# Patient Record
Sex: FEMALE | Race: WHITE | NOT HISPANIC OR LATINO | Employment: OTHER | URBAN - METROPOLITAN AREA
[De-identification: names, ages, dates, MRNs, and addresses within clinical notes are randomized per-mention and may not be internally consistent; named-entity substitution may affect disease eponyms.]

---

## 2016-07-23 LAB
LEFT EYE DM RETINOPATHY: POSITIVE
RIGHT EYE DM RETINOPATHY: POSITIVE

## 2019-09-27 ENCOUNTER — TELEPHONE (OUTPATIENT)
Dept: ENDOCRINOLOGY | Facility: CLINIC | Age: 69
End: 2019-09-27

## 2019-09-27 NOTE — TELEPHONE ENCOUNTER
Left vm regarding no insurance information completed in chart to make NP with Endocrine. Advised pt to call [facility] to have completed .

## 2020-05-29 ENCOUNTER — OFFICE VISIT (OUTPATIENT)
Dept: FAMILY MEDICINE | Facility: CLINIC | Age: 70
End: 2020-05-29
Payer: MEDICARE

## 2020-05-29 VITALS
HEART RATE: 82 BPM | WEIGHT: 153.38 LBS | TEMPERATURE: 100 F | BODY MASS INDEX: 27.18 KG/M2 | HEIGHT: 63 IN | DIASTOLIC BLOOD PRESSURE: 86 MMHG | SYSTOLIC BLOOD PRESSURE: 136 MMHG | OXYGEN SATURATION: 96 %

## 2020-05-29 DIAGNOSIS — Z13.820 ENCOUNTER FOR SCREENING FOR OSTEOPOROSIS: ICD-10-CM

## 2020-05-29 DIAGNOSIS — Z13.6 SCREENING FOR ISCHEMIC HEART DISEASE (IHD): ICD-10-CM

## 2020-05-29 DIAGNOSIS — Z12.11 SCREENING FOR COLON CANCER: ICD-10-CM

## 2020-05-29 DIAGNOSIS — Z79.4 TYPE 2 DIABETES MELLITUS WITHOUT COMPLICATION, WITH LONG-TERM CURRENT USE OF INSULIN: Primary | ICD-10-CM

## 2020-05-29 DIAGNOSIS — F17.200 SMOKER: ICD-10-CM

## 2020-05-29 DIAGNOSIS — Z01.00 ENCOUNTER FOR ROUTINE EYE AND VISION EXAMINATION: ICD-10-CM

## 2020-05-29 DIAGNOSIS — Z13.29 ENCOUNTER FOR SCREENING FOR ENDOCRINE DISORDER: ICD-10-CM

## 2020-05-29 DIAGNOSIS — Z01.419 WOMEN'S ANNUAL ROUTINE GYNECOLOGICAL EXAMINATION: ICD-10-CM

## 2020-05-29 DIAGNOSIS — Z12.31 ENCOUNTER FOR SCREENING MAMMOGRAM FOR BREAST CANCER: ICD-10-CM

## 2020-05-29 DIAGNOSIS — E11.9 TYPE 2 DIABETES MELLITUS WITHOUT COMPLICATION, WITH LONG-TERM CURRENT USE OF INSULIN: Primary | ICD-10-CM

## 2020-05-29 DIAGNOSIS — Z13.89 SCREENING FOR BLOOD OR PROTEIN IN URINE: ICD-10-CM

## 2020-05-29 DIAGNOSIS — Z78.0 POST-MENOPAUSE: ICD-10-CM

## 2020-05-29 PROCEDURE — 1126F AMNT PAIN NOTED NONE PRSNT: CPT | Mod: S$GLB,,, | Performed by: FAMILY MEDICINE

## 2020-05-29 PROCEDURE — 99203 OFFICE O/P NEW LOW 30 MIN: CPT | Mod: S$GLB,,, | Performed by: FAMILY MEDICINE

## 2020-05-29 PROCEDURE — 1159F PR MEDICATION LIST DOCUMENTED IN MEDICAL RECORD: ICD-10-PCS | Mod: S$GLB,,, | Performed by: FAMILY MEDICINE

## 2020-05-29 PROCEDURE — 1159F MED LIST DOCD IN RCRD: CPT | Mod: S$GLB,,, | Performed by: FAMILY MEDICINE

## 2020-05-29 PROCEDURE — 1101F PT FALLS ASSESS-DOCD LE1/YR: CPT | Mod: S$GLB,,, | Performed by: FAMILY MEDICINE

## 2020-05-29 PROCEDURE — 99203 PR OFFICE/OUTPT VISIT, NEW, LEVL III, 30-44 MIN: ICD-10-PCS | Mod: S$GLB,,, | Performed by: FAMILY MEDICINE

## 2020-05-29 PROCEDURE — 1101F PR PT FALLS ASSESS DOC 0-1 FALLS W/OUT INJ PAST YR: ICD-10-PCS | Mod: S$GLB,,, | Performed by: FAMILY MEDICINE

## 2020-05-29 PROCEDURE — 1126F PR PAIN SEVERITY QUANTIFIED, NO PAIN PRESENT: ICD-10-PCS | Mod: S$GLB,,, | Performed by: FAMILY MEDICINE

## 2020-05-29 RX ORDER — LOPERAMIDE HYDROCHLORIDE 2 MG/1
2 CAPSULE ORAL 4 TIMES DAILY PRN
Status: ON HOLD | COMMUNITY
End: 2023-04-24

## 2020-05-29 RX ORDER — MELATONIN 10 MG
1 CAPSULE ORAL NIGHTLY
Status: ON HOLD | COMMUNITY
End: 2023-06-11 | Stop reason: HOSPADM

## 2020-05-29 RX ORDER — INSULIN DETEMIR 100 [IU]/ML
10-20 INJECTION, SOLUTION SUBCUTANEOUS DAILY
COMMUNITY
Start: 2020-03-02 | End: 2020-05-29 | Stop reason: SDUPTHER

## 2020-05-29 RX ORDER — ASCORBIC ACID 500 MG
500 TABLET ORAL DAILY
Status: ON HOLD | COMMUNITY
End: 2023-04-24

## 2020-05-29 RX ORDER — PHENYLEPHRINE HCL 10 MG
500 TABLET ORAL DAILY
Status: ON HOLD | COMMUNITY
End: 2023-04-24

## 2020-05-29 RX ORDER — INSULIN DETEMIR 100 [IU]/ML
20 INJECTION, SOLUTION SUBCUTANEOUS NIGHTLY
Qty: 6 ML | Refills: 2 | Status: SHIPPED | OUTPATIENT
Start: 2020-05-29 | End: 2020-11-09 | Stop reason: SDUPTHER

## 2020-05-29 NOTE — PROGRESS NOTES
SUBJECTIVE:    Patient ID: Anastasiia Badillo is a 70 y.o. female.    Chief Complaint: Establish Care and Bottles brought    Pt here to get established.     Pt with PMHx of DM.   Has had for 10 years.     Pt doesn't check her blood sugar anymore because it is a pain. Has a machine in a bag somewhere.   Eats twice a day and drinks twice a day. Also has been snacking since the COVID pandemic. Has gained 13 pounds over the last few months.   Takes 15 units with meals, 20 units if she drinks. Has only had 2 spells this year, and will go drink something sweet at this time.      Has to take immodium every now and then, is she eats too many vegetables.  Unusual for her, as she usually has constipation.     Has a hx of smoking since in her 20s, about 1/4 ppd.     Pt sleeps with tea next to her bed, getting up 3-4 times a night.       -----------------------------------------------  Last mammogram last year. Last pap smear was about 5 years ago. Could not find anyone who would take her insurance.       No visits with results within 6 Month(s) from this visit.   Latest known visit with results is:   No results found for any previous visit.       Past Medical History:   Diagnosis Date    Diabetes mellitus, type 2      History reviewed. No pertinent surgical history.  History reviewed. No pertinent family history.    Marital Status:   Alcohol History:  reports that she drinks alcohol.  Tobacco History:  reports that she has been smoking cigarettes. She has a 11.25 pack-year smoking history. She has never used smokeless tobacco.  Drug History:  reports that she does not use drugs.    Review of patient's allergies indicates:  No Known Allergies    Current Outpatient Medications:     ascorbic acid, vitamin C, (VITAMIN C) 500 MG tablet, Take 500 mg by mouth once daily., Disp: , Rfl:     cinnamon bark (CINNAMON) 500 mg capsule, Take 500 mg by mouth once daily., Disp: , Rfl:     LEVEMIR FLEXTOUCH U-100 INSULN 100 unit/mL  "(3 mL) InPn pen, Inject 20 Units into the skin every evening., Disp: 6 mL, Rfl: 2    loperamide (IMODIUM) 2 mg capsule, Take 2 mg by mouth 4 (four) times daily as needed for Diarrhea., Disp: , Rfl:     melatonin 10 mg Cap, Take 1 capsule by mouth every evening., Disp: , Rfl:     multivit-min-FA-lycopen-lutein (CENTRUM SILVER) 0.4-300-250 mg-mcg-mcg Tab, Take 1 tablet by mouth once daily., Disp: , Rfl:     UNABLE TO FIND, Take 1 capsule by mouth daily as needed. Beet Root, Disp: , Rfl:     Review of Systems   Constitutional: Negative for appetite change, fatigue, fever and unexpected weight change.   Respiratory: Negative for cough, chest tightness, shortness of breath and wheezing.    Cardiovascular: Negative for chest pain and leg swelling.   Gastrointestinal: Positive for constipation. Negative for abdominal pain, nausea and vomiting.        -heartburn   Genitourinary: Positive for frequency. Negative for difficulty urinating, dysuria and urgency.   Musculoskeletal: Negative for arthralgias, back pain, myalgias and neck pain.   Skin: Negative for rash.   Neurological: Negative for dizziness, weakness, numbness and headaches.   Hematological: Does not bruise/bleed easily.   Psychiatric/Behavioral: Negative for dysphoric mood, sleep disturbance and suicidal ideas. The patient is not nervous/anxious.    All other systems reviewed and are negative.         Objective:      Vitals:    05/29/20 0952   BP: 136/86   Pulse: 82   Temp: 99.6 °F (37.6 °C)   SpO2: 96%   Weight: 69.6 kg (153 lb 6.4 oz)   Height: 5' 3.25" (1.607 m)     Body mass index is 26.96 kg/m².     Physical Exam   Constitutional: She is oriented to person, place, and time. She appears well-developed and well-nourished. No distress.   overweight   HENT:   Head: Normocephalic and atraumatic.   Neck: Neck supple. Carotid bruit is not present. No thyromegaly present.   Cardiovascular: Normal rate, regular rhythm and normal heart sounds. Exam reveals no " friction rub.   No murmur heard.  Pulmonary/Chest: Effort normal and breath sounds normal. She has no wheezes. She has no rales.   Abdominal: Soft. Bowel sounds are normal. She exhibits no distension. There is no tenderness.   Musculoskeletal: She exhibits no edema.   Lymphadenopathy:     She has no cervical adenopathy.   Neurological: She is alert and oriented to person, place, and time.   Skin: Skin is warm and dry. No rash noted.   Psychiatric: She has a normal mood and affect. Her speech is normal and behavior is normal. Judgment and thought content normal. She is attentive.   Vitals reviewed.        Assessment:       1. Type 2 diabetes mellitus without complication, with long-term current use of insulin    2. Post-menopause    3. Encounter for screening for osteoporosis    4. Smoker    5. Encounter for screening mammogram for breast cancer    6. Women's annual routine gynecological examination    7. Screening for colon cancer    8. Encounter for routine eye and vision examination    9. Screening for blood or protein in urine    10. Encounter for screening for endocrine disorder    11. Screening for ischemic heart disease (IHD)         Plan:       Type 2 diabetes mellitus without complication, with long-term current use of insulin  Comments:  Pt encouraged to check blood sugar, follow ADA diet, remain active. Will monitor A1c.  Orders:  -     Urinalysis Complete; Future; Expected date: 05/29/2020  -     CBC auto differential; Future; Expected date: 05/29/2020  -     TSH w/reflex to FT4; Future; Expected date: 05/29/2020  -     Microalbumin/creatinine urine ratio; Future; Expected date: 05/29/2020  -     Comprehensive metabolic panel; Future; Expected date: 05/29/2020  -     Lipid Panel; Future; Expected date: 05/29/2020  -     Mammo Digital Screening Bilat w/ Florentino; Future; Expected date: 05/29/2020  -     DXA Bone Density Spine And Hip; Future; Expected date: 05/29/2020  -     Ambulatory referral/consult to  Ophthalmology; Future; Expected date: 06/05/2020  -     LEVEMIR FLEXTOUCH U-100 INSULN 100 unit/mL (3 mL) InPn pen; Inject 20 Units into the skin every evening.  Dispense: 6 mL; Refill: 2    Post-menopause  Comments:  DEXA order sent to [facility]  Orders:  -     DXA Bone Density Spine And Hip; Future; Expected date: 05/29/2020    Encounter for screening for osteoporosis  -     DXA Bone Density Spine And Hip; Future; Expected date: 05/29/2020    Smoker  Comments:  Pt encouraged to stop smoking    Encounter for screening mammogram for breast cancer  Comments:  Mammogram order sent to [facility]  Orders:  -     Mammo Digital Screening Bilat w/ Florentino; Future; Expected date: 05/29/2020    Women's annual routine gynecological examination  Comments:  Pt referred to GYN for exam  Orders:  -     Ambulatory referral/consult to Obstetrics / Gynecology; Future; Expected date: 06/05/2020    Screening for colon cancer  Comments:  Pt has elected to do cologuard which is appropriate for this pt that is low risk. Discussed risks and benefits, including costs.    Encounter for routine eye and vision examination  Comments:  Pt referred for eye exam for screening DM retinopathy and glaucoma    Screening for blood or protein in urine  -     Urinalysis Complete; Future; Expected date: 05/29/2020    Encounter for screening for endocrine disorder  -     TSH w/reflex to FT4; Future; Expected date: 05/29/2020    Screening for ischemic heart disease (IHD)  -     Lipid Panel; Future; Expected date: 05/29/2020    Labs have been ordered for monitoring of chronic conditions, just before next visit.    Follow up in about 3 months (around 8/29/2020) for DM.

## 2020-05-30 LAB
ALBUMIN SERPL-MCNC: 4.4 G/DL (ref 3.6–5.1)
ALBUMIN/GLOB SERPL: 1.9 (CALC) (ref 1–2.5)
ALP SERPL-CCNC: 89 U/L (ref 37–153)
ALT SERPL-CCNC: 15 U/L (ref 6–29)
AST SERPL-CCNC: 15 U/L (ref 10–35)
BASOPHILS # BLD AUTO: 39 CELLS/UL (ref 0–200)
BASOPHILS NFR BLD AUTO: 0.7 %
BILIRUB SERPL-MCNC: 0.7 MG/DL (ref 0.2–1.2)
BUN SERPL-MCNC: 16 MG/DL (ref 7–25)
BUN/CREAT SERPL: ABNORMAL (CALC) (ref 6–22)
CALCIUM SERPL-MCNC: 9.5 MG/DL (ref 8.6–10.4)
CHLORIDE SERPL-SCNC: 100 MMOL/L (ref 98–110)
CHOLEST SERPL-MCNC: 190 MG/DL
CHOLEST/HDLC SERPL: 2.1 (CALC)
CO2 SERPL-SCNC: 30 MMOL/L (ref 20–32)
CREAT SERPL-MCNC: 0.89 MG/DL (ref 0.5–0.99)
EOSINOPHIL # BLD AUTO: 39 CELLS/UL (ref 15–500)
EOSINOPHIL NFR BLD AUTO: 0.7 %
ERYTHROCYTE [DISTWIDTH] IN BLOOD BY AUTOMATED COUNT: 13.5 % (ref 11–15)
GFRSERPLBLD MDRD-ARVRAT: 66 ML/MIN/1.73M2
GLOBULIN SER CALC-MCNC: 2.3 G/DL (CALC) (ref 1.9–3.7)
GLUCOSE SERPL-MCNC: 199 MG/DL (ref 65–99)
HCT VFR BLD AUTO: 42.2 % (ref 35–45)
HDLC SERPL-MCNC: 89 MG/DL
HGB BLD-MCNC: 13.6 G/DL (ref 11.7–15.5)
LDLC SERPL CALC-MCNC: 85 MG/DL (CALC)
LYMPHOCYTES # BLD AUTO: 1348 CELLS/UL (ref 850–3900)
LYMPHOCYTES NFR BLD AUTO: 24.5 %
MCH RBC QN AUTO: 30.4 PG (ref 27–33)
MCHC RBC AUTO-ENTMCNC: 32.2 G/DL (ref 32–36)
MCV RBC AUTO: 94.4 FL (ref 80–100)
MONOCYTES # BLD AUTO: 402 CELLS/UL (ref 200–950)
MONOCYTES NFR BLD AUTO: 7.3 %
NEUTROPHILS # BLD AUTO: 3674 CELLS/UL (ref 1500–7800)
NEUTROPHILS NFR BLD AUTO: 66.8 %
NONHDLC SERPL-MCNC: 101 MG/DL (CALC)
PLATELET # BLD AUTO: 235 THOUSAND/UL (ref 140–400)
PMV BLD REES-ECKER: 10.8 FL (ref 7.5–12.5)
POTASSIUM SERPL-SCNC: 4.7 MMOL/L (ref 3.5–5.3)
PROT SERPL-MCNC: 6.7 G/DL (ref 6.1–8.1)
RBC # BLD AUTO: 4.47 MILLION/UL (ref 3.8–5.1)
SODIUM SERPL-SCNC: 138 MMOL/L (ref 135–146)
TRIGL SERPL-MCNC: 74 MG/DL
TSH SERPL-ACNC: 0.87 MIU/L (ref 0.4–4.5)
WBC # BLD AUTO: 5.5 THOUSAND/UL (ref 3.8–10.8)

## 2020-06-11 ENCOUNTER — HOSPITAL ENCOUNTER (OUTPATIENT)
Dept: RADIOLOGY | Facility: HOSPITAL | Age: 70
Discharge: HOME | End: 2020-06-11
Attending: FAMILY MEDICINE
Payer: MEDICARE

## 2020-06-11 DIAGNOSIS — Z13.820 ENCOUNTER FOR SCREENING FOR OSTEOPOROSIS: ICD-10-CM

## 2020-06-11 DIAGNOSIS — E11.9 TYPE 2 DIABETES MELLITUS WITHOUT COMPLICATION, WITH LONG-TERM CURRENT USE OF INSULIN: ICD-10-CM

## 2020-06-11 DIAGNOSIS — Z12.31 ENCOUNTER FOR SCREENING MAMMOGRAM FOR BREAST CANCER: ICD-10-CM

## 2020-06-11 DIAGNOSIS — Z78.0 POST-MENOPAUSE: ICD-10-CM

## 2020-06-11 DIAGNOSIS — Z79.4 TYPE 2 DIABETES MELLITUS WITHOUT COMPLICATION, WITH LONG-TERM CURRENT USE OF INSULIN: ICD-10-CM

## 2020-06-11 PROCEDURE — 77067 SCR MAMMO BI INCL CAD: CPT | Mod: TC,PO

## 2020-06-11 PROCEDURE — 77080 DXA BONE DENSITY AXIAL: CPT | Mod: TC,PO

## 2020-06-19 ENCOUNTER — TELEPHONE (OUTPATIENT)
Dept: FAMILY MEDICINE | Facility: CLINIC | Age: 70
End: 2020-06-19

## 2020-06-19 NOTE — TELEPHONE ENCOUNTER
----- Message from Raji Parkinson MD sent at 6/19/2020  7:10 AM CDT -----  Please let the patient know that her mammogram is shows no evidence of malignancy. To repeat in 1-2 years.

## 2020-07-14 ENCOUNTER — LAB VISIT (OUTPATIENT)
Dept: PRIMARY CARE CLINIC | Facility: OTHER | Age: 70
End: 2020-07-14
Attending: INTERNAL MEDICINE
Payer: MEDICARE

## 2020-07-14 DIAGNOSIS — Z11.59 SPECIAL SCREENING EXAMINATION FOR UNSPECIFIED VIRAL DISEASE: ICD-10-CM

## 2020-07-14 PROCEDURE — U0003 INFECTIOUS AGENT DETECTION BY NUCLEIC ACID (DNA OR RNA); SEVERE ACUTE RESPIRATORY SYNDROME CORONAVIRUS 2 (SARS-COV-2) (CORONAVIRUS DISEASE [COVID-19]), AMPLIFIED PROBE TECHNIQUE, MAKING USE OF HIGH THROUGHPUT TECHNOLOGIES AS DESCRIBED BY CMS-2020-01-R: HCPCS

## 2020-07-17 LAB — SARS-COV-2 RNA RESP QL NAA+PROBE: NOT DETECTED

## 2020-11-02 ENCOUNTER — TELEPHONE (OUTPATIENT)
Dept: FAMILY MEDICINE | Facility: CLINIC | Age: 70
End: 2020-11-02

## 2020-11-02 NOTE — TELEPHONE ENCOUNTER
----- Message from Panda Olson sent at 11/2/2020  8:42 AM CST -----  Regarding: needing call back  Pt legs gave out and pt sat down and then she couldn't get back up. When she goes to the rest room she just has to sit there it take a while for her to get up. Pt is saying she is needing a Rollator walker due to her legs   Pt [phone]

## 2020-11-02 NOTE — TELEPHONE ENCOUNTER
Pt states that her legs are numb and and she has been using a friends walker and it works well for her but she just has trouble getting up (no strength in her legs) pt says she doesn't want to come in or feel she needs to be seen- I advised she probably will need to be evaluated but will call her back

## 2020-11-03 ENCOUNTER — TELEPHONE (OUTPATIENT)
Dept: FAMILY MEDICINE | Facility: CLINIC | Age: 70
End: 2020-11-03

## 2020-11-03 NOTE — TELEPHONE ENCOUNTER
----- Message from Alaina Lozoya sent at 11/3/2020  8:41 AM CST -----  Pt calling she said was suppose to have an appt alfonso and she said she spoke to someone yesterday.   # [phone]

## 2020-11-04 ENCOUNTER — OFFICE VISIT (OUTPATIENT)
Dept: FAMILY MEDICINE | Facility: CLINIC | Age: 70
End: 2020-11-04
Payer: MEDICARE

## 2020-11-04 VITALS
DIASTOLIC BLOOD PRESSURE: 78 MMHG | WEIGHT: 152 LBS | SYSTOLIC BLOOD PRESSURE: 122 MMHG | TEMPERATURE: 98 F | HEIGHT: 63 IN | HEART RATE: 72 BPM | BODY MASS INDEX: 26.93 KG/M2

## 2020-11-04 DIAGNOSIS — G62.9 NEUROPATHY: Primary | ICD-10-CM

## 2020-11-04 DIAGNOSIS — E11.42 DIABETIC POLYNEUROPATHY ASSOCIATED WITH TYPE 2 DIABETES MELLITUS: ICD-10-CM

## 2020-11-04 DIAGNOSIS — R29.898 WEAKNESS OF BOTH LOWER EXTREMITIES: ICD-10-CM

## 2020-11-04 PROCEDURE — 99214 PR OFFICE/OUTPT VISIT, EST, LEVL IV, 30-39 MIN: ICD-10-PCS | Mod: S$GLB,,, | Performed by: NURSE PRACTITIONER

## 2020-11-04 PROCEDURE — 3008F BODY MASS INDEX DOCD: CPT | Mod: S$GLB,,, | Performed by: NURSE PRACTITIONER

## 2020-11-04 PROCEDURE — 1159F MED LIST DOCD IN RCRD: CPT | Mod: S$GLB,,, | Performed by: NURSE PRACTITIONER

## 2020-11-04 PROCEDURE — 99214 OFFICE O/P EST MOD 30 MIN: CPT | Mod: S$GLB,,, | Performed by: NURSE PRACTITIONER

## 2020-11-04 PROCEDURE — 3288F FALL RISK ASSESSMENT DOCD: CPT | Mod: S$GLB,,, | Performed by: NURSE PRACTITIONER

## 2020-11-04 PROCEDURE — 1100F PTFALLS ASSESS-DOCD GE2>/YR: CPT | Mod: S$GLB,,, | Performed by: NURSE PRACTITIONER

## 2020-11-04 PROCEDURE — 3008F PR BODY MASS INDEX (BMI) DOCUMENTED: ICD-10-PCS | Mod: S$GLB,,, | Performed by: NURSE PRACTITIONER

## 2020-11-04 PROCEDURE — 1159F PR MEDICATION LIST DOCUMENTED IN MEDICAL RECORD: ICD-10-PCS | Mod: S$GLB,,, | Performed by: NURSE PRACTITIONER

## 2020-11-04 PROCEDURE — 3288F PR FALLS RISK ASSESSMENT DOCUMENTED: ICD-10-PCS | Mod: S$GLB,,, | Performed by: NURSE PRACTITIONER

## 2020-11-04 PROCEDURE — 1100F PR PT FALLS ASSESS DOC 2+ FALLS/FALL W/INJURY/YR: ICD-10-PCS | Mod: S$GLB,,, | Performed by: NURSE PRACTITIONER

## 2020-11-04 RX ORDER — LISINOPRIL 5 MG/1
TABLET ORAL
COMMUNITY
End: 2023-03-28
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
## 2020-11-05 ENCOUNTER — TELEPHONE (OUTPATIENT)
Dept: FAMILY MEDICINE | Facility: CLINIC | Age: 70
End: 2020-11-05

## 2020-11-05 DIAGNOSIS — Z79.4 TYPE 2 DIABETES MELLITUS WITHOUT COMPLICATION, WITH LONG-TERM CURRENT USE OF INSULIN: ICD-10-CM

## 2020-11-05 DIAGNOSIS — E11.42 DIABETIC POLYNEUROPATHY ASSOCIATED WITH TYPE 2 DIABETES MELLITUS: Primary | ICD-10-CM

## 2020-11-05 DIAGNOSIS — E11.9 TYPE 2 DIABETES MELLITUS WITHOUT COMPLICATION, WITH LONG-TERM CURRENT USE OF INSULIN: ICD-10-CM

## 2020-11-05 LAB
ALBUMIN SERPL-MCNC: 4.4 G/DL (ref 3.6–5.1)
ALBUMIN/CREAT UR: 345 MCG/MG CREAT
ALBUMIN/GLOB SERPL: 1.6 (CALC) (ref 1–2.5)
ALP SERPL-CCNC: 85 U/L (ref 37–153)
ALT SERPL-CCNC: 12 U/L (ref 6–29)
APPEARANCE UR: ABNORMAL
AST SERPL-CCNC: 18 U/L (ref 10–35)
BACTERIA #/AREA URNS HPF: ABNORMAL /HPF
BILIRUB SERPL-MCNC: 0.7 MG/DL (ref 0.2–1.2)
BILIRUB UR QL STRIP: NEGATIVE
BUN SERPL-MCNC: 12 MG/DL (ref 7–25)
BUN/CREAT SERPL: ABNORMAL (CALC) (ref 6–22)
CALCIUM SERPL-MCNC: 9.7 MG/DL (ref 8.6–10.4)
CHLORIDE SERPL-SCNC: 93 MMOL/L (ref 98–110)
CO2 SERPL-SCNC: 34 MMOL/L (ref 20–32)
COLOR UR: YELLOW
CREAT SERPL-MCNC: 0.79 MG/DL (ref 0.5–0.99)
CREAT UR-MCNC: 44 MG/DL (ref 20–275)
GFRSERPLBLD MDRD-ARVRAT: 76 ML/MIN/1.73M2
GLOBULIN SER CALC-MCNC: 2.7 G/DL (CALC) (ref 1.9–3.7)
GLUCOSE SERPL-MCNC: 356 MG/DL (ref 65–99)
GLUCOSE UR QL STRIP: ABNORMAL
HBA1C MFR BLD: 11.7 % OF TOTAL HGB
HGB UR QL STRIP: ABNORMAL
HYALINE CASTS #/AREA URNS LPF: ABNORMAL /LPF
KETONES UR QL STRIP: ABNORMAL
LEUKOCYTE ESTERASE UR QL STRIP: ABNORMAL
MICROALBUMIN UR-MCNC: 15.2 MG/DL
NITRITE UR QL STRIP: NEGATIVE
PH UR STRIP: ABNORMAL [PH] (ref 5–8)
POTASSIUM SERPL-SCNC: 4.8 MMOL/L (ref 3.5–5.3)
PROT SERPL-MCNC: 7.1 G/DL (ref 6.1–8.1)
PROT UR QL STRIP: ABNORMAL
RBC #/AREA URNS HPF: ABNORMAL /HPF
SODIUM SERPL-SCNC: 133 MMOL/L (ref 135–146)
SP GR UR STRIP: 1.03 (ref 1–1.03)
SQUAMOUS #/AREA URNS HPF: ABNORMAL /HPF
WBC #/AREA URNS HPF: ABNORMAL /HPF

## 2020-11-05 RX ORDER — METFORMIN HYDROCHLORIDE 500 MG/1
500 TABLET, EXTENDED RELEASE ORAL 2 TIMES DAILY WITH MEALS
Qty: 180 TABLET | Refills: 1 | Status: SHIPPED | OUTPATIENT
Start: 2020-11-05 | End: 2021-04-15 | Stop reason: SDUPTHER

## 2020-11-05 RX ORDER — METFORMIN HYDROCHLORIDE 500 MG/1
500 TABLET, FILM COATED, EXTENDED RELEASE ORAL 2 TIMES DAILY WITH MEALS
Qty: 60 TABLET | Refills: 2 | Status: CANCELLED | OUTPATIENT
Start: 2020-11-05 | End: 2021-02-03

## 2020-11-05 NOTE — TELEPHONE ENCOUNTER
----- Message from Nehal Smart NP sent at 11/5/2020  8:20 AM CST -----  Please call pt and let her know her DM is extremely poorly controlled with A1C up to 11.7%. I would recommend she increase levemir to 30units daily and also add metformin ER 500mg twice a day. She needs to be monitoring her blood sugar BID and slowly increase levemir dose by 2-3 units every 3 days if FBS>140. Her uncontrolled DM is likely what is causing worsening leg symptoms so she needs to get DM under control.

## 2020-11-05 NOTE — TELEPHONE ENCOUNTER
----- Message from [facility] RT sent at 11/5/2020  9:36 AM CST -----  Patient said she needs a handicapped sticker. Wants to know how she gets one? [phone]

## 2020-11-05 NOTE — PROGRESS NOTES
Spoke to patient with results verbatim per Nehal. Verbalized understanding on all, including to increase Levemir by 30 units daily and to slowly increase by 2-3 units every days if FBS>140. She also understandings we are calling in rx to take twice a day and the importance of getting DM under control Allergies and pharmacy verified.

## 2020-11-05 NOTE — PROGRESS NOTES
Please call pt and let her know her DM is extremely poorly controlled with A1C up to 11.7%. I would recommend she increase levemir to 30units daily and also add metformin ER 500mg twice a day. She needs to be monitoring her blood sugar BID and slowly increase levemir dose by 2-3 units every 3 days if FBS>140. Her uncontrolled DM is likely what is causing worsening leg symptoms so she needs to get DM under control.

## 2020-11-06 ENCOUNTER — TELEPHONE (OUTPATIENT)
Dept: FAMILY MEDICINE | Facility: CLINIC | Age: 70
End: 2020-11-06

## 2020-11-06 NOTE — TELEPHONE ENCOUNTER
Call pt and find out if she is having UTI symptoms. If so, can have ceftin 500mg po bid x 5 days. Let pt know her A1c is 11.7, which is too high and may be increasing her propensity for UTI or infection in general. Is she taking her blood sugar. If she taking her metformin or levemir, if not she may need to resume.     If possible, move her appt sooner than Dec 22

## 2020-11-09 ENCOUNTER — TELEPHONE (OUTPATIENT)
Dept: FAMILY MEDICINE | Facility: CLINIC | Age: 70
End: 2020-11-09

## 2020-11-09 DIAGNOSIS — Z79.4 TYPE 2 DIABETES MELLITUS WITHOUT COMPLICATION, WITH LONG-TERM CURRENT USE OF INSULIN: ICD-10-CM

## 2020-11-09 DIAGNOSIS — E11.9 TYPE 2 DIABETES MELLITUS WITHOUT COMPLICATION, WITH LONG-TERM CURRENT USE OF INSULIN: ICD-10-CM

## 2020-11-09 RX ORDER — INSULIN DETEMIR 100 [IU]/ML
20 INJECTION, SOLUTION SUBCUTANEOUS NIGHTLY
Qty: 6 ML | Refills: 2 | Status: SHIPPED | OUTPATIENT
Start: 2020-11-09 | End: 2021-03-08 | Stop reason: SDUPTHER

## 2020-11-09 RX ORDER — CALCIUM CARBONATE 600 MG
600 TABLET ORAL 2 TIMES DAILY WITH MEALS
Status: ON HOLD | COMMUNITY
End: 2023-04-24

## 2020-11-09 RX ORDER — FOLIC ACID/MULTIVIT,IRON,MINER 0.4MG-18MG
TABLET ORAL 2 TIMES DAILY
Status: ON HOLD | COMMUNITY
End: 2023-04-24

## 2020-11-09 NOTE — TELEPHONE ENCOUNTER
----- Message from Dickson Miner sent at 11/9/2020  9:27 AM CST -----  Regarding: Refill  Contact: Anastasiia Badillo  Pt needs a refill on her Levemir Pens   [facility]  Pt# [phone]

## 2020-11-09 NOTE — TELEPHONE ENCOUNTER
Spoke to patient with results verbatim per Dr Parkinson. Verbalized understanding on all instructions, including instructions on Calcium and Vitamin D

## 2020-11-09 NOTE — TELEPHONE ENCOUNTER
The patient's prescription has been approved and sent to   [id number] - [place], [place] - [address] AT [facility] [address] [facility] [address]  [address] [address]  Phone: [phone] Fax: [phone]

## 2020-11-09 NOTE — TELEPHONE ENCOUNTER
----- Message from Raji Parkinson MD sent at 11/6/2020 12:19 PM CST -----  Let pt know that her DEXA bone scan is     1. Osteopenia in the left hip and spine.  2. She should continue citrical 600mg plus 400 IU D bid with meals and Weight bearing exercises as tolerated to maintain her bone strength and to help prevent further decreases in bone density in the future.

## 2020-11-20 ENCOUNTER — LAB VISIT (OUTPATIENT)
Dept: LAB | Facility: OTHER | Age: 70
End: 2020-11-20
Payer: MEDICARE

## 2020-11-20 DIAGNOSIS — Z03.818 ENCOUNTER FOR OBSERVATION FOR SUSPECTED EXPOSURE TO OTHER BIOLOGICAL AGENTS RULED OUT: ICD-10-CM

## 2020-11-20 PROCEDURE — U0003 INFECTIOUS AGENT DETECTION BY NUCLEIC ACID (DNA OR RNA); SEVERE ACUTE RESPIRATORY SYNDROME CORONAVIRUS 2 (SARS-COV-2) (CORONAVIRUS DISEASE [COVID-19]), AMPLIFIED PROBE TECHNIQUE, MAKING USE OF HIGH THROUGHPUT TECHNOLOGIES AS DESCRIBED BY CMS-2020-01-R: HCPCS

## 2020-11-21 LAB — SARS-COV-2 RNA RESP QL NAA+PROBE: NOT DETECTED

## 2020-12-02 ENCOUNTER — TELEPHONE (OUTPATIENT)
Dept: FAMILY MEDICINE | Facility: CLINIC | Age: 70
End: 2020-12-02

## 2020-12-02 NOTE — TELEPHONE ENCOUNTER
Pt states she will not pay for a same day cancel we should have known better than to send her there --I advised pt to find a PT she would like to go to and we will be glad to fax the referral/ pt voiced ok

## 2020-12-02 NOTE — TELEPHONE ENCOUNTER
----- Message from Lissettelorie Wesley sent at 12/2/2020 10:01 AM CST -----  Regarding: needing call back  Pt is saying that she had to cancel her appt with the specialist in [place] because she was sick and they sent you a bill for cancellation for a$120 and pt says she cant pay for that being close to ac time. Pt is asking can be sent to another clinic   Pt [phone]

## 2021-02-10 ENCOUNTER — LAB VISIT (OUTPATIENT)
Dept: LAB | Facility: OTHER | Age: 71
End: 2021-02-10
Payer: MEDICARE

## 2021-02-10 DIAGNOSIS — Z20.822 ENCOUNTER FOR LABORATORY TESTING FOR COVID-19 VIRUS: ICD-10-CM

## 2021-02-10 PROCEDURE — U0003 INFECTIOUS AGENT DETECTION BY NUCLEIC ACID (DNA OR RNA); SEVERE ACUTE RESPIRATORY SYNDROME CORONAVIRUS 2 (SARS-COV-2) (CORONAVIRUS DISEASE [COVID-19]), AMPLIFIED PROBE TECHNIQUE, MAKING USE OF HIGH THROUGHPUT TECHNOLOGIES AS DESCRIBED BY CMS-2020-01-R: HCPCS

## 2021-02-12 LAB — SARS-COV-2 RNA RESP QL NAA+PROBE: NOT DETECTED

## 2021-03-08 DIAGNOSIS — E11.9 TYPE 2 DIABETES MELLITUS WITHOUT COMPLICATION, WITH LONG-TERM CURRENT USE OF INSULIN: ICD-10-CM

## 2021-03-08 DIAGNOSIS — Z79.4 TYPE 2 DIABETES MELLITUS WITHOUT COMPLICATION, WITH LONG-TERM CURRENT USE OF INSULIN: ICD-10-CM

## 2021-03-08 RX ORDER — INSULIN DETEMIR 100 [IU]/ML
20 INJECTION, SOLUTION SUBCUTANEOUS NIGHTLY
Qty: 6 ML | Refills: 2 | Status: SHIPPED | OUTPATIENT
Start: 2021-03-08 | End: 2021-05-18 | Stop reason: SDUPTHER

## 2021-04-15 ENCOUNTER — OFFICE VISIT (OUTPATIENT)
Dept: FAMILY MEDICINE | Facility: CLINIC | Age: 71
End: 2021-04-15
Payer: MEDICARE

## 2021-04-15 VITALS
SYSTOLIC BLOOD PRESSURE: 136 MMHG | DIASTOLIC BLOOD PRESSURE: 88 MMHG | HEART RATE: 70 BPM | HEIGHT: 63 IN | BODY MASS INDEX: 26.93 KG/M2 | WEIGHT: 152 LBS

## 2021-04-15 DIAGNOSIS — H93.19 TINNITUS, UNSPECIFIED LATERALITY: ICD-10-CM

## 2021-04-15 DIAGNOSIS — Z79.4 TYPE 2 DIABETES MELLITUS WITH DIABETIC POLYNEUROPATHY, WITH LONG-TERM CURRENT USE OF INSULIN: Primary | ICD-10-CM

## 2021-04-15 DIAGNOSIS — Z13.6 SCREENING FOR ISCHEMIC HEART DISEASE (IHD): ICD-10-CM

## 2021-04-15 DIAGNOSIS — Z79.899 LONG-TERM USE OF HIGH-RISK MEDICATION: ICD-10-CM

## 2021-04-15 DIAGNOSIS — E11.42 TYPE 2 DIABETES MELLITUS WITH DIABETIC POLYNEUROPATHY, WITH LONG-TERM CURRENT USE OF INSULIN: Primary | ICD-10-CM

## 2021-04-15 DIAGNOSIS — Z13.5 SCREENING FOR EYE CONDITION: ICD-10-CM

## 2021-04-15 DIAGNOSIS — R31.9 HEMATURIA, UNSPECIFIED TYPE: ICD-10-CM

## 2021-04-15 DIAGNOSIS — Z13.29 SCREENING FOR ENDOCRINE DISORDER: ICD-10-CM

## 2021-04-15 DIAGNOSIS — Z13.89 SCREENING FOR BLOOD OR PROTEIN IN URINE: ICD-10-CM

## 2021-04-15 DIAGNOSIS — F17.200 SMOKER: ICD-10-CM

## 2021-04-15 PROCEDURE — 3008F PR BODY MASS INDEX (BMI) DOCUMENTED: ICD-10-PCS | Mod: S$GLB,,, | Performed by: FAMILY MEDICINE

## 2021-04-15 PROCEDURE — 99214 PR OFFICE/OUTPT VISIT, EST, LEVL IV, 30-39 MIN: ICD-10-PCS | Mod: S$GLB,,, | Performed by: FAMILY MEDICINE

## 2021-04-15 PROCEDURE — 3008F BODY MASS INDEX DOCD: CPT | Mod: S$GLB,,, | Performed by: FAMILY MEDICINE

## 2021-04-15 PROCEDURE — 1159F MED LIST DOCD IN RCRD: CPT | Mod: S$GLB,,, | Performed by: FAMILY MEDICINE

## 2021-04-15 PROCEDURE — 1159F PR MEDICATION LIST DOCUMENTED IN MEDICAL RECORD: ICD-10-PCS | Mod: S$GLB,,, | Performed by: FAMILY MEDICINE

## 2021-04-15 PROCEDURE — 99214 OFFICE O/P EST MOD 30 MIN: CPT | Mod: S$GLB,,, | Performed by: FAMILY MEDICINE

## 2021-04-15 RX ORDER — METFORMIN HYDROCHLORIDE 500 MG/1
500 TABLET, EXTENDED RELEASE ORAL 2 TIMES DAILY WITH MEALS
Qty: 180 TABLET | Refills: 1 | Status: ON HOLD | OUTPATIENT
Start: 2021-04-15 | End: 2023-02-23 | Stop reason: HOSPADM

## 2021-04-16 LAB
ALBUMIN SERPL-MCNC: 4.4 G/DL (ref 3.6–5.1)
ALBUMIN/CREAT UR: 83 MCG/MG CREAT
ALBUMIN/GLOB SERPL: 1.7 (CALC) (ref 1–2.5)
ALP SERPL-CCNC: 78 U/L (ref 37–153)
ALT SERPL-CCNC: 26 U/L (ref 6–29)
AST SERPL-CCNC: 24 U/L (ref 10–35)
BASOPHILS # BLD AUTO: 40 CELLS/UL (ref 0–200)
BASOPHILS NFR BLD AUTO: 0.7 %
BILIRUB SERPL-MCNC: 0.8 MG/DL (ref 0.2–1.2)
BUN SERPL-MCNC: 20 MG/DL (ref 7–25)
BUN/CREAT SERPL: ABNORMAL (CALC) (ref 6–22)
CALCIUM SERPL-MCNC: 9.7 MG/DL (ref 8.6–10.4)
CHLORIDE SERPL-SCNC: 93 MMOL/L (ref 98–110)
CHOLEST SERPL-MCNC: 203 MG/DL
CHOLEST/HDLC SERPL: 1.9 (CALC)
CO2 SERPL-SCNC: 29 MMOL/L (ref 20–32)
CREAT SERPL-MCNC: 0.87 MG/DL (ref 0.6–0.93)
CREAT UR-MCNC: 125 MG/DL (ref 20–275)
EOSINOPHIL # BLD AUTO: 68 CELLS/UL (ref 15–500)
EOSINOPHIL NFR BLD AUTO: 1.2 %
ERYTHROCYTE [DISTWIDTH] IN BLOOD BY AUTOMATED COUNT: 12.5 % (ref 11–15)
GLOBULIN SER CALC-MCNC: 2.6 G/DL (CALC) (ref 1.9–3.7)
GLUCOSE SERPL-MCNC: 323 MG/DL (ref 65–99)
HBA1C MFR BLD: 10 % OF TOTAL HGB
HCT VFR BLD AUTO: 43.6 % (ref 35–45)
HDLC SERPL-MCNC: 107 MG/DL
HGB BLD-MCNC: 14.5 G/DL (ref 11.7–15.5)
LDLC SERPL CALC-MCNC: 82 MG/DL (CALC)
LYMPHOCYTES # BLD AUTO: 1408 CELLS/UL (ref 850–3900)
LYMPHOCYTES NFR BLD AUTO: 24.7 %
MCH RBC QN AUTO: 31.3 PG (ref 27–33)
MCHC RBC AUTO-ENTMCNC: 33.3 G/DL (ref 32–36)
MCV RBC AUTO: 94 FL (ref 80–100)
MICROALBUMIN UR-MCNC: 10.4 MG/DL
MONOCYTES # BLD AUTO: 479 CELLS/UL (ref 200–950)
MONOCYTES NFR BLD AUTO: 8.4 %
NEUTROPHILS # BLD AUTO: 3705 CELLS/UL (ref 1500–7800)
NEUTROPHILS NFR BLD AUTO: 65 %
NONHDLC SERPL-MCNC: 96 MG/DL (CALC)
PLATELET # BLD AUTO: 275 THOUSAND/UL (ref 140–400)
PMV BLD REES-ECKER: 11.4 FL (ref 7.5–12.5)
POTASSIUM SERPL-SCNC: 4.5 MMOL/L (ref 3.5–5.3)
PROT SERPL-MCNC: 7 G/DL (ref 6.1–8.1)
RBC # BLD AUTO: 4.64 MILLION/UL (ref 3.8–5.1)
SODIUM SERPL-SCNC: 133 MMOL/L (ref 135–146)
TRIGL SERPL-MCNC: 67 MG/DL
TSH SERPL-ACNC: 1.29 MIU/L (ref 0.4–4.5)
WBC # BLD AUTO: 5.7 THOUSAND/UL (ref 3.8–10.8)

## 2021-04-19 ENCOUNTER — TELEPHONE (OUTPATIENT)
Dept: FAMILY MEDICINE | Facility: CLINIC | Age: 71
End: 2021-04-19

## 2021-05-18 DIAGNOSIS — Z79.4 TYPE 2 DIABETES MELLITUS WITHOUT COMPLICATION, WITH LONG-TERM CURRENT USE OF INSULIN: ICD-10-CM

## 2021-05-18 DIAGNOSIS — E11.9 TYPE 2 DIABETES MELLITUS WITHOUT COMPLICATION, WITH LONG-TERM CURRENT USE OF INSULIN: ICD-10-CM

## 2021-05-18 RX ORDER — INSULIN DETEMIR 100 [IU]/ML
INJECTION, SOLUTION SUBCUTANEOUS
Qty: 27 ML | Refills: 2 | Status: SHIPPED | OUTPATIENT
Start: 2021-05-18 | End: 2022-05-11 | Stop reason: SDUPTHER

## 2021-07-06 ENCOUNTER — TELEPHONE (OUTPATIENT)
Dept: FAMILY MEDICINE | Facility: CLINIC | Age: 71
End: 2021-07-06

## 2021-07-06 DIAGNOSIS — E11.42 TYPE 2 DIABETES MELLITUS WITH DIABETIC POLYNEUROPATHY, WITH LONG-TERM CURRENT USE OF INSULIN: Primary | ICD-10-CM

## 2021-07-06 DIAGNOSIS — Z79.4 TYPE 2 DIABETES MELLITUS WITH DIABETIC POLYNEUROPATHY, WITH LONG-TERM CURRENT USE OF INSULIN: Primary | ICD-10-CM

## 2021-07-08 ENCOUNTER — OFFICE VISIT (OUTPATIENT)
Dept: FAMILY MEDICINE | Facility: CLINIC | Age: 71
End: 2021-07-08
Payer: MEDICARE

## 2021-07-08 VITALS
SYSTOLIC BLOOD PRESSURE: 132 MMHG | DIASTOLIC BLOOD PRESSURE: 82 MMHG | BODY MASS INDEX: 27.29 KG/M2 | OXYGEN SATURATION: 98 % | HEIGHT: 63 IN | HEART RATE: 85 BPM | WEIGHT: 154 LBS

## 2021-07-08 DIAGNOSIS — Z01.818 PREOPERATIVE CLEARANCE: Primary | ICD-10-CM

## 2021-07-08 DIAGNOSIS — Z91.199 NONCOMPLIANCE WITH SELF-MONITORING REGIMEN: ICD-10-CM

## 2021-07-08 DIAGNOSIS — Z12.31 SCREENING MAMMOGRAM, ENCOUNTER FOR: ICD-10-CM

## 2021-07-08 DIAGNOSIS — Z79.4 TYPE 2 DIABETES MELLITUS WITH DIABETIC POLYNEUROPATHY, WITH LONG-TERM CURRENT USE OF INSULIN: ICD-10-CM

## 2021-07-08 DIAGNOSIS — Z87.448 HX OF HEMATURIA: ICD-10-CM

## 2021-07-08 DIAGNOSIS — E11.42 TYPE 2 DIABETES MELLITUS WITH DIABETIC POLYNEUROPATHY, WITH LONG-TERM CURRENT USE OF INSULIN: ICD-10-CM

## 2021-07-08 DIAGNOSIS — F17.200 SMOKER: ICD-10-CM

## 2021-07-08 PROCEDURE — 3046F PR MOST RECENT HEMOGLOBIN A1C LEVEL > 9.0%: ICD-10-PCS | Mod: S$GLB,,, | Performed by: FAMILY MEDICINE

## 2021-07-08 PROCEDURE — 3046F HEMOGLOBIN A1C LEVEL >9.0%: CPT | Mod: S$GLB,,, | Performed by: FAMILY MEDICINE

## 2021-07-08 PROCEDURE — 99214 OFFICE O/P EST MOD 30 MIN: CPT | Mod: S$GLB,,, | Performed by: FAMILY MEDICINE

## 2021-07-08 PROCEDURE — 1101F PR PT FALLS ASSESS DOC 0-1 FALLS W/OUT INJ PAST YR: ICD-10-PCS | Mod: S$GLB,,, | Performed by: FAMILY MEDICINE

## 2021-07-08 PROCEDURE — 3288F FALL RISK ASSESSMENT DOCD: CPT | Mod: S$GLB,,, | Performed by: FAMILY MEDICINE

## 2021-07-08 PROCEDURE — 3008F BODY MASS INDEX DOCD: CPT | Mod: S$GLB,,, | Performed by: FAMILY MEDICINE

## 2021-07-08 PROCEDURE — 1159F MED LIST DOCD IN RCRD: CPT | Mod: S$GLB,,, | Performed by: FAMILY MEDICINE

## 2021-07-08 PROCEDURE — 1159F PR MEDICATION LIST DOCUMENTED IN MEDICAL RECORD: ICD-10-PCS | Mod: S$GLB,,, | Performed by: FAMILY MEDICINE

## 2021-07-08 PROCEDURE — 3008F PR BODY MASS INDEX (BMI) DOCUMENTED: ICD-10-PCS | Mod: S$GLB,,, | Performed by: FAMILY MEDICINE

## 2021-07-08 PROCEDURE — 1101F PT FALLS ASSESS-DOCD LE1/YR: CPT | Mod: S$GLB,,, | Performed by: FAMILY MEDICINE

## 2021-07-08 PROCEDURE — 3288F PR FALLS RISK ASSESSMENT DOCUMENTED: ICD-10-PCS | Mod: S$GLB,,, | Performed by: FAMILY MEDICINE

## 2021-07-08 PROCEDURE — 99214 PR OFFICE/OUTPT VISIT, EST, LEVL IV, 30-39 MIN: ICD-10-PCS | Mod: S$GLB,,, | Performed by: FAMILY MEDICINE

## 2021-07-11 LAB
APPEARANCE UR: ABNORMAL
BACTERIA #/AREA URNS HPF: ABNORMAL /HPF
BACTERIA UR CULT: ABNORMAL
BILIRUB UR QL STRIP: NEGATIVE
COLOR UR: ABNORMAL
GLUCOSE UR QL STRIP: ABNORMAL
HGB UR QL STRIP: ABNORMAL
HYALINE CASTS #/AREA URNS LPF: ABNORMAL /LPF
KETONES UR QL STRIP: ABNORMAL
LEUKOCYTE ESTERASE UR QL STRIP: ABNORMAL
NITRITE UR QL STRIP: NEGATIVE
PH UR STRIP: ABNORMAL [PH] (ref 5–8)
PROT UR QL STRIP: ABNORMAL
RBC #/AREA URNS HPF: ABNORMAL /HPF
SERVICE CMNT-IMP: ABNORMAL
SP GR UR STRIP: 1.02 (ref 1–1.03)
SQUAMOUS #/AREA URNS HPF: ABNORMAL /HPF
WBC #/AREA URNS HPF: ABNORMAL /HPF

## 2021-07-12 ENCOUNTER — TELEPHONE (OUTPATIENT)
Dept: FAMILY MEDICINE | Facility: CLINIC | Age: 71
End: 2021-07-12

## 2021-07-19 ENCOUNTER — TELEPHONE (OUTPATIENT)
Dept: FAMILY MEDICINE | Facility: CLINIC | Age: 71
End: 2021-07-19

## 2021-09-29 ENCOUNTER — TELEPHONE (OUTPATIENT)
Dept: FAMILY MEDICINE | Facility: CLINIC | Age: 71
End: 2021-09-29

## 2021-11-04 ENCOUNTER — TELEPHONE (OUTPATIENT)
Dept: FAMILY MEDICINE | Facility: CLINIC | Age: 71
End: 2021-11-04
Payer: MEDICARE

## 2022-02-14 RX ORDER — PEN NEEDLE, DIABETIC 30 GX3/16"
1 NEEDLE, DISPOSABLE MISCELLANEOUS 2 TIMES DAILY
Qty: 200 EACH | Refills: 0 | Status: SHIPPED | OUTPATIENT
Start: 2022-02-14

## 2022-02-14 NOTE — TELEPHONE ENCOUNTER
----- Message from Neahl Davis sent at 2/14/2022  9:04 AM CST -----  Pt needs a RX for pen needles. [facility] [address] Pt #[phone]

## 2022-02-14 NOTE — TELEPHONE ENCOUNTER
The patient's prescription has been approved and sent to   [id number] - [place], [place] - [address] AT [facility] [address]  [address] [address]  Phone: [phone] Fax: [phone]

## 2022-05-11 DIAGNOSIS — Z79.4 TYPE 2 DIABETES MELLITUS WITHOUT COMPLICATION, WITH LONG-TERM CURRENT USE OF INSULIN: ICD-10-CM

## 2022-05-11 DIAGNOSIS — E11.9 TYPE 2 DIABETES MELLITUS WITHOUT COMPLICATION, WITH LONG-TERM CURRENT USE OF INSULIN: ICD-10-CM

## 2022-05-11 NOTE — TELEPHONE ENCOUNTER
----- Message from Carri Lara sent at 5/11/2022 12:24 PM CDT -----  Patient called and stated that she need a refill of her insulin called into [facility] on [facility] if any questions please give her a call at [phone]

## 2022-05-12 RX ORDER — INSULIN DETEMIR 100 [IU]/ML
INJECTION, SOLUTION SUBCUTANEOUS
Qty: 27 ML | Refills: 1 | Status: ON HOLD | OUTPATIENT
Start: 2022-05-12 | End: 2023-02-23 | Stop reason: HOSPADM

## 2022-05-12 NOTE — TELEPHONE ENCOUNTER
The patient's prescription has been approved and sent to   [id number] - [place], [place] - [address] AT [facility] [address]  [address] [address]  Phone: [phone] Fax: [phone]

## 2023-02-17 ENCOUNTER — HOSPITAL ENCOUNTER (INPATIENT)
Facility: HOSPITAL | Age: 73
LOS: 6 days | Discharge: NURSING, REHAB OR LONG-TERM CARE | DRG: 481 | End: 2023-02-23
Attending: EMERGENCY MEDICINE | Admitting: INTERNAL MEDICINE
Payer: MEDICARE

## 2023-02-17 DIAGNOSIS — E11.65 TYPE 2 DIABETES MELLITUS WITH HYPERGLYCEMIA, WITH LONG-TERM CURRENT USE OF INSULIN: ICD-10-CM

## 2023-02-17 DIAGNOSIS — S72.002A CLOSED FRACTURE OF LEFT HIP, INITIAL ENCOUNTER: Primary | ICD-10-CM

## 2023-02-17 DIAGNOSIS — M25.552 LEFT HIP PAIN: ICD-10-CM

## 2023-02-17 DIAGNOSIS — Z79.4 TYPE 2 DIABETES MELLITUS WITH HYPERGLYCEMIA, WITH LONG-TERM CURRENT USE OF INSULIN: ICD-10-CM

## 2023-02-17 DIAGNOSIS — S72.22XA CLOSED LEFT SUBTROCHANTERIC FEMUR FRACTURE, INITIAL ENCOUNTER: ICD-10-CM

## 2023-02-17 DIAGNOSIS — R07.9 CHEST PAIN: ICD-10-CM

## 2023-02-17 DIAGNOSIS — Z01.810 PREOP CARDIOVASCULAR EXAM: ICD-10-CM

## 2023-02-17 DIAGNOSIS — N30.00 ACUTE CYSTITIS WITHOUT HEMATURIA: ICD-10-CM

## 2023-02-17 PROBLEM — N39.0 URINARY TRACT INFECTION WITHOUT HEMATURIA: Status: ACTIVE | Noted: 2023-02-17

## 2023-02-17 PROBLEM — I10 ESSENTIAL HYPERTENSION: Status: ACTIVE | Noted: 2023-02-17

## 2023-02-17 PROBLEM — E86.0 DEHYDRATION: Status: ACTIVE | Noted: 2023-02-17

## 2023-02-17 PROBLEM — E87.20 METABOLIC ACIDOSIS: Status: ACTIVE | Noted: 2023-02-17

## 2023-02-17 PROBLEM — E87.5 HYPERKALEMIA: Status: ACTIVE | Noted: 2023-02-17

## 2023-02-17 PROBLEM — Z71.89 ACP (ADVANCE CARE PLANNING): Status: ACTIVE | Noted: 2023-02-17

## 2023-02-17 LAB
ALBUMIN SERPL BCP-MCNC: 4 G/DL (ref 3.5–5.2)
ALP SERPL-CCNC: 71 U/L (ref 55–135)
ALT SERPL W/O P-5'-P-CCNC: 25 U/L (ref 10–44)
ANION GAP SERPL CALC-SCNC: 18 MMOL/L (ref 8–16)
ANION GAP SERPL CALC-SCNC: 8 MMOL/L (ref 8–16)
AST SERPL-CCNC: 31 U/L (ref 10–40)
B-OH-BUTYR BLD STRIP-SCNC: 4.2 MMOL/L (ref 0–0.5)
BACTERIA #/AREA URNS HPF: ABNORMAL /HPF
BASOPHILS # BLD AUTO: 0.02 K/UL (ref 0–0.2)
BASOPHILS NFR BLD: 0.2 % (ref 0–1.9)
BILIRUB SERPL-MCNC: 1.4 MG/DL (ref 0.1–1)
BILIRUB UR QL STRIP: NEGATIVE
BUN SERPL-MCNC: 26 MG/DL (ref 8–23)
BUN SERPL-MCNC: 28 MG/DL (ref 8–23)
CALCIUM SERPL-MCNC: 8.5 MG/DL (ref 8.7–10.5)
CALCIUM SERPL-MCNC: 9.5 MG/DL (ref 8.7–10.5)
CHLORIDE SERPL-SCNC: 102 MMOL/L (ref 95–110)
CHLORIDE SERPL-SCNC: 97 MMOL/L (ref 95–110)
CK SERPL-CCNC: 461 U/L (ref 20–180)
CLARITY UR: ABNORMAL
CO2 SERPL-SCNC: 22 MMOL/L (ref 23–29)
CO2 SERPL-SCNC: 25 MMOL/L (ref 23–29)
COLOR UR: YELLOW
CREAT SERPL-MCNC: 1 MG/DL (ref 0.5–1.4)
CREAT SERPL-MCNC: 1.1 MG/DL (ref 0.5–1.4)
DIFFERENTIAL METHOD: ABNORMAL
EOSINOPHIL # BLD AUTO: 0 K/UL (ref 0–0.5)
EOSINOPHIL NFR BLD: 0 % (ref 0–8)
ERYTHROCYTE [DISTWIDTH] IN BLOOD BY AUTOMATED COUNT: 13.4 % (ref 11.5–14.5)
EST. GFR  (NO RACE VARIABLE): 53 ML/MIN/1.73 M^2
EST. GFR  (NO RACE VARIABLE): 60 ML/MIN/1.73 M^2
GLUCOSE SERPL-MCNC: 261 MG/DL (ref 70–110)
GLUCOSE SERPL-MCNC: 485 MG/DL (ref 70–110)
GLUCOSE UR QL STRIP: ABNORMAL
HCT VFR BLD AUTO: 33 % (ref 37–48.5)
HGB BLD-MCNC: 11 G/DL (ref 12–16)
HGB UR QL STRIP: ABNORMAL
IMM GRANULOCYTES # BLD AUTO: 0.04 K/UL (ref 0–0.04)
IMM GRANULOCYTES NFR BLD AUTO: 0.4 % (ref 0–0.5)
KETONES UR QL STRIP: ABNORMAL
LACTATE SERPL-SCNC: 2.5 MMOL/L (ref 0.5–2.2)
LEUKOCYTE ESTERASE UR QL STRIP: ABNORMAL
LYMPHOCYTES # BLD AUTO: 0.4 K/UL (ref 1–4.8)
LYMPHOCYTES NFR BLD: 3.7 % (ref 18–48)
MCH RBC QN AUTO: 32.2 PG (ref 27–31)
MCHC RBC AUTO-ENTMCNC: 33.3 G/DL (ref 32–36)
MCV RBC AUTO: 97 FL (ref 82–98)
MICROSCOPIC COMMENT: ABNORMAL
MONOCYTES # BLD AUTO: 0.8 K/UL (ref 0.3–1)
MONOCYTES NFR BLD: 7.2 % (ref 4–15)
NEUTROPHILS # BLD AUTO: 9.9 K/UL (ref 1.8–7.7)
NEUTROPHILS NFR BLD: 88.5 % (ref 38–73)
NITRITE UR QL STRIP: POSITIVE
NRBC BLD-RTO: 0 /100 WBC
PH UR STRIP: 5 [PH] (ref 5–8)
PLATELET # BLD AUTO: 221 K/UL (ref 150–450)
PMV BLD AUTO: 11.1 FL (ref 9.2–12.9)
POCT GLUCOSE: 280 MG/DL (ref 70–110)
POCT GLUCOSE: 320 MG/DL (ref 70–110)
POCT GLUCOSE: 448 MG/DL (ref 70–110)
POCT GLUCOSE: 488 MG/DL (ref 70–110)
POTASSIUM SERPL-SCNC: 4.4 MMOL/L (ref 3.5–5.1)
POTASSIUM SERPL-SCNC: 5.2 MMOL/L (ref 3.5–5.1)
PROT SERPL-MCNC: 7 G/DL (ref 6–8.4)
PROT UR QL STRIP: NEGATIVE
RBC # BLD AUTO: 3.42 M/UL (ref 4–5.4)
RBC #/AREA URNS HPF: 6 /HPF (ref 0–4)
SODIUM SERPL-SCNC: 135 MMOL/L (ref 136–145)
SODIUM SERPL-SCNC: 137 MMOL/L (ref 136–145)
SP GR UR STRIP: 1.01 (ref 1–1.03)
URN SPEC COLLECT METH UR: ABNORMAL
UROBILINOGEN UR STRIP-ACNC: NEGATIVE EU/DL
WBC # BLD AUTO: 11.18 K/UL (ref 3.9–12.7)
WBC #/AREA URNS HPF: 25 /HPF (ref 0–5)
YEAST URNS QL MICRO: ABNORMAL

## 2023-02-17 PROCEDURE — A4216 STERILE WATER/SALINE, 10 ML: HCPCS | Performed by: INTERNAL MEDICINE

## 2023-02-17 PROCEDURE — 87186 SC STD MICRODIL/AGAR DIL: CPT | Performed by: EMERGENCY MEDICINE

## 2023-02-17 PROCEDURE — 93010 ELECTROCARDIOGRAM REPORT: CPT | Mod: ,,, | Performed by: INTERNAL MEDICINE

## 2023-02-17 PROCEDURE — 80048 BASIC METABOLIC PNL TOTAL CA: CPT | Mod: XB | Performed by: INTERNAL MEDICINE

## 2023-02-17 PROCEDURE — 87086 URINE CULTURE/COLONY COUNT: CPT | Performed by: EMERGENCY MEDICINE

## 2023-02-17 PROCEDURE — 87077 CULTURE AEROBIC IDENTIFY: CPT | Performed by: EMERGENCY MEDICINE

## 2023-02-17 PROCEDURE — 63600175 PHARM REV CODE 636 W HCPCS: Performed by: INTERNAL MEDICINE

## 2023-02-17 PROCEDURE — 82550 ASSAY OF CK (CPK): CPT | Performed by: EMERGENCY MEDICINE

## 2023-02-17 PROCEDURE — 93010 EKG 12-LEAD: ICD-10-PCS | Mod: ,,, | Performed by: INTERNAL MEDICINE

## 2023-02-17 PROCEDURE — 80053 COMPREHEN METABOLIC PANEL: CPT | Performed by: EMERGENCY MEDICINE

## 2023-02-17 PROCEDURE — 96374 THER/PROPH/DIAG INJ IV PUSH: CPT

## 2023-02-17 PROCEDURE — 51702 INSERT TEMP BLADDER CATH: CPT

## 2023-02-17 PROCEDURE — 83036 HEMOGLOBIN GLYCOSYLATED A1C: CPT | Performed by: INTERNAL MEDICINE

## 2023-02-17 PROCEDURE — 82010 KETONE BODYS QUAN: CPT | Performed by: INTERNAL MEDICINE

## 2023-02-17 PROCEDURE — 82962 GLUCOSE BLOOD TEST: CPT

## 2023-02-17 PROCEDURE — 36415 COLL VENOUS BLD VENIPUNCTURE: CPT | Performed by: EMERGENCY MEDICINE

## 2023-02-17 PROCEDURE — 12000002 HC ACUTE/MED SURGE SEMI-PRIVATE ROOM

## 2023-02-17 PROCEDURE — 81000 URINALYSIS NONAUTO W/SCOPE: CPT | Performed by: EMERGENCY MEDICINE

## 2023-02-17 PROCEDURE — 87088 URINE BACTERIA CULTURE: CPT | Performed by: EMERGENCY MEDICINE

## 2023-02-17 PROCEDURE — 93005 ELECTROCARDIOGRAM TRACING: CPT

## 2023-02-17 PROCEDURE — 83605 ASSAY OF LACTIC ACID: CPT | Performed by: INTERNAL MEDICINE

## 2023-02-17 PROCEDURE — 85025 COMPLETE CBC W/AUTO DIFF WBC: CPT | Performed by: EMERGENCY MEDICINE

## 2023-02-17 PROCEDURE — 25000003 PHARM REV CODE 250: Performed by: INTERNAL MEDICINE

## 2023-02-17 PROCEDURE — 63600175 PHARM REV CODE 636 W HCPCS: Performed by: EMERGENCY MEDICINE

## 2023-02-17 PROCEDURE — 99285 EMERGENCY DEPT VISIT HI MDM: CPT | Mod: 25

## 2023-02-17 PROCEDURE — 25000003 PHARM REV CODE 250: Performed by: EMERGENCY MEDICINE

## 2023-02-17 PROCEDURE — 36415 COLL VENOUS BLD VENIPUNCTURE: CPT | Performed by: INTERNAL MEDICINE

## 2023-02-17 RX ORDER — GLUCAGON 1 MG
1 KIT INJECTION
Status: DISCONTINUED | OUTPATIENT
Start: 2023-02-17 | End: 2023-02-23 | Stop reason: HOSPADM

## 2023-02-17 RX ORDER — POLYETHYLENE GLYCOL 3350 17 G/17G
17 POWDER, FOR SOLUTION ORAL DAILY PRN
Status: DISCONTINUED | OUTPATIENT
Start: 2023-02-17 | End: 2023-02-23 | Stop reason: HOSPADM

## 2023-02-17 RX ORDER — SODIUM CHLORIDE 0.9 % (FLUSH) 0.9 %
10 SYRINGE (ML) INJECTION
Status: DISCONTINUED | OUTPATIENT
Start: 2023-02-17 | End: 2023-02-23 | Stop reason: HOSPADM

## 2023-02-17 RX ORDER — ACETAMINOPHEN 325 MG/1
650 TABLET ORAL EVERY 8 HOURS PRN
Status: DISCONTINUED | OUTPATIENT
Start: 2023-02-17 | End: 2023-02-23 | Stop reason: HOSPADM

## 2023-02-17 RX ORDER — MUPIROCIN 20 MG/G
OINTMENT TOPICAL
Status: CANCELLED | OUTPATIENT
Start: 2023-02-17

## 2023-02-17 RX ORDER — LANOLIN ALCOHOL/MO/W.PET/CERES
800 CREAM (GRAM) TOPICAL
Status: DISCONTINUED | OUTPATIENT
Start: 2023-02-17 | End: 2023-02-23 | Stop reason: HOSPADM

## 2023-02-17 RX ORDER — LISINOPRIL 2.5 MG/1
5 TABLET ORAL DAILY
Status: DISCONTINUED | OUTPATIENT
Start: 2023-02-18 | End: 2023-02-23 | Stop reason: HOSPADM

## 2023-02-17 RX ORDER — TALC
3 POWDER (GRAM) TOPICAL NIGHTLY PRN
Status: DISCONTINUED | OUTPATIENT
Start: 2023-02-17 | End: 2023-02-17 | Stop reason: SDUPTHER

## 2023-02-17 RX ORDER — HYDRALAZINE HYDROCHLORIDE 20 MG/ML
10 INJECTION INTRAMUSCULAR; INTRAVENOUS
Status: DISCONTINUED | OUTPATIENT
Start: 2023-02-17 | End: 2023-02-22

## 2023-02-17 RX ORDER — DEXTROSE 40 %
30 GEL (GRAM) ORAL
Status: DISCONTINUED | OUTPATIENT
Start: 2023-02-17 | End: 2023-02-23 | Stop reason: HOSPADM

## 2023-02-17 RX ORDER — HYDROCODONE BITARTRATE AND ACETAMINOPHEN 5; 325 MG/1; MG/1
1 TABLET ORAL EVERY 6 HOURS PRN
Status: DISCONTINUED | OUTPATIENT
Start: 2023-02-17 | End: 2023-02-23 | Stop reason: HOSPADM

## 2023-02-17 RX ORDER — NALOXONE HCL 0.4 MG/ML
0.02 VIAL (ML) INJECTION
Status: DISCONTINUED | OUTPATIENT
Start: 2023-02-17 | End: 2023-02-23 | Stop reason: HOSPADM

## 2023-02-17 RX ORDER — TALC
6 POWDER (GRAM) TOPICAL NIGHTLY PRN
Status: DISCONTINUED | OUTPATIENT
Start: 2023-02-17 | End: 2023-02-23 | Stop reason: HOSPADM

## 2023-02-17 RX ORDER — ASCORBIC ACID 500 MG
500 TABLET ORAL DAILY
Status: DISCONTINUED | OUTPATIENT
Start: 2023-02-18 | End: 2023-02-23 | Stop reason: HOSPADM

## 2023-02-17 RX ORDER — CHOLECALCIFEROL (VITAMIN D3) 25 MCG
1000 TABLET ORAL DAILY
Status: DISCONTINUED | OUTPATIENT
Start: 2023-02-18 | End: 2023-02-23 | Stop reason: HOSPADM

## 2023-02-17 RX ORDER — MUPIROCIN 20 MG/G
OINTMENT TOPICAL 2 TIMES DAILY
Status: COMPLETED | OUTPATIENT
Start: 2023-02-17 | End: 2023-02-22

## 2023-02-17 RX ORDER — DEXTROSE 40 %
15 GEL (GRAM) ORAL
Status: DISCONTINUED | OUTPATIENT
Start: 2023-02-17 | End: 2023-02-23 | Stop reason: HOSPADM

## 2023-02-17 RX ORDER — SODIUM,POTASSIUM PHOSPHATES 280-250MG
2 POWDER IN PACKET (EA) ORAL
Status: DISCONTINUED | OUTPATIENT
Start: 2023-02-17 | End: 2023-02-23 | Stop reason: HOSPADM

## 2023-02-17 RX ORDER — SODIUM CHLORIDE 0.9 % (FLUSH) 0.9 %
10 SYRINGE (ML) INJECTION EVERY 8 HOURS
Status: DISCONTINUED | OUTPATIENT
Start: 2023-02-17 | End: 2023-02-23 | Stop reason: HOSPADM

## 2023-02-17 RX ORDER — SODIUM CHLORIDE 9 MG/ML
INJECTION, SOLUTION INTRAVENOUS CONTINUOUS
Status: DISCONTINUED | OUTPATIENT
Start: 2023-02-17 | End: 2023-02-22

## 2023-02-17 RX ORDER — CALCIUM CARBONATE 200(500)MG
500 TABLET,CHEWABLE ORAL 2 TIMES DAILY
Status: DISCONTINUED | OUTPATIENT
Start: 2023-02-17 | End: 2023-02-23 | Stop reason: HOSPADM

## 2023-02-17 RX ORDER — INSULIN ASPART 100 [IU]/ML
0-5 INJECTION, SOLUTION INTRAVENOUS; SUBCUTANEOUS
Status: DISCONTINUED | OUTPATIENT
Start: 2023-02-17 | End: 2023-02-23 | Stop reason: HOSPADM

## 2023-02-17 RX ORDER — CEFAZOLIN SODIUM 2 G/50ML
2 SOLUTION INTRAVENOUS
Status: CANCELLED | OUTPATIENT
Start: 2023-02-17

## 2023-02-17 RX ORDER — IBUPROFEN 200 MG
16 TABLET ORAL
Status: DISCONTINUED | OUTPATIENT
Start: 2023-02-17 | End: 2023-02-23 | Stop reason: HOSPADM

## 2023-02-17 RX ORDER — IBUPROFEN 200 MG
24 TABLET ORAL
Status: DISCONTINUED | OUTPATIENT
Start: 2023-02-17 | End: 2023-02-23 | Stop reason: HOSPADM

## 2023-02-17 RX ORDER — MORPHINE SULFATE 4 MG/ML
4 INJECTION, SOLUTION INTRAMUSCULAR; INTRAVENOUS EVERY 4 HOURS PRN
Status: DISCONTINUED | OUTPATIENT
Start: 2023-02-17 | End: 2023-02-23 | Stop reason: HOSPADM

## 2023-02-17 RX ORDER — ONDANSETRON 2 MG/ML
4 INJECTION INTRAMUSCULAR; INTRAVENOUS EVERY 8 HOURS PRN
Status: DISCONTINUED | OUTPATIENT
Start: 2023-02-17 | End: 2023-02-23 | Stop reason: HOSPADM

## 2023-02-17 RX ADMIN — CEFTRIAXONE 1 G: 1 INJECTION, POWDER, FOR SOLUTION INTRAMUSCULAR; INTRAVENOUS at 04:02

## 2023-02-17 RX ADMIN — MORPHINE SULFATE 4 MG: 4 INJECTION INTRAVENOUS at 09:02

## 2023-02-17 RX ADMIN — MUPIROCIN: 20 OINTMENT TOPICAL at 09:02

## 2023-02-17 RX ADMIN — INSULIN DETEMIR 20 UNITS: 100 INJECTION, SOLUTION SUBCUTANEOUS at 05:02

## 2023-02-17 RX ADMIN — CALCIUM CARBONATE (ANTACID) CHEW TAB 500 MG 500 MG: 500 CHEW TAB at 09:02

## 2023-02-17 RX ADMIN — SODIUM CHLORIDE: 9 INJECTION, SOLUTION INTRAVENOUS at 05:02

## 2023-02-17 RX ADMIN — INSULIN HUMAN 5 UNITS: 100 INJECTION, SOLUTION PARENTERAL at 03:02

## 2023-02-17 RX ADMIN — SODIUM CHLORIDE 1000 ML: 9 INJECTION, SOLUTION INTRAVENOUS at 03:02

## 2023-02-17 RX ADMIN — SODIUM CHLORIDE: 9 INJECTION, SOLUTION INTRAVENOUS at 08:02

## 2023-02-17 RX ADMIN — Medication 10 ML: at 09:02

## 2023-02-17 RX ADMIN — MORPHINE SULFATE 4 MG: 4 INJECTION INTRAVENOUS at 05:02

## 2023-02-17 NOTE — ASSESSMENT & PLAN NOTE
Check lactic acid and beta hydroxybutyrate.  Continue IV fluid hydration and improve glycemic control.  Hold metformin use.

## 2023-02-17 NOTE — ED PROVIDER NOTES
Encounter Date: 2/17/2023       History     Chief Complaint   Patient presents with    Fall    Hip Injury     Left  / leg short and rotated      73-year-old female with diabetes presents after mechanical fall.  She is complaining of left hip pain.  She was on the ground all night, someone heard her yelling for help today and called EMS.  She has no complaints other than the hip pain.    The history is provided by the patient and the EMS personnel.   Review of patient's allergies indicates:  No Known Allergies  Past Medical History:   Diagnosis Date    Diabetes mellitus, type 2      Past Surgical History:   Procedure Laterality Date    AUGMENTATION OF BREAST       Family History   Problem Relation Age of Onset    Breast cancer Sister      Social History     Tobacco Use    Smoking status: Every Day     Packs/day: 0.25     Years: 45.00     Pack years: 11.25     Types: Cigarettes    Smokeless tobacco: Never   Substance Use Topics    Alcohol use: Yes    Drug use: Never     Review of Systems   Constitutional:  Negative for fever.   HENT:  Negative for sore throat.    Respiratory:  Negative for shortness of breath.    Cardiovascular:  Negative for chest pain.   Gastrointestinal:  Negative for nausea.   Genitourinary:  Negative for dysuria.   Musculoskeletal:  Positive for arthralgias. Negative for back pain.   Skin:  Negative for rash.   Neurological:  Negative for weakness.   Hematological:  Does not bruise/bleed easily.   All other systems reviewed and are negative.    Physical Exam     Initial Vitals [02/17/23 1359]   BP Pulse Resp Temp SpO2   (!) 216/92 100 18 98.9 °F (37.2 °C) 98 %      MAP       --         Physical Exam    Nursing note and vitals reviewed.  Constitutional: She appears well-developed and well-nourished. She is not diaphoretic. No distress.   HENT:   Head: Normocephalic and atraumatic.   Eyes: EOM are normal.   Neck: Neck supple.   Normal range of motion.  Cardiovascular:  Normal rate, regular rhythm  and normal heart sounds.     Exam reveals no gallop and no friction rub.       No murmur heard.  Pulmonary/Chest: Breath sounds normal. No respiratory distress. She has no wheezes. She has no rhonchi. She has no rales.   Musculoskeletal:      Cervical back: Normal range of motion and neck supple.      Comments: Left leg shortened and externally rotated.  Lower extremity plantar flexion dorsiflexion and EHL extension is intact.  Tenderness and swelling to the left superior lateral thigh     Neurological: She is alert and oriented to person, place, and time.   Skin: Skin is warm and dry.   Psychiatric: She has a normal mood and affect. Her behavior is normal. Judgment and thought content normal.       ED Course   Procedures  Labs Reviewed   CBC W/ AUTO DIFFERENTIAL - Abnormal; Notable for the following components:       Result Value    RBC 3.42 (*)     Hemoglobin 11.0 (*)     Hematocrit 33.0 (*)     MCH 32.2 (*)     Gran # (ANC) 9.9 (*)     Lymph # 0.4 (*)     Gran % 88.5 (*)     Lymph % 3.7 (*)     All other components within normal limits   COMPREHENSIVE METABOLIC PANEL - Abnormal; Notable for the following components:    Potassium 5.2 (*)     CO2 22 (*)     Glucose 485 (*)     BUN 26 (*)     Total Bilirubin 1.4 (*)     Anion Gap 18 (*)     eGFR 53 (*)     All other components within normal limits    Narrative:      Glucose  critical result(s) called and verbal readback obtained from   Marian Jarquin by BTI1 02/17/2023 15:38   CK - Abnormal; Notable for the following components:     (*)     All other components within normal limits   URINALYSIS, REFLEX TO URINE CULTURE - Abnormal; Notable for the following components:    Appearance, UA Hazy (*)     Glucose, UA 3+ (*)     Ketones, UA 3+ (*)     Occult Blood UA 2+ (*)     Nitrite, UA Positive (*)     Leukocytes, UA Trace (*)     All other components within normal limits    Narrative:     Specimen Source->Urine   URINALYSIS MICROSCOPIC - Abnormal; Notable for the  following components:    RBC, UA 6 (*)     WBC, UA 25 (*)     Bacteria Many (*)     All other components within normal limits    Narrative:     Specimen Source->Urine   POCT GLUCOSE - Abnormal; Notable for the following components:    POCT Glucose 448 (*)     All other components within normal limits   POCT GLUCOSE - Abnormal; Notable for the following components:    POCT Glucose 488 (*)     All other components within normal limits   CULTURE, URINE   POCT GLUCOSE MONITORING CONTINUOUS          Imaging Results              X-Ray Hip 2 or 3 views Left (with Pelvis when performed) (Final result)  Result time 02/17/23 14:51:27      Final result by Ihsan Aguilar Jr., MD (02/17/23 14:51:27)                   Impression:      Mildly angulated inter trochanteric fracture of the left hip.      Electronically signed by: Ihsan Aguilar MD  Date:    02/17/2023  Time:    14:51               Narrative:    EXAMINATION:  XR HIP WITH PELVIS WHEN PERFORMED, 2 OR 3 VIEWS LEFT    CLINICAL HISTORY:  Pain in left hip    TECHNIQUE:  AP view of the pelvis and frog leg lateral view of the left hip were performed.    COMPARISON:  None    FINDINGS:  There is an angulated intertrochanteric fracture of the left femur.  The acetabulum is intact.  The pelvis and right hip are intact.  The sacroiliac joints are sharp and symmetric.                                       X-Ray Chest AP Portable (Final result)  Result time 02/17/23 14:50:32      Final result by Ihsan Aguilar Jr., MD (02/17/23 14:50:32)                   Impression:      Few strands of atelectasis bilaterally otherwise negative chest      Electronically signed by: Ihsan Aguilar MD  Date:    02/17/2023  Time:    14:50               Narrative:    EXAMINATION:  XR CHEST AP PORTABLE    CLINICAL HISTORY:  pre-operative;    TECHNIQUE:  Single frontal view of the chest was performed.    COMPARISON:  None    FINDINGS:  The mediastinal and cardiac size and contours are normal.   There are few strands of atelectasis seen in the left midlung field and right lung base.  An infiltrate or solid mass is not seen.  There is no pneumothorax or pleural effusion.                                       Medications   sodium chloride 0.9% bolus 1,000 mL 1,000 mL (1,000 mLs Intravenous New Bag 2/17/23 1543)   insulin regular injection 5 Units 0.05 mL (5 Units Intravenous Given 2/17/23 1546)     Medical Decision Making:   History:   I obtained history from: EMS provider.  Clinical Tests:   Lab Tests: Reviewed and Ordered  Radiological Study: Ordered and Reviewed  Medical Tests: Reviewed and Ordered  Other:   I have discussed this case with another health care provider.       <> Summary of the Discussion: Case discussed with Orthopedics and Internal Medicine for admission.           ED Course as of 02/17/23 1552   Fri Feb 17, 2023   1414 Sinus rhythm 99 beats per minute normal axis no ST elevation or depression or T-wave inversion independently interpreted [EF]   1434 POCT Glucose(!!): 488 [EF]   1454 X-Ray Hip 2 or 3 views Left (with Pelvis when performed) [EF]   1454 X-Ray Chest AP Portable [EF]   1504 Bacteria, UA(!): Many [EF]   1504 WBC, UA(!): 25 [EF]   1504 NITRITE UA(!): Positive [EF]   1504 Leukocytes, UA(!): Trace [EF]   1551 73-year-old presents after mechanical fall.  X-ray demonstrates left-sided hip fracture.  Blood sugar is elevated but she did not get her insulin last night.  She will be given IV fluids and IV insulin in the ER.  I do not think she is in diabetic ketoacidosis.  Also has a bladder infection.  Hospital Medicine will admit.  Case discussed with orthopedics. [EF]      ED Course User Index  [EF] Param Calvert MD                 Clinical Impression:   Final diagnoses:  [Z01.810] Preop cardiovascular exam  [M25.552] Left hip pain  [S72.002A] Closed fracture of left hip, initial encounter (Primary)        ED Disposition Condition    Observation Stable                Param HANNA  MD Enrike  02/17/23 6610

## 2023-02-17 NOTE — H&P
[facility] Emergency Dept  [facility]  History & Physical    Patient Name: Anastasiia Badillo  MRN: [id number]  Patient Class: IP- Inpatient  Admission Date: 2/17/2023  Attending Physician: Arely Duval MD   Primary Care Provider: Primary Doctor No         Patient information was obtained from patient and ER records.     Subjective:     Principal Problem:Type 2 diabetes mellitus with hyperglycemia    Chief Complaint:   Chief Complaint   Patient presents with    Fall    Hip Injury     Left  / leg short and rotated         HPI: Patient is a 73-year-old female with past medical history significant for hypertension and diabetes mellitus type 2 who is being admitted to Hospital Medicine from [facility] Emergency room status post fall which patient suffered last evening at home with complaints of left hip pain.  Got a around midnight after waking up from sleep and somehow lost her balance while using walker and landed on her left hip.  Patient was brought by paramedics.  Patient remained on the floor for more than 12 hours until friend arrived.  There is with obvious left leg shortening and external rotation noted.  Patient is unable to bear weight.  Patient denies any head injury, loss of consciousness or any focal neuro deficits.  No recent fevers or chills reported.  Patient denies any chest pain or shortness of breath.      Past Medical History:   Diagnosis Date    Diabetes mellitus, type 2        Past Surgical History:   Procedure Laterality Date    AUGMENTATION OF BREAST         Review of patient's allergies indicates:  No Known Allergies    No current facility-administered medications on file prior to encounter.     Current Outpatient Medications on File Prior to Encounter   Medication Sig    ascorbic acid, vitamin C, (VITAMIN C) 500 MG tablet Take 500 mg by mouth once daily.    calcium carbonate (OS-DAGMAR) 600 mg calcium (1,500 mg) Tab Take 600 mg by mouth 2 (two) times daily with  "meals.    cholecalciferol, vitamin D3, (VITAMIN D3) 10 mcg (400 unit) Chew Take by mouth 2 (two) times a day.    cinnamon bark (CINNAMON) 500 mg capsule Take 500 mg by mouth once daily.    LEVEMIR FLEXTOUCH U-100 INSULN 100 unit/mL (3 mL) InPn pen Inject 20-25 units into skin every evening.    lisinopriL (PRINIVIL,ZESTRIL) 5 MG tablet lisinopril 5 mg tablet    loperamide (IMODIUM) 2 mg capsule Take 2 mg by mouth 4 (four) times daily as needed for Diarrhea.    melatonin 10 mg Cap Take 1 capsule by mouth every evening.    metFORMIN (GLUCOPHAGE-XR) 500 MG ER 24hr tablet Take 1 tablet (500 mg total) by mouth 2 (two) times daily with meals.    multivit-min-FA-lycopen-lutein (CENTRUM SILVER) 0.4-300-250 mg-mcg-mcg Tab Take 1 tablet by mouth once daily.    pen needle, diabetic (PEN NEEDLE) 31 gauge x 3/16" Ndle 1 application by Misc.(Non-Drug; Combo Route) route 2 (two) times a day.    UNABLE TO FIND Take 1 capsule by mouth daily as needed. Beet Root     Family History       Problem Relation (Age of Onset)    Breast cancer Sister          Tobacco Use    Smoking status: Every Day     Packs/day: 0.25     Years: 45.00     Pack years: 11.25     Types: Cigarettes    Smokeless tobacco: Never   Substance and Sexual Activity    Alcohol use: Yes    Drug use: Never    Sexual activity: Not on file     Review of Systems   Constitutional:  Positive for fatigue.   Musculoskeletal:         Left hip pain   Neurological:  Positive for weakness.   All other systems reviewed and are negative.  Objective:     Vital Signs (Most Recent):  Temp: 98.9 °F (37.2 °C) (02/17/23 1359)  Pulse: 100 (02/17/23 1510)  Resp: 18 (02/17/23 1359)  BP: (!) 196/90 (02/17/23 1510)  SpO2: 98 % (02/17/23 1510)   Vital Signs (24h Range):  Temp:  [98.9 °F (37.2 °C)] 98.9 °F (37.2 °C)  Pulse:  [100] 100  Resp:  [18] 18  SpO2:  [98 %] 98 %  BP: (196-216)/(90-92) 196/90     Weight: 69.9 kg (154 lb)  Body mass index is 27.28 kg/m².    Physical " Exam  Constitutional:       Appearance: She is well-developed.   HENT:      Head: Normocephalic and atraumatic.   Eyes:      Conjunctiva/sclera: Conjunctivae normal.      Pupils: Pupils are equal, round, and reactive to light.   Neck:      Thyroid: No thyromegaly.      Vascular: No JVD.   Cardiovascular:      Rate and Rhythm: Normal rate and regular rhythm.      Heart sounds: No murmur heard.    No friction rub. No gallop.   Pulmonary:      Effort: Pulmonary effort is normal.      Breath sounds: Normal breath sounds.   Abdominal:      General: Bowel sounds are normal. There is no distension.      Palpations: Abdomen is soft. There is no mass.      Tenderness: There is no abdominal tenderness.   Musculoskeletal:         General: Normal range of motion.      Cervical back: Neck supple.      Comments: Left leg shortening and external rotation noted.  Left hip tenderness present.   Skin:     General: Skin is warm and dry.   Neurological:      Mental Status: She is oriented to person, place, and time.      Cranial Nerves: No cranial nerve deficit.   Psychiatric:         Behavior: Behavior normal.         CRANIAL NERVES     CN III, IV, VI   Pupils are equal, round, and reactive to light.     Significant Labs: All pertinent labs within the past 24 hours have been reviewed.  CBC:   Recent Labs   Lab 02/17/23  1415   WBC 11.18   HGB 11.0*   HCT 33.0*        CMP:   Recent Labs   Lab 02/17/23  1415      K 5.2*   CL 97   CO2 22*   *   BUN 26*   CREATININE 1.1   CALCIUM 9.5   PROT 7.0   ALBUMIN 4.0   BILITOT 1.4*   ALKPHOS 71   AST 31   ALT 25   ANIONGAP 18*     Lactic Acid: No results for input(s): LACTATE in the last 48 hours.  Urine Culture: No results for input(s): LABURIN in the last 48 hours.  Urine Studies:   Recent Labs   Lab 02/17/23  1424   COLORU Yellow   APPEARANCEUA Hazy*   PHUR 5.0   SPECGRAV 1.010   PROTEINUA Negative   GLUCUA 3+*   KETONESU 3+*   BILIRUBINUA Negative   OCCULTUA 2+*   NITRITE  Positive*   UROBILINOGEN Negative   LEUKOCYTESUR Trace*   RBCUA 6*   WBCUA 25*   BACTERIA Many*   Beta-hydroxybutyrate; Pending.    Significant Imaging:   CXR: Few strands of atelectasis bilaterally otherwise negative chest    Left hip x-ray:  Mildly angulated inter trochanteric fracture of the left hip.       Assessment/Plan:     * Type 2 diabetes mellitus with hyperglycemia  Patient's FSGs are uncontrolled due to hyperglycemia on current medication regimen.  Last A1c reviewed-   Lab Results   Component Value Date    HGBA1C 10.0 (H) 04/15/2021     Most recent fingerstick glucose reviewed-   Recent Labs   Lab 02/17/23  1422 02/17/23  1424   POCTGLUCOSE 448* 488*     Current correctional scale  Low  Maintain anti-hyperglycemic dose as follows-   Antihyperglycemics (From admission, onward)    None        Hold Oral hypoglycemics while patient is in the hospital.    ACP (advance care planning)  Advance Care Planning     Date: 02/17/2023    Living Will  During this visit, I engaged the patient  in the advance care planning process.  The patient and I reviewed the role for advance directives and their purpose in directing future healthcare if the patient's unable to speak for herself.  At this point in time, the patient does have full decision-making capacity.  We discussed different extreme health states that she could experience, and reviewed what kind of medical care she would want in those situations.  The patient communicated that if she were comatose and had little chance of a meaningful recovery, she would want machines/life-sustaining treatments used.  I spent a total of 16 minutes engaging the patient in this advance care planning discussion.                Dehydration  Continue gentle IV fluid hydration with normal saline.      Urinary tract infection without hematuria  Follow urine culture and sensitivity results.  Continue intravenous Rocephin 1 g daily      Metabolic acidosis  Check lactic acid and beta  hydroxybutyrate.  Continue IV fluid hydration and improve glycemic control.  Hold metformin use.      Hyperkalemia  Tele monitoring.  Continue IV fluid hydration.  Repeat BMP this evening.      Essential hypertension  Continue lisinopril 5 mg daily.  Tele monitoring.  Continue intravenous hydralazine 10 mg every 2 hours as needed for systolic blood pressure in excess of 160 mmHg.      Closed fracture of neck of left femur  NPO after MN. Dr. Ovalle from orthopedics to evaluate the patient.  Patient denies any cardiopulmonary symptoms or history.  Patient is considered intermediate risk for cardiopulmonary complications in the perioperative period.  Patient may proceed for left femur neck fracture repair surgery tomorrow.  In the meantime will improve patient's hydration status and glycemic control.  Check lactic acid and beta hydroxybutyrate.  Bedrest for now.  Use intravenous narcotics and antiemetics on as needed basis.  Use of Buck's traction and anticoagulation therapy for DVT prophylaxis as per orthopedic recommendations.  EKG reviewed which shows normal sinus rhythm 99 beats per minute with nonspecific ST and T-wave abnormality.        DVT prophylaxis: Use SCD and TEDs. Start Lovenox 40 mg sq q day once okay with orthopedics.     Arely Duval MD  Department of Hospital Medicine   [facility] - Emergency Dept

## 2023-02-17 NOTE — ASSESSMENT & PLAN NOTE
Patient's FSGs are uncontrolled due to hyperglycemia on current medication regimen.  Last A1c reviewed-   Lab Results   Component Value Date    HGBA1C 10.0 (H) 04/15/2021     Most recent fingerstick glucose reviewed-   Recent Labs   Lab 02/17/23  1422 02/17/23  1424   POCTGLUCOSE 448* 488*     Current correctional scale  Low  Maintain anti-hyperglycemic dose as follows-   Antihyperglycemics (From admission, onward)    None        Hold Oral hypoglycemics while patient is in the hospital.

## 2023-02-17 NOTE — ASSESSMENT & PLAN NOTE
Advance Care Planning     Date: 02/17/2023    Living Will  During this visit, I engaged the patient  in the advance care planning process.  The patient and I reviewed the role for advance directives and their purpose in directing future healthcare if the patient's unable to speak for herself.  At this point in time, the patient does have full decision-making capacity.  We discussed different extreme health states that she could experience, and reviewed what kind of medical care she would want in those situations.  The patient communicated that if she were comatose and had little chance of a meaningful recovery, she would want machines/life-sustaining treatments used.  I spent a total of 16 minutes engaging the patient in this advance care planning discussion.

## 2023-02-17 NOTE — SUBJECTIVE & OBJECTIVE
"Past Medical History:   Diagnosis Date    Diabetes mellitus, type 2        Past Surgical History:   Procedure Laterality Date    AUGMENTATION OF BREAST         Review of patient's allergies indicates:  No Known Allergies    No current facility-administered medications on file prior to encounter.     Current Outpatient Medications on File Prior to Encounter   Medication Sig    ascorbic acid, vitamin C, (VITAMIN C) 500 MG tablet Take 500 mg by mouth once daily.    calcium carbonate (OS-DAGMAR) 600 mg calcium (1,500 mg) Tab Take 600 mg by mouth 2 (two) times daily with meals.    cholecalciferol, vitamin D3, (VITAMIN D3) 10 mcg (400 unit) Chew Take by mouth 2 (two) times a day.    cinnamon bark (CINNAMON) 500 mg capsule Take 500 mg by mouth once daily.    LEVEMIR FLEXTOUCH U-100 INSULN 100 unit/mL (3 mL) InPn pen Inject 20-25 units into skin every evening.    lisinopriL (PRINIVIL,ZESTRIL) 5 MG tablet lisinopril 5 mg tablet    loperamide (IMODIUM) 2 mg capsule Take 2 mg by mouth 4 (four) times daily as needed for Diarrhea.    melatonin 10 mg Cap Take 1 capsule by mouth every evening.    metFORMIN (GLUCOPHAGE-XR) 500 MG ER 24hr tablet Take 1 tablet (500 mg total) by mouth 2 (two) times daily with meals.    multivit-min-FA-lycopen-lutein (CENTRUM SILVER) 0.4-300-250 mg-mcg-mcg Tab Take 1 tablet by mouth once daily.    pen needle, diabetic (PEN NEEDLE) 31 gauge x 3/16" Ndle 1 application by Misc.(Non-Drug; Combo Route) route 2 (two) times a day.    UNABLE TO FIND Take 1 capsule by mouth daily as needed. Beet Root     Family History       Problem Relation (Age of Onset)    Breast cancer Sister          Tobacco Use    Smoking status: Every Day     Packs/day: 0.25     Years: 45.00     Pack years: 11.25     Types: Cigarettes    Smokeless tobacco: Never   Substance and Sexual Activity    Alcohol use: Yes    Drug use: Never    Sexual activity: Not on file     Review of Systems   Constitutional:  Positive for fatigue. "   Musculoskeletal:         Left hip pain   Neurological:  Positive for weakness.   All other systems reviewed and are negative.  Objective:     Vital Signs (Most Recent):  Temp: 98.9 °F (37.2 °C) (02/17/23 1359)  Pulse: 100 (02/17/23 1510)  Resp: 18 (02/17/23 1359)  BP: (!) 196/90 (02/17/23 1510)  SpO2: 98 % (02/17/23 1510)   Vital Signs (24h Range):  Temp:  [98.9 °F (37.2 °C)] 98.9 °F (37.2 °C)  Pulse:  [100] 100  Resp:  [18] 18  SpO2:  [98 %] 98 %  BP: (196-216)/(90-92) 196/90     Weight: 69.9 kg (154 lb)  Body mass index is 27.28 kg/m².    Physical Exam  Constitutional:       Appearance: She is well-developed.   HENT:      Head: Normocephalic and atraumatic.   Eyes:      Conjunctiva/sclera: Conjunctivae normal.      Pupils: Pupils are equal, round, and reactive to light.   Neck:      Thyroid: No thyromegaly.      Vascular: No JVD.   Cardiovascular:      Rate and Rhythm: Normal rate and regular rhythm.      Heart sounds: No murmur heard.    No friction rub. No gallop.   Pulmonary:      Effort: Pulmonary effort is normal.      Breath sounds: Normal breath sounds.   Abdominal:      General: Bowel sounds are normal. There is no distension.      Palpations: Abdomen is soft. There is no mass.      Tenderness: There is no abdominal tenderness.   Musculoskeletal:         General: Normal range of motion.      Cervical back: Neck supple.      Comments: Left leg shortening and external rotation noted.  Left hip tenderness present.   Skin:     General: Skin is warm and dry.   Neurological:      Mental Status: She is oriented to person, place, and time.      Cranial Nerves: No cranial nerve deficit.   Psychiatric:         Behavior: Behavior normal.         CRANIAL NERVES     CN III, IV, VI   Pupils are equal, round, and reactive to light.     Significant Labs: All pertinent labs within the past 24 hours have been reviewed.  CBC:   Recent Labs   Lab 02/17/23  1415   WBC 11.18   HGB 11.0*   HCT 33.0*        CMP:   Recent  Labs   Lab 02/17/23  1415      K 5.2*   CL 97   CO2 22*   *   BUN 26*   CREATININE 1.1   CALCIUM 9.5   PROT 7.0   ALBUMIN 4.0   BILITOT 1.4*   ALKPHOS 71   AST 31   ALT 25   ANIONGAP 18*     Lactic Acid: No results for input(s): LACTATE in the last 48 hours.  Urine Culture: No results for input(s): LABURIN in the last 48 hours.  Urine Studies:   Recent Labs   Lab 02/17/23  1424   COLORU Yellow   APPEARANCEUA Hazy*   PHUR 5.0   SPECGRAV 1.010   PROTEINUA Negative   GLUCUA 3+*   KETONESU 3+*   BILIRUBINUA Negative   OCCULTUA 2+*   NITRITE Positive*   UROBILINOGEN Negative   LEUKOCYTESUR Trace*   RBCUA 6*   WBCUA 25*   BACTERIA Many*   Beta-hydroxybutyrate; Pending.    Significant Imaging:   CXR: Few strands of atelectasis bilaterally otherwise negative chest    Left hip x-ray:  Mildly angulated inter trochanteric fracture of the left hip.

## 2023-02-17 NOTE — HPI
Patient is a 73-year-old female with past medical history significant for hypertension and diabetes mellitus type 2 who is being admitted to Hospital Medicine from [facility] Emergency room status post fall which patient suffered last evening at home with complaints of left hip pain.  Got a around midnight after waking up from sleep and somehow lost her balance while using walker and landed on her left hip.  Patient was brought by paramedics.  Patient remained on the floor for more than 12 hours until friend arrived.  There is with obvious left leg shortening and external rotation noted.  Patient is unable to bear weight.  Patient denies any head injury, loss of consciousness or any focal neuro deficits.  No recent fevers or chills reported.  Patient denies any chest pain or shortness of breath.

## 2023-02-17 NOTE — ASSESSMENT & PLAN NOTE
Continue lisinopril 5 mg daily.  Tele monitoring.  Continue intravenous hydralazine 10 mg every 2 hours as needed for systolic blood pressure in excess of 160 mmHg.

## 2023-02-18 ENCOUNTER — ANESTHESIA (OUTPATIENT)
Dept: SURGERY | Facility: HOSPITAL | Age: 73
DRG: 481 | End: 2023-02-18
Payer: MEDICARE

## 2023-02-18 ENCOUNTER — ANESTHESIA EVENT (OUTPATIENT)
Dept: SURGERY | Facility: HOSPITAL | Age: 73
DRG: 481 | End: 2023-02-18
Payer: MEDICARE

## 2023-02-18 PROBLEM — S72.22XA CLOSED LEFT SUBTROCHANTERIC FEMUR FRACTURE, INITIAL ENCOUNTER: Status: ACTIVE | Noted: 2023-02-18

## 2023-02-18 LAB
ALBUMIN SERPL BCP-MCNC: 3.3 G/DL (ref 3.5–5.2)
ALP SERPL-CCNC: 51 U/L (ref 55–135)
ALT SERPL W/O P-5'-P-CCNC: 20 U/L (ref 10–44)
ANION GAP SERPL CALC-SCNC: 9 MMOL/L (ref 8–16)
AST SERPL-CCNC: 27 U/L (ref 10–40)
B-OH-BUTYR BLD STRIP-SCNC: 1.9 MMOL/L (ref 0–0.5)
BILIRUB SERPL-MCNC: 0.7 MG/DL (ref 0.1–1)
BUN SERPL-MCNC: 30 MG/DL (ref 8–23)
CALCIUM SERPL-MCNC: 8.8 MG/DL (ref 8.7–10.5)
CHLORIDE SERPL-SCNC: 100 MMOL/L (ref 95–110)
CO2 SERPL-SCNC: 24 MMOL/L (ref 23–29)
CREAT SERPL-MCNC: 1.1 MG/DL (ref 0.5–1.4)
ERYTHROCYTE [DISTWIDTH] IN BLOOD BY AUTOMATED COUNT: 13.7 % (ref 11.5–14.5)
EST. GFR  (NO RACE VARIABLE): 53 ML/MIN/1.73 M^2
ESTIMATED AVG GLUCOSE: 209 MG/DL (ref 68–131)
GLUCOSE SERPL-MCNC: 162 MG/DL (ref 70–110)
HBA1C MFR BLD: 8.9 % (ref 4–5.6)
HCT VFR BLD AUTO: 27.3 % (ref 37–48.5)
HGB BLD-MCNC: 9.1 G/DL (ref 12–16)
LACTATE SERPL-SCNC: 0.9 MMOL/L (ref 0.5–2.2)
MAGNESIUM SERPL-MCNC: 1.8 MG/DL (ref 1.6–2.6)
MCH RBC QN AUTO: 32.4 PG (ref 27–31)
MCHC RBC AUTO-ENTMCNC: 33.3 G/DL (ref 32–36)
MCV RBC AUTO: 97 FL (ref 82–98)
PHOSPHATE SERPL-MCNC: 3.5 MG/DL (ref 2.7–4.5)
PLATELET # BLD AUTO: 174 K/UL (ref 150–450)
PMV BLD AUTO: 11 FL (ref 9.2–12.9)
POCT GLUCOSE: 136 MG/DL (ref 70–110)
POCT GLUCOSE: 204 MG/DL (ref 70–110)
POTASSIUM SERPL-SCNC: 4.4 MMOL/L (ref 3.5–5.1)
PROT SERPL-MCNC: 5.8 G/DL (ref 6–8.4)
RBC # BLD AUTO: 2.81 M/UL (ref 4–5.4)
SODIUM SERPL-SCNC: 133 MMOL/L (ref 136–145)
WBC # BLD AUTO: 11.35 K/UL (ref 3.9–12.7)

## 2023-02-18 PROCEDURE — C1713 ANCHOR/SCREW BN/BN,TIS/BN: HCPCS | Performed by: ORTHOPAEDIC SURGERY

## 2023-02-18 PROCEDURE — D9220A PRA ANESTHESIA: ICD-10-PCS | Mod: CRNA,,, | Performed by: NURSE ANESTHETIST, CERTIFIED REGISTERED

## 2023-02-18 PROCEDURE — 27000221 HC OXYGEN, UP TO 24 HOURS

## 2023-02-18 PROCEDURE — 71000039 HC RECOVERY, EACH ADD'L HOUR: Performed by: ORTHOPAEDIC SURGERY

## 2023-02-18 PROCEDURE — D9220A PRA ANESTHESIA: ICD-10-PCS | Mod: ANES,,, | Performed by: ANESTHESIOLOGY

## 2023-02-18 PROCEDURE — 25000003 PHARM REV CODE 250: Performed by: ORTHOPAEDIC SURGERY

## 2023-02-18 PROCEDURE — 63600175 PHARM REV CODE 636 W HCPCS: Performed by: ORTHOPAEDIC SURGERY

## 2023-02-18 PROCEDURE — 12000002 HC ACUTE/MED SURGE SEMI-PRIVATE ROOM

## 2023-02-18 PROCEDURE — 94799 UNLISTED PULMONARY SVC/PX: CPT

## 2023-02-18 PROCEDURE — 99900035 HC TECH TIME PER 15 MIN (STAT)

## 2023-02-18 PROCEDURE — 99223 1ST HOSP IP/OBS HIGH 75: CPT | Mod: 57,,, | Performed by: ORTHOPAEDIC SURGERY

## 2023-02-18 PROCEDURE — 83735 ASSAY OF MAGNESIUM: CPT | Performed by: INTERNAL MEDICINE

## 2023-02-18 PROCEDURE — 25000003 PHARM REV CODE 250: Performed by: NURSE ANESTHETIST, CERTIFIED REGISTERED

## 2023-02-18 PROCEDURE — 25000003 PHARM REV CODE 250: Performed by: NURSE PRACTITIONER

## 2023-02-18 PROCEDURE — 94761 N-INVAS EAR/PLS OXIMETRY MLT: CPT

## 2023-02-18 PROCEDURE — 36415 COLL VENOUS BLD VENIPUNCTURE: CPT | Performed by: INTERNAL MEDICINE

## 2023-02-18 PROCEDURE — 63600175 PHARM REV CODE 636 W HCPCS: Performed by: NURSE ANESTHETIST, CERTIFIED REGISTERED

## 2023-02-18 PROCEDURE — 37000009 HC ANESTHESIA EA ADD 15 MINS: Performed by: ORTHOPAEDIC SURGERY

## 2023-02-18 PROCEDURE — 85027 COMPLETE CBC AUTOMATED: CPT | Performed by: INTERNAL MEDICINE

## 2023-02-18 PROCEDURE — 71000033 HC RECOVERY, INTIAL HOUR: Performed by: ORTHOPAEDIC SURGERY

## 2023-02-18 PROCEDURE — A4216 STERILE WATER/SALINE, 10 ML: HCPCS | Performed by: ORTHOPAEDIC SURGERY

## 2023-02-18 PROCEDURE — 63600175 PHARM REV CODE 636 W HCPCS: Performed by: ANESTHESIOLOGY

## 2023-02-18 PROCEDURE — 84100 ASSAY OF PHOSPHORUS: CPT | Performed by: INTERNAL MEDICINE

## 2023-02-18 PROCEDURE — 36000711: Performed by: ORTHOPAEDIC SURGERY

## 2023-02-18 PROCEDURE — D9220A PRA ANESTHESIA: Mod: ANES,,, | Performed by: ANESTHESIOLOGY

## 2023-02-18 PROCEDURE — 83605 ASSAY OF LACTIC ACID: CPT | Performed by: INTERNAL MEDICINE

## 2023-02-18 PROCEDURE — 37000008 HC ANESTHESIA 1ST 15 MINUTES: Performed by: ORTHOPAEDIC SURGERY

## 2023-02-18 PROCEDURE — 80053 COMPREHEN METABOLIC PANEL: CPT | Performed by: INTERNAL MEDICINE

## 2023-02-18 PROCEDURE — 25000003 PHARM REV CODE 250: Performed by: INTERNAL MEDICINE

## 2023-02-18 PROCEDURE — D9220A PRA ANESTHESIA: Mod: CRNA,,, | Performed by: NURSE ANESTHETIST, CERTIFIED REGISTERED

## 2023-02-18 PROCEDURE — 25000003 PHARM REV CODE 250: Performed by: ANESTHESIOLOGY

## 2023-02-18 PROCEDURE — C1769 GUIDE WIRE: HCPCS | Performed by: ORTHOPAEDIC SURGERY

## 2023-02-18 PROCEDURE — 27245 TREAT THIGH FRACTURE: CPT | Mod: LT,,, | Performed by: ORTHOPAEDIC SURGERY

## 2023-02-18 PROCEDURE — 27201423 OPTIME MED/SURG SUP & DEVICES STERILE SUPPLY: Performed by: ORTHOPAEDIC SURGERY

## 2023-02-18 PROCEDURE — 36000710: Performed by: ORTHOPAEDIC SURGERY

## 2023-02-18 PROCEDURE — 27245 PR OPEN FIX INTER/SUBTROCH FX,IMPLNT: ICD-10-PCS | Mod: LT,,, | Performed by: ORTHOPAEDIC SURGERY

## 2023-02-18 PROCEDURE — 99223 PR INITIAL HOSPITAL CARE,LEVL III: ICD-10-PCS | Mod: 57,,, | Performed by: ORTHOPAEDIC SURGERY

## 2023-02-18 PROCEDURE — 82010 KETONE BODYS QUAN: CPT | Performed by: INTERNAL MEDICINE

## 2023-02-18 DEVICE — NAIL IM CANN 130 DEG 11X400 L: Type: IMPLANTABLE DEVICE | Site: HIP | Status: FUNCTIONAL

## 2023-02-18 DEVICE — SCREW FEM NECK PERF GOLD 85MM: Type: IMPLANTABLE DEVICE | Site: HIP | Status: FUNCTIONAL

## 2023-02-18 DEVICE — SCREW STRDRV REC T25 5X42 TTNM: Type: IMPLANTABLE DEVICE | Site: HIP | Status: FUNCTIONAL

## 2023-02-18 DEVICE — SCREW LOCK T25 5.0X38MM: Type: IMPLANTABLE DEVICE | Site: HIP | Status: FUNCTIONAL

## 2023-02-18 RX ORDER — FENTANYL CITRATE 50 UG/ML
25 INJECTION, SOLUTION INTRAMUSCULAR; INTRAVENOUS EVERY 5 MIN PRN
Status: DISCONTINUED | OUTPATIENT
Start: 2023-02-18 | End: 2023-02-18

## 2023-02-18 RX ORDER — ONDANSETRON HYDROCHLORIDE 2 MG/ML
INJECTION, SOLUTION INTRAMUSCULAR; INTRAVENOUS
Status: DISCONTINUED | OUTPATIENT
Start: 2023-02-18 | End: 2023-02-18

## 2023-02-18 RX ORDER — CEFAZOLIN SODIUM 1 G/3ML
INJECTION, POWDER, FOR SOLUTION INTRAMUSCULAR; INTRAVENOUS
Status: DISCONTINUED | OUTPATIENT
Start: 2023-02-18 | End: 2023-02-18

## 2023-02-18 RX ORDER — MIDAZOLAM HYDROCHLORIDE 1 MG/ML
INJECTION INTRAMUSCULAR; INTRAVENOUS
Status: DISCONTINUED | OUTPATIENT
Start: 2023-02-18 | End: 2023-02-18

## 2023-02-18 RX ORDER — CEFAZOLIN SODIUM 2 G/50ML
2 SOLUTION INTRAVENOUS
Status: DISPENSED | OUTPATIENT
Start: 2023-02-18 | End: 2023-02-19

## 2023-02-18 RX ORDER — OXYCODONE HYDROCHLORIDE 5 MG/1
5 TABLET ORAL
Status: DISCONTINUED | OUTPATIENT
Start: 2023-02-18 | End: 2023-02-18

## 2023-02-18 RX ORDER — FENTANYL CITRATE 50 UG/ML
INJECTION, SOLUTION INTRAMUSCULAR; INTRAVENOUS
Status: DISCONTINUED | OUTPATIENT
Start: 2023-02-18 | End: 2023-02-18

## 2023-02-18 RX ORDER — BACLOFEN 5 MG/1
5 TABLET ORAL ONCE
Status: COMPLETED | OUTPATIENT
Start: 2023-02-18 | End: 2023-02-18

## 2023-02-18 RX ORDER — LIDOCAINE HCL/PF 100 MG/5ML
SYRINGE (ML) INTRAVENOUS
Status: DISCONTINUED | OUTPATIENT
Start: 2023-02-18 | End: 2023-02-18

## 2023-02-18 RX ORDER — PROPOFOL 10 MG/ML
VIAL (ML) INTRAVENOUS
Status: DISCONTINUED | OUTPATIENT
Start: 2023-02-18 | End: 2023-02-18

## 2023-02-18 RX ORDER — ACETAMINOPHEN 10 MG/ML
INJECTION, SOLUTION INTRAVENOUS
Status: DISCONTINUED | OUTPATIENT
Start: 2023-02-18 | End: 2023-02-18

## 2023-02-18 RX ORDER — ROCURONIUM BROMIDE 10 MG/ML
INJECTION, SOLUTION INTRAVENOUS
Status: DISCONTINUED | OUTPATIENT
Start: 2023-02-18 | End: 2023-02-18

## 2023-02-18 RX ORDER — DEXAMETHASONE SODIUM PHOSPHATE 4 MG/ML
INJECTION, SOLUTION INTRA-ARTICULAR; INTRALESIONAL; INTRAMUSCULAR; INTRAVENOUS; SOFT TISSUE
Status: DISCONTINUED | OUTPATIENT
Start: 2023-02-18 | End: 2023-02-18

## 2023-02-18 RX ORDER — SODIUM CHLORIDE 0.9 % (FLUSH) 0.9 %
10 SYRINGE (ML) INJECTION
Status: DISCONTINUED | OUTPATIENT
Start: 2023-02-18 | End: 2023-02-23 | Stop reason: HOSPADM

## 2023-02-18 RX ORDER — HYDROMORPHONE HYDROCHLORIDE 2 MG/ML
0.2 INJECTION, SOLUTION INTRAMUSCULAR; INTRAVENOUS; SUBCUTANEOUS EVERY 5 MIN PRN
Status: DISCONTINUED | OUTPATIENT
Start: 2023-02-18 | End: 2023-02-18

## 2023-02-18 RX ORDER — PHENYLEPHRINE HYDROCHLORIDE 10 MG/ML
INJECTION INTRAVENOUS
Status: DISCONTINUED | OUTPATIENT
Start: 2023-02-18 | End: 2023-02-18

## 2023-02-18 RX ADMIN — ACETAMINOPHEN 1000 MG: 10 INJECTION, SOLUTION INTRAVENOUS at 10:02

## 2023-02-18 RX ADMIN — OXYCODONE HYDROCHLORIDE AND ACETAMINOPHEN 500 MG: 500 TABLET ORAL at 03:02

## 2023-02-18 RX ADMIN — FENTANYL CITRATE 25 MCG: 50 INJECTION INTRAMUSCULAR; INTRAVENOUS at 01:02

## 2023-02-18 RX ADMIN — BACLOFEN 5 MG: 5 TABLET ORAL at 10:02

## 2023-02-18 RX ADMIN — LISINOPRIL 5 MG: 2.5 TABLET ORAL at 03:02

## 2023-02-18 RX ADMIN — Medication 10 ML: at 10:02

## 2023-02-18 RX ADMIN — SODIUM CHLORIDE, SODIUM GLUCONATE, SODIUM ACETATE, POTASSIUM CHLORIDE, MAGNESIUM CHLORIDE, SODIUM PHOSPHATE, DIBASIC, AND POTASSIUM PHOSPHATE: .53; .5; .37; .037; .03; .012; .00082 INJECTION, SOLUTION INTRAVENOUS at 10:02

## 2023-02-18 RX ADMIN — MUPIROCIN: 20 OINTMENT TOPICAL at 09:02

## 2023-02-18 RX ADMIN — FENTANYL CITRATE 25 MCG: 50 INJECTION, SOLUTION INTRAMUSCULAR; INTRAVENOUS at 12:02

## 2023-02-18 RX ADMIN — MORPHINE SULFATE 4 MG: 4 INJECTION INTRAVENOUS at 05:02

## 2023-02-18 RX ADMIN — GLYCOPYRROLATE 0.2 MG: 0.2 INJECTION, SOLUTION INTRAMUSCULAR; INTRAVITREAL at 10:02

## 2023-02-18 RX ADMIN — PROPOFOL 100 MG: 10 INJECTION, EMULSION INTRAVENOUS at 10:02

## 2023-02-18 RX ADMIN — CALCIUM CARBONATE (ANTACID) CHEW TAB 500 MG 500 MG: 500 CHEW TAB at 08:02

## 2023-02-18 RX ADMIN — HYDROCODONE BITARTRATE AND ACETAMINOPHEN 1 TABLET: 5; 325 TABLET ORAL at 08:02

## 2023-02-18 RX ADMIN — FENTANYL CITRATE 25 MCG: 50 INJECTION, SOLUTION INTRAMUSCULAR; INTRAVENOUS at 10:02

## 2023-02-18 RX ADMIN — DEXAMETHASONE SODIUM PHOSPHATE 4 MG: 4 INJECTION, SOLUTION INTRA-ARTICULAR; INTRALESIONAL; INTRAMUSCULAR; INTRAVENOUS; SOFT TISSUE at 10:02

## 2023-02-18 RX ADMIN — OXYCODONE 5 MG: 5 TABLET ORAL at 01:02

## 2023-02-18 RX ADMIN — PHENYLEPHRINE HYDROCHLORIDE 100 MCG: 10 INJECTION INTRAVENOUS at 11:02

## 2023-02-18 RX ADMIN — ONDANSETRON 4 MG: 2 INJECTION INTRAMUSCULAR; INTRAVENOUS at 10:02

## 2023-02-18 RX ADMIN — INSULIN ASPART 2 UNITS: 100 INJECTION, SOLUTION INTRAVENOUS; SUBCUTANEOUS at 05:02

## 2023-02-18 RX ADMIN — APIXABAN 2.5 MG: 2.5 TABLET, FILM COATED ORAL at 08:02

## 2023-02-18 RX ADMIN — ROCURONIUM BROMIDE 50 MG: 10 INJECTION, SOLUTION INTRAVENOUS at 10:02

## 2023-02-18 RX ADMIN — PHENYLEPHRINE HYDROCHLORIDE 100 MCG: 10 INJECTION INTRAVENOUS at 10:02

## 2023-02-18 RX ADMIN — CEFAZOLIN 2 G: 1 INJECTION, POWDER, FOR SOLUTION INTRAVENOUS at 10:02

## 2023-02-18 RX ADMIN — MORPHINE SULFATE 4 MG: 4 INJECTION INTRAVENOUS at 10:02

## 2023-02-18 RX ADMIN — CALCIUM CARBONATE (ANTACID) CHEW TAB 500 MG 500 MG: 500 CHEW TAB at 09:02

## 2023-02-18 RX ADMIN — SODIUM CHLORIDE, SODIUM GLUCONATE, SODIUM ACETATE, POTASSIUM CHLORIDE, MAGNESIUM CHLORIDE, SODIUM PHOSPHATE, DIBASIC, AND POTASSIUM PHOSPHATE: .53; .5; .37; .037; .03; .012; .00082 INJECTION, SOLUTION INTRAVENOUS at 11:02

## 2023-02-18 RX ADMIN — LIDOCAINE HYDROCHLORIDE 50 MG: 20 INJECTION INTRAVENOUS at 10:02

## 2023-02-18 RX ADMIN — MIDAZOLAM HYDROCHLORIDE 2 MG: 1 INJECTION, SOLUTION INTRAMUSCULAR; INTRAVENOUS at 10:02

## 2023-02-18 RX ADMIN — Medication 1000 UNITS: at 03:02

## 2023-02-18 RX ADMIN — SUGAMMADEX 200 MG: 100 INJECTION, SOLUTION INTRAVENOUS at 12:02

## 2023-02-18 RX ADMIN — Medication 10 ML: at 02:02

## 2023-02-18 NOTE — PROGRESS NOTES
Pt in PACU To go for surgery. Pts son Yaya notified per telephone pt is going to OR Set up for text messages    1020 To OR per bed

## 2023-02-18 NOTE — PT/OT/SLP PROGRESS
Physical Therapy      Patient Name:  Anastasiia Badillo   MRN:  [id number]    Patient not seen today secondary to Other (Comment) (awaiting ortho consult for possbile surgery). Will follow-up when new order obtained.

## 2023-02-18 NOTE — TRANSFER OF CARE
"Anesthesia Transfer of Care Note    Patient: Anastasiia Badillo    Procedure(s) Performed: Procedure(s) (LRB):  INSERTION, INTRAMEDULLARY ROXANNA, FEMUR (hana table -- synthes -- long nail -- have bovie and suction and large bone set) (Left)    Patient location: PACU    Anesthesia Type: general    Transport from OR: Transported from OR on 2-3 L/min O2 by NC with adequate spontaneous ventilation    Post pain: adequate analgesia    Post assessment: no apparent anesthetic complications and tolerated procedure well    Post vital signs: stable    Level of consciousness: sedated and responds to stimulation    Nausea/Vomiting: no nausea/vomiting    Complications: none    Transfer of care protocol was followed      Last vitals:   Visit Vitals  BP (!) 152/73   Pulse 74   Temp 36.3 °C (97.4 °F) (Tympanic)   Resp 14   Ht 5' 5" (1.651 m)   Wt 67 kg (147 lb 11.3 oz)   SpO2 98%   Breastfeeding No   BMI 24.58 kg/m²     "

## 2023-02-18 NOTE — CONSULTS
Patient ID: Anastasiia Badillo is a 73 y.o. female    Chief Complaint: left hip pain     History of Present Illness:    Anastasiia Badillo is a pleasant 73 y.o. female who presented to the ED with a chief complaint of left hip pain. This occurred after a fall from standing height on thursday. She was found down the following day.  Patient was taken to the ED and X-rays revealed a left subtrochanteric femur fracture. She was then admitted to the medicine service for optimization with orthopedic consultation.     Social Status: lives at assisted living  Ambulatory Status: walker  Pertinent Surgical/Medical Hx: DM, tobacco      PAST MEDICAL HISTORY:   Past Medical History:   Diagnosis Date    Diabetes mellitus, type 2      PAST SURGICAL HISTORY:   Past Surgical History:   Procedure Laterality Date    AUGMENTATION OF BREAST       FAMILY HISTORY:   Family History   Problem Relation Age of Onset    Breast cancer Sister      SOCIAL HISTORY:   Social History     Occupational History    Not on file   Tobacco Use    Smoking status: Every Day     Packs/day: 0.25     Years: 45.00     Pack years: 11.25     Types: Cigarettes    Smokeless tobacco: Never   Substance and Sexual Activity    Alcohol use: Yes    Drug use: Never    Sexual activity: Not on file        MEDICATIONS: Reviewed per Epic   ALLERGIES: Review of patient's allergies indicates:  No Known Allergies    Review of Systems:  Constitutional: no fever or chills  Eyes: no visual changes  ENT: no nasal congestion or sore throat  Respiratory: no cough or shortness of breath  Cardiovascular: no chest pain  Gastrointestinal: no nausea or vomiting, tolerating diet  Musculoskeletal: pain and soreness  All others negative        Physical Exam     Vitals:    02/18/23 0741   BP: (!) 152/70   Pulse: 89   Resp: 18   Temp: 98.3 °F (36.8 °C)     Alert and oriented to person, place and time. No acute distress. Well-groomed, not ill appearing. Pupils round and reactive, normal  respiratory effort, no audible wheezing.     Hip Exam    Global tenderness to palpation of the left hip  + Logroll  Leg held in shortened/externally rotated position  No evidence of open fracture, no masses/lesions/surgical scars  Neurovascularly intact: intact peroneal, tibial, sciatic nerve function   Palpable distal pulses  Compartments soft and compressible  Warm well perfused extremity, brisk cap refill to toes       Imagin view left Hip X-rays ordered/reviewed by me showing displaced subtrochanteric femur fracture, varus        Assessment    73 y.o. female with   Left subtrochanteric femur fracture     Plan    --Admit to Internal Medicine service for pre-operative clearance and medical evaluation.   --To OR today for operative fixation of left hip with long intramedullary nail  --Written and verbal consent obtained from patient  --Pre-operative labs and imaging reviewed by me   --Bed rest, holley, NPO     Risks and complications were discussed including but not limited to the risks of anesthetic complications, infection, wound healing complications, non-union, mal-union, hardware failure, pain, stiffness, DVT, pulmonary embolism, perioperative medical risks (cardiac, pulmonary, renal, neurologic), and death among others were discussed. No guarantees were made and the patient and family elect to proceed. They fully understand the reported 30% mortality risk within the first year of surgery.

## 2023-02-18 NOTE — PT/OT/SLP PROGRESS
Occupational Therapy      Patient Name:  Anastasiia Badillo   MRN:  [id number]    Patient not seen today secondary to Other (Comment) (Pt with L femur fracture with surgery scheduled today per Ortho note. Will required OT orders post op as appropriate.).    2/18/2023

## 2023-02-18 NOTE — ANESTHESIA PROCEDURE NOTES
Intubation    Date/Time: 2/18/2023 10:31 AM  Performed by: Daniel Mcmahon CRNA  Authorized by: Phoebe Aguilar MD     Intubation:     Induction:  Intravenous    Intubated:  Postinduction    Mask Ventilation:  Easy mask    Attempts:  1    Attempted By:  CRNA    Method of Intubation:  Video laryngoscopy    Blade:  Blel 3    Laryngeal View Grade: Grade I - full view of cords      Difficult Airway Encountered?: No      Complications:  None    Airway Device:  Oral endotracheal tube    Airway Device Size:  7.0    Style/Cuff Inflation:  Cuffed (inflated to minimal occlusive pressure)    Tube secured:  22    Secured at:  The lips    Placement Verified By:  Capnometry    Complicating Factors:  None    Findings Post-Intubation:  BS equal bilateral and atraumatic/condition of teeth unchanged

## 2023-02-18 NOTE — PLAN OF CARE
Problem: Adult Inpatient Plan of Care  Goal: Plan of Care Review  Outcome: Ongoing, Progressing   Supine with HOB up 35. POC and medications reviewed with pt. Verbalized understanding. Tele 8678 in use. NS infusing at 75cc/hr without difficulty. DDI. No redness or swelling at site. SR up x 2. Call light in reach. Bed in lowest position  with brakes locked. Will continue to monitor.   Problem: Adult Inpatient Plan of Care  Goal: Optimal Comfort and Wellbeing  Outcome: Ongoing, Progressing   Q 2 hourly rounds made through out shift and IV site, pain and position monitored .   Problem: Diabetes Comorbidity  Goal: Blood Glucose Level Within Targeted Range  Outcome: Ongoing, Progressing   CBGs monitored through out shift.   Problem: Adult Inpatient Plan of Care  Goal: Patient-Specific Goal (Individualized)  Outcome: Ongoing, Progressing   Pt is post op Left hip denzel placement today. Island dsg dry and intact. No drainage on dsg.  Denies any needs at this time.Will continue to monitor.

## 2023-02-18 NOTE — PROGRESS NOTES
Criteria met per anesthesia for transfer to room. Alert oriented. Pain controlled with po  and IV analgesics. Denies nausea  Tolerating po fluids. Transferred  to 322 per bed with telemetry. Report given to Yannick Patel's family at bedside

## 2023-02-18 NOTE — PLAN OF CARE
POC discussed with patient, verbalized understanding. Iv to left arm remains intact and patent with iv fluids infusing without difficulty. Pain well controlled with iv pain meds this shift. N/v checks remain intact. Gee cath remains intact and patent.  Safety measures maintained with side rails up, bed alarm on, slip resistant socks on, call light in reach, pt instructed to call for needs.

## 2023-02-18 NOTE — PLAN OF CARE
CM attempted to complete DC assessment x2. Pt in surgery       02/18/23 9255   Discharge Assessment   Assessment Type Discharge Planning Assessment

## 2023-02-18 NOTE — ASSESSMENT & PLAN NOTE
Patient's FSGs are uncontrolled due to hyperglycemia on current medication regimen.  Last A1c reviewed-   Lab Results   Component Value Date    HGBA1C 8.9 (H) 02/17/2023     Most recent fingerstick glucose reviewed-   Recent Labs   Lab 02/17/23  1424 02/17/23  1700 02/17/23 2020 02/18/23  0759   POCTGLUCOSE 488* 320* 280* 136*     Current correctional scale  Low  Maintain anti-hyperglycemic dose as follows-   Antihyperglycemics (From admission, onward)    Start     Stop Route Frequency Ordered    02/17/23 1727  insulin aspart U-100 pen 0-5 Units         -- SubQ Before meals & nightly PRN 02/17/23 1628    02/17/23 1630  insulin detemir U-100 pen 20 Units         -- SubQ Daily 02/17/23 1628        Hold Oral hypoglycemics while patient is in the hospital.

## 2023-02-18 NOTE — BRIEF OP NOTE
[facility]  Brief Operative Note    SUMMARY     Surgery Date: 2/18/2023     Surgeon(s) and Role:     * Keanu Brand MD - Primary    Assisting Surgeon: None    Pre-op Diagnosis:  Closed fracture of left hip, initial encounter [S72.002A]    Post-op Diagnosis:  Post-Op Diagnosis Codes:     * Closed fracture of left hip, initial encounter [S72.002A]    Procedure(s) (LRB):  INSERTION, INTRAMEDULLARY ROXANNA, FEMUR (hana table -- synthes -- long nail -- have bovie and suction and large bone set) (Left)    Anesthesia: General    Operative Findings: displaced subtroch fracture    Estimated Blood Loss: 100 mL    Estimated Blood Loss has been documented.         Specimens:   Specimen (24h ago, onward)      None            [id number]

## 2023-02-18 NOTE — SUBJECTIVE & OBJECTIVE
Interval History: s/p left femur IMR placement.  Currently afebrile.  Denies any chest pain or shortness of breath.  Glycemic control improving.    Review of Systems   Constitutional:  Positive for fatigue.   Musculoskeletal:         Left hip pain   Neurological:  Positive for weakness.   All other systems reviewed and are negative.  Objective:     Vital Signs (Most Recent):  Temp: 98.3 °F (36.8 °C) (02/18/23 0741)  Pulse: 89 (02/18/23 0741)  Resp: 18 (02/18/23 0741)  BP: (!) 152/70 (02/18/23 0741)  SpO2: (!) 92 % (02/18/23 0741)   Vital Signs (24h Range):  Temp:  [96.1 °F (35.6 °C)-98.9 °F (37.2 °C)] 98.3 °F (36.8 °C)  Pulse:  [] 89  Resp:  [18-20] 18  SpO2:  [92 %-98 %] 92 %  BP: (135-216)/(62-92) 152/70     Weight: 67 kg (147 lb 11.3 oz)  Body mass index is 24.58 kg/m².    Intake/Output Summary (Last 24 hours) at 2/18/2023 0950  Last data filed at 2/18/2023 0526  Gross per 24 hour   Intake 926.44 ml   Output 800 ml   Net 126.44 ml      Physical Exam  Constitutional:       Appearance: She is well-developed.   HENT:      Head: Normocephalic and atraumatic.   Eyes:      Conjunctiva/sclera: Conjunctivae normal.      Pupils: Pupils are equal, round, and reactive to light.   Neck:      Thyroid: No thyromegaly.      Vascular: No JVD.   Cardiovascular:      Rate and Rhythm: Normal rate and regular rhythm.      Heart sounds: No murmur heard.    No friction rub. No gallop.   Pulmonary:      Effort: Pulmonary effort is normal.      Breath sounds: Normal breath sounds.   Abdominal:      General: Bowel sounds are normal. There is no distension.      Palpations: Abdomen is soft. There is no mass.      Tenderness: There is no abdominal tenderness.   Musculoskeletal:         General: Normal range of motion.      Cervical back: Neck supple.      Comments: Left leg shortening and external rotation noted.  Left hip tenderness present.   Skin:     General: Skin is warm and dry.   Neurological:      Mental Status: She is  oriented to person, place, and time.      Cranial Nerves: No cranial nerve deficit.   Psychiatric:         Behavior: Behavior normal.       Significant Labs: All pertinent labs within the past 24 hours have been reviewed.  CBC:   Recent Labs   Lab 02/17/23  1415 02/18/23  0457   WBC 11.18 11.35   HGB 11.0* 9.1*   HCT 33.0* 27.3*    174     CMP:   Recent Labs   Lab 02/17/23  1415 02/17/23  2250 02/18/23  0457    135* 133*   K 5.2* 4.4 4.4   CL 97 102 100   CO2 22* 25 24   * 261* 162*   BUN 26* 28* 30*   CREATININE 1.1 1.0 1.1   CALCIUM 9.5 8.5* 8.8   PROT 7.0  --  5.8*   ALBUMIN 4.0  --  3.3*   BILITOT 1.4*  --  0.7   ALKPHOS 71  --  51*   AST 31  --  27   ALT 25  --  20   ANIONGAP 18* 8 9     Coagulation: No results for input(s): PT, INR, APTT in the last 48 hours.    Significant Imaging:   CXR: Few strands of atelectasis bilaterally otherwise negative chest     Left hip x-ray:  Mildly angulated inter trochanteric fracture of the left hip.

## 2023-02-18 NOTE — ANESTHESIA PREPROCEDURE EVALUATION
02/18/2023  Anastasiia Badillo is a 73 y.o., female.      Pre-op Assessment    I have reviewed the Patient Summary Reports.     I have reviewed the Nursing Notes.       Review of Systems  Anesthesia Hx:  No problems with previous Anesthesia    Cardiovascular:   Hypertension    Endocrine:   Diabetes        Physical Exam  General: Well nourished        Anesthesia Plan  Type of Anesthesia, risks & benefits discussed:    Anesthesia Type: Gen ETT  Intra-op Monitoring Plan: Standard ASA Monitors  Post Op Pain Control Plan: multimodal analgesia and IV/PO Opioids PRN  Induction:  IV  Airway Plan: Video  Informed Consent: Informed consent signed with the Patient and all parties understand the risks and agree with anesthesia plan.  All questions answered.   ASA Score: 3  Day of Surgery Review of History & Physical: H&P Update referred to the surgeon/provider.    Ready For Surgery From Anesthesia Perspective.     .

## 2023-02-18 NOTE — PROGRESS NOTES
[facility] Hospital Medicine  Progress Note    Patient Name: Anastasiia Badillo  MRN: [id number]  Patient Class: IP- Inpatient   Admission Date: 2/17/2023  Length of Stay: 1 days  Attending Physician: Arely Duval MD  Primary Care Provider: Primary Doctor No        Subjective:     Principal Problem:Closed left subtrochanteric femur fracture, initial encounter        HPI:  Patient is a 73-year-old female with past medical history significant for hypertension and diabetes mellitus type 2 who is being admitted to Hospital Medicine from [facility] Emergency room status post fall which patient suffered last evening at home with complaints of left hip pain.  Got a around midnight after waking up from sleep and somehow lost her balance while using walker and landed on her left hip.  Patient was brought by paramedics.  Patient remained on the floor for more than 12 hours until friend arrived.  There is with obvious left leg shortening and external rotation noted.  Patient is unable to bear weight.  Patient denies any head injury, loss of consciousness or any focal neuro deficits.  No recent fevers or chills reported.  Patient denies any chest pain or shortness of breath.      Overview/Hospital Course:  No notes on file    Interval History: s/p left femur IMR placement.  Currently afebrile.  Denies any chest pain or shortness of breath.  Glycemic control improving.    Review of Systems   Constitutional:  Positive for fatigue.   Musculoskeletal:         Left hip pain   Neurological:  Positive for weakness.   All other systems reviewed and are negative.  Objective:     Vital Signs (Most Recent):  Temp: 98.3 °F (36.8 °C) (02/18/23 0741)  Pulse: 89 (02/18/23 0741)  Resp: 18 (02/18/23 0741)  BP: (!) 152/70 (02/18/23 0741)  SpO2: (!) 92 % (02/18/23 0741)   Vital Signs (24h Range):  Temp:  [96.1 °F (35.6 °C)-98.9 °F (37.2 °C)] 98.3 °F (36.8 °C)  Pulse:  [] 89  Resp:  [18-20] 18  SpO2:   [92 %-98 %] 92 %  BP: (135-216)/(62-92) 152/70     Weight: 67 kg (147 lb 11.3 oz)  Body mass index is 24.58 kg/m².    Intake/Output Summary (Last 24 hours) at 2/18/2023 0950  Last data filed at 2/18/2023 0526  Gross per 24 hour   Intake 926.44 ml   Output 800 ml   Net 126.44 ml      Physical Exam  Constitutional:       Appearance: She is well-developed.   HENT:      Head: Normocephalic and atraumatic.   Eyes:      Conjunctiva/sclera: Conjunctivae normal.      Pupils: Pupils are equal, round, and reactive to light.   Neck:      Thyroid: No thyromegaly.      Vascular: No JVD.   Cardiovascular:      Rate and Rhythm: Normal rate and regular rhythm.      Heart sounds: No murmur heard.    No friction rub. No gallop.   Pulmonary:      Effort: Pulmonary effort is normal.      Breath sounds: Normal breath sounds.   Abdominal:      General: Bowel sounds are normal. There is no distension.      Palpations: Abdomen is soft. There is no mass.      Tenderness: There is no abdominal tenderness.   Musculoskeletal:         General: Normal range of motion.      Cervical back: Neck supple.      Comments: Left leg shortening and external rotation noted.  Left hip tenderness present.   Skin:     General: Skin is warm and dry.   Neurological:      Mental Status: She is oriented to person, place, and time.      Cranial Nerves: No cranial nerve deficit.   Psychiatric:         Behavior: Behavior normal.       Significant Labs: All pertinent labs within the past 24 hours have been reviewed.  CBC:   Recent Labs   Lab 02/17/23  1415 02/18/23  0457   WBC 11.18 11.35   HGB 11.0* 9.1*   HCT 33.0* 27.3*    174     CMP:   Recent Labs   Lab 02/17/23  1415 02/17/23  2250 02/18/23  0457    135* 133*   K 5.2* 4.4 4.4   CL 97 102 100   CO2 22* 25 24   * 261* 162*   BUN 26* 28* 30*   CREATININE 1.1 1.0 1.1   CALCIUM 9.5 8.5* 8.8   PROT 7.0  --  5.8*   ALBUMIN 4.0  --  3.3*   BILITOT 1.4*  --  0.7   ALKPHOS 71  --  51*   AST 31  --   27   ALT 25  --  20   ANIONGAP 18* 8 9     Coagulation: No results for input(s): PT, INR, APTT in the last 48 hours.    Significant Imaging:   CXR: Few strands of atelectasis bilaterally otherwise negative chest     Left hip x-ray:  Mildly angulated inter trochanteric fracture of the left hip.      Assessment/Plan:      ACP (advance care planning)  Advance Care Planning     Date: 02/17/2023    Living Will  During this visit, I engaged the patient  in the advance care planning process.  The patient and I reviewed the role for advance directives and their purpose in directing future healthcare if the patient's unable to speak for herself.  At this point in time, the patient does have full decision-making capacity.  We discussed different extreme health states that she could experience, and reviewed what kind of medical care she would want in those situations.  The patient communicated that if she were comatose and had little chance of a meaningful recovery, she would want machines/life-sustaining treatments used.  I spent a total of 16 minutes engaging the patient in this advance care planning discussion.                Dehydration  Continue gentle IV fluid hydration with normal saline.      Urinary tract infection without hematuria  Follow urine culture and sensitivity results.  Continue intravenous Rocephin 1 g daily      Metabolic acidosis  Check lactic acid and beta hydroxybutyrate.  Continue IV fluid hydration and improve glycemic control.  Hold metformin use.      Hyperkalemia  Tele monitoring.  Continue IV fluid hydration.  Repeat BMP this evening.      Essential hypertension  Continue lisinopril 5 mg daily.  Tele monitoring.  Continue intravenous hydralazine 10 mg every 2 hours as needed for systolic blood pressure in excess of 160 mmHg.      Type 2 diabetes mellitus with hyperglycemia  Patient's FSGs are uncontrolled due to hyperglycemia on current medication regimen.  Last A1c reviewed-   Lab Results    Component Value Date    HGBA1C 8.9 (H) 02/17/2023     Most recent fingerstick glucose reviewed-   Recent Labs   Lab 02/17/23  1424 02/17/23  1700 02/17/23  2020 02/18/23  0759   POCTGLUCOSE 488* 320* 280* 136*     Current correctional scale  Low  Maintain anti-hyperglycemic dose as follows-   Antihyperglycemics (From admission, onward)    Start     Stop Route Frequency Ordered    02/17/23 1727  insulin aspart U-100 pen 0-5 Units         -- SubQ Before meals & nightly PRN 02/17/23 1628    02/17/23 1630  insulin detemir U-100 pen 20 Units         -- SubQ Daily 02/17/23 1628        Hold Oral hypoglycemics while patient is in the hospital.    Closed fracture of neck of left femur  S/p left femur IMR placement - POD # 0.  Continue to follow Orthopedic recommendations.  Needs aggressive incentive spirometry.  Follow hemoglobin and hematocrit closely.  Pain control with PO narcotics and antiemetics as needed.  Physical therapy as per Orthopedics protocol with fall precautions.          D/W family members at bedside, answered all the questions.   VTE Risk Mitigation (From admission, onward)         Ordered     apixaban tablet 2.5 mg  2 times daily         02/18/23 1358     Reason for No Pharmacological VTE Prophylaxis  Once        Question:  Reasons:  Answer:  Physician Provided (leave comment)    02/17/23 1628     IP VTE HIGH RISK PATIENT  Once         02/17/23 1628     Place sequential compression device  Until discontinued         02/17/23 1628                Discharge Planning   CITLALI:2/20/23      Code Status: Full Code   Is the patient medically ready for discharge?:     Reason for patient still in hospital (select all that apply): Patient trending condition and Consult recommendations                     Arely Duval MD  Department of Hospital Medicine   [facility]

## 2023-02-18 NOTE — ASSESSMENT & PLAN NOTE
S/p left femur IMR placement - POD # 0.  Continue to follow Orthopedic recommendations.  Needs aggressive incentive spirometry.  Follow hemoglobin and hematocrit closely.  Pain control with PO narcotics and antiemetics as needed.  Physical therapy as per Orthopedics protocol with fall precautions.

## 2023-02-18 NOTE — ANESTHESIA POSTPROCEDURE EVALUATION
Anesthesia Post Evaluation    Patient: Anastasiia Badillo    Procedure(s) Performed: Procedure(s) (LRB):  INSERTION, INTRAMEDULLARY ROXANNA, FEMUR (hana table -- synthes -- long nail -- have bovie and suction and large bone set) (Left)    Final Anesthesia Type: general      Patient location during evaluation: PACU  Patient participation: Yes- Able to Participate  Level of consciousness: awake and alert  Post-procedure vital signs: reviewed and stable  Pain management: adequate  Airway patency: patent    PONV status at discharge: No PONV  Anesthetic complications: no      Cardiovascular status: blood pressure returned to baseline  Respiratory status: unassisted and room air  Hydration status: euvolemic  Follow-up not needed.          Vitals Value Taken Time   /74 02/18/23 1350   Temp 36.5 °C (97.7 °F) 02/18/23 1340   Pulse 63 02/18/23 1405   Resp 16 02/18/23 1405   SpO2 94 % 02/18/23 1405         Event Time   Out of Recovery 02/18/2023 13:50:00         Pain/Pravin Score: Pain Rating Prior to Med Admin: 2 (2/18/2023  1:24 PM)  Pain Rating Post Med Admin: 0 (2/18/2023  1:45 PM)  Pravin Score: 9 (2/18/2023  1:45 PM)

## 2023-02-18 NOTE — NURSING
Nurses Note -- 4 Eyes      2/17/2023   6:28 PM      Skin assessed during: Admit      [x] No Pressure Injuries Present    []Prevention Measures Documented      [] Yes- Altered Skin Integrity Present or Discovered   [] LDA Added if Not in Epic (Describe Wound)   [] New Altered Skin Integrity was Present on Admit and Documented in LDA   [] Wound Image Taken    Wound Care Consulted? No    Attending Nurse:  Eagle Peña RN     Second RN/Staff Member:  Laurie Wilson

## 2023-02-18 NOTE — OP NOTE
OPERATIVE NOTE    Date of Surgery:  02/18/2023     Pre-operative Diagnosis: Left  subtrochanteric femur fracture    Post-operative Diagnosis: Left  subtrochanteric femur fracture    Procedure:   Intramedullary nailing left femur    Surgeon:   Keanu Brand M.D.    Asst:    Miguel Johnson, Certified First Assist    He was present and scrubbed for the entirety of the procedure. They were instrumental in patient positioning, insertion of implants and retractions. Without him/her I would have been unable to safely perform the case.     Anesthesia: General    EBL:  100 mL    Specimens: None    Findings: Displaced subtrochanteric femur fracture    Dispo:  To PACU extubated/stable    Implants:    Implant Name Type Inv. Item Serial No.  Lot No. LRB No. Used Action   ROXANNA REAM W/BL TP 2.5MM D 950 - [id number]  ROXANNA REAM W/BL TP 2.5MM D 950  [employer]  Left 1 Implanted and Explanted   NAIL IM  DEG 11X400 L - [id number]  NAIL IM  DEG 11X400 L  [employer]  Left 1 Implanted   SCREW FEM NECK PERF GOLD 85MM - [id number]  SCREW FEM NECK PERF GOLD 85MM  [employer]  Left 1 Implanted           Indications for Procedure:    The patient is a 73 y.o. female who sustained a ground level fall resulting in a left subtrochanteric femur fracture.  We are proceeding to the operating room for cephalomedullary nail fixation.  The risks, benefits and alternatives to surgery were discussed with the patient and family at length prior to going to the operating room.  Informed consent was obtained for fixation.  The patient was optimized by Internal Medicine prior to going to the operating room.    Procedure in Detail:    The patient was identified in the preoperative holding area and the site was marked.   Patient was wheeled into the operating room and general endotracheal anesthesia was induced on the patient's hospital bed.  Preoperative antibiotics were administered.  The patient was placed onto the [place] fracture table  with all bony prominences well padded and both lower extremities in traction boots.  A time-out was undertaken to confirm patient, side, site, surgery, surgeon and the administration of preoperative antibiotics.  All agreed and we proceeded.    Closed reduction maneuvers were performed on the table gaining good alignment.  The left  hip and lower extremity were prepped and draped in sterile fashion.  Based on the fracture pattern I felt that we could not get an adequate reduction closed.  I marked out an incision over the fracture at the subtrochanteric level and incision was made.  I carried the incision down to the ITB band.  The ITB band and vastus lateralis fascia was opened.  I mobilized the fracture and removed any fibrous debris and hematoma.  Using a collinear clamp and a pin inside of the neck to rotate, I was able to reduce the fracture in a better position for nail insertion.  A incision was made proximal to the greater trochanter and the guidewire for the entry reamer was placed in the appropriate position.  Entry reamer was then used.  A guide denzel was passed distally and measurement undertaken.  The canal was then reamed with a 12.5 mm reamer through the isthmus.  A size 11 x 380 Synthes TFN nail was placed over the guidewire and seated down into the canal. X-ray was checked of the lateral distal femur to make sure we were not too anterior.     Attention was then turned proximally to the hip and the nail seated at its final position.  A guidewire for the hip screw was then placed in a center center position in the femoral head under fluoroscopic guidance.  This was then reamed and measured and a 85 mm hip screw was placed gaining good compression through the insertion handle.  Final position was confirmed under fluoroscopy and insertion handle was removed.     Attention was then turned distally and 2 distal interlocking screws were placed without difficulty using perfect [place] technique..  The wounds  were then copiously irrigated with normal saline solution and the proximal wound closed with 0 Vicryl suture the fascia, and all wounds closed with 2-0 Vicryl suture in the subcutaneous tissue, followed by staples in the skin.  Sterile dressings were applied.    All instrument and sponge counts were reported correct at the end of the case.  There were no complications.  The patient was returned to the hospital bed, extubated, awakened and taken to the recovery room in stable condition.    Disposition:     Immediate physical and occupational therapy (weight bearing status:  25% weight-bearing)   Multimodal pain management limiting narcotics.  Anticoagulation x1 month.  Follow up in 2-3 weeks with X-rays of the hip and wound check.

## 2023-02-18 NOTE — ED NOTES
Attempt #2- will call back in 10 minutes.   
Attempted to call report- patient has not been assigned yet.   
Gee catheter placed using sterile technique, pt tolerated well. 10 mL balloon inflated with sterile water. Urine returned and collection for urinalysis obtained.   
Patient to ER 15 via [facility]. Patient reports she fell last night around 8pm with immediate onset of left hip/thigh pain. Patient was unable to move from position, so was on floor until around 1 when someone heard her yell. Patient received 25 mcg of Fentanyl via EMS for pain.    
Pt AAO x 3, vital sign stable. IV intact and dry. Dr. Duval at the bedside.   
Report given to Laurie.  Telebox [phone] placed on patient.  Monitor room notified.   
step to step

## 2023-02-19 PROBLEM — D62 ACUTE BLOOD LOSS ANEMIA: Status: ACTIVE | Noted: 2023-02-19

## 2023-02-19 LAB
ABO + RH BLD: NORMAL
ALBUMIN SERPL BCP-MCNC: 2.8 G/DL (ref 3.5–5.2)
ALP SERPL-CCNC: 44 U/L (ref 55–135)
ALT SERPL W/O P-5'-P-CCNC: 14 U/L (ref 10–44)
ANION GAP SERPL CALC-SCNC: 11 MMOL/L (ref 8–16)
AST SERPL-CCNC: 27 U/L (ref 10–40)
BILIRUB SERPL-MCNC: 0.4 MG/DL (ref 0.1–1)
BLD GP AB SCN CELLS X3 SERPL QL: NORMAL
BLD PROD TYP BPU: NORMAL
BLD PROD TYP BPU: NORMAL
BLOOD UNIT EXPIRATION DATE: NORMAL
BLOOD UNIT EXPIRATION DATE: NORMAL
BLOOD UNIT TYPE CODE: 5100
BLOOD UNIT TYPE CODE: 5100
BLOOD UNIT TYPE: NORMAL
BLOOD UNIT TYPE: NORMAL
BUN SERPL-MCNC: 33 MG/DL (ref 8–23)
CALCIUM SERPL-MCNC: 8.3 MG/DL (ref 8.7–10.5)
CHLORIDE SERPL-SCNC: 98 MMOL/L (ref 95–110)
CO2 SERPL-SCNC: 23 MMOL/L (ref 23–29)
CODING SYSTEM: NORMAL
CODING SYSTEM: NORMAL
CREAT SERPL-MCNC: 1.2 MG/DL (ref 0.5–1.4)
CROSSMATCH INTERPRETATION: NORMAL
CROSSMATCH INTERPRETATION: NORMAL
DISPENSE STATUS: NORMAL
DISPENSE STATUS: NORMAL
ERYTHROCYTE [DISTWIDTH] IN BLOOD BY AUTOMATED COUNT: 13.5 % (ref 11.5–14.5)
EST. GFR  (NO RACE VARIABLE): 48 ML/MIN/1.73 M^2
GLUCOSE SERPL-MCNC: 251 MG/DL (ref 70–110)
HCT VFR BLD AUTO: 21.8 % (ref 37–48.5)
HGB BLD-MCNC: 7.1 G/DL (ref 12–16)
MAGNESIUM SERPL-MCNC: 1.9 MG/DL (ref 1.6–2.6)
MCH RBC QN AUTO: 32.7 PG (ref 27–31)
MCHC RBC AUTO-ENTMCNC: 32.6 G/DL (ref 32–36)
MCV RBC AUTO: 101 FL (ref 82–98)
NUM UNITS TRANS PACKED RBC: NORMAL
NUM UNITS TRANS PACKED RBC: NORMAL
PHOSPHATE SERPL-MCNC: 4.9 MG/DL (ref 2.7–4.5)
PLATELET # BLD AUTO: 166 K/UL (ref 150–450)
PMV BLD AUTO: 11.6 FL (ref 9.2–12.9)
POCT GLUCOSE: 208 MG/DL (ref 70–110)
POCT GLUCOSE: 216 MG/DL (ref 70–110)
POCT GLUCOSE: 258 MG/DL (ref 70–110)
POTASSIUM SERPL-SCNC: 4.9 MMOL/L (ref 3.5–5.1)
PROT SERPL-MCNC: 5.1 G/DL (ref 6–8.4)
RBC # BLD AUTO: 2.17 M/UL (ref 4–5.4)
SODIUM SERPL-SCNC: 132 MMOL/L (ref 136–145)
WBC # BLD AUTO: 7.76 K/UL (ref 3.9–12.7)

## 2023-02-19 PROCEDURE — 80053 COMPREHEN METABOLIC PANEL: CPT | Performed by: ORTHOPAEDIC SURGERY

## 2023-02-19 PROCEDURE — 36415 COLL VENOUS BLD VENIPUNCTURE: CPT | Performed by: INTERNAL MEDICINE

## 2023-02-19 PROCEDURE — 97161 PT EVAL LOW COMPLEX 20 MIN: CPT

## 2023-02-19 PROCEDURE — 84100 ASSAY OF PHOSPHORUS: CPT | Performed by: ORTHOPAEDIC SURGERY

## 2023-02-19 PROCEDURE — 63600175 PHARM REV CODE 636 W HCPCS: Performed by: NURSE PRACTITIONER

## 2023-02-19 PROCEDURE — 25000003 PHARM REV CODE 250: Performed by: INTERNAL MEDICINE

## 2023-02-19 PROCEDURE — 12000002 HC ACUTE/MED SURGE SEMI-PRIVATE ROOM

## 2023-02-19 PROCEDURE — 25000003 PHARM REV CODE 250: Performed by: ORTHOPAEDIC SURGERY

## 2023-02-19 PROCEDURE — 25000003 PHARM REV CODE 250: Performed by: NURSE PRACTITIONER

## 2023-02-19 PROCEDURE — 86920 COMPATIBILITY TEST SPIN: CPT | Performed by: INTERNAL MEDICINE

## 2023-02-19 PROCEDURE — 86580 TB INTRADERMAL TEST: CPT | Performed by: INTERNAL MEDICINE

## 2023-02-19 PROCEDURE — 36430 TRANSFUSION BLD/BLD COMPNT: CPT

## 2023-02-19 PROCEDURE — 97530 THERAPEUTIC ACTIVITIES: CPT

## 2023-02-19 PROCEDURE — 85027 COMPLETE CBC AUTOMATED: CPT | Performed by: ORTHOPAEDIC SURGERY

## 2023-02-19 PROCEDURE — A4216 STERILE WATER/SALINE, 10 ML: HCPCS | Performed by: ORTHOPAEDIC SURGERY

## 2023-02-19 PROCEDURE — 63600175 PHARM REV CODE 636 W HCPCS: Performed by: ORTHOPAEDIC SURGERY

## 2023-02-19 PROCEDURE — 97165 OT EVAL LOW COMPLEX 30 MIN: CPT

## 2023-02-19 PROCEDURE — 27000221 HC OXYGEN, UP TO 24 HOURS

## 2023-02-19 PROCEDURE — 30200315 PPD INTRADERMAL TEST REV CODE 302: Performed by: INTERNAL MEDICINE

## 2023-02-19 PROCEDURE — 36415 COLL VENOUS BLD VENIPUNCTURE: CPT | Performed by: ORTHOPAEDIC SURGERY

## 2023-02-19 PROCEDURE — 94761 N-INVAS EAR/PLS OXIMETRY MLT: CPT

## 2023-02-19 PROCEDURE — 86900 BLOOD TYPING SEROLOGIC ABO: CPT | Performed by: INTERNAL MEDICINE

## 2023-02-19 PROCEDURE — P9016 RBC LEUKOCYTES REDUCED: HCPCS | Performed by: INTERNAL MEDICINE

## 2023-02-19 PROCEDURE — 94799 UNLISTED PULMONARY SVC/PX: CPT

## 2023-02-19 PROCEDURE — 83735 ASSAY OF MAGNESIUM: CPT | Performed by: ORTHOPAEDIC SURGERY

## 2023-02-19 RX ORDER — HYDROCODONE BITARTRATE AND ACETAMINOPHEN 500; 5 MG/1; MG/1
TABLET ORAL
Status: DISCONTINUED | OUTPATIENT
Start: 2023-02-19 | End: 2023-02-23 | Stop reason: HOSPADM

## 2023-02-19 RX ORDER — METFORMIN HYDROCHLORIDE 500 MG/1
500 TABLET, EXTENDED RELEASE ORAL 2 TIMES DAILY WITH MEALS
Status: DISCONTINUED | OUTPATIENT
Start: 2023-02-19 | End: 2023-02-22

## 2023-02-19 RX ADMIN — APIXABAN 2.5 MG: 2.5 TABLET, FILM COATED ORAL at 08:02

## 2023-02-19 RX ADMIN — Medication 10 ML: at 10:02

## 2023-02-19 RX ADMIN — CEFAZOLIN SODIUM 2 G: 2 SOLUTION INTRAVENOUS at 02:02

## 2023-02-19 RX ADMIN — METFORMIN HYDROCHLORIDE 500 MG: 500 TABLET, EXTENDED RELEASE ORAL at 11:02

## 2023-02-19 RX ADMIN — INSULIN DETEMIR 20 UNITS: 100 INJECTION, SOLUTION SUBCUTANEOUS at 08:02

## 2023-02-19 RX ADMIN — TUBERCULIN PURIFIED PROTEIN DERIVATIVE 5 UNITS: 5 INJECTION, SOLUTION INTRADERMAL at 12:02

## 2023-02-19 RX ADMIN — MORPHINE SULFATE 4 MG: 4 INJECTION INTRAVENOUS at 03:02

## 2023-02-19 RX ADMIN — CALCIUM CARBONATE (ANTACID) CHEW TAB 500 MG 500 MG: 500 CHEW TAB at 08:02

## 2023-02-19 RX ADMIN — HYDROCODONE BITARTRATE AND ACETAMINOPHEN 1 TABLET: 5; 325 TABLET ORAL at 02:02

## 2023-02-19 RX ADMIN — Medication 10 ML: at 02:02

## 2023-02-19 RX ADMIN — INSULIN ASPART 2 UNITS: 100 INJECTION, SOLUTION INTRAVENOUS; SUBCUTANEOUS at 12:02

## 2023-02-19 RX ADMIN — OXYCODONE HYDROCHLORIDE AND ACETAMINOPHEN 500 MG: 500 TABLET ORAL at 08:02

## 2023-02-19 RX ADMIN — SODIUM CHLORIDE: 9 INJECTION, SOLUTION INTRAVENOUS at 12:02

## 2023-02-19 RX ADMIN — LISINOPRIL 5 MG: 2.5 TABLET ORAL at 08:02

## 2023-02-19 RX ADMIN — MUPIROCIN: 20 OINTMENT TOPICAL at 09:02

## 2023-02-19 RX ADMIN — PROMETHAZINE HYDROCHLORIDE 12.5 MG: 25 INJECTION INTRAMUSCULAR; INTRAVENOUS at 03:02

## 2023-02-19 RX ADMIN — MUPIROCIN: 20 OINTMENT TOPICAL at 08:02

## 2023-02-19 RX ADMIN — INSULIN ASPART 3 UNITS: 100 INJECTION, SOLUTION INTRAVENOUS; SUBCUTANEOUS at 08:02

## 2023-02-19 RX ADMIN — ONDANSETRON 4 MG: 2 INJECTION INTRAMUSCULAR; INTRAVENOUS at 12:02

## 2023-02-19 RX ADMIN — THERA TABS 1 TABLET: TAB at 08:02

## 2023-02-19 RX ADMIN — HYDROCODONE BITARTRATE AND ACETAMINOPHEN 1 TABLET: 5; 325 TABLET ORAL at 09:02

## 2023-02-19 RX ADMIN — Medication 1000 UNITS: at 08:02

## 2023-02-19 RX ADMIN — Medication 10 ML: at 06:02

## 2023-02-19 RX ADMIN — INSULIN ASPART 2 UNITS: 100 INJECTION, SOLUTION INTRAVENOUS; SUBCUTANEOUS at 05:02

## 2023-02-19 RX ADMIN — METFORMIN HYDROCHLORIDE 500 MG: 500 TABLET, EXTENDED RELEASE ORAL at 05:02

## 2023-02-19 RX ADMIN — MORPHINE SULFATE 4 MG: 4 INJECTION INTRAVENOUS at 09:02

## 2023-02-19 NOTE — ASSESSMENT & PLAN NOTE
Follow urine culture and sensitivity results (growing GNR).  Continue intravenous Rocephin 1 g daily

## 2023-02-19 NOTE — ASSESSMENT & PLAN NOTE
Secondary to femur fracture and post-op.  Transfuse 2 units of PRBC. Follow CBC and transfuse as needed.

## 2023-02-19 NOTE — PLAN OF CARE
1. Patient will perform UE dressing with SBA  2. Patient will perform LE dressing with max A  3. Patient will perform toileting with max A  4. Patient will perform rolling with LB dressing with mod A

## 2023-02-19 NOTE — PLAN OF CARE
[facility]  Initial Discharge Assessment       Primary Care Provider: Primary Doctor No    Admission Diagnosis: Preop cardiovascular exam [Z01.810]  Left hip pain [M25.552]  Chest pain [R07.9]  Closed fracture of left hip, initial encounter [S72.002A]    Admission Date: 2/17/2023  Expected Discharge Date:    The pt confirmed her home address and resides at [facility]. She has a home walker and denied HH. Pt stated that Dr. Parkinson is her PCP and she has PH[employer] insurance. She uses [facility] and stated that she uses [facility] for transportation services. PTs son Yaya Badillo [phone] is a  in [facility]. CM following for therapy recs for discharge.     Discharge Barriers Identified: None    Payor: [employer]ARE / Plan: [employer] / Product Type: Medicare Advantage /     Extended Emergency Contact Information  Primary Emergency Contact: Yaya Badillo  Mobile Phone: [phone]  Relation: Son   needed? No    Discharge Plan A: Skilled Nursing Facility, Rehab  Discharge Plan B: Home with family, Home Health      [id number] - [place], [place] - [address] AT [facility] [address]  [address]  [place] [address]  Phone: [phone] Fax: [phone]      Initial Assessment (most recent)       Adult Discharge Assessment - 02/19/23 1041          Discharge Assessment    Assessment Type Discharge Planning Assessment     Confirmed/corrected address, phone number and insurance Yes     Confirmed Demographics Correct on Facesheet     Source of Information patient     Reason For Admission Fall     People in Home alone     Facility Arrived From: [facility]     Do you expect to return to your current living situation? Yes     Do you have help at home or someone to help you manage your care at home? Yes     Who are your caregiver(s) and their phone number(s)? leonardo Badillo  [phone]     Prior to hospitilization cognitive status: Alert/Oriented     Current cognitive status: Alert/Oriented     Walking or Climbing Stairs ambulation difficulty, requires equipment     Mobility Management walker     Home Layout Able to live on 1st floor     Equipment Currently Used at Home walker, rolling     Readmission within 30 days? No     Patient currently being followed by outpatient case management? No     Do you currently have service(s) that help you manage your care at home? No     Do you take prescription medications? Yes     Do you have prescription coverage? Yes     Is the patient taking medications as prescribed? yes     Who is going to help you get home at discharge? leonardo Badillo [phone]     How do you get to doctors appointments? family or friend will provide     Are you on dialysis? No     Do you take coumadin? No     Discharge Plan A Skilled Nursing Facility;Rehab     Discharge Plan B Home with family;Home Health     DME Needed Upon Discharge  none     Discharge Plan discussed with: Patient     Discharge Barriers Identified None        Physical Activity    On average, how many days per week do you engage in moderate to strenuous exercise (like a brisk walk)? Patient refused     On average, how many minutes do you engage in exercise at this level? Patient refused        Financial Resource Strain    How hard is it for you to pay for the very basics like food, housing, medical care, and heating? Not hard at all        Housing Stability    In the last 12 months, was there a time when you were not able to pay the mortgage or rent on time? No     In the last 12 months, was there a time when you did not have a steady place to sleep or slept in a shelter (including now)? No        Transportation Needs    In the past 12 months, has lack of transportation kept you from medical appointments or from getting medications? No     In the past 12 months, has lack of transportation kept you  from meetings, work, or from getting things needed for daily living? No        Food Insecurity    Within the past 12 months, you worried that your food would run out before you got the money to buy more. Never true     Within the past 12 months, the food you bought just didn't last and you didn't have money to get more. Never true        Stress    Do you feel stress - tense, restless, nervous, or anxious, or unable to sleep at night because your mind is troubled all the time - these days? Patient refused        Social Connections    In a typical week, how many times do you talk on the phone with family, friends, or neighbors? Patient refused     How often do you get together with friends or relatives? Patient refused     How often do you attend Scientology or Scientology services? Patient refused     Do you belong to any clubs or organizations such as Scientology groups, unions, fraternal or athletic groups, or school groups? Patient refused     How often do you attend meetings of the clubs or organizations you belong to? Patient refused     Are you , , , , never , or living with a partner? Patient refused        Alcohol Use    Q1: How often do you have a drink containing alcohol? Patient refused     Q2: How many drinks containing alcohol do you have on a typical day when you are drinking? Patient refused     Q3: How often do you have six or more drinks on one occasion? Patient refused

## 2023-02-19 NOTE — NURSING
Pt's holley was pulled out this am at 6.  Pt has not yet voided on her own.  Bladder scanned pt only got 155mls.  Notified MD Duval.  Orders were just to continue to monitor pt at this time.

## 2023-02-19 NOTE — ASSESSMENT & PLAN NOTE
S/p left femur IMR placement - POD # 1.  Continue to follow Orthopedic recommendations. Surgeon was notified regarding bleeding noted at the dressing.  Needs aggressive incentive spirometry.  Follow hemoglobin and hematocrit closely.  Pain control with PO narcotics and antiemetics as needed.  Physical therapy as per Orthopedics protocol with fall precautions.

## 2023-02-19 NOTE — PT/OT/SLP EVAL
Physical Therapy Evaluation    Patient Name:  Anastasiia Badillo   MRN:  [id number]    Recommendations:     Discharge Recommendations: nursing facility, skilled   Discharge Equipment Recommendations:  (TBD at next level of care)   Barriers to discharge: Decreased caregiver support    Assessment:     Anastasiia Badillo is a 73 y.o. female admitted with a medical diagnosis of Closed left subtrochanteric femur fracture, initial encounter.  She presents with the following impairments/functional limitations: orthopedic precautions, weakness, impaired endurance, impaired balance, pain, gait instability, impaired functional mobility, decreased lower extremity function  .    Rehab Prognosis: Good and Fair; patient would benefit from acute skilled PT services to address these deficits and reach maximum level of function.    Recent Surgery: Procedure(s) (LRB):  INSERTION, INTRAMEDULLARY ROXANNA, FEMUR (hana table -- synthes -- long nail -- have bovie and suction and large bone set) (Left) 1 Day Post-Op    Plan:     During this hospitalization, patient to be seen daily (1-2 x/day) to address the identified rehab impairments via gait training, therapeutic activities, therapeutic exercises and progress toward the following goals:    Plan of Care Expires:  02/20/23    Subjective     Family Comments/goals: not present  Pain/Comfort:  Pain Rating 1: 3/10  Location - Side 1: Left  Location 1: hip  Pain Addressed 1: Reposition, Cessation of Activity, Pre-medicate for activity, Nurse notified  Pain Rating Post-Intervention 1: 3/10    Living Environment:  Alone in apartment  Prior to admission, patients level of function was independent with rollator.  Equipment used at home: rollator.  DME owned (not currently used): none.  Upon discharge, patient will have assistance from ?.    Objective:     Communicated with nurse (Nissa) prior to session.  Patient found supine with peripheral IV, SCD, oxygen, PureWick  upon PT entry to room.    General  Precautions: Standard, fall  Orthopedic Precautions: (LLE 25%)   Braces: N/A  Respiratory Status: Nasal cannula, flow 1 L/min    Exams:  RLE Strength: demonstrates < 3-/5 hip strength for abd/add and SLR  LLE Strength: HIP: abd/add < 2-/5; SLR: ~ 2-/5    Functional Mobility training:  Bed Mobility:     Rolling Right: maximal assistance  Scooting: maximal assistance to scoot in sitting and supine  Supine to Sit: maximal assistance  Sit to Supine: maximal assistance  Transfers:     Sit to Stand:  maximal assistance with rolling walker and x 3 reps  Bed to Chair: attempted but unable due to safety (weakness, balance, effort?)  Gait: attempted but unable      AM-PAC 6 CLICK MOBILITY  Total Score:9       Treatment & Education:  Mobility training as above with cues for technique  SUPINE: SLR (with assist L/R), ankle DF/PF, x 10 reps each  Sit balance training at eob with min to max A and cues  Stand balance training with RW with mod A and cues 25% WB LLE    Patient left HOB elevated with all lines intact, call button in reach, bed alarm on, and SCDs on BLEs .    GOALS:   Multidisciplinary Problems       Physical Therapy Goals          Problem: Physical Therapy    Goal Priority Disciplines Outcome Goal Variances Interventions   Physical Therapy Goal     PT, PT/OT Ongoing, Progressing     Description: Goals to be met by: 2023     Patient will increase functional independence with mobility by performin). Supine to sit with Stand-by Assistance  2). Sit to supine with Stand-by Assistance  3). Sit to stand transfer with Contact Guard Assistance  4). Gait  x > 25 feet with Contact Guard Assistance using Rolling Walker.                          History:     Past Medical History:   Diagnosis Date    Diabetes mellitus, type 2        Past Surgical History:   Procedure Laterality Date    AUGMENTATION OF BREAST         Time Tracking:     PT Received On: 23  PT Start Time: 925     PT Stop Time: 1005  PT Total Time  (min): 40 min     Billable Minutes: Evaluation 15 and Therapeutic Activity 25 02/19/2023

## 2023-02-19 NOTE — SUBJECTIVE & OBJECTIVE
Interval History: s/p left femur IMR placement - POD # 1. Some bleeding noted at hip dressing site. Further drop in Hgb noted.  Currently afebrile.  Denies any chest pain or shortness of breath.  Glycemic control improving.    Review of Systems   Constitutional:  Positive for fatigue.   Musculoskeletal:         Left hip pain   Neurological:  Positive for weakness.   All other systems reviewed and are negative.  Objective:     Vital Signs (Most Recent):  Temp: 98.3 °F (36.8 °C) (02/19/23 0733)  Pulse: 99 (02/19/23 0733)  Resp: 19 (02/19/23 0900)  BP: (!) 171/75 (02/19/23 0733)  SpO2: 99 % (02/19/23 0744)   Vital Signs (24h Range):  Temp:  [96.2 °F (35.7 °C)-98.4 °F (36.9 °C)] 98.3 °F (36.8 °C)  Pulse:  [59-99] 99  Resp:  [11-20] 19  SpO2:  [86 %-100 %] 99 %  BP: ()/(55-83) 171/75     Weight: 67 kg (147 lb 11.3 oz)  Body mass index is 24.58 kg/m².    Intake/Output Summary (Last 24 hours) at 2/19/2023 0931  Last data filed at 2/19/2023 0906  Gross per 24 hour   Intake 3083.96 ml   Output 375 ml   Net 2708.96 ml        Physical Exam  Constitutional:       Appearance: She is well-developed.   HENT:      Head: Normocephalic and atraumatic.   Eyes:      Conjunctiva/sclera: Conjunctivae normal.      Pupils: Pupils are equal, round, and reactive to light.   Neck:      Thyroid: No thyromegaly.      Vascular: No JVD.   Cardiovascular:      Rate and Rhythm: Normal rate and regular rhythm.      Heart sounds: No murmur heard.    No friction rub. No gallop.   Pulmonary:      Effort: Pulmonary effort is normal.      Breath sounds: Normal breath sounds.   Abdominal:      General: Bowel sounds are normal. There is no distension.      Palpations: Abdomen is soft. There is no mass.      Tenderness: There is no abdominal tenderness.   Musculoskeletal:         General: Normal range of motion.      Cervical back: Neck supple.      Comments: Left hip surgical dressing soaked with blood.    Skin:     General: Skin is warm and dry.    Neurological:      Mental Status: She is oriented to person, place, and time.      Cranial Nerves: No cranial nerve deficit.   Psychiatric:         Behavior: Behavior normal.       Significant Labs: All pertinent labs within the past 24 hours have been reviewed.  CBC:   Recent Labs   Lab 02/17/23  1415 02/18/23  0457 02/19/23  0506   WBC 11.18 11.35 7.76   HGB 11.0* 9.1* 7.1*   HCT 33.0* 27.3* 21.8*    174 166       CMP:   Recent Labs   Lab 02/17/23  1415 02/17/23  2250 02/18/23  0457 02/19/23  0506    135* 133* 132*   K 5.2* 4.4 4.4 4.9   CL 97 102 100 98   CO2 22* 25 24 23   * 261* 162* 251*   BUN 26* 28* 30* 33*   CREATININE 1.1 1.0 1.1 1.2   CALCIUM 9.5 8.5* 8.8 8.3*   PROT 7.0  --  5.8* 5.1*   ALBUMIN 4.0  --  3.3* 2.8*   BILITOT 1.4*  --  0.7 0.4   ALKPHOS 71  --  51* 44*   AST 31  --  27 27   ALT 25  --  20 14   ANIONGAP 18* 8 9 11       Coagulation: No results for input(s): PT, INR, APTT in the last 48 hours.    Significant Imaging:   CXR: Few strands of atelectasis bilaterally otherwise negative chest     Left hip x-ray:  Mildly angulated inter trochanteric fracture of the left hip.

## 2023-02-19 NOTE — PLAN OF CARE
Problem: Physical Therapy  Goal: Physical Therapy Goal  Description: Goals to be met by: 2023     Patient will increase functional independence with mobility by performin). Supine to sit with Stand-by Assistance  2). Sit to supine with Stand-by Assistance  3). Sit to stand transfer with Contact Guard Assistance  4). Gait  x > 25 feet with Contact Guard Assistance using Rolling Walker.     Outcome: Ongoing, Progressing

## 2023-02-19 NOTE — PROGRESS NOTES
[facility] Hospital Medicine  Progress Note    Patient Name: Anastasiia Badillo  MRN: [id number]  Patient Class: IP- Inpatient   Admission Date: 2/17/2023  Length of Stay: 2 days  Attending Physician: Arely Duval MD  Primary Care Provider: Raji Parkinson MD        Subjective:     Principal Problem:Closed left subtrochanteric femur fracture, initial encounter        HPI:  Patient is a 73-year-old female with past medical history significant for hypertension and diabetes mellitus type 2 who is being admitted to Hospital Medicine from [facility] Emergency room status post fall which patient suffered last evening at home with complaints of left hip pain.  Got a around midnight after waking up from sleep and somehow lost her balance while using walker and landed on her left hip.  Patient was brought by paramedics.  Patient remained on the floor for more than 12 hours until friend arrived.  There is with obvious left leg shortening and external rotation noted.  Patient is unable to bear weight.  Patient denies any head injury, loss of consciousness or any focal neuro deficits.  No recent fevers or chills reported.  Patient denies any chest pain or shortness of breath.      Overview/Hospital Course:  No notes on file    Interval History: s/p left femur IMR placement - POD # 1. Some bleeding noted at hip dressing site. Further drop in Hgb noted.  Currently afebrile.  Denies any chest pain or shortness of breath.  Glycemic control improving.    Review of Systems   Constitutional:  Positive for fatigue.   Musculoskeletal:         Left hip pain   Neurological:  Positive for weakness.   All other systems reviewed and are negative.  Objective:     Vital Signs (Most Recent):  Temp: 98.3 °F (36.8 °C) (02/19/23 0733)  Pulse: 99 (02/19/23 0733)  Resp: 19 (02/19/23 0900)  BP: (!) 171/75 (02/19/23 0733)  SpO2: 99 % (02/19/23 0744)   Vital Signs (24h Range):  Temp:  [96.2 °F (35.7 °C)-98.4 °F  (36.9 °C)] 98.3 °F (36.8 °C)  Pulse:  [59-99] 99  Resp:  [11-20] 19  SpO2:  [86 %-100 %] 99 %  BP: ()/(55-83) 171/75     Weight: 67 kg (147 lb 11.3 oz)  Body mass index is 24.58 kg/m².    Intake/Output Summary (Last 24 hours) at 2/19/2023 0931  Last data filed at 2/19/2023 0906  Gross per 24 hour   Intake 3083.96 ml   Output 375 ml   Net 2708.96 ml        Physical Exam  Constitutional:       Appearance: She is well-developed.   HENT:      Head: Normocephalic and atraumatic.   Eyes:      Conjunctiva/sclera: Conjunctivae normal.      Pupils: Pupils are equal, round, and reactive to light.   Neck:      Thyroid: No thyromegaly.      Vascular: No JVD.   Cardiovascular:      Rate and Rhythm: Normal rate and regular rhythm.      Heart sounds: No murmur heard.    No friction rub. No gallop.   Pulmonary:      Effort: Pulmonary effort is normal.      Breath sounds: Normal breath sounds.   Abdominal:      General: Bowel sounds are normal. There is no distension.      Palpations: Abdomen is soft. There is no mass.      Tenderness: There is no abdominal tenderness.   Musculoskeletal:         General: Normal range of motion.      Cervical back: Neck supple.      Comments: Left hip surgical dressing soaked with blood.    Skin:     General: Skin is warm and dry.   Neurological:      Mental Status: She is oriented to person, place, and time.      Cranial Nerves: No cranial nerve deficit.   Psychiatric:         Behavior: Behavior normal.       Significant Labs: All pertinent labs within the past 24 hours have been reviewed.  CBC:   Recent Labs   Lab 02/17/23  1415 02/18/23  0457 02/19/23  0506   WBC 11.18 11.35 7.76   HGB 11.0* 9.1* 7.1*   HCT 33.0* 27.3* 21.8*    174 166       CMP:   Recent Labs   Lab 02/17/23  1415 02/17/23  2250 02/18/23  0457 02/19/23  0506    135* 133* 132*   K 5.2* 4.4 4.4 4.9   CL 97 102 100 98   CO2 22* 25 24 23   * 261* 162* 251*   BUN 26* 28* 30* 33*   CREATININE 1.1 1.0 1.1 1.2    CALCIUM 9.5 8.5* 8.8 8.3*   PROT 7.0  --  5.8* 5.1*   ALBUMIN 4.0  --  3.3* 2.8*   BILITOT 1.4*  --  0.7 0.4   ALKPHOS 71  --  51* 44*   AST 31  --  27 27   ALT 25  --  20 14   ANIONGAP 18* 8 9 11       Coagulation: No results for input(s): PT, INR, APTT in the last 48 hours.    Significant Imaging:   CXR: Few strands of atelectasis bilaterally otherwise negative chest     Left hip x-ray:  Mildly angulated inter trochanteric fracture of the left hip.      Assessment/Plan:      Acute blood loss anemia  Secondary to femur fracture and post-op.  Transfuse 2 units of PRBC. Follow CBC and transfuse as needed.       ACP (advance care planning)  Advance Care Planning     Date: 02/17/2023    Living Will  During this visit, I engaged the patient  in the advance care planning process.  The patient and I reviewed the role for advance directives and their purpose in directing future healthcare if the patient's unable to speak for herself.  At this point in time, the patient does have full decision-making capacity.  We discussed different extreme health states that she could experience, and reviewed what kind of medical care she would want in those situations.  The patient communicated that if she were comatose and had little chance of a meaningful recovery, she would want machines/life-sustaining treatments used.  I spent a total of 16 minutes engaging the patient in this advance care planning discussion.                Dehydration  Resolved. DC IVF.      Urinary tract infection without hematuria  Follow urine culture and sensitivity results (growing GNR).  Continue intravenous Rocephin 1 g daily      Metabolic acidosis  Resolved. Will resume Metformin.       Hyperkalemia  Tele monitoring.  Continue IV fluid hydration.  Repeat BMP this evening.      Essential hypertension  Continue lisinopril 5 mg daily.  Tele monitoring.  Continue intravenous hydralazine 10 mg every 2 hours as needed for systolic blood pressure in excess of  160 mmHg.      Type 2 diabetes mellitus with hyperglycemia  Patient's FSGs are uncontrolled due to hyperglycemia on current medication regimen.  Last A1c reviewed-   Lab Results   Component Value Date    HGBA1C 8.9 (H) 02/17/2023     Most recent fingerstick glucose reviewed-   Recent Labs   Lab 02/17/23  1424 02/17/23  1700 02/17/23  2020 02/18/23  0759   POCTGLUCOSE 488* 320* 280* 136*     Current correctional scale  Low  Maintain anti-hyperglycemic dose as follows-   Antihyperglycemics (From admission, onward)    Start     Stop Route Frequency Ordered    02/17/23 1727  insulin aspart U-100 pen 0-5 Units         -- SubQ Before meals & nightly PRN 02/17/23 1628    02/17/23 1630  insulin detemir U-100 pen 20 Units         -- SubQ Daily 02/17/23 1628        Hold Oral hypoglycemics while patient is in the hospital.    Closed fracture of neck of left femur  S/p left femur IMR placement - POD # 1.  Continue to follow Orthopedic recommendations. Surgeon was notified regarding bleeding noted at the dressing.  Needs aggressive incentive spirometry.  Follow hemoglobin and hematocrit closely.  Pain control with PO narcotics and antiemetics as needed.  Physical therapy as per Orthopedics protocol with fall precautions.          Patient will benefit with SNF placement. Consulting SW for assistance, place PPD.   VTE Risk Mitigation (From admission, onward)         Ordered     apixaban tablet 2.5 mg  2 times daily         02/18/23 1358     Reason for No Pharmacological VTE Prophylaxis  Once        Question:  Reasons:  Answer:  Physician Provided (leave comment)    02/17/23 1628     IP VTE HIGH RISK PATIENT  Once         02/17/23 1628     Place sequential compression device  Until discontinued         02/17/23 1628                Discharge Planning   CITLALI: 2/21/23     Code Status: Full Code   Is the patient medically ready for discharge?:     Reason for patient still in hospital (select all that apply): Patient trending condition  and Consult recommendations  Discharge Plan A: Skilled Nursing Facility, Rehab                  Arely Duval MD  Department of Hospital Medicine   [facility]

## 2023-02-19 NOTE — NURSING
Patient with complant of pain and was medicated as per prn orders with Morphine and Hydrocodone with good relief. Patient complained pof leg spasms and shooting pain running down left left, notified NP who gave one time dose of Baclofen which patient described as effective with relief. She had nausea and vomiting, medicated with Zofran which was unsuccessful with relief, new order for phenagren which provided good relief from the nausea and vomiting. FC removed at 6am, patient tolerated well. Instructed would need to void within the next 6hr andpurewick external catheter placed.

## 2023-02-19 NOTE — PT/OT/SLP EVAL
Occupational Therapy   Evaluation    Name: Anastasiia Badillo  MRN: [id number]  Admitting Diagnosis: Closed left subtrochanteric femur fracture, initial encounter  Recent Surgery: Procedure(s) (LRB):  INSERTION, INTRAMEDULLARY ROXANNA, FEMUR (hana table -- synthes -- long nail -- have bovie and suction and large bone set) (Left) 1 Day Post-Op    Recommendations:     Discharge Recommendations: rehabilitation facility  Discharge Equipment Recommendations:  walker, rolling, grab bar, bedside commode, bath bench, shower chair  Barriers to discharge:       Assessment:     Anastasiia Badillo is a 73 y.o. female with a medical diagnosis of Closed left subtrochanteric femur fracture, initial encounter.  She presents with decreased ADL and functional transfers. Performance deficits affecting function: weakness, impaired self care skills, impaired functional mobility, pain.      Rehab Prognosis: Good; patient would benefit from acute skilled OT services to address these deficits and reach maximum level of function.       Plan:     Patient to be seen 3 x/week to address the above listed problems via self-care/home management, therapeutic activities, therapeutic exercises  Plan of Care Expires: 02/26/23  Plan of Care Reviewed with: patient    Subjective     Chief Complaint: decreased ADL  Patient/Family Comments/goals: return to OF and living environment    Occupational Profile:  Living Environment: [facility]  Previous level of function: I with all ADL and transfers  Roles and Routines: basic ADL  Equipment Used at Home: walker, rolling, grab bar  Assistance upon Discharge: [facility] staff    Pain/Comfort:  Pain Rating 1: 9/10 (left hip, intermittent)  Pain Addressed 1: Other (see comments) (nurse came in to give meds from around [phone] and asked if patient wanted IV pain meds)    Patients cultural, spiritual, Quaker conflicts given the current situation: no    Objective:     Communicated  with: nurse yolie prior to session.  Patient found HOB elevated with peripheral IV, oxygen, SCD upon OT entry to room.    General Precautions: Standard, fall, other (see comments) (25% weightbearing left leg)  Orthopedic Precautions:    Braces:    Respiratory Status: Nasal cannula, flow 1 L/min    Occupational Performance:    Bed Mobility:    Patient completed Rolling/Turning to Right with maximal assistance    Functional Mobility/Transfers:  Not tested  Activities of Daily Living:  Feeding:  modified independence required  UE dressing min A  LE dressing dependent  Toileting dependent    Cognitive/Visual Perceptual:  Cognitive/Psychosocial Skills:     -       Oriented to: Person, Place, Time, and Situation   -       Follows Commands/attention:Follows one-step commands  -       Communication: clear/fluent  -       Memory: No Deficits noted  -       Safety awareness/insight to disability: intact   -       Mood/Affect/Coping skills/emotional control: Appropriate to situation  Visual/Perceptual:      -Intact seems to be the status    Physical Exam:  Postural examination/scapula alignment:    -       Rounded shoulders  -       Forward head  Skin integrity: Visible skin intact  Edema:  None noted  Sensation:    -       Intact  Motor Planning:    -       WNL  Dominant hand:    -       not tested  Upper Extremity Range of Motion:     -       Right Upper Extremity: WNL  -       Left Upper Extremity: WNL  Upper Extremity Strength:    -       Right Upper Extremity: WFL  -       Left Upper Extremity: WFL  Fine Motor Coordination:    -       Intact  Gross motor coordination:   WFL    AMPAC 6 Click ADL:  AMPAC Total Score: 16    Treatment & Education:  Evaluation, basic role of OT, need to move left leg via AROM as tolerated-pain free frequently when in bed    Patient left HOB elevated with all lines intact, call button in reach, and bed alarm off    GOALS:   Multidisciplinary Problems       Occupational Therapy Goals           Problem: Occupational Therapy    Goal Priority Disciplines Outcome Interventions   Occupational Therapy Goal     OT, PT/OT                         History:     Past Medical History:   Diagnosis Date    Diabetes mellitus, type 2          Past Surgical History:   Procedure Laterality Date    AUGMENTATION OF BREAST         Time Tracking:     OT Date of Treatment: 02/19/23  OT Start Time: 0800  OT Stop Time: 0835  OT Total Time (min): 35 min    Billable Minutes:Evaluation 30    2/19/2023

## 2023-02-20 PROBLEM — F17.200 NICOTINE ADDICTION: Status: ACTIVE | Noted: 2023-02-20

## 2023-02-20 LAB
ALBUMIN SERPL BCP-MCNC: 3 G/DL (ref 3.5–5.2)
ALP SERPL-CCNC: 48 U/L (ref 55–135)
ALT SERPL W/O P-5'-P-CCNC: 10 U/L (ref 10–44)
ANION GAP SERPL CALC-SCNC: 9 MMOL/L (ref 8–16)
AST SERPL-CCNC: 29 U/L (ref 10–40)
BACTERIA UR CULT: ABNORMAL
BILIRUB SERPL-MCNC: 0.9 MG/DL (ref 0.1–1)
BUN SERPL-MCNC: 26 MG/DL (ref 8–23)
CALCIUM SERPL-MCNC: 8.9 MG/DL (ref 8.7–10.5)
CHLORIDE SERPL-SCNC: 98 MMOL/L (ref 95–110)
CO2 SERPL-SCNC: 26 MMOL/L (ref 23–29)
CREAT SERPL-MCNC: 0.8 MG/DL (ref 0.5–1.4)
ERYTHROCYTE [DISTWIDTH] IN BLOOD BY AUTOMATED COUNT: 14.9 % (ref 11.5–14.5)
EST. GFR  (NO RACE VARIABLE): >60 ML/MIN/1.73 M^2
GLUCOSE SERPL-MCNC: 158 MG/DL (ref 70–110)
HCT VFR BLD AUTO: 30.6 % (ref 37–48.5)
HGB BLD-MCNC: 10.3 G/DL (ref 12–16)
MAGNESIUM SERPL-MCNC: 1.9 MG/DL (ref 1.6–2.6)
MCH RBC QN AUTO: 31.8 PG (ref 27–31)
MCHC RBC AUTO-ENTMCNC: 33.7 G/DL (ref 32–36)
MCV RBC AUTO: 94 FL (ref 82–98)
PHOSPHATE SERPL-MCNC: 2.4 MG/DL (ref 2.7–4.5)
PLATELET # BLD AUTO: 184 K/UL (ref 150–450)
PMV BLD AUTO: 11.1 FL (ref 9.2–12.9)
POCT GLUCOSE: 178 MG/DL (ref 70–110)
POCT GLUCOSE: 197 MG/DL (ref 70–110)
POCT GLUCOSE: 240 MG/DL (ref 70–110)
POCT GLUCOSE: 250 MG/DL (ref 70–110)
POCT GLUCOSE: 87 MG/DL (ref 70–110)
POTASSIUM SERPL-SCNC: 4.5 MMOL/L (ref 3.5–5.1)
PROT SERPL-MCNC: 5.8 G/DL (ref 6–8.4)
RBC # BLD AUTO: 3.24 M/UL (ref 4–5.4)
SODIUM SERPL-SCNC: 133 MMOL/L (ref 136–145)
WBC # BLD AUTO: 7.5 K/UL (ref 3.9–12.7)

## 2023-02-20 PROCEDURE — 27000221 HC OXYGEN, UP TO 24 HOURS

## 2023-02-20 PROCEDURE — 97110 THERAPEUTIC EXERCISES: CPT

## 2023-02-20 PROCEDURE — 83735 ASSAY OF MAGNESIUM: CPT | Performed by: ORTHOPAEDIC SURGERY

## 2023-02-20 PROCEDURE — 84100 ASSAY OF PHOSPHORUS: CPT | Performed by: ORTHOPAEDIC SURGERY

## 2023-02-20 PROCEDURE — 97535 SELF CARE MNGMENT TRAINING: CPT

## 2023-02-20 PROCEDURE — 25000003 PHARM REV CODE 250: Performed by: INTERNAL MEDICINE

## 2023-02-20 PROCEDURE — 97530 THERAPEUTIC ACTIVITIES: CPT

## 2023-02-20 PROCEDURE — 99406 BEHAV CHNG SMOKING 3-10 MIN: CPT

## 2023-02-20 PROCEDURE — 36415 COLL VENOUS BLD VENIPUNCTURE: CPT | Performed by: ORTHOPAEDIC SURGERY

## 2023-02-20 PROCEDURE — 85027 COMPLETE CBC AUTOMATED: CPT | Performed by: ORTHOPAEDIC SURGERY

## 2023-02-20 PROCEDURE — 80053 COMPREHEN METABOLIC PANEL: CPT | Performed by: ORTHOPAEDIC SURGERY

## 2023-02-20 PROCEDURE — 63600175 PHARM REV CODE 636 W HCPCS: Performed by: ORTHOPAEDIC SURGERY

## 2023-02-20 PROCEDURE — A4216 STERILE WATER/SALINE, 10 ML: HCPCS | Performed by: ORTHOPAEDIC SURGERY

## 2023-02-20 PROCEDURE — 12000002 HC ACUTE/MED SURGE SEMI-PRIVATE ROOM

## 2023-02-20 PROCEDURE — 94761 N-INVAS EAR/PLS OXIMETRY MLT: CPT

## 2023-02-20 PROCEDURE — 25000003 PHARM REV CODE 250: Performed by: ORTHOPAEDIC SURGERY

## 2023-02-20 PROCEDURE — 94799 UNLISTED PULMONARY SVC/PX: CPT

## 2023-02-20 RX ORDER — BISACODYL 10 MG
10 SUPPOSITORY, RECTAL RECTAL ONCE
Status: COMPLETED | OUTPATIENT
Start: 2023-02-20 | End: 2023-02-20

## 2023-02-20 RX ORDER — DOCUSATE SODIUM 100 MG/1
100 CAPSULE, LIQUID FILLED ORAL 2 TIMES DAILY
Status: DISCONTINUED | OUTPATIENT
Start: 2023-02-20 | End: 2023-02-23 | Stop reason: HOSPADM

## 2023-02-20 RX ORDER — NITROFURANTOIN 25; 75 MG/1; MG/1
100 CAPSULE ORAL EVERY 12 HOURS
Status: DISCONTINUED | OUTPATIENT
Start: 2023-02-20 | End: 2023-02-23 | Stop reason: HOSPADM

## 2023-02-20 RX ADMIN — SODIUM PHOSPHATE, MONOBASIC, MONOHYDRATE AND SODIUM PHOSPHATE, DIBASIC, ANHYDROUS 30 MMOL: 276; 142 INJECTION, SOLUTION INTRAVENOUS at 12:02

## 2023-02-20 RX ADMIN — INSULIN ASPART 2 UNITS: 100 INJECTION, SOLUTION INTRAVENOUS; SUBCUTANEOUS at 05:02

## 2023-02-20 RX ADMIN — BISACODYL 10 MG: 10 SUPPOSITORY RECTAL at 07:02

## 2023-02-20 RX ADMIN — CALCIUM CARBONATE (ANTACID) CHEW TAB 500 MG 500 MG: 500 CHEW TAB at 09:02

## 2023-02-20 RX ADMIN — NITROFURANTOIN (MONOHYDRATE/MACROCRYSTALS) 100 MG: 75; 25 CAPSULE ORAL at 09:02

## 2023-02-20 RX ADMIN — Medication 10 ML: at 10:02

## 2023-02-20 RX ADMIN — HYDROCODONE BITARTRATE AND ACETAMINOPHEN 1 TABLET: 5; 325 TABLET ORAL at 02:02

## 2023-02-20 RX ADMIN — APIXABAN 2.5 MG: 2.5 TABLET, FILM COATED ORAL at 08:02

## 2023-02-20 RX ADMIN — DOCUSATE SODIUM 100 MG: 100 CAPSULE, LIQUID FILLED ORAL at 08:02

## 2023-02-20 RX ADMIN — MUPIROCIN: 20 OINTMENT TOPICAL at 09:02

## 2023-02-20 RX ADMIN — HYDROCODONE BITARTRATE AND ACETAMINOPHEN 1 TABLET: 5; 325 TABLET ORAL at 08:02

## 2023-02-20 RX ADMIN — MORPHINE SULFATE 4 MG: 4 INJECTION INTRAVENOUS at 03:02

## 2023-02-20 RX ADMIN — INSULIN ASPART 2 UNITS: 100 INJECTION, SOLUTION INTRAVENOUS; SUBCUTANEOUS at 12:02

## 2023-02-20 RX ADMIN — OXYCODONE HYDROCHLORIDE AND ACETAMINOPHEN 500 MG: 500 TABLET ORAL at 09:02

## 2023-02-20 RX ADMIN — Medication 10 ML: at 06:02

## 2023-02-20 RX ADMIN — LISINOPRIL 5 MG: 2.5 TABLET ORAL at 09:02

## 2023-02-20 RX ADMIN — MUPIROCIN: 20 OINTMENT TOPICAL at 08:02

## 2023-02-20 RX ADMIN — METFORMIN HYDROCHLORIDE 500 MG: 500 TABLET, EXTENDED RELEASE ORAL at 09:02

## 2023-02-20 RX ADMIN — METFORMIN HYDROCHLORIDE 500 MG: 500 TABLET, EXTENDED RELEASE ORAL at 05:02

## 2023-02-20 RX ADMIN — CALCIUM CARBONATE (ANTACID) CHEW TAB 500 MG 500 MG: 500 CHEW TAB at 08:02

## 2023-02-20 RX ADMIN — HYDROCODONE BITARTRATE AND ACETAMINOPHEN 1 TABLET: 5; 325 TABLET ORAL at 05:02

## 2023-02-20 RX ADMIN — APIXABAN 2.5 MG: 2.5 TABLET, FILM COATED ORAL at 09:02

## 2023-02-20 RX ADMIN — Medication 1000 UNITS: at 09:02

## 2023-02-20 NOTE — RESPIRATORY THERAPY
02/19/23 1928   Patient Assessment/Suction   Level of Consciousness (AVPU) alert   Respiratory Effort Normal;Unlabored   Expansion/Accessory Muscles/Retractions expansion symmetric;no use of accessory muscles   PRE-TX-O2   Device (Oxygen Therapy) room air   SpO2 96 %   Pulse Oximetry Type Intermittent   $ Pulse Oximetry - Multiple Charge Pulse Oximetry - Multiple   Pulse 94   Resp 18   Incentive Spirometer   $ Incentive Spirometer Charges done with encouragement   Administration (IS) proper technique demonstrated   Number of Repetitions (IS) 8   Level Incentive Spirometer (mL) 1000   Patient Tolerance (IS) good;no adverse signs/symptoms present

## 2023-02-20 NOTE — SUBJECTIVE & OBJECTIVE
Interval History: s/p left femur IMR placement - POD # 2. S/p 2 PRBC. Currently afebrile.  Denies any chest pain or shortness of breath.  Glycemic control improving.    Review of Systems   Constitutional:  Positive for fatigue.   Musculoskeletal:         Left hip pain   Neurological:  Positive for weakness.   All other systems reviewed and are negative.  Objective:     Vital Signs (Most Recent):  Temp: 97.8 °F (36.6 °C) (02/20/23 0712)  Pulse: 86 (02/20/23 0712)  Resp: 16 (02/20/23 0712)  BP: (!) 140/66 (02/20/23 0712)  SpO2: 98 % (02/20/23 0712)   Vital Signs (24h Range):  Temp:  [97.8 °F (36.6 °C)-100 °F (37.8 °C)] 97.8 °F (36.6 °C)  Pulse:  [] 86  Resp:  [16-20] 16  SpO2:  [84 %-98 %] 98 %  BP: (112-140)/(56-74) 140/66     Weight: 67 kg (147 lb 11.3 oz)  Body mass index is 24.58 kg/m².    Intake/Output Summary (Last 24 hours) at 2/20/2023 0915  Last data filed at 2/20/2023 0602  Gross per 24 hour   Intake 2218.75 ml   Output 600 ml   Net 1618.75 ml        Physical Exam  Constitutional:       Appearance: She is well-developed.   HENT:      Head: Normocephalic and atraumatic.   Eyes:      Conjunctiva/sclera: Conjunctivae normal.      Pupils: Pupils are equal, round, and reactive to light.   Neck:      Thyroid: No thyromegaly.      Vascular: No JVD.   Cardiovascular:      Rate and Rhythm: Normal rate and regular rhythm.      Heart sounds: No murmur heard.    No friction rub. No gallop.   Pulmonary:      Effort: Pulmonary effort is normal.      Breath sounds: Normal breath sounds.   Abdominal:      General: Bowel sounds are normal. There is no distension.      Palpations: Abdomen is soft. There is no mass.      Tenderness: There is no abdominal tenderness.   Musculoskeletal:         General: Normal range of motion.      Cervical back: Neck supple.      Comments: Left hip surgical dressing soaked with blood.    Skin:     General: Skin is warm and dry.   Neurological:      Mental Status: She is oriented to  person, place, and time.      Cranial Nerves: No cranial nerve deficit.   Psychiatric:         Behavior: Behavior normal.       Significant Labs: All pertinent labs within the past 24 hours have been reviewed.  CBC:   Recent Labs   Lab 02/19/23  0506 02/20/23  0535   WBC 7.76 7.50   HGB 7.1* 10.3*   HCT 21.8* 30.6*    184       CMP:   Recent Labs   Lab 02/19/23  0506 02/20/23  0535   * 133*   K 4.9 4.5   CL 98 98   CO2 23 26   * 158*   BUN 33* 26*   CREATININE 1.2 0.8   CALCIUM 8.3* 8.9   PROT 5.1* 5.8*   ALBUMIN 2.8* 3.0*   BILITOT 0.4 0.9   ALKPHOS 44* 48*   AST 27 29   ALT 14 10   ANIONGAP 11 9       Coagulation: No results for input(s): PT, INR, APTT in the last 48 hours.    Significant Imaging:   CXR: Few strands of atelectasis bilaterally otherwise negative chest     Left hip x-ray:  Mildly angulated inter trochanteric fracture of the left hip.

## 2023-02-20 NOTE — CARE UPDATE
02/20/23 0805   Tobacco Cessation Intervention   Do you use any type of tobacco product? Yes   Are you interested in quitting use of tobacco products? Thinking about quitting   Are you interested in Nicotine Replacement for symptom relief? No   $ Smoking Cessation Charges Smoking Cessation - Intermediate (CTTS)     Patient states that she lives in non-smoking facility and she only goes out to smoke 3 times a day. Thinking about quitting all together since she's been in the hospital.  Nicotine patch not needed.

## 2023-02-20 NOTE — ASSESSMENT & PLAN NOTE
Smoking cessation counseling performed. Dangers of cigarette smoking were reviewed with patient in detail and patient was encouraged to quit. Nicotine replacement options were discussed for > 10 minutes.

## 2023-02-20 NOTE — ASSESSMENT & PLAN NOTE
Secondary to femur fracture and post-op.  S/p 2 units of PRBC transfusion on 2/19/23. Follow CBC and transfuse as needed.

## 2023-02-20 NOTE — PT/OT/SLP PROGRESS
Physical Therapy Treatment    Patient Name:  Anastasiia Badillo   MRN:  [id number]    Recommendations:     Discharge Recommendations: nursing facility, skilled  Discharge Equipment Recommendations:  (TBD at next level of care)    Assessment:     Anastasiia Badillo is a 73 y.o. female admitted with a medical diagnosis of Closed left subtrochanteric femur fracture, initial encounter.  She presents with the following impairments/functional limitations: weakness, impaired endurance, impaired balance, gait instability, impaired functional mobility, decreased lower extremity function, orthopedic precautions  .    Rehab Prognosis: Good and better effort today ; patient would benefit from acute skilled PT services to address these deficits and reach maximum level of function.    Recent Surgery: Procedure(s) (LRB):  INSERTION, INTRAMEDULLARY ROXANNA, FEMUR (hana table -- synthes -- long nail -- have bovie and suction and large bone set) (Left) 2 Days Post-Op    Plan:     During this hospitalization, patient to be seen BID to address the identified rehab impairments via gait training, therapeutic activities, therapeutic exercises and progress toward the following goals:    Plan of Care Expires:  02/20/23    Subjective     Patient/Family Comments/goals: agrees to get back to bed  Pain/Comfort:  Pain Rating Post-Intervention 1: 6/10  Location - Side 2: Left  Location 2: hip  Pain Addressed 2: Pre-medicate for activity, Reposition, Cessation of Activity  Pain Rating Post-Intervention 2: 6/10      Objective:     Communicated with RN (Laurie) prior to session.  Patient found up in chair with telemetry, oxygen (chair alarm) upon PT entry to room.     General Precautions: Standard, fall  Orthopedic Precautions:  (25% WB LLE)  Braces: N/A  Respiratory Status: Nasal cannula, flow   L/min     Functional Mobility training:  Bed Mobility:     Scooting: maximal assistance, of 2 persons, and to scoot up in bed  Sit to Supine: maximal assistance  and of 2 persons  Transfers:     Sit to Stand:  moderate assistance with rolling walker and x 2 reps  Bed to Chair: moderate assistance with  rolling walker  using  Stand Pivot and 25% WB LLE  Toilet Transfer: moderate assistance with  rolling walker  using  Stand Pivot  Gait: pivot 2' x 1, 4' x 1 with RW 25% WB LLE with mod A and cues      AM-PAC 6 CLICK MOBILITY  Turning over in bed (including adjusting bedclothes, sheets and blankets)?: 2  Sitting down on and standing up from a chair with arms (e.g., wheelchair, bedside commode, etc.): 2  Moving from lying on back to sitting on the side of the bed?: 2  Moving to and from a bed to a chair (including a wheelchair)?: 2  Need to walk in hospital room?: 1  Climbing 3-5 steps with a railing?: 1  Basic Mobility Total Score: 10       Treatment & Education:  Mobility training as above with cues for technique    Patient left supine with all lines intact, call button in reach, bed alarm on, charge RN (Cindy) present, and SCDs on BLEs .    GOALS:   Multidisciplinary Problems       Physical Therapy Goals          Problem: Physical Therapy    Goal Priority Disciplines Outcome Goal Variances Interventions   Physical Therapy Goal     PT, PT/OT Ongoing, Progressing     Description: Goals to be met by: 2023     Patient will increase functional independence with mobility by performin). Supine to sit with Stand-by Assistance  2). Sit to supine with Stand-by Assistance  3). Sit to stand transfer with Contact Guard Assistance  4). Gait  x > 25 feet with Contact Guard Assistance using Rolling Walker.                          Time Tracking:     PT Received On: 23  PT Start Time: 1402     PT Stop Time: 1428  PT Total Time (min): 26 min     Billable Minutes: Therapeutic Activity 15    Treatment Type: Treatment  PT/PTA: PT     PTA Visit Number: 0     2023

## 2023-02-20 NOTE — ASSESSMENT & PLAN NOTE
S/p left femur IMR placement - POD # 2.  Continue to follow Orthopedic recommendations.   Needs aggressive incentive spirometry.  Follow hemoglobin and hematocrit closely.  Pain control with PO narcotics and antiemetics as needed.  Physical therapy as per Orthopedics protocol with fall precautions.

## 2023-02-20 NOTE — PT/OT/SLP PROGRESS
Physical Therapy Treatment    Patient Name:  Anastasiia Badillo   MRN:  [id number]    Recommendations:     Discharge Recommendations: nursing facility, skilled  Discharge Equipment Recommendations:  (TBD at next level of care)    Assessment:     Anastasiia Badillo is a 73 y.o. female admitted with a medical diagnosis of Closed left subtrochanteric femur fracture, initial encounter.  She presents with the following impairments/functional limitations: orthopedic precautions, weakness, impaired endurance, pain, impaired balance, gait instability, impaired functional mobility, decreased lower extremity function  .    Rehab Prognosis: Good and Fair; patient would benefit from acute skilled PT services to address these deficits and reach maximum level of function.    Recent Surgery: Procedure(s) (LRB):  INSERTION, INTRAMEDULLARY ROXANNA, FEMUR (hana table -- synthes -- long nail -- have bovie and suction and large bone set) (Left) 2 Days Post-Op    Plan:     During this hospitalization, patient to be seen BID to address the identified rehab impairments via gait training, therapeutic activities, therapeutic exercises and progress toward the following goals:    Plan of Care Expires:  02/20/23    Subjective     Chief Complaint: pain  Patient/Family goals: none stated  Pain/Comfort:  Pain Rating Post-Intervention 1: 6/10  Location - Side 2: Left  Location 2: hip  Pain Addressed 2: Pre-medicate for activity, Reposition, Cessation of Activity  Pain Rating Post-Intervention 2: 6/10      Objective:     Communicated with charge RN (Cindy) prior to session.  Patient found HOB elevated with SCD, oxygen, bed alarm, telemetry upon PT entry to room.     General Precautions: Standard, fall  Orthopedic Precautions:  (LLE 25%)  Braces: N/A  Respiratory Status: Nasal cannula, flow 1.75 L/min     Functional Mobility training:  Bed Mobility:     Rolling Right: maximal assistance  Scooting: maximal assistance  Supine to Sit: maximal  assistance  Transfers:     Sit to Stand:  maximal assistance with rolling walker and x 3 reps  Bed to Chair: moderate assistance, maximal assistance, and of 2 persons with  rolling walker  using  Stand Pivot and 25%WB LLE  Gait: pivot 2' with RW with mod/max A 25%WB LLE      AM-PAC 6 CLICK MOBILITY  Turning over in bed (including adjusting bedclothes, sheets and blankets)?: 2  Sitting down on and standing up from a chair with arms (e.g., wheelchair, bedside commode, etc.): 2  Moving from lying on back to sitting on the side of the bed?: 2  Moving to and from a bed to a chair (including a wheelchair)?: 2  Need to walk in hospital room?: 1  Climbing 3-5 steps with a railing?: 1  Basic Mobility Total Score: 10       Treatment & Education:  Mobility training as above with ceus for technique  Instructed to not exceed 25% WB LLE  SUPINE: SLR (with assist), ankle DF/PF, x 10 reps each  SEATED: (L with assist):  LAQ, hip flexion, ankle DF/PF, x 10 reps each (rec'd to perform frequently to prevent DVT)  Spoke with patient re importance of compliance with PT/OT to qualify for acceptance to rehab/SNF.    Patient left up in chair with all lines intact, call button in reach, chair alarm on, and RN (Laurie) notified.    GOALS:   Multidisciplinary Problems       Physical Therapy Goals          Problem: Physical Therapy    Goal Priority Disciplines Outcome Goal Variances Interventions   Physical Therapy Goal     PT, PT/OT Ongoing, Progressing     Description: Goals to be met by: 2023     Patient will increase functional independence with mobility by performin). Supine to sit with Stand-by Assistance  2). Sit to supine with Stand-by Assistance  3). Sit to stand transfer with Contact Guard Assistance  4). Gait  x > 25 feet with Contact Guard Assistance using Rolling Walker.                          Time Tracking:     PT Received On: 23  PT Start Time: 842     PT Stop Time: 908  PT Total Time (min): 26 min      Billable Minutes: Therapeutic Activity 15 and Therapeutic Exercise 11    Treatment Type: Treatment  PT/PTA: PT     PTA Visit Number: 0     02/20/2023

## 2023-02-20 NOTE — PT/OT/SLP PROGRESS
Occupational Therapy   Treatment    Name: Anastasiia Badillo  MRN: [id number]  Admitting Diagnosis:  Closed left subtrochanteric femur fracture, initial encounter  2 Days Post-Op    Recommendations:     Discharge Recommendations: rehabilitation facility  Discharge Equipment Recommendations:  walker, rolling, grab bar, bedside commode, bath bench, shower chair  Barriers to discharge:      Assessment:     Anastasiia Badillo is a 73 y.o. female with a medical diagnosis of Closed left subtrochanteric femur fracture, initial encounter.  She presents with performance deficits affecting function are weakness, impaired self care skills, impaired functional mobility and pain.     Rehab Prognosis:  Good; patient would benefit from acute skilled OT services to address these deficits and reach maximum level of function.       Plan:     Patient to be seen 3 x/week to address the above listed problems via self-care/home management, therapeutic activities, therapeutic exercises  Plan of Care Expires: 02/26/23  Plan of Care Reviewed with: patient    Subjective     Pain/Comfort:  Pain Rating 1: 7/10  Location - Side 1: Left  Location 1: hip  Pain Addressed 1: Nurse notified    Objective:     Communicated with: nurse prior to session.  Patient found HOB elevated with peripheral IV and SCD upon OT entry to room.    General Precautions: Standard, fall    Orthopedic Precautions: 25% WB LLE  Braces: N/A  Respiratory Status: Room air     Occupational Performance:     Activities of Daily Living:  Grooming: Pt combed her hair and performed hand hygiene with set up A/SBA.      Treatment & Education:  Grooming: set up A/SBA    Patient left HOB elevated with all lines intact, call button in reach and bed alarm on.    GOALS:   Multidisciplinary Problems       Occupational Therapy Goals          Problem: Occupational Therapy    Goal Priority Disciplines Outcome Interventions   Occupational Therapy Goal     OT, PT/OT                         Time  Tracking:     OT Date of Treatment: 02/20/23  OT Start Time: 1430  OT Stop Time: 1438  OT Total Time (min): 8 min    Billable Minutes:Self Care/Home Management 8               2/20/2023

## 2023-02-20 NOTE — PLAN OF CARE
Problem: Adult Inpatient Plan of Care  Goal: Plan of Care Review  Outcome: Ongoing, Progressing  Goal: Absence of Hospital-Acquired Illness or Injury  Outcome: Ongoing, Progressing  Goal: Optimal Comfort and Wellbeing  Outcome: Ongoing, Progressing     Problem: Diabetes Comorbidity  Goal: Blood Glucose Level Within Targeted Range  Outcome: Ongoing, Progressing     Problem: Skin Injury Risk Increased  Goal: Skin Health and Integrity  Outcome: Ongoing, Progressing     Problem: Fall Injury Risk  Goal: Absence of Fall and Fall-Related Injury  Outcome: Ongoing, Progressing   POC has been reviewed with pt and son.  Pt received 2 units of PRBCs today for hgb of 7.1 day before was 9.1.  No falls during this shift.  Current pain regimen is working.  No bleeding at incisional site today.  Pt hasn't urinated but has been bladder scanned twice and doctor is aware.  Last bladder scan was 270mls. Pt has been in/out cath today.  Blood glucose has not remained in targeted range.

## 2023-02-20 NOTE — PLAN OF CARE
JOHN sent patient information to [facility], [facility], and [facility] via [employer] system.       02/20/23 1530   Post-Acute Status   Post-Acute Authorization Placement   Post-Acute Placement Status Pending post-acute provider review/more information requested   Patient choice form signed by patient/caregiver List with quality metrics by geographic area provided

## 2023-02-20 NOTE — CARE UPDATE
02/20/23 0810   PRE-TX-O2   Device (Oxygen Therapy) nasal cannula   $ Is the patient on Low Flow Oxygen? Yes   Flow (L/min) 2   SpO2 98 %   Pulse Oximetry Type Intermittent   $ Pulse Oximetry - Multiple Charge Pulse Oximetry - Multiple   Incentive Spirometer   $ Incentive Spirometer Charges done with encouragement   Administration (IS) instruction provided, follow-up   Number of Repetitions (IS) 10   Level Incentive Spirometer (mL) 1000   Patient Tolerance (IS) good;no adverse signs/symptoms present

## 2023-02-21 PROBLEM — E87.20 METABOLIC ACIDOSIS: Status: RESOLVED | Noted: 2023-02-17 | Resolved: 2023-02-21

## 2023-02-21 PROBLEM — E87.5 HYPERKALEMIA: Status: RESOLVED | Noted: 2023-02-17 | Resolved: 2023-02-21

## 2023-02-21 PROBLEM — E86.0 DEHYDRATION: Status: RESOLVED | Noted: 2023-02-17 | Resolved: 2023-02-21

## 2023-02-21 LAB
BASOPHILS # BLD AUTO: 0.02 K/UL (ref 0–0.2)
BASOPHILS NFR BLD: 0.3 % (ref 0–1.9)
DIFFERENTIAL METHOD: ABNORMAL
EOSINOPHIL # BLD AUTO: 0 K/UL (ref 0–0.5)
EOSINOPHIL NFR BLD: 0.4 % (ref 0–8)
ERYTHROCYTE [DISTWIDTH] IN BLOOD BY AUTOMATED COUNT: 14.6 % (ref 11.5–14.5)
HCT VFR BLD AUTO: 31 % (ref 37–48.5)
HGB BLD-MCNC: 10.2 G/DL (ref 12–16)
IMM GRANULOCYTES # BLD AUTO: 0.03 K/UL (ref 0–0.04)
IMM GRANULOCYTES NFR BLD AUTO: 0.4 % (ref 0–0.5)
LYMPHOCYTES # BLD AUTO: 0.8 K/UL (ref 1–4.8)
LYMPHOCYTES NFR BLD: 10.1 % (ref 18–48)
MCH RBC QN AUTO: 31.5 PG (ref 27–31)
MCHC RBC AUTO-ENTMCNC: 32.9 G/DL (ref 32–36)
MCV RBC AUTO: 96 FL (ref 82–98)
MONOCYTES # BLD AUTO: 0.8 K/UL (ref 0.3–1)
MONOCYTES NFR BLD: 10.2 % (ref 4–15)
NEUTROPHILS # BLD AUTO: 6.1 K/UL (ref 1.8–7.7)
NEUTROPHILS NFR BLD: 78.6 % (ref 38–73)
NRBC BLD-RTO: 0 /100 WBC
PLATELET # BLD AUTO: 218 K/UL (ref 150–450)
PMV BLD AUTO: 10.5 FL (ref 9.2–12.9)
POCT GLUCOSE: 203 MG/DL (ref 70–110)
POCT GLUCOSE: 217 MG/DL (ref 70–110)
POCT GLUCOSE: 41 MG/DL (ref 70–110)
POCT GLUCOSE: 47 MG/DL (ref 70–110)
POCT GLUCOSE: 73 MG/DL (ref 70–110)
POCT GLUCOSE: 91 MG/DL (ref 70–110)
POCT GLUCOSE: 96 MG/DL (ref 70–110)
RBC # BLD AUTO: 3.24 M/UL (ref 4–5.4)
WBC # BLD AUTO: 7.74 K/UL (ref 3.9–12.7)

## 2023-02-21 PROCEDURE — 94799 UNLISTED PULMONARY SVC/PX: CPT

## 2023-02-21 PROCEDURE — 85025 COMPLETE CBC W/AUTO DIFF WBC: CPT | Performed by: HOSPITALIST

## 2023-02-21 PROCEDURE — 97110 THERAPEUTIC EXERCISES: CPT

## 2023-02-21 PROCEDURE — 25000003 PHARM REV CODE 250: Performed by: ORTHOPAEDIC SURGERY

## 2023-02-21 PROCEDURE — A4216 STERILE WATER/SALINE, 10 ML: HCPCS | Performed by: ORTHOPAEDIC SURGERY

## 2023-02-21 PROCEDURE — 25000003 PHARM REV CODE 250: Performed by: INTERNAL MEDICINE

## 2023-02-21 PROCEDURE — 12000002 HC ACUTE/MED SURGE SEMI-PRIVATE ROOM

## 2023-02-21 PROCEDURE — 97530 THERAPEUTIC ACTIVITIES: CPT

## 2023-02-21 PROCEDURE — 97535 SELF CARE MNGMENT TRAINING: CPT

## 2023-02-21 PROCEDURE — 94761 N-INVAS EAR/PLS OXIMETRY MLT: CPT

## 2023-02-21 PROCEDURE — 36415 COLL VENOUS BLD VENIPUNCTURE: CPT | Performed by: HOSPITALIST

## 2023-02-21 RX ADMIN — Medication 10 ML: at 11:02

## 2023-02-21 RX ADMIN — Medication 10 ML: at 06:02

## 2023-02-21 RX ADMIN — APIXABAN 2.5 MG: 2.5 TABLET, FILM COATED ORAL at 09:02

## 2023-02-21 RX ADMIN — NITROFURANTOIN (MONOHYDRATE/MACROCRYSTALS) 100 MG: 75; 25 CAPSULE ORAL at 08:02

## 2023-02-21 RX ADMIN — MUPIROCIN: 20 OINTMENT TOPICAL at 08:02

## 2023-02-21 RX ADMIN — ACETAMINOPHEN 650 MG: 325 TABLET ORAL at 09:02

## 2023-02-21 RX ADMIN — INSULIN ASPART 2 UNITS: 100 INJECTION, SOLUTION INTRAVENOUS; SUBCUTANEOUS at 11:02

## 2023-02-21 RX ADMIN — CALCIUM CARBONATE (ANTACID) CHEW TAB 500 MG 500 MG: 500 CHEW TAB at 08:02

## 2023-02-21 RX ADMIN — APIXABAN 2.5 MG: 2.5 TABLET, FILM COATED ORAL at 08:02

## 2023-02-21 RX ADMIN — CALCIUM CARBONATE (ANTACID) CHEW TAB 500 MG 500 MG: 500 CHEW TAB at 09:02

## 2023-02-21 RX ADMIN — HYDROCODONE BITARTRATE AND ACETAMINOPHEN 1 TABLET: 5; 325 TABLET ORAL at 08:02

## 2023-02-21 RX ADMIN — HYDROCODONE BITARTRATE AND ACETAMINOPHEN 1 TABLET: 5; 325 TABLET ORAL at 01:02

## 2023-02-21 RX ADMIN — OXYCODONE HYDROCHLORIDE AND ACETAMINOPHEN 500 MG: 500 TABLET ORAL at 09:02

## 2023-02-21 RX ADMIN — THERA TABS 1 TABLET: TAB at 09:02

## 2023-02-21 RX ADMIN — NITROFURANTOIN (MONOHYDRATE/MACROCRYSTALS) 100 MG: 75; 25 CAPSULE ORAL at 09:02

## 2023-02-21 RX ADMIN — HYDROCODONE BITARTRATE AND ACETAMINOPHEN 1 TABLET: 5; 325 TABLET ORAL at 07:02

## 2023-02-21 RX ADMIN — MUPIROCIN: 20 OINTMENT TOPICAL at 09:02

## 2023-02-21 RX ADMIN — METFORMIN HYDROCHLORIDE 500 MG: 500 TABLET, EXTENDED RELEASE ORAL at 07:02

## 2023-02-21 RX ADMIN — METFORMIN HYDROCHLORIDE 500 MG: 500 TABLET, EXTENDED RELEASE ORAL at 05:02

## 2023-02-21 RX ADMIN — Medication 10 ML: at 01:02

## 2023-02-21 RX ADMIN — Medication 1000 UNITS: at 09:02

## 2023-02-21 RX ADMIN — ACETAMINOPHEN 650 MG: 325 TABLET ORAL at 05:02

## 2023-02-21 RX ADMIN — Medication 24 G: at 04:02

## 2023-02-21 RX ADMIN — LISINOPRIL 5 MG: 2.5 TABLET ORAL at 09:02

## 2023-02-21 RX ADMIN — SODIUM CHLORIDE: 9 INJECTION, SOLUTION INTRAVENOUS at 08:02

## 2023-02-21 NOTE — PLAN OF CARE
Plan of care reviewed with patient, verbalized understanding. Worked with PT, only able to pivot to chair. Up in chair throughout day. Patient only able to void 50 cc today. Bladder scan performed 672 cc in bladder.  Dr. Duval notified. Gee cath ordered. Blood glucose monitored , prn insulin given. Call light within reach.  Hourly rounding.

## 2023-02-21 NOTE — PLAN OF CARE
Problem: Infection  Goal: Absence of Infection Signs and Symptoms  2/21/2023 0516 by Juanita Harrell RN  Outcome: Ongoing, Progressing  2/20/2023 2219 by Juanita Harrell RN  Outcome: Ongoing, Progressing     Problem: Adult Inpatient Plan of Care  Goal: Plan of Care Review  2/21/2023 0516 by Juanita Harrell RN  Outcome: Ongoing, Progressing  2/20/2023 2219 by Juanita Harrell RN  Outcome: Ongoing, Progressing  Goal: Patient-Specific Goal (Individualized)  2/21/2023 0516 by Juanita Harrell RN  Outcome: Ongoing, Progressing  2/20/2023 2219 by Juanita Harrell RN  Outcome: Ongoing, Progressing  Goal: Absence of Hospital-Acquired Illness or Injury  2/21/2023 0516 by Juanita Harrell RN  Outcome: Ongoing, Progressing  2/20/2023 2219 by Juanita Harrell RN  Outcome: Ongoing, Progressing  Goal: Optimal Comfort and Wellbeing  2/21/2023 0516 by Juanita Harrell RN  Outcome: Ongoing, Progressing  2/20/2023 2219 by Juanita Harrell RN  Outcome: Ongoing, Progressing  Goal: Readiness for Transition of Care  2/21/2023 0516 by Juanita Harrell RN  Outcome: Ongoing, Progressing  2/20/2023 2219 by Juanita Harrell RN  Outcome: Ongoing, Progressing     Problem: Diabetes Comorbidity  Goal: Blood Glucose Level Within Targeted Range  2/21/2023 0516 by Juanita Harrell RN  Outcome: Ongoing, Progressing  2/20/2023 2219 by Juanita Harrell RN  Outcome: Ongoing, Progressing     Problem: Skin Injury Risk Increased  Goal: Skin Health and Integrity  2/21/2023 0516 by Juanita Harrell RN  Outcome: Ongoing, Progressing  2/20/2023 2219 by Juanita Harrell RN  Outcome: Ongoing, Progressing     Problem: Fall Injury Risk  Goal: Absence of Fall and Fall-Related Injury  2/21/2023 0516 by Juanita Harrell RN  Outcome: Ongoing, Progressing  2/20/2023 2219 by Juanita Harrell RN  Outcome: Ongoing, Progressing

## 2023-02-21 NOTE — PT/OT/SLP PROGRESS
Physical Therapy Treatment    Patient Name:  Anastasiia Badillo   MRN:  [id number]    Recommendations:     Discharge Recommendations: nursing facility, skilled  Discharge Equipment Recommendations:  (TBD at next level of care)    Assessment:     Anastasiia Badillo is a 73 y.o. female admitted with a medical diagnosis of Closed left subtrochanteric femur fracture, initial encounter.  She presents with the following impairments/functional limitations: weakness, impaired endurance, orthopedic precautions, impaired balance, gait instability, impaired functional mobility, decreased lower extremity function  .    Rehab Prognosis: Good; patient would benefit from acute skilled PT services to address these deficits and reach maximum level of function.    Recent Surgery: Procedure(s) (LRB):  INSERTION, INTRAMEDULLARY ROXANNA, FEMUR (hana table -- synthes -- long nail -- have bovie and suction and large bone set) (Left) 3 Days Post-Op    Plan:     During this hospitalization, patient to be seen  (1-2 x/day) to address the identified rehab impairments via gait training, therapeutic activities, therapeutic exercises and progress toward the following goals:    Plan of Care Expires:  02/20/23    Subjective     Patient/Family Comments/goals: wants to get back in bed    Objective:     Communicated with nurse prior to session.  Patient found up in chair with holley catheter upon PT entry to room.     General Precautions: Standard, fall  Orthopedic Precautions:  (25% WB LLE)  Braces: N/A  Respiratory Status: Room air     Functional Mobility training:  Bed Mobility:     Sit to Supine: moderate assistance and for BLEs  Transfers:     Sit to Stand:  moderate assistance with rolling walker  Bed to Chair: moderate assistance with  rolling walker  using  Stand Pivot      AM-PAC 6 CLICK MOBILITY  Turning over in bed (including adjusting bedclothes, sheets and blankets)?: 2  Sitting down on and standing up from a chair with arms (e.g., wheelchair,  bedside commode, etc.): 2  Moving from lying on back to sitting on the side of the bed?: 2  Moving to and from a bed to a chair (including a wheelchair)?: 2  Need to walk in hospital room?: 1  Climbing 3-5 steps with a railing?: 1  Basic Mobility Total Score: 10       Treatment & Education:  Mobility training as above with cues for technique  Instructed to not exceed 25% WB LLE  Static stand with RW 25% WB LLE with CGA  Declines further activity.    Patient left supine with all lines intact, call button in reach, and bed alarm on..    GOALS:   Multidisciplinary Problems       Physical Therapy Goals          Problem: Physical Therapy    Goal Priority Disciplines Outcome Goal Variances Interventions   Physical Therapy Goal     PT, PT/OT Ongoing, Progressing     Description: Goals to be met by: 2023     Patient will increase functional independence with mobility by performin). Supine to sit with Stand-by Assistance  2). Sit to supine with Stand-by Assistance  3). Sit to stand transfer with Contact Guard Assistance  4). Gait  x > 25 feet with Contact Guard Assistance using Rolling Walker.                          Time Tracking:     PT Received On: 23  PT Start Time: 1335     PT Stop Time: 1349  PT Total Time (min): 14 min     Billable Minutes: Therapeutic Activity 14    Treatment Type: Treatment  PT/PTA: PT     PTA Visit Number: 0     2023

## 2023-02-21 NOTE — PT/OT/SLP PROGRESS
Physical Therapy Treatment    Patient Name:  Anastasiia Badillo   MRN:  [id number]     Recommendations:     Discharge Recommendations: nursing facility, skilled  Discharge Equipment Recommendations:  (TBD at next level of care)    Assessment:     Anastasiia Badillo is a 73 y.o. female admitted with a medical diagnosis of Closed left subtrochanteric femur fracture, initial encounter.  She presents with the following impairments/functional limitations: weakness, impaired endurance, orthopedic precautions, impaired balance, gait instability, impaired functional mobility, decreased lower extremity function  .    Rehab Prognosis: Good; patient would benefit from acute skilled PT services to address these deficits and reach maximum level of function.    Recent Surgery: Procedure(s) (LRB):  INSERTION, INTRAMEDULLARY ROXANNA, FEMUR (hana table -- synthes -- long nail -- have bovie and suction and large bone set) (Left) 3 Days Post-Op    Plan:     During this hospitalization, patient to be seen  (1-2 x/day) to address the identified rehab impairments via gait training, therapeutic activities, therapeutic exercises and progress toward the following goals:    Plan of Care Expires:  02/20/23    Subjective     Patient/Family Comments/goals: agrees to work with PT to get to chair    Objective:     Communicated with nurse (Meaghan) prior to session.  Patient found HOB elevated with SCD upon PT entry to room.     General Precautions: Standard, fall  Orthopedic Precautions:  (25% WB LLE)  Braces: N/A  Respiratory Status: Room air     Functional Mobility training:  Bed Mobility:     Rolling Right: moderate assistance  Supine to Sit: moderate assistance  Transfers:     Sit to Stand:  moderate assistance with rolling walker  Bed to Chair: moderate assistance with  rolling walker  using  Stand Pivot  Gait: 3' with RW with min A 25% WB LLE      AM-PAC 6 CLICK MOBILITY  Turning over in bed (including adjusting bedclothes, sheets and blankets)?:  2  Sitting down on and standing up from a chair with arms (e.g., wheelchair, bedside commode, etc.): 2  Moving from lying on back to sitting on the side of the bed?: 2  Moving to and from a bed to a chair (including a wheelchair)?: 2  Need to walk in hospital room?: 1  Climbing 3-5 steps with a railing?: 1  Basic Mobility Total Score: 10       Treatment & Education:  Mobility training as above with cues for technique  Static stand balance training with RW with cGA 25% WB LLE  SUPINE: SLR (with assist), ankle DF/PF  SEATED: (L with assist): LAQ, hip flexion, ankle DF/PF, x 10 reps each  Instructed to not exceed 25% WB LLE, verbalizes understanding  Declines further activity.    Patient left up in chair with all lines intact, call button in reach, and nurse (Meaghan) notified..    GOALS:   Multidisciplinary Problems       Physical Therapy Goals          Problem: Physical Therapy    Goal Priority Disciplines Outcome Goal Variances Interventions   Physical Therapy Goal     PT, PT/OT Ongoing, Progressing     Description: Goals to be met by: 2023     Patient will increase functional independence with mobility by performin). Supine to sit with Stand-by Assistance  2). Sit to supine with Stand-by Assistance  3). Sit to stand transfer with Contact Guard Assistance  4). Gait  x > 25 feet with Contact Guard Assistance using Rolling Walker.                          Time Tracking:     PT Received On: 23  PT Start Time: 1005     PT Stop Time: 1038  PT Total Time (min): 33 min     Billable Minutes: Therapeutic Activity 15 and Therapeutic Exercise 15    Treatment Type: Treatment  PT/PTA: PT     PTA Visit Number: 0     2023

## 2023-02-21 NOTE — NURSING
Patient with poor appetite and refusing to eat, frequent small meals encouraged, reheated dinner meal and offered with much encouragement, HS BS of 89 noted and patient provided with bedtime snack and encouraged to eat to maintain glycemic control. 0426 patient reported feeling flushed and BS was taken and was noted at 47, rechecked and noted as 41. Juice and glucose administered, BS rechecked and patient at 91. Will continue to monitor, neurochecks q4hr this shift

## 2023-02-21 NOTE — PLAN OF CARE
JOHN received call from Mariela with [facility] [phone] who stated they accept patient.  [facility] closed for holiday for state approval.  JOHN faxed patient information to [facility] [phone] for insurance approval.  Fax confirmation received.       02/21/23 8178   Post-Acute Status   Post-Acute Authorization Placement   Post-Acute Placement Status Pending payor review/awaiting authorization (if required)

## 2023-02-21 NOTE — CARE UPDATE
02/20/23 2005   Patient Assessment/Suction   Level of Consciousness (AVPU) alert   Respiratory Effort Normal;Unlabored   Expansion/Accessory Muscles/Retractions no retractions;no use of accessory muscles   All Lung Fields Breath Sounds clear   Rhythm/Pattern, Respiratory no shortness of breath reported;depth regular;pattern regular   Cough Frequency no cough   PRE-TX-O2   Device (Oxygen Therapy) room air   SpO2 95 %   Pulse Oximetry Type Intermittent   Incentive Spirometer   $ Incentive Spirometer Charges done with encouragement   Administration (IS) mouthpiece utilized;proper technique demonstrated   Number of Repetitions (IS) 10   Level Incentive Spirometer (mL) 1000   Patient Tolerance (IS) good;no adverse signs/symptoms present

## 2023-02-22 LAB
ANION GAP SERPL CALC-SCNC: 8 MMOL/L (ref 8–16)
BUN SERPL-MCNC: 14 MG/DL (ref 8–23)
CALCIUM SERPL-MCNC: 8.7 MG/DL (ref 8.7–10.5)
CHLORIDE SERPL-SCNC: 101 MMOL/L (ref 95–110)
CO2 SERPL-SCNC: 26 MMOL/L (ref 23–29)
CREAT SERPL-MCNC: 0.7 MG/DL (ref 0.5–1.4)
EST. GFR  (NO RACE VARIABLE): >60 ML/MIN/1.73 M^2
GLUCOSE SERPL-MCNC: 62 MG/DL (ref 70–110)
POCT GLUCOSE: 165 MG/DL (ref 70–110)
POCT GLUCOSE: 169 MG/DL (ref 70–110)
POCT GLUCOSE: 219 MG/DL (ref 70–110)
POCT GLUCOSE: 255 MG/DL (ref 70–110)
POCT GLUCOSE: 38 MG/DL (ref 70–110)
POCT GLUCOSE: 45 MG/DL (ref 70–110)
POCT GLUCOSE: 93 MG/DL (ref 70–110)
POTASSIUM SERPL-SCNC: 3.7 MMOL/L (ref 3.5–5.1)
SODIUM SERPL-SCNC: 135 MMOL/L (ref 136–145)
TB INDURATION 48 - 72 HR READ: 0 MM

## 2023-02-22 PROCEDURE — 63600175 PHARM REV CODE 636 W HCPCS: Performed by: ORTHOPAEDIC SURGERY

## 2023-02-22 PROCEDURE — 25000003 PHARM REV CODE 250: Performed by: ORTHOPAEDIC SURGERY

## 2023-02-22 PROCEDURE — 36415 COLL VENOUS BLD VENIPUNCTURE: CPT | Performed by: NURSE PRACTITIONER

## 2023-02-22 PROCEDURE — 94761 N-INVAS EAR/PLS OXIMETRY MLT: CPT

## 2023-02-22 PROCEDURE — 97530 THERAPEUTIC ACTIVITIES: CPT

## 2023-02-22 PROCEDURE — 25000003 PHARM REV CODE 250: Performed by: INTERNAL MEDICINE

## 2023-02-22 PROCEDURE — 12000002 HC ACUTE/MED SURGE SEMI-PRIVATE ROOM

## 2023-02-22 PROCEDURE — A4216 STERILE WATER/SALINE, 10 ML: HCPCS | Performed by: ORTHOPAEDIC SURGERY

## 2023-02-22 PROCEDURE — 80048 BASIC METABOLIC PNL TOTAL CA: CPT | Performed by: NURSE PRACTITIONER

## 2023-02-22 RX ORDER — HYDRALAZINE HYDROCHLORIDE 20 MG/ML
10 INJECTION INTRAMUSCULAR; INTRAVENOUS EVERY 4 HOURS PRN
Status: DISCONTINUED | OUTPATIENT
Start: 2023-02-22 | End: 2023-02-23 | Stop reason: HOSPADM

## 2023-02-22 RX ADMIN — THERA TABS 1 TABLET: TAB at 08:02

## 2023-02-22 RX ADMIN — Medication 10 ML: at 05:02

## 2023-02-22 RX ADMIN — NITROFURANTOIN (MONOHYDRATE/MACROCRYSTALS) 100 MG: 75; 25 CAPSULE ORAL at 08:02

## 2023-02-22 RX ADMIN — INSULIN ASPART 2 UNITS: 100 INJECTION, SOLUTION INTRAVENOUS; SUBCUTANEOUS at 04:02

## 2023-02-22 RX ADMIN — POLYETHYLENE GLYCOL (3350) 17 G: 17 POWDER, FOR SOLUTION ORAL at 02:02

## 2023-02-22 RX ADMIN — DOCUSATE SODIUM 100 MG: 100 CAPSULE, LIQUID FILLED ORAL at 08:02

## 2023-02-22 RX ADMIN — MELATONIN TAB 3 MG 6 MG: 3 TAB at 01:02

## 2023-02-22 RX ADMIN — Medication 10 ML: at 02:02

## 2023-02-22 RX ADMIN — CALCIUM CARBONATE (ANTACID) CHEW TAB 500 MG 500 MG: 500 CHEW TAB at 08:02

## 2023-02-22 RX ADMIN — APIXABAN 2.5 MG: 2.5 TABLET, FILM COATED ORAL at 08:02

## 2023-02-22 RX ADMIN — LISINOPRIL 5 MG: 2.5 TABLET ORAL at 08:02

## 2023-02-22 RX ADMIN — ONDANSETRON 4 MG: 2 INJECTION INTRAMUSCULAR; INTRAVENOUS at 05:02

## 2023-02-22 RX ADMIN — Medication 1000 UNITS: at 08:02

## 2023-02-22 RX ADMIN — Medication 10 ML: at 11:02

## 2023-02-22 RX ADMIN — MORPHINE SULFATE 4 MG: 4 INJECTION INTRAVENOUS at 08:02

## 2023-02-22 RX ADMIN — HYDROCODONE BITARTRATE AND ACETAMINOPHEN 1 TABLET: 5; 325 TABLET ORAL at 08:02

## 2023-02-22 RX ADMIN — MUPIROCIN: 20 OINTMENT TOPICAL at 08:02

## 2023-02-22 RX ADMIN — OXYCODONE HYDROCHLORIDE AND ACETAMINOPHEN 500 MG: 500 TABLET ORAL at 08:02

## 2023-02-22 RX ADMIN — HYDRALAZINE HYDROCHLORIDE 10 MG: 20 INJECTION INTRAMUSCULAR; INTRAVENOUS at 01:02

## 2023-02-22 RX ADMIN — DEXTROSE MONOHYDRATE 250 ML: 100 INJECTION, SOLUTION INTRAVENOUS at 12:02

## 2023-02-22 RX ADMIN — INSULIN ASPART 1 UNITS: 100 INJECTION, SOLUTION INTRAVENOUS; SUBCUTANEOUS at 08:02

## 2023-02-22 RX ADMIN — HYDROCODONE BITARTRATE AND ACETAMINOPHEN 1 TABLET: 5; 325 TABLET ORAL at 02:02

## 2023-02-22 RX ADMIN — MORPHINE SULFATE 4 MG: 4 INJECTION INTRAVENOUS at 02:02

## 2023-02-22 NOTE — PROGRESS NOTES
[facility] Hospital Medicine  Progress Note    Patient Name: Anastasiia Badillo  MRN: [id number]  Patient Class: IP- Inpatient   Admission Date: 2/17/2023  Length of Stay: 5 days  Attending Physician: Sg Hendricks MD  Primary Care Provider: Raji Pakrinson MD        Subjective:     Principal Problem:Closed left subtrochanteric femur fracture, initial encounter        HPI:  Patient is a 73-year-old female with past medical history significant for hypertension and diabetes mellitus type 2 who is being admitted to Hospital Medicine from [facility] Emergency room status post fall which patient suffered last evening at home with complaints of left hip pain.  Got a around midnight after waking up from sleep and somehow lost her balance while using walker and landed on her left hip.  Patient was brought by paramedics.  Patient remained on the floor for more than 12 hours until friend arrived.  There is with obvious left leg shortening and external rotation noted.  Patient is unable to bear weight.  Patient denies any head injury, loss of consciousness or any focal neuro deficits.  No recent fevers or chills reported.  Patient denies any chest pain or shortness of breath.      Overview/Hospital Course:  Admitted after a fall resulting in left hip fracture. Ortho consulted and performed repair. Also noted to have E. Coli UTI so started on macrobid. Had an episode of hypoglycemia overnight so hypoglycemics held and adjusted. CM worked on SNF placement.      Interval History: Patient seen and examined. Had some hypoglycemia overnight. No changes. Working on SNF placement.    Review of Systems   Constitutional:  Negative for chills and fever.   Respiratory:  Negative for shortness of breath.    Cardiovascular:  Negative for chest pain.   Gastrointestinal:  Negative for abdominal pain, constipation, diarrhea, nausea and vomiting.   Musculoskeletal:  Positive for arthralgias.    Objective:     Vital Signs (Most Recent):  Temp: 97.4 °F (36.3 °C) (02/22/23 1550)  Pulse: 87 (02/22/23 1550)  Resp: 18 (02/22/23 1550)  BP: (!) 182/84 (02/22/23 1550)  SpO2: 96 % (02/22/23 1550)   Vital Signs (24h Range):  Temp:  [96.2 °F (35.7 °C)-98.2 °F (36.8 °C)] 97.4 °F (36.3 °C)  Pulse:  [77-94] 87  Resp:  [17-19] 18  SpO2:  [94 %-99 %] 96 %  BP: (135-184)/(69-84) 182/84     Weight: 67 kg (147 lb 11.3 oz)  Body mass index is 24.58 kg/m².    Intake/Output Summary (Last 24 hours) at 2/22/2023 1620  Last data filed at 2/22/2023 1341  Gross per 24 hour   Intake 480 ml   Output 2350 ml   Net -1870 ml      Physical Exam  Vitals reviewed.   Constitutional:       General: She is not in acute distress.  HENT:      Head: Normocephalic and atraumatic.   Cardiovascular:      Rate and Rhythm: Normal rate and regular rhythm.      Heart sounds: Normal heart sounds. No murmur heard.  Pulmonary:      Effort: Pulmonary effort is normal. No respiratory distress.      Breath sounds: Normal breath sounds. No wheezing, rhonchi or rales.   Musculoskeletal:      Comments: Left hip dressing c/d/I  LLE neurovascularly intact   Neurological:      General: No focal deficit present.      Mental Status: She is alert and oriented to person, place, and time.   Psychiatric:         Mood and Affect: Affect normal.         Behavior: Behavior normal.         Thought Content: Thought content normal.       Significant Labs: All pertinent labs within the past 24 hours have been reviewed.    Significant Imaging: I have reviewed all pertinent imaging results/findings within the past 24 hours.      Assessment/Plan:      * Closed left subtrochanteric femur fracture, initial encounter  Repaired by orthopedic surgeon.  Routine postop care  continue daily physical therapy.  Continue analgesics.  CM working on SNF placement    Nicotine addiction  Smoking cessation counseling performed. Dangers of cigarette smoking were reviewed with patient in detail  and patient was encouraged to quit. Nicotine replacement options were discussed for > 10 minutes on admit.    Acute blood loss anemia  Secondary to femur fracture and post-op.  Patient's anemia is currently controlled.  Current CBC reviewed-   Lab Results   Component Value Date    HGB 10.2 (L) 02/21/2023    HCT 31.0 (L) 02/21/2023     Monitor serial CBC and transfuse if patient becomes hemodynamically unstable, symptomatic or H/H drops below 7/21.     ACP (advance care planning)  Advance Care Planning     Date: 02/17/2023    Living Will  During this visit, I engaged the patient  in the advance care planning process.  The patient and I reviewed the role for advance directives and their purpose in directing future healthcare if the patient's unable to speak for herself.  At this point in time, the patient does have full decision-making capacity.  We discussed different extreme health states that she could experience, and reviewed what kind of medical care she would want in those situations.  The patient communicated that if she were comatose and had little chance of a meaningful recovery, she would want machines/life-sustaining treatments used.  I spent a total of 16 minutes engaging the patient in this advance care planning discussion. (per MD at time of visit)    Urinary tract infection without hematuria  E. Coli per urine culture  Continue macrobid for 7 days total    Essential hypertension  Chronic, stable.  Continue lisinopril 5 mg daily.   Tele monitoring.  Continue intravenous hydralazine 10 mg every 2 hours as needed for systolic blood pressure in excess of 180 mmHg.    Type 2 diabetes mellitus with hyperglycemia  Patient's FSGs are uncontrolled due to hypoglycemia on current medication regimen.  Last A1c reviewed-   Lab Results   Component Value Date    HGBA1C 8.9 (H) 02/17/2023     Most recent fingerstick glucose reviewed-   Recent Labs   Lab 02/22/23  0029 02/22/23  0126 02/22/23  0735 02/22/23  1154    POCTGLUCOSE 45* 169* 93 165*     Current correctional scale  Low  Maintain anti-hyperglycemic dose as follows-   Antihyperglycemics (From admission, onward)    Start     Stop Route Frequency Ordered    02/17/23 1727  insulin aspart U-100 pen 0-5 Units         -- SubQ Before meals & nightly PRN 02/17/23 1628        Hold Oral hypoglycemics while patient is in the hospital.  Holding all hypoglycemics due to hypoglycemia overnight got dextrose now has been stable  Likely restart lower dose regimen      VTE Risk Mitigation (From admission, onward)         Ordered     apixaban tablet 2.5 mg  2 times daily         02/18/23 1358     Reason for No Pharmacological VTE Prophylaxis  Once        Question:  Reasons:  Answer:  Physician Provided (leave comment)    02/17/23 1628     IP VTE HIGH RISK PATIENT  Once         02/17/23 1628     Place sequential compression device  Until discontinued         02/17/23 1628                Discharge Planning   CITLALI: 2/23/2023     Code Status: Full Code   Is the patient medically ready for discharge?:     Reason for patient still in hospital (select all that apply): Pending disposition  Discharge Plan A: Skilled Nursing Facility, Rehab        Sg Hendricks MD  Department of Hospital Medicine   [facility]

## 2023-02-22 NOTE — PLAN OF CARE
Called [facility] for update on SNF auth request- request is still pending. CM following       02/22/23 1526   Post-Acute Status   Post-Acute Authorization Placement   Post-Acute Placement Status Pending payor review/awaiting authorization (if required)

## 2023-02-22 NOTE — SUBJECTIVE & OBJECTIVE
Interval History: Patient seen and examined. Had some hypoglycemia overnight. No changes. Working on SNF placement.    Review of Systems   Constitutional:  Negative for chills and fever.   Respiratory:  Negative for shortness of breath.    Cardiovascular:  Negative for chest pain.   Gastrointestinal:  Negative for abdominal pain, constipation, diarrhea, nausea and vomiting.   Musculoskeletal:  Positive for arthralgias.   Objective:     Vital Signs (Most Recent):  Temp: 97.4 °F (36.3 °C) (02/22/23 1550)  Pulse: 87 (02/22/23 1550)  Resp: 18 (02/22/23 1550)  BP: (!) 182/84 (02/22/23 1550)  SpO2: 96 % (02/22/23 1550)   Vital Signs (24h Range):  Temp:  [96.2 °F (35.7 °C)-98.2 °F (36.8 °C)] 97.4 °F (36.3 °C)  Pulse:  [77-94] 87  Resp:  [17-19] 18  SpO2:  [94 %-99 %] 96 %  BP: (135-184)/(69-84) 182/84     Weight: 67 kg (147 lb 11.3 oz)  Body mass index is 24.58 kg/m².    Intake/Output Summary (Last 24 hours) at 2/22/2023 1620  Last data filed at 2/22/2023 1341  Gross per 24 hour   Intake 480 ml   Output 2350 ml   Net -1870 ml      Physical Exam  Vitals reviewed.   Constitutional:       General: She is not in acute distress.  HENT:      Head: Normocephalic and atraumatic.   Cardiovascular:      Rate and Rhythm: Normal rate and regular rhythm.      Heart sounds: Normal heart sounds. No murmur heard.  Pulmonary:      Effort: Pulmonary effort is normal. No respiratory distress.      Breath sounds: Normal breath sounds. No wheezing, rhonchi or rales.   Musculoskeletal:      Comments: Left hip dressing c/d/I  LLE neurovascularly intact   Neurological:      General: No focal deficit present.      Mental Status: She is alert and oriented to person, place, and time.   Psychiatric:         Mood and Affect: Affect normal.         Behavior: Behavior normal.         Thought Content: Thought content normal.       Significant Labs: All pertinent labs within the past 24 hours have been reviewed.    Significant Imaging: I have reviewed all  pertinent imaging results/findings within the past 24 hours.

## 2023-02-22 NOTE — ASSESSMENT & PLAN NOTE
Repaired by orthopedic surgeon.  Routine postop care  continue daily physical therapy.  Continue analgesics.  CM working on SNF placement

## 2023-02-22 NOTE — ASSESSMENT & PLAN NOTE
Patient's FSGs are uncontrolled due to hypoglycemia on current medication regimen.  Last A1c reviewed-   Lab Results   Component Value Date    HGBA1C 8.9 (H) 02/17/2023     Most recent fingerstick glucose reviewed-   Recent Labs   Lab 02/22/23  0029 02/22/23  0126 02/22/23  0735 02/22/23  1154   POCTGLUCOSE 45* 169* 93 165*     Current correctional scale  Low  Maintain anti-hyperglycemic dose as follows-   Antihyperglycemics (From admission, onward)    Start     Stop Route Frequency Ordered    02/17/23 1727  insulin aspart U-100 pen 0-5 Units         -- SubQ Before meals & nightly PRN 02/17/23 1628        Hold Oral hypoglycemics while patient is in the hospital.  Holding all hypoglycemics due to hypoglycemia overnight got dextrose now has been stable  Likely restart lower dose regimen

## 2023-02-22 NOTE — PROGRESS NOTES
[facility] Hospital Medicine  Progress Note    Patient Name: Anastasiia Badillo  MRN: [id number]  Patient Class: IP- Inpatient   Admission Date: 2/17/2023  Length of Stay: 4 days  Attending Physician: Brandon Martin MD  Primary Care Provider: Raji Parkinson MD        Subjective:     Principal Problem:Closed left subtrochanteric femur fracture, initial encounter        HPI:  Patient is a 73-year-old female with past medical history significant for hypertension and diabetes mellitus type 2 who is being admitted to Hospital Medicine from [facility] Emergency room status post fall which patient suffered last evening at home with complaints of left hip pain.  Got a around midnight after waking up from sleep and somehow lost her balance while using walker and landed on her left hip.  Patient was brought by paramedics.  Patient remained on the floor for more than 12 hours until friend arrived.  There is with obvious left leg shortening and external rotation noted.  Patient is unable to bear weight.  Patient denies any head injury, loss of consciousness or any focal neuro deficits.  No recent fevers or chills reported.  Patient denies any chest pain or shortness of breath.      Overview/Hospital Course:  No notes on file    Interval History:  no new issues or complaints.   working on SNF referral.  Pain is well controlled.    Review of Systems   Constitutional:  Negative for chills and fever.   Respiratory:  Negative for cough and shortness of breath.    Cardiovascular:  Negative for chest pain and leg swelling.   Gastrointestinal:  Negative for abdominal pain, nausea and vomiting.   Objective:     Vital Signs (Most Recent):  Temp: 97.9 °F (36.6 °C) (02/21/23 2003)  Pulse: 82 (02/21/23 2003)  Resp: 18 (02/21/23 2003)  BP: (!) 146/70 (02/21/23 2003)  SpO2: 98 % (02/21/23 2003)   Vital Signs (24h Range):  Temp:  [97.5 °F (36.4 °C)-98.2 °F (36.8 °C)] 97.9 °F (36.6  °C)  Pulse:  [78-93] 82  Resp:  [18] 18  SpO2:  [94 %-99 %] 98 %  BP: (134-176)/(70-85) 146/70     Weight: 67 kg (147 lb 11.3 oz)  Body mass index is 24.58 kg/m².    Intake/Output Summary (Last 24 hours) at 2/21/2023 2211  Last data filed at 2/21/2023 1730  Gross per 24 hour   Intake 340 ml   Output --   Net 340 ml      Physical Exam  Constitutional:       General: She is not in acute distress.     Appearance: She is not ill-appearing.   Eyes:      General:         Right eye: No discharge.         Left eye: No discharge.   Neck:      Vascular: No JVD.   Cardiovascular:      Rate and Rhythm: Normal rate and regular rhythm.   Pulmonary:      Effort: Pulmonary effort is normal.      Breath sounds: Normal breath sounds.   Abdominal:      General: Abdomen is flat. Bowel sounds are normal. There is no distension.      Palpations: Abdomen is soft.      Tenderness: There is no abdominal tenderness.   Musculoskeletal:      Right lower leg: No edema.      Left lower leg: No edema.   Skin:     General: Skin is warm and moist.      Findings: No rash.   Neurological:      Mental Status: She is alert and oriented to person, place, and time.   Psychiatric:         Attention and Perception: Attention normal.         Mood and Affect: Mood and affect normal.         Speech: Speech normal.       Significant Labs: All pertinent labs within the past 24 hours have been reviewed.    Significant Imaging:  No new imaging      Assessment/Plan:      * Closed left subtrochanteric femur fracture, initial encounter  Repaired by orthopedic surgeon.  continue daily physical therapy.  Continue analgesics.      Nicotine addiction  Smoking cessation counseling performed. Dangers of cigarette smoking were reviewed with patient in detail and patient was encouraged to quit. Nicotine replacement options were discussed for > 10 minutes.        Acute blood loss anemia  Secondary to femur fracture and post-op.  Patient's anemia is currently controlled. .    Current CBC reviewed-   Lab Results   Component Value Date    HGB 10.2 (L) 02/21/2023    HCT 31.0 (L) 02/21/2023     Monitor serial CBC and transfuse if patient becomes hemodynamically unstable, symptomatic or H/H drops below 7/21.       ACP (advance care planning)  Advance Care Planning     Date: 02/17/2023    Living Will  During this visit, I engaged the patient  in the advance care planning process.  The patient and I reviewed the role for advance directives and their purpose in directing future healthcare if the patient's unable to speak for herself.  At this point in time, the patient does have full decision-making capacity.  We discussed different extreme health states that she could experience, and reviewed what kind of medical care she would want in those situations.  The patient communicated that if she were comatose and had little chance of a meaningful recovery, she would want machines/life-sustaining treatments used.  I spent a total of 16 minutes engaging the patient in this advance care planning discussion.                Urinary tract infection without hematuria  Follow urine culture and sensitivity results (growing E.Coli).  On PO Macrobid day 2 of 7    Results for orders placed or performed during the hospital encounter of 02/17/23 (from the past 168 hour(s))   Urine culture    Specimen: Urine   Result Value Ref Range    Urine Culture, Routine (A)      ESCHERICHIA COLI  > 100,000 cfu/ml  No other significant isolate         Susceptibility    Escherichia coli - CULTURE, URINE     Amp/Sulbactam <=8/4 Sensitive mcg/mL     Ampicillin >16 Resistant mcg/mL     Amox/K Clav'ate <=8/4 Sensitive mcg/mL     Ceftriaxone <=1 Sensitive mcg/mL     Cefazolin <=2 Sensitive mcg/mL     Ciprofloxacin >2 Resistant mcg/mL     Cefepime <=2 Sensitive mcg/mL     Ertapenem <=0.5 Sensitive mcg/mL     Nitrofurantoin <=32 Sensitive mcg/mL     Gentamicin <=4 Sensitive mcg/mL     Levofloxacin >4 Resistant mcg/mL     Meropenem  <=1 Sensitive mcg/mL     Piperacillin/Tazo <=16 Sensitive mcg/mL     Trimeth/Sulfa <=2/38 Sensitive mcg/mL     Tobramycin <=4 Sensitive mcg/mL     .      Essential hypertension  Continue lisinopril 5 mg daily.  Stable.  Tele monitoring.  Continue intravenous hydralazine 10 mg every 2 hours as needed for systolic blood pressure in excess of 160 mmHg.      Type 2 diabetes mellitus with hyperglycemia  Patient's FSGs are uncontrolled due to hyperglycemia on current medication regimen.  Last A1c reviewed-   Lab Results   Component Value Date    HGBA1C 8.9 (H) 02/17/2023     Most recent fingerstick glucose reviewed-   Recent Labs   Lab 02/21/23  0729 02/21/23  1143 02/21/23  1750 02/21/23 2033   POCTGLUCOSE 217* 203* 96 73     Current correctional scale  Low  Maintain anti-hyperglycemic dose as follows-   Antihyperglycemics (From admission, onward)    Start     Stop Route Frequency Ordered    02/20/23 0900  insulin detemir U-100 pen 26 Units         -- SubQ Daily 02/19/23 0934    02/19/23 1045  metFORMIN ER 24hr tablet 500 mg         -- Oral 2 times daily with meals 02/19/23 0934    02/17/23 1727  insulin aspart U-100 pen 0-5 Units         -- SubQ Before meals & nightly PRN 02/17/23 1628        Hold Oral hypoglycemics while patient is in the hospital.      VTE Risk Mitigation (From admission, onward)         Ordered     apixaban tablet 2.5 mg  2 times daily         02/18/23 1358     Reason for No Pharmacological VTE Prophylaxis  Once        Question:  Reasons:  Answer:  Physician Provided (leave comment)    02/17/23 1628     IP VTE HIGH RISK PATIENT  Once         02/17/23 1628     Place sequential compression device  Until discontinued         02/17/23 1628                Discharge Planning   CITLALI: 2/21/2023     Code Status: Full Code   Is the patient medically ready for discharge?:     Reason for patient still in hospital (select all that apply): Pending disposition  Discharge Plan A: Skilled Nursing Facility, Rehab                   Brandon Martin MD  Department of Hospital Medicine   [facility]

## 2023-02-22 NOTE — PLAN OF CARE
Attempted to call in [facility]- due to high call volumes they will return my call       02/22/23 1120   Post-Acute Status   Post-Acute Authorization Placement   Post-Acute Placement Status Pending state direction/certification

## 2023-02-22 NOTE — PT/OT/SLP PROGRESS
Physical Therapy Treatment    Patient Name:  Anastasiia Badillo   MRN:  [id number]    Recommendations:     Discharge Recommendations: nursing facility, skilled  Discharge Equipment Recommendations: none  Barriers to discharge: Decreased caregiver support    Assessment:     Anastasiia Badillo is a 73 y.o. female admitted with a medical diagnosis of Closed left subtrochanteric femur fracture, initial encounter.  She presents with the following impairments/functional limitations: weakness, impaired endurance, impaired functional mobility, gait instability, impaired balance, decreased lower extremity function, pain, decreased ROM, impaired cardiopulmonary response to activity, orthopedic precautions .    Pt seen supine in bed, alert and agreeable to PT. Pt completed thera ex in supine. Mod/max assist to sit EOB. Pt tolerated standing with RW and few steps to chair. Education for 25% weight bearing only LLE pt repositioned in chair with all needs within reach.  Pt to benefit from SNF.    Rehab Prognosis: Fair; patient would benefit from acute skilled PT services to address these deficits and reach maximum level of function.    Recent Surgery: Procedure(s) (LRB):  INSERTION, INTRAMEDULLARY ROXANNA, FEMUR (hana table -- synthes -- long nail -- have bovie and suction and large bone set) (Left) 4 Days Post-Op    Plan:     During this hospitalization, patient to be seen daily (1-2x day) to address the identified rehab impairments via gait training, therapeutic activities, therapeutic exercises and progress toward the following goals:    Plan of Care Expires:  02/28/23    Subjective   Pt stated that he was down on floor several hours post falling  Was home alone at [facility]  Chief Complaint: LH pain  Patient/Family Comments/goals: get well  Pain/Comfort:  Pain Rating 1:  (not rated)  Location - Side 1: Left  Location 1: hip  Pain Addressed 1: Pre-medicate for activity, Reposition,  Distraction      Objective:     Communicated with nurse Flowers prior to session.  Patient found HOB elevated with holley catheter, bed alarm upon PT entry to room.     General Precautions: Standard, fall, anti-coagulation medicine  Orthopedic Precautions: LLE toe touch weight bearing (25% only)  Braces: N/A  Respiratory Status: Room air     Functional Mobility:  Bed Mobility:     Rolling Right: moderate assistance  Scooting: moderate assistance  Supine to Sit: moderate assistance  Transfers:     Sit to Stand:  moderate assistance and maximal assistance with rolling walker  Bed to Chair: moderate assistance and maximal assistance with  rolling walker  using  Stand Pivot and pt able to take ~4 steps 25% weight bearing      AM-PAC 6 CLICK MOBILITY          Treatment & Education:  Patient was educated on the importance of OOB activity and functional mobility to negate negative effects of prolonged bed rest during hospitalization, safe transfers and ambulation, and D/C planning   Pt completed AP,QS/GS  Repositioned in chair with all needs within reach    Patient left up in chair with all lines intact, call button in reach, chair alarm on, and nurse Flowers notified..    GOALS:   Multidisciplinary Problems       Physical Therapy Goals          Problem: Physical Therapy    Goal Priority Disciplines Outcome Goal Variances Interventions   Physical Therapy Goal     PT, PT/OT Ongoing, Progressing     Description: Goals to be met by: 2023     Patient will increase functional independence with mobility by performin). Supine to sit with Stand-by Assistance  2). Sit to supine with Stand-by Assistance  3). Sit to stand transfer with Contact Guard Assistance  4). Gait  x > 25 feet with Contact Guard Assistance using Rolling Walker.                          Time Tracking:     PT Received On: 23  PT Start Time: 1119     PT Stop Time: 1138  PT Total Time (min): 19 min     Billable Minutes: Therapeutic Activity  19    Treatment Type: Treatment  PT/PTA: PT     PTA Visit Number: 0     02/22/2023

## 2023-02-22 NOTE — ASSESSMENT & PLAN NOTE
Follow urine culture and sensitivity results (growing E.Coli).  On PO Macrobid day 2 of 7    Results for orders placed or performed during the hospital encounter of 02/17/23 (from the past 168 hour(s))   Urine culture    Specimen: Urine   Result Value Ref Range    Urine Culture, Routine (A)      ESCHERICHIA COLI  > 100,000 cfu/ml  No other significant isolate         Susceptibility    Escherichia coli - CULTURE, URINE     Amp/Sulbactam <=8/4 Sensitive mcg/mL     Ampicillin >16 Resistant mcg/mL     Amox/K Clav'ate <=8/4 Sensitive mcg/mL     Ceftriaxone <=1 Sensitive mcg/mL     Cefazolin <=2 Sensitive mcg/mL     Ciprofloxacin >2 Resistant mcg/mL     Cefepime <=2 Sensitive mcg/mL     Ertapenem <=0.5 Sensitive mcg/mL     Nitrofurantoin <=32 Sensitive mcg/mL     Gentamicin <=4 Sensitive mcg/mL     Levofloxacin >4 Resistant mcg/mL     Meropenem <=1 Sensitive mcg/mL     Piperacillin/Tazo <=16 Sensitive mcg/mL     Trimeth/Sulfa <=2/38 Sensitive mcg/mL     Tobramycin <=4 Sensitive mcg/mL     .

## 2023-02-22 NOTE — HOSPITAL COURSE
Admitted after a fall resulting in left hip fracture. Ortho consulted and performed repair. Also noted to have E. Coli UTI so started on macrobid. Had an episode of hypoglycemia overnight so hypoglycemics held and adjusted. Had urinary retention so holley kept in and she will follow up with outpatient urology for voiding trial. CM worked on SNF placement. She was accepted to SNF and discharged there. By time of discharge the patient was tolerating a regular diet with improving admission symptoms. Patient seen and examined on day of discharge.    Physical exam on day of discharge:  Constitutional:       General: She is not in acute distress.  HENT:      Head: Normocephalic and atraumatic.   Cardiovascular:      Rate and Rhythm: Normal rate and regular rhythm.      Heart sounds: Normal heart sounds. No murmur heard.  Pulmonary:      Effort: Pulmonary effort is normal. No respiratory distress.      Breath sounds: Normal breath sounds. No wheezing, rhonchi or rales.   Musculoskeletal:      Comments: Left hip dressing with moderate serosanguinous saturation  LLE neurovascularly intact   Neurological:      General: No focal deficit present.      Mental Status: She is alert and oriented to person, place, and time.   Psychiatric:         Mood and Affect: Affect normal.         Behavior: Behavior normal.         Thought Content: Thought content normal.

## 2023-02-22 NOTE — PROGRESS NOTES
Rechecked patients Blood Glucose 38/45 will cover with PRN and reevaluate, charge and on call NP made aware, acknowledged order for BMP.

## 2023-02-22 NOTE — ASSESSMENT & PLAN NOTE
Secondary to femur fracture and post-op.  Patient's anemia is currently controlled. .   Current CBC reviewed-   Lab Results   Component Value Date    HGB 10.2 (L) 02/21/2023    HCT 31.0 (L) 02/21/2023     Monitor serial CBC and transfuse if patient becomes hemodynamically unstable, symptomatic or H/H drops below 7/21.

## 2023-02-22 NOTE — PT/OT/SLP PROGRESS
Physical Therapy Treatment    Patient Name:  Anastasiia Badillo   MRN:  [id number]    Recommendations:     Discharge Recommendations: nursing facility, skilled  Discharge Equipment Recommendations: none  Barriers to discharge: Decreased caregiver support    Assessment:     Anastasiia Badillo is a 73 y.o. female admitted with a medical diagnosis of Closed left subtrochanteric femur fracture, initial encounter.  She presents with the following impairments/functional limitations: weakness, impaired endurance, impaired functional mobility, gait instability, impaired balance, decreased lower extremity function, pain, decreased ROM, impaired cardiopulmonary response to activity, orthopedic precautions .    Pt seen BID today. Pt tolerated OOB chair x 2.5 hours. Pt able to stand from chair with mod assist and took few steps  back to bed with RW.  Pt to benefit from SNF.    Rehab Prognosis: Fair; patient would benefit from acute skilled PT services to address these deficits and reach maximum level of function.    Recent Surgery: Procedure(s) (LRB):  INSERTION, INTRAMEDULLARY ROXANNA, FEMUR ([employer] table -- synthes -- long nail -- have bovie and suction and large bone set) (Left) 4 Days Post-Op    Plan:     During this hospitalization, patient to be seen daily (1-2x day) to address the identified rehab impairments via gait training, therapeutic activities, therapeutic exercises and progress toward the following goals:    Plan of Care Expires:  02/28/23    Subjective     Chief Complaint: pain with mobility training  Patient/Family Comments/goals: get well  Pain/Comfort:  Pain Rating 1:  (not rated)  Location - Side 1: Left  Location 1: hip  Pain Addressed 1: Pre-medicate for activity, Reposition, Distraction      Objective:     Communicated with nurse Flowers prior to session.  Patient found up in chair with holley catheter, bed alarm upon PT entry to room.     General Precautions: Standard, fall, anti-coagulation medicine  Orthopedic  Precautions: LLE toe touch weight bearing (25% only)  Braces: N/A  Respiratory Status: Room air     Functional Mobility:  Bed Mobility:     Scooting: moderate assistance  Sit to Supine: moderate assistance  Transfers:     Sit to Stand:  moderate assistance and maximal assistance with rolling walker      AM-PAC 6 CLICK MOBILITY          Treatment & Education:  Patient was educated on the importance of OOB activity and functional mobility to negate negative effects of prolonged bed rest during hospitalization, safe transfers and ambulation, and D/C planning   Pt with skin tear on both elbows from fall at home with bleeding L elbow.  Wound care nurse  at bedside to assess and dress wounds    Patient left HOB elevated with all lines intact, call button in reach, and bed alarm on..    GOALS:   Multidisciplinary Problems       Physical Therapy Goals          Problem: Physical Therapy    Goal Priority Disciplines Outcome Goal Variances Interventions   Physical Therapy Goal     PT, PT/OT Ongoing, Progressing     Description: Goals to be met by: 2023     Patient will increase functional independence with mobility by performin). Supine to sit with Stand-by Assistance  2). Sit to supine with Stand-by Assistance  3). Sit to stand transfer with Contact Guard Assistance  4). Gait  x > 25 feet with Contact Guard Assistance using Rolling Walker.                          Time Tracking:     PT Received On: 23  PT Start Time: 1419     PT Stop Time: 1437  PT Total Time (min): 18 min     Billable Minutes: Therapeutic Activity 18    Treatment Type: Treatment  PT/PTA: PT     PTA Visit Number: 0     2023

## 2023-02-22 NOTE — PLAN OF CARE
Problem: Adult Inpatient Plan of Care  Goal: Plan of Care Review  Outcome: Ongoing, Progressing     Problem: Adult Inpatient Plan of Care  Goal: Optimal Comfort and Wellbeing  Outcome: Ongoing, Progressing       POC reviewed with pt. Verbalized understanding. AAOx4. VSS. Meds given per mar. Complaints of pain moderately managed with prn pain medication. Left hip dressing changed this shift.  BG monitored closely, covered per PRN sliding scale. Purposeful hourly/q2hr rounding done during shift to promote patient safety. NAD noted. Safety maintained with side rails up x3, bed wheels locked, bed in lowest position, call light in reach. No further needs expressed at this time.

## 2023-02-22 NOTE — RESPIRATORY THERAPY
02/21/23 1956   Patient Assessment/Suction   Level of Consciousness (AVPU) alert   Respiratory Effort Normal;Unlabored   Expansion/Accessory Muscles/Retractions no use of accessory muscles   PRE-TX-O2   Device (Oxygen Therapy) room air   SpO2 98 %   Pulse Oximetry Type Intermittent   $ Pulse Oximetry - Multiple Charge Pulse Oximetry - Multiple   Pulse 80   Resp 18

## 2023-02-22 NOTE — PLAN OF CARE
Problem: Physical Therapy  Goal: Physical Therapy Goal  Description: Goals to be met by: 2023     Patient will increase functional independence with mobility by performin). Supine to sit with Stand-by Assistance  2). Sit to supine with Stand-by Assistance  3). Sit to stand transfer with Contact Guard Assistance  4). Gait  x > 25 feet with Contact Guard Assistance using Rolling Walker.     Outcome: Ongoing, Progressing   Pt tolerated thera ex in supine. Mod/max assist to sit EOB. Pt able to stand with RW mod assist and few steps to chair with 25% weight bearing LLE. SNF

## 2023-02-22 NOTE — ASSESSMENT & PLAN NOTE
Continue lisinopril 5 mg daily.  Stable.  Tele monitoring.  Continue intravenous hydralazine 10 mg every 2 hours as needed for systolic blood pressure in excess of 160 mmHg.

## 2023-02-22 NOTE — ASSESSMENT & PLAN NOTE
Patient's FSGs are uncontrolled due to hyperglycemia on current medication regimen.  Last A1c reviewed-   Lab Results   Component Value Date    HGBA1C 8.9 (H) 02/17/2023     Most recent fingerstick glucose reviewed-   Recent Labs   Lab 02/21/23  0729 02/21/23  1143 02/21/23  1750 02/21/23 2033   POCTGLUCOSE 217* 203* 96 73     Current correctional scale  Low  Maintain anti-hyperglycemic dose as follows-   Antihyperglycemics (From admission, onward)    Start     Stop Route Frequency Ordered    02/20/23 0900  insulin detemir U-100 pen 26 Units         -- SubQ Daily 02/19/23 0934    02/19/23 1045  metFORMIN ER 24hr tablet 500 mg         -- Oral 2 times daily with meals 02/19/23 0934    02/17/23 1727  insulin aspart U-100 pen 0-5 Units         -- SubQ Before meals & nightly PRN 02/17/23 1628        Hold Oral hypoglycemics while patient is in the hospital.

## 2023-02-22 NOTE — PLAN OF CARE
AAO x 4, VSS, Blood glucose monitored and maintained to orders, PRN pain medication given to maintain comfort, holley to gravity, dressing clean, dry, intact, safety maintained, resting between care    Problem: Adult Inpatient Plan of Care  Goal: Plan of Care Review  Outcome: Ongoing, Progressing     Problem: Adult Inpatient Plan of Care  Goal: Optimal Comfort and Wellbeing  Outcome: Ongoing, Progressing     Problem: Infection  Goal: Absence of Infection Signs and Symptoms  Outcome: Ongoing, Progressing     Problem: Diabetes Comorbidity  Goal: Blood Glucose Level Within Targeted Range  Outcome: Ongoing, Progressing     Problem: Skin Injury Risk Increased  Goal: Skin Health and Integrity  Outcome: Ongoing, Progressing     Problem: Fall Injury Risk  Goal: Absence of Fall and Fall-Related Injury  Outcome: Ongoing, Progressing

## 2023-02-22 NOTE — ASSESSMENT & PLAN NOTE
Smoking cessation counseling performed. Dangers of cigarette smoking were reviewed with patient in detail and patient was encouraged to quit. Nicotine replacement options were discussed for > 10 minutes on admit.

## 2023-02-22 NOTE — PT/OT/SLP PROGRESS
Occupational Therapy   Treatment    Name: Anastasiia Badillo  MRN: [id number]  Admitting Diagnosis:  Closed left subtrochanteric femur fracture, initial encounter  3 Days Post-Op    Recommendations:     Discharge Recommendations: rehabilitation facility  Discharge Equipment Recommendations:  walker, rolling, grab bar, bedside commode, bath bench, shower chair    Assessment:     Anastasiia Badillo is a 73 y.o. female with a medical diagnosis of Closed left subtrochanteric femur fracture, initial encounter.  She presents with performance deficits affecting function including weakness, impaired self care skills, impaired functional mobility and pain.      Rehab Prognosis:  Good; patient would benefit from acute skilled OT services to address these deficits and reach maximum level of function.       Plan:     Patient to be seen 3 x/week to address the above listed problems via self-care/home management, therapeutic activities, therapeutic exercises  Plan of Care Expires: 02/26/23  Plan of Care Reviewed with: patient    Subjective     Pain/Comfort:  Pain Rating 1: 0/10    Objective:     Communicated with: Nursing prior to session.  Patient found HOB elevated upon OT entry to room.    General Precautions: Standard, fall   Orthopedic Precautions: 25% WB LLE  Braces: N/A    Occupational Performance:     Bed Mobility:    Patient completed Supine to Sit with moderate assistance  Patient completed Sit to Supine with moderate assistance       Activities of Daily Living:  Pt performed hand and oral hygiene with set up A/SBA.      Treatment & Education:  Pt was given instruction and demonstration of UE ROM exercises to improve her independence with ADLs. Pt performed 3x10 bilateral shoulder flexion, circumduction and elbow extension/flexion with rest breaks.   - Importance of mobility to maximize recovery.    Patient left HOB elevated with all lines intact and call button in reach.    GOALS:   Multidisciplinary Problems        Occupational Therapy Goals          Problem: Occupational Therapy    Goal Priority Disciplines Outcome Interventions   Occupational Therapy Goal     OT, PT/OT                         Time Tracking:     OT Date of Treatment: 02/21/23  OT Start Time: 1601  OT Stop Time: 1624  OT Total Time (min): 23 min    Billable Minutes:Self Care/Home Management 15  Therapeutic Exercise 8    Karma Pierce, OTR  2/21/2023

## 2023-02-22 NOTE — ASSESSMENT & PLAN NOTE
Secondary to femur fracture and post-op.  Patient's anemia is currently controlled.  Current CBC reviewed-   Lab Results   Component Value Date    HGB 10.2 (L) 02/21/2023    HCT 31.0 (L) 02/21/2023     Monitor serial CBC and transfuse if patient becomes hemodynamically unstable, symptomatic or H/H drops below 7/21.

## 2023-02-22 NOTE — PLAN OF CARE
[facility] called in. pssr faxed. awaiting 142       02/22/23 1341   Post-Acute Status   Post-Acute Authorization Placement   Post-Acute Placement Status Pending state direction/certification

## 2023-02-22 NOTE — ASSESSMENT & PLAN NOTE
Advance Care Planning     Date: 02/17/2023    Living Will  During this visit, I engaged the patient  in the advance care planning process.  The patient and I reviewed the role for advance directives and their purpose in directing future healthcare if the patient's unable to speak for herself.  At this point in time, the patient does have full decision-making capacity.  We discussed different extreme health states that she could experience, and reviewed what kind of medical care she would want in those situations.  The patient communicated that if she were comatose and had little chance of a meaningful recovery, she would want machines/life-sustaining treatments used.  I spent a total of 16 minutes engaging the patient in this advance care planning discussion. (per MD at time of visit)

## 2023-02-22 NOTE — SUBJECTIVE & OBJECTIVE
Interval History:  no new issues or complaints.   working on SNF referral.  Pain is well controlled.    Review of Systems   Constitutional:  Negative for chills and fever.   Respiratory:  Negative for cough and shortness of breath.    Cardiovascular:  Negative for chest pain and leg swelling.   Gastrointestinal:  Negative for abdominal pain, nausea and vomiting.   Objective:     Vital Signs (Most Recent):  Temp: 97.9 °F (36.6 °C) (02/21/23 2003)  Pulse: 82 (02/21/23 2003)  Resp: 18 (02/21/23 2003)  BP: (!) 146/70 (02/21/23 2003)  SpO2: 98 % (02/21/23 2003)   Vital Signs (24h Range):  Temp:  [97.5 °F (36.4 °C)-98.2 °F (36.8 °C)] 97.9 °F (36.6 °C)  Pulse:  [78-93] 82  Resp:  [18] 18  SpO2:  [94 %-99 %] 98 %  BP: (134-176)/(70-85) 146/70     Weight: 67 kg (147 lb 11.3 oz)  Body mass index is 24.58 kg/m².    Intake/Output Summary (Last 24 hours) at 2/21/2023 2211  Last data filed at 2/21/2023 1730  Gross per 24 hour   Intake 340 ml   Output --   Net 340 ml      Physical Exam  Constitutional:       General: She is not in acute distress.     Appearance: She is not ill-appearing.   Eyes:      General:         Right eye: No discharge.         Left eye: No discharge.   Neck:      Vascular: No JVD.   Cardiovascular:      Rate and Rhythm: Normal rate and regular rhythm.   Pulmonary:      Effort: Pulmonary effort is normal.      Breath sounds: Normal breath sounds.   Abdominal:      General: Abdomen is flat. Bowel sounds are normal. There is no distension.      Palpations: Abdomen is soft.      Tenderness: There is no abdominal tenderness.   Musculoskeletal:      Right lower leg: No edema.      Left lower leg: No edema.   Skin:     General: Skin is warm and moist.      Findings: No rash.   Neurological:      Mental Status: She is alert and oriented to person, place, and time.   Psychiatric:         Attention and Perception: Attention normal.         Mood and Affect: Mood and affect normal.         Speech: Speech  normal.       Significant Labs: All pertinent labs within the past 24 hours have been reviewed.    Significant Imaging:  No new imaging

## 2023-02-22 NOTE — ASSESSMENT & PLAN NOTE
Chronic, stable.  Continue lisinopril 5 mg daily.   Tele monitoring.  Continue intravenous hydralazine 10 mg every 2 hours as needed for systolic blood pressure in excess of 180 mmHg.

## 2023-02-23 VITALS
HEIGHT: 65 IN | SYSTOLIC BLOOD PRESSURE: 186 MMHG | WEIGHT: 147.69 LBS | RESPIRATION RATE: 18 BRPM | TEMPERATURE: 97 F | HEART RATE: 90 BPM | DIASTOLIC BLOOD PRESSURE: 84 MMHG | BODY MASS INDEX: 24.61 KG/M2 | OXYGEN SATURATION: 95 %

## 2023-02-23 LAB
POCT GLUCOSE: 208 MG/DL (ref 70–110)
POCT GLUCOSE: 318 MG/DL (ref 70–110)

## 2023-02-23 PROCEDURE — 63600175 PHARM REV CODE 636 W HCPCS: Performed by: ORTHOPAEDIC SURGERY

## 2023-02-23 PROCEDURE — A4216 STERILE WATER/SALINE, 10 ML: HCPCS | Performed by: ORTHOPAEDIC SURGERY

## 2023-02-23 PROCEDURE — 25000003 PHARM REV CODE 250: Performed by: ORTHOPAEDIC SURGERY

## 2023-02-23 PROCEDURE — 97530 THERAPEUTIC ACTIVITIES: CPT | Mod: CQ

## 2023-02-23 PROCEDURE — 94761 N-INVAS EAR/PLS OXIMETRY MLT: CPT

## 2023-02-23 PROCEDURE — 25000003 PHARM REV CODE 250: Performed by: INTERNAL MEDICINE

## 2023-02-23 PROCEDURE — 25000003 PHARM REV CODE 250: Performed by: STUDENT IN AN ORGANIZED HEALTH CARE EDUCATION/TRAINING PROGRAM

## 2023-02-23 RX ORDER — LACTULOSE 10 G/15ML
30 SOLUTION ORAL ONCE
Status: COMPLETED | OUTPATIENT
Start: 2023-02-23 | End: 2023-02-23

## 2023-02-23 RX ORDER — HYDROCODONE BITARTRATE AND ACETAMINOPHEN 5; 325 MG/1; MG/1
1 TABLET ORAL EVERY 6 HOURS PRN
Qty: 21 TABLET | Refills: 0 | Status: SHIPPED | OUTPATIENT
Start: 2023-02-23 | End: 2023-03-31 | Stop reason: SDUPTHER

## 2023-02-23 RX ORDER — NITROFURANTOIN 25; 75 MG/1; MG/1
100 CAPSULE ORAL EVERY 12 HOURS
Qty: 6 CAPSULE | Refills: 0
Start: 2023-02-23 | End: 2023-02-26

## 2023-02-23 RX ORDER — INSULIN ASPART 100 [IU]/ML
0-5 INJECTION, SOLUTION INTRAVENOUS; SUBCUTANEOUS
Refills: 0
Start: 2023-02-23 | End: 2023-05-24 | Stop reason: SDUPTHER

## 2023-02-23 RX ADMIN — MORPHINE SULFATE 4 MG: 4 INJECTION INTRAVENOUS at 06:02

## 2023-02-23 RX ADMIN — Medication 10 ML: at 02:02

## 2023-02-23 RX ADMIN — MORPHINE SULFATE 4 MG: 4 INJECTION INTRAVENOUS at 12:02

## 2023-02-23 RX ADMIN — POLYETHYLENE GLYCOL (3350) 17 G: 17 POWDER, FOR SOLUTION ORAL at 07:02

## 2023-02-23 RX ADMIN — Medication 1000 UNITS: at 08:02

## 2023-02-23 RX ADMIN — INSULIN ASPART 2 UNITS: 100 INJECTION, SOLUTION INTRAVENOUS; SUBCUTANEOUS at 08:02

## 2023-02-23 RX ADMIN — LACTULOSE 30 G: 20 SOLUTION ORAL at 11:02

## 2023-02-23 RX ADMIN — INSULIN ASPART 4 UNITS: 100 INJECTION, SOLUTION INTRAVENOUS; SUBCUTANEOUS at 12:02

## 2023-02-23 RX ADMIN — ACETAMINOPHEN 650 MG: 325 TABLET ORAL at 03:02

## 2023-02-23 RX ADMIN — CALCIUM CARBONATE (ANTACID) CHEW TAB 500 MG 500 MG: 500 CHEW TAB at 08:02

## 2023-02-23 RX ADMIN — Medication 10 ML: at 05:02

## 2023-02-23 RX ADMIN — INSULIN DETEMIR 18 UNITS: 100 INJECTION, SOLUTION SUBCUTANEOUS at 11:02

## 2023-02-23 RX ADMIN — NITROFURANTOIN (MONOHYDRATE/MACROCRYSTALS) 100 MG: 75; 25 CAPSULE ORAL at 08:02

## 2023-02-23 RX ADMIN — APIXABAN 2.5 MG: 2.5 TABLET, FILM COATED ORAL at 08:02

## 2023-02-23 RX ADMIN — THERA TABS 1 TABLET: TAB at 08:02

## 2023-02-23 RX ADMIN — DOCUSATE SODIUM 100 MG: 100 CAPSULE, LIQUID FILLED ORAL at 08:02

## 2023-02-23 RX ADMIN — LISINOPRIL 5 MG: 2.5 TABLET ORAL at 08:02

## 2023-02-23 RX ADMIN — HYDROCODONE BITARTRATE AND ACETAMINOPHEN 1 TABLET: 5; 325 TABLET ORAL at 10:02

## 2023-02-23 RX ADMIN — OXYCODONE HYDROCHLORIDE AND ACETAMINOPHEN 500 MG: 500 TABLET ORAL at 08:02

## 2023-02-23 NOTE — PLAN OF CARE
Problem: Physical Therapy  Goal: Physical Therapy Goal  Description: Goals to be met by: 2023     Patient will increase functional independence with mobility by performin). Supine to sit with Stand-by Assistance  2). Sit to supine with Stand-by Assistance  3). Sit to stand transfer with Contact Guard Assistance  4). Gait  x > 25 feet with Contact Guard Assistance using Rolling Walker.     Outcome: Ongoing, Progressing   Therapeutic activity to improve functional mobility : bed mobility, transfers, gait with rw and assistance for safety TTWB LLE.

## 2023-02-23 NOTE — PLAN OF CARE
AVS, ppd, cxr, phn auth sent to [facility] via [employer] for review. Still awaiting 142       02/23/23 0900   Post-Acute Status   Post-Acute Authorization Placement   Post-Acute Placement Status Pending post-acute provider review/more information requested

## 2023-02-23 NOTE — PLAN OF CARE
AAO x 4, VSS, Blood glucose monitored and maintained to sliding scale, PRN pain medication given to maintain comfort, hip dressing dry and intact, holley to gravity, safety maintained, resting between care.     Problem: Adult Inpatient Plan of Care  Goal: Plan of Care Review  Outcome: Ongoing, Progressing     Problem: Adult Inpatient Plan of Care  Goal: Optimal Comfort and Wellbeing  Outcome: Ongoing, Progressing     Problem: Diabetes Comorbidity  Goal: Blood Glucose Level Within Targeted Range  Outcome: Ongoing, Progressing     Problem: Skin Injury Risk Increased  Goal: Skin Health and Integrity  Outcome: Ongoing, Progressing     Problem: Fall Injury Risk  Goal: Absence of Fall and Fall-Related Injury  Outcome: Ongoing, Progressing

## 2023-02-23 NOTE — PLAN OF CARE
Problem: Adult Inpatient Plan of Care  Goal: Plan of Care Review  Outcome: Ongoing, Progressing   Supine with HOB 45. Tele [id number] in use. POC and medications reviewed with pt. Verbalized understanding. HL in left fa with DDI. No redness or swelling at site. Dsg to Left hip dry and intact. No drainage. Gee cath intact and draining yellow urine in drainage bag. SR up x 2. Call light in reach. Will continue to monitor. Bed in lowest position with brakes locked.   Problem: Adult Inpatient Plan of Care  Goal: Optimal Comfort and Wellbeing  Outcome: Ongoing, Progressing   Q 2 hourly rounds made through out shift and IV site, pain and position monitored. PRN meds given per MD order.   Problem: Diabetes Comorbidity  Goal: Blood Glucose Level Within Targeted Range  Outcome: Ongoing, Progressing   CBGs monitored through out shift and insulin given per MD order.

## 2023-02-23 NOTE — CONSULTS
Wound care consulted for skin tears to bilateral elbows present on admit. Cleansed with wound cleanser and patted dry, applied urgotul over tears and secured with foam dressing.         L elbow       R elbow

## 2023-02-23 NOTE — PROGRESS NOTES
Pt discharge instructions and prescription sent with pt. PIV and Tele removed.   Catheter remains intact until urology follow up. Personal items in hand. Pt transported via wheelchair to [employer] transport vehicle.

## 2023-02-23 NOTE — NURSING
Attempted to call report to nurse receiving patient at [facility]. Nurse not available at this time, will call back.

## 2023-02-23 NOTE — PLAN OF CARE
Pssr/142 in [facility].    Report can be called to [phone] to A14 in 30 minutes. [facility] will set up transportation    Pt clear for discharge from CM standpoint. Discharging to [facility]       02/23/23 1152   Final Note   Assessment Type Final Discharge Note   Anticipated Discharge Disposition SNF

## 2023-02-23 NOTE — DISCHARGE SUMMARY
[facility] Hospital Medicine  Discharge Summary      Patient Name: Anastasiia Badillo  MRN: [id number]  CASEY: [id number]  Patient Class: IP- Inpatient  Admission Date: 2/17/2023  Hospital Length of Stay: 6 days  Discharge Date and Time:  02/23/2023 4:24 PM  Attending Physician: Sg Hendricks MD   Discharging Provider: Sg Hendricks MD  Primary Care Provider: Raji Parkinson MD    Primary Care Team: Networked reference to record PCT     HPI:   Patient is a 73-year-old female with past medical history significant for hypertension and diabetes mellitus type 2 who is being admitted to Hospital Medicine from [facility] Emergency room status post fall which patient suffered last evening at home with complaints of left hip pain.  Got a around midnight after waking up from sleep and somehow lost her balance while using walker and landed on her left hip.  Patient was brought by paramedics.  Patient remained on the floor for more than 12 hours until friend arrived.  There is with obvious left leg shortening and external rotation noted.  Patient is unable to bear weight.  Patient denies any head injury, loss of consciousness or any focal neuro deficits.  No recent fevers or chills reported.  Patient denies any chest pain or shortness of breath.      Procedure(s) (LRB):  INSERTION, INTRAMEDULLARY ROXANNA, FEMUR (hana table -- synthes -- long nail -- have bovie and suction and large bone set) (Left)      Hospital Course:   Admitted after a fall resulting in left hip fracture. Ortho consulted and performed repair. Also noted to have E. Coli UTI so started on macrobid. Had an episode of hypoglycemia overnight so hypoglycemics held and adjusted. Had urinary retention so holley kept in and she will follow up with outpatient urology for voiding trial. CM worked on SNF placement. She was accepted to SNF and discharged there. By time of discharge the patient was tolerating a regular diet with  improving admission symptoms. Patient seen and examined on day of discharge.    Physical exam on day of discharge:  Constitutional:       General: She is not in acute distress.  HENT:      Head: Normocephalic and atraumatic.   Cardiovascular:      Rate and Rhythm: Normal rate and regular rhythm.      Heart sounds: Normal heart sounds. No murmur heard.  Pulmonary:      Effort: Pulmonary effort is normal. No respiratory distress.      Breath sounds: Normal breath sounds. No wheezing, rhonchi or rales.   Musculoskeletal:      Comments: Left hip dressing with moderate serosanguinous saturation  LLE neurovascularly intact   Neurological:      General: No focal deficit present.      Mental Status: She is alert and oriented to person, place, and time.   Psychiatric:         Mood and Affect: Affect normal.         Behavior: Behavior normal.         Thought Content: Thought content normal.         Goals of Care Treatment Preferences:  Code Status: Full Code      Consults:   Consults (From admission, onward)        Status Ordering Provider     Inpatient consult to Social Work/Case Management  Once        Provider:  (Not yet assigned)    Acknowledged INDRA VILLALPANDO     Case Management/  Once        Provider:  (Not yet assigned)    Completed KEANU CASTRO     Inpatient consult to Orthopedic Surgery  Once        Provider:  Keanu Castro MD    Completed INDRA VILLALPANDO     Inpatient consult to Orthopedic Surgery  Once        Provider:  Keanu Castro MD    Completed KAT ALVAREZ                Final Active Diagnoses:    Diagnosis Date Noted POA    PRINCIPAL PROBLEM:  Closed left subtrochanteric femur fracture, initial encounter [S72.22XA] 02/18/2023 Yes    Nicotine addiction [F17.200] 02/20/2023 Yes    Acute blood loss anemia [D62] 02/19/2023 No    Type 2 diabetes mellitus with hyperglycemia [E11.65] 02/17/2023 Yes    Essential hypertension [I10] 02/17/2023 Yes    Urinary tract infection without  hematuria [N39.0] 02/17/2023 Yes    ACP (advance care planning) [Z71.89] 02/17/2023 Not Applicable      Problems Resolved During this Admission:    Diagnosis Date Noted Date Resolved POA    Hyperkalemia [E87.5] 02/17/2023 02/21/2023 Yes    Metabolic acidosis [E87.20] 02/17/2023 02/21/2023 Yes    Dehydration [E86.0] 02/17/2023 02/21/2023 Yes       Discharged Condition: good    Disposition: Skilled Nursing Facility    Follow Up:   Follow-up Information     Raji Parkinson MD. Schedule an appointment as soon as possible for a visit in 1 week(s).    Specialty: Family Medicine  Why: or with SNF provider  Contact information:  [address]  [address]  [facility]  [place] [address]  [phone]             Keanu Brand MD. Go on 3/3/2023.    Specialty: Orthopedic Surgery  Contact information:  [address] [place] [address] [address] [place] [address]  [phone]                       Patient Instructions:      Diet diabetic   Order Comments: Cardiac, low sodium as well     Notify your health care provider if you experience any of the following:  temperature >100.4     Notify your health care provider if you experience any of the following:  persistent nausea and vomiting or diarrhea     Notify your health care provider if you experience any of the following:  severe uncontrolled pain     Notify your health care provider if you experience any of the following:  redness, tenderness, or signs of infection (pain, swelling, redness, odor or green/yellow discharge around incision site)     Notify your health care provider if you experience any of the following:  difficulty breathing or increased cough     Notify your health care provider if you experience any of the following:  severe persistent headache     Notify your health care provider if you experience any of the following:  worsening rash     Notify your health care provider if you experience any of the following:  persistent dizziness, light-headedness, or  visual disturbances     Notify your health care provider if you experience any of the following:  increased confusion or weakness     Activity as tolerated       Significant Diagnostic Studies:       Recent Results (from the past 150 hour(s))   CBC Auto Differential    Collection Time: 02/17/23  2:15 PM   Result Value Ref Range    WBC 11.18 3.90 - 12.70 K/uL    RBC 3.42 (L) 4.00 - 5.40 M/uL    Hemoglobin 11.0 (L) 12.0 - 16.0 g/dL    Hematocrit 33.0 (L) 37.0 - 48.5 %    MCV 97 82 - 98 fL    MCH 32.2 (H) 27.0 - 31.0 pg    MCHC 33.3 32.0 - 36.0 g/dL    RDW 13.4 11.5 - 14.5 %    Platelets 221 150 - 450 K/uL    MPV 11.1 9.2 - 12.9 fL    Immature Granulocytes 0.4 0.0 - 0.5 %    Gran # (ANC) 9.9 (H) 1.8 - 7.7 K/uL    Immature Grans (Abs) 0.04 0.00 - 0.04 K/uL    Lymph # 0.4 (L) 1.0 - 4.8 K/uL    Mono # 0.8 0.3 - 1.0 K/uL    Eos # 0.0 0.0 - 0.5 K/uL    Baso # 0.02 0.00 - 0.20 K/uL    nRBC 0 0 /100 WBC    Gran % 88.5 (H) 38.0 - 73.0 %    Lymph % 3.7 (L) 18.0 - 48.0 %    Mono % 7.2 4.0 - 15.0 %    Eosinophil % 0.0 0.0 - 8.0 %    Basophil % 0.2 0.0 - 1.9 %    Differential Method Automated    Comprehensive Metabolic Panel    Collection Time: 02/17/23  2:15 PM   Result Value Ref Range    Sodium 137 136 - 145 mmol/L    Potassium 5.2 (H) 3.5 - 5.1 mmol/L    Chloride 97 95 - 110 mmol/L    CO2 22 (L) 23 - 29 mmol/L    Glucose 485 (HH) 70 - 110 mg/dL    BUN 26 (H) 8 - 23 mg/dL    Creatinine 1.1 0.5 - 1.4 mg/dL    Calcium 9.5 8.7 - 10.5 mg/dL    Total Protein 7.0 6.0 - 8.4 g/dL    Albumin 4.0 3.5 - 5.2 g/dL    Total Bilirubin 1.4 (H) 0.1 - 1.0 mg/dL    Alkaline Phosphatase 71 55 - 135 U/L    AST 31 10 - 40 U/L    ALT 25 10 - 44 U/L    Anion Gap 18 (H) 8 - 16 mmol/L    eGFR 53 (A) >60 mL/min/1.73 m^2   CPK    Collection Time: 02/17/23  2:15 PM   Result Value Ref Range     (H) 20 - 180 U/L   Hemoglobin A1c    Collection Time: 02/17/23  2:15 PM   Result Value Ref Range    Hemoglobin A1C 8.9 (H) 4.0 - 5.6 %    Estimated Avg Glucose  209 (H) 68 - 131 mg/dL   POCT glucose    Collection Time: 02/17/23  2:22 PM   Result Value Ref Range    POCT Glucose 448 (H) 70 - 110 mg/dL   Urinalysis, Reflex to Urine Culture Urine, Clean Catch    Collection Time: 02/17/23  2:24 PM    Specimen: Urine   Result Value Ref Range    Specimen UA Urine, Catheterized     Color, UA Yellow Yellow, Straw, Meaghan    Appearance, UA Hazy (A) Clear    pH, UA 5.0 5.0 - 8.0    Specific Gravity, UA 1.010 1.005 - 1.030    Protein, UA Negative Negative    Glucose, UA 3+ (A) Negative    Ketones, UA 3+ (A) Negative    Bilirubin (UA) Negative Negative    Occult Blood UA 2+ (A) Negative    Nitrite, UA Positive (A) Negative    Urobilinogen, UA Negative <2.0 EU/dL    Leukocytes, UA Trace (A) Negative   Urinalysis Microscopic    Collection Time: 02/17/23  2:24 PM   Result Value Ref Range    RBC, UA 6 (H) 0 - 4 /hpf    WBC, UA 25 (H) 0 - 5 /hpf    Bacteria Many (A) None-Occ /hpf    Yeast, UA None None    Microscopic Comment SEE COMMENT    Urine culture    Collection Time: 02/17/23  2:24 PM    Specimen: Urine   Result Value Ref Range    Urine Culture, Routine (A)      ESCHERICHIA COLI  > 100,000 cfu/ml  No other significant isolate         Susceptibility    Escherichia coli - CULTURE, URINE     Amp/Sulbactam <=8/4 Sensitive mcg/mL     Ampicillin >16 Resistant mcg/mL     Amox/K Clav'ate <=8/4 Sensitive mcg/mL     Ceftriaxone <=1 Sensitive mcg/mL     Cefazolin <=2 Sensitive mcg/mL     Ciprofloxacin >2 Resistant mcg/mL     Cefepime <=2 Sensitive mcg/mL     Ertapenem <=0.5 Sensitive mcg/mL     Nitrofurantoin <=32 Sensitive mcg/mL     Gentamicin <=4 Sensitive mcg/mL     Levofloxacin >4 Resistant mcg/mL     Meropenem <=1 Sensitive mcg/mL     Piperacillin/Tazo <=16 Sensitive mcg/mL     Trimeth/Sulfa <=2/38 Sensitive mcg/mL     Tobramycin <=4 Sensitive mcg/mL   POCT glucose    Collection Time: 02/17/23  2:24 PM   Result Value Ref Range    POCT Glucose 488 (HH) 70 - 110 mg/dL   Beta - Hydroxybutyrate,  Serum    Collection Time: 02/17/23  4:10 PM   Result Value Ref Range    Beta-Hydroxybutyrate 4.2 (H) 0.0 - 0.5 mmol/L   Lactic Acid, Plasma    Collection Time: 02/17/23  4:10 PM   Result Value Ref Range    Lactate (Lactic Acid) 2.5 (H) 0.5 - 2.2 mmol/L   POCT glucose    Collection Time: 02/17/23  5:00 PM   Result Value Ref Range    POCT Glucose 320 (H) 70 - 110 mg/dL   POCT glucose    Collection Time: 02/17/23  8:20 PM   Result Value Ref Range    POCT Glucose 280 (H) 70 - 110 mg/dL   Basic Metabolic Panel    Collection Time: 02/17/23 10:50 PM   Result Value Ref Range    Sodium 135 (L) 136 - 145 mmol/L    Potassium 4.4 3.5 - 5.1 mmol/L    Chloride 102 95 - 110 mmol/L    CO2 25 23 - 29 mmol/L    Glucose 261 (H) 70 - 110 mg/dL    BUN 28 (H) 8 - 23 mg/dL    Creatinine 1.0 0.5 - 1.4 mg/dL    Calcium 8.5 (L) 8.7 - 10.5 mg/dL    Anion Gap 8 8 - 16 mmol/L    eGFR 60 >60 mL/min/1.73 m^2   Comprehensive Metabolic Panel (CMP)    Collection Time: 02/18/23  4:57 AM   Result Value Ref Range    Sodium 133 (L) 136 - 145 mmol/L    Potassium 4.4 3.5 - 5.1 mmol/L    Chloride 100 95 - 110 mmol/L    CO2 24 23 - 29 mmol/L    Glucose 162 (H) 70 - 110 mg/dL    BUN 30 (H) 8 - 23 mg/dL    Creatinine 1.1 0.5 - 1.4 mg/dL    Calcium 8.8 8.7 - 10.5 mg/dL    Total Protein 5.8 (L) 6.0 - 8.4 g/dL    Albumin 3.3 (L) 3.5 - 5.2 g/dL    Total Bilirubin 0.7 0.1 - 1.0 mg/dL    Alkaline Phosphatase 51 (L) 55 - 135 U/L    AST 27 10 - 40 U/L    ALT 20 10 - 44 U/L    Anion Gap 9 8 - 16 mmol/L    eGFR 53 (A) >60 mL/min/1.73 m^2   Magnesium    Collection Time: 02/18/23  4:57 AM   Result Value Ref Range    Magnesium 1.8 1.6 - 2.6 mg/dL   Phosphorus    Collection Time: 02/18/23  4:57 AM   Result Value Ref Range    Phosphorus 3.5 2.7 - 4.5 mg/dL   CBC Without Differential    Collection Time: 02/18/23  4:57 AM   Result Value Ref Range    WBC 11.35 3.90 - 12.70 K/uL    RBC 2.81 (L) 4.00 - 5.40 M/uL    Hemoglobin 9.1 (L) 12.0 - 16.0 g/dL    Hematocrit 27.3 (L) 37.0  - 48.5 %    MCV 97 82 - 98 fL    MCH 32.4 (H) 27.0 - 31.0 pg    MCHC 33.3 32.0 - 36.0 g/dL    RDW 13.7 11.5 - 14.5 %    Platelets 174 150 - 450 K/uL    MPV 11.0 9.2 - 12.9 fL   POCT glucose    Collection Time: 02/18/23  7:59 AM   Result Value Ref Range    POCT Glucose 136 (H) 70 - 110 mg/dL   Lactic Acid, Plasma    Collection Time: 02/18/23  2:16 PM   Result Value Ref Range    Lactate (Lactic Acid) 0.9 0.5 - 2.2 mmol/L   Beta - Hydroxybutyrate, Serum    Collection Time: 02/18/23  2:16 PM   Result Value Ref Range    Beta-Hydroxybutyrate 1.9 (H) 0.0 - 0.5 mmol/L   POCT glucose    Collection Time: 02/18/23  4:54 PM   Result Value Ref Range    POCT Glucose 204 (H) 70 - 110 mg/dL   POCT glucose    Collection Time: 02/18/23  8:31 PM   Result Value Ref Range    POCT Glucose 197 (H) 70 - 110 mg/dL   Comprehensive Metabolic Panel (CMP)    Collection Time: 02/19/23  5:06 AM   Result Value Ref Range    Sodium 132 (L) 136 - 145 mmol/L    Potassium 4.9 3.5 - 5.1 mmol/L    Chloride 98 95 - 110 mmol/L    CO2 23 23 - 29 mmol/L    Glucose 251 (H) 70 - 110 mg/dL    BUN 33 (H) 8 - 23 mg/dL    Creatinine 1.2 0.5 - 1.4 mg/dL    Calcium 8.3 (L) 8.7 - 10.5 mg/dL    Total Protein 5.1 (L) 6.0 - 8.4 g/dL    Albumin 2.8 (L) 3.5 - 5.2 g/dL    Total Bilirubin 0.4 0.1 - 1.0 mg/dL    Alkaline Phosphatase 44 (L) 55 - 135 U/L    AST 27 10 - 40 U/L    ALT 14 10 - 44 U/L    Anion Gap 11 8 - 16 mmol/L    eGFR 48 (A) >60 mL/min/1.73 m^2   Magnesium    Collection Time: 02/19/23  5:06 AM   Result Value Ref Range    Magnesium 1.9 1.6 - 2.6 mg/dL   Phosphorus    Collection Time: 02/19/23  5:06 AM   Result Value Ref Range    Phosphorus 4.9 (H) 2.7 - 4.5 mg/dL   CBC Without Differential    Collection Time: 02/19/23  5:06 AM   Result Value Ref Range    WBC 7.76 3.90 - 12.70 K/uL    RBC 2.17 (L) 4.00 - 5.40 M/uL    Hemoglobin 7.1 (L) 12.0 - 16.0 g/dL    Hematocrit 21.8 (L) 37.0 - 48.5 %     (H) 82 - 98 fL    MCH 32.7 (H) 27.0 - 31.0 pg    MCHC 32.6 32.0  - 36.0 g/dL    RDW 13.5 11.5 - 14.5 %    Platelets 166 150 - 450 K/uL    MPV 11.6 9.2 - 12.9 fL   POCT glucose    Collection Time: 02/19/23  7:43 AM   Result Value Ref Range    POCT Glucose 258 (H) 70 - 110 mg/dL   Prepare RBC 2 Units; Post-surgical bleeding    Collection Time: 02/19/23 11:06 AM   Result Value Ref Range    UNIT NUMBER [id number]     Product Code P5915T94     DISPENSE STATUS TRANSFUSED     CODING SYSTEM RYDL175     Unit Blood Type Code 5100     Unit Blood Type O POS     Unit Expiration [id number]     CROSSMATCH INTERPRETATION Compatible     UNIT NUMBER [id number]     Product Code H7079V28     DISPENSE STATUS TRANSFUSED     CODING SYSTEM [id number]     Unit Blood Type Code 5100     Unit Blood Type O POS     Unit Expiration [id number]     CROSSMATCH INTERPRETATION Compatible    Type & Screen    Collection Time: 02/19/23 11:06 AM   Result Value Ref Range    Group & Rh O POS     Indirect Robinson NEG    POCT glucose    Collection Time: 02/19/23 11:23 AM   Result Value Ref Range    POCT Glucose 208 (H) 70 - 110 mg/dL   POCT glucose    Collection Time: 02/19/23  4:53 PM   Result Value Ref Range    POCT Glucose 216 (H) 70 - 110 mg/dL   POCT glucose    Collection Time: 02/19/23  9:54 PM   Result Value Ref Range    POCT Glucose 178 (H) 70 - 110 mg/dL   Comprehensive Metabolic Panel (CMP)    Collection Time: 02/20/23  5:35 AM   Result Value Ref Range    Sodium 133 (L) 136 - 145 mmol/L    Potassium 4.5 3.5 - 5.1 mmol/L    Chloride 98 95 - 110 mmol/L    CO2 26 23 - 29 mmol/L    Glucose 158 (H) 70 - 110 mg/dL    BUN 26 (H) 8 - 23 mg/dL    Creatinine 0.8 0.5 - 1.4 mg/dL    Calcium 8.9 8.7 - 10.5 mg/dL    Total Protein 5.8 (L) 6.0 - 8.4 g/dL    Albumin 3.0 (L) 3.5 - 5.2 g/dL    Total Bilirubin 0.9 0.1 - 1.0 mg/dL    Alkaline Phosphatase 48 (L) 55 - 135 U/L    AST 29 10 - 40 U/L    ALT 10 10 - 44 U/L    Anion Gap 9 8 - 16 mmol/L    eGFR >60 >60 mL/min/1.73 m^2   Magnesium    Collection Time: 02/20/23  5:35 AM    Result Value Ref Range    Magnesium 1.9 1.6 - 2.6 mg/dL   Phosphorus    Collection Time: 02/20/23  5:35 AM   Result Value Ref Range    Phosphorus 2.4 (L) 2.7 - 4.5 mg/dL   CBC Without Differential    Collection Time: 02/20/23  5:35 AM   Result Value Ref Range    WBC 7.50 3.90 - 12.70 K/uL    RBC 3.24 (L) 4.00 - 5.40 M/uL    Hemoglobin 10.3 (L) 12.0 - 16.0 g/dL    Hematocrit 30.6 (L) 37.0 - 48.5 %    MCV 94 82 - 98 fL    MCH 31.8 (H) 27.0 - 31.0 pg    MCHC 33.7 32.0 - 36.0 g/dL    RDW 14.9 (H) 11.5 - 14.5 %    Platelets 184 150 - 450 K/uL    MPV 11.1 9.2 - 12.9 fL   POCT glucose    Collection Time: 02/20/23 11:38 AM   Result Value Ref Range    POCT Glucose 240 (H) 70 - 110 mg/dL   POCT glucose    Collection Time: 02/20/23  4:34 PM   Result Value Ref Range    POCT Glucose 250 (H) 70 - 110 mg/dL   POCT glucose    Collection Time: 02/20/23 10:22 PM   Result Value Ref Range    POCT Glucose 87 70 - 110 mg/dL   POCT glucose    Collection Time: 02/21/23  4:20 AM   Result Value Ref Range    POCT Glucose 47 (LL) 70 - 110 mg/dL   POCT glucose    Collection Time: 02/21/23  4:22 AM   Result Value Ref Range    POCT Glucose 41 (LL) 70 - 110 mg/dL   POCT glucose    Collection Time: 02/21/23  4:42 AM   Result Value Ref Range    POCT Glucose 91 70 - 110 mg/dL   POCT glucose    Collection Time: 02/21/23  7:29 AM   Result Value Ref Range    POCT Glucose 217 (H) 70 - 110 mg/dL   CBC Auto Differential    Collection Time: 02/21/23  8:26 AM   Result Value Ref Range    WBC 7.74 3.90 - 12.70 K/uL    RBC 3.24 (L) 4.00 - 5.40 M/uL    Hemoglobin 10.2 (L) 12.0 - 16.0 g/dL    Hematocrit 31.0 (L) 37.0 - 48.5 %    MCV 96 82 - 98 fL    MCH 31.5 (H) 27.0 - 31.0 pg    MCHC 32.9 32.0 - 36.0 g/dL    RDW 14.6 (H) 11.5 - 14.5 %    Platelets 218 150 - 450 K/uL    MPV 10.5 9.2 - 12.9 fL    Immature Granulocytes 0.4 0.0 - 0.5 %    Gran # (ANC) 6.1 1.8 - 7.7 K/uL    Immature Grans (Abs) 0.03 0.00 - 0.04 K/uL    Lymph # 0.8 (L) 1.0 - 4.8 K/uL    Mono # 0.8  0.3 - 1.0 K/uL    Eos # 0.0 0.0 - 0.5 K/uL    Baso # 0.02 0.00 - 0.20 K/uL    nRBC 0 0 /100 WBC    Gran % 78.6 (H) 38.0 - 73.0 %    Lymph % 10.1 (L) 18.0 - 48.0 %    Mono % 10.2 4.0 - 15.0 %    Eosinophil % 0.4 0.0 - 8.0 %    Basophil % 0.3 0.0 - 1.9 %    Differential Method Automated    POCT glucose    Collection Time: 02/21/23 11:43 AM   Result Value Ref Range    POCT Glucose 203 (H) 70 - 110 mg/dL   POCT glucose    Collection Time: 02/21/23  5:50 PM   Result Value Ref Range    POCT Glucose 96 70 - 110 mg/dL   POCT glucose    Collection Time: 02/21/23  8:33 PM   Result Value Ref Range    POCT Glucose 73 70 - 110 mg/dL   POCT glucose    Collection Time: 02/22/23 12:23 AM   Result Value Ref Range    POCT Glucose 38 (LL) 70 - 110 mg/dL   POCT glucose    Collection Time: 02/22/23 12:29 AM   Result Value Ref Range    POCT Glucose 45 (LL) 70 - 110 mg/dL   Basic Metabolic Panel    Collection Time: 02/22/23 12:51 AM   Result Value Ref Range    Sodium 135 (L) 136 - 145 mmol/L    Potassium 3.7 3.5 - 5.1 mmol/L    Chloride 101 95 - 110 mmol/L    CO2 26 23 - 29 mmol/L    Glucose 62 (L) 70 - 110 mg/dL    BUN 14 8 - 23 mg/dL    Creatinine 0.7 0.5 - 1.4 mg/dL    Calcium 8.7 8.7 - 10.5 mg/dL    Anion Gap 8 8 - 16 mmol/L    eGFR >60 >60 mL/min/1.73 m^2   POCT glucose    Collection Time: 02/22/23  1:26 AM   Result Value Ref Range    POCT Glucose 169 (H) 70 - 110 mg/dL   POCT glucose    Collection Time: 02/22/23  7:35 AM   Result Value Ref Range    POCT Glucose 93 70 - 110 mg/dL   POCT glucose    Collection Time: 02/22/23 11:54 AM   Result Value Ref Range    POCT Glucose 165 (H) 70 - 110 mg/dL   POCT TB Skin Test Read    Collection Time: 02/22/23  2:09 PM   Result Value Ref Range    TB Induration 48 - 72 hr read 0 mm   POCT glucose    Collection Time: 02/22/23  4:23 PM   Result Value Ref Range    POCT Glucose 219 (H) 70 - 110 mg/dL   POCT glucose    Collection Time: 02/22/23  8:24 PM   Result Value Ref Range    POCT Glucose 255 (H)  70 - 110 mg/dL   POCT glucose    Collection Time: 02/23/23  8:13 AM   Result Value Ref Range    POCT Glucose 208 (H) 70 - 110 mg/dL   POCT glucose    Collection Time: 02/23/23 11:59 AM   Result Value Ref Range    POCT Glucose 318 (H) 70 - 110 mg/dL       Imaging Results          X-Ray Hip 2 or 3 views Left (with Pelvis when performed) (Final result)  Result time 02/17/23 14:51:27    Final result by Ihsan Aguilar Jr., MD (02/17/23 14:51:27)                 Impression:      Mildly angulated inter trochanteric fracture of the left hip.      Electronically signed by: Ihsan Aguilar MD  Date:    02/17/2023  Time:    14:51             Narrative:    EXAMINATION:  XR HIP WITH PELVIS WHEN PERFORMED, 2 OR 3 VIEWS LEFT    CLINICAL HISTORY:  Pain in left hip    TECHNIQUE:  AP view of the pelvis and frog leg lateral view of the left hip were performed.    COMPARISON:  None    FINDINGS:  There is an angulated intertrochanteric fracture of the left femur.  The acetabulum is intact.  The pelvis and right hip are intact.  The sacroiliac joints are sharp and symmetric.                               X-Ray Chest AP Portable (Final result)  Result time 02/17/23 14:50:32    Final result by Ihsan Aguilar Jr., MD (02/17/23 14:50:32)                 Impression:      Few strands of atelectasis bilaterally otherwise negative chest      Electronically signed by: Ihsan Aguilar MD  Date:    02/17/2023  Time:    14:50             Narrative:    EXAMINATION:  XR CHEST AP PORTABLE    CLINICAL HISTORY:  pre-operative;    TECHNIQUE:  Single frontal view of the chest was performed.    COMPARISON:  None    FINDINGS:  The mediastinal and cardiac size and contours are normal.  There are few strands of atelectasis seen in the left midlung field and right lung base.  An infiltrate or solid mass is not seen.  There is no pneumothorax or pleural effusion.                                Pending Diagnostic Studies:     Procedure Component Value  Units Date/Time    SURG FL Surgery Fluoro Usage [[id number]] Resulted: 02/18/23 1241    Order Status: Sent Lab Status: In process Updated: 02/18/23 1241         Medications:  Reconciled Home Medications:      Medication List      START taking these medications    apixaban 2.5 mg Tab  Commonly known as: ELIQUIS  Take 1 tablet (2.5 mg total) by mouth 2 (two) times daily. for 25 days     HYDROcodone-acetaminophen 5-325 mg per tablet  Commonly known as: NORCO  Take 1 tablet by mouth every 6 (six) hours as needed.     insulin aspart U-100 100 unit/mL (3 mL) Inpn pen  Commonly known as: NovoLOG  Inject 0-5 Units into the skin before meals and at bedtime as needed (Hyperglycemia). Blood Glucose  mg/dL                  Pre-meal                2200  151-200                0 unit                      0 unit  201-250                2 units                    1 unit  251-300                3 units                    1 unit  301-350                4 units                    2 units  >350                     5 units                    3 units     nitrofurantoin (macrocrystal-monohydrate) 100 MG capsule  Commonly known as: MACROBID  Take 1 capsule (100 mg total) by mouth every 12 (twelve) hours. for 3 days        CHANGE how you take these medications    insulin detemir U-100 100 unit/mL (3 mL) Inpn pen  Commonly known as: Levemir FLEXTOUCH  Inject 18 Units into the skin once daily.  What changed:   · how much to take  · how to take this  · when to take this  · additional instructions        CONTINUE taking these medications    calcium carbonate 600 mg calcium (1,500 mg) Tab  Commonly known as: OS-DAGMAR  Take 600 mg by mouth 2 (two) times daily with meals.     CENTRUM SILVER 0.4 mg-300 mcg- 250 mcg Tab  Generic drug: multivit-min-FA-lycopen-lutein  Take 1 tablet by mouth once daily.     CINNAMON 500 mg capsule  Generic drug: cinnamon bark  Take 500 mg by mouth once daily.     lisinopriL 5 MG tablet  Commonly known as:  "PRINIVIL,ZESTRIL  lisinopril 5 mg tablet     loperamide 2 mg capsule  Commonly known as: IMODIUM  Take 2 mg by mouth 4 (four) times daily as needed for Diarrhea.     melatonin 10 mg Cap  Take 1 capsule by mouth every evening.     pen needle, diabetic 31 gauge x 3/16" Ndle  Commonly known as: PEN NEEDLE  1 application by Misc.(Non-Drug; Combo Route) route 2 (two) times a day.     UNABLE TO FIND  Take 1 capsule by mouth daily as needed. Beet Root     VITAMIN C 500 MG tablet  Generic drug: ascorbic acid (vitamin C)  Take 500 mg by mouth once daily.     VITAMIN D3 10 mcg (400 unit) Chew  Generic drug: cholecalciferol (vitamin D3)  Take by mouth 2 (two) times a day.        STOP taking these medications    metFORMIN 500 MG ER 24hr tablet  Commonly known as: GLUCOPHAGE-XR            Indwelling Lines/Drains at time of discharge:   Lines/Drains/Airways     Drain  Duration                Urethral Catheter 02/20/23 0700 3 days                Time spent on the discharge of patient: 38 minutes         Sg Hendricks MD  Department of Hospital Medicine  [facility]  "

## 2023-02-23 NOTE — PT/OT/SLP PROGRESS
Physical Therapy Treatment    Patient Name:  Anastasiia Badillo   MRN:  [id number]    Recommendations:     Discharge Recommendations: nursing facility, skilled  Discharge Equipment Recommendations: none  Barriers to discharge: None    Assessment:     Anastasiia Badillo is a 73 y.o. female admitted with a medical diagnosis of Closed left subtrochanteric femur fracture, initial encounter.  She presents with the following impairments/functional limitations: weakness, impaired endurance, impaired self care skills, impaired functional mobility, gait instability, visual deficits, decreased lower extremity function, pain, orthopedic precautions . Required 2 attempts to participate. Requested pain meds prior. Transferred EOB with Max A and verbal cues for technique. Assistance to move LLE very slowly due to pain, Max A for push up and to scoot forward to feet touching the floor. Reviewed weight bearing precautions with patient. Sat briefly. Sit > stand with rw and Max A. SOT bed > chair with rw and Max A, TTWB LLE. Stand to sit with Mod A and verbal cues for safety.     Rehab Prognosis: Fair; patient would benefit from acute skilled PT services to address these deficits and reach maximum level of function.    Recent Surgery: Procedure(s) (LRB):  INSERTION, INTRAMEDULLARY ROXANNA, FEMUR (hana table -- synthes -- long nail -- have bovie and suction and large bone set) (Left) 5 Days Post-Op    Plan:     During this hospitalization, patient to be seen daily (1-2x / day) to address the identified rehab impairments via gait training, therapeutic activities, therapeutic exercises and progress toward the following goals:    Plan of Care Expires:  02/28/23    Subjective     Chief Complaint: pain LLE  Patient/Family Comments/goals: to go to facility   Pain/Comfort:  Pain Rating 1: other (see comments) (did not rate)  Location - Side 1: Left  Location - Orientation 1: generalized  Location 1: leg  Pain Addressed 1: Pre-medicate for activity  (requested and received pain meds prior.)      Objective:     Communicated with nurse Harding prior to session.  Patient found supine with bed alarm, holley catheter, SCD, telemetry upon PT entry to room.     General Precautions: Standard, fall, anti-coagulation medicine  Orthopedic Precautions: LLE toe touch weight bearing (25% weight LLE.)  Braces: N/A  Respiratory Status: Room air     Functional Mobility:  Bed Mobility:     Rolling Right: moderate assistance  Supine to Sit: maximal assistance  Transfers:     Sit to Stand:  maximal assistance with rolling walker  Bed to Chair: maximal assistance with  rolling walker  using  Stand Pivot      AM-PAC 6 CLICK MOBILITY          Treatment & Education:  Rolled R<>L for gown change in bed ( drainage noted , nurse aware).   Sup > sit with  Max A.  Sit > stand with rw and Max A.  SPT bed > chair with rw and Max A.  Stand to sit with Mod A , verbal cues for technique.     Patient left up in chair with all lines intact, call button in reach, chair alarm on, and nurse Harding notified..    GOALS:   Multidisciplinary Problems       Physical Therapy Goals          Problem: Physical Therapy    Goal Priority Disciplines Outcome Goal Variances Interventions   Physical Therapy Goal     PT, PT/OT Ongoing, Progressing     Description: Goals to be met by: 2023     Patient will increase functional independence with mobility by performin). Supine to sit with Stand-by Assistance  2). Sit to supine with Stand-by Assistance  3). Sit to stand transfer with Contact Guard Assistance  4). Gait  x > 25 feet with Contact Guard Assistance using Rolling Walker.                          Time Tracking:     PT Received On: 23  PT Start Time: 1020     PT Stop Time: 1043  PT Total Time (min): 23 min     Billable Minutes: Therapeutic Activity 23min    Treatment Type: Treatment  PT/PTA: PTA     PTA Visit Number: 1     2023

## 2023-02-23 NOTE — DISCHARGE INSTRUCTIONS
[facility] Facility Transfer Orders        Admit to: SNF    Diagnoses:   Active Hospital Problems    Diagnosis  POA    *Closed left subtrochanteric femur fracture, initial encounter [S72.22XA]  Yes     Priority: 1 - High    Nicotine addiction [F17.200]  Yes    Acute blood loss anemia [D62]  No    Type 2 diabetes mellitus with hyperglycemia [E11.65]  Yes    Essential hypertension [I10]  Yes    Urinary tract infection without hematuria [N39.0]  Yes    ACP (advance care planning) [Z71.89]  Not Applicable      Resolved Hospital Problems    Diagnosis Date Resolved POA    Hyperkalemia [E87.5] 02/21/2023 Yes    Metabolic acidosis [E87.20] 02/21/2023 Yes    Dehydration [E86.0] 02/21/2023 Yes     Allergies: Review of patient's allergies indicates:  No Known Allergies    Code Status: full    Vitals: Routine       Diet: cardiac diet and diabetic diet: 2000 calorie    Activity: Activity as tolerated    Nursing Precautions: Fall    Bed/Surface: routine    Consults: PT to evaluate and treat-, OT to evaluate and treat-, and Wound Care    Oxygen: room air    Dialysis: Patient is not on dialysis.     Pending Diagnostic Studies:       Procedure Component Value Units Date/Time    SURG FL Surgery Fluoro Usage [[id number]] Resulted: 02/18/23 1241    Order Status: Sent Lab Status: In process Updated: 02/18/23 1241             Medications: Discontinue all previous medication orders, if any. See new list below.  Current Discharge Medication List        START taking these medications    Details   apixaban (ELIQUIS) 2.5 mg Tab Take 1 tablet (2.5 mg total) by mouth 2 (two) times daily. for 25 days  Qty: 50 tablet, Refills: 0      insulin aspart U-100 (NOVOLOG) 100 unit/mL (3 mL) InPn pen Inject 0-5 Units into the skin before meals and at bedtime as needed (Hyperglycemia). Blood Glucose  mg/dL                  Pre-meal                2200  151-200                0 unit                      0 unit  201-250                2  "units                    1 unit  251-300                3 units                    1 unit  301-350                4 units                    2 units  >350                     5 units                    3 units  Refills: 0      nitrofurantoin, macrocrystal-monohydrate, (MACROBID) 100 MG capsule Take 1 capsule (100 mg total) by mouth every 12 (twelve) hours. for 3 days  Qty: 6 capsule, Refills: 0           CONTINUE these medications which have CHANGED    Details   insulin detemir U-100 (LEVEMIR FLEXTOUCH) 100 unit/mL (3 mL) SubQ InPn pen Inject 18 Units into the skin once daily.  Refills: 0           CONTINUE these medications which have NOT CHANGED    Details   ascorbic acid, vitamin C, (VITAMIN C) 500 MG tablet Take 500 mg by mouth once daily.      calcium carbonate (OS-DAGMAR) 600 mg calcium (1,500 mg) Tab Take 600 mg by mouth 2 (two) times daily with meals.      cholecalciferol, vitamin D3, (VITAMIN D3) 10 mcg (400 unit) Chew Take by mouth 2 (two) times a day.      cinnamon bark (CINNAMON) 500 mg capsule Take 500 mg by mouth once daily.      lisinopriL (PRINIVIL,ZESTRIL) 5 MG tablet lisinopril 5 mg tablet    Comments: .      loperamide (IMODIUM) 2 mg capsule Take 2 mg by mouth 4 (four) times daily as needed for Diarrhea.      melatonin 10 mg Cap Take 1 capsule by mouth every evening.      multivit-min-FA-lycopen-lutein (CENTRUM SILVER) 0.4-300-250 mg-mcg-mcg Tab Take 1 tablet by mouth once daily.      pen needle, diabetic (PEN NEEDLE) 31 gauge x 3/16" Ndle 1 application by Misc.(Non-Drug; Combo Route) route 2 (two) times a day.  Qty: 200 each, Refills: 0      UNABLE TO FIND Take 1 capsule by mouth daily as needed. Beet Root           STOP taking these medications       metFORMIN (GLUCOPHAGE-XR) 500 MG ER 24hr tablet Comments:   Reason for Stopping:             Follow up:    Follow-up Information       Raji Parkinson MD. Schedule an appointment as soon as possible for a visit in 1 week(s).    Specialty: Family " Medicine  Why: or with SNF provider  Contact information:  [address]  [address]  [facility]  [place] [address]  [phone]               Keanu Brand MD. Go on 3/3/2023.    Specialty: Orthopedic Surgery  Contact information:  [address] [place] [address] [address] [place] [address]  [phone]                               Immunizations Administered as of 2/23/2023       No immunizations on file.            Sg Hendricks MD

## 2023-02-23 NOTE — PLAN OF CARE
PHN auth obtained for SNF placement. Auth #[id number]  Still awaiting 142  LM for Jayda at [facility] regarding DC today       02/23/23 0843   Post-Acute Status   Post-Acute Authorization Placement   Post-Acute Placement Status Set-up Complete/Auth obtained

## 2023-02-24 ENCOUNTER — TELEPHONE (OUTPATIENT)
Dept: ORTHOPEDICS | Facility: CLINIC | Age: 73
End: 2023-02-24
Payer: MEDICARE

## 2023-02-24 NOTE — TELEPHONE ENCOUNTER
----- Message from Jewel Keating MA sent at 2/24/2023  2:24 PM CST -----  Contact: Quan/Javier  Patient is having some drainage from her incision site & would like to speak to nurse.     Callback number is [phone]

## 2023-02-24 NOTE — TELEPHONE ENCOUNTER
Has excessive amount of serous drainage. She states it is mostly clear however, she states taking the dressing off it did have a dark green halo around the dressing like pseudomonas appearing. No odor. She is on Macrobid for UTI. Please advise on what to do?

## 2023-02-27 DIAGNOSIS — S72.22XA CLOSED LEFT SUBTROCHANTERIC FEMUR FRACTURE, INITIAL ENCOUNTER: Primary | ICD-10-CM

## 2023-02-28 ENCOUNTER — HOSPITAL ENCOUNTER (OUTPATIENT)
Dept: RADIOLOGY | Facility: HOSPITAL | Age: 73
Discharge: HOME | End: 2023-02-28
Attending: ORTHOPAEDIC SURGERY
Payer: MEDICARE

## 2023-02-28 ENCOUNTER — OFFICE VISIT (OUTPATIENT)
Dept: ORTHOPEDICS | Facility: CLINIC | Age: 73
End: 2023-02-28
Payer: MEDICARE

## 2023-02-28 VITALS — BODY MASS INDEX: 24.49 KG/M2 | HEIGHT: 65 IN | RESPIRATION RATE: 18 BRPM | WEIGHT: 147 LBS

## 2023-02-28 DIAGNOSIS — S72.22XA CLOSED LEFT SUBTROCHANTERIC FEMUR FRACTURE, INITIAL ENCOUNTER: Primary | ICD-10-CM

## 2023-02-28 DIAGNOSIS — S72.22XA CLOSED LEFT SUBTROCHANTERIC FEMUR FRACTURE, INITIAL ENCOUNTER: ICD-10-CM

## 2023-02-28 PROCEDURE — 73552 X-RAY EXAM OF FEMUR 2/>: CPT | Mod: 26,LT,, | Performed by: RADIOLOGY

## 2023-02-28 PROCEDURE — 73552 X-RAY EXAM OF FEMUR 2/>: CPT | Mod: TC,PN,LT

## 2023-02-28 PROCEDURE — 73552 XR FEMUR 2 VIEW LEFT: ICD-10-PCS | Mod: 26,LT,, | Performed by: RADIOLOGY

## 2023-02-28 PROCEDURE — 1125F AMNT PAIN NOTED PAIN PRSNT: CPT | Mod: CPTII,S$GLB,, | Performed by: ORTHOPAEDIC SURGERY

## 2023-02-28 PROCEDURE — 3008F PR BODY MASS INDEX (BMI) DOCUMENTED: ICD-10-PCS | Mod: CPTII,S$GLB,, | Performed by: ORTHOPAEDIC SURGERY

## 2023-02-28 PROCEDURE — 4010F PR ACE/ARB THEARPY RXD/TAKEN: ICD-10-PCS | Mod: CPTII,S$GLB,, | Performed by: ORTHOPAEDIC SURGERY

## 2023-02-28 PROCEDURE — 99999 PR PBB SHADOW E&M-EST. PATIENT-LVL II: CPT | Mod: PBBFAC,,, | Performed by: ORTHOPAEDIC SURGERY

## 2023-02-28 PROCEDURE — 99999 PR PBB SHADOW E&M-EST. PATIENT-LVL II: ICD-10-PCS | Mod: PBBFAC,,, | Performed by: ORTHOPAEDIC SURGERY

## 2023-02-28 PROCEDURE — 4010F ACE/ARB THERAPY RXD/TAKEN: CPT | Mod: CPTII,S$GLB,, | Performed by: ORTHOPAEDIC SURGERY

## 2023-02-28 PROCEDURE — 3008F BODY MASS INDEX DOCD: CPT | Mod: CPTII,S$GLB,, | Performed by: ORTHOPAEDIC SURGERY

## 2023-02-28 PROCEDURE — 99024 PR POST-OP FOLLOW-UP VISIT: ICD-10-PCS | Mod: S$GLB,,, | Performed by: ORTHOPAEDIC SURGERY

## 2023-02-28 PROCEDURE — 3052F HG A1C>EQUAL 8.0%<EQUAL 9.0%: CPT | Mod: CPTII,S$GLB,, | Performed by: ORTHOPAEDIC SURGERY

## 2023-02-28 PROCEDURE — 3052F PR MOST RECENT HEMOGLOBIN A1C LEVEL 8.0 - < 9.0%: ICD-10-PCS | Mod: CPTII,S$GLB,, | Performed by: ORTHOPAEDIC SURGERY

## 2023-02-28 PROCEDURE — 1125F PR PAIN SEVERITY QUANTIFIED, PAIN PRESENT: ICD-10-PCS | Mod: CPTII,S$GLB,, | Performed by: ORTHOPAEDIC SURGERY

## 2023-02-28 PROCEDURE — 99024 POSTOP FOLLOW-UP VISIT: CPT | Mod: S$GLB,,, | Performed by: ORTHOPAEDIC SURGERY

## 2023-02-28 NOTE — PROGRESS NOTES
Post-op Note    HPI    Anastasiia Arangondld is here 2 weeks s/p the following procedure:     Left femur intramedullary nailing     Overall doing well. Pain controlled on current regimen. She is currently enrolled in Physical Therapy. Denies any chest pain or shortness of breathe. Denies any drainage from the incision. Denies any fevers, chills or paresthesias. Was notified of some drainage of the left hip which has since stopped. On bactrim.     DVT Prophylaxis: none       Physical Exam:     Patient is alert and oriented no acute distress.   Assistive Device: wheelchair     Left hip.leg Incision(s) are well healed.  There is no evidence of dehiscence.  There is no induration erythema or signs of infection.  Appropriate soft tissue swelling.  Compartments are soft and compressible.  Warm well-perfused extremity. Negative homans. Diffuse LLE swelling and edema.     Imaging:     I have personally reviewed the following imaging and these are an interpretation of my findings:     X-Ray: I have reviewed all pertinent results/findings and my personal findings are:  well aligned left subtrochanteric femur fracture, no evidence of complication     Assessment    Anastasiia A Carolinld is 2 weeks Post-op     Plan:    Overall doing as expected.  We discussed expectations of surgery and postoperative course.     Pain: Continued postoperative pain regimen   DVT prophylaxis  PT/OT: Continue/Initiate physical therapy (weight bearing status: 25% WB)   No signs of infection or DVT    Follow-up: 1 weeks  for suture removal  X-rays next visit: none

## 2023-03-07 ENCOUNTER — OFFICE VISIT (OUTPATIENT)
Dept: ORTHOPEDICS | Facility: CLINIC | Age: 73
End: 2023-03-07
Payer: MEDICARE

## 2023-03-07 VITALS — WEIGHT: 147 LBS | HEIGHT: 65 IN | BODY MASS INDEX: 24.49 KG/M2 | RESPIRATION RATE: 18 BRPM

## 2023-03-07 DIAGNOSIS — S72.22XA CLOSED LEFT SUBTROCHANTERIC FEMUR FRACTURE, INITIAL ENCOUNTER: Primary | ICD-10-CM

## 2023-03-07 PROCEDURE — 99999 PR PBB SHADOW E&M-EST. PATIENT-LVL II: CPT | Mod: PBBFAC,,, | Performed by: ORTHOPAEDIC SURGERY

## 2023-03-07 PROCEDURE — 4010F PR ACE/ARB THEARPY RXD/TAKEN: ICD-10-PCS | Mod: CPTII,S$GLB,, | Performed by: ORTHOPAEDIC SURGERY

## 2023-03-07 PROCEDURE — 99024 PR POST-OP FOLLOW-UP VISIT: ICD-10-PCS | Mod: S$GLB,,, | Performed by: ORTHOPAEDIC SURGERY

## 2023-03-07 PROCEDURE — 99024 POSTOP FOLLOW-UP VISIT: CPT | Mod: S$GLB,,, | Performed by: ORTHOPAEDIC SURGERY

## 2023-03-07 PROCEDURE — 3052F HG A1C>EQUAL 8.0%<EQUAL 9.0%: CPT | Mod: CPTII,S$GLB,, | Performed by: ORTHOPAEDIC SURGERY

## 2023-03-07 PROCEDURE — 3052F PR MOST RECENT HEMOGLOBIN A1C LEVEL 8.0 - < 9.0%: ICD-10-PCS | Mod: CPTII,S$GLB,, | Performed by: ORTHOPAEDIC SURGERY

## 2023-03-07 PROCEDURE — 3008F BODY MASS INDEX DOCD: CPT | Mod: CPTII,S$GLB,, | Performed by: ORTHOPAEDIC SURGERY

## 2023-03-07 PROCEDURE — 4010F ACE/ARB THERAPY RXD/TAKEN: CPT | Mod: CPTII,S$GLB,, | Performed by: ORTHOPAEDIC SURGERY

## 2023-03-07 PROCEDURE — 3008F PR BODY MASS INDEX (BMI) DOCUMENTED: ICD-10-PCS | Mod: CPTII,S$GLB,, | Performed by: ORTHOPAEDIC SURGERY

## 2023-03-07 PROCEDURE — 1125F PR PAIN SEVERITY QUANTIFIED, PAIN PRESENT: ICD-10-PCS | Mod: CPTII,S$GLB,, | Performed by: ORTHOPAEDIC SURGERY

## 2023-03-07 PROCEDURE — 1125F AMNT PAIN NOTED PAIN PRSNT: CPT | Mod: CPTII,S$GLB,, | Performed by: ORTHOPAEDIC SURGERY

## 2023-03-07 PROCEDURE — 99999 PR PBB SHADOW E&M-EST. PATIENT-LVL II: ICD-10-PCS | Mod: PBBFAC,,, | Performed by: ORTHOPAEDIC SURGERY

## 2023-03-07 NOTE — PROGRESS NOTES
Post-op Note    HPI    Anastasiia Badillo is here 3 weeks s/p the following procedure:     Left femur intramedullary nailing     Overall doing well. Pain controlled on current regimen. She is currently enrolled in Physical Therapy. Denies any chest pain or shortness of breathe. Denies any drainage from the incision. Denies any fevers, chills or paresthesias.       Physical Exam:     Patient is alert and oriented no acute distress.   Assistive Device: wheelchair     Left hip.leg Incision(s) are well healed.  There is no evidence of dehiscence.  There is no induration erythema or signs of infection.  Appropriate soft tissue swelling.  Compartments are soft and compressible.  Warm well-perfused extremity. Negative homans.  Improved LLE swelling and edema.     Imaging:     None    Assessment    Anastasiia Badillo is 3 weeks Post-op     Plan:    Overall doing as expected.  We discussed expectations of surgery and postoperative course.     Pain: Continued postoperative pain regimen   DVT prophylaxis  PT/OT: Continue/Initiate physical therapy (weight bearing status: 25% WB)   No signs of infection or DVT  Staples removed today    Follow-up: 3 weeks    X-rays next visit:  Left femur

## 2023-03-08 ENCOUNTER — TELEPHONE (OUTPATIENT)
Dept: FAMILY MEDICINE | Facility: CLINIC | Age: 73
End: 2023-03-08

## 2023-03-08 DIAGNOSIS — Z12.39 ENCOUNTER FOR SCREENING FOR MALIGNANT NEOPLASM OF BREAST, UNSPECIFIED SCREENING MODALITY: ICD-10-CM

## 2023-03-08 DIAGNOSIS — Z12.11 COLON CANCER SCREENING: Primary | ICD-10-CM

## 2023-03-08 NOTE — TELEPHONE ENCOUNTER
Called pt to inquire about her mammo/c-scope, pt stated she is in the hospital for a left hip[ fracture and would like the orders put in and she will see if they will do them while she is there.

## 2023-03-09 NOTE — TELEPHONE ENCOUNTER
These are outpt procedures, so I am pretty sure they will not do them while she is inpt unless she is bleeding severely.  Let pt know I put in referral to GI for Dr. Reed

## 2023-03-09 NOTE — TELEPHONE ENCOUNTER
Called patient. No answer.    Just trying to let her know that her mammogram order was put in and referral to GI who she will have to see for colonoscopy

## 2023-03-13 DIAGNOSIS — Z12.31 ENCOUNTER FOR SCREENING MAMMOGRAM FOR MALIGNANT NEOPLASM OF BREAST: Primary | ICD-10-CM

## 2023-03-20 ENCOUNTER — TELEPHONE (OUTPATIENT)
Dept: ORTHOPEDICS | Facility: CLINIC | Age: 73
End: 2023-03-20
Payer: MEDICARE

## 2023-03-20 ENCOUNTER — TELEPHONE (OUTPATIENT)
Dept: FAMILY MEDICINE | Facility: CLINIC | Age: 73
End: 2023-03-20

## 2023-03-20 NOTE — TELEPHONE ENCOUNTER
As she is seeing Ortho tomorrow and not likely to see me in the next 30 days, it is best if they sign it, due to Medicare rules

## 2023-03-20 NOTE — TELEPHONE ENCOUNTER
----- Message from Nehal Davis sent at 3/20/2023  3:19 PM CDT -----  Contact: SE DIANA BOWLES [facility] They want to confirm that Dr. Parkinson will follow patientNoemi Trevino @[phone]

## 2023-03-20 NOTE — TELEPHONE ENCOUNTER
----- Message from Kenia Natarajan MA sent at 3/20/2023  3:10 PM CDT -----  Contact:   SE Chau  Received  orders Lissa dunn  orders   Call back

## 2023-03-21 PROCEDURE — G0180 PR HOME HEALTH MD CERTIFICATION: ICD-10-PCS | Mod: ,,, | Performed by: ORTHOPAEDIC SURGERY

## 2023-03-21 PROCEDURE — G0180 MD CERTIFICATION HHA PATIENT: HCPCS | Mod: ,,, | Performed by: ORTHOPAEDIC SURGERY

## 2023-03-21 NOTE — TELEPHONE ENCOUNTER
Spoke to Marian with [facility]. Advised we have not seen the patient in almost 2 years and she is following up with Ortho so advised for them to request Ortho to sign orders.

## 2023-03-27 DIAGNOSIS — S72.22XA CLOSED LEFT SUBTROCHANTERIC FEMUR FRACTURE, INITIAL ENCOUNTER: Primary | ICD-10-CM

## 2023-03-28 ENCOUNTER — OFFICE VISIT (OUTPATIENT)
Dept: ORTHOPEDICS | Facility: CLINIC | Age: 73
End: 2023-03-28
Payer: MEDICARE

## 2023-03-28 ENCOUNTER — HOSPITAL ENCOUNTER (OUTPATIENT)
Dept: RADIOLOGY | Facility: HOSPITAL | Age: 73
Discharge: HOME | End: 2023-03-28
Attending: ORTHOPAEDIC SURGERY
Payer: MEDICARE

## 2023-03-28 VITALS — WEIGHT: 147 LBS | RESPIRATION RATE: 16 BRPM | HEIGHT: 65 IN | BODY MASS INDEX: 24.49 KG/M2

## 2023-03-28 DIAGNOSIS — S72.22XA CLOSED LEFT SUBTROCHANTERIC FEMUR FRACTURE, INITIAL ENCOUNTER: Primary | ICD-10-CM

## 2023-03-28 DIAGNOSIS — S72.22XA CLOSED LEFT SUBTROCHANTERIC FEMUR FRACTURE, INITIAL ENCOUNTER: ICD-10-CM

## 2023-03-28 PROCEDURE — 73552 XR FEMUR 2 VIEW LEFT: ICD-10-PCS | Mod: 26,LT,, | Performed by: RADIOLOGY

## 2023-03-28 PROCEDURE — 73552 X-RAY EXAM OF FEMUR 2/>: CPT | Mod: 26,LT,, | Performed by: RADIOLOGY

## 2023-03-28 PROCEDURE — 3052F HG A1C>EQUAL 8.0%<EQUAL 9.0%: CPT | Mod: CPTII,S$GLB,, | Performed by: ORTHOPAEDIC SURGERY

## 2023-03-28 PROCEDURE — 1159F PR MEDICATION LIST DOCUMENTED IN MEDICAL RECORD: ICD-10-PCS | Mod: CPTII,S$GLB,, | Performed by: ORTHOPAEDIC SURGERY

## 2023-03-28 PROCEDURE — 99024 PR POST-OP FOLLOW-UP VISIT: ICD-10-PCS | Mod: S$GLB,,, | Performed by: ORTHOPAEDIC SURGERY

## 2023-03-28 PROCEDURE — 73552 X-RAY EXAM OF FEMUR 2/>: CPT | Mod: TC,PN,LT

## 2023-03-28 PROCEDURE — 4010F PR ACE/ARB THEARPY RXD/TAKEN: ICD-10-PCS | Mod: CPTII,S$GLB,, | Performed by: ORTHOPAEDIC SURGERY

## 2023-03-28 PROCEDURE — 3008F BODY MASS INDEX DOCD: CPT | Mod: CPTII,S$GLB,, | Performed by: ORTHOPAEDIC SURGERY

## 2023-03-28 PROCEDURE — 4010F ACE/ARB THERAPY RXD/TAKEN: CPT | Mod: CPTII,S$GLB,, | Performed by: ORTHOPAEDIC SURGERY

## 2023-03-28 PROCEDURE — 99999 PR PBB SHADOW E&M-EST. PATIENT-LVL III: ICD-10-PCS | Mod: PBBFAC,,, | Performed by: ORTHOPAEDIC SURGERY

## 2023-03-28 PROCEDURE — 3052F PR MOST RECENT HEMOGLOBIN A1C LEVEL 8.0 - < 9.0%: ICD-10-PCS | Mod: CPTII,S$GLB,, | Performed by: ORTHOPAEDIC SURGERY

## 2023-03-28 PROCEDURE — 99999 PR PBB SHADOW E&M-EST. PATIENT-LVL III: CPT | Mod: PBBFAC,,, | Performed by: ORTHOPAEDIC SURGERY

## 2023-03-28 PROCEDURE — 1159F MED LIST DOCD IN RCRD: CPT | Mod: CPTII,S$GLB,, | Performed by: ORTHOPAEDIC SURGERY

## 2023-03-28 PROCEDURE — 99024 POSTOP FOLLOW-UP VISIT: CPT | Mod: S$GLB,,, | Performed by: ORTHOPAEDIC SURGERY

## 2023-03-28 PROCEDURE — 3008F PR BODY MASS INDEX (BMI) DOCUMENTED: ICD-10-PCS | Mod: CPTII,S$GLB,, | Performed by: ORTHOPAEDIC SURGERY

## 2023-03-28 RX ORDER — LISINOPRIL 5 MG/1
5 TABLET ORAL DAILY
Status: ON HOLD | COMMUNITY
Start: 2023-03-16 | End: 2023-06-11 | Stop reason: HOSPADM

## 2023-03-28 RX ORDER — ONDANSETRON 4 MG/1
TABLET, FILM COATED ORAL
Status: ON HOLD | COMMUNITY
Start: 2023-03-13 | End: 2023-04-24

## 2023-03-28 NOTE — PROGRESS NOTES
Post-op Note    HPI    Anastasiia Badillo is here 6 weeks s/p the following procedure:     Left femur intramedullary nailing     Overall doing well. Pain controlled on current regimen. She is currently enrolled in Physical Therapy.  She is living with her son currently.  Reports swelling of the left lower extremity and weakness but is slowly getting better.  She is able to be a little bit more independent at home.      Physical Exam:     Patient is alert and oriented no acute distress.   Assistive Device: wheelchair     Left hip.leg Incision(s) are well healed.  There is no evidence of dehiscence.  There is no induration erythema or signs of infection.  Appropriate soft tissue swelling.  Compartments are soft and compressible.  Warm well-perfused extremity. Negative homans.  Improved LLE swelling and edema.  Painless passive range of motion.  Mild swelling and tenderness over the lateral proximal femur.      Imaging:     Left hip and femur x-rays ordered and reviewed by me showing nail fixation of a subtrochanteric femur fracture.  There is mild early healing and no evidence of hardware complication.  Adequate alignment.    Assessment    Anastasiia Badillo is 6 weeks Post-op     Plan:    Overall doing as expected.  We discussed expectations of surgery and postoperative course.     Had long discussion with patient and patient's family regarding expectations of surgery.  She is functionally doing quite well.  We do recommend advancing her weight-bearing to progressive weight-bearing as tolerated.  Her goal is to return home.  She is currently living with her son.  We discussed the morbidity and mortality associated with the surgery and that functionally she is doing quite well.  I would like to see her back in 2 months and see how she is doing.  We will get a repeat x-ray of the left femur.

## 2023-03-31 DIAGNOSIS — S72.22XA CLOSED LEFT SUBTROCHANTERIC FEMUR FRACTURE, INITIAL ENCOUNTER: Primary | ICD-10-CM

## 2023-03-31 RX ORDER — HYDROCODONE BITARTRATE AND ACETAMINOPHEN 5; 325 MG/1; MG/1
1 TABLET ORAL EVERY 6 HOURS PRN
Qty: 21 TABLET | Refills: 0 | Status: SHIPPED | OUTPATIENT
Start: 2023-03-31 | End: 2023-04-12 | Stop reason: SDUPTHER

## 2023-03-31 NOTE — TELEPHONE ENCOUNTER
----- Message from Kenia Natarajan MA sent at 3/31/2023  8:46 AM CDT -----  Contact: son, Yaya  Pain medication   Shania matt   Call back

## 2023-04-12 DIAGNOSIS — S72.22XA CLOSED LEFT SUBTROCHANTERIC FEMUR FRACTURE, INITIAL ENCOUNTER: ICD-10-CM

## 2023-04-12 RX ORDER — HYDROCODONE BITARTRATE AND ACETAMINOPHEN 5; 325 MG/1; MG/1
1 TABLET ORAL EVERY 6 HOURS PRN
Qty: 21 TABLET | Refills: 0 | Status: ON HOLD | OUTPATIENT
Start: 2023-04-12 | End: 2023-04-27 | Stop reason: HOSPADM

## 2023-04-12 NOTE — TELEPHONE ENCOUNTER
----- Message from Jewel Keating MA sent at 4/12/2023  9:28 AM CDT -----  Contact: patient  Refill on pain medications      [id number] - [place] - [address] AT [facility] [address] [facility] [address]  [address] [place] [address]  Phone: [phone] Fax: [phone]    Call back number is [phone]

## 2023-04-17 ENCOUNTER — TELEPHONE (OUTPATIENT)
Dept: FAMILY MEDICINE | Facility: CLINIC | Age: 73
End: 2023-04-17

## 2023-04-17 NOTE — TELEPHONE ENCOUNTER
----- Message from Carri Lara sent at 4/17/2023 11:17 AM CDT -----  Patient called and stated that the home health nurse advised her to contact Dr. Parkinson and advised that her foot looks real bad she stated that she has been putting a lot of pressure on her foot  and that she need a referral to a foot doctor [facility]. And now the patient is worried because she has diabetes. Please give her a call at [phone]

## 2023-04-17 NOTE — TELEPHONE ENCOUNTER
----- Message from Carri Lara sent at 4/17/2023  2:28 PM CDT -----  Patient called to see if anything had been done about a referral for her foot because she had not gotten a call I did advised that and order was sent over to her home health but also did advise she need to schedule an appointment with Dr Parkinson please give her a call to schedule an appointment. Please give her a call at [phone]

## 2023-04-17 NOTE — TELEPHONE ENCOUNTER
Spoke to Karma. Scabs on foot that the patient is picking but now needs wound care. Needs us to send an order for wound care. Advised we havent seen patient in almost 2 years but I can ask Dr Parkinson and that patient will need to come in for an appointment soon.    Printed for Dr goel

## 2023-04-19 ENCOUNTER — HOSPITAL ENCOUNTER (INPATIENT)
Facility: HOSPITAL | Age: 73
LOS: 2 days | Discharge: ANOTHER HOSPITAL | DRG: 872 | End: 2023-04-21
Attending: EMERGENCY MEDICINE | Admitting: HOSPITALIST
Payer: MEDICARE

## 2023-04-19 DIAGNOSIS — E11.621 DIABETIC FOOT ULCER: ICD-10-CM

## 2023-04-19 DIAGNOSIS — R73.9 HYPERGLYCEMIA: Primary | ICD-10-CM

## 2023-04-19 DIAGNOSIS — R07.9 CHEST PAIN: ICD-10-CM

## 2023-04-19 DIAGNOSIS — L97.509 DIABETIC FOOT ULCER: ICD-10-CM

## 2023-04-19 PROBLEM — E11.59 HYPERTENSION ASSOCIATED WITH TYPE 2 DIABETES MELLITUS: Status: ACTIVE | Noted: 2023-02-17

## 2023-04-19 PROBLEM — L03.032 CELLULITIS OF GREAT TOE OF LEFT FOOT: Status: ACTIVE | Noted: 2023-04-19

## 2023-04-19 PROBLEM — I15.2 HYPERTENSION ASSOCIATED WITH TYPE 2 DIABETES MELLITUS: Status: ACTIVE | Noted: 2023-02-17

## 2023-04-19 PROBLEM — A41.9 SEPSIS: Status: ACTIVE | Noted: 2023-04-19

## 2023-04-19 LAB
ALBUMIN SERPL BCP-MCNC: 3.4 G/DL (ref 3.5–5.2)
ALLENS TEST: ABNORMAL
ALP SERPL-CCNC: 114 U/L (ref 55–135)
ALT SERPL W/O P-5'-P-CCNC: 10 U/L (ref 10–44)
ANION GAP SERPL CALC-SCNC: 11 MMOL/L (ref 8–16)
AST SERPL-CCNC: 11 U/L (ref 10–40)
B-OH-BUTYR BLD STRIP-SCNC: 2.8 MMOL/L (ref 0–0.5)
BASOPHILS # BLD AUTO: 0.04 K/UL (ref 0–0.2)
BASOPHILS NFR BLD: 0.3 % (ref 0–1.9)
BILIRUB SERPL-MCNC: 0.6 MG/DL (ref 0.1–1)
BUN SERPL-MCNC: 14 MG/DL (ref 8–23)
CALCIUM SERPL-MCNC: 10.1 MG/DL (ref 8.7–10.5)
CHLORIDE SERPL-SCNC: 97 MMOL/L (ref 95–110)
CO2 SERPL-SCNC: 23 MMOL/L (ref 23–29)
CREAT SERPL-MCNC: 0.8 MG/DL (ref 0.5–1.4)
CRP SERPL-MCNC: 213.8 MG/L (ref 0–8.2)
DELSYS: ABNORMAL
DIFFERENTIAL METHOD: ABNORMAL
EOSINOPHIL # BLD AUTO: 0 K/UL (ref 0–0.5)
EOSINOPHIL NFR BLD: 0.1 % (ref 0–8)
ERYTHROCYTE [DISTWIDTH] IN BLOOD BY AUTOMATED COUNT: 13.4 % (ref 11.5–14.5)
ERYTHROCYTE [SEDIMENTATION RATE] IN BLOOD BY WESTERGREN METHOD: 75 MM/HR (ref 0–20)
EST. GFR  (NO RACE VARIABLE): >60 ML/MIN/1.73 M^2
GLUCOSE SERPL-MCNC: 238 MG/DL (ref 70–110)
HCO3 UR-SCNC: 28.6 MMOL/L (ref 24–28)
HCT VFR BLD AUTO: 36.5 % (ref 37–48.5)
HCV AB SERPL QL IA: NORMAL
HGB BLD-MCNC: 11.8 G/DL (ref 12–16)
HIV 1+2 AB+HIV1 P24 AG SERPL QL IA: NORMAL
IMM GRANULOCYTES # BLD AUTO: 0.04 K/UL (ref 0–0.04)
IMM GRANULOCYTES NFR BLD AUTO: 0.3 % (ref 0–0.5)
LACTATE SERPL-SCNC: 1.1 MMOL/L (ref 0.5–2.2)
LYMPHOCYTES # BLD AUTO: 1.5 K/UL (ref 1–4.8)
LYMPHOCYTES NFR BLD: 11.5 % (ref 18–48)
MCH RBC QN AUTO: 30.5 PG (ref 27–31)
MCHC RBC AUTO-ENTMCNC: 32.3 G/DL (ref 32–36)
MCV RBC AUTO: 94 FL (ref 82–98)
MONOCYTES # BLD AUTO: 0.7 K/UL (ref 0.3–1)
MONOCYTES NFR BLD: 5.1 % (ref 4–15)
NEUTROPHILS # BLD AUTO: 10.8 K/UL (ref 1.8–7.7)
NEUTROPHILS NFR BLD: 82.7 % (ref 38–73)
NRBC BLD-RTO: 0 /100 WBC
PCO2 BLDA: 50 MMHG (ref 35–45)
PH SMN: 7.37 [PH] (ref 7.35–7.45)
PLATELET # BLD AUTO: 342 K/UL (ref 150–450)
PMV BLD AUTO: 11.6 FL (ref 9.2–12.9)
PO2 BLDA: 19 MMHG (ref 40–60)
POC BE: 3 MMOL/L
POC SATURATED O2: 27 % (ref 95–100)
POC TCO2: 30 MMOL/L (ref 24–29)
POCT GLUCOSE: 231 MG/DL (ref 70–110)
POCT GLUCOSE: 317 MG/DL (ref 70–110)
POTASSIUM SERPL-SCNC: 5 MMOL/L (ref 3.5–5.1)
PROT SERPL-MCNC: 7.4 G/DL (ref 6–8.4)
RBC # BLD AUTO: 3.87 M/UL (ref 4–5.4)
SAMPLE: ABNORMAL
SITE: ABNORMAL
SODIUM SERPL-SCNC: 131 MMOL/L (ref 136–145)
WBC # BLD AUTO: 13 K/UL (ref 3.9–12.7)

## 2023-04-19 PROCEDURE — 82803 BLOOD GASES ANY COMBINATION: CPT

## 2023-04-19 PROCEDURE — 80053 COMPREHEN METABOLIC PANEL: CPT | Performed by: EMERGENCY MEDICINE

## 2023-04-19 PROCEDURE — 87075 CULTR BACTERIA EXCEPT BLOOD: CPT | Performed by: NURSE PRACTITIONER

## 2023-04-19 PROCEDURE — 63600175 PHARM REV CODE 636 W HCPCS: Performed by: NURSE PRACTITIONER

## 2023-04-19 PROCEDURE — 99900035 HC TECH TIME PER 15 MIN (STAT)

## 2023-04-19 PROCEDURE — 36415 COLL VENOUS BLD VENIPUNCTURE: CPT | Performed by: EMERGENCY MEDICINE

## 2023-04-19 PROCEDURE — 83605 ASSAY OF LACTIC ACID: CPT | Performed by: NURSE PRACTITIONER

## 2023-04-19 PROCEDURE — 94761 N-INVAS EAR/PLS OXIMETRY MLT: CPT

## 2023-04-19 PROCEDURE — 87186 SC STD MICRODIL/AGAR DIL: CPT | Performed by: NURSE PRACTITIONER

## 2023-04-19 PROCEDURE — 96375 TX/PRO/DX INJ NEW DRUG ADDON: CPT

## 2023-04-19 PROCEDURE — 25000003 PHARM REV CODE 250: Performed by: EMERGENCY MEDICINE

## 2023-04-19 PROCEDURE — 25000003 PHARM REV CODE 250: Performed by: NURSE PRACTITIONER

## 2023-04-19 PROCEDURE — 87077 CULTURE AEROBIC IDENTIFY: CPT | Performed by: NURSE PRACTITIONER

## 2023-04-19 PROCEDURE — 36415 COLL VENOUS BLD VENIPUNCTURE: CPT | Performed by: NURSE PRACTITIONER

## 2023-04-19 PROCEDURE — 86140 C-REACTIVE PROTEIN: CPT | Performed by: EMERGENCY MEDICINE

## 2023-04-19 PROCEDURE — 99285 EMERGENCY DEPT VISIT HI MDM: CPT | Mod: 25

## 2023-04-19 PROCEDURE — 87389 HIV-1 AG W/HIV-1&-2 AB AG IA: CPT | Performed by: EMERGENCY MEDICINE

## 2023-04-19 PROCEDURE — 85651 RBC SED RATE NONAUTOMATED: CPT | Performed by: EMERGENCY MEDICINE

## 2023-04-19 PROCEDURE — 63600175 PHARM REV CODE 636 W HCPCS: Performed by: EMERGENCY MEDICINE

## 2023-04-19 PROCEDURE — 11000001 HC ACUTE MED/SURG PRIVATE ROOM

## 2023-04-19 PROCEDURE — 25000003 PHARM REV CODE 250: Performed by: HOSPITALIST

## 2023-04-19 PROCEDURE — 87040 BLOOD CULTURE FOR BACTERIA: CPT | Mod: 59 | Performed by: EMERGENCY MEDICINE

## 2023-04-19 PROCEDURE — 96365 THER/PROPH/DIAG IV INF INIT: CPT

## 2023-04-19 PROCEDURE — 87070 CULTURE OTHR SPECIMN AEROBIC: CPT | Performed by: NURSE PRACTITIONER

## 2023-04-19 PROCEDURE — 82010 KETONE BODYS QUAN: CPT | Performed by: EMERGENCY MEDICINE

## 2023-04-19 PROCEDURE — 96361 HYDRATE IV INFUSION ADD-ON: CPT

## 2023-04-19 PROCEDURE — 86803 HEPATITIS C AB TEST: CPT | Performed by: EMERGENCY MEDICINE

## 2023-04-19 PROCEDURE — 85025 COMPLETE CBC W/AUTO DIFF WBC: CPT | Performed by: EMERGENCY MEDICINE

## 2023-04-19 RX ORDER — DEXTROSE 40 %
30 GEL (GRAM) ORAL
Status: DISCONTINUED | OUTPATIENT
Start: 2023-04-19 | End: 2023-04-21 | Stop reason: HOSPADM

## 2023-04-19 RX ORDER — ASCORBIC ACID 500 MG
500 TABLET ORAL DAILY
Status: DISCONTINUED | OUTPATIENT
Start: 2023-04-20 | End: 2023-04-21 | Stop reason: HOSPADM

## 2023-04-19 RX ORDER — SODIUM CHLORIDE 0.9 % (FLUSH) 0.9 %
10 SYRINGE (ML) INJECTION EVERY 12 HOURS PRN
Status: DISCONTINUED | OUTPATIENT
Start: 2023-04-19 | End: 2023-04-21 | Stop reason: HOSPADM

## 2023-04-19 RX ORDER — LANOLIN ALCOHOL/MO/W.PET/CERES
800 CREAM (GRAM) TOPICAL
Status: DISCONTINUED | OUTPATIENT
Start: 2023-04-19 | End: 2023-04-21 | Stop reason: HOSPADM

## 2023-04-19 RX ORDER — CHOLECALCIFEROL (VITAMIN D3) 25 MCG
1000 TABLET ORAL DAILY
Status: DISCONTINUED | OUTPATIENT
Start: 2023-04-20 | End: 2023-04-21 | Stop reason: HOSPADM

## 2023-04-19 RX ORDER — MAG HYDROX/ALUMINUM HYD/SIMETH 200-200-20
30 SUSPENSION, ORAL (FINAL DOSE FORM) ORAL 4 TIMES DAILY PRN
Status: DISCONTINUED | OUTPATIENT
Start: 2023-04-19 | End: 2023-04-21 | Stop reason: HOSPADM

## 2023-04-19 RX ORDER — INSULIN ASPART 100 [IU]/ML
1-10 INJECTION, SOLUTION INTRAVENOUS; SUBCUTANEOUS
Status: DISCONTINUED | OUTPATIENT
Start: 2023-04-19 | End: 2023-04-21 | Stop reason: HOSPADM

## 2023-04-19 RX ORDER — HYDROCODONE BITARTRATE AND ACETAMINOPHEN 10; 325 MG/1; MG/1
1 TABLET ORAL EVERY 6 HOURS PRN
Status: DISCONTINUED | OUTPATIENT
Start: 2023-04-19 | End: 2023-04-21 | Stop reason: HOSPADM

## 2023-04-19 RX ORDER — ONDANSETRON 2 MG/ML
4 INJECTION INTRAMUSCULAR; INTRAVENOUS EVERY 8 HOURS PRN
Status: DISCONTINUED | OUTPATIENT
Start: 2023-04-19 | End: 2023-04-21 | Stop reason: HOSPADM

## 2023-04-19 RX ORDER — CALCIUM CARBONATE 200(500)MG
1000 TABLET,CHEWABLE ORAL DAILY
Status: DISCONTINUED | OUTPATIENT
Start: 2023-04-20 | End: 2023-04-21 | Stop reason: HOSPADM

## 2023-04-19 RX ORDER — IPRATROPIUM BROMIDE 0.5 MG/2.5ML
0.5 SOLUTION RESPIRATORY (INHALATION) EVERY 8 HOURS PRN
Status: DISCONTINUED | OUTPATIENT
Start: 2023-04-19 | End: 2023-04-21 | Stop reason: HOSPADM

## 2023-04-19 RX ORDER — GLUCAGON 1 MG
1 KIT INJECTION
Status: DISCONTINUED | OUTPATIENT
Start: 2023-04-19 | End: 2023-04-21 | Stop reason: HOSPADM

## 2023-04-19 RX ORDER — AMOXICILLIN 250 MG
1 CAPSULE ORAL 2 TIMES DAILY
Status: DISCONTINUED | OUTPATIENT
Start: 2023-04-19 | End: 2023-04-21 | Stop reason: HOSPADM

## 2023-04-19 RX ORDER — LEVALBUTEROL 1.25 MG/.5ML
1.25 SOLUTION, CONCENTRATE RESPIRATORY (INHALATION) EVERY 8 HOURS PRN
Status: DISCONTINUED | OUTPATIENT
Start: 2023-04-19 | End: 2023-04-21 | Stop reason: HOSPADM

## 2023-04-19 RX ORDER — DEXTROSE 40 %
15 GEL (GRAM) ORAL
Status: DISCONTINUED | OUTPATIENT
Start: 2023-04-19 | End: 2023-04-21 | Stop reason: HOSPADM

## 2023-04-19 RX ORDER — HYDROCODONE BITARTRATE AND ACETAMINOPHEN 7.5; 325 MG/1; MG/1
1 TABLET ORAL EVERY 6 HOURS PRN
Status: DISCONTINUED | OUTPATIENT
Start: 2023-04-19 | End: 2023-04-21 | Stop reason: HOSPADM

## 2023-04-19 RX ORDER — NALOXONE HCL 0.4 MG/ML
0.02 VIAL (ML) INJECTION
Status: DISCONTINUED | OUTPATIENT
Start: 2023-04-19 | End: 2023-04-21 | Stop reason: HOSPADM

## 2023-04-19 RX ORDER — SODIUM,POTASSIUM PHOSPHATES 280-250MG
2 POWDER IN PACKET (EA) ORAL
Status: DISCONTINUED | OUTPATIENT
Start: 2023-04-19 | End: 2023-04-21 | Stop reason: HOSPADM

## 2023-04-19 RX ORDER — ACETAMINOPHEN 325 MG/1
650 TABLET ORAL EVERY 8 HOURS PRN
Status: DISCONTINUED | OUTPATIENT
Start: 2023-04-19 | End: 2023-04-21 | Stop reason: HOSPADM

## 2023-04-19 RX ORDER — HYDROCODONE BITARTRATE AND ACETAMINOPHEN 5; 325 MG/1; MG/1
1 TABLET ORAL
Status: COMPLETED | OUTPATIENT
Start: 2023-04-19 | End: 2023-04-19

## 2023-04-19 RX ORDER — TALC
9 POWDER (GRAM) TOPICAL NIGHTLY PRN
Status: DISCONTINUED | OUTPATIENT
Start: 2023-04-19 | End: 2023-04-21 | Stop reason: HOSPADM

## 2023-04-19 RX ORDER — LISINOPRIL 10 MG/1
10 TABLET ORAL DAILY
Status: DISCONTINUED | OUTPATIENT
Start: 2023-04-20 | End: 2023-04-21 | Stop reason: HOSPADM

## 2023-04-19 RX ORDER — SIMETHICONE 80 MG
2 TABLET,CHEWABLE ORAL 4 TIMES DAILY PRN
Status: DISCONTINUED | OUTPATIENT
Start: 2023-04-19 | End: 2023-04-21 | Stop reason: HOSPADM

## 2023-04-19 RX ADMIN — VANCOMYCIN HYDROCHLORIDE 1500 MG: 1.5 INJECTION, POWDER, LYOPHILIZED, FOR SOLUTION INTRAVENOUS at 01:04

## 2023-04-19 RX ADMIN — HYDROCODONE BITARTRATE AND ACETAMINOPHEN 1 TABLET: 7.5; 325 TABLET ORAL at 07:04

## 2023-04-19 RX ADMIN — MELATONIN TAB 3 MG 9 MG: 3 TAB at 08:04

## 2023-04-19 RX ADMIN — DOCUSATE SODIUM AND SENNOSIDES 1 TABLET: 8.6; 5 TABLET, FILM COATED ORAL at 08:04

## 2023-04-19 RX ADMIN — SODIUM CHLORIDE 1000 ML: 9 INJECTION, SOLUTION INTRAVENOUS at 01:04

## 2023-04-19 RX ADMIN — INSULIN ASPART 4 UNITS: 100 INJECTION, SOLUTION INTRAVENOUS; SUBCUTANEOUS at 08:04

## 2023-04-19 RX ADMIN — CEFEPIME 2 G: 2 INJECTION, POWDER, FOR SOLUTION INTRAVENOUS at 07:04

## 2023-04-19 RX ADMIN — PIPERACILLIN AND TAZOBACTAM 4.5 G: 4; .5 INJECTION, POWDER, LYOPHILIZED, FOR SOLUTION INTRAVENOUS; PARENTERAL at 02:04

## 2023-04-19 RX ADMIN — INSULIN DETEMIR 14 UNITS: 100 INJECTION, SOLUTION SUBCUTANEOUS at 08:04

## 2023-04-19 RX ADMIN — HYDROCODONE BITARTRATE AND ACETAMINOPHEN 1 TABLET: 5; 325 TABLET ORAL at 01:04

## 2023-04-19 NOTE — ED NOTES
Attempted to give report to floor, but rn is not available at the moment.  Wound care RN at bedside dressing wounds and completing cultures

## 2023-04-19 NOTE — ASSESSMENT & PLAN NOTE
Consult podiatry  IV vancomycin/IV cefepime  NPO after MN unless otherwise directed by podiatry  Tight glucose control  Wound care consult  Trend CRP  Arterial studies BLE

## 2023-04-19 NOTE — HPI
Anastasiia Badillo is a 72 y/o F with a past medical history significant for DM2 who presented to the ED for further evaluation of two wounds to the left foot, one to the heel and one to the great toe.  She states they have been present for about 2 weeks, but are becoming worse and she noted some redness to the left great toe over the past couple of days.  She was seen by home health today and was directed to the ED for a wound check.  She denies ever having similar wounds, but she had an intramedullary denzel inserted into her left femur on 2/18/23 for a subtrochanteric fracture and has been rubbing her left lower leg against objects frequently due to the difficulty she is experiencing in moving her left leg since surgery.  While in the ED she was administered IV vancomycin and IV zosyn.  WBC is 13K.  She is admitted to the service of hospital medicine for continued monitoring and treatment.

## 2023-04-19 NOTE — H&P
[facility] Medicine  History & Physical    Patient Name: Anastasiia Badillo  MRN: [id number]  Patient Class: IP- Inpatient  Admission Date: 4/19/2023  Attending Physician: Sania Soliman MD   Primary Care Provider: Raji Parkinson MD         Patient information was obtained from patient, past medical records and ER records.     Subjective:     Principal Problem:Diabetic foot ulcer    Chief Complaint:   Chief Complaint   Patient presents with    Wound Check     Patient had diabetes and has a wound on her left foot, has 2 wounds one on her toes and her heel         HPI: Anastasiia Badillo is a 74 y/o F with a past medical history significant for DM2 who presented to the ED for further evaluation of two wounds to the left foot, one to the heel and one to the great toe.  She states they have been present for about 2 weeks, but are becoming worse and she noted some redness to the left great toe over the past couple of days.  She was seen by home health today and was directed to the ED for a wound check.  She denies ever having similar wounds, but she had an intramedullary roxanna inserted into her left femur on 2/18/23 for a subtrochanteric fracture and has been rubbing her left lower leg against objects frequently due to the difficulty she is experiencing in moving her left leg since surgery.  While in the ED she was administered IV vancomycin and IV zosyn.  WBC is 13K.  She is admitted to the service of hospital medicine for continued monitoring and treatment.      Past Medical History:   Diagnosis Date    Diabetes mellitus, type 2        Past Surgical History:   Procedure Laterality Date    AUGMENTATION OF BREAST      INTRAMEDULLARY RODDING OF FEMUR Left 2/18/2023    Procedure: INSERTION, INTRAMEDULLARY ROXANNA, FEMUR (hana table -- synthes -- long nail -- have bovie and suction and large bone set);  Surgeon: Keanu Brand MD;  [facility];  Service: Orthopedics;  Laterality: Left;  "      Review of patient's allergies indicates:  No Known Allergies    No current facility-administered medications on file prior to encounter.     Current Outpatient Medications on File Prior to Encounter   Medication Sig    ascorbic acid, vitamin C, (VITAMIN C) 500 MG tablet Take 500 mg by mouth once daily.    calcium carbonate (OS-DAGMAR) 600 mg calcium (1,500 mg) Tab Take 600 mg by mouth 2 (two) times daily with meals.    cholecalciferol, vitamin D3, 10 mcg (400 unit) Chew Take by mouth 2 (two) times a day.    cinnamon bark 500 mg capsule Take 500 mg by mouth once daily.    HYDROcodone-acetaminophen (NORCO) 5-325 mg per tablet Take 1 tablet by mouth every 6 (six) hours as needed for Pain.    insulin aspart U-100 (NOVOLOG) 100 unit/mL (3 mL) InPn pen Inject 0-5 Units into the skin before meals and at bedtime as needed (Hyperglycemia). Blood Glucose  mg/dL                  Pre-meal                2200  151-200                0 unit                      0 unit  201-250                2 units                    1 unit  251-300                3 units                    1 unit  301-350                4 units                    2 units  >350                     5 units                    3 units    insulin detemir U-100 (LEVEMIR FLEXTOUCH) 100 unit/mL (3 mL) SubQ InPn pen Inject 18 Units into the skin once daily.    lisinopriL 10 MG tablet Take 10 mg by mouth.    loperamide (IMODIUM) 2 mg capsule Take 2 mg by mouth 4 (four) times daily as needed for Diarrhea.    melatonin 10 mg Cap Take 1 capsule by mouth every evening.    multivit-min-FA-lycopen-lutein 0.4 mg-300 mcg- 250 mcg Tab Take 1 tablet by mouth once daily.    ondansetron (ZOFRAN) 4 MG tablet Take by mouth.    pen needle, diabetic (PEN NEEDLE) 31 gauge x 3/16" Ndle 1 application by Misc.(Non-Drug; Combo Route) route 2 (two) times a day.    UNABLE TO FIND Take 1 capsule by mouth daily as needed. Beet Root     Family History       Problem Relation (Age of Onset)    " Breast cancer Sister    Cancer Mother, Sister    Cataracts Mother    Heart disease Mother, Father, Sister, Brother    Hypertension Father          Tobacco Use    Smoking status: Every Day     Packs/day: 0.25     Years: 45.00     Pack years: 11.25     Types: Cigarettes    Smokeless tobacco: Never   Substance and Sexual Activity    Alcohol use: Yes    Drug use: Never    Sexual activity: Not on file     Review of Systems   Constitutional:  Negative for chills and fever.   HENT:  Negative for congestion and sore throat.    Respiratory:  Negative for cough and shortness of breath.    Cardiovascular:  Negative for chest pain and palpitations.   Gastrointestinal:  Negative for abdominal pain, diarrhea, nausea and vomiting.   Genitourinary:  Negative for dysuria and hematuria.   Musculoskeletal:  Positive for arthralgias and joint swelling.   Skin:  Positive for color change and wound.   Neurological:  Negative for dizziness and light-headedness.   Psychiatric/Behavioral:  Negative for agitation and confusion.    All other systems reviewed and are negative.  Objective:     Vital Signs (Most Recent):  Temp: 98.2 °F (36.8 °C) (04/19/23 1121)  Pulse: 94 (04/19/23 1406)  Resp: 20 (04/19/23 1357)  BP: (!) 172/77 (04/19/23 1406)  SpO2: 98 % (04/19/23 1406)   Vital Signs (24h Range):  Temp:  [98.2 °F (36.8 °C)] 98.2 °F (36.8 °C)  Pulse:  [83-94] 94  Resp:  [20] 20  SpO2:  [97 %-100 %] 98 %  BP: (141-197)/(63-87) 172/77     Weight: 72.6 kg (160 lb)  Body mass index is 26.63 kg/m².    Physical Exam  Constitutional:       General: She is not in acute distress.     Appearance: She is well-developed and normal weight. She is not ill-appearing.   HENT:      Head: Normocephalic and atraumatic.      Mouth/Throat:      Mouth: Mucous membranes are dry.   Eyes:      Conjunctiva/sclera: Conjunctivae normal.      Pupils: Pupils are equal, round, and reactive to light.   Cardiovascular:      Rate and Rhythm: Normal rate and regular rhythm.       Pulses: faint left pedal.      Heart sounds: Normal heart sounds.   Pulmonary:      Effort: Pulmonary effort is normal.      Breath sounds: Normal breath sounds.   Abdominal:      General: Bowel sounds are normal. There is no distension.      Palpations: Abdomen is soft.      Tenderness: There is no abdominal tenderness.   Genitourinary:     Comments: Deferred    Musculoskeletal:         General: Normal range of motion.      Cervical back: Normal range of motion and neck supple.   Skin:     General: Skin is warm and dry.                           Neurological:      General: No focal deficit present.      Mental Status: She is alert and oriented to person, place, and time.   Psychiatric:         Mood and Affect: Mood normal.         Behavior: Behavior normal.         CRANIAL NERVES     CN III, IV, VI   Pupils are equal, round, and reactive to light.     Significant Labs: All pertinent labs within the past 24 hours have been reviewed.  CBC:   Recent Labs   Lab 04/19/23  1248   WBC 13.00*   HGB 11.8*   HCT 36.5*        CMP:   Recent Labs   Lab 04/19/23  1248   *   K 5.0   CL 97   CO2 23   *   BUN 14   CREATININE 0.8   CALCIUM 10.1   PROT 7.4   ALBUMIN 3.4*   BILITOT 0.6   ALKPHOS 114   AST 11   ALT 10   ANIONGAP 11       Significant Imaging: I have reviewed all pertinent imaging results/findings within the past 24 hours.  Xray left foot:  Soft tissue swelling of the forefoot.  Otherwise negative radiographs.    Assessment/Plan:     * Diabetic foot ulcer  Consult podiatry  IV vancomycin/IV cefepime  NPO after MN unless otherwise directed by podiatry  Tight glucose control  Wound care consult  Trend CRP  Arterial studies BLE    Sepsis  This patient does have evidence of infective focus  My overall impression is sepsis.  Source: Skin and Soft Tissue (location left foot)  Antibiotics given-   Antibiotics (72h ago, onward)      Start     Stop Route Frequency Ordered    04/19/23 1700  ceFEPIme (MAXIPIME)  2 g in dextrose 5 % in water (D5W) 5 % 50 mL IVPB (MB+)         -- IV Every 8 hours (non-standard times) 04/19/23 1551    04/19/23 1647  vancomycin - pharmacy to dose  (vancomycin IVPB)        See Hyperspace for full Linked Orders Report.    -- IV pharmacy to manage frequency 04/19/23 1551          Latest lactate reviewed-  No results for input(s): LACTATE in the last 72 hours.  Organ dysfunction indicated by tachycardia    Fluid challenge Not needed - patient is not hypotensive      Post- resuscitation assessment No - Post resuscitation assessment not needed       Will Not start Pressors- Levophed for MAP of 65  Source control achieved by: IV abx/podiatry consult    Cellulitis of great toe of left foot  IV vanc/IV cefepime  Trend CRP  Check lactic acid      Nicotine dependence  Dangers of cigarette smoking were reviewed with patient in detail. Patient was Counseled for 3-10 minutes. Nicotine replacement options were discussed. Nicotine replacement was discussed- not prescribed per patient's request    Hypertension associated with type 2 diabetes mellitus  Chronic, controlled.  Latest blood pressure and vitals reviewed-   Temp:  [98.2 °F (36.8 °C)]   Pulse:  [83-94]   Resp:  [20]   BP: (141-197)/(63-87)   SpO2:  [97 %-100 %] .   Home meds for hypertension were reviewed and noted below.   Hypertension Medications               lisinopriL 10 MG tablet Take 10 mg by mouth.            While in the hospital, will manage blood pressure as follows; Continue home antihypertensive regimen    Will utilize p.r.n. blood pressure medication only if patient's blood pressure greater than  180/110 and she develops symptoms such as worsening chest pain or shortness of breath.      Type 2 diabetes mellitus with hyperglycemia  Patient's FSGs are uncontrolled due to hyperglycemia on current medication regimen.  Last A1c reviewed-   Lab Results   Component Value Date    HGBA1C 8.9 (H) 02/17/2023     Most recent fingerstick glucose  reviewed-   Recent Labs   Lab 04/19/23  1138   POCTGLUCOSE 231*     Current correctional scale  Medium  Maintain anti-hyperglycemic dose as follows-   Antihyperglycemics (From admission, onward)      Continue levemir and SSI          Hold Oral hypoglycemics while patient is in the hospital.      VTE Risk Mitigation (From admission, onward)      SAVANNAs                       RAMYA Mckeon  Department of Hospital Medicine  [facility] - Emergency Dept

## 2023-04-19 NOTE — ED PROVIDER NOTES
Encounter Date: 4/19/2023    SCRIBE #1 NOTE: I, Ezequiel Avalos, am scribing for, and in the presence of,  Ollie Hankins MD.     History     Chief Complaint   Patient presents with    Wound Check     Patient had diabetes and has a wound on her left foot, has 2 wounds one on her toes and her heel      Time seen by provider: 12:14 PM on 04/19/2023    Anastasiia Badillo is a 73 y.o. female who presents to the ED with an onset of two wounds to her left first toe and heel that began in the last two weeks. The patient had an intramedullary roxanna inserted into her left femur on 2/18 for a subtrochanteric fracture. She states she has been rubbing her left lower leg against objects frequently due to how difficult it is for her to move her left leg following her surgery. Her home health nurse visited her today and referred her to the ED. The patient confirms a history of DM and states her blood sugar typically hovers around 200 mg/dL. The patient denies fever or any other symptoms at this time. PMHx of DM II. PSHx of intramedullary rodding of left femur.    The history is provided by the patient.   Review of patient's allergies indicates:  No Known Allergies  Past Medical History:   Diagnosis Date    Diabetes mellitus, type 2      Past Surgical History:   Procedure Laterality Date    AUGMENTATION OF BREAST      INTRAMEDULLARY RODDING OF FEMUR Left 2/18/2023    Procedure: INSERTION, INTRAMEDULLARY ROXANNA, FEMUR (hana table -- synthes -- long nail -- have bovie and suction and large bone set);  Surgeon: Keanu Brand MD;  Location: [facility];  Service: Orthopedics;  Laterality: Left;     Family History   Problem Relation Age of Onset    Heart disease Mother     Cancer Mother     Cataracts Mother     Hypertension Father     Heart disease Father     Cancer Sister     Heart disease Sister     Breast cancer Sister     Heart disease Brother      Social History     Tobacco Use    Smoking status: Every Day     Packs/day: 0.25     Years:  45.00     Pack years: 11.25     Types: Cigarettes    Smokeless tobacco: Never   Substance Use Topics    Alcohol use: Yes    Drug use: Never     Review of Systems   Constitutional:  Negative for activity change, diaphoresis and fever.   HENT:  Negative for ear pain, rhinorrhea, sore throat and trouble swallowing.    Eyes:  Negative for pain and visual disturbance.   Respiratory:  Negative for cough, shortness of breath and stridor.    Cardiovascular:  Negative for chest pain.   Gastrointestinal:  Negative for abdominal pain, blood in stool, diarrhea, nausea and vomiting.   Genitourinary:  Negative for dysuria, hematuria, vaginal bleeding and vaginal discharge.   Skin:  Positive for wound. Negative for rash.   Neurological:  Negative for seizures and headaches.   Psychiatric/Behavioral:  Negative for hallucinations and suicidal ideas.      Physical Exam     Initial Vitals [04/19/23 1121]   BP Pulse Resp Temp SpO2   (!) 141/63 83 20 98.2 °F (36.8 °C) 100 %      MAP       --         Physical Exam    Nursing note and vitals reviewed.  Constitutional: She appears well-developed. She is not diaphoretic. No distress.   HENT:   Head: Normocephalic and atraumatic.   Nose: Nose normal.   Eyes: EOM are normal. No scleral icterus.   Neck: Neck supple.   Normal range of motion.  Cardiovascular:  Normal rate, regular rhythm, normal heart sounds and intact distal pulses.     Exam reveals no gallop and no friction rub.       No murmur heard.  Pulses:       Dorsalis pedis pulses are 2+ on the right side and 2+ on the left side.        Posterior tibial pulses are 2+ on the right side and 2+ on the left side.   Patient's legs are warm and well perfused distally.   Pulmonary/Chest: Breath sounds normal. No stridor. No respiratory distress. She has no wheezes. She has no rhonchi. She has no rales.   Abdominal: Abdomen is soft. Bowel sounds are normal. She exhibits no distension. There is no abdominal tenderness. There is no rebound and  no guarding.   Musculoskeletal:         General: Normal range of motion.      Cervical back: Normal range of motion and neck supple.     Neurological: She is alert and oriented to person, place, and time. She has normal strength. No cranial nerve deficit or sensory deficit.   Sensation intact to the left foot.   Skin: Skin is warm and dry. Capillary refill takes less than 2 seconds. Abrasion (superficial to the bilateral calves) noted. No rash noted.   Diabetic foot ulcer to the bottom of the first left toe with surrounding erythema and swelling. Diabetic ulcer to the left heel, as well. Well-healing surgical scar over left hip is clean, dry, and intact.    Psychiatric: She has a normal mood and affect.                             ED Course   Procedures  Labs Reviewed   CBC W/ AUTO DIFFERENTIAL - Abnormal; Notable for the following components:       Result Value    WBC 13.00 (*)     RBC 3.87 (*)     Hemoglobin 11.8 (*)     Hematocrit 36.5 (*)     Gran # (ANC) 10.8 (*)     Gran % 82.7 (*)     Lymph % 11.5 (*)     All other components within normal limits   COMPREHENSIVE METABOLIC PANEL - Abnormal; Notable for the following components:    Sodium 131 (*)     Glucose 238 (*)     Albumin 3.4 (*)     All other components within normal limits   BETA - HYDROXYBUTYRATE, SERUM - Abnormal; Notable for the following components:    Beta-Hydroxybutyrate 2.8 (*)     All other components within normal limits   SEDIMENTATION RATE - Abnormal; Notable for the following components:    Sed Rate 75 (*)     All other components within normal limits   C-REACTIVE PROTEIN - Abnormal; Notable for the following components:    .8 (*)     All other components within normal limits   POCT GLUCOSE - Abnormal; Notable for the following components:    POCT Glucose 231 (*)     All other components within normal limits   ISTAT PROCEDURE - Abnormal; Notable for the following components:    POC PCO2 50.0 (*)     POC PO2 19 (*)     POC HCO3 28.6  (*)     POC SATURATED O2 27 (*)     POC TCO2 30 (*)     All other components within normal limits   CULTURE, AEROBIC  (SPECIFY SOURCE)   CULTURE, ANAEROBIC   LACTIC ACID, PLASMA    Narrative:     Suspect COVID 19   URINALYSIS, REFLEX TO URINE CULTURE   POCT GLUCOSE MONITORING CONTINUOUS          Imaging Results              US Lower Extremity Arteries Bilateral (Final result)  Result time 04/19/23 18:23:27   Procedure changed from US Lower Extrem Arteries Bilat with BECKIE (xpd)     Final result by Belinda Parekh MD (04/19/23 18:23:27)                   Impression:      Focal high-grade stenosis within the bilateral mid SFAs and left popliteal artery.    Diffusely low velocities and monophasic waveforms in the bilateral calf arteries, likely due to upstream stenoses and/or vessel hardening such as from arteriosclerosis.      Electronically signed by: Belinda Parekh  Date:    04/19/2023  Time:    18:23               Narrative:    EXAMINATION:  US LOWER EXTREMITY ARTERIES BILATERAL    CLINICAL HISTORY:  diabetic foot ulcer;    TECHNIQUE:  Bilateral lower extremity arterial duplex ultrasound examination performed. Multiple gray scale and color doppler images were obtained in addition to waveform analysis.  Ankle-brachial indices not performed due to overlying wounds and bandages.    COMPARISON:  None    FINDINGS:  Morphologically normal left inguinal lymph nodes.    The peak systolic velocities on the right are as follows, in centimeters/second:    Common femoral artery: 146    Deep femoral artery: 89    Superficial femoral artery, proximal: 135    Superficial femoral artery, mid portion: 325, 141, 322    Superficial femoral artery, distal: 173    Popliteal artery: 117, monophasic    Posterior tibial artery: 55, monophasic    Anterior tibial artery: 38, monophasic    Peroneal artery: Unable to visualize    The peak systolic velocities on the left are as follows, in centimeters/second:    Common femoral artery: 146,  monophasic    Deep femoral artery: 70    Superficial femoral artery, proximal: 116    Superficial femoral artery, mid portion: 212, 274    Superficial femoral artery, distal: 107    Popliteal artery: 254, 385, 354, 139    Posterior tibial artery: 28    Anterior tibial artery: 76    Peroneal artery: 53    Monophasic waveforms throughout the left lower extremity arterial system.                                       X-Ray Foot Complete Left (Final result)  Result time 04/19/23 13:21:06      Final result by Ihsan Aguilar Jr., MD (04/19/23 13:21:06)                   Impression:      Soft tissue swelling of the forefoot.  Otherwise negative radiographs.      Electronically signed by: Ihsan Aguilar MD  Date:    04/19/2023  Time:    13:21               Narrative:    EXAMINATION:  XR FOOT COMPLETE 3 VIEW LEFT    CLINICAL HISTORY:  .  Type 2 diabetes mellitus with foot ulcer    TECHNIQUE:  AP, lateral and oblique views of the left foot were performed.    COMPARISON:  None    FINDINGS:  There is soft tissue swelling of the forefoot.  A foreign body is not identified.  On the provided images a fracture is not identified.                                       Medications   ascorbic acid (vitamin C) tablet 500 mg (has no administration in time range)   calcium carbonate 200 mg calcium (500 mg) chewable tablet 1,000 mg (has no administration in time range)   vitamin D 1000 units tablet 1,000 Units (has no administration in time range)   lisinopriL tablet 10 mg (has no administration in time range)   insulin detemir U-100 (Levemir) pen 14 Units (14 Units Subcutaneous Given 4/19/23 2050)   sodium chloride 0.9% flush 10 mL (has no administration in time range)   melatonin tablet 9 mg (9 mg Oral Given 4/19/23 2050)   ondansetron injection 4 mg (has no administration in time range)   senna-docusate 8.6-50 mg per tablet 1 tablet (1 tablet Oral Given 4/19/23 2051)   acetaminophen tablet 650 mg (has no administration in time  range)   simethicone chewable tablet 160 mg (has no administration in time range)   aluminum-magnesium hydroxide-simethicone 200-200-20 mg/5 mL suspension 30 mL (has no administration in time range)   naloxone 0.4 mg/mL injection 0.02 mg (has no administration in time range)   potassium bicarbonate disintegrating tablet 50 mEq (has no administration in time range)   potassium bicarbonate disintegrating tablet 35 mEq (has no administration in time range)   potassium bicarbonate disintegrating tablet 60 mEq (has no administration in time range)   magnesium oxide tablet 800 mg (has no administration in time range)   magnesium oxide tablet 800 mg (has no administration in time range)   potassium, sodium phosphates 280-160-250 mg packet 2 packet (has no administration in time range)   potassium, sodium phosphates 280-160-250 mg packet 2 packet (has no administration in time range)   potassium, sodium phosphates 280-160-250 mg packet 2 packet (has no administration in time range)   insulin aspart U-100 pen 1-10 Units (4 Units Subcutaneous Given 4/19/23 2049)   glucagon (human recombinant) injection 1 mg (has no administration in time range)   dextrose 10% bolus 125 mL 125 mL (has no administration in time range)   dextrose 10% bolus 250 mL 250 mL (has no administration in time range)   dextrose 40 % gel 15,000 mg (has no administration in time range)   dextrose 40 % gel 30,000 mg (has no administration in time range)   levalbuterol nebulizer solution 1.25 mg (has no administration in time range)     And   ipratropium 0.02 % nebulizer solution 0.5 mg (has no administration in time range)   vancomycin - pharmacy to dose (has no administration in time range)   ceFEPIme (MAXIPIME) 2 g in dextrose 5 % in water (D5W) 5 % 50 mL IVPB (MB+) (0 g Intravenous Stopped 4/19/23 1943)   vancomycin 750 mg in dextrose 5 % (D5W) 250 mL IVPB (Vial-Mate) (750 mg Intravenous Trough Due As Scheduled Before Dose 4/20/23 1200)    HYDROcodone-acetaminophen 7.5-325 mg per tablet 1 tablet (1 tablet Oral Given 4/19/23 1913)   HYDROcodone-acetaminophen  mg per tablet 1 tablet (has no administration in time range)   sodium chloride 0.9% bolus 1,000 mL 1,000 mL (0 mLs Intravenous Stopped 4/19/23 1421)   vancomycin 1,500 mg in dextrose 5 % (D5W) 250 mL IVPB (Vial-Mate) (0 mg Intravenous Stopped 4/19/23 1443)   piperacillin-tazobactam (ZOSYN) 4.5 g in dextrose 5 % in water (D5W) 5 % 100 mL IVPB (MB+) (0 g Intravenous Stopped 4/19/23 1610)   HYDROcodone-acetaminophen 5-325 mg per tablet 1 tablet (1 tablet Oral Given 4/19/23 1357)     Medical Decision Making:   History:   Old Medical Records: I decided to obtain old medical records.  Independently Interpreted Test(s):   I have ordered and independently interpreted EKG Reading(s) - see prior notes  Clinical Tests:   Lab Tests: Ordered and Reviewed  Radiological Study: Ordered and Reviewed  Medical Tests: Ordered and Reviewed        Scribe Attestation:   Scribe #1: I performed the above scribed service and the documentation accurately describes the services I performed. I attest to the accuracy of the note.      ED Course as of 04/19/23 2101 Wed Apr 19, 2023   1339 X-Ray Foot Complete Left [BD]   1339 Impression:     Soft tissue swelling of the forefoot.  Otherwise negative radiographs.        Electronically signed by: Ihsan Aguilar MD  Date:                                            04/19/2023  Time:                                           13:21 [BD]   1339 BP(!): 141/63 [BD]   1340 Temp: 98.2 °F (36.8 °C) [BD]   1340 Pulse: 83 [BD]   1340 Resp: 20 [BD]   1340 SpO2: 100 % [BD]   1340 WBC(!): 13.00 [BD]   1340 Glucose(!): 238 [BD]   1340 Beta-Hydroxybutyrate(!): 2.8 [BD]   1340 POC PH: 7.366 [BD]   1342 73-year-old female with a history of left femur fracture approximately 2 months status post intramedullary denzel insertion by Dr. Brand and diabetes presents today with two worsening  diabetic foot ulcers. Vital signs reassuring do not suspect sepsis. Labs show patient is hyperglycemic and has a leukocytosis with elevated inflammatory markers.   Patient hyperglycemic to 238 with beta hydroxybutyrate of 2.8 however no anion gap, normal CO2 and pH do not suspect DKA. Suspect infection is causing hyperglycemia.  Given patient is a diabetic and wounds worsening despite outpatient management with wound care will start on IV antibiotics and admit to Hospital Medicine for further management of infected diabetic foot ulcers.  [BD]      ED Course User Index  [BD] Ollie Hankins MD            Attending Attestation:     Physician Attestation for Scribe:    I, Dr. Ollie Hankins, personally performed the services described in this documentation.   All medical record entries made by the scribe were at my direction and in my presence.   I have reviewed the chart and agree that the record is accurate and complete.   Ollie Hankins MD  9:02 PM 04/19/2023     DISCLAIMER: This note was prepared with [employer] voice recognition transcription software. Garbled syntax, mangled pronouns, and other bizarre constructions may be attributed to that software system.         Clinical Impression:   Final diagnoses:  [E11.621, L97.509] Diabetic foot ulcer  [E11.621, L97.509] Diabetic foot ulcer - Heal and great toe  [R73.9] Hyperglycemia (Primary)  [R07.9] Chest pain        ED Disposition Condition    Admit                 Ollie Hankins MD  04/19/23 2181

## 2023-04-19 NOTE — ASSESSMENT & PLAN NOTE
This patient does have evidence of infective focus  My overall impression is sepsis.  Source: Skin and Soft Tissue (location left foot)  Antibiotics given-   Antibiotics (72h ago, onward)    Start     Stop Route Frequency Ordered    04/19/23 1700  ceFEPIme (MAXIPIME) 2 g in dextrose 5 % in water (D5W) 5 % 50 mL IVPB (MB+)         -- IV Every 8 hours (non-standard times) 04/19/23 1551    04/19/23 1647  vancomycin - pharmacy to dose  (vancomycin IVPB)        See Hyperspace for full Linked Orders Report.    -- IV pharmacy to manage frequency 04/19/23 1551        Latest lactate reviewed-  No results for input(s): LACTATE in the last 72 hours.  Organ dysfunction indicated by tachycardia    Fluid challenge Not needed - patient is not hypotensive      Post- resuscitation assessment No - Post resuscitation assessment not needed       Will Not start Pressors- Levophed for MAP of 65  Source control achieved by: IV abx/podiatry consult

## 2023-04-19 NOTE — NURSING
Pt awake, alert and oriented. Wound care available at bedside for foot wound care- pt admits to having pain in foot.. Pt aware of impending admission- agreeable with plan. PIV site available.

## 2023-04-19 NOTE — PROGRESS NOTES
Pharmacokinetic Initial Assessment: IV Vancomycin    Assessment/Plan:    Initiate intravenous vancomycin with loading dose of 1500 mg once followed by a maintenance dose of vancomycin 750mg IV every 12 hours  Desired empiric serum trough concentration is 15 to 20 mcg/mL  Draw vancomycin trough level 60 min prior to third dose on 4/20/23 at approximately 1200  Pharmacy will continue to follow and monitor vancomycin.      Please contact pharmacy at extension [phone] with any questions regarding this assessment.     Thank you for the consult,   Norma Romano       Patient brief summary:  Anastasiia Badillo is a 73 y.o. female initiated on antimicrobial therapy with IV Vancomycin for treatment of suspected skin & soft tissue infection    Drug Allergies:   Review of patient's allergies indicates:  No Known Allergies    Actual Body Weight:   72.6 kg    Renal Function:   Estimated Creatinine Clearance: 63.4 mL/min (based on SCr of 0.8 mg/dL).,     Dialysis Method (if applicable):  N/A    CBC (last 72 hours):  Recent Labs   Lab Result Units 04/19/23  1248   WBC K/uL 13.00*   Hemoglobin g/dL 11.8*   Hematocrit % 36.5*   Platelets K/uL 342   Gran % % 82.7*   Lymph % % 11.5*   Mono % % 5.1   Eosinophil % % 0.1   Basophil % % 0.3   Differential Method  Automated       Metabolic Panel (last 72 hours):  Recent Labs   Lab Result Units 04/19/23  1248   Sodium mmol/L 131*   Potassium mmol/L 5.0   Chloride mmol/L 97   CO2 mmol/L 23   Glucose mg/dL 238*   BUN mg/dL 14   Creatinine mg/dL 0.8   Albumin g/dL 3.4*   Total Bilirubin mg/dL 0.6   Alkaline Phosphatase U/L 114   AST U/L 11   ALT U/L 10       Drug levels (last 3 results):  No results for input(s): VANCOMYCINRA, VANCORANDOM, VANCOMYCINPE, VANCOPEAK, VANCOMYCINTR, VANCOTROUGH in the last 72 hours.    Microbiologic Results:  Microbiology Results (last 7 days)       Procedure Component Value Units Date/Time    Blood Culture #2 [[id number]] Collected: 04/19/23 1252    Order Status:  Sent Specimen: Blood from Antecubital, Left Arm Updated: 04/19/23 1253    Blood Culture #1 [[id number]] Collected: 04/19/23 1248    Order Status: Sent Specimen: Blood from Antecubital, Right Arm Updated: 04/19/23 1241

## 2023-04-19 NOTE — ASSESSMENT & PLAN NOTE
Dangers of cigarette smoking were reviewed with patient in detail. Patient was Counseled for 3-10 minutes. Nicotine replacement options were discussed. Nicotine replacement was discussed- not prescribed per patient's request

## 2023-04-19 NOTE — ASSESSMENT & PLAN NOTE
Chronic, controlled.  Latest blood pressure and vitals reviewed-   Temp:  [98.2 °F (36.8 °C)]   Pulse:  [83-94]   Resp:  [20]   BP: (141-197)/(63-87)   SpO2:  [97 %-100 %] .   Home meds for hypertension were reviewed and noted below.   Hypertension Medications             lisinopriL 10 MG tablet Take 10 mg by mouth.          While in the hospital, will manage blood pressure as follows; Continue home antihypertensive regimen    Will utilize p.r.n. blood pressure medication only if patient's blood pressure greater than  180/110 and she develops symptoms such as worsening chest pain or shortness of breath.

## 2023-04-19 NOTE — SUBJECTIVE & OBJECTIVE
Past Medical History:   Diagnosis Date    Diabetes mellitus, type 2        Past Surgical History:   Procedure Laterality Date    AUGMENTATION OF BREAST      INTRAMEDULLARY RODDING OF FEMUR Left 2/18/2023    Procedure: INSERTION, INTRAMEDULLARY ROXANNA, FEMUR (hana table -- synthes -- long nail -- have bovie and suction and large bone set);  Surgeon: Keanu Brand MD;  Location: [facility];  Service: Orthopedics;  Laterality: Left;       Review of patient's allergies indicates:  No Known Allergies    No current facility-administered medications on file prior to encounter.     Current Outpatient Medications on File Prior to Encounter   Medication Sig    ascorbic acid, vitamin C, (VITAMIN C) 500 MG tablet Take 500 mg by mouth once daily.    calcium carbonate (OS-DAGMAR) 600 mg calcium (1,500 mg) Tab Take 600 mg by mouth 2 (two) times daily with meals.    cholecalciferol, vitamin D3, 10 mcg (400 unit) Chew Take by mouth 2 (two) times a day.    cinnamon bark 500 mg capsule Take 500 mg by mouth once daily.    HYDROcodone-acetaminophen (NORCO) 5-325 mg per tablet Take 1 tablet by mouth every 6 (six) hours as needed for Pain.    insulin aspart U-100 (NOVOLOG) 100 unit/mL (3 mL) InPn pen Inject 0-5 Units into the skin before meals and at bedtime as needed (Hyperglycemia). Blood Glucose  mg/dL                  Pre-meal                2200  151-200                0 unit                      0 unit  201-250                2 units                    1 unit  251-300                3 units                    1 unit  301-350                4 units                    2 units  >350                     5 units                    3 units    insulin detemir U-100 (LEVEMIR FLEXTOUCH) 100 unit/mL (3 mL) SubQ InPn pen Inject 18 Units into the skin once daily.    lisinopriL 10 MG tablet Take 10 mg by mouth.    loperamide (IMODIUM) 2 mg capsule Take 2 mg by mouth 4 (four) times daily as needed for Diarrhea.    melatonin 10 mg Cap Take 1 capsule  "by mouth every evening.    multivit-min-FA-lycopen-lutein 0.4 mg-300 mcg- 250 mcg Tab Take 1 tablet by mouth once daily.    ondansetron (ZOFRAN) 4 MG tablet Take by mouth.    pen needle, diabetic (PEN NEEDLE) 31 gauge x 3/16" Ndle 1 application by Misc.(Non-Drug; Combo Route) route 2 (two) times a day.    UNABLE TO FIND Take 1 capsule by mouth daily as needed. Beet Root     Family History       Problem Relation (Age of Onset)    Breast cancer Sister    Cancer Mother, Sister    Cataracts Mother    Heart disease Mother, Father, Sister, Brother    Hypertension Father          Tobacco Use    Smoking status: Every Day     Packs/day: 0.25     Years: 45.00     Pack years: 11.25     Types: Cigarettes    Smokeless tobacco: Never   Substance and Sexual Activity    Alcohol use: Yes    Drug use: Never    Sexual activity: Not on file     Review of Systems   Constitutional:  Negative for chills and fever.   HENT:  Negative for congestion and sore throat.    Respiratory:  Negative for cough and shortness of breath.    Cardiovascular:  Negative for chest pain and palpitations.   Gastrointestinal:  Negative for abdominal pain, diarrhea, nausea and vomiting.   Genitourinary:  Negative for dysuria and hematuria.   Musculoskeletal:  Positive for arthralgias and joint swelling.   Skin:  Positive for color change and wound.   Neurological:  Negative for dizziness and light-headedness.   Psychiatric/Behavioral:  Negative for agitation and confusion.    All other systems reviewed and are negative.  Objective:     Vital Signs (Most Recent):  Temp: 98.2 °F (36.8 °C) (04/19/23 1121)  Pulse: 94 (04/19/23 1406)  Resp: 20 (04/19/23 1357)  BP: (!) 172/77 (04/19/23 1406)  SpO2: 98 % (04/19/23 1406)   Vital Signs (24h Range):  Temp:  [98.2 °F (36.8 °C)] 98.2 °F (36.8 °C)  Pulse:  [83-94] 94  Resp:  [20] 20  SpO2:  [97 %-100 %] 98 %  BP: (141-197)/(63-87) 172/77     Weight: 72.6 kg (160 lb)  Body mass index is 26.63 kg/m².    Physical " Exam  Constitutional:       General: She is not in acute distress.     Appearance: She is well-developed and normal weight. She is not ill-appearing.   HENT:      Head: Normocephalic and atraumatic.      Mouth/Throat:      Mouth: Mucous membranes are dry.   Eyes:      Conjunctiva/sclera: Conjunctivae normal.      Pupils: Pupils are equal, round, and reactive to light.   Cardiovascular:      Rate and Rhythm: Normal rate and regular rhythm.      Pulses: Normal pulses.      Heart sounds: Normal heart sounds.   Pulmonary:      Effort: Pulmonary effort is normal.      Breath sounds: Normal breath sounds.   Abdominal:      General: Bowel sounds are normal. There is no distension.      Palpations: Abdomen is soft.      Tenderness: There is no abdominal tenderness.   Genitourinary:     Comments: Deferred    Musculoskeletal:         General: Normal range of motion.      Cervical back: Normal range of motion and neck supple.   Skin:     General: Skin is warm and dry.   Neurological:      General: No focal deficit present.      Mental Status: She is alert and oriented to person, place, and time.   Psychiatric:         Mood and Affect: Mood normal.         Behavior: Behavior normal.         CRANIAL NERVES     CN III, IV, VI   Pupils are equal, round, and reactive to light.     Significant Labs: All pertinent labs within the past 24 hours have been reviewed.  CBC:   Recent Labs   Lab 04/19/23  1248   WBC 13.00*   HGB 11.8*   HCT 36.5*        CMP:   Recent Labs   Lab 04/19/23  1248   *   K 5.0   CL 97   CO2 23   *   BUN 14   CREATININE 0.8   CALCIUM 10.1   PROT 7.4   ALBUMIN 3.4*   BILITOT 0.6   ALKPHOS 114   AST 11   ALT 10   ANIONGAP 11       Significant Imaging: I have reviewed all pertinent imaging results/findings within the past 24 hours.  Xray left foot:  Soft tissue swelling of the forefoot.  Otherwise negative radiographs.

## 2023-04-19 NOTE — CONSULTS
Consulted for wounds present on admit to left great toe, left heel, left lower medial leg, left 5th toe. Patient is also noted with right lower medial leg scabbed areas and right heel is noted with a callous. Patient also has a moisture associated skin damage to her bilateral buttocks and sacral areas and a scabbed area to her right outer buttock as patient reports she scratched that area and caused a wound. Bilateral arms are noted with scabs from patient scratching her arms as this was stated by the patient. Patient has pain to her left leg from recent surgery on 2/28/23 to repair her left femur fracture. Patient is not able to mobilize on her left leg well due to the amount of pain from her left hip and thigh. Patient reports pain to left foot is stabbing and different from her hip and thigh pain.   Cleaned all wounds with wound cleanser and patted dry. Applied Urgotul pieces cut to cover the wounds to bilateral heels, left great toe, left 5th toe, bilateral lower medial leg wounds then covered these areas with foam dressings. Applied Triad to buttocks and sacral areas. Multiple scabs to bilateral arms cleaned and applied a thin layer of Triad to these areas and left open to air. Patient will need a quilted heel protector to her left foot to help offload heel. Notified charge nurse Robert Schwab that skin assessment was completed on this patient in the ED by wound care nurse. Wound care will follow up throughout hospital stay.

## 2023-04-19 NOTE — FIRST PROVIDER EVALUATION
Emergency Department TeleTriage Encounter Note      CHIEF COMPLAINT    Chief Complaint   Patient presents with    Wound Check     Patient had diabetes and has a wound on her left foot, has 2 wounds one on her toes and her heel        VITAL SIGNS   Initial Vitals [04/19/23 1121]   BP Pulse Resp Temp SpO2   (!) 141/63 83 20 98.2 °F (36.8 °C) 100 %      MAP       --            ALLERGIES    Review of patient's allergies indicates:  No Known Allergies    PROVIDER TRIAGE NOTE  This is a teletriage evaluation of a 73 y.o. female presenting to the ED complaining of wound check. Patient reports wound to left foot have started forming after having surgery on her left thigh 2 months ago. She is unsure of how well her blood sugar has been controlled since the surgery. Home health recommended she be seen by wound specialist.    Patient is alert and oriented. She speaks in complete sentences. She is sitting upright in the chair in no distress. Wounds not visualized.    Initial orders will be placed and care will be transferred to an alternate provider when patient is roomed for a full evaluation. Any additional orders and the final disposition will be determined by that provider.         ORDERS  Labs Reviewed   HIV 1 / 2 ANTIBODY   HEPATITIS C ANTIBODY   POCT GLUCOSE MONITORING CONTINUOUS       ED Orders (720h ago, onward)      Start Ordered     Status Ordering Provider    04/19/23 1128 04/19/23 1127  POCT glucose  Once         Ordered KORY SOLARES    04/19/23 1122 04/19/23 1121  HIV 1/2 Ag/Ab (4th Gen)  STAT         Pending Collection RIANA GARCIA    04/19/23 1122 04/19/23 1121  Hepatitis C Antibody  STAT         Pending Collection RIANA GARCIA              Virtual Visit Note: The provider triage portion of this emergency department evaluation and documentation was performed via [employer], a HIPAA-compliant telemedicine application, in concert with a tele-presenter in the room. A face to face patient  evaluation with one of my colleagues will occur once the patient is placed in an emergency department room.      DISCLAIMER: This note was prepared with [employer] voice recognition transcription software. Garbled syntax, mangled pronouns, and other bizarre constructions may be attributed to that software system.

## 2023-04-19 NOTE — ASSESSMENT & PLAN NOTE
Patient's FSGs are uncontrolled due to hyperglycemia on current medication regimen.  Last A1c reviewed-   Lab Results   Component Value Date    HGBA1C 8.9 (H) 02/17/2023     Most recent fingerstick glucose reviewed-   Recent Labs   Lab 04/19/23  1138   POCTGLUCOSE 231*     Current correctional scale  Medium  Maintain anti-hyperglycemic dose as follows-   Antihyperglycemics (From admission, onward)    Continue levemir and SSI        Hold Oral hypoglycemics while patient is in the hospital.

## 2023-04-20 ENCOUNTER — OUTSIDE PLACE OF SERVICE (OUTPATIENT)
Dept: INFECTIOUS DISEASES | Facility: CLINIC | Age: 73
End: 2023-04-20
Payer: MEDICARE

## 2023-04-20 DIAGNOSIS — E11.621 DIABETIC FOOT ULCER: ICD-10-CM

## 2023-04-20 DIAGNOSIS — A41.9 SEPSIS: ICD-10-CM

## 2023-04-20 DIAGNOSIS — L97.509 DIABETIC FOOT ULCER: ICD-10-CM

## 2023-04-20 DIAGNOSIS — L03.032 CELLULITIS OF GREAT TOE OF LEFT FOOT: Primary | ICD-10-CM

## 2023-04-20 PROBLEM — I70.202: Status: ACTIVE | Noted: 2023-04-20

## 2023-04-20 LAB
ANION GAP SERPL CALC-SCNC: 6 MMOL/L (ref 8–16)
APTT PPP: 27.7 SEC (ref 21–32)
BASOPHILS # BLD AUTO: 0.04 K/UL (ref 0–0.2)
BASOPHILS NFR BLD: 0.5 % (ref 0–1.9)
BUN SERPL-MCNC: 15 MG/DL (ref 8–23)
CALCIUM SERPL-MCNC: 9.1 MG/DL (ref 8.7–10.5)
CHLORIDE SERPL-SCNC: 98 MMOL/L (ref 95–110)
CO2 SERPL-SCNC: 25 MMOL/L (ref 23–29)
CREAT SERPL-MCNC: 0.8 MG/DL (ref 0.5–1.4)
DIFFERENTIAL METHOD: ABNORMAL
EOSINOPHIL # BLD AUTO: 0.1 K/UL (ref 0–0.5)
EOSINOPHIL NFR BLD: 0.9 % (ref 0–8)
ERYTHROCYTE [DISTWIDTH] IN BLOOD BY AUTOMATED COUNT: 13.3 % (ref 11.5–14.5)
EST. GFR  (NO RACE VARIABLE): >60 ML/MIN/1.73 M^2
GLUCOSE SERPL-MCNC: 352 MG/DL (ref 70–110)
HCT VFR BLD AUTO: 32.2 % (ref 37–48.5)
HGB BLD-MCNC: 10.2 G/DL (ref 12–16)
IMM GRANULOCYTES # BLD AUTO: 0.03 K/UL (ref 0–0.04)
IMM GRANULOCYTES NFR BLD AUTO: 0.4 % (ref 0–0.5)
INR PPP: 0.9 (ref 0.8–1.2)
LYMPHOCYTES # BLD AUTO: 0.9 K/UL (ref 1–4.8)
LYMPHOCYTES NFR BLD: 12 % (ref 18–48)
MAGNESIUM SERPL-MCNC: 1.6 MG/DL (ref 1.6–2.6)
MCH RBC QN AUTO: 30 PG (ref 27–31)
MCHC RBC AUTO-ENTMCNC: 31.7 G/DL (ref 32–36)
MCV RBC AUTO: 95 FL (ref 82–98)
MONOCYTES # BLD AUTO: 0.6 K/UL (ref 0.3–1)
MONOCYTES NFR BLD: 7.5 % (ref 4–15)
NEUTROPHILS # BLD AUTO: 5.8 K/UL (ref 1.8–7.7)
NEUTROPHILS NFR BLD: 78.7 % (ref 38–73)
NRBC BLD-RTO: 0 /100 WBC
PLATELET # BLD AUTO: 324 K/UL (ref 150–450)
PMV BLD AUTO: 11.9 FL (ref 9.2–12.9)
POCT GLUCOSE: 199 MG/DL (ref 70–110)
POCT GLUCOSE: 250 MG/DL (ref 70–110)
POCT GLUCOSE: 253 MG/DL (ref 70–110)
POCT GLUCOSE: 285 MG/DL (ref 70–110)
POTASSIUM SERPL-SCNC: 4.3 MMOL/L (ref 3.5–5.1)
PROTHROMBIN TIME: 10 SEC (ref 9–12.5)
RBC # BLD AUTO: 3.4 M/UL (ref 4–5.4)
SODIUM SERPL-SCNC: 129 MMOL/L (ref 136–145)
TSH SERPL DL<=0.005 MIU/L-ACNC: 1.17 UIU/ML (ref 0.4–4)
VANCOMYCIN TROUGH SERPL-MCNC: 14.2 UG/ML (ref 10–22)
WBC # BLD AUTO: 7.43 K/UL (ref 3.9–12.7)

## 2023-04-20 PROCEDURE — 85730 THROMBOPLASTIN TIME PARTIAL: CPT | Performed by: NURSE PRACTITIONER

## 2023-04-20 PROCEDURE — 80048 BASIC METABOLIC PNL TOTAL CA: CPT | Performed by: NURSE PRACTITIONER

## 2023-04-20 PROCEDURE — 11000001 HC ACUTE MED/SURG PRIVATE ROOM

## 2023-04-20 PROCEDURE — 25000003 PHARM REV CODE 250: Performed by: NURSE PRACTITIONER

## 2023-04-20 PROCEDURE — 63600175 PHARM REV CODE 636 W HCPCS: Performed by: NURSE PRACTITIONER

## 2023-04-20 PROCEDURE — A9585 GADOBUTROL INJECTION: HCPCS | Performed by: HOSPITALIST

## 2023-04-20 PROCEDURE — 97165 OT EVAL LOW COMPLEX 30 MIN: CPT

## 2023-04-20 PROCEDURE — 94761 N-INVAS EAR/PLS OXIMETRY MLT: CPT

## 2023-04-20 PROCEDURE — 80202 ASSAY OF VANCOMYCIN: CPT | Performed by: HOSPITALIST

## 2023-04-20 PROCEDURE — 99900035 HC TECH TIME PER 15 MIN (STAT)

## 2023-04-20 PROCEDURE — 99223 PR INITIAL HOSPITAL CARE,LEVL III: ICD-10-PCS | Mod: S$GLB,,, | Performed by: STUDENT IN AN ORGANIZED HEALTH CARE EDUCATION/TRAINING PROGRAM

## 2023-04-20 PROCEDURE — 97161 PT EVAL LOW COMPLEX 20 MIN: CPT

## 2023-04-20 PROCEDURE — 25500020 PHARM REV CODE 255: Performed by: HOSPITALIST

## 2023-04-20 PROCEDURE — 36415 COLL VENOUS BLD VENIPUNCTURE: CPT | Performed by: HOSPITALIST

## 2023-04-20 PROCEDURE — 25000003 PHARM REV CODE 250: Performed by: HOSPITALIST

## 2023-04-20 PROCEDURE — 84443 ASSAY THYROID STIM HORMONE: CPT | Performed by: NURSE PRACTITIONER

## 2023-04-20 PROCEDURE — 36415 COLL VENOUS BLD VENIPUNCTURE: CPT | Performed by: NURSE PRACTITIONER

## 2023-04-20 PROCEDURE — 97535 SELF CARE MNGMENT TRAINING: CPT

## 2023-04-20 PROCEDURE — 85025 COMPLETE CBC W/AUTO DIFF WBC: CPT | Performed by: NURSE PRACTITIONER

## 2023-04-20 PROCEDURE — 83735 ASSAY OF MAGNESIUM: CPT | Performed by: NURSE PRACTITIONER

## 2023-04-20 PROCEDURE — 25500020 PHARM REV CODE 255

## 2023-04-20 PROCEDURE — 99223 1ST HOSP IP/OBS HIGH 75: CPT | Mod: S$GLB,,, | Performed by: STUDENT IN AN ORGANIZED HEALTH CARE EDUCATION/TRAINING PROGRAM

## 2023-04-20 PROCEDURE — 85610 PROTHROMBIN TIME: CPT | Performed by: NURSE PRACTITIONER

## 2023-04-20 RX ORDER — GADOBUTROL 604.72 MG/ML
7 INJECTION INTRAVENOUS
Status: COMPLETED | OUTPATIENT
Start: 2023-04-20 | End: 2023-04-20

## 2023-04-20 RX ADMIN — DOCUSATE SODIUM AND SENNOSIDES 1 TABLET: 8.6; 5 TABLET, FILM COATED ORAL at 08:04

## 2023-04-20 RX ADMIN — HYDROCODONE BITARTRATE AND ACETAMINOPHEN 1 TABLET: 7.5; 325 TABLET ORAL at 08:04

## 2023-04-20 RX ADMIN — HYDROCODONE BITARTRATE AND ACETAMINOPHEN 1 TABLET: 10; 325 TABLET ORAL at 03:04

## 2023-04-20 RX ADMIN — CEFEPIME 2 G: 2 INJECTION, POWDER, FOR SOLUTION INTRAVENOUS at 08:04

## 2023-04-20 RX ADMIN — INSULIN ASPART 2 UNITS: 100 INJECTION, SOLUTION INTRAVENOUS; SUBCUTANEOUS at 12:04

## 2023-04-20 RX ADMIN — VANCOMYCIN HYDROCHLORIDE 750 MG: 750 INJECTION, POWDER, LYOPHILIZED, FOR SOLUTION INTRAVENOUS at 12:04

## 2023-04-20 RX ADMIN — CALCIUM CARBONATE (ANTACID) CHEW TAB 500 MG 1000 MG: 500 CHEW TAB at 08:04

## 2023-04-20 RX ADMIN — CEFEPIME 2 G: 2 INJECTION, POWDER, FOR SOLUTION INTRAVENOUS at 12:04

## 2023-04-20 RX ADMIN — HYDROCODONE BITARTRATE AND ACETAMINOPHEN 1 TABLET: 7.5; 325 TABLET ORAL at 06:04

## 2023-04-20 RX ADMIN — CEFEPIME 2 G: 2 INJECTION, POWDER, FOR SOLUTION INTRAVENOUS at 06:04

## 2023-04-20 RX ADMIN — MELATONIN TAB 3 MG 9 MG: 3 TAB at 08:04

## 2023-04-20 RX ADMIN — INSULIN ASPART 2 UNITS: 100 INJECTION, SOLUTION INTRAVENOUS; SUBCUTANEOUS at 08:04

## 2023-04-20 RX ADMIN — OXYCODONE HYDROCHLORIDE AND ACETAMINOPHEN 500 MG: 500 TABLET ORAL at 08:04

## 2023-04-20 RX ADMIN — INSULIN ASPART 6 UNITS: 100 INJECTION, SOLUTION INTRAVENOUS; SUBCUTANEOUS at 07:04

## 2023-04-20 RX ADMIN — IOHEXOL 125 ML: 350 INJECTION, SOLUTION INTRAVENOUS at 11:04

## 2023-04-20 RX ADMIN — VANCOMYCIN HYDROCHLORIDE 750 MG: 750 INJECTION, POWDER, LYOPHILIZED, FOR SOLUTION INTRAVENOUS at 01:04

## 2023-04-20 RX ADMIN — LISINOPRIL 10 MG: 10 TABLET ORAL at 08:04

## 2023-04-20 RX ADMIN — GADOBUTROL 7 ML: 604.72 INJECTION INTRAVENOUS at 04:04

## 2023-04-20 RX ADMIN — Medication 1000 UNITS: at 08:04

## 2023-04-20 NOTE — CONSULTS
Consult Note  Infectious Disease    Reason for Consult:  Diabetic foot ulcer    HPI: Anastasiia Badillo is a 73 y.o. female active smoker, with past medical history of diabetes, and nonhealing ulcers on her feet, diabetic neuropathy, who was brought in to the ER for further workup for worsening wounds on her left great toe, and right heel.  As per patient, she is not very certain regarding timeline of this ulcers.  She states she has had them for at least 2 weeks but she noticed worsening redness on her left great toe for the last couple of days in addition to stabbing pain on both legs.  She denies fever or chills, no nausea or vomiting, no abdominal pain, no dysuria increased urinary frequency, no change in bowel movements.  Of note, patient ambulates with a walker, and usually uses a wheelchair to get around.  She admits to rubbing her legs against objects frequently since she had a left hip fracture in February status post intramedullary denzel.    In the ER, hypertensive, afebrile   Labs on admission with leukocytosis of 13, left shift 82.7%, H&H 11.8/36.5, platelet count 342   ESR 75, .8   Hyponatremia 131, glucose 238, stable kidney function   Beta hydroxybutyrate 2.8   Hep C and HIV 4th Gen nonreactive   X-ray left foot with soft tissue swelling of the forefoot.  Otherwise negative.    Lower extremity arterial ultrasound with focal high-grade stenosis within the bilateral mid SFA and left popliteal artery.  Diffusely low velocities and monophasic waveforms in the bilateral calf arteries, likely due to upstream stenosis.      Empirically started on vancomycin and Zosyn, switch to cefepime IV.    ID consult for left great toe diabetic foot ulcer.    Review of patient's allergies indicates:  No Known Allergies  Past Medical History:   Diagnosis Date    Diabetes mellitus, type 2      Past Surgical History:   Procedure Laterality Date    AUGMENTATION OF BREAST      INTRAMEDULLARY RODDING OF FEMUR Left  2/18/2023    Procedure: INSERTION, INTRAMEDULLARY ROXANNA, FEMUR (hana table -- synthes -- long nail -- have bovie and suction and large bone set);  Surgeon: Keanu Brand MD;  Location: [facility];  Service: Orthopedics;  Laterality: Left;     Social History     Tobacco Use    Smoking status: Every Day     Packs/day: 0.25     Years: 45.00     Pack years: 11.25     Types: Cigarettes    Smokeless tobacco: Never   Substance Use Topics    Alcohol use: Yes        Family History   Problem Relation Age of Onset    Heart disease Mother     Cancer Mother     Cataracts Mother     Hypertension Father     Heart disease Father     Cancer Sister     Heart disease Sister     Breast cancer Sister     Heart disease Brother        Review of Systems:   No chills, fever, sweats, weight loss  No change in vision, loss of vision or diplopia  No sinus congestion, purulent nasal discharge, post nasal drip or facial pain  No pain in mouth or throat. No problems with teeth, gums.  No chest pain, palpitations, syncope  No cough, sputum production, shortness of breath, dyspnea on exertion, pleurisy, hemoptysis  No dysphagia, odynophagia  No nausea, vomiting, diarrhea, constipation, blood in stool, or focal abd pain,    No dysuria, hesitancy, hematuria, retention, incontinence  No vaginal discharge, genital ulcers, risk for STD  Except for left great toe, and right heel, no swelling of joints, redness of joints, injuries, or new focal pain  No unusual headaches, dizziness, vertigo, LE +numbness, paresthesias, neuropathy  No anxiety, depression, substance abuse, sleep disturbance  No bleeding, lymphadenopathy  No new rashes, lesions, or wounds    Outdoor activities: Lives at home by herself, active smoker, no alcohol, no drugs.   Travel: None  Implants: L hip - intramedullary roxanna  Antibiotic History: See HPI    EXAM & DIAGNOSTICS REVIEWED:   Vitals:     Temp:  [97.1 °F (36.2 °C)-98.4 °F (36.9 °C)]   Temp: 97.1 °F (36.2 °C) (04/20/23  0734)  Pulse: 82 (04/20/23 0734)  Resp: 18 (04/20/23 0851)  BP: (!) 159/67 (04/20/23 0734)  SpO2: 96 % (04/20/23 0734)    Intake/Output Summary (Last 24 hours) at 4/20/2023 0923  Last data filed at 4/20/2023 0530  Gross per 24 hour   Intake 1665.6 ml   Output 300 ml   Net 1365.6 ml       General:  In NAD. Alert and attentive, cooperative, comfortable on room air  Eyes:  Anicteric, PERRL  ENT:  No ulcers, exudates, thrush, nares patent, dentures  Neck:  Supple, no adenopathy appreciated  Lungs: Clear to auscultation b/l  Heart:  S1/S2+, regular rhythm, no murmurs  Abd:  +BS, soft, non tender, non distended, no rebound  :  Voids, no flank tenderness  Musc:  Joints without effusion, swelling,  erythema, synovitis, ambulatory with assistance  Skin:  Warm, no rash  Wound: L great toe and R heel with callous  Neuro:  Following commands, speech is clear, moves all extremities, LE neuropathy b/l  Psych:  Calm, cooperative  Lymphatic:     No cervical, supraclavicular nodes  Extrem: L great toe with redness and edema, wound at base of L great toe,  heel and left 5th toe. R heel callous,   No LE edema b/l  VAD:  Peripheral IV       Isolation: None    4/19:      L great toe    L great toe         R heel      R heel    General Labs reviewed:  Recent Labs   Lab 04/19/23  1248 04/20/23  0443   WBC 13.00* 7.43   HGB 11.8* 10.2*   HCT 36.5* 32.2*    324       Recent Labs   Lab 04/19/23  1248 04/20/23  0443   * 129*   K 5.0 4.3   CL 97 98   CO2 23 25   BUN 14 15   CREATININE 0.8 0.8   CALCIUM 10.1 9.1   PROT 7.4  --    BILITOT 0.6  --    ALKPHOS 114  --    ALT 10  --    AST 11  --      Recent Labs   Lab 04/19/23  1248   .8*     Recent Labs   Lab 04/19/23  1248   SEDRATE 75*       Estimated Creatinine Clearance: 63.4 mL/min (based on SCr of 0.8 mg/dL).     Prior micro:  Urine culture 02/17/2023 E coli resistant to ampicillin and quinolones, sensitive to cephalosporins, Zosyn, Bactrim, meropenem,  Macrobid    Micro:  Microbiology Results (last 7 days)       Procedure Component Value Units Date/Time    Blood Culture #1 [[id number]] Collected: 04/19/23 1248    Order Status: Completed Specimen: Blood from Antecubital, Right Arm Updated: 04/20/23 0315     Blood Culture, Routine No Growth to date    Blood Culture #2 [[id number]] Collected: 04/19/23 1252    Order Status: Completed Specimen: Blood from Antecubital, Left Arm Updated: 04/20/23 0315     Blood Culture, Routine No Growth to date    Aerobic culture [[id number]] Collected: 04/19/23 1752    Order Status: Sent Specimen: Wound from Toe, Left Foot Updated: 04/19/23 2238    Culture, Anaerobic [[id number]] Collected: 04/19/23 1752    Order Status: Sent Specimen: Wound from Toe, Left Foot Updated: 04/19/23 2238            Imaging Reviewed:  X-ray   LE arterial US:   Focal high-grade stenosis within the bilateral mid SFAs and left popliteal artery.  Diffusely low velocities and monophasic waveforms in the bilateral calf arteries, likely due to upstream stenoses and/or vessel hardening such as from arteriosclerosis.    Cardiology: Last EKG 2/17/23 Qtc 456 ms       IMPRESSION & PLAN     Left great toe severe cellulitis with non-healing chronic ulcer, rule out osteomyelitis in the setting of PAD and poorly controlled diabetes  ESR 75, .2, high  Blood cultures x 2 no growth to date, pending final   Wound cultures 4/19 in process    2. Diabetes, last HbA1C: 8.9% (Feb/2023)    3. Smoker    Recommendations:  Follow up CT angio run-off  MRI L  foot ordered   Transfer to [facility]/higher level of care  for vascular surgery evaluation/angiogram  Podiatry for I&D, please send deep tissue and bone for Gram stain and cultures   HbA1c ordered with AM labs  Follow cultures   Continue Vancomycin IV, keep level 15-20  Cefepime 2g IV q8h  Tight glucose control   Wound care following    D/w patient, NP     Medical Decision Making during this encounter was  [_] Low Complexity  [_]  Moderate Complexity  [xx] High Complexity

## 2023-04-20 NOTE — PLAN OF CARE
[facility]  Initial Discharge Assessment       Primary Care Provider: Raji Parkinson MD    Admission Diagnosis: Hyperglycemia [R73.9]  Diabetic foot ulcer [E11.621, L97.509]  Chest pain [R07.9]    Admission Date: 4/19/2023  Expected Discharge Date:     Discharge Barriers Identified: None    Payor: [employer]ICARE / Plan: [employer] / Product Type: Medicare Advantage /     Extended Emergency Contact Information  Primary Emergency Contact: Yaya Badillo  Mobile Phone: [phone]  Relation: Son   needed? No    Discharge Plan A: Home Health  Discharge Plan B: Skilled Nursing Facility      [id number] - [place], [place] - [address] AT [facility] [address]  [address]  [place] [address]  Phone: [phone] Fax: [phone]    SW met with patient at bedside to complete discharge planning assessment.  Patient alert and oriented xs 4.  Patient verified all demographic information on facesheet is correct.  Patient verified PCP is Dr. Parkinson.  Patient verified primary health insurance is [employer].  Patient active with [employer] [facility] and has listed DME.  Patient choice completed to resume home health with [facility].  Patient with NO POA or Living Will.  Patient not on dialysis or medication coumadin.  Patient with no 30 day admission.  Patient with no financial issues at this time.  Patient family or facility will provide transportation upon discharge from facility.  Patient independent with ADLs, live at [facility], facility drives.      Initial Assessment (most recent)       Adult Discharge Assessment - 04/20/23 1540          Discharge Assessment    Assessment Type Discharge Planning Assessment     Confirmed/corrected address, phone number and insurance Yes     Confirmed Demographics Correct on Facesheet     Source of Information patient     Communicated CITLALI with patient/caregiver Date  not available/Unable to determine     People in Home facility resident     Facility Arrived From: [facility]     Do you expect to return to your current living situation? Yes     Do you have help at home or someone to help you manage your care at home? Yes     Who are your caregiver(s) and their phone number(s)? staff     Prior to hospitilization cognitive status: Alert/Oriented     Current cognitive status: Alert/Oriented     Walking or Climbing Stairs ambulation difficulty, requires equipment;stair climbing difficulty, requires equipment     Dressing/Bathing bathing difficulty, requires equipment     Equipment Currently Used at Home wheelchair;walker, rolling     Readmission within 30 days? No     Patient currently being followed by outpatient case management? No     Do you currently have service(s) that help you manage your care at home? Yes     Name and Contact number of agency [facility]     Is the pt/caregiver preference to resume services with current agency Yes     Do you take prescription medications? Yes     Do you have prescription coverage? Yes     Is the patient taking medications as prescribed? yes     Who is going to help you get home at discharge? staff     How do you get to doctors appointments? agency;family or friend will provide     Are you on dialysis? No     Do you take coumadin? No     Discharge Plan A Home Health     Discharge Plan B Skilled Nursing Facility     DME Needed Upon Discharge  none     Discharge Plan discussed with: Patient     Discharge Barriers Identified None

## 2023-04-20 NOTE — PLAN OF CARE
Goals to be met by: 5/18/23     Patient will increase functional independence with ADLs by performing:    UE Dressing with Modified [place].  LE Dressing with Modified [place].  Grooming while standing at sink with Modified [place].  Toileting from raised toilet with Modified [place]ke for hygiene and clothing management.   Bathing from  shower chair/bench with Modified [place]ke.  Toilet transfer to raised toilet with Modified [place]ke.  Increased functional strength to WFL for ADL's/IADL's.

## 2023-04-20 NOTE — ASSESSMENT & PLAN NOTE
This patient does have evidence of infective focus  My overall impression is sepsis.  Source: Skin and Soft Tissue (location left foot)  Antibiotics given-   Antibiotics (72h ago, onward)    Start     Stop Route Frequency Ordered    04/20/23 0100  vancomycin 750 mg in dextrose 5 % (D5W) 250 mL IVPB (Vial-Mate)         -- IV Every 12 hours (non-standard times) 04/19/23 1603    04/19/23 1700  ceFEPIme (MAXIPIME) 2 g in dextrose 5 % in water (D5W) 5 % 50 mL IVPB (MB+)         -- IV Every 8 hours (non-standard times) 04/19/23 1551    04/19/23 1647  vancomycin - pharmacy to dose  (vancomycin IVPB)        See Hyperspace for full Linked Orders Report.    -- IV pharmacy to manage frequency 04/19/23 1551        Latest lactate reviewed-  Recent Labs   Lab 04/19/23  1252   LACTATE 1.1     Organ dysfunction indicated by tachycardia    Fluid challenge Not needed - patient is not hypotensive      Post- resuscitation assessment No - Post resuscitation assessment not needed       Will Not start Pressors- Levophed for MAP of 65  Source control achieved by: IV abx/podiatry consult

## 2023-04-20 NOTE — PT/OT/SLP EVAL
Occupational Therapy   Evaluation    Name: Anastasiia Badillo  MRN: [id number]  Admitting Diagnosis: Diabetic foot ulcer  Recent Surgery: * No surgery found *      Recommendations:     Discharge Recommendations: home health OT  Discharge Equipment Recommendations:  none  Barriers to discharge:       Assessment:     Anastasiia Badillo is a 73 y.o. female with a medical diagnosis of Diabetic foot ulcer.  She presents with the following performance deficits affecting function: weakness, impaired endurance, impaired self care skills, impaired functional mobility, gait instability, impaired balance, pain, impaired skin, edema.  Pt was returning from CT when OT arrived. Pt required Min assist with transfer from  to bed with no AD. OT provided education in role of OT. Pt verbalized understanding and was agreeable to OT. Pt demonstrates BUE AROM/strength WFL's. OT provided instruction in home safety with ADL's/IADL's including review of home set up and DME/AE. Pt verbalized understanding. Pt required Min assist for LLE with supine to sit EOB. Pt required Min assist with transfer to BSC. Pt required Min assist for clothing management in standing. Pt completed toileting hygiene with set up. Pt required Min assist with sit to standing and clothing management after toileting. Pt required Min assist with transfer from BSC to bed including CGA with LLE for sit to supine. Pt was able to scoot up in bed with SBA. OT provided education in calling for assist. Pt verbalized understanding.    Rehab Prognosis: Good; patient would benefit from acute skilled OT services to address these deficits and reach maximum level of function.       Plan:     Patient to be seen 5 x/week to address the above listed problems via self-care/home management, therapeutic activities, therapeutic exercises  Plan of Care Expires: 05/18/23  Plan of Care Reviewed with: patient    Subjective     Chief Complaint: Pain  Patient/Family Comments/goals: To feel  better and go home    Occupational Profile:  Living Environment: [facility] Pt has tub/shower with grab bars and raised toilet with grab bars.    Previous level of function: Independent prior to IM Demetris L femur 2/18/23.  Equipment Used at Home: wheelchair, walker, rolling, bath bench, grab bar, raised toilet  Assistance upon Discharge: Family/    Pain/Comfort:  Pain Rating 1: 5/10  Location - Side 1: Left  Location 1: foot  Pain Rating Post-Intervention 1: 5/10    Patients cultural, spiritual, [employer] conflicts given the current situation:      Objective:     Communicated with: Nurse Anat prior to session.  Patient found  arriving from CT  with peripheral IV upon OT entry to room.    General Precautions: Standard, fall  Orthopedic Precautions: N/A  Braces: N/A  Respiratory Status: Room air    Occupational Performance:    Bed Mobility:    Patient completed Scooting/Bridging with stand by assistance  Patient completed Supine to Sit with minimum assistance  Patient completed Sit to Supine with contact guard assistance    Functional Mobility/Transfers:  Patient completed Sit <> Stand Transfer with minimum assistance  with  no assistive device   Patient completed Toilet Transfer to/from [facility] Step Transfer technique with minimum assistance with  no AD    Activities of Daily Living:  Feeding:  independence    Grooming: stand by assistance set up in sitting  Bathing: moderate assistance    Upper Body Dressing: stand by assistance set up in sitting  Lower Body Dressing: minimum assistance and moderate assistance    Toileting: minimum assistance      Cognitive/Visual Perceptual:  Pt alert and oriented    Physical Exam:  Upper Extremity Strength:    -       Right Upper Extremity: WFL  -       Left Upper Extremity: WFL    AMPAC 6 Click ADL:  AMPAC Total Score: 19    Treatment & Education:  OT provided education in role of OT. Pt verbalized understanding and was agreeable to  OT.  OT provided instruction in home safety with ADL's/IADL's including review of home set up and DME/AE. Pt verbalized understanding.  OT provided education in calling for assist. Pt verbalized understanding.    Patient left HOB elevated with all lines intact, call button in reach, and bed alarm on    GOALS:   Multidisciplinary Problems       Occupational Therapy Goals          Problem: Occupational Therapy    Goal Priority Disciplines Outcome Interventions   Occupational Therapy Goal     OT, PT/OT     Description: Goals to be met by: 5/18/23     Patient will increase functional independence with ADLs by performing:    UE Dressing with Modified [place].  LE Dressing with Modified [place].  Grooming while standing at sink with Modified [place].  Toileting from raised toilet with Modified [place] for hygiene and clothing management.   Bathing from  shower chair/bench with Modified [place].  Toilet transfer to raised toilet with Modified [place].  Increased functional strength to WFL for ADL's/IADL's.                         History:     Past Medical History:   Diagnosis Date    Diabetes mellitus, type 2          Past Surgical History:   Procedure Laterality Date    AUGMENTATION OF BREAST      INTRAMEDULLARY RODDING OF FEMUR Left 2/18/2023    Procedure: INSERTION, INTRAMEDULLARY ROXANNA, FEMUR (hana table -- synthes -- long nail -- have bovie and suction and large bone set);  Surgeon: Keanu Brand MD;  Location: [facility];  Service: Orthopedics;  Laterality: Left;       Time Tracking:     OT Date of Treatment: 04/20/23  OT Start Time: 1120  OT Stop Time: 1143  OT Total Time (min): 23 min    Billable Minutes:Evaluation 8  Self Care/Home Management 15    4/20/2023

## 2023-04-20 NOTE — PLAN OF CARE
Pt educated on keeping blood sugar under control and foods to avoid. Pt educated on need for protein for wound healing. Pt educated on using call light when needing to get out of bed.Pt verbalized understanding of education.  Problem: Adult Inpatient Plan of Care  Goal: Plan of Care Review  Outcome: Ongoing, Progressing     Problem: Adult Inpatient Plan of Care  Goal: Plan of Care Review  Outcome: Ongoing, Progressing     Problem: Adult Inpatient Plan of Care  Goal: Patient-Specific Goal (Individualized)  Outcome: Ongoing, Progressing     Problem: Adult Inpatient Plan of Care  Goal: Absence of Hospital-Acquired Illness or Injury  Outcome: Ongoing, Progressing     Problem: Adult Inpatient Plan of Care  Goal: Optimal Comfort and Wellbeing  Outcome: Ongoing, Progressing     Problem: Adult Inpatient Plan of Care  Goal: Readiness for Transition of Care  Outcome: Ongoing, Progressing     Problem: Diabetes Comorbidity  Goal: Blood Glucose Level Within Targeted Range  Outcome: Ongoing, Progressing     Problem: Adjustment to Illness (Sepsis/Septic Shock)  Goal: Optimal Coping  Outcome: Ongoing, Progressing     Problem: Bleeding (Sepsis/Septic Shock)  Goal: Absence of Bleeding  Outcome: Ongoing, Progressing     Problem: Glycemic Control Impaired (Sepsis/Septic Shock)  Goal: Blood Glucose Level Within Desired Range  Outcome: Ongoing, Progressing     Problem: Infection Progression (Sepsis/Septic Shock)  Goal: Absence of Infection Signs and Symptoms  Outcome: Ongoing, Progressing     Problem: Nutrition Impaired (Sepsis/Septic Shock)  Goal: Optimal Nutrition Intake  Outcome: Ongoing, Progressing

## 2023-04-20 NOTE — PROGRESS NOTES
Pharmacokinetic Assessment Follow Up: IV Vancomycin    Vancomycin serum concentration assessment(s):    The trough level was drawn correctly and can be used to guide therapy at this time. The measurement is below the desired definitive target range of 15 to 20 mcg/mL.    Vancomycin Regimen Plan:    Continue regimen to Vancomycin 750 mg IV every 12 hours with next serum trough concentration measured at 1200 prior to third dose on 4/21/23    Drug levels (last 3 results):  Recent Labs   Lab Result Units 04/20/23  1144   Vancomycin-Trough ug/mL 14.2       Pharmacy will continue to follow and monitor vancomycin.    Please contact pharmacy at extension [phone] for questions regarding this assessment.    Thank you for the consult,   Radha Gaston       Patient brief summary:  Anastasiia Badillo is a 73 y.o. female initiated on antimicrobial therapy with IV Vancomycin for treatment of skin & soft tissue infection      Drug Allergies:   Review of patient's allergies indicates:  No Known Allergies    Actual Body Weight:   72.5 kg    Renal Function:   Estimated Creatinine Clearance: 63.4 mL/min (based on SCr of 0.8 mg/dL).,     Dialysis Method (if applicable):  N/A    CBC (last 72 hours):  Recent Labs   Lab Result Units 04/19/23  1248 04/20/23  0443   WBC K/uL 13.00* 7.43   Hemoglobin g/dL 11.8* 10.2*   Hematocrit % 36.5* 32.2*   Platelets K/uL 342 324   Gran % % 82.7* 78.7*   Lymph % % 11.5* 12.0*   Mono % % 5.1 7.5   Eosinophil % % 0.1 0.9   Basophil % % 0.3 0.5   Differential Method  Automated Automated       Metabolic Panel (last 72 hours):  Recent Labs   Lab Result Units 04/19/23  1248 04/20/23  0443   Sodium mmol/L 131* 129*   Potassium mmol/L 5.0 4.3   Chloride mmol/L 97 98   CO2 mmol/L 23 25   Glucose mg/dL 238* 352*   BUN mg/dL 14 15   Creatinine mg/dL 0.8 0.8   Albumin g/dL 3.4*  --    Total Bilirubin mg/dL 0.6  --    Alkaline Phosphatase U/L 114  --    AST U/L 11  --    ALT U/L 10  --    Magnesium mg/dL  --  1.6        Vancomycin Administrations:  vancomycin given in the last 96 hours                     vancomycin 750 mg in dextrose 5 % (D5W) 250 mL IVPB (Vial-Mate) (mg) 750 mg New Bag 04/20/23 1222     750 mg New Bag  0149    vancomycin 1,500 mg in dextrose 5 % (D5W) 250 mL IVPB (Vial-Mate) (mg) 1,500 mg New Bag 04/19/23 1313                    Microbiologic Results:  Microbiology Results (last 7 days)       Procedure Component Value Units Date/Time    Blood Culture #1 [[id number]] Collected: 04/19/23 1248    Order Status: Completed Specimen: Blood from Antecubital, Right Arm Updated: 04/20/23 0315     Blood Culture, Routine No Growth to date    Blood Culture #2 [[id number]] Collected: 04/19/23 1252    Order Status: Completed Specimen: Blood from Antecubital, Left Arm Updated: 04/20/23 0315     Blood Culture, Routine No Growth to date    Aerobic culture [[id number]] Collected: 04/19/23 1752    Order Status: Sent Specimen: Wound from Toe, Left Foot Updated: 04/19/23 2238    Culture, Anaerobic [[id number]] Collected: 04/19/23 1752    Order Status: Sent Specimen: Wound from Toe, Left Foot Updated: 04/19/23 2238

## 2023-04-20 NOTE — PROGRESS NOTES
[facility] Hospital Medicine  Progress Note    Patient Name: Anastasiia Badillo  MRN: [id number]  Patient Class: IP- Inpatient   Admission Date: 4/19/2023  Length of Stay: 1 days  Attending Physician: Sania Soliman MD  Primary Care Provider: Raji Parkinson MD        Subjective:     Principal Problem:Diabetic foot ulcer        HPI:  Anastasiia Badillo is a 74 y/o F with a past medical history significant for DM2 who presented to the ED for further evaluation of two wounds to the left foot, one to the heel and one to the great toe.  She states they have been present for about 2 weeks, but are becoming worse and she noted some redness to the left great toe over the past couple of days.  She was seen by home health today and was directed to the ED for a wound check.  She denies ever having similar wounds, but she had an intramedullary denzel inserted into her left femur on 2/18/23 for a subtrochanteric fracture and has been rubbing her left lower leg against objects frequently due to the difficulty she is experiencing in moving her left leg since surgery.  While in the ED she was administered IV vancomycin and IV zosyn.  WBC is 13K.  She is admitted to the service of hospital medicine for continued monitoring and treatment.      Overview/Hospital Course:  No notes on file    Interval History: no acute events overnight.  Arterial ultrasound BLE reviewed.  Transfer for vascular services initiated as ultrasound showed high grade stenosis SFA bilaterally and popliteal on the left.  CTA with runoff ordered.  ID consulted for further recommendations.  Discussed possible transfer with patient and she is in agreement.                Review of Systems   Constitutional:  Negative for chills and fever.   HENT:  Negative for congestion and sore throat.    Respiratory:  Negative for cough and shortness of breath.    Cardiovascular:  Negative for chest pain and palpitations.   Gastrointestinal:  Negative for  abdominal pain, diarrhea, nausea and vomiting.   Genitourinary:  Negative for dysuria and hematuria.   Musculoskeletal:  Positive for arthralgias and joint swelling.   Skin:  Positive for color change and wound.   Neurological:  Negative for dizziness and light-headedness.   Psychiatric/Behavioral:  Negative for agitation and confusion.    All other systems reviewed and are negative.  Objective:     Vital Signs (Most Recent):  Temp: 97.1 °F (36.2 °C) (04/20/23 0734)  Pulse: 82 (04/20/23 0734)  Resp: 18 (04/20/23 0851)  BP: (!) 159/67 (04/20/23 0734)  SpO2: 96 % (04/20/23 0734)   Vital Signs (24h Range):  Temp:  [97.1 °F (36.2 °C)-98.4 °F (36.9 °C)] 97.1 °F (36.2 °C)  Pulse:  [78-94] 82  Resp:  [16-20] 18  SpO2:  [88 %-100 %] 96 %  BP: (121-197)/(58-87) 159/67     Weight: 72.6 kg (160 lb)  Body mass index is 26.63 kg/m².    Intake/Output Summary (Last 24 hours) at 4/20/2023 1013  Last data filed at 4/20/2023 0530  Gross per 24 hour   Intake 1665.6 ml   Output 300 ml   Net 1365.6 ml      Physical Exam  Constitutional:       General: She is not in acute distress.     Appearance: She is well-developed and normal weight. She is not ill-appearing.   HENT:      Head: Normocephalic and atraumatic.      Mouth/Throat:      Mouth: Mucous membranes are dry.   Eyes:      Conjunctiva/sclera: Conjunctivae normal.      Pupils: Pupils are equal, round, and reactive to light.   Cardiovascular:      Rate and Rhythm: Normal rate and regular rhythm.      Pulses: faint left pedal.      Heart sounds: Normal heart sounds.   Pulmonary:      Effort: Pulmonary effort is normal.      Breath sounds: Normal breath sounds.   Abdominal:      General: Bowel sounds are normal. There is no distension.      Palpations: Abdomen is soft.      Tenderness: There is no abdominal tenderness.   Genitourinary:     Comments: Deferred    Musculoskeletal:         General: Normal range of motion.      Cervical back: Normal range of motion and neck supple.    Skin:     General: Skin is warm and dry.      Comments: Dressing to left foot CDI   Neurological:      General: No focal deficit present.      Mental Status: She is alert and oriented to person, place, and time.   Psychiatric:         Mood and Affect: Mood normal.         Behavior: Behavior normal.       Significant Labs: All pertinent labs within the past 24 hours have been reviewed.  CBC:   Recent Labs   Lab 04/19/23  1248 04/20/23  0443   WBC 13.00* 7.43   HGB 11.8* 10.2*   HCT 36.5* 32.2*    324     CMP:   Recent Labs   Lab 04/19/23  1248 04/20/23  0443   * 129*   K 5.0 4.3   CL 97 98   CO2 23 25   * 352*   BUN 14 15   CREATININE 0.8 0.8   CALCIUM 10.1 9.1   PROT 7.4  --    ALBUMIN 3.4*  --    BILITOT 0.6  --    ALKPHOS 114  --    AST 11  --    ALT 10  --    ANIONGAP 11 6*       Significant Imaging: I have reviewed all pertinent imaging results/findings within the past 24 hours.      Assessment/Plan:      * Diabetic foot ulcer  Consult podiatry  IV vancomycin/IV cefepime  NPO after MN unless otherwise directed by podiatry  Tight glucose control  Wound care consult  Trend CRP  Arterial studies BLE-SFA stenosis bilaterally; L popliteal stenosis per ultrasound.  CTA runoff ordered.  ID consult  Transfer for vascular services    Popliteal artery stenosis, left  Per ultrasound  CTA runoff  Transfer for vascular services      Sepsis  This patient does have evidence of infective focus  My overall impression is sepsis.  Source: Skin and Soft Tissue (location left foot)  Antibiotics given-   Antibiotics (72h ago, onward)      Start     Stop Route Frequency Ordered    04/20/23 0100  vancomycin 750 mg in dextrose 5 % (D5W) 250 mL IVPB (Vial-Mate)         -- IV Every 12 hours (non-standard times) 04/19/23 1603    04/19/23 1700  ceFEPIme (MAXIPIME) 2 g in dextrose 5 % in water (D5W) 5 % 50 mL IVPB (MB+)         -- IV Every 8 hours (non-standard times) 04/19/23 1551    04/19/23 1647  vancomycin -  pharmacy to dose  (vancomycin IVPB)        See Hyperspace for full Linked Orders Report.    -- IV pharmacy to manage frequency 04/19/23 1551          Latest lactate reviewed-  Recent Labs   Lab 04/19/23  1252   LACTATE 1.1     Organ dysfunction indicated by tachycardia    Fluid challenge Not needed - patient is not hypotensive      Post- resuscitation assessment No - Post resuscitation assessment not needed       Will Not start Pressors- Levophed for MAP of 65  Source control achieved by: IV abx/podiatry consult    Cellulitis of great toe of left foot  IV vanc/IV cefepime  Trend CRP  Check lactic acid-WNL      Nicotine dependence  Dangers of cigarette smoking were reviewed with patient in detail. Patient was Counseled for 3-10 minutes. Nicotine replacement options were discussed. Nicotine replacement was discussed- not prescribed per patient's request    Hypertension associated with type 2 diabetes mellitus  Chronic, controlled.  Latest blood pressure and vitals reviewed-   Temp:  [97.1 °F (36.2 °C)-98.4 °F (36.9 °C)]   Pulse:  [78-94]   Resp:  [16-20]   BP: (121-197)/(58-87)   SpO2:  [88 %-100 %] .   Home meds for hypertension were reviewed and noted below.   Hypertension Medications               lisinopriL 10 MG tablet Take 10 mg by mouth.            While in the hospital, will manage blood pressure as follows; Continue home antihypertensive regimen    Will utilize p.r.n. blood pressure medication only if patient's blood pressure greater than  180/110 and she develops symptoms such as worsening chest pain or shortness of breath.      Type 2 diabetes mellitus with hyperglycemia  Patient's FSGs are uncontrolled due to hyperglycemia on current medication regimen.  Last A1c reviewed-   Lab Results   Component Value Date    HGBA1C 8.9 (H) 02/17/2023     Most recent fingerstick glucose reviewed-   Recent Labs   Lab 04/19/23  1138 04/19/23  2046 04/20/23  0715   POCTGLUCOSE 231* 317* 285*     Current correctional scale   Medium  Increase anti-hyperglycemic dose as follows-   Antihyperglycemics (From admission, onward)      Start     Stop Route Frequency Ordered    04/20/23 2100  insulin detemir U-100 (Levemir) pen 17 Units         -- SubQ Nightly 04/20/23 1012    04/19/23 1738  insulin aspart U-100 pen 1-10 Units         -- SubQ Before meals & nightly PRN 04/19/23 1738          Hold Oral hypoglycemics while patient is in the hospital.        VTE Risk Mitigation (From admission, onward)           Ordered     IP VTE HIGH RISK PATIENT  Once         04/19/23 1738     Place sequential compression device  Until discontinued         04/19/23 1738     Reason for No Pharmacological VTE Prophylaxis  Once        Question:  Reasons:  Answer:  Physician Provided (leave comment)    04/19/23 1738                    Discharge Planning   CITLALI:      Code Status: Full Code   Is the patient medically ready for discharge?:     Reason for patient still in hospital (select all that apply): Patient trending condition, Laboratory test, Treatment, Imaging, Consult recommendations and Pending disposition                     RAMYA Mckeon  Department of Hospital Medicine   [facility]

## 2023-04-20 NOTE — NURSING
Informed Lalita Camarillo, NP via secure chat of patient and situation, isbar provided, explained to NP that patient has a UA ordered; however, patient is stating she is unable to void via bedpan, and that she is unable to ambulate to the bathroom right now due to diabetic foot ulcer pain, but would like to ambulate to the bathroom tomorrow. Informed NP that patient currently has a female external catheter in.  NP voiced understanding and stated that it was okay to wait to obtain the UA tomorrow whenever the patient gets up to the bathroom to void.

## 2023-04-20 NOTE — SUBJECTIVE & OBJECTIVE
Interval History: no acute events overnight.  Arterial ultrasound BLE reviewed.  Transfer for vascular services initiated as ultrasound showed high grade stenosis SFA bilaterally and popliteal on the left.  CTA with runoff ordered.  ID consulted for further recommendations.  Discussed possible transfer with patient and she is in agreement.                Review of Systems   Constitutional:  Negative for chills and fever.   HENT:  Negative for congestion and sore throat.    Respiratory:  Negative for cough and shortness of breath.    Cardiovascular:  Negative for chest pain and palpitations.   Gastrointestinal:  Negative for abdominal pain, diarrhea, nausea and vomiting.   Genitourinary:  Negative for dysuria and hematuria.   Musculoskeletal:  Positive for arthralgias and joint swelling.   Skin:  Positive for color change and wound.   Neurological:  Negative for dizziness and light-headedness.   Psychiatric/Behavioral:  Negative for agitation and confusion.    All other systems reviewed and are negative.  Objective:     Vital Signs (Most Recent):  Temp: 97.1 °F (36.2 °C) (04/20/23 0734)  Pulse: 82 (04/20/23 0734)  Resp: 18 (04/20/23 0851)  BP: (!) 159/67 (04/20/23 0734)  SpO2: 96 % (04/20/23 0734)   Vital Signs (24h Range):  Temp:  [97.1 °F (36.2 °C)-98.4 °F (36.9 °C)] 97.1 °F (36.2 °C)  Pulse:  [78-94] 82  Resp:  [16-20] 18  SpO2:  [88 %-100 %] 96 %  BP: (121-197)/(58-87) 159/67     Weight: 72.6 kg (160 lb)  Body mass index is 26.63 kg/m².    Intake/Output Summary (Last 24 hours) at 4/20/2023 1013  Last data filed at 4/20/2023 0530  Gross per 24 hour   Intake 1665.6 ml   Output 300 ml   Net 1365.6 ml      Physical Exam  Constitutional:       General: She is not in acute distress.     Appearance: She is well-developed and normal weight. She is not ill-appearing.   HENT:      Head: Normocephalic and atraumatic.      Mouth/Throat:      Mouth: Mucous membranes are dry.   Eyes:      Conjunctiva/sclera: Conjunctivae  normal.      Pupils: Pupils are equal, round, and reactive to light.   Cardiovascular:      Rate and Rhythm: Normal rate and regular rhythm.      Pulses: Normal pulses.      Heart sounds: Normal heart sounds.   Pulmonary:      Effort: Pulmonary effort is normal.      Breath sounds: Normal breath sounds.   Abdominal:      General: Bowel sounds are normal. There is no distension.      Palpations: Abdomen is soft.      Tenderness: There is no abdominal tenderness.   Genitourinary:     Comments: Deferred    Musculoskeletal:         General: Normal range of motion.      Cervical back: Normal range of motion and neck supple.   Skin:     General: Skin is warm and dry.      Comments: Dressing to left foot CDI   Neurological:      General: No focal deficit present.      Mental Status: She is alert and oriented to person, place, and time.   Psychiatric:         Mood and Affect: Mood normal.         Behavior: Behavior normal.       Significant Labs: All pertinent labs within the past 24 hours have been reviewed.  CBC:   Recent Labs   Lab 04/19/23  1248 04/20/23  0443   WBC 13.00* 7.43   HGB 11.8* 10.2*   HCT 36.5* 32.2*    324     CMP:   Recent Labs   Lab 04/19/23  1248 04/20/23  0443   * 129*   K 5.0 4.3   CL 97 98   CO2 23 25   * 352*   BUN 14 15   CREATININE 0.8 0.8   CALCIUM 10.1 9.1   PROT 7.4  --    ALBUMIN 3.4*  --    BILITOT 0.6  --    ALKPHOS 114  --    AST 11  --    ALT 10  --    ANIONGAP 11 6*       Significant Imaging: I have reviewed all pertinent imaging results/findings within the past 24 hours.

## 2023-04-20 NOTE — PLAN OF CARE
Pt educated on foods to avoid while trying to keep blood sugar under control.Pt educated on importance of proteins to aid in wound healing.Pt verbalized understanding of education.

## 2023-04-20 NOTE — CONSULTS
[facility]  Adult Nutrition  Consult Note    SUMMARY     Recommendations  1) Change diet to DM 1500 kcal, easy to chew  2) Send Boost glucose control  1 x daily + Gagandeep BID   3) weigh weekly   4) Nutrition education and handout given   5) Pt needs to purchase a glucometer for home    Goals: 1) PO Intakes > 75% of meals and supplements at f/u  Nutrition Goal Status: new  Communication of RD Recs:  (POC, sticky note)    Assessment and Plan     Altered nutrition related lab values  R/t inconsistent carb intakes, altered absorption of nutrients, infection  As evidenced by DM wounds, Asking nutrition related questions, and elevated glucose/A1C  Intervention: carb modified diet and nutrition supplement therapy  new    Malnutrition Assessment     Skin (Micronutrient):  (Uzair = 20,left great toe, left heel, left lower medial leg, left 5th toe. Scabs right lower medial leg, arms, sacrum, moisture associated dermatitis bilateral buttocks and sacral areas)  Teeth (Micronutrient):  (missing teeth- had dental appliance)   Micronutrient Evaluation Summary: suspected deficiency  Micronutrient Evaluation Comments: Na, check iron             Reason for Assessment    Reason For Assessment: consult  Diagnosis:  (diabetic ulcer)  Relevant Medical History: DM 2  Interdisciplinary Rounds: did not attend    General Information Comments: 74 y/o female admitted with cellulitis/sepsis r/t DM toe wound. Pt with multiple wounds s/p recent hip surgery and fall. A1C slightly elevated. We discussed how tighter blood sugar control could help with wound healing. PTA not able to move around and cook as much, pt prescribed 18 U levemir daily and Novolog sliding scale with meals, she takes 17 of the levemir and only sometimes takes her novolog, she does not know how to carb count but does avoid sugar. Blood sugar monitor broke a month ago. I gave pt written directions for purchasing a Reli-on glucometer and supplies from  "[employer] when she is discharged as her last one was provided through insurance. Educated pt on carb counting/plate method and tips for very little prep/healthy meals. teachback successful. NFPE WDL 23. Wt gain per chart review.    Nutrition Discharge Planning: To be determined- DM 1500 kcal diet + Gagandeep BID    Nutrition Risk Screen    Nutrition Risk Screen: large or nonhealing wound, burn or pressure injury    Nutrition/Diet History    Patient Reported Diet/Restrictions/Preferences: diabetic diet (no sugar added)  Spiritual, Cultural Beliefs, Sikh Practices, Values that Affect Care: no  Food Allergies: NKFA  Factors Affecting Nutritional Intake: None identified at this time (Ate most of her breakfast today)    Anthropometrics    Temp: 97.1 °F (36.2 °C)  Height Method: Measured (office 21)  Height: 5' 3.25" (office 21)  Height (inches): 63.25 in  Weight Method: Bed Scale  Weight: 72.5 kg (159 lb 13.3 oz)  Weight (lb): 159.84 lb  Ideal Body Weight (IBW), Female: 116.25 lb  % Ideal Body Weight, Female (lb): 127.87 %  BMI (Calculated): 26.6  BMI Grade: 25 - 29.9 - overweight  Usual Body Weight (UBW), k.9 kg (21)  % Usual Body Weight: 103.94  % Weight Change From Usual Weight: 3.72 %       Lab/Procedures/Meds    Pertinent Labs Reviewed: reviewed  BMP  Lab Results   Component Value Date     (L) 2023    K 4.3 2023    CL 98 2023    CO2 25 2023    BUN 15 2023    CREATININE 0.8 2023    CALCIUM 9.1 2023    ANIONGAP 6 (L) 2023    EGFRNORACEVR >60 2023     POCT Glucose   Date Value Ref Range Status   2023 199 (H) 70 - 110 mg/dL Final   2023 285 (H) 70 - 110 mg/dL Final   2023 317 (H) 70 - 110 mg/dL Final   2023 231 (H) 70 - 110 mg/dL Final     Lab Results   Component Value Date    HGBA1C 8.9 (H) 2023     Lab Results   Component Value Date    ALBUMIN 3.4 (L) 2023       Pertinent Medications Reviewed: " reviewed  Pertinent Medications Comments: insulin, vitamin C, Ca carbonate, senna, Vitamin D, zofran, Kbicarb, KNaPhos    Estimated/Assessed Needs    Weight Used For Calorie Calculations: 72.5 kg (159 lb 13.3 oz)  Energy Calorie Requirements (kcal): MSj ( x 1.2) = 1450 kcal  Energy Need Method: [place]-St Jeor  Protein Requirements: 1.2-1.5 g protein/kg ( wounds) =  g  Weight Used For Protein Calculations: 72.5 kg (159 lb 13.3 oz)  Fluid Requirements (mL): 1450 ml or per MD  Estimated Fluid Requirement Method: RDA Method  CHO Requirement: 163-199 g      Nutrition Prescription Ordered    Current Diet Order: DM 2000 kcal    Evaluation of Received Nutrient/Fluid Intake    Energy Calories Required: meeting needs  Protein Required: meeting needs  Fluid Required: meeting needs  Tolerance: tolerating - just admitted    Intake/Output Summary (Last 24 hours) at 4/20/2023 1324  Last data filed at 4/20/2023 1319  Gross per 24 hour   Intake 1875.37 ml   Output 300 ml   Net 1575.37 ml       % Intake of Estimated Energy Needs: 50 - 75 %  % Meal Intake: 50 - 75 %    Nutrition Risk    Level of Risk/Frequency of Follow-up:  (1 x weekly)       Monitor and Evaluation    Food and Nutrient Intake: energy intake, food and beverage intake  Food and Nutrient Adminstration: diet order  Anthropometric Measurements: weight  Biochemical Data, Medical Tests and Procedures: electrolyte and renal panel, glucose/endocrine profile  Nutrition-Focused Physical Findings: overall appearance, skin       Nutrition Follow-Up    RD Follow-up?: Yes

## 2023-04-20 NOTE — ASSESSMENT & PLAN NOTE
Patient's FSGs are uncontrolled due to hyperglycemia on current medication regimen.  Last A1c reviewed-   Lab Results   Component Value Date    HGBA1C 8.9 (H) 02/17/2023     Most recent fingerstick glucose reviewed-   Recent Labs   Lab 04/19/23  1138 04/19/23  2046 04/20/23  0715   POCTGLUCOSE 231* 317* 285*     Current correctional scale  Medium  Increase anti-hyperglycemic dose as follows-   Antihyperglycemics (From admission, onward)    Start     Stop Route Frequency Ordered    04/20/23 2100  insulin detemir U-100 (Levemir) pen 17 Units         -- SubQ Nightly 04/20/23 1012    04/19/23 1738  insulin aspart U-100 pen 1-10 Units         -- SubQ Before meals & nightly PRN 04/19/23 1738        Hold Oral hypoglycemics while patient is in the hospital.

## 2023-04-20 NOTE — NURSING
Pt  admitted to floor. Pt's wounds on left foot covered with new dressing applied in ER by Wound care nurse. Dressings are clean dry and intact. Pt states has pain and contacted for order for pain medication.Pt has daughter Adelaida at bedside.Daughter states her mom uses a wheelchair and walker at home since her hip surgery in which a denzel was inserted into her left femur. Pt is AAOX4 and answered all questions appropriately when asked. Charge called for dinner tray for pt.Pt denies additional needs at this time and handoff performed to Marie POTTER.

## 2023-04-20 NOTE — PLAN OF CARE
Problem: Adult Inpatient Plan of Care  Goal: Plan of Care Review  Outcome: Ongoing, Progressing     Problem: Diabetes Comorbidity  Goal: Blood Glucose Level Within Targeted Range  Outcome: Ongoing, Progressing     Problem: Adjustment to Illness (Sepsis/Septic Shock)  Goal: Optimal Coping  Outcome: Ongoing, Progressing     Problem: Infection Progression (Sepsis/Septic Shock)  Goal: Absence of Infection Signs and Symptoms  Outcome: Ongoing, Progressing     Problem: Nutrition Impaired (Sepsis/Septic Shock)  Goal: Optimal Nutrition Intake  Outcome: Ongoing, Progressing     Problem: Impaired Wound Healing  Goal: Optimal Wound Healing  Outcome: Ongoing, Progressing

## 2023-04-20 NOTE — ASSESSMENT & PLAN NOTE
Chronic, controlled.  Latest blood pressure and vitals reviewed-   Temp:  [97.1 °F (36.2 °C)-98.4 °F (36.9 °C)]   Pulse:  [78-94]   Resp:  [16-20]   BP: (121-197)/(58-87)   SpO2:  [88 %-100 %] .   Home meds for hypertension were reviewed and noted below.   Hypertension Medications             lisinopriL 10 MG tablet Take 10 mg by mouth.          While in the hospital, will manage blood pressure as follows; Continue home antihypertensive regimen    Will utilize p.r.n. blood pressure medication only if patient's blood pressure greater than  180/110 and she develops symptoms such as worsening chest pain or shortness of breath.

## 2023-04-20 NOTE — PROVIDER TRANSFER
Outside Transfer Acceptance Note / Regional Referral Center    Referring facility: [facility]   Referring provider: JUANY VILLEGAS  Accepting facility: [facility]  Accepting provider: [facility]- provider  Admitting provider: [facility]- provider  Reason for transfer:    Transfer diagnosis:   Transfer specialty requested: Vascular Surgery  Transfer specialty notified: yes  Transfer level: NUMBER 1-5: 2  Bed type requested: med/surg  Isolation status: No active isolations   Admission class or status: IP- Inpatient      Narrative     Ms. Anastasiia Badillo is a 73 year old woman with PMH of DM2, diabetic neuropathy, non-healing foot ulcers, PAD, tobacco use, and history of subtrochanteric femur fracture s/p intramedullary denzel (2/18/2023) who was admitted to [facility] on 4/19/2023 for evaluation and management of diabetic wounds of the left foot. Patient stated that she first noticed the wounds about 2 weeks prior to admission and they have been progressive worsening with increased redness on her left great toe for the last couple of days in addition to stabbing pain on both legs.  . Since her left femur surgery, she notes that she has been frequently rubbing her left lower leg against objects due to the post-op weakness. At baseline, she ambulates with a walker and usually uses a wheelchair to get around.She was evaluated by home health on 4/19/2023 and was instructed to go to the ED for further evaluation. Upon ED arrival, she was found to have WBC 13, ESR 75, . She was started on IV vancomycin and IV zosyn. She was admitted to Hospital Medicine and escalated from zosyn to cefepime. Arterial US BLE showed high grade stenosis SFA bilaterally and popliteal on the left. ID was consulted and recommended following up on CT angio run-off, ordering MRI left foot, continuing IV cefepime and vancomycin, and tight glucose control. ID also recommended Podiatry consult for I&D and  Wound care consult. Patient remains afebrile and hemodynamically stable at this time. WBC downtrended to 7.43 and glucose in the 200s. Blood cultures are NGTD and wound cultures are pending.     Given the patient's high grade stenosis on Arterial US BLE, Dr. Rojas ([facility]) contacted the [facility] to request a patient transfer for higher level of care due to need for Vascular Surgery consultation. The [facility] notified Dr. Guadarrama ([facility]-main Vascular surgery) and the case was discussed. Dr. Guadarrama agreed to consult on the patient with admission to Hospital Medicine service. Patient will be transferred to [facility] for admission to Hospital Medicine with Vascular surgery consultation for further evaluation and management of SFA stenosis and diabetic foot wound infection. Patient will require med/surg bed.     Objective     Vitals: Temp: 97.1 °F (36.2 °C) (04/20/23 0734)  Pulse: 80 (04/20/23 1153)  Resp: 18 (04/20/23 1153)  BP: (!) 164/70 (04/20/23 1153)  SpO2: 98 % (04/20/23 1153)  Recent Labs: A1C:   Recent Labs   Lab 02/17/23  1415   HGBA1C 8.9*     Blood Culture:   Recent Labs   Lab 04/19/23  1248 04/19/23  1252   LABBLOO No Growth to date No Growth to date     CBC:   Recent Labs   Lab 04/19/23  1248 04/20/23  0443   WBC 13.00* 7.43   HGB 11.8* 10.2*   HCT 36.5* 32.2*    324     CMP:   Recent Labs   Lab 04/19/23  1248 04/20/23  0443   * 129*   K 5.0 4.3   CL 97 98   CO2 23 25   * 352*   BUN 14 15   CREATININE 0.8 0.8   CALCIUM 10.1 9.1   PROT 7.4  --    ALBUMIN 3.4*  --    BILITOT 0.6  --    ALKPHOS 114  --    AST 11  --    ALT 10  --    ANIONGAP 11 6*     Coagulation:   Recent Labs   Lab 04/20/23  0443   INR 0.9   APTT 27.7     Lactic Acid:   Recent Labs   Lab 04/19/23  1252   LACTATE 1.1     Magnesium:   Recent Labs   Lab 04/20/23  0443   MG 1.6     POCT Glucose:   Recent Labs   Lab 04/19/23  2046 04/20/23  0715 04/20/23  1156   POCTGLUCOSE 317* 285* 199*     Troponin: No results for  input(s): TROPONINI, TROPONINIHS in the last 48 hours.  TSH:   Recent Labs   Lab 04/20/23  0443   TSH 1.169       Recent imaging:   Imaging Results              US Lower Extremity Arteries Bilateral (Final result)  Result time 04/19/23 18:23:27   Procedure changed from US Lower Extrem Arteries Bilat with BECKIE (xpd)     Final result by Belinda Parekh MD (04/19/23 18:23:27)                   Impression:      Focal high-grade stenosis within the bilateral mid SFAs and left popliteal artery.    Diffusely low velocities and monophasic waveforms in the bilateral calf arteries, likely due to upstream stenoses and/or vessel hardening such as from arteriosclerosis.      Electronically signed by: Belinda Parekh  Date:    04/19/2023  Time:    18:23               Narrative:    EXAMINATION:  US LOWER EXTREMITY ARTERIES BILATERAL    CLINICAL HISTORY:  diabetic foot ulcer;    TECHNIQUE:  Bilateral lower extremity arterial duplex ultrasound examination performed. Multiple gray scale and color doppler images were obtained in addition to waveform analysis.  Ankle-brachial indices not performed due to overlying wounds and bandages.    COMPARISON:  None    FINDINGS:  Morphologically normal left inguinal lymph nodes.    The peak systolic velocities on the right are as follows, in centimeters/second:    Common femoral artery: 146    Deep femoral artery: 89    Superficial femoral artery, proximal: 135    Superficial femoral artery, mid portion: 325, 141, 322    Superficial femoral artery, distal: 173    Popliteal artery: 117, monophasic    Posterior tibial artery: 55, monophasic    Anterior tibial artery: 38, monophasic    Peroneal artery: Unable to visualize    The peak systolic velocities on the left are as follows, in centimeters/second:    Common femoral artery: 146, monophasic    Deep femoral artery: 70    Superficial femoral artery, proximal: 116    Superficial femoral artery, mid portion: 212, 274    Superficial femoral artery,  distal: 107    Popliteal artery: 254, 385, 354, 139    Posterior tibial artery: 28    Anterior tibial artery: 76    Peroneal artery: 53    Monophasic waveforms throughout the left lower extremity arterial system.                                       X-Ray Foot Complete Left (Final result)  Result time 04/19/23 13:21:06      Final result by Ihsan Aguilar Jr., MD (04/19/23 13:21:06)                   Impression:      Soft tissue swelling of the forefoot.  Otherwise negative radiographs.      Electronically signed by: Ihsan Aguilar MD  Date:    04/19/2023  Time:    13:21               Narrative:    EXAMINATION:  XR FOOT COMPLETE 3 VIEW LEFT    CLINICAL HISTORY:  .  Type 2 diabetes mellitus with foot ulcer    TECHNIQUE:  AP, lateral and oblique views of the left foot were performed.    COMPARISON:  None    FINDINGS:  There is soft tissue swelling of the forefoot.  A foreign body is not identified.  On the provided images a fracture is not identified.                                       Airway:  Room air  Vent settings:  N/A       IV access:        Peripheral IV - Single Lumen 04/19/23 1312 Anterior;Right Forearm (Active)   Site Assessment Clean;Dry;Intact 04/20/23 0819   Extremity Assessment Distal to IV No abnormal discoloration;No redness;No swelling;No warmth 04/20/23 0819   Line Status Infusing 04/20/23 0819   Dressing Status Clean;Dry;Intact 04/20/23 0819   Dressing Intervention Integrity maintained 04/20/23 0819   Dressing Change Due 04/22/23 04/20/23 0819   Site Change Due 04/22/23 04/20/23 0819   Reason Not Rotated Not due 04/20/23 0819     Infusions: IV vancomycin and IV cefepime  Allergies: Review of patient's allergies indicates:  No Known Allergies   NPO: No    Anticoagulation:   Anticoagulants       None             Instructions      Dion Violety-  Admit to Hospital Medicine  Upon patient arrival to floor, please send SecureChat to [facility] P or call extension [phone] (if no answer, do NOT leave a  callback number after the beep, rather please send a SecureChat to [facility]), for Hospital Medicine admit team assignment and for additional admit orders for the patient.  Do not page the attending physician associated with the patient on arrival (this physician may not be on duty at the time of arrival).  Rather, always send a SecureChat to [facility] or call [phone] to reach the triage physician for orders and team assignment.

## 2023-04-20 NOTE — ASSESSMENT & PLAN NOTE
Consult podiatry  IV vancomycin/IV cefepime  NPO after MN unless otherwise directed by podiatry  Tight glucose control  Wound care consult  Trend CRP  Arterial studies BLE-SFA stenosis bilaterally; L popliteal stenosis per ultrasound.  CTA runoff ordered.  ID consult  Transfer for vascular services

## 2023-04-20 NOTE — RESPIRATORY THERAPY
04/19/23 1926   Patient Assessment/Suction   Level of Consciousness (AVPU) alert   Respiratory Effort Normal;Unlabored   Expansion/Accessory Muscles/Retractions expansion symmetric;no use of accessory muscles   PRE-TX-O2   Device (Oxygen Therapy) room air   SpO2 96 %   Pulse Oximetry Type Intermittent   $ Pulse Oximetry - Multiple Charge Pulse Oximetry - Multiple   Pulse 80   Resp 16   Aerosol Therapy   $ Aerosol Therapy Charges PRN treatment not required   Respiratory Treatment Status (SVN) PRN treatment not required

## 2023-04-20 NOTE — PLAN OF CARE
Problem: Physical Therapy  Goal: Physical Therapy Goal  Description: Goals to be met by: 23     Patient will increase functional independence with mobility by performin. Supine to sit with [place]  2. Sit to supine with [place]  3. Sit to stand transfer with Supervision  4. Bed to chair transfer with Supervision using Rolling Walker  5. Gait  x 25 feet with Supervision using Rolling Walker.     Outcome: Ongoing, Progressing

## 2023-04-20 NOTE — PT/OT/SLP EVAL
Physical Therapy Evaluation    Patient Name:  Anastasiia Badillo   MRN:  [id number]    Recommendations:     Discharge Recommendations: home, home health PT   Discharge Equipment Recommendations: none   Barriers to discharge: None    Assessment:     Anastasiia Badillo is a 73 y.o. female admitted with a medical diagnosis of Diabetic foot ulcer.  She presents with the following impairments/functional limitations: weakness, impaired endurance, impaired functional mobility, gait instability, impaired balance, decreased lower extremity function, pain, decreased ROM, impaired skin, edema .  Patient agreeable to PT evaluation but did refuse to attempt to stand due to wound on her right foot feeling like it would damage it further if she attempted to stand.  Patient presented supine in bed and required min assist to transfer to sitting.  Patient then able to sit EOB with independence but then required min assist with left LE to return to supine.      Rehab Prognosis: Good; patient would benefit from acute skilled PT services to address these deficits and reach maximum level of function.    Recent Surgery: * No surgery found *      Plan:     During this hospitalization, patient to be seen 6 x/week to address the identified rehab impairments via therapeutic activities, gait training, therapeutic exercises and progress toward the following goals:    Plan of Care Expires:  05/25/23    Subjective     Chief Complaint: fear of making wound on left LE worse  Patient/Family Comments/goals: get wound healed  Pain/Comfort:  Pain Rating 1: 4/10  Location - Side 1: Left  Location - Orientation 1: lower  Location 1: foot  Pain Addressed 1: Reposition, Cessation of Activity  Pain Rating Post-Intervention 1: 6/10    Patients cultural, spiritual, Latter day conflicts given the current situation:      Living Environment:  Currently lives in an apartment in an assisted living facility.    Prior to admission, patients level of function was  modified independent.  Equipment used at home: wheelchair, walker, rolling.  DME owned (not currently used): none.  Upon discharge, patient will have assistance from facility staff and family.    Objective:     Communicated with nurse prior to session.  Patient found supine with bed alarm  upon PT entry to room.    General Precautions: Standard, fall  Orthopedic Precautions:N/A   Braces:    Respiratory Status: Room air    Exams:  RLE ROM: WFL  RLE Strength: Deficits: 4/5 overall  LLE ROM: not tested  LLE Strength: Deficits: 3/5 overall    Functional Mobility:  Bed Mobility:     Supine to Sit: minimum assistance  Sit to Supine: minimum assistance  Balance: sitting EOB with independence      AM-PAC 6 CLICK MOBILITY  Total Score:18       Treatment & Education:  None given    Patient left supine with call button in reach, bed alarm on, and nurse notified.    GOALS:   Multidisciplinary Problems       Physical Therapy Goals          Problem: Physical Therapy    Goal Priority Disciplines Outcome Goal Variances Interventions   Physical Therapy Goal     PT, PT/OT Ongoing, Progressing     Description: Goals to be met by: 23     Patient will increase functional independence with mobility by performin. Supine to sit with [place]  2. Sit to supine with [place]  3. Sit to stand transfer with Supervision  4. Bed to chair transfer with Supervision using Rolling Walker  5. Gait  x 25 feet with Supervision using Rolling Walker.                          History:     Past Medical History:   Diagnosis Date    Diabetes mellitus, type 2        Past Surgical History:   Procedure Laterality Date    AUGMENTATION OF BREAST      INTRAMEDULLARY RODDING OF FEMUR Left 2023    Procedure: INSERTION, INTRAMEDULLARY ROXANNA, FEMUR (hana table -- synthes -- long nail -- have bovie and suction and large bone set);  Surgeon: Keanu Brand MD;  Location: [facility];  Service: Orthopedics;  Laterality: Left;       Time Tracking:      PT Received On: 04/20/23  PT Start Time: 0924     PT Stop Time: 0945  PT Total Time (min): 21 min     Billable Minutes: Evaluation 21 04/20/2023

## 2023-04-21 ENCOUNTER — HOSPITAL ENCOUNTER (INPATIENT)
Facility: HOSPITAL | Age: 73
LOS: 6 days | Discharge: HOME | DRG: 253 | End: 2023-04-27
Attending: INTERNAL MEDICINE | Admitting: HOSPITALIST
Payer: MEDICARE

## 2023-04-21 ENCOUNTER — DOCUMENT SCAN (OUTPATIENT)
Dept: HOME HEALTH SERVICES | Facility: HOSPITAL | Age: 73
End: 2023-04-21
Payer: MEDICARE

## 2023-04-21 VITALS
HEIGHT: 63 IN | WEIGHT: 159.81 LBS | TEMPERATURE: 98 F | RESPIRATION RATE: 18 BRPM | SYSTOLIC BLOOD PRESSURE: 164 MMHG | BODY MASS INDEX: 28.32 KG/M2 | HEART RATE: 79 BPM | DIASTOLIC BLOOD PRESSURE: 72 MMHG | OXYGEN SATURATION: 95 %

## 2023-04-21 DIAGNOSIS — R07.9 CHEST PAIN: ICD-10-CM

## 2023-04-21 DIAGNOSIS — I73.9 PAD (PERIPHERAL ARTERY DISEASE): Primary | ICD-10-CM

## 2023-04-21 DIAGNOSIS — M86.172 ACUTE OSTEOMYELITIS OF LEFT CALCANEUS: ICD-10-CM

## 2023-04-21 DIAGNOSIS — E11.621 DIABETIC FOOT ULCER: ICD-10-CM

## 2023-04-21 DIAGNOSIS — L97.509 DIABETIC FOOT ULCER: ICD-10-CM

## 2023-04-21 DIAGNOSIS — N63.0 MASS OF BREAST, UNSPECIFIED LATERALITY: ICD-10-CM

## 2023-04-21 DIAGNOSIS — I70.244 ATHEROSCLEROSIS OF NATIVE ARTERIES OF LEFT LEG WITH ULCERATION OF HEEL AND MIDFOOT: ICD-10-CM

## 2023-04-21 DIAGNOSIS — Z01.818 PRE-OP EVALUATION: ICD-10-CM

## 2023-04-21 PROBLEM — N28.9 KIDNEY LESION, NATIVE, RIGHT: Status: ACTIVE | Noted: 2023-04-21

## 2023-04-21 LAB
ANION GAP SERPL CALC-SCNC: 8 MMOL/L (ref 8–16)
BASOPHILS # BLD AUTO: 0.03 K/UL (ref 0–0.2)
BASOPHILS NFR BLD: 0.4 % (ref 0–1.9)
BUN SERPL-MCNC: 11 MG/DL (ref 8–23)
CALCIUM SERPL-MCNC: 9.3 MG/DL (ref 8.7–10.5)
CHLORIDE SERPL-SCNC: 100 MMOL/L (ref 95–110)
CO2 SERPL-SCNC: 24 MMOL/L (ref 23–29)
CREAT SERPL-MCNC: 0.7 MG/DL (ref 0.5–1.4)
CRP SERPL-MCNC: 82.7 MG/L (ref 0–8.2)
DIFFERENTIAL METHOD: ABNORMAL
EOSINOPHIL # BLD AUTO: 0.1 K/UL (ref 0–0.5)
EOSINOPHIL NFR BLD: 1.6 % (ref 0–8)
ERYTHROCYTE [DISTWIDTH] IN BLOOD BY AUTOMATED COUNT: 13.3 % (ref 11.5–14.5)
EST. GFR  (NO RACE VARIABLE): >60 ML/MIN/1.73 M^2
ESTIMATED AVG GLUCOSE: 189 MG/DL (ref 68–131)
GLUCOSE SERPL-MCNC: 230 MG/DL (ref 70–110)
HBA1C MFR BLD: 8.2 % (ref 4–5.6)
HCT VFR BLD AUTO: 31.5 % (ref 37–48.5)
HGB BLD-MCNC: 10 G/DL (ref 12–16)
IMM GRANULOCYTES # BLD AUTO: 0.04 K/UL (ref 0–0.04)
IMM GRANULOCYTES NFR BLD AUTO: 0.5 % (ref 0–0.5)
LYMPHOCYTES # BLD AUTO: 1.3 K/UL (ref 1–4.8)
LYMPHOCYTES NFR BLD: 16.5 % (ref 18–48)
MAGNESIUM SERPL-MCNC: 1.7 MG/DL (ref 1.6–2.6)
MCH RBC QN AUTO: 29.5 PG (ref 27–31)
MCHC RBC AUTO-ENTMCNC: 31.7 G/DL (ref 32–36)
MCV RBC AUTO: 93 FL (ref 82–98)
MONOCYTES # BLD AUTO: 0.6 K/UL (ref 0.3–1)
MONOCYTES NFR BLD: 7.4 % (ref 4–15)
NEUTROPHILS # BLD AUTO: 5.9 K/UL (ref 1.8–7.7)
NEUTROPHILS NFR BLD: 73.6 % (ref 38–73)
NRBC BLD-RTO: 0 /100 WBC
PLATELET # BLD AUTO: 311 K/UL (ref 150–450)
PMV BLD AUTO: 11.9 FL (ref 9.2–12.9)
POCT GLUCOSE: 157 MG/DL (ref 70–110)
POCT GLUCOSE: 179 MG/DL (ref 70–110)
POCT GLUCOSE: 282 MG/DL (ref 70–110)
POTASSIUM SERPL-SCNC: 4 MMOL/L (ref 3.5–5.1)
RBC # BLD AUTO: 3.39 M/UL (ref 4–5.4)
SODIUM SERPL-SCNC: 132 MMOL/L (ref 136–145)
VANCOMYCIN TROUGH SERPL-MCNC: 17.1 UG/ML (ref 10–22)
WBC # BLD AUTO: 8.06 K/UL (ref 3.9–12.7)

## 2023-04-21 PROCEDURE — 25000003 PHARM REV CODE 250: Performed by: NURSE PRACTITIONER

## 2023-04-21 PROCEDURE — 86140 C-REACTIVE PROTEIN: CPT | Performed by: NURSE PRACTITIONER

## 2023-04-21 PROCEDURE — 94761 N-INVAS EAR/PLS OXIMETRY MLT: CPT

## 2023-04-21 PROCEDURE — 25000003 PHARM REV CODE 250: Performed by: HOSPITALIST

## 2023-04-21 PROCEDURE — 83735 ASSAY OF MAGNESIUM: CPT | Performed by: NURSE PRACTITIONER

## 2023-04-21 PROCEDURE — 80202 ASSAY OF VANCOMYCIN: CPT | Performed by: HOSPITALIST

## 2023-04-21 PROCEDURE — 99900035 HC TECH TIME PER 15 MIN (STAT)

## 2023-04-21 PROCEDURE — 11000001 HC ACUTE MED/SURG PRIVATE ROOM

## 2023-04-21 PROCEDURE — 80048 BASIC METABOLIC PNL TOTAL CA: CPT | Performed by: NURSE PRACTITIONER

## 2023-04-21 PROCEDURE — 85025 COMPLETE CBC W/AUTO DIFF WBC: CPT | Performed by: NURSE PRACTITIONER

## 2023-04-21 PROCEDURE — 83036 HEMOGLOBIN GLYCOSYLATED A1C: CPT | Performed by: STUDENT IN AN ORGANIZED HEALTH CARE EDUCATION/TRAINING PROGRAM

## 2023-04-21 PROCEDURE — 36415 COLL VENOUS BLD VENIPUNCTURE: CPT | Performed by: STUDENT IN AN ORGANIZED HEALTH CARE EDUCATION/TRAINING PROGRAM

## 2023-04-21 PROCEDURE — 63600175 PHARM REV CODE 636 W HCPCS: Performed by: NURSE PRACTITIONER

## 2023-04-21 RX ADMIN — OXYCODONE HYDROCHLORIDE AND ACETAMINOPHEN 500 MG: 500 TABLET ORAL at 08:04

## 2023-04-21 RX ADMIN — CEFEPIME 2 G: 2 INJECTION, POWDER, FOR SOLUTION INTRAVENOUS at 12:04

## 2023-04-21 RX ADMIN — CEFEPIME 2 G: 2 INJECTION, POWDER, FOR SOLUTION INTRAVENOUS at 08:04

## 2023-04-21 RX ADMIN — CALCIUM CARBONATE (ANTACID) CHEW TAB 500 MG 1000 MG: 500 CHEW TAB at 08:04

## 2023-04-21 RX ADMIN — MELATONIN TAB 3 MG 9 MG: 3 TAB at 08:04

## 2023-04-21 RX ADMIN — HYDROCODONE BITARTRATE AND ACETAMINOPHEN 1 TABLET: 10; 325 TABLET ORAL at 03:04

## 2023-04-21 RX ADMIN — CEFEPIME 2 G: 2 INJECTION, POWDER, FOR SOLUTION INTRAVENOUS at 05:04

## 2023-04-21 RX ADMIN — Medication 1000 UNITS: at 08:04

## 2023-04-21 RX ADMIN — VANCOMYCIN HYDROCHLORIDE 750 MG: 750 INJECTION, POWDER, LYOPHILIZED, FOR SOLUTION INTRAVENOUS at 01:04

## 2023-04-21 RX ADMIN — DOCUSATE SODIUM AND SENNOSIDES 1 TABLET: 8.6; 5 TABLET, FILM COATED ORAL at 08:04

## 2023-04-21 RX ADMIN — LISINOPRIL 10 MG: 10 TABLET ORAL at 08:04

## 2023-04-21 RX ADMIN — INSULIN ASPART 6 UNITS: 100 INJECTION, SOLUTION INTRAVENOUS; SUBCUTANEOUS at 05:04

## 2023-04-21 RX ADMIN — HYDROCODONE BITARTRATE AND ACETAMINOPHEN 1 TABLET: 10; 325 TABLET ORAL at 09:04

## 2023-04-21 RX ADMIN — INSULIN ASPART 2 UNITS: 100 INJECTION, SOLUTION INTRAVENOUS; SUBCUTANEOUS at 11:04

## 2023-04-21 RX ADMIN — INSULIN ASPART 2 UNITS: 100 INJECTION, SOLUTION INTRAVENOUS; SUBCUTANEOUS at 08:04

## 2023-04-21 RX ADMIN — HYDROCODONE BITARTRATE AND ACETAMINOPHEN 1 TABLET: 10; 325 TABLET ORAL at 05:04

## 2023-04-21 NOTE — ASSESSMENT & PLAN NOTE
Patient's FSGs are uncontrolled due to hyperglycemia on current medication regimen.  Last A1c reviewed-   Lab Results   Component Value Date    HGBA1C 8.2 (H) 04/21/2023     Most recent fingerstick glucose reviewed-   Recent Labs   Lab 04/20/23  1704 04/20/23  2040 04/21/23  0743   POCTGLUCOSE 253* 250* 157*     Current correctional scale  Medium  Maintain anti-hyperglycemic dose as follows-   Antihyperglycemics (From admission, onward)    Start     Stop Route Frequency Ordered    04/20/23 2100  insulin detemir U-100 (Levemir) pen 17 Units         -- SubQ Nightly 04/20/23 1012    04/19/23 1738  insulin aspart U-100 pen 1-10 Units         -- SubQ Before meals & nightly PRN 04/19/23 1738        Hold Oral hypoglycemics while patient is in the hospital.     No

## 2023-04-21 NOTE — ASSESSMENT & PLAN NOTE
Chronic, controlled.  Latest blood pressure and vitals reviewed-   Temp:  [97.7 °F (36.5 °C)-98.4 °F (36.9 °C)]   Pulse:  [65-92]   Resp:  [17-18]   BP: (115-145)/(57-75)   SpO2:  [95 %-96 %] .   Home meds for hypertension were reviewed and noted below.   Hypertension Medications             lisinopriL 10 MG tablet Take 10 mg by mouth.          While in the hospital, will manage blood pressure as follows; Continue home antihypertensive regimen    Will utilize p.r.n. blood pressure medication only if patient's blood pressure greater than  180/110 and she develops symptoms such as worsening chest pain or shortness of breath.

## 2023-04-21 NOTE — SUBJECTIVE & OBJECTIVE
Interval History: no acute events overnight.  MRI left foot without evidence of osteomyelitis.  CTA reviewed.  Awaiting bed at [facility] for vascular services.      Review of Systems   Constitutional:  Negative for chills and fever.   HENT:  Negative for congestion and sore throat.    Respiratory:  Negative for cough and shortness of breath.    Cardiovascular:  Negative for chest pain and palpitations.   Gastrointestinal:  Negative for abdominal pain, diarrhea, nausea and vomiting.   Genitourinary:  Negative for dysuria and hematuria.   Musculoskeletal:  Positive for arthralgias and joint swelling.   Skin:  Positive for color change and wound.   Neurological:  Negative for dizziness and light-headedness.   Psychiatric/Behavioral:  Negative for agitation and confusion.    All other systems reviewed and are negative.  Objective:     Vital Signs (Most Recent):  Temp: 98.3 °F (36.8 °C) (04/21/23 0745)  Pulse: 79 (04/21/23 0745)  Resp: 18 (04/21/23 0942)  BP: (!) 130/58 (04/21/23 0745)  SpO2: 95 % (04/21/23 0745)   Vital Signs (24h Range):  Temp:  [97.7 °F (36.5 °C)-98.4 °F (36.9 °C)] 98.3 °F (36.8 °C)  Pulse:  [74-92] 79  Resp:  [17-18] 18  SpO2:  [95 %-98 %] 95 %  BP: (115-164)/(57-75) 130/58     Weight: 72.5 kg (159 lb 13.3 oz)  Body mass index is 28.09 kg/m².    Intake/Output Summary (Last 24 hours) at 4/21/2023 1100  Last data filed at 4/21/2023 0812  Gross per 24 hour   Intake 1039.77 ml   Output 1400 ml   Net -360.23 ml      Physical Exam  Constitutional:       Appearance: She is well-developed.   HENT:      Head: Normocephalic and atraumatic.   Eyes:      Conjunctiva/sclera: Conjunctivae normal.      Pupils: Pupils are equal, round, and reactive to light.   Cardiovascular:      Rate and Rhythm: Normal rate and regular rhythm.      Heart sounds: Normal heart sounds.   Pulmonary:      Effort: Pulmonary effort is normal.      Breath sounds: Normal breath sounds.   Abdominal:      General: Bowel sounds are normal.       Palpations: Abdomen is soft.   Musculoskeletal:         General: Normal range of motion.      Cervical back: Normal range of motion and neck supple.   Skin:     General: Skin is warm and dry.      Comments: Left foot dressing clean, dry and intact.   Neurological:      General: No focal deficit present.      Mental Status: She is alert and oriented to person, place, and time.   Psychiatric:         Mood and Affect: Mood normal.         Behavior: Behavior normal.         Thought Content: Thought content normal.       Significant Labs: All pertinent labs within the past 24 hours have been reviewed.  CBC:   Recent Labs   Lab 04/19/23  1248 04/20/23  0443 04/21/23  0429   WBC 13.00* 7.43 8.06   HGB 11.8* 10.2* 10.0*   HCT 36.5* 32.2* 31.5*    324 311     CMP:   Recent Labs   Lab 04/19/23  1248 04/20/23 0443 04/21/23  0429   * 129* 132*   K 5.0 4.3 4.0   CL 97 98 100   CO2 23 25 24   * 352* 230*   BUN 14 15 11   CREATININE 0.8 0.8 0.7   CALCIUM 10.1 9.1 9.3   PROT 7.4  --   --    ALBUMIN 3.4*  --   --    BILITOT 0.6  --   --    ALKPHOS 114  --   --    AST 11  --   --    ALT 10  --   --    ANIONGAP 11 6* 8       Significant Imaging: I have reviewed all pertinent imaging results/findings within the past 24 hours.  MRI left forefoot:    1. Skin ulcer along the plantar aspect of the great toe.  No subjacent osteomyelitis for abscess.  2. Organizing soft tissue infection/cellulitis along the great toe.  3. Dorsal foot soft tissue swelling is nonspecific.    CTA runoff abd pel BLE:  1. Right lower extremity: Focal stenosis of 60% in the mid SFA.  Focal stenosis of 60-70% stenosis distal SFA.  Several other sites of up to 50% stenosis in the SFA and popliteal.  High-grade stenosis resulting in diminished flow in the PTA distally.  Preserved flow in the other trifurcation vessels.  2. Left lower extremity: 60% stenosis mid SFA.  Focal 60-70% stenosis distal SFA.  Additional sites up to 50% stenosis  elsewhere in the SFA and popliteal.  Preserved flow in the trifurcation vessels.  3. Cardiomegaly.  4. Hyperdense lesion in the right kidney.  Recommend ultrasound to assess for solid mass as neoplasm is not excluded.

## 2023-04-21 NOTE — CARE UPDATE
04/20/23 1919   Patient Assessment/Suction   Level of Consciousness (AVPU) alert   Respiratory Effort Normal;Unlabored   Expansion/Accessory Muscles/Retractions no retractions;no use of accessory muscles   All Lung Fields Breath Sounds clear   Rhythm/Pattern, Respiratory no shortness of breath reported   Cough Frequency no cough   PRE-TX-O2   Device (Oxygen Therapy) room air   SpO2 96 %   Pulse Oximetry Type Intermittent   Aerosol Therapy   $ Aerosol Therapy Charges PRN treatment not required

## 2023-04-21 NOTE — PROGRESS NOTES
Pharmacokinetic Assessment Follow Up: IV Vancomycin    Vancomycin serum concentration assessment(s):    The trough level was drawn correctly and can be used to guide therapy at this time. The measurement is within the desired definitive target range of 15 to 20 mcg/mL.    Vancomycin Regimen Plan:    Continue regimen to Vancomycin 750  mg IV every 12 hours with next serum trough concentration measured at 0000 prior to fourth dose on 4/23/23    Drug levels (last 3 results):  Recent Labs   Lab Result Units 04/20/23  1144 04/21/23  1202   Vancomycin-Trough ug/mL 14.2 17.1       Pharmacy will continue to follow and monitor vancomycin.    Please contact pharmacy at extension [phone] for questions regarding this assessment.    Thank you for the consult,   Radha Gaston       Patient brief summary:  Anastasiia Badillo is a 73 y.o. female initiated on antimicrobial therapy with IV Vancomycin for treatment of skin & soft tissue infection      Drug Allergies:   Review of patient's allergies indicates:  No Known Allergies    Actual Body Weight:   72.5 kg    Renal Function:   Estimated Creatinine Clearance: 69.7 mL/min (based on SCr of 0.7 mg/dL).,     Dialysis Method (if applicable):  N/A    CBC (last 72 hours):  Recent Labs   Lab Result Units 04/19/23  1248 04/20/23  0443 04/21/23  0429   WBC K/uL 13.00* 7.43 8.06   Hemoglobin g/dL 11.8* 10.2* 10.0*   Hemoglobin A1C %  --   --  8.2*   Hematocrit % 36.5* 32.2* 31.5*   Platelets K/uL 342 324 311   Gran % % 82.7* 78.7* 73.6*   Lymph % % 11.5* 12.0* 16.5*   Mono % % 5.1 7.5 7.4   Eosinophil % % 0.1 0.9 1.6   Basophil % % 0.3 0.5 0.4   Differential Method  Automated Automated Automated       Metabolic Panel (last 72 hours):  Recent Labs   Lab Result Units 04/19/23  1248 04/20/23  0443 04/21/23  0429   Sodium mmol/L 131* 129* 132*   Potassium mmol/L 5.0 4.3 4.0   Chloride mmol/L 97 98 100   CO2 mmol/L 23 25 24   Glucose mg/dL 238* 352* 230*   BUN mg/dL 14 15 11   Creatinine mg/dL 0.8 0.8  0.7   Albumin g/dL 3.4*  --   --    Total Bilirubin mg/dL 0.6  --   --    Alkaline Phosphatase U/L 114  --   --    AST U/L 11  --   --    ALT U/L 10  --   --    Magnesium mg/dL  --  1.6 1.7       Vancomycin Administrations:  vancomycin given in the last 96 hours                     vancomycin 750 mg in dextrose 5 % (D5W) 250 mL IVPB (Vial-Mate) (mg) 750 mg New Bag 04/21/23 0107     750 mg New Bag 04/20/23 1222     750 mg New Bag  0149    vancomycin 1,500 mg in dextrose 5 % (D5W) 250 mL IVPB (Vial-Mate) (mg) 1,500 mg New Bag 04/19/23 1313                    Microbiologic Results:  Microbiology Results (last 7 days)       Procedure Component Value Units Date/Time    Aerobic culture [[id number]]  (Abnormal) Collected: 04/19/23 1752    Order Status: Completed Specimen: Wound from Toe, Left Foot Updated: 04/21/23 1038     Aerobic Bacterial Culture STAPHYLOCOCCUS AUREUS  Many  Susceptibility pending      Culture, Anaerobic [[id number]] Collected: 04/19/23 1752    Order Status: Completed Specimen: Wound from Toe, Left Foot Updated: 04/21/23 0742     Anaerobic Culture Culture in progress    Blood Culture #2 [[id number]] Collected: 04/19/23 1252    Order Status: Completed Specimen: Blood from Antecubital, Left Arm Updated: 04/20/23 2212     Blood Culture, Routine No Growth to date      No Growth to date    Blood Culture #1 [[id number]] Collected: 04/19/23 1248    Order Status: Completed Specimen: Blood from Antecubital, Right Arm Updated: 04/20/23 2212     Blood Culture, Routine No Growth to date      No Growth to date

## 2023-04-21 NOTE — PT/OT/SLP PROGRESS
Occupational Therapy      Patient Name:  Anastasiia Badillo   MRN:  [id number]    Patient not seen today secondary to patient declined OT.     4/21/2023

## 2023-04-21 NOTE — ASSESSMENT & PLAN NOTE
IV vanc/IV cefepime  Trend CRP  Check lactic acid-WNL  MRI without evidence of osteomyelitis     negative Patient-parent interaction appropriate

## 2023-04-21 NOTE — ASSESSMENT & PLAN NOTE
Consult podiatry  IV vancomycin/IV cefepime  NPO after MN unless otherwise directed by podiatry  Tight glucose control  Wound care consult  Trend CRP  Arterial studies BLE-SFA stenosis bilaterally; L popliteal stenosis per ultrasound.  CTA runoff ordered.  ID consult  Transfer for vascular services-awaiting bed at [facility]

## 2023-04-21 NOTE — PLAN OF CARE
Problem: Adult Inpatient Plan of Care  Goal: Plan of Care Review  Outcome: Ongoing, Progressing  Goal: Optimal Comfort and Wellbeing  Outcome: Ongoing, Progressing     Problem: Diabetes Comorbidity  Goal: Blood Glucose Level Within Targeted Range  Outcome: Ongoing, Progressing     Problem: Glycemic Control Impaired (Sepsis/Septic Shock)  Goal: Blood Glucose Level Within Desired Range  Outcome: Ongoing, Progressing     Problem: Infection Progression (Sepsis/Septic Shock)  Goal: Absence of Infection Signs and Symptoms  Outcome: Ongoing, Progressing     Problem: Nutrition Impaired (Sepsis/Septic Shock)  Goal: Optimal Nutrition Intake  Outcome: Ongoing, Progressing     Problem: Impaired Wound Healing  Goal: Optimal Wound Healing  Outcome: Ongoing, Progressing

## 2023-04-21 NOTE — CARE UPDATE
04/21/23 0736   Patient Assessment/Suction   Level of Consciousness (AVPU) alert   Respiratory Effort Unlabored;Normal   Expansion/Accessory Muscles/Retractions no use of accessory muscles;no retractions;expansion symmetric   All Lung Fields Breath Sounds Anterior:;Lateral:;clear   Rhythm/Pattern, Respiratory unlabored;pattern regular;depth regular;no shortness of breath reported   PRE-TX-O2   Device (Oxygen Therapy) room air   SpO2 95 %   Pulse Oximetry Type Intermittent   $ Pulse Oximetry - Multiple Charge Pulse Oximetry - Multiple   Pulse 77   Resp 18   Aerosol Therapy   $ Aerosol Therapy Charges PRN treatment not required   Respiratory Treatment Status (SVN) PRN treatment not required

## 2023-04-21 NOTE — PROGRESS NOTES
[facility] Hospital Medicine  Progress Note    Patient Name: Anastasiia Badillo  MRN: [id number]  Patient Class: IP- Inpatient   Admission Date: 4/19/2023  Length of Stay: 2 days  Attending Physician: Sania Soliman MD  Primary Care Provider: Raji Parkinson MD        Subjective:     Principal Problem:Diabetic foot ulcer        HPI:  Anastasiia Badillo is a 72 y/o F with a past medical history significant for DM2 who presented to the ED for further evaluation of two wounds to the left foot, one to the heel and one to the great toe.  She states they have been present for about 2 weeks, but are becoming worse and she noted some redness to the left great toe over the past couple of days.  She was seen by home health today and was directed to the ED for a wound check.  She denies ever having similar wounds, but she had an intramedullary denzel inserted into her left femur on 2/18/23 for a subtrochanteric fracture and has been rubbing her left lower leg against objects frequently due to the difficulty she is experiencing in moving her left leg since surgery.  While in the ED she was administered IV vancomycin and IV zosyn.  WBC is 13K.  She is admitted to the service of hospital medicine for continued monitoring and treatment.      Overview/Hospital Course:  No notes on file    Interval History: no acute events overnight.  MRI left foot without evidence of osteomyelitis.  CTA reviewed.  Awaiting bed at [facility] for vascular services.      Review of Systems   Constitutional:  Negative for chills and fever.   HENT:  Negative for congestion and sore throat.    Respiratory:  Negative for cough and shortness of breath.    Cardiovascular:  Negative for chest pain and palpitations.   Gastrointestinal:  Negative for abdominal pain, diarrhea, nausea and vomiting.   Genitourinary:  Negative for dysuria and hematuria.   Musculoskeletal:  Positive for arthralgias and joint swelling.   Skin:  Positive for color  change and wound.   Neurological:  Negative for dizziness and light-headedness.   Psychiatric/Behavioral:  Negative for agitation and confusion.    All other systems reviewed and are negative.  Objective:     Vital Signs (Most Recent):  Temp: 98.3 °F (36.8 °C) (04/21/23 0745)  Pulse: 79 (04/21/23 0745)  Resp: 18 (04/21/23 0942)  BP: (!) 130/58 (04/21/23 0745)  SpO2: 95 % (04/21/23 0745)   Vital Signs (24h Range):  Temp:  [97.7 °F (36.5 °C)-98.4 °F (36.9 °C)] 98.3 °F (36.8 °C)  Pulse:  [74-92] 79  Resp:  [17-18] 18  SpO2:  [95 %-98 %] 95 %  BP: (115-164)/(57-75) 130/58     Weight: 72.5 kg (159 lb 13.3 oz)  Body mass index is 28.09 kg/m².    Intake/Output Summary (Last 24 hours) at 4/21/2023 1100  Last data filed at 4/21/2023 0812  Gross per 24 hour   Intake 1039.77 ml   Output 1400 ml   Net -360.23 ml      Physical Exam  Constitutional:       Appearance: She is well-developed.   HENT:      Head: Normocephalic and atraumatic.   Eyes:      Conjunctiva/sclera: Conjunctivae normal.      Pupils: Pupils are equal, round, and reactive to light.   Cardiovascular:      Rate and Rhythm: Normal rate and regular rhythm.      Heart sounds: Normal heart sounds.   Pulmonary:      Effort: Pulmonary effort is normal.      Breath sounds: Normal breath sounds.   Abdominal:      General: Bowel sounds are normal.      Palpations: Abdomen is soft.   Musculoskeletal:         General: Normal range of motion.      Cervical back: Normal range of motion and neck supple.   Skin:     General: Skin is warm and dry.      Comments: Left foot dressing clean, dry and intact.   Neurological:      General: No focal deficit present.      Mental Status: She is alert and oriented to person, place, and time.   Psychiatric:         Mood and Affect: Mood normal.         Behavior: Behavior normal.         Thought Content: Thought content normal.       Significant Labs: All pertinent labs within the past 24 hours have been reviewed.  CBC:   Recent Labs   Lab  04/19/23  1248 04/20/23  0443 04/21/23  0429   WBC 13.00* 7.43 8.06   HGB 11.8* 10.2* 10.0*   HCT 36.5* 32.2* 31.5*    324 311     CMP:   Recent Labs   Lab 04/19/23  1248 04/20/23  0443 04/21/23  0429   * 129* 132*   K 5.0 4.3 4.0   CL 97 98 100   CO2 23 25 24   * 352* 230*   BUN 14 15 11   CREATININE 0.8 0.8 0.7   CALCIUM 10.1 9.1 9.3   PROT 7.4  --   --    ALBUMIN 3.4*  --   --    BILITOT 0.6  --   --    ALKPHOS 114  --   --    AST 11  --   --    ALT 10  --   --    ANIONGAP 11 6* 8       Significant Imaging: I have reviewed all pertinent imaging results/findings within the past 24 hours.  MRI left forefoot:    1. Skin ulcer along the plantar aspect of the great toe.  No subjacent osteomyelitis for abscess.  2. Organizing soft tissue infection/cellulitis along the great toe.  3. Dorsal foot soft tissue swelling is nonspecific.    CTA runoff abd pel BLE:  1. Right lower extremity: Focal stenosis of 60% in the mid SFA.  Focal stenosis of 60-70% stenosis distal SFA.  Several other sites of up to 50% stenosis in the SFA and popliteal.  High-grade stenosis resulting in diminished flow in the PTA distally.  Preserved flow in the other trifurcation vessels.  2. Left lower extremity: 60% stenosis mid SFA.  Focal 60-70% stenosis distal SFA.  Additional sites up to 50% stenosis elsewhere in the SFA and popliteal.  Preserved flow in the trifurcation vessels.  3. Cardiomegaly.  4. Hyperdense lesion in the right kidney.  Recommend ultrasound to assess for solid mass as neoplasm is not excluded.          Assessment/Plan:      * Diabetic foot ulcer  Consult podiatry  IV vancomycin/IV cefepime  NPO after MN unless otherwise directed by podiatry  Tight glucose control  Wound care consult  Trend CRP  Arterial studies BLE-SFA stenosis bilaterally; L popliteal stenosis per ultrasound.  CTA runoff ordered.  ID consult  Transfer for vascular services-awaiting bed at [facility]    Kidney lesion, native,  right  Incidental finding on CT-obtain renal ultrasound      Popliteal artery stenosis, left  Per ultrasound  CTA runoff  Transfer for vascular services      Sepsis  This patient does have evidence of infective focus  My overall impression is sepsis.  Source: Skin and Soft Tissue (location left foot)  Antibiotics given-   Antibiotics (72h ago, onward)    Start     Stop Route Frequency Ordered    04/20/23 0100  vancomycin 750 mg in dextrose 5 % (D5W) 250 mL IVPB (Vial-Mate)         -- IV Every 12 hours (non-standard times) 04/19/23 1603    04/19/23 1700  ceFEPIme (MAXIPIME) 2 g in dextrose 5 % in water (D5W) 5 % 50 mL IVPB (MB+)         -- IV Every 8 hours (non-standard times) 04/19/23 1551    04/19/23 1647  vancomycin - pharmacy to dose  (vancomycin IVPB)        See Hyperspace for full Linked Orders Report.    -- IV pharmacy to manage frequency 04/19/23 1551        Latest lactate reviewed-  Recent Labs   Lab 04/19/23  1252   LACTATE 1.1     Organ dysfunction indicated by tachycardia    Fluid challenge Not needed - patient is not hypotensive      Post- resuscitation assessment No - Post resuscitation assessment not needed       Will Not start Pressors- Levophed for MAP of 65  Source control achieved by: IV abx/podiatry consult    Cellulitis of great toe of left foot  IV vanc/IV cefepime  Trend CRP  Check lactic acid-WNL  MRI without evidence of osteomyelitis      Nicotine dependence  Dangers of cigarette smoking were reviewed with patient in detail. Patient was Counseled for 3-10 minutes. Nicotine replacement options were discussed. Nicotine replacement was discussed- not prescribed per patient's request    Hypertension associated with type 2 diabetes mellitus  Chronic, controlled.  Latest blood pressure and vitals reviewed-   Temp:  [97.7 °F (36.5 °C)-98.4 °F (36.9 °C)]   Pulse:  [65-92]   Resp:  [17-18]   BP: (115-145)/(57-75)   SpO2:  [95 %-96 %] .   Home meds for hypertension were reviewed and noted below.    Hypertension Medications             lisinopriL 10 MG tablet Take 10 mg by mouth.          While in the hospital, will manage blood pressure as follows; Continue home antihypertensive regimen    Will utilize p.r.n. blood pressure medication only if patient's blood pressure greater than  180/110 and she develops symptoms such as worsening chest pain or shortness of breath.      Type 2 diabetes mellitus with hyperglycemia  Patient's FSGs are uncontrolled due to hyperglycemia on current medication regimen.  Last A1c reviewed-   Lab Results   Component Value Date    HGBA1C 8.2 (H) 04/21/2023     Most recent fingerstick glucose reviewed-   Recent Labs   Lab 04/20/23  1704 04/20/23  2040 04/21/23  0743   POCTGLUCOSE 253* 250* 157*     Current correctional scale  Medium  Maintain anti-hyperglycemic dose as follows-   Antihyperglycemics (From admission, onward)    Start     Stop Route Frequency Ordered    04/20/23 2100  insulin detemir U-100 (Levemir) pen 17 Units         -- SubQ Nightly 04/20/23 1012    04/19/23 1738  insulin aspart U-100 pen 1-10 Units         -- SubQ Before meals & nightly PRN 04/19/23 1738        Hold Oral hypoglycemics while patient is in the hospital.        VTE Risk Mitigation (From admission, onward)         Ordered     IP VTE HIGH RISK PATIENT  Once         04/19/23 1738     Place sequential compression device  Until discontinued         04/19/23 1738     Reason for No Pharmacological VTE Prophylaxis  Once        Question:  Reasons:  Answer:  Physician Provided (leave comment)    04/19/23 1738                Discharge Planning   CITLALI:      Code Status: Full Code   Is the patient medically ready for discharge?:     Reason for patient still in hospital (select all that apply): Patient trending condition, Treatment and Pending disposition  Discharge Plan A: Home Health                  RAMYA Mckeon  Department of Hospital Medicine   [facility]

## 2023-04-21 NOTE — PLAN OF CARE
Received ambulance auth from [facility] for transfer. Auth for Manuel.. auth# [id number]

## 2023-04-21 NOTE — CLINICAL REVIEW
ID chart check  Wound cultures 4/19 grew Staph aureus, sensitivities pending   Blood cultures x 2 no growth to date, pending final  MRI no subjacent osteomyelitis, evidence of organizing cellulitis R great toe   CT angio runoff with b/l stenosis mid SFA  ->82.7  HbA1c 8.2%  Continue Vancomycin and Cefepime IV for now  Follow cultures  Awaiting transfer to [facility] for Vascular and Podiatry services

## 2023-04-21 NOTE — NURSING
Informed Lalita Camarillo NP of patient and situation, isbar provided, explained that patient has a nonhealing diabetic foot wound, and is being transferred to [facility] for stent placement to her leg; however, patient is not on any blood thinners, and wanted to clarify to make sure patient did not need to be on anything.  NP voiced understanding and stated that she will look through patient's chart.    2119-Provider responded no blood thinners for now.

## 2023-04-22 PROBLEM — L97.509 TYPE 2 DIABETES MELLITUS WITH FOOT ULCER, WITH LONG-TERM CURRENT USE OF INSULIN: Status: ACTIVE | Noted: 2023-02-17

## 2023-04-22 PROBLEM — Z79.4 TYPE 2 DIABETES MELLITUS WITH FOOT ULCER, WITH LONG-TERM CURRENT USE OF INSULIN: Status: ACTIVE | Noted: 2023-02-17

## 2023-04-22 PROBLEM — I73.9 PAD (PERIPHERAL ARTERY DISEASE): Status: ACTIVE | Noted: 2023-04-22

## 2023-04-22 PROBLEM — E11.621 TYPE 2 DIABETES MELLITUS WITH FOOT ULCER, WITH LONG-TERM CURRENT USE OF INSULIN: Status: ACTIVE | Noted: 2023-02-17

## 2023-04-22 PROBLEM — I70.244 ATHEROSCLEROSIS OF NATIVE ARTERIES OF LEFT LEG WITH ULCERATION OF HEEL AND MIDFOOT: Status: ACTIVE | Noted: 2023-04-22

## 2023-04-22 LAB
ALBUMIN SERPL BCP-MCNC: 2.5 G/DL (ref 3.5–5.2)
ALP SERPL-CCNC: 89 U/L (ref 55–135)
ALT SERPL W/O P-5'-P-CCNC: <5 U/L (ref 10–44)
ANION GAP SERPL CALC-SCNC: 9 MMOL/L (ref 8–16)
AST SERPL-CCNC: 9 U/L (ref 10–40)
BACTERIA SPEC AEROBE CULT: ABNORMAL
BASOPHILS # BLD AUTO: 0.06 K/UL (ref 0–0.2)
BASOPHILS NFR BLD: 0.8 % (ref 0–1.9)
BILIRUB SERPL-MCNC: 0.3 MG/DL (ref 0.1–1)
BUN SERPL-MCNC: 11 MG/DL (ref 8–23)
CALCIUM SERPL-MCNC: 9.4 MG/DL (ref 8.7–10.5)
CHLORIDE SERPL-SCNC: 104 MMOL/L (ref 95–110)
CO2 SERPL-SCNC: 23 MMOL/L (ref 23–29)
CREAT SERPL-MCNC: 0.7 MG/DL (ref 0.5–1.4)
DIFFERENTIAL METHOD: ABNORMAL
EOSINOPHIL # BLD AUTO: 0.2 K/UL (ref 0–0.5)
EOSINOPHIL NFR BLD: 2.3 % (ref 0–8)
ERYTHROCYTE [DISTWIDTH] IN BLOOD BY AUTOMATED COUNT: 13.1 % (ref 11.5–14.5)
EST. GFR  (NO RACE VARIABLE): >60 ML/MIN/1.73 M^2
GLUCOSE SERPL-MCNC: 106 MG/DL (ref 70–110)
HCT VFR BLD AUTO: 34 % (ref 37–48.5)
HGB BLD-MCNC: 11 G/DL (ref 12–16)
IMM GRANULOCYTES # BLD AUTO: 0.04 K/UL (ref 0–0.04)
IMM GRANULOCYTES NFR BLD AUTO: 0.5 % (ref 0–0.5)
LYMPHOCYTES # BLD AUTO: 1.6 K/UL (ref 1–4.8)
LYMPHOCYTES NFR BLD: 21.9 % (ref 18–48)
MAGNESIUM SERPL-MCNC: 1.8 MG/DL (ref 1.6–2.6)
MCH RBC QN AUTO: 30.1 PG (ref 27–31)
MCHC RBC AUTO-ENTMCNC: 32.4 G/DL (ref 32–36)
MCV RBC AUTO: 93 FL (ref 82–98)
MONOCYTES # BLD AUTO: 0.6 K/UL (ref 0.3–1)
MONOCYTES NFR BLD: 8 % (ref 4–15)
NEUTROPHILS # BLD AUTO: 4.9 K/UL (ref 1.8–7.7)
NEUTROPHILS NFR BLD: 66.5 % (ref 38–73)
NRBC BLD-RTO: 0 /100 WBC
PHOSPHATE SERPL-MCNC: 3.6 MG/DL (ref 2.7–4.5)
PLATELET # BLD AUTO: 337 K/UL (ref 150–450)
PMV BLD AUTO: 11.6 FL (ref 9.2–12.9)
POCT GLUCOSE: 194 MG/DL (ref 70–110)
POCT GLUCOSE: 80 MG/DL (ref 70–110)
POCT GLUCOSE: 81 MG/DL (ref 70–110)
POCT GLUCOSE: 87 MG/DL (ref 70–110)
POTASSIUM SERPL-SCNC: 4.2 MMOL/L (ref 3.5–5.1)
PROT SERPL-MCNC: 5.9 G/DL (ref 6–8.4)
RBC # BLD AUTO: 3.65 M/UL (ref 4–5.4)
SODIUM SERPL-SCNC: 136 MMOL/L (ref 136–145)
WBC # BLD AUTO: 7.35 K/UL (ref 3.9–12.7)

## 2023-04-22 PROCEDURE — 80053 COMPREHEN METABOLIC PANEL: CPT

## 2023-04-22 PROCEDURE — 99223 1ST HOSP IP/OBS HIGH 75: CPT | Mod: ,,, | Performed by: SURGERY

## 2023-04-22 PROCEDURE — 99223 PR INITIAL HOSPITAL CARE,LEVL III: ICD-10-PCS | Mod: ,,, | Performed by: SURGERY

## 2023-04-22 PROCEDURE — 25000003 PHARM REV CODE 250

## 2023-04-22 PROCEDURE — 99223 PR INITIAL HOSPITAL CARE,LEVL III: ICD-10-PCS | Mod: ,,, | Performed by: INTERNAL MEDICINE

## 2023-04-22 PROCEDURE — 11000001 HC ACUTE MED/SURG PRIVATE ROOM

## 2023-04-22 PROCEDURE — 36415 COLL VENOUS BLD VENIPUNCTURE: CPT

## 2023-04-22 PROCEDURE — 85025 COMPLETE CBC W/AUTO DIFF WBC: CPT

## 2023-04-22 PROCEDURE — 25000003 PHARM REV CODE 250: Performed by: INTERNAL MEDICINE

## 2023-04-22 PROCEDURE — 84100 ASSAY OF PHOSPHORUS: CPT

## 2023-04-22 PROCEDURE — 99223 1ST HOSP IP/OBS HIGH 75: CPT | Mod: ,,, | Performed by: INTERNAL MEDICINE

## 2023-04-22 PROCEDURE — 63600175 PHARM REV CODE 636 W HCPCS

## 2023-04-22 PROCEDURE — 25000003 PHARM REV CODE 250: Performed by: STUDENT IN AN ORGANIZED HEALTH CARE EDUCATION/TRAINING PROGRAM

## 2023-04-22 PROCEDURE — 83735 ASSAY OF MAGNESIUM: CPT

## 2023-04-22 PROCEDURE — 99223 1ST HOSP IP/OBS HIGH 75: CPT | Mod: ,,, | Performed by: PHYSICIAN ASSISTANT

## 2023-04-22 PROCEDURE — 99223 PR INITIAL HOSPITAL CARE,LEVL III: ICD-10-PCS | Mod: ,,, | Performed by: PHYSICIAN ASSISTANT

## 2023-04-22 RX ORDER — NALOXONE HCL 0.4 MG/ML
0.02 VIAL (ML) INJECTION
Status: DISCONTINUED | OUTPATIENT
Start: 2023-04-22 | End: 2023-04-27 | Stop reason: HOSPADM

## 2023-04-22 RX ORDER — SODIUM CHLORIDE 0.9 % (FLUSH) 0.9 %
10 SYRINGE (ML) INJECTION EVERY 12 HOURS PRN
Status: DISCONTINUED | OUTPATIENT
Start: 2023-04-22 | End: 2023-04-27 | Stop reason: HOSPADM

## 2023-04-22 RX ORDER — TALC
9 POWDER (GRAM) TOPICAL NIGHTLY PRN
Status: DISCONTINUED | OUTPATIENT
Start: 2023-04-22 | End: 2023-04-27 | Stop reason: HOSPADM

## 2023-04-22 RX ORDER — LISINOPRIL 10 MG/1
10 TABLET ORAL DAILY
Status: DISCONTINUED | OUTPATIENT
Start: 2023-04-22 | End: 2023-04-27 | Stop reason: HOSPADM

## 2023-04-22 RX ORDER — CEFADROXIL 1000 MG/1
1 TABLET ORAL EVERY 12 HOURS
Status: DISCONTINUED | OUTPATIENT
Start: 2023-04-22 | End: 2023-04-27 | Stop reason: HOSPADM

## 2023-04-22 RX ORDER — ONDANSETRON 8 MG/1
8 TABLET, ORALLY DISINTEGRATING ORAL EVERY 8 HOURS PRN
Status: DISCONTINUED | OUTPATIENT
Start: 2023-04-22 | End: 2023-04-27 | Stop reason: HOSPADM

## 2023-04-22 RX ORDER — ACETAMINOPHEN 325 MG/1
650 TABLET ORAL EVERY 4 HOURS PRN
Status: DISCONTINUED | OUTPATIENT
Start: 2023-04-22 | End: 2023-04-27 | Stop reason: HOSPADM

## 2023-04-22 RX ORDER — HYDROCODONE BITARTRATE AND ACETAMINOPHEN 10; 325 MG/1; MG/1
1 TABLET ORAL EVERY 6 HOURS PRN
Status: DISCONTINUED | OUTPATIENT
Start: 2023-04-22 | End: 2023-04-22

## 2023-04-22 RX ORDER — ATORVASTATIN CALCIUM 40 MG/1
40 TABLET, FILM COATED ORAL NIGHTLY
Status: DISCONTINUED | OUTPATIENT
Start: 2023-04-22 | End: 2023-04-23

## 2023-04-22 RX ORDER — TALC
6 POWDER (GRAM) TOPICAL NIGHTLY PRN
Status: DISCONTINUED | OUTPATIENT
Start: 2023-04-22 | End: 2023-04-22

## 2023-04-22 RX ORDER — HYDROCODONE BITARTRATE AND ACETAMINOPHEN 7.5; 325 MG/1; MG/1
1 TABLET ORAL EVERY 6 HOURS PRN
Status: DISCONTINUED | OUTPATIENT
Start: 2023-04-22 | End: 2023-04-27

## 2023-04-22 RX ORDER — INSULIN ASPART 100 [IU]/ML
1-10 INJECTION, SOLUTION INTRAVENOUS; SUBCUTANEOUS EVERY 6 HOURS PRN
Status: DISCONTINUED | OUTPATIENT
Start: 2023-04-22 | End: 2023-04-27 | Stop reason: HOSPADM

## 2023-04-22 RX ORDER — ENOXAPARIN SODIUM 100 MG/ML
40 INJECTION SUBCUTANEOUS EVERY 24 HOURS
Status: DISCONTINUED | OUTPATIENT
Start: 2023-04-22 | End: 2023-04-24

## 2023-04-22 RX ORDER — GLUCAGON 1 MG
1 KIT INJECTION
Status: DISCONTINUED | OUTPATIENT
Start: 2023-04-22 | End: 2023-04-27 | Stop reason: HOSPADM

## 2023-04-22 RX ORDER — HYDROCODONE BITARTRATE AND ACETAMINOPHEN 10; 325 MG/1; MG/1
1 TABLET ORAL EVERY 6 HOURS PRN
Status: DISCONTINUED | OUTPATIENT
Start: 2023-04-22 | End: 2023-04-27

## 2023-04-22 RX ADMIN — LISINOPRIL 10 MG: 10 TABLET ORAL at 09:04

## 2023-04-22 RX ADMIN — ATORVASTATIN CALCIUM 40 MG: 40 TABLET, FILM COATED ORAL at 08:04

## 2023-04-22 RX ADMIN — CEFEPIME 2 G: 2 INJECTION, POWDER, FOR SOLUTION INTRAVENOUS at 01:04

## 2023-04-22 RX ADMIN — HYDROCODONE BITARTRATE AND ACETAMINOPHEN 1 TABLET: 7.5; 325 TABLET ORAL at 01:04

## 2023-04-22 RX ADMIN — CEFADROXIL 1 G: 1000 TABLET ORAL at 08:04

## 2023-04-22 RX ADMIN — HYDROCODONE BITARTRATE AND ACETAMINOPHEN 1 TABLET: 7.5; 325 TABLET ORAL at 03:04

## 2023-04-22 RX ADMIN — ENOXAPARIN SODIUM 40 MG: 40 INJECTION SUBCUTANEOUS at 04:04

## 2023-04-22 RX ADMIN — HYDROCODONE BITARTRATE AND ACETAMINOPHEN 1 TABLET: 7.5; 325 TABLET ORAL at 09:04

## 2023-04-22 RX ADMIN — CEFEPIME 2 G: 2 INJECTION, POWDER, FOR SOLUTION INTRAVENOUS at 09:04

## 2023-04-22 RX ADMIN — Medication 9 MG: at 01:04

## 2023-04-22 RX ADMIN — VANCOMYCIN HYDROCHLORIDE 750 MG: 750 INJECTION, POWDER, LYOPHILIZED, FOR SOLUTION INTRAVENOUS at 03:04

## 2023-04-22 NOTE — ASSESSMENT & PLAN NOTE
Patient started on vanc/cefepime at OSH w/ ID consult. Initial WCx w/ staph aureus, susceptibilities pending.  -f/u cultures  -continue vanc/cefepime for now  -ID consult, appreciate recs

## 2023-04-22 NOTE — ASSESSMENT & PLAN NOTE
"See on CTA. F/u US showing "2.3 cm hypoechoic mass of the upper pole of the right kidney may represent a bloody cyst but follow-up is recommended"  -refer to PCP for OP follow up    "

## 2023-04-22 NOTE — H&P
[facility]  History & Physical    Patient Name: Anastasiia Badillo  MRN: [id number]  Patient Class: IP- Inpatient  Admission Date: 4/21/2023  Attending Physician: Linh Cantor MD   Primary Care Provider: Raji Parkinson MD         Patient information was obtained from patient, past medical records and ER records.     Subjective:     Principal Problem:PAD (peripheral artery disease)    Chief Complaint: No chief complaint on file.       HPI: Ms. Badillo is a 74 y/o F with a past medical history significant for DM2, HTN, recent femur fracture, and tobacco use who presented to [facility] ED for L foot wounds.  She states they have been present for about 2 weeks, but are becoming worse. She noted some redness to the left great toe over the past couple of days. She was seen by home health who told her to come to the ED for a wound check. She denies ever having similar wounds, but she had an intramedullary denzel inserted into her left femur on 2/18/23 for a subtrochanteric fracture and has been rubbing her left lower leg against objects frequently due to the difficulty she is experiencing in moving her left leg since surgery. She was found to have WBC 13, ESR 75, . She was given 1x vancomycin and zosyn. She was admitted to Hospital Medicine service. Started on vanc/cefepime. Arterial US BLE showed high grade stenosis SFA bilaterally and popliteal on the left. ID was consulted. Wound cx grew staph aureus, susceptibility pending. CTA w/ runoff showed RLE: 60% and greater stenosis of mid&distal SFA and high-grade stenosis distal PTA; LLE: 60% and greater stenosis mid&distal SFA. She also had a retroperitoneal US which showed 2.3 cm hypoechoic mass of the upper pole of the right kidney may represent a bloody cyst. Patient [place] [facility] [place].      Past Medical History:   Diagnosis Date    Diabetes mellitus, type 2        Past Surgical History:   Procedure Laterality Date     AUGMENTATION OF BREAST      INTRAMEDULLARY RODDING OF FEMUR Left 2/18/2023    Procedure: INSERTION, INTRAMEDULLARY ROXANNA, FEMUR (hana table -- synthes -- long nail -- have bovie and suction and large bone set);  Surgeon: Keanu Brand MD;  Location: [facility], [facility]–1983;  Service: Orthopedics;  Laterality: Left;       Review of patient's allergies indicates:  No Known Allergies    Current Facility-Administered Medications on File Prior to Encounter   Medication    [DISCONTINUED] acetaminophen tablet 650 mg    [DISCONTINUED] aluminum-magnesium hydroxide-simethicone 200-200-20 mg/5 mL suspension 30 mL    [DISCONTINUED] ascorbic acid (vitamin C) tablet 500 mg    [DISCONTINUED] calcium carbonate 200 mg calcium (500 mg) chewable tablet 1,000 mg    [DISCONTINUED] ceFEPIme (MAXIPIME) 2 g in dextrose 5 % in water (D5W) 5 % 50 mL IVPB (MB+)    [DISCONTINUED] dextrose 10% bolus 125 mL 125 mL    [DISCONTINUED] dextrose 10% bolus 250 mL 250 mL    [DISCONTINUED] dextrose 40 % gel 15,000 mg    [DISCONTINUED] dextrose 40 % gel 30,000 mg    [DISCONTINUED] glucagon (human recombinant) injection 1 mg    [DISCONTINUED] HYDROcodone-acetaminophen  mg per tablet 1 tablet    [DISCONTINUED] HYDROcodone-acetaminophen 7.5-325 mg per tablet 1 tablet    [DISCONTINUED] insulin aspart U-100 pen 1-10 Units    [DISCONTINUED] insulin detemir U-100 (Levemir) pen 17 Units    [DISCONTINUED] ipratropium 0.02 % nebulizer solution 0.5 mg    [DISCONTINUED] levalbuterol nebulizer solution 1.25 mg    [DISCONTINUED] lisinopriL tablet 10 mg    [DISCONTINUED] magnesium oxide tablet 800 mg    [DISCONTINUED] magnesium oxide tablet 800 mg    [DISCONTINUED] melatonin tablet 9 mg    [DISCONTINUED] naloxone 0.4 mg/mL injection 0.02 mg    [DISCONTINUED] ondansetron injection 4 mg    [DISCONTINUED] potassium bicarbonate disintegrating tablet 35 mEq    [DISCONTINUED] potassium bicarbonate disintegrating tablet 50 mEq    [DISCONTINUED]  potassium bicarbonate disintegrating tablet 60 mEq    [DISCONTINUED] potassium, sodium phosphates 280-160-250 mg packet 2 packet    [DISCONTINUED] potassium, sodium phosphates 280-160-250 mg packet 2 packet    [DISCONTINUED] potassium, sodium phosphates 280-160-250 mg packet 2 packet    [DISCONTINUED] senna-docusate 8.6-50 mg per tablet 1 tablet    [DISCONTINUED] simethicone chewable tablet 160 mg    [DISCONTINUED] sodium chloride 0.9% flush 10 mL    [DISCONTINUED] vancomycin - pharmacy to dose    [DISCONTINUED] vancomycin 750 mg in dextrose 5 % (D5W) 250 mL IVPB (Vial-Mate)    [DISCONTINUED] vitamin D 1000 units tablet 1,000 Units     Current Outpatient Medications on File Prior to Encounter   Medication Sig    ascorbic acid, vitamin C, (VITAMIN C) 500 MG tablet Take 500 mg by mouth once daily.    calcium carbonate (OS-DAGMAR) 600 mg calcium (1,500 mg) Tab Take 600 mg by mouth 2 (two) times daily with meals.    cholecalciferol, vitamin D3, 10 mcg (400 unit) Chew Take by mouth 2 (two) times a day.    cinnamon bark 500 mg capsule Take 500 mg by mouth once daily.    HYDROcodone-acetaminophen (NORCO) 5-325 mg per tablet Take 1 tablet by mouth every 6 (six) hours as needed for Pain.    insulin aspart U-100 (NOVOLOG) 100 unit/mL (3 mL) InPn pen Inject 0-5 Units into the skin before meals and at bedtime as needed (Hyperglycemia). Blood Glucose  mg/dL                  Pre-meal                2200  151-200                0 unit                      0 unit  201-250                2 units                    1 unit  251-300                3 units                    1 unit  301-350                4 units                    2 units  >350                     5 units                    3 units    insulin detemir U-100 (LEVEMIR FLEXTOUCH) 100 unit/mL (3 mL) SubQ InPn pen Inject 18 Units into the skin once daily.    lisinopriL 10 MG tablet Take 10 mg by mouth.    loperamide (IMODIUM) 2 mg capsule Take 2 mg by mouth 4  "(four) times daily as needed for Diarrhea.    melatonin 10 mg Cap Take 1 capsule by mouth every evening.    multivit-min-FA-lycopen-lutein 0.4 mg-300 mcg- 250 mcg Tab Take 1 tablet by mouth once daily.    pen needle, diabetic (PEN NEEDLE) 31 gauge x 3/16" Ndle 1 application by Misc.(Non-Drug; Combo Route) route 2 (two) times a day.    ondansetron (ZOFRAN) 4 MG tablet Take by mouth.    UNABLE TO FIND Take 1 capsule by mouth daily as needed. Beet Root     Family History       Problem Relation (Age of Onset)    Breast cancer Sister    Cancer Mother, Sister    Cataracts Mother    Heart disease Mother, Father, Sister, Brother    Hypertension Father          Tobacco Use    Smoking status: Every Day     Packs/day: 0.25     Years: 45.00     Pack years: 11.25     Types: Cigarettes    Smokeless tobacco: Never   Substance and Sexual Activity    Alcohol use: Yes    Drug use: Never    Sexual activity: Not on file     Review of Systems   Constitutional:  Negative for chills, fatigue and fever.   Respiratory:  Negative for cough and shortness of breath.    Cardiovascular:  Positive for leg swelling. Negative for chest pain.   Gastrointestinal:  Negative for abdominal pain and nausea.   Genitourinary:  Negative for dysuria and frequency.   Musculoskeletal:  Positive for gait problem. Negative for myalgias.   Skin:  Positive for color change and wound.   Objective:     Vital Signs (Most Recent):  Temp: 98 °F (36.7 °C) (04/21/23 2353)  Pulse: 64 (04/21/23 2353)  Resp: 19 (04/21/23 2353)  BP: (!) 152/69 (04/21/23 2353)  SpO2: 97 % (04/21/23 2353)   Vital Signs (24h Range):  Temp:  [97.7 °F (36.5 °C)-98.5 °F (36.9 °C)] 98 °F (36.7 °C)  Pulse:  [64-87] 64  Resp:  [17-19] 19  SpO2:  [95 %-97 %] 97 %  BP: (130-175)/(58-72) 152/69     Weight: 76.1 kg (167 lb 12.3 oz)  Body mass index is 29.72 kg/m².    Physical Exam  Vitals and nursing note reviewed.   Constitutional:       General: She is not in acute distress.     Appearance: " Normal appearance.   HENT:      Mouth/Throat:      Mouth: Mucous membranes are moist.      Pharynx: Oropharynx is clear.   Cardiovascular:      Rate and Rhythm: Normal rate and regular rhythm.      Heart sounds: No murmur heard.  Pulmonary:      Effort: Pulmonary effort is normal.      Breath sounds: Normal breath sounds. No wheezing or rales.   Abdominal:      General: Abdomen is flat. Bowel sounds are normal. There is no distension.      Palpations: Abdomen is soft.      Tenderness: There is no abdominal tenderness.   Musculoskeletal:         General: Tenderness (LLE) present. No swelling.   Skin:     General: Skin is warm.      Capillary Refill: Capillary refill takes less than 2 seconds.      Findings: Lesion (see media tab, ulcers on LLE, non-healing abrashions LLE, RLE w/ heel callous) present.   Neurological:      General: No focal deficit present.      Mental Status: She is alert and oriented to person, place, and time.   Psychiatric:         Mood and Affect: Mood normal.         Behavior: Behavior normal.           Significant Labs: All pertinent labs within the past 24 hours have been reviewed.  BMP:   Recent Labs   Lab 04/21/23  0429   *   *   K 4.0      CO2 24   BUN 11   CREATININE 0.7   CALCIUM 9.3   MG 1.7     CBC:   Recent Labs   Lab 04/20/23  0443 04/21/23  0429   WBC 7.43 8.06   HGB 10.2* 10.0*   HCT 32.2* 31.5*    311     Lab Results   Component Value Date    CRP 82.7 (H) 04/21/2023       Significant Imaging: I have reviewed all pertinent imaging results/findings within the past 24 hours.    Assessment/Plan:     * PAD (peripheral artery disease)  Patient w/ hx diabetes, tobacco use who presents w/ non-healing ulcers of LLE. CTA showing stenosis of bl SFA , bl popliteal.  -vascular surgery consulted, appreciate recs  -glucose control  -likely need DAPT, will defer until after possible intervention  -lipitor qhs      Kidney lesion, native, right  See on CTA. F/u US showing  ""2.3 cm hypoechoic mass of the upper pole of the right kidney may represent a bloody cyst but follow-up is recommended"  -refer to PCP for OP follow up      Cellulitis of great toe of left foot  See diabetic foot ulcer      Diabetic foot ulcer  Patient started on vanc/cefepime at OSH w/ ID consult. Initial WCx w/ staph aureus, susceptibilities pending.  -f/u cultures  -continue vanc/cefepime for now  -ID consult, appreciate recs    Nicotine dependence  Dangers of cigarette smoking were reviewed with patient in detail. Patient was Counseled for 3-10 minutes. Nicotine replacement options were discussed. Nicotine replacement was discussed- not prescribed per patient's request    Essential (primary) hypertension  -lisinopril 10 daily      Type 2 diabetes mellitus with foot ulcer, with long-term current use of insulin  Patient's FSGs are uncontrolled due to hyperglycemia on current medication regimen.  Last A1c reviewed-   Lab Results   Component Value Date    HGBA1C 8.2 (H) 04/21/2023     Most recent fingerstick glucose reviewed-   Recent Labs   Lab 04/21/23  0743 04/21/23  1102 04/21/23  1619   POCTGLUCOSE 157* 179* 282*     Current correctional scale  Medium  Maintain anti-hyperglycemic dose as follows-   Antihyperglycemics (From admission, onward)    Start     Stop Route Frequency Ordered    04/22/23 0900  insulin detemir U-100 (Levemir) pen 9 Units         -- SubQ Daily 04/22/23 0049    04/22/23 0148  insulin aspart U-100 pen 1-10 Units         -- SubQ Every 6 hours PRN 04/22/23 0049        Hold Oral hypoglycemics while patient is in the hospital.      VTE Risk Mitigation (From admission, onward)         Ordered     enoxaparin injection 40 mg  Daily         04/22/23 0022     IP VTE HIGH RISK PATIENT  Once         04/22/23 0022     Place sequential compression device  Until discontinued         04/22/23 0022                           Dick Lerner MD  Department of Hospital Medicine  [facility]  "

## 2023-04-22 NOTE — ASSESSMENT & PLAN NOTE
Patient's FSGs are uncontrolled due to hyperglycemia on current medication regimen.  Last A1c reviewed-   Lab Results   Component Value Date    HGBA1C 8.2 (H) 04/21/2023     Most recent fingerstick glucose reviewed-   Recent Labs   Lab 04/21/23  0743 04/21/23  1102 04/21/23  1619   POCTGLUCOSE 157* 179* 282*     Current correctional scale  Medium  Maintain anti-hyperglycemic dose as follows-   Antihyperglycemics (From admission, onward)    Start     Stop Route Frequency Ordered    04/22/23 0900  insulin detemir U-100 (Levemir) pen 9 Units         -- SubQ Daily 04/22/23 0049    04/22/23 0148  insulin aspart U-100 pen 1-10 Units         -- SubQ Every 6 hours PRN 04/22/23 0049        Hold Oral hypoglycemics while patient is in the hospital.

## 2023-04-22 NOTE — CONSULTS
[facility] - [facility]  Vascular Surgery  Consult Note    Inpatient consult to Vascular Surgery  Consult performed by: Darron Petty MD  Consult ordered by: Dick Lerner MD        Subjective:     Chief Complaint/Reason for Admission: PAD with ulcer lle    History of Present Illness: 74 yo female with DM2, HTN presenting as a transfer for vascular surgery consultation for her Left foot ulcers. Patient is a long time smoker and currently smoked. Patient has had wounds on her left foot for a couple of months. She walks with a walker at home, she can not walk up a flight of stairs.       Medications Prior to Admission   Medication Sig Dispense Refill Last Dose    ascorbic acid, vitamin C, (VITAMIN C) 500 MG tablet Take 500 mg by mouth once daily.   4/21/2023    calcium carbonate (OS-DAGMAR) 600 mg calcium (1,500 mg) Tab Take 600 mg by mouth 2 (two) times daily with meals.   4/21/2023    cholecalciferol, vitamin D3, 10 mcg (400 unit) Chew Take by mouth 2 (two) times a day.   4/21/2023    cinnamon bark 500 mg capsule Take 500 mg by mouth once daily.   4/21/2023    HYDROcodone-acetaminophen (NORCO) 5-325 mg per tablet Take 1 tablet by mouth every 6 (six) hours as needed for Pain. 21 tablet 0 4/21/2023    insulin aspart U-100 (NOVOLOG) 100 unit/mL (3 mL) InPn pen Inject 0-5 Units into the skin before meals and at bedtime as needed (Hyperglycemia). Blood Glucose  mg/dL                  Pre-meal                2200  151-200                0 unit                      0 unit  201-250                2 units                    1 unit  251-300                3 units                    1 unit  301-350                4 units                    2 units  >350                     5 units                    3 units  0 4/21/2023    insulin detemir U-100 (LEVEMIR FLEXTOUCH) 100 unit/mL (3 mL) SubQ InPn pen Inject 18 Units into the skin once daily.  0 4/21/2023    lisinopriL 10 MG tablet Take 10 mg by mouth.   4/21/2023     "loperamide (IMODIUM) 2 mg capsule Take 2 mg by mouth 4 (four) times daily as needed for Diarrhea.   4/21/2023    melatonin 10 mg Cap Take 1 capsule by mouth every evening.   4/21/2023    multivit-min-FA-lycopen-lutein 0.4 mg-300 mcg- 250 mcg Tab Take 1 tablet by mouth once daily.   4/21/2023    pen needle, diabetic (PEN NEEDLE) 31 gauge x 3/16" Ndle 1 application by Misc.(Non-Drug; Combo Route) route 2 (two) times a day. 200 each 0 4/21/2023    ondansetron (ZOFRAN) 4 MG tablet Take by mouth.       UNABLE TO FIND Take 1 capsule by mouth daily as needed. Beet Root          Review of patient's allergies indicates:  No Known Allergies    Past Medical History:   Diagnosis Date    Diabetes mellitus, type 2      Past Surgical History:   Procedure Laterality Date    AUGMENTATION OF BREAST      INTRAMEDULLARY RODDING OF FEMUR Left 2/18/2023    Procedure: INSERTION, INTRAMEDULLARY ROXANNA, FEMUR ([place] table -- synthes -- long nail -- have bovie and suction and large bone set);  Surgeon: Keanu Brand MD;  Location: [facility];  Service: Orthopedics;  Laterality: Left;     Family History       Problem Relation (Age of Onset)    Breast cancer Sister    Cancer Mother, Sister    Cataracts Mother    Heart disease Mother, Father, Sister, Brother    Hypertension Father          Tobacco Use    Smoking status: Every Day     Packs/day: 0.25     Years: 45.00     Pack years: 11.25     Types: Cigarettes    Smokeless tobacco: Never   Substance and Sexual Activity    Alcohol use: Yes    Drug use: Never    Sexual activity: Not on file     Review of Systems   Constitutional: Negative.    All other systems reviewed and are negative.  Objective:     Vital Signs (Most Recent):  Temp: 97.4 °F (36.3 °C) (04/22/23 0800)  Pulse: 65 (04/22/23 0800)  Resp: 16 (04/22/23 0902)  BP: (!) 188/80 (04/22/23 0800)  SpO2: 95 % (04/22/23 0800)   Vital Signs (24h Range):  Temp:  [97.4 °F (36.3 °C)-98.5 °F (36.9 °C)] 97.4 °F (36.3 °C)  Pulse:  [64-87] " 65  Resp:  [16-19] 16  SpO2:  [95 %-97 %] 95 %  BP: (146-188)/(69-80) 188/80     Weight: 76.1 kg (167 lb 12.3 oz)  Body mass index is 29.72 kg/m².    Physical Exam  Vitals reviewed.   Constitutional:       Appearance: Normal appearance.   Cardiovascular:      Comments: 1+ femoral pulses bilateral  DP and PT monophasic on the right  AT and PT are monophasic on the left.   Pulmonary:      Effort: Pulmonary effort is normal.   Abdominal:      General: Abdomen is flat.   Skin:     General: Skin is warm and dry.      Comments: Non healing heel wound and toe wounds dry gangrene LLE   Neurological:      General: No focal deficit present.      Mental Status: She is alert and oriented to person, place, and time.       Significant Labs:  CBC:   Recent Labs   Lab 04/22/23  0602   WBC 7.35   RBC 3.65*   HGB 11.0*   HCT 34.0*      MCV 93   MCH 30.1   MCHC 32.4     CMP:   Recent Labs   Lab 04/22/23  0602      CALCIUM 9.4   ALBUMIN 2.5*   PROT 5.9*      K 4.2   CO2 23      BUN 11   CREATININE 0.7   ALKPHOS 89   ALT <5*   AST 9*   BILITOT 0.3                 Significant Diagnostics:  U/S: US Retroperitoneal Complete    Result Date: 4/21/2023  2.3 cm hypoechoic mass of the upper pole of the right kidney may represent a bloody cyst but follow-up is recommended.  Elevated resistive index from the right kidney consistent with intrinsic renal disease.  Distended bladder noted Electronically signed by: Ihsan Aguilar MD Date:    04/21/2023 Time:    17:03     Assessment/Plan:     Diabetic foot ulcer  -- Smoking cessation  -- DM control  -- ASA  -- plavix  -- Statin  -- wound care  -- podiatry consult  -- BECKIE/TBI  -- float heels  -- Angiogram for Tuesday  -- NPO  -- shelly  -- consent  -- MRI to evaluate left foot.         Thank you for your consult. I will follow-up with patient. Please contact us if you have any additional questions.    Darron Petty MD  Vascular Surgery  [facility]

## 2023-04-22 NOTE — HPI
72 yo female with DM2, HTN presenting as a transfer for vascular surgery consultation for her Left foot ulcers. Patient is a long time smoker and currently smoked. Patient has had wounds on her left foot for a couple of months. She walks with a walker at home, she can not walk up a flight of stairs.

## 2023-04-22 NOTE — SUBJECTIVE & OBJECTIVE
Past Medical History:   Diagnosis Date    Diabetes mellitus, type 2        Past Surgical History:   Procedure Laterality Date    AUGMENTATION OF BREAST      INTRAMEDULLARY RODDING OF FEMUR Left 2/18/2023    Procedure: INSERTION, INTRAMEDULLARY ROXANNA, FEMUR (hana table -- synthes -- long nail -- have bovie and suction and large bone set);  Surgeon: Keanu Brand MD;  Location: [facility];  Service: Orthopedics;  Laterality: Left;       Review of patient's allergies indicates:  No Known Allergies    Medications:  Medications Prior to Admission   Medication Sig    ascorbic acid, vitamin C, (VITAMIN C) 500 MG tablet Take 500 mg by mouth once daily.    calcium carbonate (OS-DAGMAR) 600 mg calcium (1,500 mg) Tab Take 600 mg by mouth 2 (two) times daily with meals.    cholecalciferol, vitamin D3, 10 mcg (400 unit) Chew Take by mouth 2 (two) times a day.    cinnamon bark 500 mg capsule Take 500 mg by mouth once daily.    HYDROcodone-acetaminophen (NORCO) 5-325 mg per tablet Take 1 tablet by mouth every 6 (six) hours as needed for Pain.    insulin aspart U-100 (NOVOLOG) 100 unit/mL (3 mL) InPn pen Inject 0-5 Units into the skin before meals and at bedtime as needed (Hyperglycemia). Blood Glucose  mg/dL                  Pre-meal                2200  151-200                0 unit                      0 unit  201-250                2 units                    1 unit  251-300                3 units                    1 unit  301-350                4 units                    2 units  >350                     5 units                    3 units    insulin detemir U-100 (LEVEMIR FLEXTOUCH) 100 unit/mL (3 mL) SubQ InPn pen Inject 18 Units into the skin once daily.    lisinopriL 10 MG tablet Take 10 mg by mouth.    loperamide (IMODIUM) 2 mg capsule Take 2 mg by mouth 4 (four) times daily as needed for Diarrhea.    melatonin 10 mg Cap Take 1 capsule by mouth every evening.    multivit-min-FA-lycopen-lutein 0.4 mg-300 mcg- 250 mcg Tab Take  "1 tablet by mouth once daily.    pen needle, diabetic (PEN NEEDLE) 31 gauge x 3/16" Ndle 1 application by Misc.(Non-Drug; Combo Route) route 2 (two) times a day.    ondansetron (ZOFRAN) 4 MG tablet Take by mouth.    UNABLE TO FIND Take 1 capsule by mouth daily as needed. Beet Root     Antibiotics (From admission, onward)      Start     Stop Route Frequency Ordered    04/22/23 0300  vancomycin 750 mg in dextrose 5 % (D5W) 250 mL IVPB (Vial-Mate)         -- IV Every 12 hours (non-standard times) 04/22/23 0116          Antifungals (From admission, onward)      None          Antivirals (From admission, onward)      None             Immunization History   Administered Date(s) Administered    Influenza 09/17/2013    PPD Test 02/19/2023    Pneumococcal Conjugate - 13 Valent 11/07/2019    Pneumococcal Polysaccharide - 23 Valent 12/06/2017    Tdap 12/06/2017       Family History       Problem Relation (Age of Onset)    Breast cancer Sister    Cancer Mother, Sister    Cataracts Mother    Heart disease Mother, Father, Sister, Brother    Hypertension Father          Social History     Socioeconomic History    Marital status:    Tobacco Use    Smoking status: Every Day     Packs/day: 0.25     Years: 45.00     Pack years: 11.25     Types: Cigarettes    Smokeless tobacco: Never   Substance and Sexual Activity    Alcohol use: Yes    Drug use: Never   Social History Narrative    ** Merged History Encounter **          Social Determinants of Health     Financial Resource Strain: Low Risk     Difficulty of Paying Living Expenses: Not hard at all   Food Insecurity: No Food Insecurity    Worried About Running Out of Food in the Last Year: Never true    Ran Out of Food in the Last Year: Never true   Transportation Needs: No Transportation Needs    Lack of Transportation (Medical): No    Lack of Transportation (Non-Medical): No   Physical Activity: Unknown    Days of Exercise per Week: Patient refused    Minutes of Exercise per " Session: Patient refused   Stress: Unknown    Feeling of Stress : Patient refused   Social Connections: Unknown    Frequency of Communication with Friends and Family: Patient refused    Frequency of Social Gatherings with Friends and Family: Patient refused    Attends Religion Services: Patient refused    Active Member of Clubs or Organizations: Patient refused    Attends Club or Organization Meetings: Patient refused    Marital Status: Patient refused   Housing Stability: Unknown    Unable to Pay for Housing in the Last Year: No    Unstable Housing in the Last Year: No     Review of Systems   Constitutional:  Positive for fatigue. Negative for chills, diaphoresis and fever.   Respiratory:  Negative for cough and shortness of breath.    Gastrointestinal:  Negative for abdominal pain, diarrhea, nausea and vomiting.   Genitourinary:  Negative for dysuria.   Musculoskeletal:  Negative for back pain.   Skin:  Positive for color change and wound. Negative for pallor and rash.   All other systems reviewed and are negative.  Objective:     Vital Signs (Most Recent):  Temp: 97.7 °F (36.5 °C) (04/22/23 1159)  Pulse: 64 (04/22/23 1159)  Resp: 16 (04/22/23 1159)  BP: (!) 155/68 (04/22/23 1159)  SpO2: 96 % (04/22/23 1159)   Vital Signs (24h Range):  Temp:  [97.4 °F (36.3 °C)-98.5 °F (36.9 °C)] 97.7 °F (36.5 °C)  Pulse:  [64-87] 64  Resp:  [16-19] 16  SpO2:  [95 %-97 %] 96 %  BP: (146-188)/(68-80) 155/68     Weight: 76.1 kg (167 lb 12.3 oz)  Body mass index is 29.72 kg/m².    Estimated Creatinine Clearance: 71 mL/min (based on SCr of 0.7 mg/dL).    Physical Exam  Vitals and nursing note reviewed.   Constitutional:       General: She is not in acute distress.     Appearance: She is well-developed. She is not diaphoretic.   HENT:      Head: Normocephalic and atraumatic.   Eyes:      Pupils: Pupils are equal, round, and reactive to light.   Cardiovascular:      Rate and Rhythm: Normal rate and regular rhythm.      Heart sounds:  Normal heart sounds. No murmur heard.    No friction rub. No gallop.   Pulmonary:      Effort: Pulmonary effort is normal. No respiratory distress.      Breath sounds: Normal breath sounds. No wheezing or rales.   Chest:      Chest wall: No tenderness.   Abdominal:      General: Bowel sounds are normal. There is no distension.      Palpations: Abdomen is soft. There is no mass.      Tenderness: There is no abdominal tenderness. There is no guarding or rebound.      Hernia: No hernia is present.   Musculoskeletal:         General: No tenderness or deformity. Normal range of motion.      Cervical back: Normal range of motion and neck supple.   Skin:     General: Skin is warm and dry.      Coloration: Skin is not pale.      Findings: No erythema.      Comments: Left hallux wound and left heel wound c/d/I    Neurological:      Mental Status: She is alert and oriented to person, place, and time.      Cranial Nerves: No cranial nerve deficit.      Coordination: Coordination normal.   Psychiatric:         Behavior: Behavior normal.         Thought Content: Thought content normal.             Significant Labs: All pertinent labs within the past 24 hours have been reviewed.    Significant Imaging: I have reviewed all pertinent imaging results/findings within the past 24 hours.

## 2023-04-22 NOTE — ASSESSMENT & PLAN NOTE
Patient w/ hx diabetes, tobacco use who presents w/ non-healing ulcers of LLE. CTA showing stenosis of bl SFA , bl popliteal.  -vascular surgery consulted, appreciate recs  -glucose control  -likely need DAPT, will defer until after possible intervention  -lipitor qhs

## 2023-04-22 NOTE — HPI
72y/o female with DMII with neuropathy, tobacco abuse, left hip fracture s/p intramedullary denzel presents to ED at OSH for worsening wounds of her left hallux and left heel. MRI negative for OM however vascular studies + significant stenosis of SFA and left popliteal artery bilaterally. She was transferred to [facility] for vascular and podiatry evaluation. She was seen by ID and was on empiric vancomycin and cefepime. Wound cultures from left hallux +S.aureus. blood cultures NGTD. Pt remained afebrile, stable.

## 2023-04-22 NOTE — HPI
Ms. Badillo is a 74 y/o F with a past medical history significant for DM2, HTN, recent femur fracture, and tobacco use who presented to [facility] ED for L foot wounds.  She states they have been present for about 2 weeks, but are becoming worse. She noted some redness to the left great toe over the past couple of days. She was seen by home health who told her to come to the ED for a wound check. She denies ever having similar wounds, but she had an intramedullary denzel inserted into her left femur on 2/18/23 for a subtrochanteric fracture and has been rubbing her left lower leg against objects frequently due to the difficulty she is experiencing in moving her left leg since surgery. She was found to have WBC 13, ESR 75, . She was given 1x vancomycin and zosyn. She was admitted to Hospital Medicine service. Started on vanc/cefepime. Arterial US BLE showed high grade stenosis SFA bilaterally and popliteal on the left. ID was consulted. Wound cx grew staph aureus, susceptibility pending. CTA w/ runoff showed RLE: 60% and greater stenosis of mid&distal SFA and high-grade stenosis distal PTA; LLE: 60% and greater stenosis mid&distal SFA. She also had a retroperitoneal US which showed 2.3 cm hypoechoic mass of the upper pole of the right kidney may represent a bloody cyst. Patient transferred [place] [facility] for vascular surgery eval.

## 2023-04-22 NOTE — PLAN OF CARE
Problem: Adult Inpatient Plan of Care  Goal: Plan of Care Review  Outcome: Ongoing, Progressing     Problem: Diabetes Comorbidity  Goal: Blood Glucose Level Within Targeted Range  Outcome: Ongoing, Progressing     Problem: Adjustment to Illness (Sepsis/Septic Shock)  Goal: Optimal Coping  Outcome: Ongoing, Progressing     Problem: Glycemic Control Impaired (Sepsis/Septic Shock)  Goal: Blood Glucose Level Within Desired Range  Outcome: Ongoing, Progressing     Problem: Infection Progression (Sepsis/Septic Shock)  Goal: Absence of Infection Signs and Symptoms  Outcome: Ongoing, Progressing     Problem: Nutrition Impaired (Sepsis/Septic Shock)  Goal: Optimal Nutrition Intake  Outcome: Ongoing, Progressing     Problem: Impaired Wound Healing  Goal: Optimal Wound Healing  Outcome: Ongoing, Progressing     Problem: Skin Injury Risk Increased  Goal: Skin Health and Integrity  Outcome: Ongoing, Progressing     Problem: Fall Injury Risk  Goal: Absence of Fall and Fall-Related Injury  Outcome: Ongoing, Progressing

## 2023-04-22 NOTE — PROGRESS NOTES
VANCOMYCIN DOSING BY PHARMACY DISCONTINUATION NOTE    Anastasiia Badillo is a 73 y.o. female who had been consulted for vancomycin dosing.    The pharmacy consult for vancomycin dosing has been discontinued.     Vancomycin Dosing by Pharmacy Consult will sign-off. Please reconsult if necessary. Thank you for allowing us to participate in this patient's care.     Thank you for allowing pharmacy participate in this patient's care.     Elsy Waldron, Ish  Clinical Pharmacist, [facility] Pharm[facility] Team  [email or web address]  office [phone]

## 2023-04-22 NOTE — CONSULTS
[facility] Surg  Infectious Disease  Consult Note    Patient Name: Anastasiia Badillo  MRN: [id number]  Admission Date: 4/21/2023  Hospital Length of Stay: 1 days  Attending Physician: Linh Cantor MD  Primary Care Provider: Raji Parkinson MD     Isolation Status: No active isolations       Inpatient consult to Infectious Diseases  Consult performed by: Frances Gaston PA-C  Consult ordered by: Dick Lerner MD        Assessment/Plan:     Endocrine  Diabetic foot ulcer  74y/o female with DMII with neuropathy, tobacco abuse, left hip fracture s/p intramedullary denzel presents to ED at OSH for worsening wounds of her left hallux and left heel. MRI negative for OM however vascular studies + significant stenosis of SFA and left popliteal artery bilaterally. She was transferred to [facility] for vascular and podiatry evaluation. Wound cultures from left hallux +MSSA. blood cultures NGTD. Pt remained afebrile, stable.     Recommendations  1. Can d/c vancomycin, transition to cefadroxil 1g po bid x 10 days to treat for SSTI as MRI negative for osteomyelitis   2. D/c cefepime as wound cultures +MSSA  3. Angiogram Tuesday per vascular surgery  4. F/u with podiatry. Continue local wound care and offload pressure of wounds.   5. ID will sign off. Please call if with questions       Thank you for your consult. I will sign off. Please contact us if you have any additional questions.    Frances Gaston PA-C  Infectious Disease  [facility] Surg    Subjective:     Principal Problem: PAD (peripheral artery disease)    HPI: 74y/o female with DMII with neuropathy, tobacco abuse, left hip fracture s/p intramedullary denzel presents to ED at OSH for worsening wounds of her left hallux and left heel. MRI negative for OM however vascular studies + significant stenosis of SFA and left popliteal artery bilaterally. She was transferred to [facility] for vascular and podiatry evaluation. She was seen by ID and was on empiric  vancomycin and cefepime. Wound cultures from left hallux +S.aureus. blood cultures NGTD. Pt remained afebrile, stable.       Past Medical History:   Diagnosis Date    Diabetes mellitus, type 2        Past Surgical History:   Procedure Laterality Date    AUGMENTATION OF BREAST      INTRAMEDULLARY RODDING OF FEMUR Left 2/18/2023    Procedure: INSERTION, INTRAMEDULLARY ROXANNA, FEMUR (hana table -- synthes -- long nail -- have bovie and suction and large bone set);  Surgeon: Keanu Brand MD;  Location: [facility];  Service: Orthopedics;  Laterality: Left;       Review of patient's allergies indicates:  No Known Allergies    Medications:  Medications Prior to Admission   Medication Sig    ascorbic acid, vitamin C, (VITAMIN C) 500 MG tablet Take 500 mg by mouth once daily.    calcium carbonate (OS-DAGMAR) 600 mg calcium (1,500 mg) Tab Take 600 mg by mouth 2 (two) times daily with meals.    cholecalciferol, vitamin D3, 10 mcg (400 unit) Chew Take by mouth 2 (two) times a day.    cinnamon bark 500 mg capsule Take 500 mg by mouth once daily.    HYDROcodone-acetaminophen (NORCO) 5-325 mg per tablet Take 1 tablet by mouth every 6 (six) hours as needed for Pain.    insulin aspart U-100 (NOVOLOG) 100 unit/mL (3 mL) InPn pen Inject 0-5 Units into the skin before meals and at bedtime as needed (Hyperglycemia). Blood Glucose  mg/dL                  Pre-meal                2200  151-200                0 unit                      0 unit  201-250                2 units                    1 unit  251-300                3 units                    1 unit  301-350                4 units                    2 units  >350                     5 units                    3 units    insulin detemir U-100 (LEVEMIR FLEXTOUCH) 100 unit/mL (3 mL) SubQ InPn pen Inject 18 Units into the skin once daily.    lisinopriL 10 MG tablet Take 10 mg by mouth.    loperamide (IMODIUM) 2 mg capsule Take 2 mg by mouth 4 (four) times daily as needed for  "Diarrhea.    melatonin 10 mg Cap Take 1 capsule by mouth every evening.    multivit-min-FA-lycopen-lutein 0.4 mg-300 mcg- 250 mcg Tab Take 1 tablet by mouth once daily.    pen needle, diabetic (PEN NEEDLE) 31 gauge x 3/16" Ndle 1 application by Misc.(Non-Drug; Combo Route) route 2 (two) times a day.    ondansetron (ZOFRAN) 4 MG tablet Take by mouth.    UNABLE TO FIND Take 1 capsule by mouth daily as needed. Beet Root     Antibiotics (From admission, onward)      Start     Stop Route Frequency Ordered    04/22/23 0300  vancomycin 750 mg in dextrose 5 % (D5W) 250 mL IVPB (Vial-Mate)         -- IV Every 12 hours (non-standard times) 04/22/23 0116          Antifungals (From admission, onward)      None          Antivirals (From admission, onward)      None             Immunization History   Administered Date(s) Administered    Influenza 09/17/2013    PPD Test 02/19/2023    Pneumococcal Conjugate - 13 Valent 11/07/2019    Pneumococcal Polysaccharide - 23 Valent 12/06/2017    Tdap 12/06/2017       Family History       Problem Relation (Age of Onset)    Breast cancer Sister    Cancer Mother, Sister    Cataracts Mother    Heart disease Mother, Father, Sister, Brother    Hypertension Father          Social History     Socioeconomic History    Marital status:    Tobacco Use    Smoking status: Every Day     Packs/day: 0.25     Years: 45.00     Pack years: 11.25     Types: Cigarettes    Smokeless tobacco: Never   Substance and Sexual Activity    Alcohol use: Yes    Drug use: Never   Social History Narrative    ** Merged History Encounter **          Social Determinants of Health     Financial Resource Strain: Low Risk     Difficulty of Paying Living Expenses: Not hard at all   Food Insecurity: No Food Insecurity    Worried About Running Out of Food in the Last Year: Never true    Ran Out of Food in the Last Year: Never true   Transportation Needs: No Transportation Needs    Lack of Transportation " (Medical): No    Lack of Transportation (Non-Medical): No   Physical Activity: Unknown    Days of Exercise per Week: Patient refused    Minutes of Exercise per Session: Patient refused   Stress: Unknown    Feeling of Stress : Patient refused   Social Connections: Unknown    Frequency of Communication with Friends and Family: Patient refused    Frequency of Social Gatherings with Friends and Family: Patient refused    Attends Cheondoism Services: Patient refused    Active Member of Clubs or Organizations: Patient refused    Attends Club or Organization Meetings: Patient refused    Marital Status: Patient refused   Housing Stability: Unknown    Unable to Pay for Housing in the Last Year: No    Unstable Housing in the Last Year: No     Review of Systems   Constitutional:  Positive for fatigue. Negative for chills, diaphoresis and fever.   Respiratory:  Negative for cough and shortness of breath.    Gastrointestinal:  Negative for abdominal pain, diarrhea, nausea and vomiting.   Genitourinary:  Negative for dysuria.   Musculoskeletal:  Negative for back pain.   Skin:  Positive for color change and wound. Negative for pallor and rash.   All other systems reviewed and are negative.  Objective:     Vital Signs (Most Recent):  Temp: 97.7 °F (36.5 °C) (04/22/23 1159)  Pulse: 64 (04/22/23 1159)  Resp: 16 (04/22/23 1159)  BP: (!) 155/68 (04/22/23 1159)  SpO2: 96 % (04/22/23 1159)   Vital Signs (24h Range):  Temp:  [97.4 °F (36.3 °C)-98.5 °F (36.9 °C)] 97.7 °F (36.5 °C)  Pulse:  [64-87] 64  Resp:  [16-19] 16  SpO2:  [95 %-97 %] 96 %  BP: (146-188)/(68-80) 155/68     Weight: 76.1 kg (167 lb 12.3 oz)  Body mass index is 29.72 kg/m².    Estimated Creatinine Clearance: 71 mL/min (based on SCr of 0.7 mg/dL).    Physical Exam  Vitals and nursing note reviewed.   Constitutional:       General: She is not in acute distress.     Appearance: She is well-developed. She is not diaphoretic.   HENT:      Head: Normocephalic and  atraumatic.   Eyes:      Pupils: Pupils are equal, round, and reactive to light.   Cardiovascular:      Rate and Rhythm: Normal rate and regular rhythm.      Heart sounds: Normal heart sounds. No murmur heard.    No friction rub. No gallop.   Pulmonary:      Effort: Pulmonary effort is normal. No respiratory distress.      Breath sounds: Normal breath sounds. No wheezing or rales.   Chest:      Chest wall: No tenderness.   Abdominal:      General: Bowel sounds are normal. There is no distension.      Palpations: Abdomen is soft. There is no mass.      Tenderness: There is no abdominal tenderness. There is no guarding or rebound.      Hernia: No hernia is present.   Musculoskeletal:         General: No tenderness or deformity. Normal range of motion.      Cervical back: Normal range of motion and neck supple.   Skin:     General: Skin is warm and dry.      Coloration: Skin is not pale.      Findings: No erythema.      Comments: Left hallux wound and left heel wound c/d/I    Neurological:      Mental Status: She is alert and oriented to person, place, and time.      Cranial Nerves: No cranial nerve deficit.      Coordination: Coordination normal.   Psychiatric:         Behavior: Behavior normal.         Thought Content: Thought content normal.             Significant Labs: All pertinent labs within the past 24 hours have been reviewed.    Significant Imaging: I have reviewed all pertinent imaging results/findings within the past 24 hours.

## 2023-04-22 NOTE — ASSESSMENT & PLAN NOTE
74y/o female with DMII with neuropathy, tobacco abuse, left hip fracture s/p intramedullary denzel presents to ED at OSH for worsening wounds of her left hallux and left heel. MRI negative for OM however vascular studies + significant stenosis of SFA and left popliteal artery bilaterally. She was transferred to [facility] for vascular and podiatry evaluation. Wound cultures from left hallux +MSSA. blood cultures NGTD. Pt remained afebrile, stable.     Recommendations  1. Can d/c vancomycin, transition to cefadroxil 1g po bid x 10 days to treat for SSTI as MRI negative for osteomyelitis   2. D/c cefepime as wound cultures +MSSA  3. Angiogram Tuesday per vascular surgery  4. F/u with podiatry. Continue local wound care and offload pressure of wounds.   5. ID will sign off. Please call if with questions

## 2023-04-22 NOTE — NURSING
Nurses Note -- 4 Eyes      4/22/2023   12:27 AM      Skin assessed during: Admit      [] No Altered Skin Integrity Present    []Prevention Measures Documented      [x] Yes- Altered Skin Integrity Present or Discovered   [x] LDA Added if Not in Epic (Describe Wound)   [x] New Altered Skin Integrity was Present on Admit and Documented in LDA   [] Wound Image Taken    Wound Care Consulted? Yes    Attending Nurse:  Ruth Valdez RN     Second RN/Staff Member:  TARIQ Melo     Pt has small ulcer on Left great toe and 5th toe, general scratches around both arms.  Scabs around bilateral legs

## 2023-04-22 NOTE — PROGRESS NOTES
Pharmacokinetic Initial Assessment: IV Vancomycin    Assessment/Plan:    Continue intravenous vancomycin 750mg every 12 hours started before patient transfer to [facility] main campus   Desired empiric serum trough concentration is 10 to 20 mcg/mL  Draw vancomycin trough level 60 min prior to fourth dose on 4/23 at approximately 1400  Pharmacy will continue to follow and monitor vancomycin.      Please contact pharmacy at extension [phone] with any questions regarding this assessment.     Thank you for the consult,   Miguel Zuniga       Patient brief summary:  Anastasiia Badillo is a 73 y.o. female initiated on antimicrobial therapy with IV Vancomycin for treatment of suspected skin & soft tissue infection    Drug Allergies:   Review of patient's allergies indicates:  No Known Allergies    Actual Body Weight:   76.1kg    Renal Function:   Estimated Creatinine Clearance: 71 mL/min (based on SCr of 0.7 mg/dL).,     Dialysis Method (if applicable):  N/A    CBC (last 72 hours):  Recent Labs   Lab Result Units 04/19/23  1248 04/20/23 0443 04/21/23  0429   WBC K/uL 13.00* 7.43 8.06   Hemoglobin g/dL 11.8* 10.2* 10.0*   Hemoglobin A1C %  --   --  8.2*   Hematocrit % 36.5* 32.2* 31.5*   Platelets K/uL 342 324 311   Gran % % 82.7* 78.7* 73.6*   Lymph % % 11.5* 12.0* 16.5*   Mono % % 5.1 7.5 7.4   Eosinophil % % 0.1 0.9 1.6   Basophil % % 0.3 0.5 0.4   Differential Method  Automated Automated Automated       Metabolic Panel (last 72 hours):  Recent Labs   Lab Result Units 04/19/23  1248 04/20/23  0443 04/21/23  0429   Sodium mmol/L 131* 129* 132*   Potassium mmol/L 5.0 4.3 4.0   Chloride mmol/L 97 98 100   CO2 mmol/L 23 25 24   Glucose mg/dL 238* 352* 230*   BUN mg/dL 14 15 11   Creatinine mg/dL 0.8 0.8 0.7   Albumin g/dL 3.4*  --   --    Total Bilirubin mg/dL 0.6  --   --    Alkaline Phosphatase U/L 114  --   --    AST U/L 11  --   --    ALT U/L 10  --   --    Magnesium mg/dL  --  1.6 1.7       Drug levels (last 3  results):  Recent Labs   Lab Result Units 04/20/23  1144 04/21/23  1202   Vancomycin-Trough ug/mL 14.2 17.1       Microbiologic Results:  Microbiology Results (last 7 days)       ** No results found for the last 168 hours. **

## 2023-04-22 NOTE — ASSESSMENT & PLAN NOTE
-- Smoking cessation  -- DM control  -- ASA  -- plavix  -- Statin  -- wound care  -- podiatry consult  -- BECKIE/TBI  -- float heels  -- Angiogram for Tuesday  -- NPO  -- shelly  -- consent  -- MRI to evaluate left foot.

## 2023-04-22 NOTE — PROGRESS NOTES
04/22/23 0800   Vital Signs   Temp 97.4 °F (36.3 °C)   Temp Source Oral   Pulse 65   Heart Rate Source Monitor   Resp 18   SpO2 95 %   Device (Oxygen Therapy) room air   BP (!) 188/80   MAP (mmHg) 115   BP Location Right arm   BP Method Automatic   Patient Position Lying        Cardiac/Telemetry Details / Alarms   Cardiac/Telemetry Monitor On Yes   Cardiac/Telemetry Audible Yes   Cardiac/Telemetry Alarms Set Yes   Assessments (Pre/Post)   Level of Consciousness (AVPU) alert     Pt denies NAD and none noted. Md notified. Will cont poc

## 2023-04-22 NOTE — PLAN OF CARE
Problem: Adult Inpatient Plan of Care  Goal: Plan of Care Review  Outcome: Ongoing, Progressing  Goal: Patient-Specific Goal (Individualized)  Outcome: Ongoing, Progressing  Goal: Absence of Hospital-Acquired Illness or Injury  Outcome: Ongoing, Progressing  Goal: Optimal Comfort and Wellbeing  Outcome: Ongoing, Progressing  Goal: Readiness for Transition of Care  Outcome: Ongoing, Progressing        Plan of care reviewed with pt. Pt voiced understanding. Pt AAOx4. Pt c/o left leg pain. PRN medication given x2. Pt is being transferred to [facility] main room 642. Report given. No apparent distress noted. Fall precautions maintained. Bed locked and in lowest position. Call light within reach. [facility]

## 2023-04-22 NOTE — SUBJECTIVE & OBJECTIVE
Past Medical History:   Diagnosis Date    Diabetes mellitus, type 2        Past Surgical History:   Procedure Laterality Date    AUGMENTATION OF BREAST      INTRAMEDULLARY RODDING OF FEMUR Left 2/18/2023    Procedure: INSERTION, INTRAMEDULLARY ROXANNA, FEMUR (hana table -- synthes -- long nail -- have bovie and suction and large bone set);  Surgeon: Keanu Brand MD;  Location: [facility];  Service: Orthopedics;  Laterality: Left;       Review of patient's allergies indicates:  No Known Allergies    Current Facility-Administered Medications on File Prior to Encounter   Medication    [DISCONTINUED] acetaminophen tablet 650 mg    [DISCONTINUED] aluminum-magnesium hydroxide-simethicone 200-200-20 mg/5 mL suspension 30 mL    [DISCONTINUED] ascorbic acid (vitamin C) tablet 500 mg    [DISCONTINUED] calcium carbonate 200 mg calcium (500 mg) chewable tablet 1,000 mg    [DISCONTINUED] ceFEPIme (MAXIPIME) 2 g in dextrose 5 % in water (D5W) 5 % 50 mL IVPB (MB+)    [DISCONTINUED] dextrose 10% bolus 125 mL 125 mL    [DISCONTINUED] dextrose 10% bolus 250 mL 250 mL    [DISCONTINUED] dextrose 40 % gel 15,000 mg    [DISCONTINUED] dextrose 40 % gel 30,000 mg    [DISCONTINUED] glucagon (human recombinant) injection 1 mg    [DISCONTINUED] HYDROcodone-acetaminophen  mg per tablet 1 tablet    [DISCONTINUED] HYDROcodone-acetaminophen 7.5-325 mg per tablet 1 tablet    [DISCONTINUED] insulin aspart U-100 pen 1-10 Units    [DISCONTINUED] insulin detemir U-100 (Levemir) pen 17 Units    [DISCONTINUED] ipratropium 0.02 % nebulizer solution 0.5 mg    [DISCONTINUED] levalbuterol nebulizer solution 1.25 mg    [DISCONTINUED] lisinopriL tablet 10 mg    [DISCONTINUED] magnesium oxide tablet 800 mg    [DISCONTINUED] magnesium oxide tablet 800 mg    [DISCONTINUED] melatonin tablet 9 mg    [DISCONTINUED] naloxone 0.4 mg/mL injection 0.02 mg    [DISCONTINUED] ondansetron injection 4 mg    [DISCONTINUED] potassium bicarbonate disintegrating tablet 35  mEq    [DISCONTINUED] potassium bicarbonate disintegrating tablet 50 mEq    [DISCONTINUED] potassium bicarbonate disintegrating tablet 60 mEq    [DISCONTINUED] potassium, sodium phosphates 280-160-250 mg packet 2 packet    [DISCONTINUED] potassium, sodium phosphates 280-160-250 mg packet 2 packet    [DISCONTINUED] potassium, sodium phosphates 280-160-250 mg packet 2 packet    [DISCONTINUED] senna-docusate 8.6-50 mg per tablet 1 tablet    [DISCONTINUED] simethicone chewable tablet 160 mg    [DISCONTINUED] sodium chloride 0.9% flush 10 mL    [DISCONTINUED] vancomycin - pharmacy to dose    [DISCONTINUED] vancomycin 750 mg in dextrose 5 % (D5W) 250 mL IVPB (Vial-Mate)    [DISCONTINUED] vitamin D 1000 units tablet 1,000 Units     Current Outpatient Medications on File Prior to Encounter   Medication Sig    ascorbic acid, vitamin C, (VITAMIN C) 500 MG tablet Take 500 mg by mouth once daily.    calcium carbonate (OS-DAGMAR) 600 mg calcium (1,500 mg) Tab Take 600 mg by mouth 2 (two) times daily with meals.    cholecalciferol, vitamin D3, 10 mcg (400 unit) Chew Take by mouth 2 (two) times a day.    cinnamon bark 500 mg capsule Take 500 mg by mouth once daily.    HYDROcodone-acetaminophen (NORCO) 5-325 mg per tablet Take 1 tablet by mouth every 6 (six) hours as needed for Pain.    insulin aspart U-100 (NOVOLOG) 100 unit/mL (3 mL) InPn pen Inject 0-5 Units into the skin before meals and at bedtime as needed (Hyperglycemia). Blood Glucose  mg/dL                  Pre-meal                2200  151-200                0 unit                      0 unit  201-250                2 units                    1 unit  251-300                3 units                    1 unit  301-350                4 units                    2 units  >350                     5 units                    3 units    insulin detemir U-100 (LEVEMIR FLEXTOUCH) 100 unit/mL (3 mL) SubQ InPn pen Inject 18 Units into the skin once daily.    lisinopriL 10 MG tablet Take  "10 mg by mouth.    loperamide (IMODIUM) 2 mg capsule Take 2 mg by mouth 4 (four) times daily as needed for Diarrhea.    melatonin 10 mg Cap Take 1 capsule by mouth every evening.    multivit-min-FA-lycopen-lutein 0.4 mg-300 mcg- 250 mcg Tab Take 1 tablet by mouth once daily.    pen needle, diabetic (PEN NEEDLE) 31 gauge x 3/16" Ndle 1 application by Misc.(Non-Drug; Combo Route) route 2 (two) times a day.    ondansetron (ZOFRAN) 4 MG tablet Take by mouth.    UNABLE TO FIND Take 1 capsule by mouth daily as needed. Beet Root     Family History       Problem Relation (Age of Onset)    Breast cancer Sister    Cancer Mother, Sister    Cataracts Mother    Heart disease Mother, Father, Sister, Brother    Hypertension Father          Tobacco Use    Smoking status: Every Day     Packs/day: 0.25     Years: 45.00     Pack years: 11.25     Types: Cigarettes    Smokeless tobacco: Never   Substance and Sexual Activity    Alcohol use: Yes    Drug use: Never    Sexual activity: Not on file     Review of Systems   Constitutional:  Negative for chills, fatigue and fever.   Respiratory:  Negative for cough and shortness of breath.    Cardiovascular:  Positive for leg swelling. Negative for chest pain.   Gastrointestinal:  Negative for abdominal pain and nausea.   Genitourinary:  Negative for dysuria and frequency.   Musculoskeletal:  Positive for gait problem. Negative for myalgias.   Skin:  Positive for color change and wound.   Objective:     Vital Signs (Most Recent):  Temp: 98 °F (36.7 °C) (04/21/23 2353)  Pulse: 64 (04/21/23 2353)  Resp: 19 (04/21/23 2353)  BP: (!) 152/69 (04/21/23 2353)  SpO2: 97 % (04/21/23 2353)   Vital Signs (24h Range):  Temp:  [97.7 °F (36.5 °C)-98.5 °F (36.9 °C)] 98 °F (36.7 °C)  Pulse:  [64-87] 64  Resp:  [17-19] 19  SpO2:  [95 %-97 %] 97 %  BP: (130-175)/(58-72) 152/69     Weight: 76.1 kg (167 lb 12.3 oz)  Body mass index is 29.72 kg/m².    Physical Exam  Vitals and nursing note reviewed. "   Constitutional:       General: She is not in acute distress.     Appearance: Normal appearance.   HENT:      Mouth/Throat:      Mouth: Mucous membranes are moist.      Pharynx: Oropharynx is clear.   Cardiovascular:      Rate and Rhythm: Normal rate and regular rhythm.      Heart sounds: No murmur heard.  Pulmonary:      Effort: Pulmonary effort is normal.      Breath sounds: Normal breath sounds. No wheezing or rales.   Abdominal:      General: Abdomen is flat. Bowel sounds are normal. There is no distension.      Palpations: Abdomen is soft.      Tenderness: There is no abdominal tenderness.   Musculoskeletal:         General: Tenderness (LLE) present. No swelling.   Skin:     General: Skin is warm.      Capillary Refill: Capillary refill takes less than 2 seconds.      Findings: Lesion (see media tab, ulcers on LLE, non-healing abrashions LLE, RLE w/ heel callous) present.   Neurological:      General: No focal deficit present.      Mental Status: She is alert and oriented to person, place, and time.   Psychiatric:         Mood and Affect: Mood normal.         Behavior: Behavior normal.           Significant Labs: All pertinent labs within the past 24 hours have been reviewed.  BMP:   Recent Labs   Lab 04/21/23  0429   *   *   K 4.0      CO2 24   BUN 11   CREATININE 0.7   CALCIUM 9.3   MG 1.7     CBC:   Recent Labs   Lab 04/20/23  0443 04/21/23  0429   WBC 7.43 8.06   HGB 10.2* 10.0*   HCT 32.2* 31.5*    311     Lab Results   Component Value Date    CRP 82.7 (H) 04/21/2023       Significant Imaging: I have reviewed all pertinent imaging results/findings within the past 24 hours.

## 2023-04-22 NOTE — PLAN OF CARE
04/22/23 0913   Final Note   Assessment Type Final Discharge Note   Anticipated Discharge Disposition Short Term

## 2023-04-22 NOTE — SUBJECTIVE & OBJECTIVE
"Medications Prior to Admission   Medication Sig Dispense Refill Last Dose    ascorbic acid, vitamin C, (VITAMIN C) 500 MG tablet Take 500 mg by mouth once daily.   4/21/2023    calcium carbonate (OS-DAGMAR) 600 mg calcium (1,500 mg) Tab Take 600 mg by mouth 2 (two) times daily with meals.   4/21/2023    cholecalciferol, vitamin D3, 10 mcg (400 unit) Chew Take by mouth 2 (two) times a day.   4/21/2023    cinnamon bark 500 mg capsule Take 500 mg by mouth once daily.   4/21/2023    HYDROcodone-acetaminophen (NORCO) 5-325 mg per tablet Take 1 tablet by mouth every 6 (six) hours as needed for Pain. 21 tablet 0 4/21/2023    insulin aspart U-100 (NOVOLOG) 100 unit/mL (3 mL) InPn pen Inject 0-5 Units into the skin before meals and at bedtime as needed (Hyperglycemia). Blood Glucose  mg/dL                  Pre-meal                2200  151-200                0 unit                      0 unit  201-250                2 units                    1 unit  251-300                3 units                    1 unit  301-350                4 units                    2 units  >350                     5 units                    3 units  0 4/21/2023    insulin detemir U-100 (LEVEMIR FLEXTOUCH) 100 unit/mL (3 mL) SubQ InPn pen Inject 18 Units into the skin once daily.  0 4/21/2023    lisinopriL 10 MG tablet Take 10 mg by mouth.   4/21/2023    loperamide (IMODIUM) 2 mg capsule Take 2 mg by mouth 4 (four) times daily as needed for Diarrhea.   4/21/2023    melatonin 10 mg Cap Take 1 capsule by mouth every evening.   4/21/2023    multivit-min-FA-lycopen-lutein 0.4 mg-300 mcg- 250 mcg Tab Take 1 tablet by mouth once daily.   4/21/2023    pen needle, diabetic (PEN NEEDLE) 31 gauge x 3/16" Ndle 1 application by Misc.(Non-Drug; Combo Route) route 2 (two) times a day. 200 each 0 4/21/2023    ondansetron (ZOFRAN) 4 MG tablet Take by mouth.       UNABLE TO FIND Take 1 capsule by mouth daily as needed. Beet Root          Review of patient's allergies " indicates:  No Known Allergies    Past Medical History:   Diagnosis Date    Diabetes mellitus, type 2      Past Surgical History:   Procedure Laterality Date    AUGMENTATION OF BREAST      INTRAMEDULLARY RODDING OF FEMUR Left 2/18/2023    Procedure: INSERTION, INTRAMEDULLARY ROXANNA, FEMUR (hana table -- synthes -- long nail -- have bovie and suction and large bone set);  Surgeon: Keanu Brand MD;  [facility];  Service: Orthopedics;  Laterality: Left;     Family History       Problem Relation (Age of Onset)    Breast cancer Sister    Cancer Mother, Sister    Cataracts Mother    Heart disease Mother, Father, Sister, Brother    Hypertension Father          Tobacco Use    Smoking status: Every Day     Packs/day: 0.25     Years: 45.00     Pack years: 11.25     Types: Cigarettes    Smokeless tobacco: Never   Substance and Sexual Activity    Alcohol use: Yes    Drug use: Never    Sexual activity: Not on file     Review of Systems   Constitutional: Negative.    All other systems reviewed and are negative.  Objective:     Vital Signs (Most Recent):  Temp: 97.4 °F (36.3 °C) (04/22/23 0800)  Pulse: 65 (04/22/23 0800)  Resp: 16 (04/22/23 0902)  BP: (!) 188/80 (04/22/23 0800)  SpO2: 95 % (04/22/23 0800)   Vital Signs (24h Range):  Temp:  [97.4 °F (36.3 °C)-98.5 °F (36.9 °C)] 97.4 °F (36.3 °C)  Pulse:  [64-87] 65  Resp:  [16-19] 16  SpO2:  [95 %-97 %] 95 %  BP: (146-188)/(69-80) 188/80     Weight: 76.1 kg (167 lb 12.3 oz)  Body mass index is 29.72 kg/m².    Physical Exam  Vitals reviewed.   Constitutional:       Appearance: Normal appearance.   Cardiovascular:      Comments: 1+ femoral pulses bilateral  DP and PT monophasic on the right  AT and PT are monophasic on the left.   Pulmonary:      Effort: Pulmonary effort is normal.   Abdominal:      General: Abdomen is flat.   Skin:     General: Skin is warm and dry.      Comments: Non healing heel wound and toe wounds dry gangrene LLE   Neurological:      General: No focal  deficit present.      Mental Status: She is alert and oriented to person, place, and time.       Significant Labs:  CBC:   Recent Labs   Lab 04/22/23  0602   WBC 7.35   RBC 3.65*   HGB 11.0*   HCT 34.0*      MCV 93   MCH 30.1   MCHC 32.4     CMP:   Recent Labs   Lab 04/22/23  0602      CALCIUM 9.4   ALBUMIN 2.5*   PROT 5.9*      K 4.2   CO2 23      BUN 11   CREATININE 0.7   ALKPHOS 89   ALT <5*   AST 9*   BILITOT 0.3                 Significant Diagnostics:  U/S: US Retroperitoneal Complete    Result Date: 4/21/2023  2.3 cm hypoechoic mass of the upper pole of the right kidney may represent a bloody cyst but follow-up is recommended.  Elevated resistive index from the right kidney consistent with intrinsic renal disease.  Distended bladder noted Electronically signed by: Ihsan Aguilar MD Date:    04/21/2023 Time:    17:03

## 2023-04-23 ENCOUNTER — DOCUMENT SCAN (OUTPATIENT)
Dept: HOME HEALTH SERVICES | Facility: HOSPITAL | Age: 73
End: 2023-04-23
Payer: MEDICARE

## 2023-04-23 LAB
ALBUMIN SERPL BCP-MCNC: 2.5 G/DL (ref 3.5–5.2)
ALP SERPL-CCNC: 97 U/L (ref 55–135)
ALT SERPL W/O P-5'-P-CCNC: 6 U/L (ref 10–44)
ANION GAP SERPL CALC-SCNC: 10 MMOL/L (ref 8–16)
AST SERPL-CCNC: 13 U/L (ref 10–40)
BASOPHILS # BLD AUTO: 0.08 K/UL (ref 0–0.2)
BASOPHILS NFR BLD: 1.2 % (ref 0–1.9)
BILIRUB SERPL-MCNC: 0.3 MG/DL (ref 0.1–1)
BUN SERPL-MCNC: 11 MG/DL (ref 8–23)
CALCIUM SERPL-MCNC: 9.5 MG/DL (ref 8.7–10.5)
CHLORIDE SERPL-SCNC: 102 MMOL/L (ref 95–110)
CO2 SERPL-SCNC: 23 MMOL/L (ref 23–29)
CREAT SERPL-MCNC: 0.7 MG/DL (ref 0.5–1.4)
DIFFERENTIAL METHOD: ABNORMAL
EOSINOPHIL # BLD AUTO: 0.1 K/UL (ref 0–0.5)
EOSINOPHIL NFR BLD: 1.8 % (ref 0–8)
ERYTHROCYTE [DISTWIDTH] IN BLOOD BY AUTOMATED COUNT: 13.2 % (ref 11.5–14.5)
EST. GFR  (NO RACE VARIABLE): >60 ML/MIN/1.73 M^2
GLUCOSE SERPL-MCNC: 158 MG/DL (ref 70–110)
GRAM STN SPEC: NORMAL
HCT VFR BLD AUTO: 33.5 % (ref 37–48.5)
HGB BLD-MCNC: 10.5 G/DL (ref 12–16)
IMM GRANULOCYTES # BLD AUTO: 0.01 K/UL (ref 0–0.04)
IMM GRANULOCYTES NFR BLD AUTO: 0.1 % (ref 0–0.5)
LYMPHOCYTES # BLD AUTO: 1.5 K/UL (ref 1–4.8)
LYMPHOCYTES NFR BLD: 22.4 % (ref 18–48)
MAGNESIUM SERPL-MCNC: 1.7 MG/DL (ref 1.6–2.6)
MCH RBC QN AUTO: 29.2 PG (ref 27–31)
MCHC RBC AUTO-ENTMCNC: 31.3 G/DL (ref 32–36)
MCV RBC AUTO: 93 FL (ref 82–98)
MONOCYTES # BLD AUTO: 0.6 K/UL (ref 0.3–1)
MONOCYTES NFR BLD: 8.1 % (ref 4–15)
NEUTROPHILS # BLD AUTO: 4.5 K/UL (ref 1.8–7.7)
NEUTROPHILS NFR BLD: 66.4 % (ref 38–73)
NRBC BLD-RTO: 0 /100 WBC
PHOSPHATE SERPL-MCNC: 3.3 MG/DL (ref 2.7–4.5)
PLATELET # BLD AUTO: 347 K/UL (ref 150–450)
PMV BLD AUTO: 11.3 FL (ref 9.2–12.9)
POCT GLUCOSE: 154 MG/DL (ref 70–110)
POCT GLUCOSE: 165 MG/DL (ref 70–110)
POCT GLUCOSE: 249 MG/DL (ref 70–110)
POCT GLUCOSE: 251 MG/DL (ref 70–110)
POTASSIUM SERPL-SCNC: 4.3 MMOL/L (ref 3.5–5.1)
PROT SERPL-MCNC: 5.9 G/DL (ref 6–8.4)
RBC # BLD AUTO: 3.59 M/UL (ref 4–5.4)
SODIUM SERPL-SCNC: 135 MMOL/L (ref 136–145)
WBC # BLD AUTO: 6.83 K/UL (ref 3.9–12.7)

## 2023-04-23 PROCEDURE — 99233 SBSQ HOSP IP/OBS HIGH 50: CPT | Mod: GC,,, | Performed by: HOSPITALIST

## 2023-04-23 PROCEDURE — 84100 ASSAY OF PHOSPHORUS: CPT

## 2023-04-23 PROCEDURE — 87077 CULTURE AEROBIC IDENTIFY: CPT

## 2023-04-23 PROCEDURE — 87205 SMEAR GRAM STAIN: CPT

## 2023-04-23 PROCEDURE — 99223 1ST HOSP IP/OBS HIGH 75: CPT | Mod: ,,, | Performed by: PODIATRIST

## 2023-04-23 PROCEDURE — 25000003 PHARM REV CODE 250: Performed by: STUDENT IN AN ORGANIZED HEALTH CARE EDUCATION/TRAINING PROGRAM

## 2023-04-23 PROCEDURE — 63600175 PHARM REV CODE 636 W HCPCS: Performed by: STUDENT IN AN ORGANIZED HEALTH CARE EDUCATION/TRAINING PROGRAM

## 2023-04-23 PROCEDURE — 87075 CULTR BACTERIA EXCEPT BLOOD: CPT

## 2023-04-23 PROCEDURE — 87106 FUNGI IDENTIFICATION YEAST: CPT

## 2023-04-23 PROCEDURE — 11000001 HC ACUTE MED/SURG PRIVATE ROOM

## 2023-04-23 PROCEDURE — 36415 COLL VENOUS BLD VENIPUNCTURE: CPT

## 2023-04-23 PROCEDURE — 87070 CULTURE OTHR SPECIMN AEROBIC: CPT

## 2023-04-23 PROCEDURE — 25000003 PHARM REV CODE 250

## 2023-04-23 PROCEDURE — 63600175 PHARM REV CODE 636 W HCPCS

## 2023-04-23 PROCEDURE — 80053 COMPREHEN METABOLIC PANEL: CPT

## 2023-04-23 PROCEDURE — 85025 COMPLETE CBC W/AUTO DIFF WBC: CPT

## 2023-04-23 PROCEDURE — 99233 PR SUBSEQUENT HOSPITAL CARE,LEVL III: ICD-10-PCS | Mod: GC,,, | Performed by: HOSPITALIST

## 2023-04-23 PROCEDURE — 83735 ASSAY OF MAGNESIUM: CPT

## 2023-04-23 PROCEDURE — S4991 NICOTINE PATCH NONLEGEND: HCPCS | Performed by: STUDENT IN AN ORGANIZED HEALTH CARE EDUCATION/TRAINING PROGRAM

## 2023-04-23 PROCEDURE — 99223 PR INITIAL HOSPITAL CARE,LEVL III: ICD-10-PCS | Mod: ,,, | Performed by: PODIATRIST

## 2023-04-23 PROCEDURE — 87186 SC STD MICRODIL/AGAR DIL: CPT

## 2023-04-23 PROCEDURE — 25000003 PHARM REV CODE 250: Performed by: INTERNAL MEDICINE

## 2023-04-23 RX ORDER — NAPROXEN SODIUM 220 MG/1
81 TABLET, FILM COATED ORAL DAILY
Status: DISCONTINUED | OUTPATIENT
Start: 2023-04-23 | End: 2023-04-27 | Stop reason: HOSPADM

## 2023-04-23 RX ORDER — IBUPROFEN 200 MG
1 TABLET ORAL DAILY
Status: DISCONTINUED | OUTPATIENT
Start: 2023-04-23 | End: 2023-04-27 | Stop reason: HOSPADM

## 2023-04-23 RX ORDER — MAGNESIUM SULFATE HEPTAHYDRATE 40 MG/ML
2 INJECTION, SOLUTION INTRAVENOUS ONCE
Status: COMPLETED | OUTPATIENT
Start: 2023-04-23 | End: 2023-04-23

## 2023-04-23 RX ORDER — ATORVASTATIN CALCIUM 40 MG/1
80 TABLET, FILM COATED ORAL NIGHTLY
Status: DISCONTINUED | OUTPATIENT
Start: 2023-04-23 | End: 2023-04-27 | Stop reason: HOSPADM

## 2023-04-23 RX ORDER — CLOPIDOGREL BISULFATE 75 MG/1
75 TABLET ORAL DAILY
Status: DISCONTINUED | OUTPATIENT
Start: 2023-04-23 | End: 2023-04-27 | Stop reason: HOSPADM

## 2023-04-23 RX ADMIN — HYDROCODONE BITARTRATE AND ACETAMINOPHEN 1 TABLET: 7.5; 325 TABLET ORAL at 08:04

## 2023-04-23 RX ADMIN — HYDROCODONE BITARTRATE AND ACETAMINOPHEN 1 TABLET: 10; 325 TABLET ORAL at 05:04

## 2023-04-23 RX ADMIN — HYDROCODONE BITARTRATE AND ACETAMINOPHEN 1 TABLET: 7.5; 325 TABLET ORAL at 06:04

## 2023-04-23 RX ADMIN — CEFADROXIL 1 G: 1000 TABLET ORAL at 09:04

## 2023-04-23 RX ADMIN — Medication 9 MG: at 08:04

## 2023-04-23 RX ADMIN — INSULIN DETEMIR 9 UNITS: 100 INJECTION, SOLUTION SUBCUTANEOUS at 12:04

## 2023-04-23 RX ADMIN — HYDROCODONE BITARTRATE AND ACETAMINOPHEN 1 TABLET: 7.5; 325 TABLET ORAL at 12:04

## 2023-04-23 RX ADMIN — MAGNESIUM SULFATE 2 G: 2 INJECTION INTRAVENOUS at 09:04

## 2023-04-23 RX ADMIN — ASPIRIN 81 MG CHEWABLE TABLET 81 MG: 81 TABLET CHEWABLE at 09:04

## 2023-04-23 RX ADMIN — LISINOPRIL 10 MG: 10 TABLET ORAL at 09:04

## 2023-04-23 RX ADMIN — ATORVASTATIN CALCIUM 80 MG: 40 TABLET, FILM COATED ORAL at 08:04

## 2023-04-23 RX ADMIN — CLOPIDOGREL BISULFATE 75 MG: 75 TABLET ORAL at 09:04

## 2023-04-23 RX ADMIN — INSULIN ASPART 4 UNITS: 100 INJECTION, SOLUTION INTRAVENOUS; SUBCUTANEOUS at 12:04

## 2023-04-23 RX ADMIN — INSULIN DETEMIR 9 UNITS: 100 INJECTION, SOLUTION SUBCUTANEOUS at 08:04

## 2023-04-23 RX ADMIN — INSULIN ASPART 6 UNITS: 100 INJECTION, SOLUTION INTRAVENOUS; SUBCUTANEOUS at 05:04

## 2023-04-23 RX ADMIN — ENOXAPARIN SODIUM 40 MG: 40 INJECTION SUBCUTANEOUS at 04:04

## 2023-04-23 NOTE — ASSESSMENT & PLAN NOTE
Patient has wounds note to plantar aspect of left great toe, left heel, and left lower leg. Wounds are without cardinal signs of foot infection noted. Xrays negative for Om like changes processes to left foot. Mri shows possible early OM like changes. Left great toe wound cultures have grown MSSA so far. Vascular surgery planning angiogram on 4/25  - Left heel wound cultures pending   - ABX plan per Id  - Vascular surgery following, appreciate recs for wound healing potential   - Dressings applied by Podiatry today: Aquacel Ag to left heel, betadine pain to left great toe wound and lower leg wound, followed by left foot dressed with 4x4 gauze, kerlix, and ace wraps loosely  - Recommend patient use z flex heel protector boots while laying in bed.   - Patient okay to weight as tolerated in post op shoe  - Patient to keep dressing clean dry and intact  - Nursing wound care orders in   - Podiatry will follow

## 2023-04-23 NOTE — SUBJECTIVE & OBJECTIVE
Scheduled Meds:   aspirin  81 mg Oral Daily    atorvastatin  80 mg Oral QHS    cefadroxil  1 g Oral Q12H    clopidogreL  75 mg Oral Daily    enoxaparin  40 mg Subcutaneous Daily    insulin detemir U-100  9 Units Subcutaneous QHS    lisinopriL  10 mg Oral Daily    nicotine  1 patch Transdermal Daily     Continuous Infusions:  PRN Meds:acetaminophen, dextrose 10%, dextrose 10%, glucagon (human recombinant), HYDROcodone-acetaminophen, HYDROcodone-acetaminophen, insulin aspart U-100, melatonin, naloxone, ondansetron, sodium chloride 0.9%    Review of patient's allergies indicates:  No Known Allergies     Past Medical History:   Diagnosis Date    Diabetes mellitus, type 2      Past Surgical History:   Procedure Laterality Date    AUGMENTATION OF BREAST      INTRAMEDULLARY RODDING OF FEMUR Left 2/18/2023    Procedure: INSERTION, INTRAMEDULLARY ROXANNA, FEMUR (hana table -- synthes -- long nail -- have bovie and suction and large bone set);  Surgeon: Keanu Brand MD;  Location: [facility];  Service: Orthopedics;  Laterality: Left;       Family History       Problem Relation (Age of Onset)    Breast cancer Sister    Cancer Mother, Sister    Cataracts Mother    Heart disease Mother, Father, Sister, Brother    Hypertension Father          Tobacco Use    Smoking status: Every Day     Packs/day: 0.25     Years: 45.00     Pack years: 11.25     Types: Cigarettes    Smokeless tobacco: Never   Substance and Sexual Activity    Alcohol use: Yes    Drug use: Never    Sexual activity: Not on file     Review of Systems   Constitutional:  Positive for fatigue. Negative for chills, diaphoresis and fever.   Respiratory:  Negative for cough and shortness of breath.    Gastrointestinal:  Negative for abdominal pain, diarrhea, nausea and vomiting.   Genitourinary:  Negative for dysuria.   Musculoskeletal:  Negative for back pain.   Skin:  Positive for color change and wound. Negative for pallor and rash.   Neurological:  Negative for weakness,  numbness and headaches.   Psychiatric/Behavioral:  Negative for agitation.    All other systems reviewed and are negative.  Objective:     Vital Signs (Most Recent):  Temp: 97 °F (36.1 °C) (04/23/23 1039)  Pulse: 89 (04/23/23 1039)  Resp: 16 (04/23/23 1039)  BP: (!) 178/99 (04/23/23 1039)  SpO2: 96 % (04/23/23 1039)   Vital Signs (24h Range):  Temp:  [97 °F (36.1 °C)-98.8 °F (37.1 °C)] 97 °F (36.1 °C)  Pulse:  [77-89] 89  Resp:  [14-16] 16  SpO2:  [93 %-97 %] 96 %  BP: (146-189)/(72-99) 178/99     Weight: 76.1 kg (167 lb 12.3 oz)  Body mass index is 29.72 kg/m².    Foot Exam    General  Orientation: alert and oriented to person, place, and time       Right Foot/Ankle     Inspection and Palpation  Skin Exam: dry skin and skin changes;     Neurovascular  Dorsalis pedis: absent  Posterior tibial: absent  Saphenous nerve sensation: diminished  Tibial nerve sensation: diminished  Superficial peroneal nerve sensation: diminished  Deep peroneal nerve sensation: diminished  Sural nerve sensation: diminished    Comments  Right foot: diffuse xerosis noted. Scabs and brown skin pigmentation noted     Left Foot/Ankle      Inspection and Palpation  Skin Exam: ulcer and erythema;     Neurovascular  Dorsalis pedis: absent  Posterior tibial: absent  Saphenous nerve sensation: diminished  Tibial nerve sensation: diminished  Superficial peroneal nerve sensation: diminished  Deep peroneal nerve sensation: diminished  Sural nerve sensation: diminished    Comments  Wound noted on the plantar aspect of the right great toe. Wound has surrounding erythema and mild swelling noted. No purulent drainage crepitus or fluctuance noted. No probing to bone noted. No mal odor noted    Left heel wound. Wound probes to about level of capsule. No purulent drainage noted. Fluctuance noted. No crepitus or mal odor noted. Mild erythema noted.    Left lower leg: Superficial wound noted to left lower leg. No purulent drainage noted. No Fluctuance noted. No  crepitus or mal odor noted. Mild erythema noted.    Diffuse xerosis noted                  Laboratory:  A1C:   Recent Labs   Lab 02/17/23  1415 04/21/23  0429   HGBA1C 8.9* 8.2*     Blood Cultures: No results for input(s): LABBLOO in the last 48 hours.  CBC:   Recent Labs   Lab 04/23/23  0433   WBC 6.83   RBC 3.59*   HGB 10.5*   HCT 33.5*      MCV 93   MCH 29.2   MCHC 31.3*     CMP:   Recent Labs   Lab 04/23/23  0433   *   CALCIUM 9.5   ALBUMIN 2.5*   PROT 5.9*   *   K 4.3   CO2 23      BUN 11   CREATININE 0.7   ALKPHOS 97   ALT 6*   AST 13   BILITOT 0.3     Coagulation:   Recent Labs   Lab 04/20/23  0443   INR 0.9   APTT 27.7     CRP:   Recent Labs   Lab 04/21/23  1202   CRP 82.7*     ESR:   Recent Labs   Lab 04/19/23  1248   SEDRATE 75*     Prealbumin: No results for input(s): PREALBUMIN in the last 48 hours.  Wound Cultures:   Recent Labs   Lab 04/19/23  1752   LABAERO STAPHYLOCOCCUS AUREUS  Many  *     Microbiology Results (last 7 days)       Procedure Component Value Units Date/Time    Culture, Anaerobe [[id number]] Collected: 04/23/23 1147    Order Status: Sent Specimen: Wound from Foot, Left Updated: 04/23/23 1148    Aerobic culture [[id number]] Collected: 04/23/23 1147    Order Status: Sent Specimen: Wound from Foot, Left Updated: 04/23/23 1148    Gram stain [[id number]] Collected: 04/23/23 1147    Order Status: Sent Specimen: Wound from Foot, Left Updated: 04/23/23 1148          Specimen (24h ago, onward)      None

## 2023-04-23 NOTE — ASSESSMENT & PLAN NOTE
Patient started on vanc/cefepime at OSH w/ ID consult. Initial WCx w/ staph aureus, susceptibilities pending.  - Wound cultures; MSSA   - Starting Cefadroxil 10 d course, D/c vanc/cefepime    - ID consult, appreciate recs

## 2023-04-23 NOTE — CONSULTS
[facility] - [facility] Surg  Podiatry  Consult Note    Patient Name: Anastasiia Badillo  MRN: [id number]  Admission Date: 4/21/2023  Hospital Length of Stay: 2 days  Attending Physician: Linh Cantor MD  Primary Care Provider: Raji Parkinson MD     Inpatient consult to Podiatry  Consult performed by: Alejandra Andujar MD  Consult ordered by: Darron Petty MD  Reason for consult: Left foot wounds        Subjective:     History of Present Illness:  72 y/o F with a past medical history significant for DM2, HTN, recent femur fracture, and tobacco use who presented to [facility] ED for L foot wounds. Podiatry consulted for left foot wounds. Patient says she has noticed for two weeks that these wounds are not healing and coming worse. Patient denies noticing any pus to her left foot. Patient admits she has noticed increased redness to her left great toe. Patient denies any fever chills nausea or vomiting. Patient is stable at time of this visit. Patient has no other pedal complaints at this time.       Scheduled Meds:   aspirin  81 mg Oral Daily    atorvastatin  80 mg Oral QHS    cefadroxil  1 g Oral Q12H    clopidogreL  75 mg Oral Daily    enoxaparin  40 mg Subcutaneous Daily    insulin detemir U-100  9 Units Subcutaneous QHS    lisinopriL  10 mg Oral Daily    nicotine  1 patch Transdermal Daily     Continuous Infusions:  PRN Meds:acetaminophen, dextrose 10%, dextrose 10%, glucagon (human recombinant), HYDROcodone-acetaminophen, HYDROcodone-acetaminophen, insulin aspart U-100, melatonin, naloxone, ondansetron, sodium chloride 0.9%    Review of patient's allergies indicates:  No Known Allergies     Past Medical History:   Diagnosis Date    Diabetes mellitus, type 2      Past Surgical History:   Procedure Laterality Date    AUGMENTATION OF BREAST      INTRAMEDULLARY RODDING OF FEMUR Left 2/18/2023    Procedure: INSERTION, INTRAMEDULLARY ROXANNA, FEMUR (Travel Beautya table -- synthes -- long nail -- have bovie and suction and large bone set);  Surgeon:  Keanu Brand MD;  Location: [facility];  Service: Orthopedics;  Laterality: Left;       Family History       Problem Relation (Age of Onset)    Breast cancer Sister    Cancer Mother, Sister    Cataracts Mother    Heart disease Mother, Father, Sister, Brother    Hypertension Father          Tobacco Use    Smoking status: Every Day     Packs/day: 0.25     Years: 45.00     Pack years: 11.25     Types: Cigarettes    Smokeless tobacco: Never   Substance and Sexual Activity    Alcohol use: Yes    Drug use: Never    Sexual activity: Not on file     Review of Systems   Constitutional:  Positive for fatigue. Negative for chills, diaphoresis and fever.   Respiratory:  Negative for cough and shortness of breath.    Gastrointestinal:  Negative for abdominal pain, diarrhea, nausea and vomiting.   Genitourinary:  Negative for dysuria.   Musculoskeletal:  Negative for back pain.   Skin:  Positive for color change and wound. Negative for pallor and rash.   Neurological:  Negative for weakness, numbness and headaches.   Psychiatric/Behavioral:  Negative for agitation.    All other systems reviewed and are negative.  Objective:     Vital Signs (Most Recent):  Temp: 97 °F (36.1 °C) (04/23/23 1039)  Pulse: 89 (04/23/23 1039)  Resp: 16 (04/23/23 1039)  BP: (!) 178/99 (04/23/23 1039)  SpO2: 96 % (04/23/23 1039)   Vital Signs (24h Range):  Temp:  [97 °F (36.1 °C)-98.8 °F (37.1 °C)] 97 °F (36.1 °C)  Pulse:  [77-89] 89  Resp:  [14-16] 16  SpO2:  [93 %-97 %] 96 %  BP: (146-189)/(72-99) 178/99     Weight: 76.1 kg (167 lb 12.3 oz)  Body mass index is 29.72 kg/m².    Foot Exam    General  Orientation: alert and oriented to person, place, and time       Right Foot/Ankle     Inspection and Palpation  Skin Exam: dry skin and skin changes;     Neurovascular  Dorsalis pedis: absent  Posterior tibial: absent  Saphenous nerve sensation: diminished  Tibial nerve sensation: diminished  Superficial peroneal nerve sensation: diminished  Deep peroneal  nerve sensation: diminished  Sural nerve sensation: diminished    Comments  Right foot: diffuse xerosis noted. Scabs and brown skin pigmentation noted     Left Foot/Ankle      Inspection and Palpation  Skin Exam: ulcer and erythema;     Neurovascular  Dorsalis pedis: absent  Posterior tibial: absent  Saphenous nerve sensation: diminished  Tibial nerve sensation: diminished  Superficial peroneal nerve sensation: diminished  Deep peroneal nerve sensation: diminished  Sural nerve sensation: diminished    Comments  Wound noted on the plantar aspect of the right great toe. Wound has surrounding erythema and mild swelling noted. No purulent drainage crepitus or fluctuance noted. No probing to bone noted. No mal odor noted    Left heel wound. Wound probes to about level of capsule. No purulent drainage noted. Fluctuance noted. No crepitus or mal odor noted. Mild erythema noted.    Left lower leg: Superficial wound noted to left lower leg. No purulent drainage noted. No Fluctuance noted. No crepitus or mal odor noted. Mild erythema noted.    Diffuse xerosis noted                  Laboratory:  A1C:   Recent Labs   Lab 02/17/23  1415 04/21/23  0429   HGBA1C 8.9* 8.2*     Blood Cultures: No results for input(s): LABBLOO in the last 48 hours.  CBC:   Recent Labs   Lab 04/23/23  0433   WBC 6.83   RBC 3.59*   HGB 10.5*   HCT 33.5*      MCV 93   MCH 29.2   MCHC 31.3*     CMP:   Recent Labs   Lab 04/23/23  0433   *   CALCIUM 9.5   ALBUMIN 2.5*   PROT 5.9*   *   K 4.3   CO2 23      BUN 11   CREATININE 0.7   ALKPHOS 97   ALT 6*   AST 13   BILITOT 0.3     Coagulation:   Recent Labs   Lab 04/20/23  0443   INR 0.9   APTT 27.7     CRP:   Recent Labs   Lab 04/21/23  1202   CRP 82.7*     ESR:   Recent Labs   Lab 04/19/23  1248   SEDRATE 75*     Prealbumin: No results for input(s): PREALBUMIN in the last 48 hours.  Wound Cultures:   Recent Labs   Lab 04/19/23  1752   LABAERO STAPHYLOCOCCUS AUREUS  Many  *      Microbiology Results (last 7 days)       Procedure Component Value Units Date/Time    Culture, Anaerobe [[id number]] Collected: 04/23/23 1147    Order Status: Sent Specimen: Wound from Foot, Left Updated: 04/23/23 1148    Aerobic culture [[id number]] Collected: 04/23/23 1147    Order Status: Sent Specimen: Wound from Foot, Left Updated: 04/23/23 1148    Gram stain [[id number]] Collected: 04/23/23 1147    Order Status: Sent Specimen: Wound from Foot, Left Updated: 04/23/23 1148          Specimen (24h ago, onward)      None                Assessment/Plan:     Endocrine  Diabetic foot ulcer  Patient has wounds note to plantar aspect of left great toe, left heel, and left lower leg. Wounds are without cardinal signs of foot infection noted. Xrays negative for Om like changes processes to left foot. Mri shows possible early OM like changes. Left great toe wound cultures have grown MSSA so far. Vascular surgery planning angiogram on 4/25  - Left heel wound cultures pending   - ABX plan per Id  - Abis pending   - Vascular surgery following, appreciate recs for wound healing potential   - Dressings applied by Podiatry today: Aquacel Ag to left heel, betadine pain to left great toe wound and lower leg wound, followed by left foot dressed with 4x4 gauze, kerlix, and ace wraps loosely  - Recommend patient use z flex heel protector boots while laying in bed.   - Patient okay to weight as tolerated in post op shoe  - Patient to keep dressing clean dry and intact  - Nursing wound care orders in   - Podiatry will follow           Thank you for your consult. I will follow-up with patient. Please contact us if you have any additional questions.    Alejandra Andujar DPM PGY-1  Podiatric Medicine & Surgery  [facility]  Secure Chat Preferred  Mobile: [phone]  Pager: [phone]

## 2023-04-23 NOTE — HPI
74 y/o F with a past medical history significant for DM2, HTN, recent femur fracture, and tobacco use who presented to [facility] ED for L foot wounds. Podiatry consulted for left foot wounds. Patient says she has noticed for two weeks that these wounds are not healing and coming worse. Patient denies noticing any pus to her left foot. Patient admits she has noticed increased redness to her left great toe. Patient denies any fever chills nausea or vomiting. Patient is stable at time of this visit. Patient has no other pedal complaints at this time.

## 2023-04-23 NOTE — ASSESSMENT & PLAN NOTE
"See on CTA. F/u US showing "2.3 cm hypoechoic mass of the upper pole of the right kidney may represent a bloody cyst but follow-up is recommended"  - Refer to PCP for OP follow up  - Patient understand the need to follow up on this on discharge     "

## 2023-04-23 NOTE — HOSPITAL COURSE
Admitted to medicine for management of LLE wound. Wound positive for MSSA, MRI negative for OM. ID consulted with recs for 10 days course with cefadroxil. Vascular planning for angiogram Tuesday. Pt tolerated procedure well. Having some tingling and pain in the LLE. Returned to vascular lab for follow-up evaluation. Pt tolerating procedure well with mild-moderate leg pain with tingling. Pt requiring Norco 10 3-4 times per day for 2 days after procedure. Pain down to 1. Will send home with short course of Norco 5 until she follows up with vascular surgery. Pt discharging to home.

## 2023-04-23 NOTE — SUBJECTIVE & OBJECTIVE
Interval History:   No acute event overnight. LLE pain tolerated with current PRNs. Patient and family updated. All questions answered.       Review of Systems   Constitutional:  Positive for fatigue. Negative for chills, diaphoresis and fever.   Respiratory:  Negative for cough and shortness of breath.    Gastrointestinal:  Negative for abdominal pain, diarrhea, nausea and vomiting.   Genitourinary:  Negative for dysuria.   Musculoskeletal:  Negative for back pain.   Skin:  Positive for color change and wound. Negative for pallor and rash.   Neurological:  Negative for weakness, numbness and headaches.   Psychiatric/Behavioral:  Negative for agitation.    All other systems reviewed and are negative.  Objective:     Vital Signs (Most Recent):  Temp: 98.8 °F (37.1 °C) (04/23/23 0548)  Pulse: 77 (04/23/23 0548)  Resp: 16 (04/23/23 0634)  BP: (!) 170/75 (04/23/23 0548)  SpO2: (!) 93 % (04/23/23 0548)   Vital Signs (24h Range):  Temp:  [97.4 °F (36.3 °C)-98.8 °F (37.1 °C)] 98.8 °F (37.1 °C)  Pulse:  [64-84] 77  Resp:  [16-18] 16  SpO2:  [93 %-96 %] 93 %  BP: (146-188)/(68-80) 170/75     Weight: 76.1 kg (167 lb 12.3 oz)  Body mass index is 29.72 kg/m².    Intake/Output Summary (Last 24 hours) at 4/23/2023 0723  Last data filed at 4/23/2023 0129  Gross per 24 hour   Intake --   Output 1200 ml   Net -1200 ml      Physical Exam  Vitals and nursing note reviewed.   Constitutional:       General: She is not in acute distress.     Appearance: Normal appearance. She is well-developed. She is not diaphoretic.   HENT:      Head: Normocephalic and atraumatic.      Mouth/Throat:      Mouth: Mucous membranes are moist.      Pharynx: Oropharynx is clear.   Eyes:      Pupils: Pupils are equal, round, and reactive to light.   Cardiovascular:      Rate and Rhythm: Normal rate and regular rhythm.      Heart sounds: Normal heart sounds. No murmur heard.    No friction rub. No gallop.   Pulmonary:      Effort: Pulmonary effort is normal.  No respiratory distress.      Breath sounds: Normal breath sounds. No wheezing or rales.   Chest:      Chest wall: No tenderness.   Abdominal:      General: Abdomen is flat. Bowel sounds are normal. There is no distension.      Palpations: Abdomen is soft. There is no mass.      Tenderness: There is no abdominal tenderness. There is no guarding or rebound.      Hernia: No hernia is present.   Musculoskeletal:         General: No swelling, tenderness or deformity. Normal range of motion.      Cervical back: Normal range of motion and neck supple.   Skin:     General: Skin is warm and dry.      Capillary Refill: Capillary refill takes less than 2 seconds.      Coloration: Skin is not pale.      Findings: Lesion (see media tab, ulcers on LLE, non-healing abrashions LLE, RLE w/ heel callous) present. No erythema.      Comments: Left hallux wound and left heel wound c/d/I    Neurological:      General: No focal deficit present.      Mental Status: She is alert and oriented to person, place, and time.      Cranial Nerves: No cranial nerve deficit.      Coordination: Coordination normal.   Psychiatric:         Mood and Affect: Mood normal.         Behavior: Behavior normal.         Thought Content: Thought content normal.       Significant Labs: All pertinent labs within the past 24 hours have been reviewed.  A1C:   Recent Labs   Lab 02/17/23  1415 04/21/23  0429   HGBA1C 8.9* 8.2*     ABGs: No results for input(s): PH, PCO2, HCO3, POCSATURATED, BE, TOTALHB, COHB, METHB, O2HB, POCFIO2, PO2 in the last 48 hours.  Bilirubin:   Recent Labs   Lab 04/19/23  1248 04/22/23  0602 04/23/23  0433   BILITOT 0.6 0.3 0.3     Blood Culture: No results for input(s): LABBLOO in the last 48 hours.  BMP:   Recent Labs   Lab 04/23/23  0433   *   *   K 4.3      CO2 23   BUN 11   CREATININE 0.7   CALCIUM 9.5   MG 1.7     CBC:   Recent Labs   Lab 04/22/23  0602 04/23/23  0433   WBC 7.35 6.83   HGB 11.0* 10.5*   HCT 34.0*  33.5*    347     CMP:   Recent Labs   Lab 04/22/23  0602 04/23/23  0433    135*   K 4.2 4.3    102   CO2 23 23    158*   BUN 11 11   CREATININE 0.7 0.7   CALCIUM 9.4 9.5   PROT 5.9* 5.9*   ALBUMIN 2.5* 2.5*   BILITOT 0.3 0.3   ALKPHOS 89 97   AST 9* 13   ALT <5* 6*   ANIONGAP 9 10     Cardiac Markers: No results for input(s): CKMB, MYOGLOBIN, BNP, TROPISTAT in the last 48 hours.  Coagulation: No results for input(s): PT, INR, APTT in the last 48 hours.  Lactic Acid: No results for input(s): LACTATE in the last 48 hours.  Lipase: No results for input(s): LIPASE in the last 48 hours.  Lipid Panel: No results for input(s): CHOL, HDL, LDLCALC, TRIG, CHOLHDL in the last 48 hours.  Magnesium:   Recent Labs   Lab 04/22/23  0602 04/23/23  0433   MG 1.8 1.7     Pathology Results  (Last 10 years)      None          POCT Glucose:   Recent Labs   Lab 04/22/23  1556 04/23/23  0013 04/23/23  0546   POCTGLUCOSE 87 165* 154*     Prealbumin: No results for input(s): PREALBUMIN in the last 48 hours.  Respiratory Culture: No results for input(s): GSRESP, RESPIRATORYC in the last 48 hours.  Troponin: No results for input(s): TROPONINI, TROPONINIHS in the last 48 hours.  TSH:   Recent Labs   Lab 04/20/23  0443   TSH 1.169     Urine Culture: No results for input(s): LABURIN in the last 48 hours.  Urine Studies: No results for input(s): COLORU, APPEARANCEUA, PHUR, SPECGRAV, PROTEINUA, GLUCUA, KETONESU, BILIRUBINUA, OCCULTUA, NITRITE, UROBILINOGEN, LEUKOCYTESUR, RBCUA, WBCUA, BACTERIA, SQUAMEPITHEL, HYALINECASTS in the last 48 hours.    Invalid input(s): WRIGHTSUR    Significant Imaging: I have reviewed all pertinent imaging results/findings within the past 24 hours.

## 2023-04-23 NOTE — ASSESSMENT & PLAN NOTE
Patient w/ hx diabetes, tobacco use who presents w/ non-healing ulcers of LLE. CTA showing stenosis of bl SFA , bl popliteal.  -vascular surgery consulted, appreciate recs  -glucose control  -Starting DAPT per vascular recs   - Planning for Angiogram Tuesday   - high intensity statin started qhs

## 2023-04-23 NOTE — ASSESSMENT & PLAN NOTE
Patient's FSGs are uncontrolled due to hyperglycemia on current medication regimen.  Last A1c reviewed-   Lab Results   Component Value Date    HGBA1C 8.2 (H) 04/21/2023     Most recent fingerstick glucose reviewed-   Recent Labs   Lab 04/22/23  1106 04/22/23  1556 04/23/23  0013 04/23/23  0546   POCTGLUCOSE 81 87 165* 154*     Current correctional scale  Medium  Maintain anti-hyperglycemic dose as follows-   Antihyperglycemics (From admission, onward)    Start     Stop Route Frequency Ordered    04/22/23 2100  insulin detemir U-100 (Levemir) pen 9 Units         -- SubQ Nightly 04/22/23 0849    04/22/23 0148  insulin aspart U-100 pen 1-10 Units         -- SubQ Every 6 hours PRN 04/22/23 0049        Hold Oral hypoglycemics while patient is in the hospital.

## 2023-04-23 NOTE — PROGRESS NOTES
[facility]  Progress Note    Patient Name: Anastasiia Badillo  MRN: [id number]  Patient Class: IP- Inpatient   Admission Date: 4/21/2023  Length of Stay: 2 days  Attending Physician: Linh Cantor MD  Primary Care Provider: Raji Parkinson MD        Subjective:     Principal Problem:PAD (peripheral artery disease)        HPI:  Ms. Badillo is a 72 y/o F with a past medical history significant for DM2, HTN, recent femur fracture, and tobacco use who presented to [facility] ED for L foot wounds.  She states they have been present for about 2 weeks, but are becoming worse. She noted some redness to the left great toe over the past couple of days. She was seen by home health who told her to come to the ED for a wound check. She denies ever having similar wounds, but she had an intramedullary denzel inserted into her left femur on 2/18/23 for a subtrochanteric fracture and has been rubbing her left lower leg against objects frequently due to the difficulty she is experiencing in moving her left leg since surgery. She was found to have WBC 13, ESR 75, . She was given 1x vancomycin and zosyn. She was admitted to Hospital Medicine service. Started on vanc/cefepime. Arterial US BLE showed high grade stenosis SFA bilaterally and popliteal on the left. ID was consulted. Wound cx grew staph aureus, susceptibility pending. CTA w/ runoff showed RLE: 60% and greater stenosis of mid&distal SFA and high-grade stenosis distal PTA; LLE: 60% and greater stenosis mid&distal SFA. She also had a retroperitoneal US which showed 2.3 cm hypoechoic mass of the upper pole of the right kidney may represent a bloody cyst. [place] [facility] [place].      Overview/Hospital Course:  Admitted to medicine for management of LLE wound. Wound positive for MSSA, MRI negative for OM. ID consulted with recs for 10 days course with cefadroxil. Vascular planning for angiogram Tuesday.       Interval  History:   No acute event overnight. LLE pain tolerated with current PRNs. Patient and family updated. All questions answered.       Review of Systems   Constitutional:  Positive for fatigue. Negative for chills, diaphoresis and fever.   Respiratory:  Negative for cough and shortness of breath.    Gastrointestinal:  Negative for abdominal pain, diarrhea, nausea and vomiting.   Genitourinary:  Negative for dysuria.   Musculoskeletal:  Negative for back pain.   Skin:  Positive for color change and wound. Negative for pallor and rash.   Neurological:  Negative for weakness, numbness and headaches.   Psychiatric/Behavioral:  Negative for agitation.    All other systems reviewed and are negative.  Objective:     Vital Signs (Most Recent):  Temp: 98.8 °F (37.1 °C) (04/23/23 0548)  Pulse: 77 (04/23/23 0548)  Resp: 16 (04/23/23 0634)  BP: (!) 170/75 (04/23/23 0548)  SpO2: (!) 93 % (04/23/23 0548)   Vital Signs (24h Range):  Temp:  [97.4 °F (36.3 °C)-98.8 °F (37.1 °C)] 98.8 °F (37.1 °C)  Pulse:  [64-84] 77  Resp:  [16-18] 16  SpO2:  [93 %-96 %] 93 %  BP: (146-188)/(68-80) 170/75     Weight: 76.1 kg (167 lb 12.3 oz)  Body mass index is 29.72 kg/m².    Intake/Output Summary (Last 24 hours) at 4/23/2023 0723  Last data filed at 4/23/2023 0129  Gross per 24 hour   Intake --   Output 1200 ml   Net -1200 ml      Physical Exam  Vitals and nursing note reviewed.   Constitutional:       General: She is not in acute distress.     Appearance: Normal appearance. She is well-developed. She is not diaphoretic.   HENT:      Head: Normocephalic and atraumatic.      Mouth/Throat:      Mouth: Mucous membranes are moist.      Pharynx: Oropharynx is clear.   Eyes:      Pupils: Pupils are equal, round, and reactive to light.   Cardiovascular:      Rate and Rhythm: Normal rate and regular rhythm.      Heart sounds: Normal heart sounds. No murmur heard.    No friction rub. No gallop.   Pulmonary:      Effort: Pulmonary effort is normal. No  respiratory distress.      Breath sounds: Normal breath sounds. No wheezing or rales.   Chest:      Chest wall: No tenderness.   Abdominal:      General: Abdomen is flat. Bowel sounds are normal. There is no distension.      Palpations: Abdomen is soft. There is no mass.      Tenderness: There is no abdominal tenderness. There is no guarding or rebound.      Hernia: No hernia is present.   Musculoskeletal:         General: No swelling, tenderness or deformity. Normal range of motion.      Cervical back: Normal range of motion and neck supple.   Skin:     General: Skin is warm and dry.      Capillary Refill: Capillary refill takes less than 2 seconds.      Coloration: Skin is not pale.      Findings: Lesion (see media tab, ulcers on LLE, non-healing abrashions LLE, RLE w/ heel callous) present. No erythema.      Comments: Left hallux wound and left heel wound c/d/I    Neurological:      General: No focal deficit present.      Mental Status: She is alert and oriented to person, place, and time.      Cranial Nerves: No cranial nerve deficit.      Coordination: Coordination normal.   Psychiatric:         Mood and Affect: Mood normal.         Behavior: Behavior normal.         Thought Content: Thought content normal.       Significant Labs: All pertinent labs within the past 24 hours have been reviewed.  A1C:   Recent Labs   Lab 02/17/23  1415 04/21/23  0429   HGBA1C 8.9* 8.2*     ABGs: No results for input(s): PH, PCO2, HCO3, POCSATURATED, BE, TOTALHB, COHB, METHB, O2HB, POCFIO2, PO2 in the last 48 hours.  Bilirubin:   Recent Labs   Lab 04/19/23  1248 04/22/23  0602 04/23/23  0433   BILITOT 0.6 0.3 0.3     Blood Culture: No results for input(s): LABBLOO in the last 48 hours.  BMP:   Recent Labs   Lab 04/23/23  0433   *   *   K 4.3      CO2 23   BUN 11   CREATININE 0.7   CALCIUM 9.5   MG 1.7     CBC:   Recent Labs   Lab 04/22/23  0602 04/23/23  0433   WBC 7.35 6.83   HGB 11.0* 10.5*   HCT 34.0* 33.5*     347     CMP:   Recent Labs   Lab 04/22/23  0602 04/23/23  0433    135*   K 4.2 4.3    102   CO2 23 23    158*   BUN 11 11   CREATININE 0.7 0.7   CALCIUM 9.4 9.5   PROT 5.9* 5.9*   ALBUMIN 2.5* 2.5*   BILITOT 0.3 0.3   ALKPHOS 89 97   AST 9* 13   ALT <5* 6*   ANIONGAP 9 10     Cardiac Markers: No results for input(s): CKMB, MYOGLOBIN, BNP, TROPISTAT in the last 48 hours.  Coagulation: No results for input(s): PT, INR, APTT in the last 48 hours.  Lactic Acid: No results for input(s): LACTATE in the last 48 hours.  Lipase: No results for input(s): LIPASE in the last 48 hours.  Lipid Panel: No results for input(s): CHOL, HDL, LDLCALC, TRIG, CHOLHDL in the last 48 hours.  Magnesium:   Recent Labs   Lab 04/22/23  0602 04/23/23  0433   MG 1.8 1.7     Pathology Results  (Last 10 years)      None          POCT Glucose:   Recent Labs   Lab 04/22/23  1556 04/23/23  0013 04/23/23  0546   POCTGLUCOSE 87 165* 154*     Prealbumin: No results for input(s): PREALBUMIN in the last 48 hours.  Respiratory Culture: No results for input(s): GSRESP, RESPIRATORYC in the last 48 hours.  Troponin: No results for input(s): TROPONINI, TROPONINIHS in the last 48 hours.  TSH:   Recent Labs   Lab 04/20/23  0443   TSH 1.169     Urine Culture: No results for input(s): LABURIN in the last 48 hours.  Urine Studies: No results for input(s): COLORU, APPEARANCEUA, PHUR, SPECGRAV, PROTEINUA, GLUCUA, KETONESU, BILIRUBINUA, OCCULTUA, NITRITE, UROBILINOGEN, LEUKOCYTESUR, RBCUA, WBCUA, BACTERIA, SQUAMEPITHEL, HYALINECASTS in the last 48 hours.    Invalid input(s): WRIGHTSUR    Significant Imaging: I have reviewed all pertinent imaging results/findings within the past 24 hours.      Assessment/Plan:      * PAD (peripheral artery disease)  Patient w/ hx diabetes, tobacco use who presents w/ non-healing ulcers of LLE. CTA showing stenosis of bl SFA , bl popliteal.  -vascular surgery consulted, appreciate recs  -glucose  "control  -Starting DAPT per vascular recs   - Planning for Angiogram Tuesday   - high intensity statin started qhs       Kidney lesion, native, right  See on CTA. F/u US showing "2.3 cm hypoechoic mass of the upper pole of the right kidney may represent a bloody cyst but follow-up is recommended"  - Refer to PCP for OP follow up  - Patient understand the need to follow up on this on discharge       Cellulitis of great toe of left foot  See diabetic foot ulcer       Diabetic foot ulcer  Patient started on vanc/cefepime at OSH w/ ID consult. Initial WCx w/ staph aureus, susceptibilities pending.  - Wound cultures; MSSA   - Starting Cefadroxil 10 d course, D/c vanc/cefepime    - ID consult, appreciate recs    Nicotine dependence  Dangers of cigarette smoking were reviewed with patient in detail. Patient was Counseled for 3-10 minutes. Nicotine replacement options were discussed. Nicotine replacement was discussed- not prescribed per patient's request       Essential (primary) hypertension  -lisinopril 10 daily       Type 2 diabetes mellitus with foot ulcer, with long-term current use of insulin  Patient's FSGs are uncontrolled due to hyperglycemia on current medication regimen.  Last A1c reviewed-   Lab Results   Component Value Date    HGBA1C 8.2 (H) 04/21/2023     Most recent fingerstick glucose reviewed-   Recent Labs   Lab 04/22/23  1106 04/22/23  1556 04/23/23  0013 04/23/23  0546   POCTGLUCOSE 81 87 165* 154*     Current correctional scale  Medium  Maintain anti-hyperglycemic dose as follows-   Antihyperglycemics (From admission, onward)    Start     Stop Route Frequency Ordered    04/22/23 2100  insulin detemir U-100 (Levemir) pen 9 Units         -- SubQ Nightly 04/22/23 0849    04/22/23 0148  insulin aspart U-100 pen 1-10 Units         -- SubQ Every 6 hours PRN 04/22/23 0049        Hold Oral hypoglycemics while patient is in the hospital.       VTE Risk Mitigation (From admission, onward)         Ordered     " enoxaparin injection 40 mg  Daily         04/22/23 0022     IP VTE HIGH RISK PATIENT  Once         04/22/23 0022     Place sequential compression device  Until discontinued         04/22/23 0022                Discharge Planning   CITLALI: 4/25/2023     Code Status: Full Code   Is the patient medically ready for discharge?: No    Reason for patient still in hospital (select all that apply): Patient trending condition                     Sidney Massey MD  Department of Hospital Medicine   [facility]

## 2023-04-24 ENCOUNTER — ANESTHESIA EVENT (OUTPATIENT)
Dept: SURGERY | Facility: HOSPITAL | Age: 73
DRG: 253 | End: 2023-04-24
Payer: MEDICARE

## 2023-04-24 ENCOUNTER — DOCUMENT SCAN (OUTPATIENT)
Dept: HOME HEALTH SERVICES | Facility: HOSPITAL | Age: 73
End: 2023-04-24
Payer: MEDICARE

## 2023-04-24 PROBLEM — Z01.818 PRE-OP EVALUATION: Status: ACTIVE | Noted: 2023-04-24

## 2023-04-24 LAB
ALBUMIN SERPL BCP-MCNC: 2.5 G/DL (ref 3.5–5.2)
ALP SERPL-CCNC: 99 U/L (ref 55–135)
ALT SERPL W/O P-5'-P-CCNC: <5 U/L (ref 10–44)
ANION GAP SERPL CALC-SCNC: 6 MMOL/L (ref 8–16)
AST SERPL-CCNC: 9 U/L (ref 10–40)
BACTERIA BLD CULT: NORMAL
BACTERIA BLD CULT: NORMAL
BACTERIA SPEC ANAEROBE CULT: NORMAL
BASOPHILS # BLD AUTO: 0.07 K/UL (ref 0–0.2)
BASOPHILS NFR BLD: 1.2 % (ref 0–1.9)
BILIRUB SERPL-MCNC: 0.3 MG/DL (ref 0.1–1)
BUN SERPL-MCNC: 13 MG/DL (ref 8–23)
CALCIUM SERPL-MCNC: 8.9 MG/DL (ref 8.7–10.5)
CHLORIDE SERPL-SCNC: 98 MMOL/L (ref 95–110)
CO2 SERPL-SCNC: 28 MMOL/L (ref 23–29)
CREAT SERPL-MCNC: 0.8 MG/DL (ref 0.5–1.4)
DIFFERENTIAL METHOD: ABNORMAL
EOSINOPHIL # BLD AUTO: 0.1 K/UL (ref 0–0.5)
EOSINOPHIL NFR BLD: 2.2 % (ref 0–8)
ERYTHROCYTE [DISTWIDTH] IN BLOOD BY AUTOMATED COUNT: 13.1 % (ref 11.5–14.5)
EST. GFR  (NO RACE VARIABLE): >60 ML/MIN/1.73 M^2
GLUCOSE SERPL-MCNC: 258 MG/DL (ref 70–110)
HCT VFR BLD AUTO: 32.4 % (ref 37–48.5)
HGB BLD-MCNC: 10.3 G/DL (ref 12–16)
IMM GRANULOCYTES # BLD AUTO: 0.02 K/UL (ref 0–0.04)
IMM GRANULOCYTES NFR BLD AUTO: 0.3 % (ref 0–0.5)
LYMPHOCYTES # BLD AUTO: 1.4 K/UL (ref 1–4.8)
LYMPHOCYTES NFR BLD: 24.4 % (ref 18–48)
MAGNESIUM SERPL-MCNC: 1.9 MG/DL (ref 1.6–2.6)
MCH RBC QN AUTO: 29.4 PG (ref 27–31)
MCHC RBC AUTO-ENTMCNC: 31.8 G/DL (ref 32–36)
MCV RBC AUTO: 93 FL (ref 82–98)
MONOCYTES # BLD AUTO: 0.6 K/UL (ref 0.3–1)
MONOCYTES NFR BLD: 9.8 % (ref 4–15)
NEUTROPHILS # BLD AUTO: 3.6 K/UL (ref 1.8–7.7)
NEUTROPHILS NFR BLD: 62.1 % (ref 38–73)
NRBC BLD-RTO: 0 /100 WBC
PHOSPHATE SERPL-MCNC: 2.4 MG/DL (ref 2.7–4.5)
PLATELET # BLD AUTO: 375 K/UL (ref 150–450)
PMV BLD AUTO: 11.4 FL (ref 9.2–12.9)
POCT GLUCOSE: 136 MG/DL (ref 70–110)
POCT GLUCOSE: 165 MG/DL (ref 70–110)
POCT GLUCOSE: 168 MG/DL (ref 70–110)
POCT GLUCOSE: 256 MG/DL (ref 70–110)
POCT GLUCOSE: 311 MG/DL (ref 70–110)
POCT GLUCOSE: 93 MG/DL (ref 70–110)
POTASSIUM SERPL-SCNC: 4.3 MMOL/L (ref 3.5–5.1)
PROT SERPL-MCNC: 5.7 G/DL (ref 6–8.4)
RBC # BLD AUTO: 3.5 M/UL (ref 4–5.4)
SODIUM SERPL-SCNC: 132 MMOL/L (ref 136–145)
WBC # BLD AUTO: 5.83 K/UL (ref 3.9–12.7)

## 2023-04-24 PROCEDURE — 93970 EXTREMITY STUDY: CPT | Performed by: SURGERY

## 2023-04-24 PROCEDURE — 99233 SBSQ HOSP IP/OBS HIGH 50: CPT | Mod: GC,,, | Performed by: HOSPITALIST

## 2023-04-24 PROCEDURE — 80053 COMPREHEN METABOLIC PANEL: CPT

## 2023-04-24 PROCEDURE — 25000003 PHARM REV CODE 250: Performed by: STUDENT IN AN ORGANIZED HEALTH CARE EDUCATION/TRAINING PROGRAM

## 2023-04-24 PROCEDURE — 99233 SBSQ HOSP IP/OBS HIGH 50: CPT | Mod: ,,, | Performed by: SURGERY

## 2023-04-24 PROCEDURE — 25000003 PHARM REV CODE 250: Performed by: INTERNAL MEDICINE

## 2023-04-24 PROCEDURE — 85025 COMPLETE CBC W/AUTO DIFF WBC: CPT

## 2023-04-24 PROCEDURE — 63600175 PHARM REV CODE 636 W HCPCS

## 2023-04-24 PROCEDURE — 25000003 PHARM REV CODE 250

## 2023-04-24 PROCEDURE — 11000001 HC ACUTE MED/SURG PRIVATE ROOM

## 2023-04-24 PROCEDURE — 99233 PR SUBSEQUENT HOSPITAL CARE,LEVL III: ICD-10-PCS | Mod: GC,,, | Performed by: HOSPITALIST

## 2023-04-24 PROCEDURE — 36415 COLL VENOUS BLD VENIPUNCTURE: CPT

## 2023-04-24 PROCEDURE — 93922 UPR/L XTREMITY ART 2 LEVELS: CPT | Performed by: SURGERY

## 2023-04-24 PROCEDURE — 83735 ASSAY OF MAGNESIUM: CPT

## 2023-04-24 PROCEDURE — 84100 ASSAY OF PHOSPHORUS: CPT

## 2023-04-24 PROCEDURE — 99233 PR SUBSEQUENT HOSPITAL CARE,LEVL III: ICD-10-PCS | Mod: ,,, | Performed by: SURGERY

## 2023-04-24 RX ORDER — ACETAMINOPHEN 500 MG
1000 TABLET ORAL EVERY 6 HOURS PRN
Status: ON HOLD | COMMUNITY
End: 2023-06-11 | Stop reason: HOSPADM

## 2023-04-24 RX ADMIN — HYDROCODONE BITARTRATE AND ACETAMINOPHEN 1 TABLET: 10; 325 TABLET ORAL at 06:04

## 2023-04-24 RX ADMIN — CEFADROXIL 1 G: 1000 TABLET ORAL at 08:04

## 2023-04-24 RX ADMIN — HYDROCODONE BITARTRATE AND ACETAMINOPHEN 1 TABLET: 10; 325 TABLET ORAL at 08:04

## 2023-04-24 RX ADMIN — CLOPIDOGREL BISULFATE 75 MG: 75 TABLET ORAL at 08:04

## 2023-04-24 RX ADMIN — ASPIRIN 81 MG CHEWABLE TABLET 81 MG: 81 TABLET CHEWABLE at 08:04

## 2023-04-24 RX ADMIN — ATORVASTATIN CALCIUM 80 MG: 40 TABLET, FILM COATED ORAL at 09:04

## 2023-04-24 RX ADMIN — INSULIN DETEMIR 9 UNITS: 100 INJECTION, SOLUTION SUBCUTANEOUS at 09:04

## 2023-04-24 RX ADMIN — CEFADROXIL 1 G: 1000 TABLET ORAL at 09:04

## 2023-04-24 RX ADMIN — INSULIN ASPART 4 UNITS: 100 INJECTION, SOLUTION INTRAVENOUS; SUBCUTANEOUS at 12:04

## 2023-04-24 RX ADMIN — Medication 9 MG: at 09:04

## 2023-04-24 RX ADMIN — LISINOPRIL 10 MG: 10 TABLET ORAL at 08:04

## 2023-04-24 RX ADMIN — INSULIN ASPART 6 UNITS: 100 INJECTION, SOLUTION INTRAVENOUS; SUBCUTANEOUS at 06:04

## 2023-04-24 NOTE — ASSESSMENT & PLAN NOTE
Ms Badillo is a 73F w/ DM, PAD w/ L heel and L great toe wounds/cellulitis    -- LLE angiogram with possible intervention tomorrow 4/25   - risks and benefits of procedure discussed, consent in chart   - npo at midnight  -- Smoking cessation  -- DM control  -- continue ASA, plavix, statin  -- wound care  -- podiatry consulted   -- MRI to evaluate L foot with posterior calcaneus psb early osteo; talar head psb insufficiency fx; subq edema at ankle/dorsum of foot, sterile vs infectious  -- f/u BECKIE/TBI, vein mapping US  -- float heels  -- remainder of care per primary team

## 2023-04-24 NOTE — PROGRESS NOTES
04/24/23 0800   WOCN Assessment   WOCN Total Time (mins) 45   Visit Date 04/24/23   Visit Time 0800   Consult Type New   WOCN Speciality Wound   Intervention assessed;changed;applied;chart review;coordination of care;orders        Altered Skin Integrity 04/19/23 1600 Left plantar Toe, first Diabetic Ulcer Full thickness tissue loss. Base is covered by slough and/or eschar in the wound bed   Date First Assessed/Time First Assessed: 04/19/23 1600   Altered Skin Integrity Present on Admission - Did Patient arrive to the hospital with altered skin?: yes  Side: Left  Orientation: plantar  Location: Toe, first  Primary Wound Type: Diabetic Ulc...   Wound Image    Description of Altered Skin Integrity Full thickness tissue loss. Base is covered by slough and/or eschar in the wound bed   Dressing Appearance Clean;Dry;Intact   Drainage Amount None   Black (%), Wound Tissue Color 100 %   Periwound Area Dry   Wound Edges Defined;Callused   Wound Length (cm) 2 cm   Wound Width (cm) 2 cm   Wound Depth (cm) 0.1 cm   Wound Volume (cm^3) 0.4 cm^3   Wound Surface Area (cm^2) 4 cm^2        Altered Skin Integrity 04/19/23 1600 Left posterior Heel Ulceration Full thickness tissue loss. Base is covered by slough and/or eschar in the wound bed   Date First Assessed/Time First Assessed: 04/19/23 1600   Altered Skin Integrity Present on Admission - Did Patient arrive to the hospital with altered skin?: yes  Side: Left  Orientation: posterior  Location: Heel  Primary Wound Type: Ulceration  Desc...   Wound Image    Description of Altered Skin Integrity Full thickness tissue loss. Base is covered by slough and/or eschar in the wound bed   Dressing Appearance Clean;Dry;Intact   Drainage Amount None   Black (%), Wound Tissue Color 100 %   Periwound Area Dry;Indurated   Wound Edges Defined   Wound Length (cm) 5 cm   Wound Width (cm) 6 cm   Wound Depth (cm) 0.1 cm   Wound Volume (cm^3) 3 cm^3   Wound Surface Area (cm^2) 30 cm^2        Altered  Skin Integrity 04/19/23 1600 Left anterior;lower;medial Leg Arterial Ulcer Partial thickness tissue loss. Shallow open ulcer with a red or pink wound bed, without slough. Intact or Open/Ruptured Serum-filled blister.   Date First Assessed/Time First Assessed: 04/19/23 1600   Altered Skin Integrity Present on Admission - Did Patient arrive to the hospital with altered skin?: yes  Side: Left  Orientation: anterior;lower;medial  Location: Leg  Primary Wound Type: Arter...   Wound Image    Description of Altered Skin Integrity Intact skin with non-blanchable redness of localized area   Dressing Appearance Clean;Dry;Intact   Drainage Amount None   Red (%), Wound Tissue Color 100 %   Periwound Area Intact   Wound Edges Defined   Wound Length (cm) 7 cm   Wound Width (cm) 7 cm   Wound Depth (cm) 0.1 cm   Wound Volume (cm^3) 4.9 cm^3   Wound Surface Area (cm^2) 49 cm^2        Altered Skin Integrity 04/19/23 1600 Right anterior;lower;medial Leg Abrasion(s) Partial thickness tissue loss. Shallow open ulcer with a red or pink wound bed, without slough. Intact or Open/Ruptured Serum-filled blister.   Date First Assessed/Time First Assessed: 04/19/23 1600   Altered Skin Integrity Present on Admission - Did Patient arrive to the hospital with altered skin?: yes  Side: Right  Orientation: anterior;lower;medial  Location: Leg  Primary Wound Type: Jose...   Wound Image    Description of Altered Skin Integrity Partial thickness tissue loss. Shallow open ulcer with a red or pink wound bed, without slough. Intact or Open/Ruptured Serum-filled blister.   Dressing Appearance Clean;Dry;Intact   Drainage Amount None   Red (%), Wound Tissue Color 100 %   Periwound Area Intact   Wound Edges Defined   Wound Length (cm) 4.5 cm   Wound Width (cm) 4.5 cm   Wound Depth (cm) 0.1 cm   Wound Volume (cm^3) 2.025 cm^3   Wound Surface Area (cm^2) 20.25 cm^2        Altered Skin Integrity 04/19/23 1600 Right posterior Heel Diabetic Ulcer Partial thickness  tissue loss. Shallow open ulcer with a red or pink wound bed, without slough. Intact or Open/Ruptured Serum-filled blister.   Date First Assessed/Time First Assessed: 04/19/23 1600   Altered Skin Integrity Present on Admission - Did Patient arrive to the hospital with altered skin?: yes  Side: Right  Orientation: posterior  Location: Heel  Primary Wound Type: Diabetic Ulcer ...   Wound Image    Description of Altered Skin Integrity Intact skin with non-blanchable redness of localized area   Dressing Appearance Clean;Dry;Intact   Drainage Amount None   Red (%), Wound Tissue Color 100 %   Periwound Area Intact   Wound Edges Undefined   Wound Length (cm) 4.5 cm   Wound Width (cm) 4.5 cm   Wound Depth (cm) 0.1 cm   Wound Volume (cm^3) 2.025 cm^3   Wound Surface Area (cm^2) 20.25 cm^2   [facility]  Wound Care    Patient Name:  Anastasiia Badillo   MRN:  [id number]  Date: 4/24/2023  Diagnosis: PAD (peripheral artery disease)    History:     Past Medical History:   Diagnosis Date    Diabetes mellitus, type 2        Social History     Socioeconomic History    Marital status:    Tobacco Use    Smoking status: Every Day     Packs/day: 0.25     Years: 45.00     Pack years: 11.25     Types: Cigarettes    Smokeless tobacco: Never   Substance and Sexual Activity    Alcohol use: Yes    Drug use: Never   Social History Narrative    ** Merged History Encounter **          Social Determinants of Health     Financial Resource Strain: Low Risk     Difficulty of Paying Living Expenses: Not hard at all   Food Insecurity: No Food Insecurity    Worried About Running Out of Food in the Last Year: Never true    Ran Out of Food in the Last Year: Never true   Transportation Needs: No Transportation Needs    Lack of Transportation (Medical): No    Lack of Transportation (Non-Medical): No   Physical Activity: Unknown    Days of Exercise per Week: Patient refused    Minutes of Exercise per Session: Patient refused   Stress: Unknown     Feeling of Stress : Patient refused   Social Connections: Unknown    Frequency of Communication with Friends and Family: Patient refused    Frequency of Social Gatherings with Friends and Family: Patient refused    Attends Bahai Services: Patient refused    Active Member of Clubs or Organizations: Patient refused    Attends Club or Organization Meetings: Patient refused    Marital Status: Patient refused   Housing Stability: Unknown    Unable to Pay for Housing in the Last Year: No    Unstable Housing in the Last Year: No       Precautions:     Allergies as of 04/20/2023    (No Known Allergies)       [facility] Assessment Details/Treatment   Patient consult for wound care to bi lateral medial legs and bi lateral heels. Bi lateral legs, that has dry scab skin, the patient isn't able explain how she got the scrapes to her legs. The treatment to each leg is to cleanse with normal saline then apply a mepilex border to each leg the dressing change is every three days and prn. The left great toe and left heel with 100% eschar the treatment to each site to cleanse with normal saline apply betadine to each site cover with dry gauze secure great toe with border or tape and secure left heel with dry gauze and a foam heel border then apply heel boots to each heel. Dressing change every other day and prn    Recommendations made to primary team for  the above  Orders placed.     04/24/2023

## 2023-04-24 NOTE — SUBJECTIVE & OBJECTIVE
Medications:  Continuous Infusions:  Scheduled Meds:   aspirin  81 mg Oral Daily    atorvastatin  80 mg Oral QHS    cefadroxil  1 g Oral Q12H    clopidogreL  75 mg Oral Daily    enoxaparin  40 mg Subcutaneous Daily    insulin detemir U-100  9 Units Subcutaneous QHS    lisinopriL  10 mg Oral Daily    nicotine  1 patch Transdermal Daily     PRN Meds:acetaminophen, dextrose 10%, dextrose 10%, glucagon (human recombinant), HYDROcodone-acetaminophen, HYDROcodone-acetaminophen, insulin aspart U-100, melatonin, naloxone, ondansetron, sodium chloride 0.9%     Objective:     Vital Signs (Most Recent):  Temp: 97.9 °F (36.6 °C) (04/24/23 0828)  Pulse: 79 (04/24/23 0828)  Resp: 17 (04/24/23 0839)  BP: 139/64 (04/24/23 0828)  SpO2: 95 % (04/24/23 0828) Vital Signs (24h Range):  Temp:  [97 °F (36.1 °C)-98.6 °F (37 °C)] 97.9 °F (36.6 °C)  Pulse:  [75-89] 79  Resp:  [14-18] 17  SpO2:  [93 %-96 %] 95 %  BP: (139-178)/(60-99) 139/64         Physical Exam  Vitals reviewed.   Constitutional:       Appearance: Normal appearance.   Cardiovascular:      Comments: 1+ femoral pulses bilateral  DP and PT monophasic on the right  AT and PT are monophasic on the left.   Pulmonary:      Effort: Pulmonary effort is normal.   Abdominal:      General: Abdomen is flat.   Skin:     General: Skin is warm and dry.      Comments: Non healing heel wound and toe wounds dry gangrene LLE   Neurological:      General: No focal deficit present.      Mental Status: She is alert and oriented to person, place, and time.        Significant Labs:  CBC:   Recent Labs   Lab 04/24/23  0323   WBC 5.83   RBC 3.50*   HGB 10.3*   HCT 32.4*      MCV 93   MCH 29.4   MCHC 31.8*     CMP:   Recent Labs   Lab 04/24/23  0323   *   CALCIUM 8.9   ALBUMIN 2.5*   PROT 5.7*   *   K 4.3   CO2 28   CL 98   BUN 13   CREATININE 0.8   ALKPHOS 99   ALT <5*   AST 9*   BILITOT 0.3       Significant Diagnostics:  I have reviewed all pertinent imaging results/findings  within the past 24 hours.

## 2023-04-24 NOTE — ANESTHESIA PREPROCEDURE EVALUATION
[facility]  Anesthesia Pre-Operative Evaluation         Patient Name: Anastasiia Badillo  YOB: 1950  MRN: [id number]    SUBJECTIVE:     Pre-operative evaluation for Procedure(s) (LRB):  Angiogram Extremity Unilateral (Left)     04/24/2023    Anastasiia Badillo is a 73 y.o. female with a significant medical history of ischemic left foot ulcers with superinfection cellulitis, PAD on DAPT, tobacco use, T2DM, and HTN who presents for the above procedure after admission for left foot wounds.    LDA:        Peripheral IV - Single Lumen 04/20/23 1240 22 G Anterior;Left Forearm (Active)   Site Assessment No redness;No swelling 04/23/23 0748   Extremity Assessment Distal to IV No abnormal discoloration;No swelling;No redness;No warmth 04/23/23 0748   Line Status Saline locked;Flushed 04/23/23 0748   Dressing Status Intact;Dry;Clean 04/23/23 0748   Dressing Intervention Integrity maintained 04/22/23 1600   Dressing Change Due 04/24/23 04/21/23 1936   Site Change Due 04/24/23 04/21/23 1936   Reason Not Rotated Not due 04/22/23 1600   Number of days: 3       Female External Urinary Catheter 04/19/23 2000 (Active)   Skin no breakdown;no redness 04/23/23 0748   Tolerance no signs/symptoms of discomfort 04/23/23 0748   Suction Continuous suction at 70 mmHg 04/23/23 0748   Date of last wick change 04/22/23 04/22/23 1945   Time of last wick change 0800 04/22/23 0800   Output (mL) 350 mL 04/24/23 0455   Number of days: 4       Prev airway:      Method of Intubation:  Video laryngoscopy    Blade:  Bell 3    Laryngeal View Grade: Grade I - full view of cords      Difficult Airway Encountered?: No      Complications:  None    Airway Device:  Oral endotracheal tube    Airway Device Size:  7.0    Style/Cuff Inflation:  Cuffed (inflated to minimal occlusive pressure)    Tube secured:   22    Drips: None documented.      Patient Active Problem List   Diagnosis    Type 2 diabetes mellitus with foot ulcer, with long-term current use of insulin    Essential (primary) hypertension    Urinary tract infection without hematuria    ACP (advance care planning)    Closed left subtrochanteric femur fracture, initial encounter    Acute blood loss anemia    Nicotine dependence    Diabetic foot ulcer    Cellulitis of great toe of left foot    Sepsis    Popliteal artery stenosis, left    Kidney lesion, native, right    PAD (peripheral artery disease)    Pre-op evaluation       Review of patient's allergies indicates:  No Known Allergies    Current Inpatient Medications:   aspirin  81 mg Oral Daily    atorvastatin  80 mg Oral QHS    cefadroxil  1 g Oral Q12H    clopidogreL  75 mg Oral Daily    enoxaparin  40 mg Subcutaneous Daily    insulin detemir U-100  9 Units Subcutaneous QHS    lisinopriL  10 mg Oral Daily    nicotine  1 patch Transdermal Daily       No current facility-administered medications on file prior to encounter.     Current Outpatient Medications on File Prior to Encounter   Medication Sig Dispense Refill    ascorbic acid, vitamin C, (VITAMIN C) 500 MG tablet Take 500 mg by mouth once daily.      calcium carbonate (OS-DAGMAR) 600 mg calcium (1,500 mg) Tab Take 600 mg by mouth 2 (two) times daily with meals.      cholecalciferol, vitamin D3, 10 mcg (400 unit) Chew Take by mouth 2 (two) times a day.      cinnamon bark 500 mg capsule Take 500 mg by mouth once daily.      HYDROcodone-acetaminophen (NORCO) 5-325 mg per tablet Take 1 tablet by mouth every 6 (six) hours as needed for Pain. 21 tablet 0    insulin aspart U-100 (NOVOLOG) 100 unit/mL (3 mL) InPn pen Inject 0-5 Units into the skin before meals and at bedtime as needed (Hyperglycemia). Blood Glucose  mg/dL                  Pre-meal                2200  151-200                0 unit                      0 unit  201-250     "            2 units                    1 unit  251-300                3 units                    1 unit  301-350                4 units                    2 units  >350                     5 units                    3 units  0    insulin detemir U-100 (LEVEMIR FLEXTOUCH) 100 unit/mL (3 mL) SubQ InPn pen Inject 18 Units into the skin once daily.  0    lisinopriL 10 MG tablet Take 10 mg by mouth.      loperamide (IMODIUM) 2 mg capsule Take 2 mg by mouth 4 (four) times daily as needed for Diarrhea.      melatonin 10 mg Cap Take 1 capsule by mouth every evening.      multivit-min-FA-lycopen-lutein 0.4 mg-300 mcg- 250 mcg Tab Take 1 tablet by mouth once daily.      pen needle, diabetic (PEN NEEDLE) 31 gauge x 3/16" Ndle 1 application by Misc.(Non-Drug; Combo Route) route 2 (two) times a day. 200 each 0    ondansetron (ZOFRAN) 4 MG tablet Take by mouth.      UNABLE TO FIND Take 1 capsule by mouth daily as needed. Beet Root         Past Surgical History:   Procedure Laterality Date    AUGMENTATION OF BREAST      INTRAMEDULLARY RODDING OF FEMUR Left 2/18/2023    Procedure: INSERTION, INTRAMEDULLARY ROXANNA, FEMUR ([employer].coma table -- synthes -- long nail -- have bovie and suction and large bone set);  Surgeon: Keanu Brand MD;  Location[facility];  Service: Orthopedics;  Laterality: Left;       Social History     Socioeconomic History    Marital status:    Tobacco Use    Smoking status: Every Day     Packs/day: 0.25     Years: 45.00     Pack years: 11.25     Types: Cigarettes    Smokeless tobacco: Never   Substance and Sexual Activity    Alcohol use: Yes    Drug use: Never   Social History Narrative    ** Merged History Encounter **          Social Determinants of Health     Financial Resource Strain: Low Risk     Difficulty of Paying Living Expenses: Not hard at all   Food Insecurity: No Food Insecurity    Worried About Running Out of Food in the Last Year: Never true    Ran Out of Food in the Last " Year: Never true   Transportation Needs: No Transportation Needs    Lack of Transportation (Medical): No    Lack of Transportation (Non-Medical): No   Physical Activity: Unknown    Days of Exercise per Week: Patient refused    Minutes of Exercise per Session: Patient refused   Stress: Unknown    Feeling of Stress : Patient refused   Social Connections: Unknown    Frequency of Communication with Friends and Family: Patient refused    Frequency of Social Gatherings with Friends and Family: Patient refused    Attends Hindu Services: Patient refused    Active Member of Clubs or Organizations: Patient refused    Attends Club or Organization Meetings: Patient refused    Marital Status: Patient refused   Housing Stability: Unknown    Unable to Pay for Housing in the Last Year: No    Unstable Housing in the Last Year: No       OBJECTIVE:     Vital Signs Range:  Vitals - 1 value per visit 4/24/2023 4/24/2023 4/24/2023   SYSTOLIC 139 - 174   DIASTOLIC 64 - 83   Pulse 79 - 78   Temp 97.9 - 98.4   Resp 17 17 18   SPO2 95 - 97   Weight (lb) - - -   Weight (kg) - - -   Height - - -   BMI (Calculated) - - -   VISIT REPORT - - -   Pain Score  - - -         CBC:   Lab Results   Component Value Date    WBC 5.83 04/24/2023    HGB 10.3 (L) 04/24/2023    HCT 32.4 (L) 04/24/2023    MCV 93 04/24/2023     04/24/2023         CMP:   Sodium   Date Value Ref Range Status   04/24/2023 132 (L) 136 - 145 mmol/L Final     Potassium   Date Value Ref Range Status   04/24/2023 4.3 3.5 - 5.1 mmol/L Final     Chloride   Date Value Ref Range Status   04/24/2023 98 95 - 110 mmol/L Final     CO2   Date Value Ref Range Status   04/24/2023 28 23 - 29 mmol/L Final     Glucose   Date Value Ref Range Status   04/24/2023 258 (H) 70 - 110 mg/dL Final     BUN   Date Value Ref Range Status   04/24/2023 13 8 - 23 mg/dL Final     Creatinine   Date Value Ref Range Status   04/24/2023 0.8 0.5 - 1.4 mg/dL Final     Calcium   Date Value Ref Range  Status   04/24/2023 8.9 8.7 - 10.5 mg/dL Final     Total Protein   Date Value Ref Range Status   04/24/2023 5.7 (L) 6.0 - 8.4 g/dL Final     Albumin   Date Value Ref Range Status   04/24/2023 2.5 (L) 3.5 - 5.2 g/dL Final     Total Bilirubin   Date Value Ref Range Status   04/24/2023 0.3 0.1 - 1.0 mg/dL Final     Comment:     For infants and newborns, interpretation of results should be based  on gestational age, weight and in agreement with clinical  observations.    Premature Infant recommended reference ranges:  Up to 24 hours.............<8.0 mg/dL  Up to 48 hours............<12.0 mg/dL  3-5 days..................<15.0 mg/dL  6-29 days.................<15.0 mg/dL       Alkaline Phosphatase   Date Value Ref Range Status   04/24/2023 99 55 - 135 U/L Final     AST   Date Value Ref Range Status   04/24/2023 9 (L) 10 - 40 U/L Final     ALT   Date Value Ref Range Status   04/24/2023 <5 (L) 10 - 44 U/L Final     Anion Gap   Date Value Ref Range Status   04/24/2023 6 (L) 8 - 16 mmol/L Final     eGFR if    Date Value Ref Range Status   04/15/2021 78 > OR = 60 mL/min/1.73m2 Final     eGFR if non    Date Value Ref Range Status   04/15/2021 67 > OR = 60 mL/min/1.73m2 Final       INR:  Lab Results   Component Value Date    INR 0.9 04/20/2023       EKG:   Results for orders placed or performed during the hospital encounter of 02/17/23   EKG 12-lead    Collection Time: 02/17/23  2:07 PM    Narrative    Test Reason : Z01.810,    Vent. Rate : 101 BPM     Atrial Rate : 101 BPM     P-R Int : 130 ms          QRS Dur : 086 ms      QT Int : 352 ms       P-R-T Axes : 087 075 057 degrees     QTc Int : 456 ms    Sinus tachycardia  Nonspecific ST abnormality  Abnormal ECG  No previous ECGs available  Confirmed by Jensen Mason MD ([id number]) on 2/20/2023 1:22:59 PM    Referred By: MACIEL   SELF           Confirmed By:Jensen Mason MD        2D ECHO:   No results found for this or any previous visit.          ASSESSMENT/PLAN:         Pre-op Assessment    I have reviewed the Patient Summary Reports.     I have reviewed the Nursing Notes.    I have reviewed the Medications.     Review of Systems  Anesthesia Hx:  No problems with previous Anesthesia  Denies Family Hx of Anesthesia complications.   Denies Personal Hx of Anesthesia complications.   Hematology/Oncology:         -- Anemia:   Cardiovascular:   Hypertension    Pulmonary:  Pulmonary Normal    Renal/:  Renal/ Normal     Hepatic/GI:  Hepatic/GI Normal    Neurological:  Neurology Normal    Endocrine:   Diabetes, type 2, using insulin    Psych:  Psychiatric Normal           Physical Exam  General: Well nourished, Cooperative, Alert and Oriented    Airway:  Mallampati: II   Mouth Opening: Normal  TM Distance: Normal  Tongue: Normal  Neck ROM: Normal ROM    Dental:  Edentulous        Anesthesia Plan  Type of Anesthesia, risks & benefits discussed:    Anesthesia Type: Gen Supraglottic Airway, Gen Natural Airway, MAC  Intra-op Monitoring Plan: Standard ASA Monitors  Post Op Pain Control Plan: multimodal analgesia and IV/PO Opioids PRN  Induction:  IV  Informed Consent: Informed consent signed with the Patient and all parties understand the risks and agree with anesthesia plan.  All questions answered.   ASA Score: 3  Day of Surgery Review of History & Physical: H&P Update referred to the surgeon/provider.    Ready For Surgery From Anesthesia Perspective.     .

## 2023-04-24 NOTE — ASSESSMENT & PLAN NOTE
Patient's FSGs are uncontrolled due to hyperglycemia on current medication regimen.  Last A1c reviewed-   Lab Results   Component Value Date    HGBA1C 8.2 (H) 04/21/2023     Most recent fingerstick glucose reviewed-   Recent Labs   Lab 04/24/23  0009 04/24/23  0544 04/24/23  0726 04/24/23  1141   POCTGLUCOSE 311* 136* 93 168*     Current correctional scale  Medium  Maintain anti-hyperglycemic dose as follows-   Antihyperglycemics (From admission, onward)    Start     Stop Route Frequency Ordered    04/22/23 2100  insulin detemir U-100 (Levemir) pen 9 Units         -- SubQ Nightly 04/22/23 0849    04/22/23 0148  insulin aspart U-100 pen 1-10 Units         -- SubQ Every 6 hours PRN 04/22/23 0049        Hold Oral hypoglycemics while patient is in the hospital.

## 2023-04-24 NOTE — PROGRESS NOTES
[facility] Medicine  Progress Note    Patient Name: Anastasiia Badillo  MRN: [id number]  Patient Class: IP- Inpatient   Admission Date: 4/21/2023  Length of Stay: 3 days  Attending Physician: Linh Cantor MD  Primary Care Provider: Raji Parkinson MD        Subjective:     Principal Problem:PAD (peripheral artery disease)        HPI:  Ms. Badillo is a 72 y/o F with a past medical history significant for DM2, HTN, recent femur fracture, and tobacco use who presented to [facility] ED for L foot wounds.  She states they have been present for about 2 weeks, but are becoming worse. She noted some redness to the left great toe over the past couple of days. She was seen by home health who told her to come to the ED for a wound check. She denies ever having similar wounds, but she had an intramedullary denzel inserted into her left femur on 2/18/23 for a subtrochanteric fracture and has been rubbing her left lower leg against objects frequently due to the difficulty she is experiencing in moving her left leg since surgery. She was found to have WBC 13, ESR 75, . She was given 1x vancomycin and zosyn. She was admitted to Hospital Medicine service. Started on vanc/cefepime. Arterial US BLE showed high grade stenosis SFA bilaterally and popliteal on the left. ID was consulted. Wound cx grew staph aureus, susceptibility pending. CTA w/ runoff showed RLE: 60% and greater stenosis of mid&distal SFA and high-grade stenosis distal PTA; LLE: 60% and greater stenosis mid&distal SFA. She also had a retroperitoneal US which showed 2.3 cm hypoechoic mass of the upper pole of the right kidney may represent a bloody cyst. Patient transferred [place] [facility] [place] vascular surgery eval.      Overview/Hospital Course:  Admitted to medicine for management of LLE wound. Wound positive for MSSA, MRI negative for OM. ID consulted with recs for 10 days course with cefadroxil. Vascular planning for angiogram Tuesday.       Interval  History: NAEON; angio tomorrow. Melatonin added     Review of Systems   Constitutional:  Positive for fatigue. Negative for chills, diaphoresis and fever.   Respiratory:  Negative for cough and shortness of breath.    Gastrointestinal:  Negative for abdominal pain, diarrhea, nausea and vomiting.   Genitourinary:  Negative for dysuria.   Musculoskeletal:  Negative for back pain.   Skin:  Positive for color change and wound. Negative for pallor and rash.   Neurological:  Negative for weakness, numbness and headaches.   Psychiatric/Behavioral:  Negative for agitation.    All other systems reviewed and are negative.  Objective:     Vital Signs (Most Recent):  Temp: 98.4 °F (36.9 °C) (04/24/23 1052)  Pulse: 78 (04/24/23 1052)  Resp: 18 (04/24/23 1052)  BP: (!) 174/83 (04/24/23 1052)  SpO2: 97 % (04/24/23 1052)   Vital Signs (24h Range):  Temp:  [97.8 °F (36.6 °C)-98.6 °F (37 °C)] 98.4 °F (36.9 °C)  Pulse:  [75-84] 78  Resp:  [14-18] 18  SpO2:  [93 %-97 %] 97 %  BP: (139-174)/(60-83) 174/83     Weight: 76.1 kg (167 lb 12.3 oz)  Body mass index is 29.72 kg/m².    Intake/Output Summary (Last 24 hours) at 4/24/2023 1523  Last data filed at 4/24/2023 0455  Gross per 24 hour   Intake --   Output 350 ml   Net -350 ml      Physical Exam  Vitals and nursing note reviewed.   Constitutional:       General: She is not in acute distress.     Appearance: Normal appearance. She is well-developed. She is not diaphoretic.   HENT:      Head: Normocephalic and atraumatic.      Mouth/Throat:      Mouth: Mucous membranes are moist.      Pharynx: Oropharynx is clear.   Eyes:      Pupils: Pupils are equal, round, and reactive to light.   Cardiovascular:      Rate and Rhythm: Normal rate and regular rhythm.      Heart sounds: Normal heart sounds. No murmur heard.    No friction rub. No gallop.   Pulmonary:      Effort: Pulmonary effort is normal. No respiratory distress.      Breath sounds: Normal breath sounds. No wheezing or rales.   Chest:  "     Chest wall: No tenderness.   Abdominal:      General: Abdomen is flat. Bowel sounds are normal. There is no distension.      Palpations: Abdomen is soft. There is no mass.      Tenderness: There is no abdominal tenderness. There is no guarding or rebound.      Hernia: No hernia is present.   Musculoskeletal:         General: No swelling, tenderness or deformity. Normal range of motion.      Cervical back: Normal range of motion and neck supple.   Skin:     General: Skin is warm and dry.      Capillary Refill: Capillary refill takes less than 2 seconds.      Coloration: Skin is not pale.      Findings: Lesion (see media tab, ulcers on LLE, non-healing abrashions LLE, RLE w/ heel callous) present. No erythema.      Comments: Left hallux wound and left heel wound c/d/I    Neurological:      General: No focal deficit present.      Mental Status: She is alert and oriented to person, place, and time.      Cranial Nerves: No cranial nerve deficit.      Coordination: Coordination normal.   Psychiatric:         Mood and Affect: Mood normal.         Behavior: Behavior normal.         Thought Content: Thought content normal.       Significant Labs: All pertinent labs within the past 24 hours have been reviewed.    Significant Imaging: I have reviewed all pertinent imaging results/findings within the past 24 hours.      Assessment/Plan:      * PAD (peripheral artery disease)  Patient w/ hx diabetes, tobacco use who presents w/ non-healing ulcers of LLE. CTA showing stenosis of bl SFA , bl popliteal.  -vascular surgery consulted, appreciate recs  -glucose control  -Starting DAPT per vascular recs   - Planning for Angiogram Tuesday   - high intensity statin started qhs         Pre-op evaluation  Per surgical team      Kidney lesion, native, right  See on CTA. F/u US showing "2.3 cm hypoechoic mass of the upper pole of the right kidney may represent a bloody cyst but follow-up is recommended"  - Refer to PCP for OP follow " up  - Patient understand the need to follow up on this on discharge       Cellulitis of great toe of left foot  See diabetic foot ulcer       Diabetic foot ulcer  Patient started on vanc/cefepime at OSH w/ ID consult. Initial WCx w/ staph aureus, susceptibilities pending.  - Wound cultures; MSSA   - Starting Cefadroxil 10 d course, D/c vanc/cefepime    - ID consult, appreciate recs    Nicotine dependence  Dangers of cigarette smoking were reviewed with patient in detail. Patient was Counseled for 3-10 minutes. Nicotine replacement options were discussed. Nicotine replacement was discussed- not prescribed per patient's request       Essential (primary) hypertension  -lisinopril 10 daily       Type 2 diabetes mellitus with foot ulcer, with long-term current use of insulin  Patient's FSGs are uncontrolled due to hyperglycemia on current medication regimen.  Last A1c reviewed-   Lab Results   Component Value Date    HGBA1C 8.2 (H) 04/21/2023     Most recent fingerstick glucose reviewed-   Recent Labs   Lab 04/24/23  0009 04/24/23  0544 04/24/23  0726 04/24/23  1141   POCTGLUCOSE 311* 136* 93 168*     Current correctional scale  Medium  Maintain anti-hyperglycemic dose as follows-   Antihyperglycemics (From admission, onward)    Start     Stop Route Frequency Ordered    04/22/23 2100  insulin detemir U-100 (Levemir) pen 9 Units         -- SubQ Nightly 04/22/23 0849    04/22/23 0148  insulin aspart U-100 pen 1-10 Units         -- SubQ Every 6 hours PRN 04/22/23 0049        Hold Oral hypoglycemics while patient is in the hospital.       VTE Risk Mitigation (From admission, onward)         Ordered     enoxaparin injection 40 mg  Daily         04/22/23 0022     IP VTE HIGH RISK PATIENT  Once         04/22/23 0022     Place sequential compression device  Until discontinued         04/22/23 0022                Discharge Planning   CITLALI: 4/26/2023     Code Status: Full Code   Is the patient medically ready for discharge?: No     Reason for patient still in hospital (select all that apply): Treatment  Discharge Plan A: Assisted Living, Home Health          Gene Nguyen MD  Department of Hospital Medicine   [facility]

## 2023-04-24 NOTE — PROGRESS NOTES
[facility]  Vascular Surgery  Progress Note    Patient Name: Anastasiia Badillo  MRN: [id number]  Admission Date: 4/21/2023  Primary Care Provider: Raji Parkinson MD    Subjective:     Interval History: NAEON. Risks and benefits of LLE angiogram discussed, consent obtained, planning for tomorrow afternoon 4/25.     Post-Op Info:  Procedure(s) (LRB):  Angiogram Extremity Unilateral (Left)           Medications:  Continuous Infusions:  Scheduled Meds:   aspirin  81 mg Oral Daily    atorvastatin  80 mg Oral QHS    cefadroxil  1 g Oral Q12H    clopidogreL  75 mg Oral Daily    enoxaparin  40 mg Subcutaneous Daily    insulin detemir U-100  9 Units Subcutaneous QHS    lisinopriL  10 mg Oral Daily    nicotine  1 patch Transdermal Daily     PRN Meds:acetaminophen, dextrose 10%, dextrose 10%, glucagon (human recombinant), HYDROcodone-acetaminophen, HYDROcodone-acetaminophen, insulin aspart U-100, melatonin, naloxone, ondansetron, sodium chloride 0.9%     Objective:     Vital Signs (Most Recent):  Temp: 97.9 °F (36.6 °C) (04/24/23 0828)  Pulse: 79 (04/24/23 0828)  Resp: 17 (04/24/23 0839)  BP: 139/64 (04/24/23 0828)  SpO2: 95 % (04/24/23 0828) Vital Signs (24h Range):  Temp:  [97 °F (36.1 °C)-98.6 °F (37 °C)] 97.9 °F (36.6 °C)  Pulse:  [75-89] 79  Resp:  [14-18] 17  SpO2:  [93 %-96 %] 95 %  BP: (139-178)/(60-99) 139/64         Physical Exam  Vitals reviewed.   Constitutional:       Appearance: Normal appearance.   Cardiovascular:      Comments: 1+ femoral pulses bilateral  DP and PT monophasic on the right  AT and PT are monophasic on the left.   Pulmonary:      Effort: Pulmonary effort is normal.   Abdominal:      General: Abdomen is flat.   Skin:     General: Skin is warm and dry.      Comments: Non healing heel wound and toe wounds dry gangrene LLE   Neurological:      General: No focal deficit present.      Mental Status: She is alert and oriented to person, place, and time.        Significant Labs:  CBC:    Recent Labs   Lab 04/24/23  0323   WBC 5.83   RBC 3.50*   HGB 10.3*   HCT 32.4*      MCV 93   MCH 29.4   MCHC 31.8*     CMP:   Recent Labs   Lab 04/24/23  0323   *   CALCIUM 8.9   ALBUMIN 2.5*   PROT 5.7*   *   K 4.3   CO2 28   CL 98   BUN 13   CREATININE 0.8   ALKPHOS 99   ALT <5*   AST 9*   BILITOT 0.3       Significant Diagnostics:  I have reviewed all pertinent imaging results/findings within the past 24 hours.    Assessment/Plan:     Diabetic foot ulcer  Ms Badillo is a 73F w/ DM, PAD w/ L heel and L great toe wounds/cellulitis    -- LLE angiogram with possible intervention tomorrow 4/25   - risks and benefits of procedure discussed, consent in chart   - npo at midnight  -- Smoking cessation  -- DM control  -- continue ASA, plavix, statin  -- wound care  -- podiatry consulted   -- MRI to evaluate L foot with posterior calcaneus psb early osteo; talar head psb insufficiency fx; subq edema at ankle/dorsum of foot, sterile vs infectious  -- f/u BECKIE/TBI, vein mapping US  -- float heels  -- remainder of care per primary team         Anat Dale MD  Vascular Surgery  [facility]

## 2023-04-24 NOTE — PHARMACY MED REC
"Admission Medication History     The home medication history was taken by Isidoro Nguyen.    You may go to "Admission" then "Reconcile Home Medications" tabs to review and/or act upon these items.     The home medication list has been updated by the Pharmacy department.   Please read ALL comments highlighted in yellow.   Please address this information as you see fit.    Feel free to contact us if you have any questions or require assistance.      The medications listed below were removed from the home medication list. Please reorder if appropriate:  Patient reports no longer taking the following medication(s):  ASCORBIC ACID, VITAMIN C 500 MG  CALCIUM CARBONATE 600 MG CALCIUM TAB  CHOLECALCIFEROL, VITAMIN D3, 10 MCG CHEW  CINNAMON BARK 500 MG CAP  LOPERAMIDE 2 MG CAP  ONDANSETRON 4 MG TAB  BEET ROOT CAP    Medications listed below were obtained from: Patient/family  PTA Medications   Medication Sig    acetaminophen (TYLENOL) 500 MG tablet   Take 1,000 mg by mouth every 6 (six) hours as needed for Pain.    HYDROcodone-acetaminophen (NORCO) 5-325 mg per tablet   Take 1 tablet by mouth every 6 (six) hours as needed for Pain.    insulin aspart U-100 (NOVOLOG) 100 unit/mL (3 mL) InPn pen Inject 0-5 Units into the skin before meals and at bedtime as needed (Hyperglycemia). Blood Glucose  mg/dL                  Pre-meal                2200  151-200                0 unit                      0 unit  201-250                2 units                    1 unit  251-300                3 units                    1 unit  301-350                4 units                    2 units  >350                     5 units                    3 units  Last filled 3/13/23 for 7 day supply. Patient reports MD provided insulin      insulin detemir U-100 (LEVEMIR FLEXTOUCH) 100   unit/mL (3 mL) SubQ InPn pen   Inject 14 Units into the skin once daily.    melatonin 10 mg Cap   Take 1 capsule by mouth every evening.    multivit-min-FA-lycopen-lutein 0.4 " "mg-300 mcg- 250 mcg Tab   Take 1 tablet by mouth once daily.    pen needle, diabetic (PEN NEEDLE) 31 gauge x 3/16" Ndle   1 application by Misc.(Non-Drug; Combo Route) route 2 (two) times a day.    lisinopriL (PRINIVIL,ZESTRIL) 5 MG tablet Take 5 mg by mouth once daily.  Patient unsure if taking @ Assisted Living       Potential issues to be addressed PRIOR TO DISCHARGE  Please discuss with the patient barriers to adherence with medication treatment plans    Patient requires education regarding drug therapies           Isidoro Nguyen  [id number]                  .        "

## 2023-04-24 NOTE — PLAN OF CARE
VASCULAR SURGERY CARE UPDATE    Planning for LLE angiogram with possible revascularization tomorrow Tues 4/25. (Procedure and revascularization plan discussed in detail w patient). Will obtain consent today. NPO at midnight.    F/U vein mapping of LE for potential bypass conduit.  Continue asa 81 and plavix daily therapy for PAD.      Catalina Oviedo MD  General Surgery, PGY-1  [facility]  Pager# [phone]     170.18

## 2023-04-24 NOTE — PLAN OF CARE
[facility] - Med Surg  Initial Discharge Assessment       Primary Care Provider: Raji Parkinson MD    Admission Diagnosis: Diabetic foot ulcer [E11.621, L97.509]    Admission Date: 4/21/2023  Expected Discharge Date: 4/26/2023   Pt admitted to [facility] 4/19/23 and transferred to [facility]. PT lives at [facility] assisted living in [place] and is active ith [facility]. Pt has a home RW, and WC. She has PH[employer] insurance and her PCP is Dr. Parkinson . Pt uses [facility]. Pts discharge plan is home with resumption of [facility]. CM following for additional needs.     Discharge Barriers Identified: None    Payor: [employer]ARE / Plan: [employer] / Product Type: Medicare Advantage /     Extended Emergency Contact Information  Primary Emergency Contact: SharmainesYaya salazar  Mobile Phone: [phone]  Relation: Son   needed? No    Discharge Plan A: Assisted Living, Home Health  Discharge Plan B: Skilled Nursing Facility      [id number] [place], [place] - [address] AT [facility] [address]  [address]  [facility] [address]  Phone: [phone] Fax: [phone]      Initial Assessment (most recent)       Adult Discharge Assessment - 04/24/23 1131          Discharge Assessment    Assessment Type Discharge Planning Assessment     Confirmed/corrected address, phone number and insurance Yes     Confirmed Demographics Correct on Facesheet     Source of Information patient     Reason For Admission Diabetic foot ulcer     People in Home alone     Facility Arrived From: [facility]     Do you expect to return to your current living situation? No     Do you have help at home or someone to help you manage your care at home? Yes     Who are your caregiver(s) and their phone number(s)? [facility]'s assited living and [facility]     Prior to hospitilization cognitive status: Alert/Oriented     Current cognitive status: Alert/Oriented     Walking or Climbing Stairs  ambulation difficulty, requires equipment     Mobility Management WC RW     Home Accessibility wheelchair accessible     Home Layout Able to live on 1st floor     Equipment Currently Used at Home walker, rolling;wheelchair     Readmission within 30 days? No     Patient currently being followed by outpatient case management? No     Do you currently have service(s) that help you manage your care at home? Yes     Name and Contact number of agency [facility]     Is the pt/caregiver preference to resume services with current agency Yes     Do you take prescription medications? Yes     Do you have prescription coverage? Yes     Do you have any problems affording any of your prescribed medications? No     Is the patient taking medications as prescribed? yes     Who is going to help you get home at discharge? Son Yaya Badillo [phone]     How do you get to doctors appointments? family or friend will provide     Are you on dialysis? No     Discharge Plan A Assisted Living;Home Health     Discharge Plan B Skilled Nursing Facility     DME Needed Upon Discharge  none     Discharge Plan discussed with: Patient     Discharge Barriers Identified None

## 2023-04-24 NOTE — SUBJECTIVE & OBJECTIVE
Interval History: NAEON; angio tomorrow. Melatonin added     Review of Systems   Constitutional:  Positive for fatigue. Negative for chills, diaphoresis and fever.   Respiratory:  Negative for cough and shortness of breath.    Gastrointestinal:  Negative for abdominal pain, diarrhea, nausea and vomiting.   Genitourinary:  Negative for dysuria.   Musculoskeletal:  Negative for back pain.   Skin:  Positive for color change and wound. Negative for pallor and rash.   Neurological:  Negative for weakness, numbness and headaches.   Psychiatric/Behavioral:  Negative for agitation.    All other systems reviewed and are negative.  Objective:     Vital Signs (Most Recent):  Temp: 98.4 °F (36.9 °C) (04/24/23 1052)  Pulse: 78 (04/24/23 1052)  Resp: 18 (04/24/23 1052)  BP: (!) 174/83 (04/24/23 1052)  SpO2: 97 % (04/24/23 1052)   Vital Signs (24h Range):  Temp:  [97.8 °F (36.6 °C)-98.6 °F (37 °C)] 98.4 °F (36.9 °C)  Pulse:  [75-84] 78  Resp:  [14-18] 18  SpO2:  [93 %-97 %] 97 %  BP: (139-174)/(60-83) 174/83     Weight: 76.1 kg (167 lb 12.3 oz)  Body mass index is 29.72 kg/m².    Intake/Output Summary (Last 24 hours) at 4/24/2023 1523  Last data filed at 4/24/2023 0455  Gross per 24 hour   Intake --   Output 350 ml   Net -350 ml      Physical Exam  Vitals and nursing note reviewed.   Constitutional:       General: She is not in acute distress.     Appearance: Normal appearance. She is well-developed. She is not diaphoretic.   HENT:      Head: Normocephalic and atraumatic.      Mouth/Throat:      Mouth: Mucous membranes are moist.      Pharynx: Oropharynx is clear.   Eyes:      Pupils: Pupils are equal, round, and reactive to light.   Cardiovascular:      Rate and Rhythm: Normal rate and regular rhythm.      Heart sounds: Normal heart sounds. No murmur heard.    No friction rub. No gallop.   Pulmonary:      Effort: Pulmonary effort is normal. No respiratory distress.      Breath sounds: Normal breath sounds. No wheezing or rales.    Chest:      Chest wall: No tenderness.   Abdominal:      General: Abdomen is flat. Bowel sounds are normal. There is no distension.      Palpations: Abdomen is soft. There is no mass.      Tenderness: There is no abdominal tenderness. There is no guarding or rebound.      Hernia: No hernia is present.   Musculoskeletal:         General: No swelling, tenderness or deformity. Normal range of motion.      Cervical back: Normal range of motion and neck supple.   Skin:     General: Skin is warm and dry.      Capillary Refill: Capillary refill takes less than 2 seconds.      Coloration: Skin is not pale.      Findings: Lesion (see media tab, ulcers on LLE, non-healing abrashions LLE, RLE w/ heel callous) present. No erythema.      Comments: Left hallux wound and left heel wound c/d/I    Neurological:      General: No focal deficit present.      Mental Status: She is alert and oriented to person, place, and time.      Cranial Nerves: No cranial nerve deficit.      Coordination: Coordination normal.   Psychiatric:         Mood and Affect: Mood normal.         Behavior: Behavior normal.         Thought Content: Thought content normal.       Significant Labs: All pertinent labs within the past 24 hours have been reviewed.    Significant Imaging: I have reviewed all pertinent imaging results/findings within the past 24 hours.

## 2023-04-25 ENCOUNTER — ANESTHESIA (OUTPATIENT)
Dept: SURGERY | Facility: HOSPITAL | Age: 73
DRG: 253 | End: 2023-04-25
Payer: MEDICARE

## 2023-04-25 LAB
BACTERIA SPEC AEROBE CULT: ABNORMAL
BACTERIA SPEC AEROBE CULT: ABNORMAL
POCT GLUCOSE: 121 MG/DL (ref 70–110)
POCT GLUCOSE: 136 MG/DL (ref 70–110)
POCT GLUCOSE: 140 MG/DL (ref 70–110)
POCT GLUCOSE: 159 MG/DL (ref 70–110)
POCT GLUCOSE: 229 MG/DL (ref 70–110)

## 2023-04-25 PROCEDURE — 25000003 PHARM REV CODE 250: Performed by: STUDENT IN AN ORGANIZED HEALTH CARE EDUCATION/TRAINING PROGRAM

## 2023-04-25 PROCEDURE — 82962 GLUCOSE BLOOD TEST: CPT | Performed by: SURGERY

## 2023-04-25 PROCEDURE — 75710 ARTERY X-RAYS ARM/LEG: CPT | Mod: 26,59,, | Performed by: SURGERY

## 2023-04-25 PROCEDURE — D9220A PRA ANESTHESIA: ICD-10-PCS | Mod: CRNA,,, | Performed by: REGISTERED NURSE

## 2023-04-25 PROCEDURE — 37228 PR TIB/PER REVASC W/TLA: ICD-10-PCS | Mod: LT,,, | Performed by: SURGERY

## 2023-04-25 PROCEDURE — D9220A PRA ANESTHESIA: Mod: ANES,,, | Performed by: INTERNAL MEDICINE

## 2023-04-25 PROCEDURE — 25000003 PHARM REV CODE 250: Performed by: REGISTERED NURSE

## 2023-04-25 PROCEDURE — 63600175 PHARM REV CODE 636 W HCPCS: Performed by: REGISTERED NURSE

## 2023-04-25 PROCEDURE — 63600175 PHARM REV CODE 636 W HCPCS

## 2023-04-25 PROCEDURE — C1760 CLOSURE DEV, VASC: HCPCS | Performed by: SURGERY

## 2023-04-25 PROCEDURE — 99233 PR SUBSEQUENT HOSPITAL CARE,LEVL III: ICD-10-PCS | Mod: GC,,, | Performed by: HOSPITALIST

## 2023-04-25 PROCEDURE — 36000706: Performed by: SURGERY

## 2023-04-25 PROCEDURE — 11000001 HC ACUTE MED/SURG PRIVATE ROOM

## 2023-04-25 PROCEDURE — C1725 CATH, TRANSLUMIN NON-LASER: HCPCS | Performed by: SURGERY

## 2023-04-25 PROCEDURE — 37000009 HC ANESTHESIA EA ADD 15 MINS: Performed by: SURGERY

## 2023-04-25 PROCEDURE — 37000008 HC ANESTHESIA 1ST 15 MINUTES: Performed by: SURGERY

## 2023-04-25 PROCEDURE — 94761 N-INVAS EAR/PLS OXIMETRY MLT: CPT

## 2023-04-25 PROCEDURE — 71000015 HC POSTOP RECOV 1ST HR: Performed by: SURGERY

## 2023-04-25 PROCEDURE — 75710 PR  ANGIO EXTREMITY UNILAT: ICD-10-PCS | Mod: 26,59,, | Performed by: SURGERY

## 2023-04-25 PROCEDURE — 37224 PR FEM/POPL REVAS W/TLA: ICD-10-PCS | Mod: 51,LT,, | Performed by: SURGERY

## 2023-04-25 PROCEDURE — 27201423 OPTIME MED/SURG SUP & DEVICES STERILE SUPPLY: Performed by: SURGERY

## 2023-04-25 PROCEDURE — C1769 GUIDE WIRE: HCPCS | Performed by: SURGERY

## 2023-04-25 PROCEDURE — D9220A PRA ANESTHESIA: ICD-10-PCS | Mod: ANES,,, | Performed by: INTERNAL MEDICINE

## 2023-04-25 PROCEDURE — 99233 SBSQ HOSP IP/OBS HIGH 50: CPT | Mod: GC,,, | Performed by: HOSPITALIST

## 2023-04-25 PROCEDURE — 71000033 HC RECOVERY, INTIAL HOUR: Performed by: SURGERY

## 2023-04-25 PROCEDURE — C1894 INTRO/SHEATH, NON-LASER: HCPCS | Performed by: SURGERY

## 2023-04-25 PROCEDURE — 25500020 PHARM REV CODE 255: Performed by: SURGERY

## 2023-04-25 PROCEDURE — 36000707: Performed by: SURGERY

## 2023-04-25 PROCEDURE — 63600175 PHARM REV CODE 636 W HCPCS: Performed by: NURSE ANESTHETIST, CERTIFIED REGISTERED

## 2023-04-25 PROCEDURE — 37228 PR TIB/PER REVASC W/TLA: CPT | Mod: LT,,, | Performed by: SURGERY

## 2023-04-25 PROCEDURE — 37224 PR FEM/POPL REVAS W/TLA: CPT | Mod: 51,LT,, | Performed by: SURGERY

## 2023-04-25 PROCEDURE — 25000003 PHARM REV CODE 250: Performed by: SURGERY

## 2023-04-25 PROCEDURE — D9220A PRA ANESTHESIA: Mod: CRNA,,, | Performed by: REGISTERED NURSE

## 2023-04-25 PROCEDURE — 25000003 PHARM REV CODE 250

## 2023-04-25 PROCEDURE — C1887 CATHETER, GUIDING: HCPCS | Performed by: SURGERY

## 2023-04-25 PROCEDURE — 63600175 PHARM REV CODE 636 W HCPCS: Performed by: SURGERY

## 2023-04-25 RX ORDER — ONDANSETRON 2 MG/ML
INJECTION INTRAMUSCULAR; INTRAVENOUS
Status: DISCONTINUED | OUTPATIENT
Start: 2023-04-25 | End: 2023-04-25

## 2023-04-25 RX ORDER — LIDOCAINE HYDROCHLORIDE 10 MG/ML
INJECTION, SOLUTION EPIDURAL; INFILTRATION; INTRACAUDAL; PERINEURAL
Status: DISCONTINUED | OUTPATIENT
Start: 2023-04-25 | End: 2023-04-25 | Stop reason: HOSPADM

## 2023-04-25 RX ORDER — HYDRALAZINE HYDROCHLORIDE 20 MG/ML
10 INJECTION INTRAMUSCULAR; INTRAVENOUS ONCE
Status: DISCONTINUED | OUTPATIENT
Start: 2023-04-25 | End: 2023-04-25

## 2023-04-25 RX ORDER — HEPARIN SODIUM 1000 [USP'U]/ML
INJECTION, SOLUTION INTRAVENOUS; SUBCUTANEOUS
Status: DISCONTINUED | OUTPATIENT
Start: 2023-04-25 | End: 2023-04-25

## 2023-04-25 RX ORDER — FENTANYL CITRATE 50 UG/ML
INJECTION, SOLUTION INTRAMUSCULAR; INTRAVENOUS
Status: DISCONTINUED | OUTPATIENT
Start: 2023-04-25 | End: 2023-04-25

## 2023-04-25 RX ORDER — IODIXANOL 320 MG/ML
INJECTION, SOLUTION INTRAVASCULAR
Status: DISCONTINUED | OUTPATIENT
Start: 2023-04-25 | End: 2023-04-25 | Stop reason: HOSPADM

## 2023-04-25 RX ORDER — DEXMEDETOMIDINE HYDROCHLORIDE 100 UG/ML
INJECTION, SOLUTION INTRAVENOUS
Status: DISCONTINUED | OUTPATIENT
Start: 2023-04-25 | End: 2023-04-25

## 2023-04-25 RX ORDER — MIDAZOLAM HYDROCHLORIDE 1 MG/ML
INJECTION INTRAMUSCULAR; INTRAVENOUS
Status: DISCONTINUED | OUTPATIENT
Start: 2023-04-25 | End: 2023-04-25

## 2023-04-25 RX ORDER — SODIUM CHLORIDE 0.9 % (FLUSH) 0.9 %
3 SYRINGE (ML) INJECTION
Status: DISCONTINUED | OUTPATIENT
Start: 2023-04-25 | End: 2023-04-25 | Stop reason: HOSPADM

## 2023-04-25 RX ORDER — VERAPAMIL HYDROCHLORIDE 2.5 MG/ML
INJECTION, SOLUTION INTRAVENOUS
Status: DISCONTINUED | OUTPATIENT
Start: 2023-04-25 | End: 2023-04-25 | Stop reason: HOSPADM

## 2023-04-25 RX ORDER — NITROGLYCERIN 5 MG/ML
INJECTION, SOLUTION INTRAVENOUS
Status: DISCONTINUED | OUTPATIENT
Start: 2023-04-25 | End: 2023-04-25 | Stop reason: HOSPADM

## 2023-04-25 RX ORDER — PROTAMINE SULFATE 10 MG/ML
INJECTION, SOLUTION INTRAVENOUS
Status: DISCONTINUED | OUTPATIENT
Start: 2023-04-25 | End: 2023-04-25

## 2023-04-25 RX ORDER — HEPARIN SODIUM 1000 [USP'U]/ML
INJECTION, SOLUTION INTRAVENOUS; SUBCUTANEOUS
Status: DISCONTINUED | OUTPATIENT
Start: 2023-04-25 | End: 2023-04-25 | Stop reason: HOSPADM

## 2023-04-25 RX ORDER — HYDRALAZINE HYDROCHLORIDE 20 MG/ML
5 INJECTION INTRAMUSCULAR; INTRAVENOUS ONCE
Status: COMPLETED | OUTPATIENT
Start: 2023-04-25 | End: 2023-04-25

## 2023-04-25 RX ADMIN — CEFADROXIL 1 G: 1000 TABLET ORAL at 08:04

## 2023-04-25 RX ADMIN — SODIUM CHLORIDE: 0.9 INJECTION, SOLUTION INTRAVENOUS at 02:04

## 2023-04-25 RX ADMIN — ATORVASTATIN CALCIUM 80 MG: 40 TABLET, FILM COATED ORAL at 08:04

## 2023-04-25 RX ADMIN — MIDAZOLAM HYDROCHLORIDE 1 MG: 1 INJECTION INTRAMUSCULAR; INTRAVENOUS at 02:04

## 2023-04-25 RX ADMIN — FENTANYL CITRATE 25 MCG: 50 INJECTION, SOLUTION INTRAMUSCULAR; INTRAVENOUS at 03:04

## 2023-04-25 RX ADMIN — DEXMEDETOMIDINE HYDROCHLORIDE 4 MCG: 100 INJECTION, SOLUTION INTRAVENOUS at 03:04

## 2023-04-25 RX ADMIN — LISINOPRIL 10 MG: 10 TABLET ORAL at 08:04

## 2023-04-25 RX ADMIN — CLOPIDOGREL BISULFATE 75 MG: 75 TABLET ORAL at 08:04

## 2023-04-25 RX ADMIN — ASPIRIN 81 MG CHEWABLE TABLET 81 MG: 81 TABLET CHEWABLE at 08:04

## 2023-04-25 RX ADMIN — INSULIN DETEMIR 9 UNITS: 100 INJECTION, SOLUTION SUBCUTANEOUS at 08:04

## 2023-04-25 RX ADMIN — HYDROCODONE BITARTRATE AND ACETAMINOPHEN 1 TABLET: 10; 325 TABLET ORAL at 08:04

## 2023-04-25 RX ADMIN — HEPARIN SODIUM 2000 UNITS: 1000 INJECTION, SOLUTION INTRAVENOUS; SUBCUTANEOUS at 04:04

## 2023-04-25 RX ADMIN — DEXMEDETOMIDINE HYDROCHLORIDE 8 MCG: 100 INJECTION, SOLUTION INTRAVENOUS at 04:04

## 2023-04-25 RX ADMIN — PROTAMINE SULFATE 45 MG: 10 INJECTION, SOLUTION INTRAVENOUS at 05:04

## 2023-04-25 RX ADMIN — DEXMEDETOMIDINE HYDROCHLORIDE 4 MCG: 100 INJECTION, SOLUTION INTRAVENOUS at 02:04

## 2023-04-25 RX ADMIN — DEXMEDETOMIDINE HYDROCHLORIDE 8 MCG: 100 INJECTION, SOLUTION INTRAVENOUS at 03:04

## 2023-04-25 RX ADMIN — HYDRALAZINE HYDROCHLORIDE 5 MG: 20 INJECTION, SOLUTION INTRAMUSCULAR; INTRAVENOUS at 08:04

## 2023-04-25 RX ADMIN — FENTANYL CITRATE 25 MCG: 50 INJECTION, SOLUTION INTRAMUSCULAR; INTRAVENOUS at 02:04

## 2023-04-25 RX ADMIN — HYDROCODONE BITARTRATE AND ACETAMINOPHEN 1 TABLET: 10; 325 TABLET ORAL at 10:04

## 2023-04-25 RX ADMIN — HEPARIN SODIUM 7000 UNITS: 1000 INJECTION, SOLUTION INTRAVENOUS; SUBCUTANEOUS at 03:04

## 2023-04-25 RX ADMIN — Medication 9 MG: at 08:04

## 2023-04-25 RX ADMIN — Medication 2 G: at 02:04

## 2023-04-25 RX ADMIN — INSULIN ASPART 2 UNITS: 100 INJECTION, SOLUTION INTRAVENOUS; SUBCUTANEOUS at 01:04

## 2023-04-25 RX ADMIN — ONDANSETRON 4 MG: 2 INJECTION INTRAMUSCULAR; INTRAVENOUS at 02:04

## 2023-04-25 NOTE — PLAN OF CARE
Vascular surgery care update    VSS, AF.  To OR today for LLE angiogram, possible intervention today. Consented and marked.       Catalina Oviedo MD  General Surgery, PGY-1  [facility]  Pager# [phone]

## 2023-04-25 NOTE — SUBJECTIVE & OBJECTIVE
Interval History: Angio postponed until the afternoon. No complaints this morning or overnight.     Review of Systems   Constitutional:  Positive for fatigue. Negative for chills, diaphoresis and fever.   Respiratory:  Negative for cough and shortness of breath.    Gastrointestinal:  Negative for abdominal pain, diarrhea, nausea and vomiting.   Genitourinary:  Negative for dysuria.   Musculoskeletal:  Negative for back pain.   Skin:  Positive for color change and wound. Negative for pallor and rash.   Neurological:  Negative for weakness, numbness and headaches.   Psychiatric/Behavioral:  Negative for agitation.    All other systems reviewed and are negative.  Objective:     Vital Signs (Most Recent):  Temp: 98.4 °F (36.9 °C) (04/25/23 1103)  Pulse: 87 (04/25/23 1103)  Resp: 18 (04/25/23 1103)  BP: (!) 143/64 (04/25/23 1103)  SpO2: 95 % (04/25/23 1103)   Vital Signs (24h Range):  Temp:  [97.8 °F (36.6 °C)-98.4 °F (36.9 °C)] 98.4 °F (36.9 °C)  Pulse:  [74-87] 87  Resp:  [17-20] 18  SpO2:  [94 %-99 %] 95 %  BP: (131-216)/() 143/64     Weight: 76.1 kg (167 lb 12.3 oz)  Body mass index is 29.72 kg/m².    Intake/Output Summary (Last 24 hours) at 4/25/2023 1320  Last data filed at 4/25/2023 0855  Gross per 24 hour   Intake --   Output 850 ml   Net -850 ml      Physical Exam  Vitals and nursing note reviewed.   Constitutional:       General: She is not in acute distress.     Appearance: Normal appearance. She is well-developed. She is not diaphoretic.   HENT:      Head: Normocephalic and atraumatic.      Mouth/Throat:      Mouth: Mucous membranes are moist.      Pharynx: Oropharynx is clear.   Eyes:      Pupils: Pupils are equal, round, and reactive to light.   Cardiovascular:      Rate and Rhythm: Normal rate and regular rhythm.      Heart sounds: Normal heart sounds. No murmur heard.    No friction rub. No gallop.   Pulmonary:      Effort: Pulmonary effort is normal. No respiratory distress.      Breath sounds:  Normal breath sounds. No wheezing or rales.   Chest:      Chest wall: No tenderness.   Abdominal:      General: Abdomen is flat. Bowel sounds are normal. There is no distension.      Palpations: Abdomen is soft. There is no mass.      Tenderness: There is no abdominal tenderness. There is no guarding or rebound.      Hernia: No hernia is present.   Musculoskeletal:         General: No swelling, tenderness or deformity. Normal range of motion.      Cervical back: Normal range of motion and neck supple.   Skin:     General: Skin is warm and dry.      Capillary Refill: Capillary refill takes less than 2 seconds.      Coloration: Skin is not pale.      Findings: Lesion (see media tab, ulcers on LLE, non-healing abrashions LLE, RLE w/ heel callous) present. No erythema.      Comments: Left hallux wound and left heel wound c/d/I    Neurological:      General: No focal deficit present.      Mental Status: She is alert and oriented to person, place, and time.      Cranial Nerves: No cranial nerve deficit.      Coordination: Coordination normal.   Psychiatric:         Mood and Affect: Mood normal.         Behavior: Behavior normal.         Thought Content: Thought content normal.       Significant Labs: All pertinent labs within the past 24 hours have been reviewed.    Significant Imaging: I have reviewed all pertinent imaging results/findings within the past 24 hours.

## 2023-04-25 NOTE — PLAN OF CARE
Vital signs stable. Afebrile. Alert, oriented and following commands. Denies pain/nausea. Dressing to right groin remains CDI. HOB flat until 1930. POC reviewed and understanding verbalized.

## 2023-04-25 NOTE — PLAN OF CARE
Problem: Adult Inpatient Plan of Care  Goal: Plan of Care Review  Outcome: Ongoing, Progressing  Goal: Patient-Specific Goal (Individualized)  Outcome: Ongoing, Progressing  Goal: Absence of Hospital-Acquired Illness or Injury  Outcome: Ongoing, Progressing  Goal: Optimal Comfort and Wellbeing  Outcome: Ongoing, Progressing  Goal: Readiness for Transition of Care  Outcome: Ongoing, Progressing     Problem: Diabetes Comorbidity  Goal: Blood Glucose Level Within Targeted Range  Outcome: Ongoing, Progressing     Problem: Adjustment to Illness (Sepsis/Septic Shock)  Goal: Optimal Coping  Outcome: Ongoing, Progressing     Problem: Bleeding (Sepsis/Septic Shock)  Goal: Absence of Bleeding  Outcome: Ongoing, Progressing     Problem: Glycemic Control Impaired (Sepsis/Septic Shock)  Goal: Blood Glucose Level Within Desired Range  Outcome: Ongoing, Progressing     Problem: Infection Progression (Sepsis/Septic Shock)  Goal: Absence of Infection Signs and Symptoms  Outcome: Ongoing, Progressing     Problem: Nutrition Impaired (Sepsis/Septic Shock)  Goal: Optimal Nutrition Intake  Outcome: Ongoing, Progressing     Problem: Impaired Wound Healing  Goal: Optimal Wound Healing  Outcome: Ongoing, Progressing     Problem: Skin Injury Risk Increased  Goal: Skin Health and Integrity  Outcome: Ongoing, Progressing     Problem: Fall Injury Risk  Goal: Absence of Fall and Fall-Related Injury  Outcome: Ongoing, Progressing     Problem: Pain Acute  Goal: Acceptable Pain Control and Functional Ability  Outcome: Ongoing, Progressing

## 2023-04-25 NOTE — ASSESSMENT & PLAN NOTE
Patient's FSGs are uncontrolled due to hyperglycemia on current medication regimen.  Last A1c reviewed-   Lab Results   Component Value Date    HGBA1C 8.2 (H) 04/21/2023     Most recent fingerstick glucose reviewed-   Recent Labs   Lab 04/25/23  0036 04/25/23  0707 04/25/23  0843 04/25/23  1101   POCTGLUCOSE 229* 121* 140* 159*     Current correctional scale  Medium  Maintain anti-hyperglycemic dose as follows-   Antihyperglycemics (From admission, onward)    Start     Stop Route Frequency Ordered    04/22/23 2100  insulin detemir U-100 (Levemir) pen 9 Units         -- SubQ Nightly 04/22/23 0849    04/22/23 0148  insulin aspart U-100 pen 1-10 Units         -- SubQ Every 6 hours PRN 04/22/23 0049        Hold Oral hypoglycemics while patient is in the hospital.

## 2023-04-25 NOTE — TRANSFER OF CARE
"Anesthesia Transfer of Care Note    Patient: Anastasiia Badillo    Procedure(s) Performed: Procedure(s) (LRB):  Angiogram Extremity Unilateral (Left)    Patient location: PACU    Anesthesia Type: general    Transport from OR: Transported from OR on room air with adequate spontaneous ventilation    Post pain: adequate analgesia    Post assessment: no apparent anesthetic complications    Post vital signs: stable    Level of consciousness: awake    Nausea/Vomiting: no nausea/vomiting    Complications: none    Transfer of care protocol was followed      Last vitals:   Visit Vitals  BP (!) 143/64   Pulse 87   Temp 36.9 °C (98.4 °F) (Oral)   Resp 18   Ht 5' 3" (1.6 m)   Wt 76.1 kg (167 lb 12.3 oz)   SpO2 95%   BMI 29.72 kg/m²     "

## 2023-04-25 NOTE — NURSING TRANSFER
Nursing Transfer Note      4/25/2023     Reason patient is being transferred: per md order     Transfer To: 642    Transfer via stretcher, bed    Transfer with n/a    Transported by n/a    Medicines sent: n/a    Any special needs or follow-up needed: HOB flat until 1930    Chart send with patient: Yes    Notified: son

## 2023-04-25 NOTE — DISCHARGE SUMMARY
[facility] Medicine  Discharge Summary      Patient Name: Anastasiia Badillo  MRN: [id number]  CASEY: [id number]  Patient Class: IP- Inpatient  Admission Date: 4/19/2023  Hospital Length of Stay: 2 days  Discharge Date and Time: 4/21/2023 11:00 PM  Attending Physician: Beverly att. providers found   Discharging Provider: RAMYA Mckeon  Primary Care Provider: Raji Parkinson MD    Primary Care Team: Networked reference to record PCT     HPI:   Anastasiia Badillo is a 74 y/o F with a past medical history significant for DM2 who presented to the ED for further evaluation of two wounds to the left foot, one to the heel and one to the great toe.  She states they have been present for about 2 weeks, but are becoming worse and she noted some redness to the left great toe over the past couple of days.  She was seen by home health today and was directed to the ED for a wound check.  She denies ever having similar wounds, but she had an intramedullary denzel inserted into her left femur on 2/18/23 for a subtrochanteric fracture and has been rubbing her left lower leg against objects frequently due to the difficulty she is experiencing in moving her left leg since surgery.  While in the ED she was administered IV vancomycin and IV zosyn.  WBC is 13K.  She is admitted to the service of hospital medicine for continued monitoring and treatment.      * No surgery found *      Hospital Course:   Anastasiia Badillo was monitored closely during her hospitalization after admission for further treatment of diabetic ulcer to left foot.  She was placed on IV cefepime and IV vancomycin.  ID and podiatry were consulted.  Arterial US BLE was performed which demonstrated focal high-grade stenosis within the bilateral mid SFAs and left popliteal artery.  CTA runoff abd/pelvis/BLE was performed which demonstrated focal stenosis of 60% in the mid SFA, focal stenosis of 60-70% stenosis distal SFA, several other sites  of up to 50% stenosis in the SFA and popliteal.  High-grade stenosis resulting in diminished flow in the PTA distally.  Preserved flow in the other trifurcation vessels of RLE; LLE: 60% stenosis mid SFA.  Focal 60-70% stenosis distal SFA.  Additional sites up to 50% stenosis elsewhere in the SFA and popliteal.  Preserved flow in the trifurcation vessels.  PFC was contacted for transfer for vascular services. Pt [facility].  She was transferred in good condition.       Goals of Care Treatment Preferences:  Code Status: Full Code      Consults:   Consults (From admission, onward)        Status Ordering Provider     Inpatient consult to Infectious Diseases  Once        Provider:  Rachel Pathak MD    Completed JUANY VILLEGAS     Case Management/  Once        Provider:  (Not yet assigned)    Completed JUANY VILLEGAS     Inpatient consult to Registered Dietitian/Nutritionist  Once        Provider:  (Not yet assigned)    Completed JUANY VILLEGAS          No new Assessment & Plan notes have been filed under this hospital service since the last note was generated.  Service: Hospital Medicine    Final Active Diagnoses:    Diagnosis Date Noted POA    PRINCIPAL PROBLEM:  Diabetic foot ulcer [E11.621, L97.509] 04/19/2023 Yes    Kidney lesion, native, right [N28.9] 04/21/2023 Yes    Popliteal artery stenosis, left [I70.202] 04/20/2023 Yes    Cellulitis of great toe of left foot [L03.032] 04/19/2023 Yes    Sepsis [A41.9] 04/19/2023 Yes    Nicotine dependence [F17.200] 02/20/2023 Yes    Type 2 diabetes mellitus with foot ulcer, with long-term current use of insulin [E11.621, L97.509, Z79.4] 02/17/2023 Yes    Essential (primary) hypertension [I10] 02/17/2023 Yes      Problems Resolved During this Admission:       Discharged Condition: good    Disposition: Another Health Care Inst*    Follow Up:    Patient Instructions:   No discharge procedures on  "file.    Significant Diagnostic Studies: Labs: All labs within the past 24 hours have been reviewed    Pending Diagnostic Studies:     None         Medications:  Reconciled Home Medications:      Medication List      ASK your doctor about these medications    HYDROcodone-acetaminophen 5-325 mg per tablet  Commonly known as: NORCO  Take 1 tablet by mouth every 6 (six) hours as needed for Pain.     insulin aspart U-100 100 unit/mL (3 mL) Inpn pen  Commonly known as: NovoLOG  Inject 0-5 Units into the skin before meals and at bedtime as needed (Hyperglycemia). Blood Glucose  mg/dL                  Pre-meal                2200  151-200                0 unit                      0 unit  201-250                2 units                    1 unit  251-300                3 units                    1 unit  301-350                4 units                    2 units  >350                     5 units                    3 units     insulin detemir U-100 (Levemir) 100 unit/mL (3 mL) Inpn pen  Inject 18 Units into the skin once daily.     lisinopriL 5 MG tablet  Commonly known as: PRINIVIL,ZESTRIL  Take 5 mg by mouth once daily.     melatonin 10 mg Cap  Take 1 capsule by mouth every evening.     multivit-min-FA-lycopen-lutein 0.4 mg-300 mcg- 250 mcg Tab  Take 1 tablet by mouth once daily.     pen needle, diabetic 31 gauge x 3/16" Ndle  Commonly known as: PEN NEEDLE  1 application by Misc.(Non-Drug; Combo Route) route 2 (two) times a day.            Indwelling Lines/Drains at time of discharge:   Lines/Drains/Airways     Drain  Duration           Female External Urinary Catheter 04/19/23 2000 5 days                Time spent on the discharge of patient: 35 minutes         RAMYA Mckeon  Department of Hospital Medicine  [facility]  "

## 2023-04-25 NOTE — ASSESSMENT & PLAN NOTE
Patient w/ hx diabetes, tobacco use who presents w/ non-healing ulcers of LLE. CTA showing stenosis of bl SFA , bl popliteal.  -vascular surgery consulted, appreciate recs  -glucose control  -Starting DAPT per vascular recs   - Angiogram this afternoon  - high intensity statin started qhs

## 2023-04-25 NOTE — HOSPITAL COURSE
Anastasiia Badillo was monitored closely during her hospitalization after admission for further treatment of diabetic ulcer to left foot.  She was placed on IV cefepime and IV vancomycin.  ID and podiatry were consulted.  Arterial US BLE was performed which demonstrated focal high-grade stenosis within the bilateral mid SFAs and left popliteal artery.  CTA runoff abd/pelvis/BLE was performed which demonstrated focal stenosis of 60% in the mid SFA, focal stenosis of 60-70% stenosis distal SFA, several other sites of up to 50% stenosis in the SFA and popliteal.  High-grade stenosis resulting in diminished flow in the PTA distally.  Preserved flow in the other trifurcation vessels of RLE; LLE: 60% stenosis mid SFA.  Focal 60-70% stenosis distal SFA.  Additional sites up to 50% stenosis elsewhere in the SFA and popliteal.  Preserved flow in the trifurcation vessels.  [facility] was contacted for transfer for vascular services. Pt was accepted to [facility].  She was transferred in good condition.

## 2023-04-25 NOTE — PROGRESS NOTES
[facility]ion Pantoja - Surgery ([facility])  [facility] Medicine  Progress Note    Patient Name: Anastasiia Badillo  MRN: [id number]  Patient Class: IP- Inpatient   Admission Date: 4/21/2023  Length of Stay: 4 days  Attending Physician: Linh Cantor MD  Primary Care Provider: Raji Parkinson MD        Subjective:     Principal Problem:PAD (peripheral artery disease)        HPI:  Ms. Badillo is a 72 y/o F with a past medical history significant for DM2, HTN, recent femur fracture, and tobacco use who presented to [facility] ED for L foot wounds.  She states they have been present for about 2 weeks, but are becoming worse. She noted some redness to the left great toe over the past couple of days. She was seen by home health who told her to come to the ED for a wound check. She denies ever having similar wounds, but she had an intramedullary denzel inserted into her left femur on 2/18/23 for a subtrochanteric fracture and has been rubbing her left lower leg against objects frequently due to the difficulty she is experiencing in moving her left leg since surgery. She was found to have WBC 13, ESR 75, . She was given 1x vancomycin and zosyn. She was admitted to Hospital Medicine service. Started on vanc/cefepime. Arterial US BLE showed high grade stenosis SFA bilaterally and popliteal on the left. ID was consulted. Wound cx grew staph aureus, susceptibility pending. CTA w/ runoff showed RLE: 60% and greater stenosis of mid&distal SFA and high-grade stenosis distal PTA; LLE: 60% and greater stenosis mid&distal SFA. She also had a retroperitoneal US which showed 2.3 cm hypoechoic mass of the upper pole of the right kidney may represent a bloody cyst. Patient transferred to [facility] for vascular surgery eval.      Overview/Hospital Course:  Admitted to medicine for management of LLE wound. Wound positive for MSSA, MRI negative for OM. ID consulted with recs for 10 days course with cefadroxil. Vascular planning for angiogram Tuesday.        Interval History: Angio postponed until the afternoon. No complaints this morning or overnight.     Review of Systems   Constitutional:  Positive for fatigue. Negative for chills, diaphoresis and fever.   Respiratory:  Negative for cough and shortness of breath.    Gastrointestinal:  Negative for abdominal pain, diarrhea, nausea and vomiting.   Genitourinary:  Negative for dysuria.   Musculoskeletal:  Negative for back pain.   Skin:  Positive for color change and wound. Negative for pallor and rash.   Neurological:  Negative for weakness, numbness and headaches.   Psychiatric/Behavioral:  Negative for agitation.    All other systems reviewed and are negative.  Objective:     Vital Signs (Most Recent):  Temp: 98.4 °F (36.9 °C) (04/25/23 1103)  Pulse: 87 (04/25/23 1103)  Resp: 18 (04/25/23 1103)  BP: (!) 143/64 (04/25/23 1103)  SpO2: 95 % (04/25/23 1103)   Vital Signs (24h Range):  Temp:  [97.8 °F (36.6 °C)-98.4 °F (36.9 °C)] 98.4 °F (36.9 °C)  Pulse:  [74-87] 87  Resp:  [17-20] 18  SpO2:  [94 %-99 %] 95 %  BP: (131-216)/() 143/64     Weight: 76.1 kg (167 lb 12.3 oz)  Body mass index is 29.72 kg/m².    Intake/Output Summary (Last 24 hours) at 4/25/2023 1320  Last data filed at 4/25/2023 0855  Gross per 24 hour   Intake --   Output 850 ml   Net -850 ml      Physical Exam  Vitals and nursing note reviewed.   Constitutional:       General: She is not in acute distress.     Appearance: Normal appearance. She is well-developed. She is not diaphoretic.   HENT:      Head: Normocephalic and atraumatic.      Mouth/Throat:      Mouth: Mucous membranes are moist.      Pharynx: Oropharynx is clear.   Eyes:      Pupils: Pupils are equal, round, and reactive to light.   Cardiovascular:      Rate and Rhythm: Normal rate and regular rhythm.      Heart sounds: Normal heart sounds. No murmur heard.    No friction rub. No gallop.   Pulmonary:      Effort: Pulmonary effort is normal. No respiratory distress.      Breath  "sounds: Normal breath sounds. No wheezing or rales.   Chest:      Chest wall: No tenderness.   Abdominal:      General: Abdomen is flat. Bowel sounds are normal. There is no distension.      Palpations: Abdomen is soft. There is no mass.      Tenderness: There is no abdominal tenderness. There is no guarding or rebound.      Hernia: No hernia is present.   Musculoskeletal:         General: No swelling, tenderness or deformity. Normal range of motion.      Cervical back: Normal range of motion and neck supple.   Skin:     General: Skin is warm and dry.      Capillary Refill: Capillary refill takes less than 2 seconds.      Coloration: Skin is not pale.      Findings: Lesion (see media tab, ulcers on LLE, non-healing abrashions LLE, RLE w/ heel callous) present. No erythema.      Comments: Left hallux wound and left heel wound c/d/I    Neurological:      General: No focal deficit present.      Mental Status: She is alert and oriented to person, place, and time.      Cranial Nerves: No cranial nerve deficit.      Coordination: Coordination normal.   Psychiatric:         Mood and Affect: Mood normal.         Behavior: Behavior normal.         Thought Content: Thought content normal.       Significant Labs: All pertinent labs within the past 24 hours have been reviewed.    Significant Imaging: I have reviewed all pertinent imaging results/findings within the past 24 hours.      Assessment/Plan:      * PAD (peripheral artery disease)  Patient w/ hx diabetes, tobacco use who presents w/ non-healing ulcers of LLE. CTA showing stenosis of bl SFA , bl popliteal.  -vascular surgery consulted, appreciate recs  -glucose control  -Starting DAPT per vascular recs   - Angiogram this afternoon  - high intensity statin started qhs         Pre-op evaluation  Per surgical team      Kidney lesion, native, right  See on CTA. F/u US showing "2.3 cm hypoechoic mass of the upper pole of the right kidney may represent a bloody cyst but " "follow-up is recommended"  - Refer to PCP for OP follow up  - Patient understand the need to follow up on this on discharge       Cellulitis of great toe of left foot  See diabetic foot ulcer       Diabetic foot ulcer  Patient started on vanc/cefepime at OSH w/ ID consult. Initial WCx w/ staph aureus, susceptibilities pending.  - Wound cultures; MSSA   - Starting Cefadroxil 10 d course, D/c vanc/cefepime    - ID consult, appreciate recs    Nicotine dependence  Dangers of cigarette smoking were reviewed with patient in detail. Patient was Counseled for 3-10 minutes. Nicotine replacement options were discussed. Nicotine replacement was discussed- not prescribed per patient's request       Essential (primary) hypertension  -lisinopril 10 daily       Type 2 diabetes mellitus with foot ulcer, with long-term current use of insulin  Patient's FSGs are uncontrolled due to hyperglycemia on current medication regimen.  Last A1c reviewed-   Lab Results   Component Value Date    HGBA1C 8.2 (H) 04/21/2023     Most recent fingerstick glucose reviewed-   Recent Labs   Lab 04/25/23  0036 04/25/23  0707 04/25/23  0843 04/25/23  1101   POCTGLUCOSE 229* 121* 140* 159*     Current correctional scale  Medium  Maintain anti-hyperglycemic dose as follows-   Antihyperglycemics (From admission, onward)    Start     Stop Route Frequency Ordered    04/22/23 2100  insulin detemir U-100 (Levemir) pen 9 Units         -- SubQ Nightly 04/22/23 0849    04/22/23 0148  insulin aspart U-100 pen 1-10 Units         -- SubQ Every 6 hours PRN 04/22/23 0049        Hold Oral hypoglycemics while patient is in the hospital.       VTE Risk Mitigation (From admission, onward)         Ordered     IP VTE HIGH RISK PATIENT  Once         04/22/23 0022     Place sequential compression device  Until discontinued         04/22/23 0022                Discharge Planning   CITLALI: 4/26/2023     Code Status: Full Code   Is the patient medically ready for discharge?: No    " Reason for patient still in hospital (select all that apply): Patient trending condition and Treatment  Discharge Plan A: Assisted Living, Home Health        Gene Nguyen MD  Department of Hospital Medicine   [facility] - Surgery (2nd Fl)

## 2023-04-26 LAB
ALBUMIN SERPL BCP-MCNC: 2.6 G/DL (ref 3.5–5.2)
ALP SERPL-CCNC: 97 U/L (ref 55–135)
ALT SERPL W/O P-5'-P-CCNC: 5 U/L (ref 10–44)
ANION GAP SERPL CALC-SCNC: 9 MMOL/L (ref 8–16)
AST SERPL-CCNC: 14 U/L (ref 10–40)
BASOPHILS # BLD AUTO: 0.07 K/UL (ref 0–0.2)
BASOPHILS NFR BLD: 0.7 % (ref 0–1.9)
BILIRUB SERPL-MCNC: 0.3 MG/DL (ref 0.1–1)
BUN SERPL-MCNC: 13 MG/DL (ref 8–23)
CALCIUM SERPL-MCNC: 9.4 MG/DL (ref 8.7–10.5)
CHLORIDE SERPL-SCNC: 101 MMOL/L (ref 95–110)
CO2 SERPL-SCNC: 24 MMOL/L (ref 23–29)
CREAT SERPL-MCNC: 0.8 MG/DL (ref 0.5–1.4)
DIFFERENTIAL METHOD: ABNORMAL
EOSINOPHIL # BLD AUTO: 0.1 K/UL (ref 0–0.5)
EOSINOPHIL NFR BLD: 1.1 % (ref 0–8)
ERYTHROCYTE [DISTWIDTH] IN BLOOD BY AUTOMATED COUNT: 13.4 % (ref 11.5–14.5)
EST. GFR  (NO RACE VARIABLE): >60 ML/MIN/1.73 M^2
GLUCOSE SERPL-MCNC: 303 MG/DL (ref 70–110)
HCT VFR BLD AUTO: 33.1 % (ref 37–48.5)
HGB BLD-MCNC: 10.2 G/DL (ref 12–16)
IMM GRANULOCYTES # BLD AUTO: 0.04 K/UL (ref 0–0.04)
IMM GRANULOCYTES NFR BLD AUTO: 0.4 % (ref 0–0.5)
LYMPHOCYTES # BLD AUTO: 1.5 K/UL (ref 1–4.8)
LYMPHOCYTES NFR BLD: 16.5 % (ref 18–48)
MCH RBC QN AUTO: 29.2 PG (ref 27–31)
MCHC RBC AUTO-ENTMCNC: 30.8 G/DL (ref 32–36)
MCV RBC AUTO: 95 FL (ref 82–98)
MONOCYTES # BLD AUTO: 0.7 K/UL (ref 0.3–1)
MONOCYTES NFR BLD: 7.4 % (ref 4–15)
NEUTROPHILS # BLD AUTO: 6.9 K/UL (ref 1.8–7.7)
NEUTROPHILS NFR BLD: 73.9 % (ref 38–73)
NRBC BLD-RTO: 0 /100 WBC
PLATELET # BLD AUTO: 386 K/UL (ref 150–450)
PMV BLD AUTO: 11.5 FL (ref 9.2–12.9)
POC ACTIVATED CLOTTING TIME K: 257 SEC (ref 74–137)
POC ACTIVATED CLOTTING TIME K: 275 SEC (ref 74–137)
POCT GLUCOSE: 205 MG/DL (ref 70–110)
POCT GLUCOSE: 207 MG/DL (ref 70–110)
POCT GLUCOSE: 208 MG/DL (ref 70–110)
POCT GLUCOSE: 279 MG/DL (ref 70–110)
POTASSIUM SERPL-SCNC: 4.7 MMOL/L (ref 3.5–5.1)
PROT SERPL-MCNC: 5.9 G/DL (ref 6–8.4)
RBC # BLD AUTO: 3.49 M/UL (ref 4–5.4)
SAMPLE: ABNORMAL
SAMPLE: ABNORMAL
SODIUM SERPL-SCNC: 134 MMOL/L (ref 136–145)
WBC # BLD AUTO: 9.36 K/UL (ref 3.9–12.7)

## 2023-04-26 PROCEDURE — 25000003 PHARM REV CODE 250: Performed by: STUDENT IN AN ORGANIZED HEALTH CARE EDUCATION/TRAINING PROGRAM

## 2023-04-26 PROCEDURE — 99233 PR SUBSEQUENT HOSPITAL CARE,LEVL III: ICD-10-PCS | Mod: ,,, | Performed by: SURGERY

## 2023-04-26 PROCEDURE — 85025 COMPLETE CBC W/AUTO DIFF WBC: CPT

## 2023-04-26 PROCEDURE — 99233 SBSQ HOSP IP/OBS HIGH 50: CPT | Mod: ,,, | Performed by: SURGERY

## 2023-04-26 PROCEDURE — 36415 COLL VENOUS BLD VENIPUNCTURE: CPT

## 2023-04-26 PROCEDURE — 99233 PR SUBSEQUENT HOSPITAL CARE,LEVL III: ICD-10-PCS | Mod: GC,,, | Performed by: HOSPITALIST

## 2023-04-26 PROCEDURE — 99233 SBSQ HOSP IP/OBS HIGH 50: CPT | Mod: GC,,, | Performed by: HOSPITALIST

## 2023-04-26 PROCEDURE — 25000003 PHARM REV CODE 250

## 2023-04-26 PROCEDURE — 11000001 HC ACUTE MED/SURG PRIVATE ROOM

## 2023-04-26 PROCEDURE — 80053 COMPREHEN METABOLIC PANEL: CPT

## 2023-04-26 RX ADMIN — CEFADROXIL 1 G: 1000 TABLET ORAL at 10:04

## 2023-04-26 RX ADMIN — ASPIRIN 81 MG CHEWABLE TABLET 81 MG: 81 TABLET CHEWABLE at 08:04

## 2023-04-26 RX ADMIN — HYDROCODONE BITARTRATE AND ACETAMINOPHEN 1 TABLET: 10; 325 TABLET ORAL at 09:04

## 2023-04-26 RX ADMIN — INSULIN DETEMIR 9 UNITS: 100 INJECTION, SOLUTION SUBCUTANEOUS at 10:04

## 2023-04-26 RX ADMIN — HYDROCODONE BITARTRATE AND ACETAMINOPHEN 1 TABLET: 10; 325 TABLET ORAL at 03:04

## 2023-04-26 RX ADMIN — HYDROCODONE BITARTRATE AND ACETAMINOPHEN 1 TABLET: 10; 325 TABLET ORAL at 08:04

## 2023-04-26 RX ADMIN — CLOPIDOGREL BISULFATE 75 MG: 75 TABLET ORAL at 08:04

## 2023-04-26 RX ADMIN — INSULIN ASPART 6 UNITS: 100 INJECTION, SOLUTION INTRAVENOUS; SUBCUTANEOUS at 06:04

## 2023-04-26 RX ADMIN — CEFADROXIL 1 G: 1000 TABLET ORAL at 09:04

## 2023-04-26 RX ADMIN — LISINOPRIL 10 MG: 10 TABLET ORAL at 08:04

## 2023-04-26 RX ADMIN — ATORVASTATIN CALCIUM 80 MG: 40 TABLET, FILM COATED ORAL at 09:04

## 2023-04-26 NOTE — PROGRESS NOTES
[facility] Medicine  Progress Note    Patient Name: Anastasiia Badillo  MRN: [id number]  Patient Class: IP- Inpatient   Admission Date: 4/21/2023  Length of Stay: 5 days  Attending Physician: Linh Cantor MD  Primary Care Provider: Raji Parkinson MD        Subjective:     Principal Problem:PAD (peripheral artery disease)        HPI:  Ms. Badillo is a 72 y/o F with a past medical history significant for DM2, HTN, recent femur fracture, and tobacco use who presented to [facility] ED for L foot wounds.  She states they have been present for about 2 weeks, but are becoming worse. She noted some redness to the left great toe over the past couple of days. She was seen by home health who told her to come to the ED for a wound check. She denies ever having similar wounds, but she had an intramedullary denzel inserted into her left femur on 2/18/23 for a subtrochanteric fracture and has been rubbing her left lower leg against objects frequently due to the difficulty she is experiencing in moving her left leg since surgery. She was found to have WBC 13, ESR 75, . She was given 1x vancomycin and zosyn. She was admitted to Hospital Medicine service. Started on vanc/cefepime. Arterial US BLE showed high grade stenosis SFA bilaterally and popliteal on the left. ID was consulted. Wound cx grew staph aureus, susceptibility pending. CTA w/ runoff showed RLE: 60% and greater stenosis of mid&distal SFA and high-grade stenosis distal PTA; LLE: 60% and greater stenosis mid&distal SFA. She also had a retroperitoneal US which showed 2.3 cm hypoechoic mass of the upper pole of the right kidney may represent a bloody cyst. Patient transferred to [facility] for vascular surgery eval.      Overview/Hospital Course:  Admitted to medicine for management of LLE wound. Wound positive for MSSA, MRI negative for OM. ID consulted with recs for 10 days course with cefadroxil. Vascular planning for angiogram Tuesday. Pt tolerated  procedure well. Having some tingling and pain in the LLE. Returned to vascular lab for follow-up evaluation.       Interval History: NAEON; pt tolerated procedure well.     Review of Systems   Constitutional:  Positive for fatigue. Negative for chills, diaphoresis and fever.   Respiratory:  Negative for cough and shortness of breath.    Gastrointestinal:  Negative for abdominal pain, diarrhea, nausea and vomiting.   Genitourinary:  Negative for dysuria.   Musculoskeletal:  Negative for back pain.   Skin:  Positive for color change and wound. Negative for pallor and rash.   Neurological:  Negative for weakness, numbness and headaches.   Psychiatric/Behavioral:  Negative for agitation.    All other systems reviewed and are negative.  Objective:     Vital Signs (Most Recent):  Temp: 97.5 °F (36.4 °C) (04/26/23 1142)  Pulse: 76 (04/26/23 1142)  Resp: 18 (04/26/23 1142)  BP: (!) 142/63 (04/26/23 1142)  SpO2: 97 % (04/26/23 1142)   Vital Signs (24h Range):  Temp:  [96.5 °F (35.8 °C)-98.7 °F (37.1 °C)] 97.5 °F (36.4 °C)  Pulse:  [67-82] 76  Resp:  [10-20] 18  SpO2:  [92 %-99 %] 97 %  BP: (136-185)/(60-82) 142/63     Weight: 76.1 kg (167 lb 12.3 oz)  Body mass index is 29.72 kg/m².    Intake/Output Summary (Last 24 hours) at 4/26/2023 1344  Last data filed at 4/26/2023 0517  Gross per 24 hour   Intake 620 ml   Output 400 ml   Net 220 ml      Physical Exam  Vitals and nursing note reviewed.   Constitutional:       General: She is not in acute distress.     Appearance: Normal appearance. She is well-developed. She is not diaphoretic.   HENT:      Head: Normocephalic and atraumatic.      Mouth/Throat:      Mouth: Mucous membranes are moist.      Pharynx: Oropharynx is clear.   Cardiovascular:      Rate and Rhythm: Normal rate.   Pulmonary:      Effort: Pulmonary effort is normal.   Abdominal:      Palpations: Abdomen is soft.   Musculoskeletal:         General: No swelling or deformity. Normal range of motion.      Cervical  "back: Normal range of motion.   Skin:     General: Skin is warm and dry.      Capillary Refill: Capillary refill takes less than 2 seconds.      Findings: Lesion (see media tab, ulcers on LLE, non-healing abrashions LLE, RLE w/ heel callous) present.      Comments: Left heel wound, left great toe wound     Neurological:      General: No focal deficit present.      Mental Status: She is alert and oriented to person, place, and time.      Cranial Nerves: No cranial nerve deficit.      Coordination: Coordination normal.   Psychiatric:         Mood and Affect: Mood normal.       Significant Labs: All pertinent labs within the past 24 hours have been reviewed.    Significant Imaging: I have reviewed all pertinent imaging results/findings within the past 24 hours.      Assessment/Plan:      * PAD (peripheral artery disease)  Patient w/ hx diabetes, tobacco use who presents w/ non-healing ulcers of LLE. CTA showing stenosis of bl SFA , bl popliteal.  -vascular surgery consulted, appreciate recs  -glucose control  -Starting DAPT per vascular recs   - high intensity statin started qhs         Pre-op evaluation  Per surgical team      Kidney lesion, native, right  See on CTA. F/u US showing "2.3 cm hypoechoic mass of the upper pole of the right kidney may represent a bloody cyst but follow-up is recommended"  - Refer to PCP for OP follow up  - Patient understand the need to follow up on this on discharge       Cellulitis of great toe of left foot  See diabetic foot ulcer       Diabetic foot ulcer  Patient started on vanc/cefepime at OSH w/ ID consult. Initial WCx w/ staph aureus, susceptibilities pending.  - Wound cultures; MSSA   - Starting Cefadroxil 10 d course, D/c vanc/cefepime    - ID consult, appreciate recs    Nicotine dependence  Dangers of cigarette smoking were reviewed with patient in detail. Patient was Counseled for 3-10 minutes. Nicotine replacement options were discussed. Nicotine replacement was discussed- " not prescribed per patient's request       Essential (primary) hypertension  -lisinopril 10 daily       Type 2 diabetes mellitus with foot ulcer, with long-term current use of insulin  Patient's FSGs are uncontrolled due to hyperglycemia on current medication regimen.  Last A1c reviewed-   Lab Results   Component Value Date    HGBA1C 8.2 (H) 04/21/2023     Most recent fingerstick glucose reviewed-   Recent Labs   Lab 04/25/23  1744 04/26/23  0004 04/26/23  0654 04/26/23  1141   POCTGLUCOSE 136* 207* 208* 205*     Current correctional scale  Medium  Maintain anti-hyperglycemic dose as follows-   Antihyperglycemics (From admission, onward)    Start     Stop Route Frequency Ordered    04/22/23 2100  insulin detemir U-100 (Levemir) pen 9 Units         -- SubQ Nightly 04/22/23 0849    04/22/23 0148  insulin aspart U-100 pen 1-10 Units         -- SubQ Every 6 hours PRN 04/22/23 0049        Hold Oral hypoglycemics while patient is in the hospital.       VTE Risk Mitigation (From admission, onward)         Ordered     IP VTE HIGH RISK PATIENT  Once         04/22/23 0022     Place sequential compression device  Until discontinued         04/22/23 0022                Discharge Planning   CITLALI: 4/26/2023     Code Status: Full Code   Is the patient medically ready for discharge?: No    Reason for patient still in hospital (select all that apply): Patient trending condition  Discharge Plan A: Assisted Living, Home Health          Gene Nguyen MD  Department of Hospital Medicine   [facility] Surg

## 2023-04-26 NOTE — ANESTHESIA POSTPROCEDURE EVALUATION
Anesthesia Post Evaluation    Patient: Anastasiia Badillo    Procedure(s) Performed: Procedure(s) (LRB):  Angiogram Extremity Unilateral (Left)  PTA (ANGIOPLASTY, PERCUTANEOUS, TRANSLUMINAL) (Left)    Final Anesthesia Type: MAC      Patient location during evaluation: PACU  Patient participation: Yes- Able to Participate  Level of consciousness: awake and alert  Post-procedure vital signs: reviewed and stable  Pain management: adequate  Airway patency: patent    PONV status at discharge: No PONV  Anesthetic complications: no      Cardiovascular status: blood pressure returned to baseline  Respiratory status: unassisted  Hydration status: euvolemic  Follow-up not needed.          Vitals Value Taken Time   /72 04/25/23 1941   Temp 36.2 °C (97.1 °F) 04/25/23 1941   Pulse 81 04/25/23 1941   Resp 18 04/25/23 2047   SpO2 95 % 04/25/23 1941         Event Time   Out of Recovery 04/25/2023 18:00:00         Pain/Pravin Score: Pain Rating Prior to Med Admin: 9 (4/25/2023  8:47 PM)  Pravin Score: 10 (4/25/2023  6:15 PM)

## 2023-04-26 NOTE — SUBJECTIVE & OBJECTIVE
Interval History: NAEON; pt tolerated procedure well.     Review of Systems   Constitutional:  Positive for fatigue. Negative for chills, diaphoresis and fever.   Respiratory:  Negative for cough and shortness of breath.    Gastrointestinal:  Negative for abdominal pain, diarrhea, nausea and vomiting.   Genitourinary:  Negative for dysuria.   Musculoskeletal:  Negative for back pain.   Skin:  Positive for color change and wound. Negative for pallor and rash.   Neurological:  Negative for weakness, numbness and headaches.   Psychiatric/Behavioral:  Negative for agitation.    All other systems reviewed and are negative.  Objective:     Vital Signs (Most Recent):  Temp: 97.5 °F (36.4 °C) (04/26/23 1142)  Pulse: 76 (04/26/23 1142)  Resp: 18 (04/26/23 1142)  BP: (!) 142/63 (04/26/23 1142)  SpO2: 97 % (04/26/23 1142)   Vital Signs (24h Range):  Temp:  [96.5 °F (35.8 °C)-98.7 °F (37.1 °C)] 97.5 °F (36.4 °C)  Pulse:  [67-82] 76  Resp:  [10-20] 18  SpO2:  [92 %-99 %] 97 %  BP: (136-185)/(60-82) 142/63     Weight: 76.1 kg (167 lb 12.3 oz)  Body mass index is 29.72 kg/m².    Intake/Output Summary (Last 24 hours) at 4/26/2023 1344  Last data filed at 4/26/2023 0517  Gross per 24 hour   Intake 620 ml   Output 400 ml   Net 220 ml      Physical Exam  Vitals and nursing note reviewed.   Constitutional:       General: She is not in acute distress.     Appearance: Normal appearance. She is well-developed. She is not diaphoretic.   HENT:      Head: Normocephalic and atraumatic.      Mouth/Throat:      Mouth: Mucous membranes are moist.      Pharynx: Oropharynx is clear.   Cardiovascular:      Rate and Rhythm: Normal rate.   Pulmonary:      Effort: Pulmonary effort is normal.   Abdominal:      Palpations: Abdomen is soft.   Musculoskeletal:         General: No swelling or deformity. Normal range of motion.      Cervical back: Normal range of motion.   Skin:     General: Skin is warm and dry.      Capillary Refill: Capillary refill  takes less than 2 seconds.      Findings: Lesion (see media tab, ulcers on LLE, non-healing abrashions LLE, RLE w/ heel callous) present.      Comments: Left heel wound, left great toe wound     Neurological:      General: No focal deficit present.      Mental Status: She is alert and oriented to person, place, and time.      Cranial Nerves: No cranial nerve deficit.      Coordination: Coordination normal.   Psychiatric:         Mood and Affect: Mood normal.       Significant Labs: All pertinent labs within the past 24 hours have been reviewed.    Significant Imaging: I have reviewed all pertinent imaging results/findings within the past 24 hours.

## 2023-04-26 NOTE — ASSESSMENT & PLAN NOTE
Patient's FSGs are uncontrolled due to hyperglycemia on current medication regimen.  Last A1c reviewed-   Lab Results   Component Value Date    HGBA1C 8.2 (H) 04/21/2023     Most recent fingerstick glucose reviewed-   Recent Labs   Lab 04/25/23  1744 04/26/23  0004 04/26/23  0654 04/26/23  1141   POCTGLUCOSE 136* 207* 208* 205*     Current correctional scale  Medium  Maintain anti-hyperglycemic dose as follows-   Antihyperglycemics (From admission, onward)    Start     Stop Route Frequency Ordered    04/22/23 2100  insulin detemir U-100 (Levemir) pen 9 Units         -- SubQ Nightly 04/22/23 0849    04/22/23 0148  insulin aspart U-100 pen 1-10 Units         -- SubQ Every 6 hours PRN 04/22/23 0049        Hold Oral hypoglycemics while patient is in the hospital.

## 2023-04-26 NOTE — SUBJECTIVE & OBJECTIVE
Medications:  Continuous Infusions:  Scheduled Meds:   aspirin  81 mg Oral Daily    atorvastatin  80 mg Oral QHS    cefadroxil  1 g Oral Q12H    clopidogreL  75 mg Oral Daily    insulin detemir U-100  9 Units Subcutaneous QHS    lisinopriL  10 mg Oral Daily    nicotine  1 patch Transdermal Daily     PRN Meds:acetaminophen, dextrose 10%, dextrose 10%, glucagon (human recombinant), HYDROcodone-acetaminophen, HYDROcodone-acetaminophen, insulin aspart U-100, melatonin, naloxone, ondansetron, sodium chloride 0.9%     Objective:     Vital Signs (Most Recent):  Temp: 98.7 °F (37.1 °C) (04/26/23 0742)  Pulse: 79 (04/26/23 0742)  Resp: 17 (04/26/23 0840)  BP: (!) 158/70 (04/26/23 0742)  SpO2: (!) 93 % (04/26/23 0742)   Vital Signs (24h Range):  Temp:  [96.5 °F (35.8 °C)-98.7 °F (37.1 °C)] 98.7 °F (37.1 °C)  Pulse:  [67-82] 79  Resp:  [10-20] 17  SpO2:  [92 %-99 %] 93 %  BP: (136-185)/(60-82) 158/70         Physical Exam  Vitals and nursing note reviewed.   Constitutional:       General: She is not in acute distress.     Appearance: Normal appearance. She is well-developed. She is not diaphoretic.   HENT:      Head: Normocephalic and atraumatic.      Mouth/Throat:      Mouth: Mucous membranes are moist.      Pharynx: Oropharynx is clear.   Cardiovascular:      Rate and Rhythm: Normal rate.   Pulmonary:      Effort: Pulmonary effort is normal.   Abdominal:      Palpations: Abdomen is soft.   Musculoskeletal:         General: No swelling or deformity. Normal range of motion.      Cervical back: Normal range of motion.   Skin:     General: Skin is warm and dry.      Capillary Refill: Capillary refill takes less than 2 seconds.      Findings: Lesion (see media tab, ulcers on LLE, non-healing abrashions LLE, RLE w/ heel callous) present.      Comments: Left heel wound, left great toe wound     Neurological:      General: No focal deficit present.      Mental Status: She is alert and oriented to person, place, and time.       Cranial Nerves: No cranial nerve deficit.      Coordination: Coordination normal.   Psychiatric:         Mood and Affect: Mood normal.       Significant Labs:  CBC: No results for input(s): WBC, RBC, HGB, HCT, PLT, MCV, MCH, MCHC in the last 48 hours.  CMP: No results for input(s): GLU, CALCIUM, ALBUMIN, PROT, NA, K, CO2, CL, BUN, CREATININE, ALKPHOS, ALT, AST, BILITOT in the last 48 hours.    Significant Diagnostics:  I have reviewed all pertinent imaging results/findings within the past 24 hours.

## 2023-04-26 NOTE — ASSESSMENT & PLAN NOTE
Patient w/ hx diabetes, tobacco use who presents w/ non-healing ulcers of LLE. CTA showing stenosis of bl SFA , bl popliteal.  -vascular surgery consulted, appreciate recs  -glucose control  -Starting DAPT per vascular recs   - high intensity statin started qhs

## 2023-04-27 ENCOUNTER — TELEPHONE (OUTPATIENT)
Dept: FAMILY MEDICINE | Facility: CLINIC | Age: 73
End: 2023-04-27

## 2023-04-27 VITALS
HEIGHT: 63 IN | OXYGEN SATURATION: 95 % | SYSTOLIC BLOOD PRESSURE: 174 MMHG | DIASTOLIC BLOOD PRESSURE: 72 MMHG | BODY MASS INDEX: 29.72 KG/M2 | RESPIRATION RATE: 19 BRPM | HEART RATE: 87 BPM | WEIGHT: 167.75 LBS | TEMPERATURE: 98 F

## 2023-04-27 PROBLEM — R23.4 ESCHAR OF FOOT: Status: ACTIVE | Noted: 2023-04-27

## 2023-04-27 PROBLEM — N63.0 BREAST MASS: Status: ACTIVE | Noted: 2023-04-27

## 2023-04-27 LAB
ALBUMIN SERPL BCP-MCNC: 2.6 G/DL (ref 3.5–5.2)
ALP SERPL-CCNC: 97 U/L (ref 55–135)
ALT SERPL W/O P-5'-P-CCNC: <5 U/L (ref 10–44)
ANION GAP SERPL CALC-SCNC: 7 MMOL/L (ref 8–16)
AST SERPL-CCNC: 13 U/L (ref 10–40)
BASOPHILS # BLD AUTO: 0.05 K/UL (ref 0–0.2)
BASOPHILS NFR BLD: 0.5 % (ref 0–1.9)
BILIRUB SERPL-MCNC: 0.3 MG/DL (ref 0.1–1)
BUN SERPL-MCNC: 12 MG/DL (ref 8–23)
CALCIUM SERPL-MCNC: 9 MG/DL (ref 8.7–10.5)
CHLORIDE SERPL-SCNC: 102 MMOL/L (ref 95–110)
CO2 SERPL-SCNC: 28 MMOL/L (ref 23–29)
CREAT SERPL-MCNC: 0.7 MG/DL (ref 0.5–1.4)
DIFFERENTIAL METHOD: ABNORMAL
EOSINOPHIL # BLD AUTO: 0.2 K/UL (ref 0–0.5)
EOSINOPHIL NFR BLD: 1.8 % (ref 0–8)
ERYTHROCYTE [DISTWIDTH] IN BLOOD BY AUTOMATED COUNT: 13.2 % (ref 11.5–14.5)
EST. GFR  (NO RACE VARIABLE): >60 ML/MIN/1.73 M^2
GLUCOSE SERPL-MCNC: 109 MG/DL (ref 70–110)
GRAM STN SPEC: NORMAL
GRAM STN SPEC: NORMAL
HCT VFR BLD AUTO: 32.3 % (ref 37–48.5)
HGB BLD-MCNC: 10 G/DL (ref 12–16)
IMM GRANULOCYTES # BLD AUTO: 0.06 K/UL (ref 0–0.04)
IMM GRANULOCYTES NFR BLD AUTO: 0.6 % (ref 0–0.5)
LYMPHOCYTES # BLD AUTO: 1.8 K/UL (ref 1–4.8)
LYMPHOCYTES NFR BLD: 19.7 % (ref 18–48)
MCH RBC QN AUTO: 29.3 PG (ref 27–31)
MCHC RBC AUTO-ENTMCNC: 31 G/DL (ref 32–36)
MCV RBC AUTO: 95 FL (ref 82–98)
MONOCYTES # BLD AUTO: 0.9 K/UL (ref 0.3–1)
MONOCYTES NFR BLD: 9.1 % (ref 4–15)
NEUTROPHILS # BLD AUTO: 6.4 K/UL (ref 1.8–7.7)
NEUTROPHILS NFR BLD: 68.3 % (ref 38–73)
NRBC BLD-RTO: 0 /100 WBC
PLATELET # BLD AUTO: 376 K/UL (ref 150–450)
PMV BLD AUTO: 11.8 FL (ref 9.2–12.9)
POCT GLUCOSE: 143 MG/DL (ref 70–110)
POCT GLUCOSE: 168 MG/DL (ref 70–110)
POCT GLUCOSE: 86 MG/DL (ref 70–110)
POCT GLUCOSE: 94 MG/DL (ref 70–110)
POTASSIUM SERPL-SCNC: 4.2 MMOL/L (ref 3.5–5.1)
PROT SERPL-MCNC: 5.8 G/DL (ref 6–8.4)
RBC # BLD AUTO: 3.41 M/UL (ref 4–5.4)
SODIUM SERPL-SCNC: 137 MMOL/L (ref 136–145)
WBC # BLD AUTO: 9.36 K/UL (ref 3.9–12.7)

## 2023-04-27 PROCEDURE — 20220 BONE BIOPSY TROCAR/NDL SUPFC: CPT | Mod: ,,, | Performed by: PODIATRIST

## 2023-04-27 PROCEDURE — 20220 BONE MARROW: ICD-10-PCS | Mod: ,,, | Performed by: PODIATRIST

## 2023-04-27 PROCEDURE — 88311 PR  DECALCIFY TISSUE: ICD-10-PCS | Mod: 26,,, | Performed by: PATHOLOGY

## 2023-04-27 PROCEDURE — 27000221 HC OXYGEN, UP TO 24 HOURS

## 2023-04-27 PROCEDURE — 88311 DECALCIFY TISSUE: CPT | Performed by: PATHOLOGY

## 2023-04-27 PROCEDURE — 99239 HOSP IP/OBS DSCHRG MGMT >30: CPT | Mod: ,,, | Performed by: HOSPITALIST

## 2023-04-27 PROCEDURE — 87075 CULTR BACTERIA EXCEPT BLOOD: CPT | Performed by: STUDENT IN AN ORGANIZED HEALTH CARE EDUCATION/TRAINING PROGRAM

## 2023-04-27 PROCEDURE — 88305 TISSUE EXAM BY PATHOLOGIST: ICD-10-PCS | Mod: 26,,, | Performed by: PATHOLOGY

## 2023-04-27 PROCEDURE — 88311 DECALCIFY TISSUE: CPT | Mod: 26,,, | Performed by: PATHOLOGY

## 2023-04-27 PROCEDURE — 80053 COMPREHEN METABOLIC PANEL: CPT

## 2023-04-27 PROCEDURE — 88305 TISSUE EXAM BY PATHOLOGIST: CPT | Performed by: PATHOLOGY

## 2023-04-27 PROCEDURE — 88305 TISSUE EXAM BY PATHOLOGIST: CPT | Mod: 26,,, | Performed by: PATHOLOGY

## 2023-04-27 PROCEDURE — 94761 N-INVAS EAR/PLS OXIMETRY MLT: CPT

## 2023-04-27 PROCEDURE — 87186 SC STD MICRODIL/AGAR DIL: CPT | Mod: 59 | Performed by: STUDENT IN AN ORGANIZED HEALTH CARE EDUCATION/TRAINING PROGRAM

## 2023-04-27 PROCEDURE — 99233 PR SUBSEQUENT HOSPITAL CARE,LEVL III: ICD-10-PCS | Mod: 25,,, | Performed by: PODIATRIST

## 2023-04-27 PROCEDURE — 25000003 PHARM REV CODE 250

## 2023-04-27 PROCEDURE — 25000003 PHARM REV CODE 250: Performed by: STUDENT IN AN ORGANIZED HEALTH CARE EDUCATION/TRAINING PROGRAM

## 2023-04-27 PROCEDURE — 99233 SBSQ HOSP IP/OBS HIGH 50: CPT | Mod: 25,,, | Performed by: PODIATRIST

## 2023-04-27 PROCEDURE — 87205 SMEAR GRAM STAIN: CPT | Performed by: STUDENT IN AN ORGANIZED HEALTH CARE EDUCATION/TRAINING PROGRAM

## 2023-04-27 PROCEDURE — 85025 COMPLETE CBC W/AUTO DIFF WBC: CPT

## 2023-04-27 PROCEDURE — 99900035 HC TECH TIME PER 15 MIN (STAT)

## 2023-04-27 PROCEDURE — 87070 CULTURE OTHR SPECIMN AEROBIC: CPT | Performed by: STUDENT IN AN ORGANIZED HEALTH CARE EDUCATION/TRAINING PROGRAM

## 2023-04-27 PROCEDURE — 36415 COLL VENOUS BLD VENIPUNCTURE: CPT

## 2023-04-27 PROCEDURE — 87077 CULTURE AEROBIC IDENTIFY: CPT | Mod: 59 | Performed by: STUDENT IN AN ORGANIZED HEALTH CARE EDUCATION/TRAINING PROGRAM

## 2023-04-27 PROCEDURE — 99239 PR HOSPITAL DISCHARGE DAY,>30 MIN: ICD-10-PCS | Mod: ,,, | Performed by: HOSPITALIST

## 2023-04-27 RX ORDER — HYDROCODONE BITARTRATE AND ACETAMINOPHEN 5; 325 MG/1; MG/1
1 TABLET ORAL EVERY 6 HOURS PRN
Status: DISCONTINUED | OUTPATIENT
Start: 2023-04-27 | End: 2023-04-27 | Stop reason: HOSPADM

## 2023-04-27 RX ORDER — CLOPIDOGREL BISULFATE 75 MG/1
75 TABLET ORAL DAILY
Qty: 30 TABLET | Refills: 2 | Status: SHIPPED | OUTPATIENT
Start: 2023-04-28 | End: 2023-05-31 | Stop reason: SDUPTHER

## 2023-04-27 RX ORDER — NAPROXEN SODIUM 220 MG/1
81 TABLET, FILM COATED ORAL DAILY
Qty: 30 TABLET | Refills: 2 | Status: ON HOLD | OUTPATIENT
Start: 2023-04-28 | End: 2023-06-11 | Stop reason: HOSPADM

## 2023-04-27 RX ORDER — HYDROCODONE BITARTRATE AND ACETAMINOPHEN 5; 325 MG/1; MG/1
1 TABLET ORAL EVERY 6 HOURS PRN
Qty: 9 TABLET | Refills: 0 | Status: ON HOLD | OUTPATIENT
Start: 2023-04-27 | End: 2023-06-11 | Stop reason: HOSPADM

## 2023-04-27 RX ORDER — IBUPROFEN 200 MG
1 TABLET ORAL DAILY
Qty: 28 PATCH | Refills: 0 | Status: ON HOLD | OUTPATIENT
Start: 2023-04-28 | End: 2023-06-11 | Stop reason: HOSPADM

## 2023-04-27 RX ORDER — CEFADROXIL 500 MG/1
1 CAPSULE ORAL EVERY 12 HOURS
Qty: 20 CAPSULE | Refills: 0 | Status: SHIPPED | OUTPATIENT
Start: 2023-04-27 | End: 2023-05-02

## 2023-04-27 RX ORDER — ATORVASTATIN CALCIUM 80 MG/1
80 TABLET, FILM COATED ORAL NIGHTLY
Qty: 90 TABLET | Refills: 3 | Status: SHIPPED | OUTPATIENT
Start: 2023-04-27 | End: 2024-04-26

## 2023-04-27 RX ADMIN — CEFADROXIL 1 G: 1000 TABLET ORAL at 09:04

## 2023-04-27 RX ADMIN — ASPIRIN 81 MG CHEWABLE TABLET 81 MG: 81 TABLET CHEWABLE at 09:04

## 2023-04-27 RX ADMIN — CLOPIDOGREL BISULFATE 75 MG: 75 TABLET ORAL at 09:04

## 2023-04-27 RX ADMIN — HYDROCODONE BITARTRATE AND ACETAMINOPHEN 1 TABLET: 5; 325 TABLET ORAL at 10:04

## 2023-04-27 RX ADMIN — HYDROCODONE BITARTRATE AND ACETAMINOPHEN 1 TABLET: 10; 325 TABLET ORAL at 04:04

## 2023-04-27 RX ADMIN — LISINOPRIL 10 MG: 10 TABLET ORAL at 09:04

## 2023-04-27 NOTE — PROCEDURES
"Anastasiia Badillo is a 73 y.o. female patient.    Temp: 96.3 °F (35.7 °C) (04/27/23 1101)  Pulse: 60 (04/27/23 1101)  Resp: 18 (04/27/23 1101)  BP: 134/64 (04/27/23 1101)  SpO2: 96 % (04/27/23 1101)  Weight: 76.1 kg (167 lb 12.3 oz) (04/21/23 2353)  Height: 5' 3" (160 cm) (04/21/23 2353)       Bone marrow    Date/Time: 4/27/2023 10:30 AM  Performed by: Keanu Campa MD  Authorized by: Zach Yuan DPM     Consent Done?: Yes (Verbal)   Immediately prior to procedure a time out was called to verify the correct patient, procedure, equipment, support staff and site/side marked as required. .      Position: supine  Aspiration?: No    Biopsy?: Yes    Number of Specimens: 2  Estimated blood loss (cc):2  Patient tolerated the procedure well with no immediate complications.    2 bone specimens collected from left calcaneus, sent to micro and path    4/27/2023        "

## 2023-04-27 NOTE — PLAN OF CARE
"   04/27/23 1320   Post-Acute Status   Post-Acute Authorization Home Health   Home Health Status Referrals Sent   Discharge Plan   Discharge Plan A Assisted Living;Home Health     Pt requiring home tania services upon d/c today.  JOHN faxed HH Orders/summary to  [facility] Phone: [phone]  - [place] via ibeatyou for review. JOHN will continue to follow patient.    2:27 PM   SW sent hand faxed to [phone];  [phone] (option 2) for Auth and transportation.JOHN will follow    Your fax has been successfully sent to [phone] at [phone].  ------------------------------------------------------------  From: [id number]  ------------------------------------------------------------  4/27/2023 2:25:37 PM Transmission Record   Sent to [phone] with remote ID "[email or web address]"   Result: (0/339;0/0) Success   Page record: 1 - 11   Elapsed time: 08:00 on channel 36      Your fax has been successfully sent to [phone] at [phone].  ------------------------------------------------------------  From: [id number]  ------------------------------------------------------------  4/27/2023 2:27:08 PM Transmission Record   Sent to [phone] with remote ID "[email or web address]"   Result: (0/339;0/0) Success   Page record: 1 - 27   Elapsed time: 10:13 on channel 58    3:54 PM   SW arranged ambulance transport via Patient [facility]. Requested  time is 4:30 pm.  Requested  time does not guarantee arrival time.  If transport does not arrive by 6:00 pm please contact assigned SW or on-call for assistance.([phone])    Patient's bedside nurse and Pt notified of the above.           Maty Aguilar LMSW  PRN - [facility]  [phone]      "

## 2023-04-27 NOTE — PLAN OF CARE
04/27/23 1618   Post-Acute Status   Post-Acute Authorization [facility]   Home Health Status Set-up Complete/Auth obtained   Discharge Delays None known at this time   Discharge Plan   Discharge Plan A Assisted Living;Home Health     [facility] home health has accepted pt to resume services.    4:53 PM   SW spoke with Chintan in Patient Flow [facility] and provided the Auth number from [facility]- [id number] and Manuel [phone].  Pt has no other issues pending.        Maty Aguilar LMSW  PRN - [facility]  [phone]

## 2023-04-27 NOTE — PLAN OF CARE
Problem: Adult Inpatient Plan of Care  Goal: Plan of Care Review  Outcome: Met  Goal: Patient-Specific Goal (Individualized)  Outcome: Met  Goal: Absence of Hospital-Acquired Illness or Injury  Outcome: Met  Goal: Optimal Comfort and Wellbeing  Outcome: Met  Goal: Readiness for Transition of Care  Outcome: Met     Problem: Diabetes Comorbidity  Goal: Blood Glucose Level Within Targeted Range  Outcome: Met     Problem: Adjustment to Illness (Sepsis/Septic Shock)  Goal: Optimal Coping  Outcome: Met     Problem: Bleeding (Sepsis/Septic Shock)  Goal: Absence of Bleeding  Outcome: Met     Problem: Glycemic Control Impaired (Sepsis/Septic Shock)  Goal: Blood Glucose Level Within Desired Range  Outcome: Met     Problem: Infection Progression (Sepsis/Septic Shock)  Goal: Absence of Infection Signs and Symptoms  Outcome: Met     Problem: Nutrition Impaired (Sepsis/Septic Shock)  Goal: Optimal Nutrition Intake  Outcome: Met     Problem: Impaired Wound Healing  Goal: Optimal Wound Healing  Outcome: Met     Problem: Skin Injury Risk Increased  Goal: Skin Health and Integrity  Outcome: Met     Problem: Fall Injury Risk  Goal: Absence of Fall and Fall-Related Injury  Outcome: Met     Problem: Pain Acute  Goal: Acceptable Pain Control and Functional Ability  Outcome: Met

## 2023-04-27 NOTE — ASSESSMENT & PLAN NOTE
Assessment: Multiple eschars of left foot, not clinically infected. MRI + for T2 changes consistent with reactive changes vs early calcaneal OM. Toe wound culture + for MSSA and Candida. Bone culture pending. PAD now s/p angio, R 0.42, L TBI 0.83.    Plan:  -Left heel bone biopsy performed, see procedure note.  -No other intervention by podiatry, OK for discharge from podiatry perspective.  -Antibiotic plan per ID. Bone biopsy can be followed outpatient as wounds are stable, not clinically infected.   -Appreciate vascular surgery recs.  -LLE WBAT in surgical shoe for transfers or short distances.   -Dressing changes by nursing.   -Podiatry will follow.   DC Instructions: Please order ambulatory referral to podiatry. LLE WBAT in surgical shoe for transfers or short distances. Daily betadine paint to eschars of left foot.

## 2023-04-27 NOTE — PLAN OF CARE
Wound care to bilateral medial legs done @ 0200. Due next on 4/30 per Q 3 day wound care order.     MIRELLAON. VSS and WNL. Pain and insomnia managed with preemptive PRN medication timing. Patient is without complaints at this time. Plan of care on going.           Problem: Adult Inpatient Plan of Care  Goal: Plan of Care Review  Outcome: Ongoing, Progressing  Goal: Patient-Specific Goal (Individualized)  Outcome: Ongoing, Progressing  Goal: Absence of Hospital-Acquired Illness or Injury  Outcome: Ongoing, Progressing  Goal: Optimal Comfort and Wellbeing  Outcome: Ongoing, Progressing  Goal: Readiness for Transition of Care  Outcome: Ongoing, Progressing     Problem: Diabetes Comorbidity  Goal: Blood Glucose Level Within Targeted Range  Outcome: Ongoing, Progressing     Problem: Adjustment to Illness (Sepsis/Septic Shock)  Goal: Optimal Coping  Outcome: Ongoing, Progressing     Problem: Bleeding (Sepsis/Septic Shock)  Goal: Absence of Bleeding  Outcome: Ongoing, Progressing     Problem: Glycemic Control Impaired (Sepsis/Septic Shock)  Goal: Blood Glucose Level Within Desired Range  Outcome: Ongoing, Progressing     Problem: Infection Progression (Sepsis/Septic Shock)  Goal: Absence of Infection Signs and Symptoms  Outcome: Ongoing, Progressing     Problem: Nutrition Impaired (Sepsis/Septic Shock)  Goal: Optimal Nutrition Intake  Outcome: Ongoing, Progressing     Problem: Impaired Wound Healing  Goal: Optimal Wound Healing  Outcome: Ongoing, Progressing     Problem: Skin Injury Risk Increased  Goal: Skin Health and Integrity  Outcome: Ongoing, Progressing     Problem: Fall Injury Risk  Goal: Absence of Fall and Fall-Related Injury  Outcome: Ongoing, Progressing     Problem: Pain Acute  Goal: Acceptable Pain Control and Functional Ability  Outcome: Ongoing, Progressing

## 2023-04-27 NOTE — PROGRESS NOTES
[facility]  Vascular Surgery  Progress Note    Patient Name: Anastasiia Badillo  MRN: [id number]  Admission Date: 4/21/2023  Primary Care Provider: Raji Parkinson MD    Subjective:     Interval History: Patient without complaints on rounds, labs stable this morning.  Puncture site at right groin saturated, dressing changed out and pressure applied for twenty minutes with improvement.     Post-Op Info:  Procedure(s) (LRB):  Angiogram Extremity Unilateral (Left)  PTA (ANGIOPLASTY, PERCUTANEOUS, TRANSLUMINAL) (Left)   2 Days Post-Op       Medications:  Continuous Infusions:  Scheduled Meds:   aspirin  81 mg Oral Daily    atorvastatin  80 mg Oral QHS    cefadroxil  1 g Oral Q12H    clopidogreL  75 mg Oral Daily    insulin detemir U-100  9 Units Subcutaneous QHS    lisinopriL  10 mg Oral Daily    nicotine  1 patch Transdermal Daily     PRN Meds:acetaminophen, dextrose 10%, dextrose 10%, glucagon (human recombinant), HYDROcodone-acetaminophen, HYDROcodone-acetaminophen, insulin aspart U-100, melatonin, naloxone, ondansetron, sodium chloride 0.9%     Objective:     Vital Signs (Most Recent):  Temp: 98.7 °F (37.1 °C) (04/26/23 0742)  Pulse: 79 (04/26/23 0742)  Resp: 17 (04/26/23 0840)  BP: (!) 158/70 (04/26/23 0742)  SpO2: (!) 93 % (04/26/23 0742)   Vital Signs (24h Range):  Temp:  [96.5 °F (35.8 °C)-98.7 °F (37.1 °C)] 98.7 °F (37.1 °C)  Pulse:  [67-82] 79  Resp:  [10-20] 17  SpO2:  [92 %-99 %] 93 %  BP: (136-185)/(60-82) 158/70         Physical Exam  Vitals and nursing note reviewed.   Constitutional:       General: She is not in acute distress.     Appearance: Normal appearance. She is well-developed. She is not diaphoretic.   HENT:      Head: Normocephalic and atraumatic.      Mouth/Throat:      Mouth: Mucous membranes are moist.      Pharynx: Oropharynx is clear.   Cardiovascular:      Rate and Rhythm: Normal rate.   Pulmonary:      Effort: Pulmonary effort is normal.   Abdominal:      Palpations: Abdomen  is soft.   Musculoskeletal:         General: No swelling or deformity. Normal range of motion.      Cervical back: Normal range of motion.   Skin:     General: Skin is warm and dry.      Capillary Refill: Capillary refill takes less than 2 seconds.      Findings: Lesion (see media tab, ulcers on LLE, non-healing abrashions LLE, RLE w/ heel callous) present.      Comments: Left heel wound, left great toe wound     Neurological:      General: No focal deficit present.      Mental Status: She is alert and oriented to person, place, and time.      Cranial Nerves: No cranial nerve deficit.      Coordination: Coordination normal.   Psychiatric:         Mood and Affect: Mood normal.       Significant Labs:  CBC: No results for input(s): WBC, RBC, HGB, HCT, PLT, MCV, MCH, MCHC in the last 48 hours.  CMP: No results for input(s): GLU, CALCIUM, ALBUMIN, PROT, NA, K, CO2, CL, BUN, CREATININE, ALKPHOS, ALT, AST, BILITOT in the last 48 hours.    Significant Diagnostics:  I have reviewed all pertinent imaging results/findings within the past 24 hours.    Assessment/Plan:     Diabetic foot ulcer  Ms Badillo is a 73F w/ DM, PAD w/ L heel and L great toe wounds/cellulitis    -- LLE angiogram 4/25   - risks and benefits of procedure discussed, consent in chart   - npo at midnight  -- Smoking cessation  -- DM control  -- continue ASA, plavix, statin  -- wound care  -- podiatry consulted   -- MRI to evaluate L foot with posterior calcaneus psb early osteo; talar head psb insufficiency fx; subq edema at ankle/dorsum of foot, sterile vs infectious  -- f/u BECKIE/TBI, vein mapping US  -- float heels  -- remainder of care per primary team     **Patient will need to follow up in four weeks with BECKIE/TBI/PVR, clinic notified  Please reach out to vascular with any subsequent questions.        Jena Phillips NP  Vascular Surgery  [facility]

## 2023-04-27 NOTE — DISCHARGE SUMMARY
[facility]  Discharge Summary      Patient Name: Anastasiia Badillo  MRN: [id number]  CASEY: [id number]  Patient Class: IP- Inpatient  Admission Date: 4/21/2023  Hospital Length of Stay: 6 days  Discharge Date and Time:  04/27/2023 1:20 PM  Attending Physician: Josselin Minaya MD   Discharging Provider: Gene Nguyen MD  Primary Care Provider: Raji Parkinson MD  Hospital Medicine Team: [facility] 2 Gene Nguyen MD  Primary Care Team: [facility] 2    HPI:   Ms. Badillo is a 72 y/o F with a past medical history significant for DM2, HTN, recent femur fracture, and tobacco use who presented to [facility] ED for L foot wounds.  She states they have been present for about 2 weeks, but are becoming worse. She noted some redness to the left great toe over the past couple of days. She was seen by home health who told her to come to the ED for a wound check. She denies ever having similar wounds, but she had an intramedullary denzel inserted into her left femur on 2/18/23 for a subtrochanteric fracture and has been rubbing her left lower leg against objects frequently due to the difficulty she is experiencing in moving her left leg since surgery. She was found to have WBC 13, ESR 75, . She was given 1x vancomycin and zosyn. She was admitted to Hospital Medicine service. Started on vanc/cefepime. Arterial US BLE showed high grade stenosis SFA bilaterally and popliteal on the left. ID was consulted. Wound cx grew staph aureus, susceptibility pending. CTA w/ runoff showed RLE: 60% and greater stenosis of mid&distal SFA and high-grade stenosis distal PTA; LLE: 60% and greater stenosis mid&distal SFA. She also had a retroperitoneal US which showed 2.3 cm hypoechoic mass of the upper pole of the right kidney may represent a bloody cyst. Patient transferred [facility] vascular surgery eval.      Procedure(s) (LRB):  Angiogram Extremity Unilateral (Left)  PTA (ANGIOPLASTY, PERCUTANEOUS, TRANSLUMINAL)  (Left)      Hospital Course:   Admitted to medicine for management of LLE wound. Wound positive for MSSA, MRI negative for OM. ID consulted with recs for 10 days course with cefadroxil. Vascular planning for angiogram Tuesday. Pt tolerated procedure well. Having some tingling and pain in the LLE. Returned to vascular lab for follow-up evaluation. Pt tolerating procedure well with mild-moderate leg pain with tingling. Pt requiring Norco 10 3-4 times per day for 2 days after procedure. Pain down to 1. Will send home with short course of Norco 5 until she follows up with vascular surgery. Pt discharging to home.      Interval History: Pt still having mild pain overnight. Leg is feeling better day by day. Pt stable for discharge home with home health.      Review of Systems   Constitutional:  Positive for fatigue. Negative for chills, diaphoresis and fever.   Respiratory:  Negative for cough and shortness of breath.    Gastrointestinal:  Negative for abdominal pain, diarrhea, nausea and vomiting.   Genitourinary:  Negative for dysuria.   Musculoskeletal:  Negative for back pain.   Skin:  Positive for color change and wound. Negative for pallor and rash.   Neurological:  Negative for weakness, numbness and headaches.   Psychiatric/Behavioral:  Negative for agitation.    All other systems reviewed and are negative.  Objective:      Vital Signs (Most Recent):  Temp: 98.3 °F (36.8 °C) (04/27/23 0731)  Pulse: 73 (04/27/23 0731)  Resp: 19 (04/27/23 0731)  BP: 112/73 (04/27/23 0731)  SpO2: 97 % (04/27/23 0731) Vital Signs (24h Range):  Temp:  [97.1 °F (36.2 °C)-99 °F (37.2 °C)] 98.3 °F (36.8 °C)  Pulse:  [73-83] 73  Resp:  [16-19] 19  SpO2:  [93 %-97 %] 97 %  BP: (112-196)/(56-86) 112/73      Weight: 76.1 kg (167 lb 12.3 oz)  Body mass index is 29.72 kg/m².  No intake or output data in the 24 hours ending 04/27/23 1020   Physical Exam  Vitals and nursing note reviewed.   Constitutional:       General: She is not in acute  distress.     Appearance: Normal appearance. She is well-developed. She is not diaphoretic.   HENT:      Head: Normocephalic and atraumatic.      Mouth/Throat:      Mouth: Mucous membranes are moist.      Pharynx: Oropharynx is clear.   Cardiovascular:      Rate and Rhythm: Normal rate.   Pulmonary:      Effort: Pulmonary effort is normal.   Abdominal:      Palpations: Abdomen is soft.   Musculoskeletal:         General: No swelling or deformity. Normal range of motion.      Cervical back: Normal range of motion.   Skin:     General: Skin is warm and dry.      Capillary Refill: Capillary refill takes less than 2 seconds.      Findings: Lesion (see media tab, ulcers on LLE, non-healing abrashions LLE, RLE w/ heel callous) present.      Comments: Left heel wound, left great toe wound     Neurological:      General: No focal deficit present.      Mental Status: She is alert and oriented to person, place, and time.      Cranial Nerves: No cranial nerve deficit.      Coordination: Coordination normal.   Psychiatric:         Mood and Affect: Mood normal.         Significant Labs: All pertinent labs within the past 24 hours have been reviewed.     Significant Imaging: I have reviewed all pertinent imaging results/findings within the past 24 hours.    Goals of Care Treatment Preferences:  Code Status: Full Code      Consults:   Consults (From admission, onward)        Status Ordering Provider     Inpatient consult to Podiatry  Once        Provider:  (Not yet assigned)    Completed BRITTANI ESCOBAR     Inpatient consult to Infectious Diseases  Once        Provider:  (Not yet assigned)    Completed SAHIL GARCIA     Inpatient consult to Vascular Surgery  Once        Provider:  (Not yet assigned)    Completed SAHIL GARCIA     IP consult to case management  Once        Provider:  (Not yet assigned)    Completed LAVONNE PETERS new Assessment & Plan notes have been filed under this hospital service since  the last note was generated.  Service: Hospital Medicine    Final Active Diagnoses:    Diagnosis Date Noted POA    PRINCIPAL PROBLEM:  PAD (peripheral artery disease) [I73.9] 04/22/2023 Yes    Breast mass [N63.0] 04/27/2023 Unknown    Pre-op evaluation [Z01.818] 04/24/2023 Not Applicable    Kidney lesion, native, right [N28.9] 04/21/2023 Yes    Cellulitis of great toe of left foot [L03.032] 04/19/2023 Yes    Diabetic foot ulcer [E11.621, L97.509] 04/19/2023 Yes    Nicotine dependence [F17.200] 02/20/2023 Yes    Type 2 diabetes mellitus with foot ulcer, with long-term current use of insulin [E11.621, L97.509, Z79.4] 02/17/2023 Not Applicable    Essential (primary) hypertension [I10] 02/17/2023 Yes      Problems Resolved During this Admission:       Discharged Condition: good    Disposition: Home or Self Care    Follow Up:   Follow-up Information     Raji Parkinson MD Follow up.    Specialty: Family Medicine  Contact information:  [address]  [address]  [facility]  [place] [address]  [phone]                       Patient Instructions:   No discharge procedures on file.    Significant Diagnostic Studies: Labs: All labs within the past 24 hours have been reviewed    Pending Diagnostic Studies:     Procedure Component Value Units Date/Time    Specimen to Pathology, Surgery Orthopedics [[id number]] Collected: 04/27/23 1055    Order Status: Sent Lab Status: In process Updated: 04/27/23 1056    Specimen: Tissue          Medications:  Reconciled Home Medications:      Medication List      START taking these medications    aspirin 81 MG Chew  Chew and swallow 1 tablet (81 mg total) by mouth once daily.  Start taking on: April 28, 2023     atorvastatin 80 MG tablet  Commonly known as: LIPITOR  Take 1 tablet (80 mg total) by mouth every evening.     cefadroxil 500 MG Cap  Commonly known as: DURICEF  Take 2 capsules (1 g total) by mouth every 12 (twelve) hours. for 5 days     clopidogreL 75 mg  "tablet  Commonly known as: PLAVIX  Take 1 tablet (75 mg total) by mouth once daily.  Start taking on: April 28, 2023     nicotine 14 mg/24 hr  Commonly known as: NICODERM CQ  Place 1 patch onto the skin once daily.  Start taking on: April 28, 2023        CHANGE how you take these medications    insulin detemir U-100 (Levemir) 100 unit/mL (3 mL) Inpn pen  Inject 18 Units into the skin once daily.  What changed: how much to take        CONTINUE taking these medications    acetaminophen 500 MG tablet  Commonly known as: TYLENOL  Take 1,000 mg by mouth every 6 (six) hours as needed for Pain.     HYDROcodone-acetaminophen 5-325 mg per tablet  Commonly known as: NORCO  Take 1 tablet by mouth every 6 (six) hours as needed for Pain.     insulin aspart U-100 100 unit/mL (3 mL) Inpn pen  Commonly known as: NovoLOG  Inject 0-5 Units into the skin before meals and at bedtime as needed (Hyperglycemia). Blood Glucose  mg/dL                  Pre-meal                2200  151-200                0 unit                      0 unit  201-250                2 units                    1 unit  251-300                3 units                    1 unit  301-350                4 units                    2 units  >350                     5 units                    3 units     lisinopriL 5 MG tablet  Commonly known as: PRINIVIL,ZESTRIL  Take 5 mg by mouth once daily.     melatonin 10 mg Cap  Take 1 capsule by mouth every evening.     multivit-min-FA-lycopen-lutein 0.4 mg-300 mcg- 250 mcg Tab  Take 1 tablet by mouth once daily.     pen needle, diabetic 31 gauge x 3/16" Ndle  Commonly known as: PEN NEEDLE  1 application by Misc.(Non-Drug; Combo Route) route 2 (two) times a day.            Indwelling Lines/Drains at time of discharge:   Lines/Drains/Airways     Drain  Duration           Female External Urinary Catheter 04/19/23 2000 7 days                Time spent on the discharge of patient: 40 minutes         Gene Nguyen MD  Department of " [facility]  [facility]

## 2023-04-27 NOTE — TELEPHONE ENCOUNTER
----- Message from Mariela Arnett sent at 4/27/2023 11:32 AM CDT -----  Cody called from [facility] to get pt a hospital follow up. Please call pt. [phone]

## 2023-04-27 NOTE — PLAN OF CARE
SW informed Md; will need an order for ambulance transportation so that it can be faxed to [facility] insurance. JOHN will follow,      Maty Aguilar LMSW  PRN - [facility]  [phone]

## 2023-04-27 NOTE — SUBJECTIVE & OBJECTIVE
Interval History: Pt still having mild pain overnight. Leg is feeling better day by day. Pt stable for discharge home with home health.     Review of Systems   Constitutional:  Positive for fatigue. Negative for chills, diaphoresis and fever.   Respiratory:  Negative for cough and shortness of breath.    Gastrointestinal:  Negative for abdominal pain, diarrhea, nausea and vomiting.   Genitourinary:  Negative for dysuria.   Musculoskeletal:  Negative for back pain.   Skin:  Positive for color change and wound. Negative for pallor and rash.   Neurological:  Negative for weakness, numbness and headaches.   Psychiatric/Behavioral:  Negative for agitation.    All other systems reviewed and are negative.  Objective:     Vital Signs (Most Recent):  Temp: 98.3 °F (36.8 °C) (04/27/23 0731)  Pulse: 73 (04/27/23 0731)  Resp: 19 (04/27/23 0731)  BP: 112/73 (04/27/23 0731)  SpO2: 97 % (04/27/23 0731) Vital Signs (24h Range):  Temp:  [97.1 °F (36.2 °C)-99 °F (37.2 °C)] 98.3 °F (36.8 °C)  Pulse:  [73-83] 73  Resp:  [16-19] 19  SpO2:  [93 %-97 %] 97 %  BP: (112-196)/(56-86) 112/73     Weight: 76.1 kg (167 lb 12.3 oz)  Body mass index is 29.72 kg/m².  No intake or output data in the 24 hours ending 04/27/23 1020   Physical Exam  Vitals and nursing note reviewed.   Constitutional:       General: She is not in acute distress.     Appearance: Normal appearance. She is well-developed. She is not diaphoretic.   HENT:      Head: Normocephalic and atraumatic.      Mouth/Throat:      Mouth: Mucous membranes are moist.      Pharynx: Oropharynx is clear.   Cardiovascular:      Rate and Rhythm: Normal rate.   Pulmonary:      Effort: Pulmonary effort is normal.   Abdominal:      Palpations: Abdomen is soft.   Musculoskeletal:         General: No swelling or deformity. Normal range of motion.      Cervical back: Normal range of motion.   Skin:     General: Skin is warm and dry.      Capillary Refill: Capillary refill takes less than 2 seconds.       Findings: Lesion (see media tab, ulcers on LLE, non-healing abrashions LLE, RLE w/ heel callous) present.      Comments: Left heel wound, left great toe wound     Neurological:      General: No focal deficit present.      Mental Status: She is alert and oriented to person, place, and time.      Cranial Nerves: No cranial nerve deficit.      Coordination: Coordination normal.   Psychiatric:         Mood and Affect: Mood normal.       Significant Labs: All pertinent labs within the past 24 hours have been reviewed.    Significant Imaging: I have reviewed all pertinent imaging results/findings within the past 24 hours.

## 2023-04-27 NOTE — ASSESSMENT & PLAN NOTE
Ms Badillo is a 73F w/ DM, PAD w/ L heel and L great toe wounds/cellulitis    -- LLE angiogram 4/25   - risks and benefits of procedure discussed, consent in chart   - npo at midnight  -- Smoking cessation  -- DM control  -- continue ASA, plavix, statin  -- wound care  -- podiatry consulted   -- MRI to evaluate L foot with posterior calcaneus psb early osteo; talar head psb insufficiency fx; subq edema at ankle/dorsum of foot, sterile vs infectious  -- f/u [facility]/TBI, vein mapping US  -- float heels  -- remainder of care per primary team     **Patient will need to follow up in four weeks with [facility]/TBI/PVR, clinic notified  Please reach out to vascular with any subsequent questions.

## 2023-04-27 NOTE — SUBJECTIVE & OBJECTIVE
Subjective:     Interval History: Vitals stable. No new pedal complaints. Dressings clean dry, intact.       Follow-up For: Procedure(s) (LRB):  Angiogram Extremity Unilateral (Left)  PTA (ANGIOPLASTY, PERCUTANEOUS, TRANSLUMINAL) (Left)    Post-Operative Day: 2 Days Post-Op    Scheduled Meds:   aspirin  81 mg Oral Daily    atorvastatin  80 mg Oral QHS    cefadroxil  1 g Oral Q12H    clopidogreL  75 mg Oral Daily    insulin detemir U-100  9 Units Subcutaneous QHS    lisinopriL  10 mg Oral Daily    nicotine  1 patch Transdermal Daily     Continuous Infusions:  PRN Meds:acetaminophen, dextrose 10%, dextrose 10%, glucagon (human recombinant), HYDROcodone-acetaminophen, insulin aspart U-100, melatonin, naloxone, ondansetron, sodium chloride 0.9%    Review of Systems   Constitutional:  Positive for fatigue. Negative for chills, diaphoresis and fever.   Respiratory:  Negative for cough and shortness of breath.    Gastrointestinal:  Negative for abdominal pain, diarrhea, nausea and vomiting.   Genitourinary:  Negative for dysuria.   Musculoskeletal:  Negative for back pain.   Skin:  Positive for color change and wound. Negative for pallor and rash.   Neurological:  Negative for weakness, numbness and headaches.   Psychiatric/Behavioral:  Negative for agitation.    All other systems reviewed and are negative.  Objective:     Vital Signs (Most Recent):  Temp: 96.3 °F (35.7 °C) (04/27/23 1101)  Pulse: 60 (04/27/23 1101)  Resp: 18 (04/27/23 1101)  BP: 134/64 (04/27/23 1101)  SpO2: 96 % (04/27/23 1101) Vital Signs (24h Range):  Temp:  [96.3 °F (35.7 °C)-99 °F (37.2 °C)] 96.3 °F (35.7 °C)  Pulse:  [60-83] 60  Resp:  [16-19] 18  SpO2:  [93 %-97 %] 96 %  BP: (112-196)/(56-86) 134/64     Weight: 76.1 kg (167 lb 12.3 oz)  Body mass index is 29.72 kg/m².    Foot Exam    General  Orientation: alert and oriented to person, place, and time       Right Foot/Ankle     Inspection and Palpation  Skin Exam: dry skin and skin changes;      Neurovascular  Dorsalis pedis: absent  Posterior tibial: absent  Saphenous nerve sensation: diminished  Tibial nerve sensation: diminished  Superficial peroneal nerve sensation: diminished  Deep peroneal nerve sensation: diminished  Sural nerve sensation: diminished    Comments  Right foot: diffuse xerosis noted. Scabs and brown skin pigmentation noted     Left Foot/Ankle      Inspection and Palpation  Skin Exam: ulcer and erythema;     Neurovascular  Dorsalis pedis: absent  Posterior tibial: absent  Saphenous nerve sensation: diminished  Tibial nerve sensation: diminished  Superficial peroneal nerve sensation: diminished  Deep peroneal nerve sensation: diminished  Sural nerve sensation: diminished    Comments  Wound noted on the plantar aspect of the right great toe. Wound has surrounding erythema and mild swelling noted. No purulent drainage crepitus or fluctuance noted. No probing to bone noted. No mal odor noted    Left heel wound. Wound probes to about level of capsule. No purulent drainage noted. Fluctuance noted. No crepitus or mal odor noted. Mild erythema noted.    Left lower leg: Superficial wound noted to left lower leg. No purulent drainage noted. No Fluctuance noted. No crepitus or mal odor noted. Mild erythema noted.    Diffuse xerosis noted                    Laboratory:  Blood Cultures: No results for input(s): LABBLOO in the last 48 hours.  CBC:   Recent Labs   Lab 04/27/23  0357   WBC 9.36   RBC 3.41*   HGB 10.0*   HCT 32.3*      MCV 95   MCH 29.3   MCHC 31.0*     CMP:   Recent Labs   Lab 04/27/23  0357      CALCIUM 9.0   ALBUMIN 2.6*   PROT 5.8*      K 4.2   CO2 28      BUN 12   CREATININE 0.7   ALKPHOS 97   ALT <5*   AST 13   BILITOT 0.3     CRP:   Recent Labs   Lab 04/21/23  1202   CRP 82.7*     ESR: No results for input(s): SEDRATE in the last 168 hours.  Microbiology Results (last 7 days)       Procedure Component Value Units Date/Time    Gram stain [[id number]]  Collected: 04/27/23 1054    Order Status: Sent Specimen: Bone from Foot, Left Updated: 04/27/23 1406    Aerobic culture [[id number]] Collected: 04/27/23 1054    Order Status: Sent Specimen: Bone from Foot, Left Updated: 04/27/23 1406    Culture, Anaerobe [[id number]] Collected: 04/27/23 1054    Order Status: Sent Specimen: Bone from Foot, Left Updated: 04/27/23 1405    Culture, Anaerobe [[id number]] Collected: 04/23/23 1147    Order Status: Completed Specimen: Wound from Foot, Left Updated: 04/27/23 0908     Anaerobic Culture Culture in progress    Narrative:      Left heel wound    Aerobic culture [[id number]]  (Abnormal)  (Susceptibility) Collected: 04/23/23 1147    Order Status: Completed Specimen: Wound from Foot, Left Updated: 04/25/23 1158     Aerobic Bacterial Culture STAPHYLOCOCCUS AUREUS  Many        BALDOMERO ALBICANS  Many      Narrative:      Left heel wound    Gram stain [[id number]] Collected: 04/23/23 1147    Order Status: Completed Specimen: Wound from Foot, Left Updated: 04/23/23 1847     Gram Stain Result Rare WBC's      Many Gram positive cocci      Rare budding yeast    Narrative:      Left heel wound          Specimen (24h ago, onward)       Start     Ordered    04/27/23 1054  Specimen to Pathology, Surgery Orthopedics  Once        Comments: Pre-op Diagnosis: osteomyelitisNumber of specimens: 1Name of specimens: left calcaneus     References:    Click here for ordering Quick Tip   Question Answer Comment   Procedure Type: Orthopedics    Which provider would you like to cc? RIANA LIGHT    Release to patient Immediate        04/27/23 1058

## 2023-04-27 NOTE — PROGRESS NOTES
[facility] Surg  Podiatry  Progress Note    I have reviewed and concur with the resident's history, physical, assessment, and plan.  I have personally interviewed and examined the patient at bedside.  See below addendum for my evaluation and additional findings.        Patient Name: Anastasiia Badillo  MRN: [id number]  Admission Date: 4/21/2023  Hospital Length of Stay: 6 days  Attending Physician: Josselin Minaya MD  Primary Care Provider: Raji Parkinson MD     Subjective:     Interval History: Vitals stable. No new pedal complaints. Dressings clean dry, intact.       Follow-up For: Procedure(s) (LRB):  Angiogram Extremity Unilateral (Left)  PTA (ANGIOPLASTY, PERCUTANEOUS, TRANSLUMINAL) (Left)    Post-Operative Day: 2 Days Post-Op    Scheduled Meds:   aspirin  81 mg Oral Daily    atorvastatin  80 mg Oral QHS    cefadroxil  1 g Oral Q12H    clopidogreL  75 mg Oral Daily    insulin detemir U-100  9 Units Subcutaneous QHS    lisinopriL  10 mg Oral Daily    nicotine  1 patch Transdermal Daily     Continuous Infusions:  PRN Meds:acetaminophen, dextrose 10%, dextrose 10%, glucagon (human recombinant), HYDROcodone-acetaminophen, insulin aspart U-100, melatonin, naloxone, ondansetron, sodium chloride 0.9%    Review of Systems   Constitutional:  Positive for fatigue. Negative for chills, diaphoresis and fever.   Respiratory:  Negative for cough and shortness of breath.    Gastrointestinal:  Negative for abdominal pain, diarrhea, nausea and vomiting.   Genitourinary:  Negative for dysuria.   Musculoskeletal:  Negative for back pain.   Skin:  Positive for color change and wound. Negative for pallor and rash.   Neurological:  Negative for weakness, numbness and headaches.   Psychiatric/Behavioral:  Negative for agitation.    All other systems reviewed and are negative.  Objective:     Vital Signs (Most Recent):  Temp: 96.3 °F (35.7 °C) (04/27/23 1101)  Pulse: 60 (04/27/23 1101)  Resp: 18 (04/27/23 1101)  BP: 134/64  (04/27/23 1101)  SpO2: 96 % (04/27/23 1101) Vital Signs (24h Range):  Temp:  [96.3 °F (35.7 °C)-99 °F (37.2 °C)] 96.3 °F (35.7 °C)  Pulse:  [60-83] 60  Resp:  [16-19] 18  SpO2:  [93 %-97 %] 96 %  BP: (112-196)/(56-86) 134/64     Weight: 76.1 kg (167 lb 12.3 oz)  Body mass index is 29.72 kg/m².    Foot Exam    General  Orientation: alert and oriented to person, place, and time       Right Foot/Ankle     Inspection and Palpation  Skin Exam: dry skin and skin changes;     Neurovascular  Dorsalis pedis: absent  Posterior tibial: absent  Saphenous nerve sensation: diminished  Tibial nerve sensation: diminished  Superficial peroneal nerve sensation: diminished  Deep peroneal nerve sensation: diminished  Sural nerve sensation: diminished    Comments  Right foot: diffuse xerosis noted. Scabs and brown skin pigmentation noted     Left Foot/Ankle      Inspection and Palpation  Skin Exam: ulcer and erythema;     Neurovascular  Dorsalis pedis: absent  Posterior tibial: absent  Saphenous nerve sensation: diminished  Tibial nerve sensation: diminished  Superficial peroneal nerve sensation: diminished  Deep peroneal nerve sensation: diminished  Sural nerve sensation: diminished    Comments  Wound noted on the plantar aspect of the right great toe. Wound has surrounding erythema and mild swelling noted. No purulent drainage crepitus or fluctuance noted. No probing to bone noted. No mal odor noted    Left heel wound. Wound probes to about level of capsule. No purulent drainage noted. Fluctuance noted. No crepitus or mal odor noted. Mild erythema noted.    Left lower leg: Superficial wound noted to left lower leg. No purulent drainage noted. No Fluctuance noted. No crepitus or mal odor noted. Mild erythema noted.    Diffuse xerosis noted                    Laboratory:  Blood Cultures: No results for input(s): LABBLOO in the last 48 hours.  CBC:   Recent Labs   Lab 04/27/23  0357   WBC 9.36   RBC 3.41*   HGB 10.0*   HCT 32.3*   PLT  376   MCV 95   MCH 29.3   MCHC 31.0*     CMP:   Recent Labs   Lab 04/27/23  0357      CALCIUM 9.0   ALBUMIN 2.6*   PROT 5.8*      K 4.2   CO2 28      BUN 12   CREATININE 0.7   ALKPHOS 97   ALT <5*   AST 13   BILITOT 0.3     CRP:   Recent Labs   Lab 04/21/23  1202   CRP 82.7*     ESR: No results for input(s): SEDRATE in the last 168 hours.  Microbiology Results (last 7 days)       Procedure Component Value Units Date/Time    Gram stain [[id number]] Collected: 04/27/23 1054    Order Status: Sent Specimen: Bone from Foot, Left Updated: 04/27/23 1406    Aerobic culture [[id number]] Collected: 04/27/23 1054    Order Status: Sent Specimen: Bone from Foot, Left Updated: 04/27/23 1406    Culture, Anaerobe [[id number]] Collected: 04/27/23 1054    Order Status: Sent Specimen: Bone from Foot, Left Updated: 04/27/23 1405    Culture, Anaerobe [[id number]] Collected: 04/23/23 1147    Order Status: Completed Specimen: Wound from Foot, Left Updated: 04/27/23 0908     Anaerobic Culture Culture in progress    Narrative:      Left heel wound    Aerobic culture [[id number]]  (Abnormal)  (Susceptibility) Collected: 04/23/23 1147    Order Status: Completed Specimen: Wound from Foot, Left Updated: 04/25/23 1158     Aerobic Bacterial Culture STAPHYLOCOCCUS AUREUS  Many        BALDOMERO ALBICANS  Many      Narrative:      Left heel wound    Gram stain [[id number]] Collected: 04/23/23 1147    Order Status: Completed Specimen: Wound from Foot, Left Updated: 04/23/23 1847     Gram Stain Result Rare WBC's      Many Gram positive cocci      Rare budding yeast    Narrative:      Left heel wound          Specimen (24h ago, onward)       Start     Ordered    04/27/23 1054  Specimen to Pathology, Surgery Orthopedics  Once        Comments: Pre-op Diagnosis: osteomyelitisNumber of specimens: 1Name of specimens: left calcaneus     References:    Click here for ordering Quick Tip   Question Answer Comment   Procedure Type: Orthopedics     Which provider would you like to cc? KEANU LIGHT    Release to patient Immediate        04/27/23 1057                        Assessment/Plan:     Derm  Eschar of foot  Assessment: Multiple eschars of left foot, not clinically infected. MRI + for T2 changes consistent with reactive changes vs early calcaneal OM. Toe wound culture + for MSSA and Candida. Bone culture pending. PAD now s/p angio, R 0.42, L TBI 0.83.    Plan:  -Left heel bone biopsy performed, see procedure note.  -No other intervention by podiatry, OK for discharge from podiatry perspective.  -Antibiotic plan per ID. Bone biopsy can be followed outpatient as wounds are stable, not clinically infected.   -Appreciate vascular surgery recs.  -LLE WBAT in surgical shoe for transfers or short distances.   -Dressing changes by nursing.   -Podiatry will follow.   DC Instructions: Please order ambulatory referral to podiatry. LLE WBAT in surgical shoe for transfers or short distances. Daily betadine paint to eschars of left foot.       Decision making:  Chronic illnesses discussed in detail, previous records/notes and imaging independently reviewed, prescription drug management performed in addition to lengthy discussion regarding both conservative and surgical treatment options.    I have reviewed and concur with the resident's history, physical, assessment, and plan.  I have personally interviewed and examined the patient at bedside.  See below addendum for my evaluation and additional findings.      Keanu Light DPM PGY-3  Podiatric Medicine & Surgery  [facility]  Secure Chat Preferred  Mobile: [phone]  Pager: [phone]

## 2023-04-27 NOTE — PLAN OF CARE
[facility]      HOME HEALTH ORDERS  FACE TO FACE ENCOUNTER    Patient Name: Anastasiia Badillo  YOB: 1950    PCP: Raji Parkinson MD   PCP Address: [address] [place] [address] [address] [place] [address] MEDICIAL / [facility]*  PCP Phone Number: [phone]  PCP Fax: [phone]    Encounter Date: 4/20/23    Admit to Home Health    Diagnoses:  Active Hospital Problems    Diagnosis  POA    *PAD (peripheral artery disease) [I73.9]  Yes    Breast mass [N63.0]  Unknown    Pre-op evaluation [Z01.818]  Not Applicable    Kidney lesion, native, right [N28.9]  Yes    Cellulitis of great toe of left foot [L03.032]  Yes    Diabetic foot ulcer [E11.621, L97.509]  Yes    Nicotine dependence [F17.200]  Yes    Type 2 diabetes mellitus with foot ulcer, with long-term current use of insulin [E11.621, L97.509, Z79.4]  Not Applicable    Essential (primary) hypertension [I10]  Yes      Resolved Hospital Problems   No resolved problems to display.       Follow Up Appointments:  Future Appointments   Date Time Provider Department Center   5/23/2023 10:15 AM Keanu Brand MD [facility]   6/1/2023 11:00 AM Raji Parkinson MD [facility]       Allergies:Review of patient's allergies indicates:  No Known Allergies    Medications: Review discharge medications with patient and family and provide education.    Current Facility-Administered Medications   Medication Dose Route Frequency Provider Last Rate Last Admin    acetaminophen tablet 650 mg  650 mg Oral Q4H PRN Darron Petty MD        aspirin chewable tablet 81 mg  81 mg Oral Daily Darron Petty MD   81 mg at 04/27/23 0908    atorvastatin tablet 80 mg  80 mg Oral QHS Darron Petty MD   80 mg at 04/26/23 2105    cefadroxil tablet 1 g  1 g Oral Q12H Darron Petty MD   1 g at 04/27/23 0907    clopidogreL tablet 75 mg  75 mg Oral Daily Darron Petty MD   75 mg at 04/27/23 0908    dextrose 10% bolus 125 mL 125 mL  12.5 g Intravenous PRN Darron Petty MD         dextrose 10% bolus 250 mL 250 mL  25 g Intravenous PRN Darron Petty MD        glucagon (human recombinant) injection 1 mg  1 mg Intramuscular PRN Darron Petty MD        HYDROcodone-acetaminophen 5-325 mg per tablet 1 tablet  1 tablet Oral Q6H PRN Gene Nguyen MD        insulin aspart U-100 pen 1-10 Units  1-10 Units Subcutaneous Q6H PRN Darron Petty MD   6 Units at 04/26/23 1819    insulin detemir U-100 (Levemir) pen 9 Units  9 Units Subcutaneous QHS Darron Petty MD   9 Units at 04/26/23 2209    lisinopriL tablet 10 mg  10 mg Oral Daily Dick Lerner MD   10 mg at 04/27/23 0907    melatonin tablet 9 mg  9 mg Oral Nightly PRN Darron Petty MD   9 mg at 04/25/23 2047    naloxone 0.4 mg/mL injection 0.02 mg  0.02 mg Intravenous PRN Darron Petty MD        nicotine 14 mg/24 hr 1 patch  1 patch Transdermal Daily Darron Petty MD        ondansetron disintegrating tablet 8 mg  8 mg Oral Q8H PRN Darron Petty MD        sodium chloride 0.9% flush 10 mL  10 mL Intravenous Q12H PRN Darron Petty MD         Current Discharge Medication List        START taking these medications    Details   aspirin 81 MG Chew Take 1 tablet (81 mg total) by mouth once daily.  Qty: 30 tablet, Refills: 2    Associated Diagnoses: PAD (peripheral artery disease)      atorvastatin (LIPITOR) 80 MG tablet Take 1 tablet (80 mg total) by mouth every evening.  Qty: 90 tablet, Refills: 3    Associated Diagnoses: PAD (peripheral artery disease)      cefadroxil (DURICEF) 1 gram tablet Take 1 tablet (1 g total) by mouth every 12 (twelve) hours. for 5 days  Qty: 10 tablet, Refills: 0    Associated Diagnoses: Diabetic foot ulcer      clopidogreL (PLAVIX) 75 mg tablet Take 1 tablet (75 mg total) by mouth once daily.  Qty: 30 tablet, Refills: 2    Associated Diagnoses: PAD (peripheral artery disease)      nicotine (NICODERM CQ) 14 mg/24 hr Place 1 patch onto the skin once daily.  Qty: 28 patch, Refills: 0    Associated Diagnoses: PAD (peripheral  "artery disease)           CONTINUE these medications which have NOT CHANGED    Details   acetaminophen (TYLENOL) 500 MG tablet Take 1,000 mg by mouth every 6 (six) hours as needed for Pain.      HYDROcodone-acetaminophen (NORCO) 5-325 mg per tablet Take 1 tablet by mouth every 6 (six) hours as needed for Pain.  Qty: 21 tablet, Refills: 0    Comments: Quantity prescribed more than 7 day supply? No  Associated Diagnoses: Closed left subtrochanteric femur fracture, initial encounter      insulin aspart U-100 (NOVOLOG) 100 unit/mL (3 mL) InPn pen Inject 0-5 Units into the skin before meals and at bedtime as needed (Hyperglycemia). Blood Glucose  mg/dL                  Pre-meal                2200  151-200                0 unit                      0 unit  201-250                2 units                    1 unit  251-300                3 units                    1 unit  301-350                4 units                    2 units  >350                     5 units                    3 units  Refills: 0      insulin detemir U-100 (LEVEMIR FLEXTOUCH) 100 unit/mL (3 mL) SubQ InPn pen Inject 18 Units into the skin once daily.  Refills: 0      lisinopriL (PRINIVIL,ZESTRIL) 5 MG tablet Take 5 mg by mouth once daily.      melatonin 10 mg Cap Take 1 capsule by mouth every evening.      multivit-min-FA-lycopen-lutein 0.4 mg-300 mcg- 250 mcg Tab Take 1 tablet by mouth once daily.      pen needle, diabetic (PEN NEEDLE) 31 gauge x 3/16" Ndle 1 application by Misc.(Non-Drug; Combo Route) route 2 (two) times a day.  Qty: 200 each, Refills: 0               I have seen and examined this patient within the last 30 days. My clinical findings that support the need for the home health skilled services and home bound status are the following:no   Weakness/numbness causing balance and gait disturbance due to Surgery and revascularization making it taxing to leave home.  Requiring assistive device to leave home due to unsteady gait caused by  " Surgery and revascularization.     Diet:   diabetic diet 2000 calorie    Labs:  SN to perform labs:  CBC: Weekly; 2 week(s) and BMP: Weekly; 2 week(s)    Referrals/ Consults  Physical Therapy to evaluate and treat. Evaluate for home safety and equipment needs; Establish/upgrade home exercise program. Perform / instruct on therapeutic exercises, gait training, transfer training, and Range of Motion.  Occupational Therapy to evaluate and treat. Evaluate home environment for safety and equipment needs. Perform/Instruct on transfers, ADL training, ROM, and therapeutic exercises.    Activities:   activity as tolerated    Nursing:   Agency to admit patient within 24 hours of hospital discharge unless specified on physician order or at patient request    SN to complete comprehensive assessment including routine vital signs. Instruct on disease process and s/s of complications to report to MD. Review/verify medication list sent home with the patient at time of discharge  and instruct patient/caregiver as needed. Frequency may be adjusted depending on start of care date.     Skilled nurse to perform up to 3 visits PRN for symptoms related to diagnosis    Notify MD if SBP > 160 or < 90; DBP > 90 or < 50; HR > 120 or < 50; Temp > 101; O2 < 88%;     Ok to schedule additional visits based on staff availability and patient request on consecutive days within the home health episode.    When multiple disciplines ordered:    Start of Care occurs on Sunday - Wednesday schedule remaining discipline evaluations as ordered on separate consecutive days following the start of care.    Thursday SOC -schedule subsequent evaluations Friday and Monday the following week.     Friday - Saturday SOC - schedule subsequent discipline evaluations on consecutive days starting Monday of the   Miscellaneous   Diabetic Care:   SN to perform and educate Diabetic management with blood glucose monitoring: and Report CBG < 60 or > 350 to physician.    Home  Health Aide:  Nursing Weekly, Physical Therapy Weekly, and Occupational Therapy Weekly    Wound Care Orders  yes:  Foot Ulcer:  Location: L great toe and L heel    Dry Eschar: Pain with betadine twice daily.    Vascular Wound:  Location: L heel    Consult ET nurse        Apply the following to wound:   Collagenase (Santyl) daily, cover with telfa, wrap with with kerlix dressing  Wound gel: weekly (frequency)    I certify that this patient is confined to her home and needs intermittent skilled nursing care, physical therapy, and occupational therapy.

## 2023-04-28 ENCOUNTER — TELEPHONE (OUTPATIENT)
Dept: PODIATRY | Facility: CLINIC | Age: 73
End: 2023-04-28
Payer: MEDICARE

## 2023-04-28 DIAGNOSIS — I73.9 PAD (PERIPHERAL ARTERY DISEASE): Primary | ICD-10-CM

## 2023-04-28 LAB — BACTERIA SPEC ANAEROBE CULT: NORMAL

## 2023-04-28 RX ORDER — COLLAGENASE SANTYL 250 [ARB'U]/G
OINTMENT TOPICAL DAILY
Qty: 90 G | Refills: 0 | Status: SHIPPED | OUTPATIENT
Start: 2023-04-28 | End: 2023-05-19

## 2023-04-28 NOTE — TELEPHONE ENCOUNTER
Spoke with patient and she states that [facility] location is to far form her home and will like to be schedule at the [place] location, called Podiatry clinic and spoke with Kate who helped me schedule patient for May 4/23 at 10:20 am, called patient back to advise no answer, unable to leave voice mail.

## 2023-04-28 NOTE — TELEPHONE ENCOUNTER
----- Message from Keanu Campa MD sent at 4/27/2023  7:18 PM CDT -----  Regarding: Outpatient followup  Please arrange outpatient followup with any provider for left foot eschars, and followup of bone biopsy for possible calcaneal osteomyelitis. Discharged from hospital 04/27

## 2023-05-02 LAB — BACTERIA SPEC ANAEROBE CULT: NORMAL

## 2023-05-03 LAB
FINAL PATHOLOGIC DIAGNOSIS: NORMAL
GROSS: NORMAL
Lab: NORMAL

## 2023-05-05 LAB
BACTERIA SPEC AEROBE CULT: ABNORMAL

## 2023-05-06 ENCOUNTER — EXTERNAL HOME HEALTH (OUTPATIENT)
Dept: HOME HEALTH SERVICES | Facility: HOSPITAL | Age: 73
End: 2023-05-06
Payer: MEDICARE

## 2023-05-10 ENCOUNTER — TELEPHONE (OUTPATIENT)
Dept: PODIATRY | Facility: CLINIC | Age: 73
End: 2023-05-10
Payer: MEDICARE

## 2023-05-15 ENCOUNTER — TELEPHONE (OUTPATIENT)
Dept: FAMILY MEDICINE | Facility: CLINIC | Age: 73
End: 2023-05-15

## 2023-05-15 NOTE — OP NOTE
[facility] - Med Surg   Operative Note    SUMMARY     Patient: Anastasiia Badillo    Surgery Date: 4/25/2023     Surgeon(s) and Role:     * Gilbert Sheppard MD - Primary     * Darron Petty MD - Fellow    Assisting Surgeon: None    Pre-op Diagnosis:  Diabetic foot ulcer [E11.621, L97.509]  PAD (peripheral artery disease) [I73.9]    Post-op Diagnosis:  Post-Op Diagnosis Codes:     * Diabetic foot ulcer [E11.621, L97.509]     * PAD (peripheral artery disease) [I73.9]    Procedure(s) (LRB):  Angiogram Extremity Unilateral (Left)  PTA (ANGIOPLASTY, PERCUTANEOUS, TRANSLUMINAL) (Left)    Anesthesia: Local MAC    Procedures:   1. Conscious sedation  2. Ultrasound-guided access to the right common femoral artery.   3. Percutaneous closure of the above access site using 6 Yoruba Mynx closure device   4. Aortogram and left lower extremity diagnostic angiogram    5. Percutaneous transluminal angioplasty left popliteal artery with a 4 by 80 mm chocolate balloon followed by drug coated balloon angioplasty of the proximal popliteal with a 4 by 40 mm inPact.  6. Percutaneous transluminal angioplasty of the entire length of the left posterior tibial artery with a 2.0 x 220 mm Scott balloon and 2.5 by 80 Chocolate balloon    Description of the findings of the procedure: Lpalpable DP pulse s/p L popliteal angioplasty (w DCB) and L PT recanalization    Estimated Blood Loss: * No values recorded between 4/25/2023  3:06 PM and 4/25/2023  5:23 PM *         Specimens:   Specimen (24h ago, onward)      None            Indications for Procedure:  Anastasiia Badillo is a 73 y.o. female with a history of chronic limb threatening ischemia with severe tissue loss of the left foot.  Patient brought to the operating planned intervention to revascularize left leg for limb salvage    Attending Attestation: I was present and scrubbed for the entire procedure.  After an informed consent was obtained the patient was brought to the  interventional suite and placed in the supine position. Both the patient and procedure were confirmed and identified during timeout process. The patient received perioperative antibiotics. The patient's bilateral groins were prepped and draped in usual sterile fashion. Using direct ultrasound guidance the right CFA was accessed with a 21-G needle, Mandril wire and 4-Fr micropuncture sheath. Then under fluoroscopic guidance, an 0.035-in wire was placed into the distal aorta. The micropuncture sheath was then exchanged over the wire for a short 5-Zimbabwean sheath. Next a omniflush catheter was placed over the wire and into the distal aorta under fluoroscopy and an aortogram was perfomed.  Using a glide wire the aortic bifurcation was traversed and the omni flush was advance over the wire to the contralateral external iliac artery just above the femoral head to perform angiogram of the  left leg.  Angiography demonstrated:  Patent aorta iliofemoral inflow with high-grade stenosis of the above knee popliteal artery and behind the knee.  Additionally single-vessel runoff via anterior tibial artery with proximal occlusion of posterior tibial artery that reconstitutes at the ankle.  Based on the images from this diagnostic angio, a decision was made to intervene. 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  After performing angiography and verified that are wire was true lumen distally, we performed the above interventions. Follow-up angiogram showed excellent luminal gain at the lesions with now inline flow to the foot with two-vessel runoff via a an anterior tibial artery and recanalized posterior tibial artery. Heparin was reversed with  protamine. We then deployed a Mynx closure device without issue to achieve hemostasis at our access site. At the conclusion of the case the patient was noted to have no evidence of a groin hematoma, and  palpable pedal pulses/sigals on the left improved from pre-op and dopplerable pedal pulses/signals on the right unchanged from pre op.  All instrument and sponge counts were correct at the end of the case. The patient tolerated the procedure well and was transferred to the pacu for further recovery.     MODERATE SEDATION in [employer]  I was present for moderate sedation and monitored the patients cardio respiratory functions during the procedure. See nurses notes for Intra-service start and end times and for the log of the medications, doseage and route.    Time of sedation:  100 mins.

## 2023-05-15 NOTE — TELEPHONE ENCOUNTER
----- Message from Phan Monreal MA sent at 5/15/2023  9:34 AM CDT -----  Regarding: orders  Steven from [employer] is calling to ask for orders,a medical necessity form, and ov notes faxed to [employer] @ [phone] due to the pt asking for an electric wheelchair. # [phone]

## 2023-05-15 NOTE — TELEPHONE ENCOUNTER
Called Steven. No answer. Left message that I was calling back on this and that we havent seen patient since 2021. Advised she does have an upcoming appt on May 24.

## 2023-05-15 NOTE — TELEPHONE ENCOUNTER
----- Message from Mariela Stark MA sent at 5/15/2023  9:53 AM CDT -----  Regarding: returning your call  Steven from [facility] call re: wheelchair to confirm your message is understood. No need to call back.

## 2023-05-17 ENCOUNTER — DOCUMENT SCAN (OUTPATIENT)
Dept: HOME HEALTH SERVICES | Facility: HOSPITAL | Age: 73
End: 2023-05-17
Payer: MEDICARE

## 2023-05-18 ENCOUNTER — OFFICE VISIT (OUTPATIENT)
Dept: PODIATRY | Facility: CLINIC | Age: 73
End: 2023-05-18
Payer: MEDICARE

## 2023-05-18 ENCOUNTER — APPOINTMENT (OUTPATIENT)
Dept: LAB | Facility: HOSPITAL | Age: 73
End: 2023-05-18
Payer: MEDICARE

## 2023-05-18 VITALS — RESPIRATION RATE: 18 BRPM | HEIGHT: 63 IN | WEIGHT: 167 LBS | BODY MASS INDEX: 29.59 KG/M2

## 2023-05-18 DIAGNOSIS — I73.9 PVD (PERIPHERAL VASCULAR DISEASE): ICD-10-CM

## 2023-05-18 DIAGNOSIS — L97.523 DIABETIC ULCER OF TOE OF LEFT FOOT ASSOCIATED WITH TYPE 2 DIABETES MELLITUS, WITH NECROSIS OF MUSCLE: ICD-10-CM

## 2023-05-18 DIAGNOSIS — L97.523 ULCER OF LEFT FOOT WITH NECROSIS OF MUSCLE: ICD-10-CM

## 2023-05-18 DIAGNOSIS — R26.2 DIFFICULTY IN WALKING, NOT ELSEWHERE CLASSIFIED: ICD-10-CM

## 2023-05-18 DIAGNOSIS — L03.119 CELLULITIS AND ABSCESS OF FOOT: ICD-10-CM

## 2023-05-18 DIAGNOSIS — R23.4 ESCHAR OF FOOT: ICD-10-CM

## 2023-05-18 DIAGNOSIS — F17.200 TOBACCO DEPENDENCY: ICD-10-CM

## 2023-05-18 DIAGNOSIS — Z79.4 TYPE 2 DIABETES MELLITUS WITH FOOT ULCER, WITH LONG-TERM CURRENT USE OF INSULIN: ICD-10-CM

## 2023-05-18 DIAGNOSIS — L97.524 ULCER OF LEFT FOOT WITH NECROSIS OF BONE: Primary | ICD-10-CM

## 2023-05-18 DIAGNOSIS — Z91.89 AT HIGH RISK FOR INADEQUATE NUTRITIONAL INTAKE: ICD-10-CM

## 2023-05-18 DIAGNOSIS — R09.89 DECREASED PEDAL PULSES: ICD-10-CM

## 2023-05-18 DIAGNOSIS — E11.621 TYPE 2 DIABETES MELLITUS WITH FOOT ULCER, WITH LONG-TERM CURRENT USE OF INSULIN: ICD-10-CM

## 2023-05-18 DIAGNOSIS — L97.509 TYPE 2 DIABETES MELLITUS WITH FOOT ULCER, WITH LONG-TERM CURRENT USE OF INSULIN: ICD-10-CM

## 2023-05-18 DIAGNOSIS — E11.40 TYPE 2 DIABETES MELLITUS WITH DIABETIC NEUROPATHY, UNSPECIFIED WHETHER LONG TERM INSULIN USE: ICD-10-CM

## 2023-05-18 DIAGNOSIS — Z09 HOSPITAL DISCHARGE FOLLOW-UP: ICD-10-CM

## 2023-05-18 DIAGNOSIS — L97.424 DIABETIC ULCER OF LEFT HEEL ASSOCIATED WITH TYPE 2 DIABETES MELLITUS, WITH NECROSIS OF BONE: ICD-10-CM

## 2023-05-18 DIAGNOSIS — E11.621 DIABETIC ULCER OF LEFT HEEL ASSOCIATED WITH TYPE 2 DIABETES MELLITUS, WITH NECROSIS OF BONE: ICD-10-CM

## 2023-05-18 DIAGNOSIS — I73.9 PAD (PERIPHERAL ARTERY DISEASE): ICD-10-CM

## 2023-05-18 DIAGNOSIS — E11.621 DIABETIC ULCER OF TOE OF LEFT FOOT ASSOCIATED WITH TYPE 2 DIABETES MELLITUS, WITH NECROSIS OF MUSCLE: ICD-10-CM

## 2023-05-18 DIAGNOSIS — L02.619 CELLULITIS AND ABSCESS OF FOOT: ICD-10-CM

## 2023-05-18 PROCEDURE — 87186 SC STD MICRODIL/AGAR DIL: CPT | Performed by: PODIATRIST

## 2023-05-18 PROCEDURE — 99214 OFFICE O/P EST MOD 30 MIN: CPT | Mod: 25,S$GLB,, | Performed by: PODIATRIST

## 2023-05-18 PROCEDURE — 3008F PR BODY MASS INDEX (BMI) DOCUMENTED: ICD-10-PCS | Mod: CPTII,,, | Performed by: PODIATRIST

## 2023-05-18 PROCEDURE — 87075 CULTR BACTERIA EXCEPT BLOOD: CPT | Performed by: PODIATRIST

## 2023-05-18 PROCEDURE — 1125F PR PAIN SEVERITY QUANTIFIED, PAIN PRESENT: ICD-10-PCS | Mod: CPTII,,, | Performed by: PODIATRIST

## 2023-05-18 PROCEDURE — 1111F PR DISCHARGE MEDS RECONCILED W/ CURRENT OUTPATIENT MED LIST: ICD-10-PCS | Mod: CPTII,,, | Performed by: PODIATRIST

## 2023-05-18 PROCEDURE — 3288F PR FALLS RISK ASSESSMENT DOCUMENTED: ICD-10-PCS | Mod: CPTII,,, | Performed by: PODIATRIST

## 2023-05-18 PROCEDURE — 3288F FALL RISK ASSESSMENT DOCD: CPT | Mod: CPTII,,, | Performed by: PODIATRIST

## 2023-05-18 PROCEDURE — 1100F PR PT FALLS ASSESS DOC 2+ FALLS/FALL W/INJURY/YR: ICD-10-PCS | Mod: CPTII,,, | Performed by: PODIATRIST

## 2023-05-18 PROCEDURE — 99999 PR PBB SHADOW E&M-EST. PATIENT-LVL V: ICD-10-PCS | Mod: PBBFAC,,, | Performed by: PODIATRIST

## 2023-05-18 PROCEDURE — 4010F ACE/ARB THERAPY RXD/TAKEN: CPT | Mod: CPTII,,, | Performed by: PODIATRIST

## 2023-05-18 PROCEDURE — 87077 CULTURE AEROBIC IDENTIFY: CPT | Performed by: PODIATRIST

## 2023-05-18 PROCEDURE — 3052F HG A1C>EQUAL 8.0%<EQUAL 9.0%: CPT | Mod: CPTII,,, | Performed by: PODIATRIST

## 2023-05-18 PROCEDURE — 1159F PR MEDICATION LIST DOCUMENTED IN MEDICAL RECORD: ICD-10-PCS | Mod: CPTII,,, | Performed by: PODIATRIST

## 2023-05-18 PROCEDURE — 1125F AMNT PAIN NOTED PAIN PRSNT: CPT | Mod: CPTII,,, | Performed by: PODIATRIST

## 2023-05-18 PROCEDURE — 1160F RVW MEDS BY RX/DR IN RCRD: CPT | Mod: CPTII,,, | Performed by: PODIATRIST

## 2023-05-18 PROCEDURE — 1160F PR REVIEW ALL MEDS BY PRESCRIBER/CLIN PHARMACIST DOCUMENTED: ICD-10-PCS | Mod: CPTII,,, | Performed by: PODIATRIST

## 2023-05-18 PROCEDURE — 1111F DSCHRG MED/CURRENT MED MERGE: CPT | Mod: CPTII,,, | Performed by: PODIATRIST

## 2023-05-18 PROCEDURE — 1100F PTFALLS ASSESS-DOCD GE2>/YR: CPT | Mod: CPTII,,, | Performed by: PODIATRIST

## 2023-05-18 PROCEDURE — 3008F BODY MASS INDEX DOCD: CPT | Mod: CPTII,,, | Performed by: PODIATRIST

## 2023-05-18 PROCEDURE — 11042 WOUND DEBRIDEMENT: ICD-10-PCS | Mod: 59,S$GLB,, | Performed by: PODIATRIST

## 2023-05-18 PROCEDURE — 3052F PR MOST RECENT HEMOGLOBIN A1C LEVEL 8.0 - < 9.0%: ICD-10-PCS | Mod: CPTII,,, | Performed by: PODIATRIST

## 2023-05-18 PROCEDURE — 11046 WOUND DEBRIDEMENT: ICD-10-PCS | Mod: S$GLB,,, | Performed by: PODIATRIST

## 2023-05-18 PROCEDURE — 11043 DBRDMT MUSC&/FSCA 1ST 20/<: CPT | Mod: S$GLB,,, | Performed by: PODIATRIST

## 2023-05-18 PROCEDURE — 11046 DBRDMT MUSC&/FSCA EA ADDL: CPT | Mod: S$GLB,,, | Performed by: PODIATRIST

## 2023-05-18 PROCEDURE — 87070 CULTURE OTHR SPECIMN AEROBIC: CPT | Performed by: PODIATRIST

## 2023-05-18 PROCEDURE — 99214 PR OFFICE/OUTPT VISIT, EST, LEVL IV, 30-39 MIN: ICD-10-PCS | Mod: 25,S$GLB,, | Performed by: PODIATRIST

## 2023-05-18 PROCEDURE — 99999 PR PBB SHADOW E&M-EST. PATIENT-LVL V: CPT | Mod: PBBFAC,,, | Performed by: PODIATRIST

## 2023-05-18 PROCEDURE — 1159F MED LIST DOCD IN RCRD: CPT | Mod: CPTII,,, | Performed by: PODIATRIST

## 2023-05-18 PROCEDURE — 11042 DBRDMT SUBQ TIS 1ST 20SQCM/<: CPT | Mod: 59,S$GLB,, | Performed by: PODIATRIST

## 2023-05-18 PROCEDURE — 11043 WOUND DEBRIDEMENT: ICD-10-PCS | Mod: S$GLB,,, | Performed by: PODIATRIST

## 2023-05-18 PROCEDURE — 4010F PR ACE/ARB THEARPY RXD/TAKEN: ICD-10-PCS | Mod: CPTII,,, | Performed by: PODIATRIST

## 2023-05-18 RX ORDER — DOXYCYCLINE 100 MG/1
100 CAPSULE ORAL 2 TIMES DAILY
Qty: 28 CAPSULE | Refills: 0 | Status: SHIPPED | OUTPATIENT
Start: 2023-05-18 | End: 2023-06-01

## 2023-05-18 NOTE — PROGRESS NOTES
[address] [facility] [address]  [place] [address]  Phone: [phone]   [phone]    Patient's PCP:Raji Parkinson MD  Referring Provider: Aaareferral Self    Subjective:      Chief Complaint:: Foot Ulcer (Left foot calcaneal and first toe ulcer bone biopsy done 4/25/23 ), Difficulty Walking, Poor Circulation, Wound Check, Wound Care, Wound Infection, Wound Consult, Pressure Ulcer, Osteomyelitis , Numbness, Non-healing Wound, Hospital Follow Up, Diabetes Mellitus, Diabetic Foot Ulcer, Infection, Cellulitis, and Diabetes    HPI  1st visit with patient with myself    Anastasiia Badillo is a 73 y.o. female with DMII with neuropathy, tobacco abuse, severe PVD, recent hospital admission, presents to clinic today with chronic left heel diabetic foot ulcer and chronic left great toe diabetic foot ulcer.    Patient was recently hospitalized for these wounds and vascular intervention was done.  Reviewed all notes from hospital stay.  Patient was under the care of a different podiatrist while she was hospitalized for these wounds.    Patient did not follow up with her podiatrist following hospital discharge and presents today for a 2nd opinion.    From review of chart it appears that bone culture taken from the left heel while patient was in the hospital on 04/27 was positive.  Patient not currently under the care of Infectious Disease for IV antibiotics for bone infection.  Will refer to Infectious Disease, as patient will possibly need IV antibiotics.    Reviewed all notes from patients medical chart from last 12 months including recent hospital admission, including imaging, procedures, studies, progress notes,  cultures, antibiotic regimens, photos,  etc.       Recent discharge summary  Patient Name: Anastasiia Badillo  MRN: [id number]  [facility]: [id number]  Patient Class: IP- Inpatient  Admission Date: 4/21/2023  Hospital Length of Stay: 6 days  Discharge Date and Time:  04/27/2023 1:20 PM  Attending Physician: Josselin  MD Rei   Discharging Provider: Gene Nguyen MD  Primary Care Provider: Raji Parkinson MD  Hospital Medicine Team: [facility] 2 Gene Nguyen MD  Primary Care Team: [facility] 2  HPI:   Ms. Badillo is a 72 y/o F with a past medical history significant for DM2, HTN, recent femur fracture, and tobacco use who presented to [facility] ED for L foot wounds.  She states they have been present for about 2 weeks, but are becoming worse. She noted some redness to the left great toe over the past couple of days. She was seen by home health who told her to come to the ED for a wound check. She denies ever having similar wounds, but she had an intramedullary denzel inserted into her left femur on 2/18/23 for a subtrochanteric fracture and has been rubbing her left lower leg against objects frequently due to the difficulty she is experiencing in moving her left leg since surgery. She was found to have WBC 13, ESR 75, . She was given 1x vancomycin and zosyn. She was admitted to Hospital Medicine service. Started on vanc/cefepime. Arterial US BLE showed high grade stenosis SFA bilaterally and popliteal on the left. ID was consulted. Wound cx grew staph aureus, susceptibility pending. CTA w/ runoff showed RLE: 60% and greater stenosis of mid&distal SFA and high-grade stenosis distal PTA; LLE: 60% and greater stenosis mid&distal SFA. She also had a retroperitoneal US which showed 2.3 cm hypoechoic mass of the upper pole of the right kidney may represent a bloody cyst. Patient transferred to [facility] for vascular surgery eval  Procedure(s) (LRB):  Angiogram Extremity Unilateral (Left)  PTA (ANGIOPLASTY, PERCUTANEOUS, TRANSLUMINAL) (Left)   Hospital Course:   Admitted to medicine for management of LLE wound. Wound positive for MSSA, MRI negative for OM. ID consulted with recs for 10 days course with cefadroxil. Vascular planning for angiogram Tuesday. Pt tolerated procedure well. Having some tingling and pain in the LLE. Returned  "to vascular lab for follow-up evaluation. Pt tolerating procedure well with mild-moderate leg pain with tingling. Pt requiring Norco 10 3-4 times per day for 2 days after procedure. Pain down to 1. Will send home with short course of Norco 5 until she follows up with vascular surgery. Pt discharging to home.             Vitals:    05/18/23 1411   Resp: 18   Weight: 75.8 kg (167 lb)   Height: 5' 3" (1.6 m)   PainSc:   5      Shoe Size:     Past Surgical History:   Procedure Laterality Date    ANGIOGRAPHY OF LOWER EXTREMITY Left 4/25/2023    Procedure: Angiogram Extremity Unilateral;  Surgeon: Gilbert Sheppard MD;  Location: [facility] OR [address] [place] [address];  Service: Vascular;  Laterality: Left;  28.4 min  487.45 mGy  75.9180 Gycm2  trans radial: 7 ml  dye: 126 ml      AUGMENTATION OF BREAST      INTRAMEDULLARY RODDING OF FEMUR Left 2/18/2023    Procedure: INSERTION, INTRAMEDULLARY ROXANNA, FEMUR (hana table -- synthes -- long nail -- have bovie and suction and large bone set);  Surgeon: Keanu Brand MD;  Location: [facility] OR;  Service: Orthopedics;  Laterality: Left;    PERCUTANEOUS TRANSLUMINAL ANGIOPLASTY Left 4/25/2023    Procedure: PTA (ANGIOPLASTY, PERCUTANEOUS, TRANSLUMINAL);  Surgeon: Gilbert Sheppard MD;  [facility];  Service: Vascular;  Laterality: Left;  Tibial and popliteal angioplasty     Past Medical History:   Diagnosis Date    Diabetes mellitus, type 2      Family History   Problem Relation Age of Onset    Heart disease Mother     Cancer Mother     Cataracts Mother     Hypertension Father     Heart disease Father     Cancer Sister     Heart disease Sister     Breast cancer Sister     Heart disease Brother         Social History:   Marital Status:   Alcohol History:  reports current alcohol use.  Tobacco History:  reports that she has been smoking cigarettes. She has a 11.25 pack-year smoking history. She has never used smokeless tobacco.  Drug History:  reports no history of " "drug use.    Review of patient's allergies indicates:  No Known Allergies    Current Outpatient Medications   Medication Sig Dispense Refill    acetaminophen (TYLENOL) 500 MG tablet Take 1,000 mg by mouth every 6 (six) hours as needed for Pain.      aspirin 81 MG Chew Chew and swallow 1 tablet (81 mg total) by mouth once daily. 30 tablet 2    atorvastatin (LIPITOR) 80 MG tablet Take 1 tablet (80 mg total) by mouth every evening. 90 tablet 3    clopidogreL (PLAVIX) 75 mg tablet Take 1 tablet (75 mg total) by mouth once daily. 30 tablet 2    collagenase (SANTYL) ointment Apply topically once daily. for 21 days 90 g 0    doxycycline (MONODOX) 100 MG capsule Take 1 capsule (100 mg total) by mouth 2 (two) times daily. for 14 days 28 capsule 0    HYDROcodone-acetaminophen (NORCO) 5-325 mg per tablet Take 1 tablet by mouth every 6 (six) hours as needed for Pain. 9 tablet 0    insulin aspart U-100 (NOVOLOG) 100 unit/mL (3 mL) InPn pen Inject 0-5 Units into the skin before meals and at bedtime as needed (Hyperglycemia). Blood Glucose  mg/dL                  Pre-meal                2200  151-200                0 unit                      0 unit  201-250                2 units                    1 unit  251-300                3 units                    1 unit  301-350                4 units                    2 units  >350                     5 units                    3 units  0    insulin detemir U-100 (LEVEMIR FLEXTOUCH) 100 unit/mL (3 mL) SubQ InPn pen Inject 18 Units into the skin once daily.  0    lisinopriL (PRINIVIL,ZESTRIL) 5 MG tablet Take 5 mg by mouth once daily.      melatonin 10 mg Cap Take 1 capsule by mouth every evening.      multivit-min-FA-lycopen-lutein 0.4 mg-300 mcg- 250 mcg Tab Take 1 tablet by mouth once daily.      nicotine (NICODERM CQ) 14 mg/24 hr Place 1 patch onto the skin once daily. 28 patch 0    pen needle, diabetic (PEN NEEDLE) 31 gauge x 3/16" Ndle 1 application by Misc.(Non-Drug; Combo Route) " route 2 (two) times a day. 200 each 0     No current facility-administered medications for this visit.       Review of Systems   Constitutional:  Negative for chills, fatigue, fever and unexpected weight change.   HENT:  Negative for hearing loss and trouble swallowing.    Eyes:  Negative for photophobia and visual disturbance.   Respiratory:  Negative for cough, shortness of breath and wheezing.    Cardiovascular:  Negative for chest pain, palpitations and leg swelling.   Gastrointestinal:  Negative for abdominal pain and nausea.   Genitourinary:  Negative for dysuria and frequency.   Musculoskeletal:  Positive for gait problem. Negative for arthralgias, back pain, joint swelling and myalgias.   Skin:  Positive for wound. Negative for rash.   Neurological:  Positive for numbness. Negative for tremors, seizures, weakness and headaches.   Hematological:  Does not bruise/bleed easily.       Objective:        Physical Exam:   Foot Exam  Physical Exam  Musculoskeletal:        Feet:      Ortho/SPM Exam       Post debridement            pre debridement        Post debridement       Post debridement      pre debridement      Physical examination: General: Pt. is well-developed, well-nourished, appears stated age, in no acute distress, alert and oriented x 3.    Vascular: Dorsalis pedis and posterior tibial pulses are 1/4  Bilaterally. Toes are warm to touch. Feet are warm proximally.    There is decreased digital hair. Skin is atrophic, slightly hyperpigmented, and mildly edematous. Capillary refill less than 5 seconds all toes/distal feet    Neurologic: [place] 5.07 monofilament is decreased bilateral feet. Sharp/dull sensation absent Bilateral feet.    Vibratory sensation absent bilateral    Musculoskeletal: adequate joint range of motion without pain, limitation, nor crepitation Bilateral feet and ankle joints. Muscle strength is 5/5 in all groups bilaterally.    Lymphatics: no lymphangitic streaking  bilaterally.    Dermatologic: Elongated, thickened, dystrophic, discolored nails x 10. Xerosis Bilaterally.      Imaging:            Assessment:       1. Ulcer of left foot with necrosis of bone    2. Type 2 diabetes mellitus with foot ulcer, with long-term current use of insulin    3. PAD (peripheral artery disease)    4. Eschar of foot    5. At high risk for inadequate nutritional intake    6. Difficulty in walking, not elsewhere classified    7. Hospital discharge follow-up    8. Diabetic ulcer of left heel associated with type 2 diabetes mellitus, with necrosis of bone    9. Diabetic ulcer of toe of left foot associated with type 2 diabetes mellitus, with necrosis of muscle    10. Ulcer of left foot with necrosis of muscle    11. Decreased pedal pulses    12. Cellulitis and abscess of foot    13. PVD (peripheral vascular disease)    14. Type 2 diabetes mellitus with diabetic neuropathy, unspecified whether long term insulin use    15. Tobacco dependency      Plan:   Ulcer of left foot with necrosis of bone    Type 2 diabetes mellitus with foot ulcer, with long-term current use of insulin  -     Ambulatory referral/consult to Infectious Disease; Future; Expected date: 05/25/2023  -     doxycycline (MONODOX) 100 MG capsule; Take 1 capsule (100 mg total) by mouth 2 (two) times daily. for 14 days  Dispense: 28 capsule; Refill: 0  -     Cancel: Ambulatory referral/consult to Home Health; Future; Expected date: 05/19/2023  -     Ambulatory referral/consult to Wound Clinic; Future; Expected date: 05/25/2023  -     Aerobic culture  -     Culture, Anaerobic  -     Ambulatory referral/consult to Home Health; Future; Expected date: 05/19/2023  -     WOUND VAC FOR HOME USE  -     SURGERY SHOE FOR HOME USE    PAD (peripheral artery disease)    Eschar of foot    At high risk for inadequate nutritional intake    Difficulty in walking, not elsewhere classified    Hospital discharge follow-up    Diabetic ulcer of left heel  "associated with type 2 diabetes mellitus, with necrosis of bone  -     Ambulatory referral/consult to Infectious Disease; Future; Expected date: 05/25/2023  -     doxycycline (MONODOX) 100 MG capsule; Take 1 capsule (100 mg total) by mouth 2 (two) times daily. for 14 days  Dispense: 28 capsule; Refill: 0  -     Cancel: Ambulatory referral/consult to Home Health; Future; Expected date: 05/19/2023  -     Ambulatory referral/consult to Wound Clinic; Future; Expected date: 05/25/2023  -     Aerobic culture  -     Culture, Anaerobic  -     Ambulatory referral/consult to Home Health; Future; Expected date: 05/19/2023  -     WOUND VAC FOR HOME USE  -     SURGERY SHOE FOR HOME USE    Diabetic ulcer of toe of left foot associated with type 2 diabetes mellitus, with necrosis of muscle  -     Ambulatory referral/consult to Infectious Disease; Future; Expected date: 05/25/2023  -     doxycycline (MONODOX) 100 MG capsule; Take 1 capsule (100 mg total) by mouth 2 (two) times daily. for 14 days  Dispense: 28 capsule; Refill: 0  -     Cancel: Ambulatory referral/consult to Home Health; Future; Expected date: 05/19/2023  -     Ambulatory referral/consult to Wound Clinic; Future; Expected date: 05/25/2023  -     Aerobic culture  -     Culture, Anaerobic  -     Ambulatory referral/consult to Home Health; Future; Expected date: 05/19/2023  -     WOUND VAC FOR HOME USE  -     SURGERY SHOE FOR HOME USE    Ulcer of left foot with necrosis of muscle    Decreased pedal pulses    Cellulitis and abscess of foot    PVD (peripheral vascular disease)    Type 2 diabetes mellitus with diabetic neuropathy, unspecified whether long term insulin use    Tobacco dependency    Other orders  -     Wound Debridement      No follow-ups on file.    Wound Debridement    Date/Time: 5/18/2023 2:20 PM  Performed by: Jenny Peace DPM  Authorized by: Jenny Peace DPM     Time out: Immediately prior to procedure a "time out" was called to verify the correct " patient, procedure, equipment, support staff and site/side marked as required.    Consent Done?:  Yes (Written)    Preparation: Patient was prepped and draped in usual sterile fashion, Patient was prepped and draped with aseptic technique and Patient was prepped and draped with clean technique      Wound Details:    Location:  Left foot    Location:  Left Heel    Type of Debridement:  Excisional       Length (cm):  4.8       Area (sq cm):  20.16       Width (cm):  4.2       Percent Debrided (%):  100       Depth (cm):  0.8       Total Area Debrided (sq cm):  20.16    Depth of debridement:  Muscle/fascia/tendon    Tissue debrided:  Subcutaneous, Fascia and Muscle    Devitalized tissue debrided:  Fibrin, Necrotic/Eschar, Slough, Other, Biofilm and Exudate    Instruments:  Blade, Curette, Forceps, Nippers and Scissors  Bleeding:  Moderate  Hemostasis Achieved: Yes  Method Used:  Pressure    Additional wounds:  1    2nd Wound Details:     Location:  Left foot    Location:  Left 1st Toe    Location:  Left 1st Toe    Type of Debridement:  Excisional       Length (cm):  2.4       Area (sq cm):  4.8       Width (cm):  2       Percent Debrided (%):  100       Depth (cm):  0.5       Total Area Debrided (sq cm):  4.8    Depth of debridement:  Subcutaneous tissue    Tissue debrided:  Subcutaneous    Devitalized tissue debrided:  Biofilm, Callus, Necrotic/Eschar, Slough and Other    Instruments:  Curette, Forceps, Nippers and Blade  Bleeding:  Moderate  Hemostasis Achieved: Yes    Method Used:  Pressure  Patient tolerance:  Patient tolerated the procedure well with no immediate complications  1st Wound Pain Assessment: 0  2nd Wound Pain Assessment: 0          Counseling:     I provided patient education verbally regarding:   Patient diagnosis, treatment options, as well as alternatives, risks, and benefits.     This note was created using Dragon voice recognition software that occasionally misinterpreted phrases or words.      Had a long discussion with patient regarding the need to follow-up with her vascular surgeon who did the surgery in the hospital.  Patient's daughter was bedside and I had a discussion with patient's daughter regarding the need to review her discharge paperwork from her hospital admission as patient had not followed up with any of her physicians from the hospital stay    Referral placed to ID    Follow-up: Patient is to return to the clinic in 1 week(s) for follow-up but should call the office immediately if any signs of infection, such as fever, chills, sweats, increased redness or pain.  Recommendations:  Check feet daily.  Dressing changes, [employer] followed by bulky wrap with no compression  No Barefoot  Use surgery shoe for walking or standing     Counseled patient on increasing protein intake, not getting wound wet, keeping dressing clean dry and intact, following a healthy diet, elevating legs when able, removing pressure from wound      Discussed with patient recommendation for smoking cessation for wound healing.  Counseled patient in detail regarding the need to stop smoking to aid in wound healing. Patient voiced an understanding.      I provided patient education verbally regarding: proper ulcer care and the possible need for serial debridements, topical medications, specific dressings and biological engineered skin substitutes if indicated.          Counseling/Education:  I provided patient education verbally regarding:   The aspects of diabetes and how it pertains to the feet. I explained the importance of proper diabetic foot care and how it is essential for the health of their feet.    I discussed the importance of knowing their Hemoglobin A1c and that the level needs to be as close to 6 as possible. I discussed the increase complications of high blood sugar including stroke, blindness, heart attack, kidney failure and loss of limb secondary to neuropathy and PVD.     With neuropathy, beware of  any breaks in the skin or redness. These areas are not recognized early due to the numbness.    I discussed Diabetes, lower back issues, metabolic disorders, systemic causes, chemotherapy, vitamin deficiency, heavy metal exposure, as some of the causes. I also explained that as much as 40% of the time we can not find a cause. I discussed different treatments available to control the symptoms but which may not cure the problem.       Counseled patient on the aspects of diabetes and how it pertains to the feet.  I explained the importance of proper diabetic foot care and how it is essential for the health of their feet.      Shoe inspection. Patient instructed on proper foot hygeine. We discussed wearing proper shoe gear, daily foot inspections, never walking without protective shoe gear, never putting sharp instruments to feet, routine podiatric nail visits every 2-3 months.      Patient should call the office immediately if any signs of infection, such as fever, chills, sweats, increased redness or pain.    Patient was instructed to call the clinic or go to the emergency department if their symptoms do not improve, worsens, or if new symptoms develop.  Patient was advised that if any increased swelling, pain, or numbness arise to go immediately to the ED. Patient knows to call any time if an emergency arises. Shared decision making occurred and patient verbalized understanding in agreement with this plan.     >50% of this > 75 minute visit was spent face to face educating/counseling the patient    I spent a total of 75 minutes on the day of the visit.This includes face to face time and non-face to face time preparing to see the patient (eg, review of tests), obtaining and/or reviewing separately obtained history, documenting clinical information in the electronic or other health record, independently interpreting results and communicating results to the patient/family/caregiver, or care coordinator.

## 2023-05-20 LAB
BACTERIA SPEC AEROBE CULT: ABNORMAL
BACTERIA SPEC ANAEROBE CULT: NORMAL

## 2023-05-20 PROCEDURE — G0179 PR HOME HEALTH MD RECERTIFICATION: ICD-10-PCS | Mod: ,,, | Performed by: FAMILY MEDICINE

## 2023-05-20 PROCEDURE — G0179 MD RECERTIFICATION HHA PT: HCPCS | Mod: ,,, | Performed by: FAMILY MEDICINE

## 2023-05-22 DIAGNOSIS — S72.22XA CLOSED LEFT SUBTROCHANTERIC FEMUR FRACTURE, INITIAL ENCOUNTER: Primary | ICD-10-CM

## 2023-05-22 PROBLEM — N39.0 URINARY TRACT INFECTION WITHOUT HEMATURIA: Status: RESOLVED | Noted: 2023-02-17 | Resolved: 2023-05-22

## 2023-05-24 ENCOUNTER — DOCUMENT SCAN (OUTPATIENT)
Dept: HOME HEALTH SERVICES | Facility: HOSPITAL | Age: 73
End: 2023-05-24
Payer: MEDICARE

## 2023-05-24 ENCOUNTER — OFFICE VISIT (OUTPATIENT)
Dept: FAMILY MEDICINE | Facility: CLINIC | Age: 73
End: 2023-05-24
Payer: MEDICARE

## 2023-05-24 VITALS
WEIGHT: 160 LBS | DIASTOLIC BLOOD PRESSURE: 72 MMHG | SYSTOLIC BLOOD PRESSURE: 120 MMHG | OXYGEN SATURATION: 99 % | HEIGHT: 63 IN | HEART RATE: 90 BPM | BODY MASS INDEX: 28.35 KG/M2

## 2023-05-24 DIAGNOSIS — I73.9 PAD (PERIPHERAL ARTERY DISEASE): ICD-10-CM

## 2023-05-24 DIAGNOSIS — Z98.82 BREAST IMPLANT IN SITU: ICD-10-CM

## 2023-05-24 DIAGNOSIS — N28.9 KIDNEY LESION, NATIVE, RIGHT: ICD-10-CM

## 2023-05-24 DIAGNOSIS — Z09 HOSPITAL DISCHARGE FOLLOW-UP: Primary | ICD-10-CM

## 2023-05-24 DIAGNOSIS — Z79.4 TYPE 2 DIABETES MELLITUS WITH DIABETIC POLYNEUROPATHY, WITH LONG-TERM CURRENT USE OF INSULIN: Primary | ICD-10-CM

## 2023-05-24 DIAGNOSIS — Z12.11 SCREENING FOR COLON CANCER: ICD-10-CM

## 2023-05-24 DIAGNOSIS — Z12.31 SCREENING MAMMOGRAM, ENCOUNTER FOR: ICD-10-CM

## 2023-05-24 DIAGNOSIS — Z99.89 WALKER AS AMBULATION AID: ICD-10-CM

## 2023-05-24 DIAGNOSIS — E11.42 TYPE 2 DIABETES MELLITUS WITH DIABETIC POLYNEUROPATHY, WITH LONG-TERM CURRENT USE OF INSULIN: Primary | ICD-10-CM

## 2023-05-24 DIAGNOSIS — Z79.4 TYPE 2 DIABETES MELLITUS WITH DIABETIC POLYNEUROPATHY, WITH LONG-TERM CURRENT USE OF INSULIN: ICD-10-CM

## 2023-05-24 DIAGNOSIS — Z87.81 HX OF FRACTURE OF LEFT HIP: ICD-10-CM

## 2023-05-24 DIAGNOSIS — Z78.0 MENOPAUSE: ICD-10-CM

## 2023-05-24 DIAGNOSIS — F17.200 SMOKER: ICD-10-CM

## 2023-05-24 DIAGNOSIS — I10 ESSENTIAL (PRIMARY) HYPERTENSION: ICD-10-CM

## 2023-05-24 DIAGNOSIS — E11.42 TYPE 2 DIABETES MELLITUS WITH DIABETIC POLYNEUROPATHY, WITH LONG-TERM CURRENT USE OF INSULIN: ICD-10-CM

## 2023-05-24 DIAGNOSIS — Z13.820 SCREENING FOR OSTEOPOROSIS: ICD-10-CM

## 2023-05-24 DIAGNOSIS — N63.11 MASS OF UPPER OUTER QUADRANT OF RIGHT BREAST: ICD-10-CM

## 2023-05-24 PROCEDURE — 99214 PR OFFICE/OUTPT VISIT, EST, LEVL IV, 30-39 MIN: ICD-10-PCS | Mod: S$GLB,,, | Performed by: FAMILY MEDICINE

## 2023-05-24 PROCEDURE — 99214 OFFICE O/P EST MOD 30 MIN: CPT | Mod: S$GLB,,, | Performed by: FAMILY MEDICINE

## 2023-05-24 PROCEDURE — 3078F DIAST BP <80 MM HG: CPT | Mod: CPTII,S$GLB,, | Performed by: FAMILY MEDICINE

## 2023-05-24 PROCEDURE — 1100F PTFALLS ASSESS-DOCD GE2>/YR: CPT | Mod: CPTII,S$GLB,, | Performed by: FAMILY MEDICINE

## 2023-05-24 PROCEDURE — 1160F PR REVIEW ALL MEDS BY PRESCRIBER/CLIN PHARMACIST DOCUMENTED: ICD-10-PCS | Mod: CPTII,S$GLB,, | Performed by: FAMILY MEDICINE

## 2023-05-24 PROCEDURE — 1160F RVW MEDS BY RX/DR IN RCRD: CPT | Mod: CPTII,S$GLB,, | Performed by: FAMILY MEDICINE

## 2023-05-24 PROCEDURE — 3074F PR MOST RECENT SYSTOLIC BLOOD PRESSURE < 130 MM HG: ICD-10-PCS | Mod: CPTII,S$GLB,, | Performed by: FAMILY MEDICINE

## 2023-05-24 PROCEDURE — 3074F SYST BP LT 130 MM HG: CPT | Mod: CPTII,S$GLB,, | Performed by: FAMILY MEDICINE

## 2023-05-24 PROCEDURE — 3052F PR MOST RECENT HEMOGLOBIN A1C LEVEL 8.0 - < 9.0%: ICD-10-PCS | Mod: CPTII,S$GLB,, | Performed by: FAMILY MEDICINE

## 2023-05-24 PROCEDURE — 1111F PR DISCHARGE MEDS RECONCILED W/ CURRENT OUTPATIENT MED LIST: ICD-10-PCS | Mod: CPTII,S$GLB,, | Performed by: FAMILY MEDICINE

## 2023-05-24 PROCEDURE — 1111F DSCHRG MED/CURRENT MED MERGE: CPT | Mod: CPTII,S$GLB,, | Performed by: FAMILY MEDICINE

## 2023-05-24 PROCEDURE — 3288F PR FALLS RISK ASSESSMENT DOCUMENTED: ICD-10-PCS | Mod: CPTII,S$GLB,, | Performed by: FAMILY MEDICINE

## 2023-05-24 PROCEDURE — 3078F PR MOST RECENT DIASTOLIC BLOOD PRESSURE < 80 MM HG: ICD-10-PCS | Mod: CPTII,S$GLB,, | Performed by: FAMILY MEDICINE

## 2023-05-24 PROCEDURE — 3008F PR BODY MASS INDEX (BMI) DOCUMENTED: ICD-10-PCS | Mod: CPTII,S$GLB,, | Performed by: FAMILY MEDICINE

## 2023-05-24 PROCEDURE — 1100F PR PT FALLS ASSESS DOC 2+ FALLS/FALL W/INJURY/YR: ICD-10-PCS | Mod: CPTII,S$GLB,, | Performed by: FAMILY MEDICINE

## 2023-05-24 PROCEDURE — 4010F PR ACE/ARB THEARPY RXD/TAKEN: ICD-10-PCS | Mod: CPTII,S$GLB,, | Performed by: FAMILY MEDICINE

## 2023-05-24 PROCEDURE — 3288F FALL RISK ASSESSMENT DOCD: CPT | Mod: CPTII,S$GLB,, | Performed by: FAMILY MEDICINE

## 2023-05-24 PROCEDURE — 1159F PR MEDICATION LIST DOCUMENTED IN MEDICAL RECORD: ICD-10-PCS | Mod: CPTII,S$GLB,, | Performed by: FAMILY MEDICINE

## 2023-05-24 PROCEDURE — 3052F HG A1C>EQUAL 8.0%<EQUAL 9.0%: CPT | Mod: CPTII,S$GLB,, | Performed by: FAMILY MEDICINE

## 2023-05-24 PROCEDURE — 3008F BODY MASS INDEX DOCD: CPT | Mod: CPTII,S$GLB,, | Performed by: FAMILY MEDICINE

## 2023-05-24 PROCEDURE — 1159F MED LIST DOCD IN RCRD: CPT | Mod: CPTII,S$GLB,, | Performed by: FAMILY MEDICINE

## 2023-05-24 PROCEDURE — 4010F ACE/ARB THERAPY RXD/TAKEN: CPT | Mod: CPTII,S$GLB,, | Performed by: FAMILY MEDICINE

## 2023-05-24 RX ORDER — INSULIN ASPART 100 [IU]/ML
0-5 INJECTION, SOLUTION INTRAVENOUS; SUBCUTANEOUS
Refills: 0 | Status: ON HOLD
Start: 2023-05-24 | End: 2023-06-11 | Stop reason: HOSPADM

## 2023-05-24 RX ORDER — LANCETS
EACH MISCELLANEOUS
Qty: 200 EACH | Refills: 1 | Status: SHIPPED | OUTPATIENT
Start: 2023-05-24

## 2023-05-24 RX ORDER — TRAMADOL HYDROCHLORIDE 50 MG/1
50 TABLET ORAL EVERY 6 HOURS PRN
Qty: 28 TABLET | Refills: 0 | Status: ON HOLD | OUTPATIENT
Start: 2023-05-24 | End: 2023-06-11 | Stop reason: HOSPADM

## 2023-05-24 RX ORDER — INSULIN PUMP SYRINGE, 3 ML
EACH MISCELLANEOUS
Qty: 1 EACH | Refills: 1 | Status: SHIPPED | OUTPATIENT
Start: 2023-05-24 | End: 2024-05-23

## 2023-05-24 NOTE — PROGRESS NOTES
"  SUBJECTIVE:    Patient ID: Anastasiia Badillo is a 73 y.o. female.    Chief Complaint: Hospital Follow Up (Meds verbally confirmed/leg is still hurting/ tylenol doesn't help//dp)    HPI    Admit Date: 4/21/23   Discharge Date: 4/27/23  Discharge Facility: Hospital  [facility]   "Ms. Badillo is a 74 y/o F with a past medical history significant for DM2, HTN, recent femur fracture (in Feb by Dr. Brand), and tobacco use who presented to [facility] ED for L foot wounds.  She states they have been present for about 2 weeks, but are becoming worse. She noted some redness to the left great toe over the past couple of days. She was seen by home health who told her to come to the ED for a wound check.  Had an intramedullary denzel inserted into her left femur on 2/18/23 for a subtrochanteric fracture and has been rubbing her left lower leg against objects frequently due to the difficulty she is experiencing in moving her left leg since surgery. She was found to have WBC 13, ESR 75, . She was given 1x vancomycin and zosyn and admitted to Hospital Medicine service. Meds changed to vanc/cefepime. Arterial US BLE showed high grade stenosis SFA bilaterally and popliteal on the left. ID was consulted. Wound cx grew staph aureus, susceptibility pending. CTA w/ runoff showed RLE: 60% and greater stenosis of mid&distal SFA and high-grade stenosis distal PTA; LLE: 60% and greater stenosis mid&distal SFA. She also had a retroperitoneal US which showed 2.3 cm hypoechoic mass of the upper pole of the right kidney may represent a bloody cyst. Patient transferred to [facility] [place] [place] vascular surgery eval.  Had angiogram Extremity Unilateral (Left) w/ PTA (ANGIOPLASTY, PERCUTANEOUS, TRANSLUMINAL) (Left)  by Dr. Gilbert Sheppard.  Pt requiring Norco 10 3-4 times per day for 2 days after procedure. Pain down to 1.   Medication Reconciliation:  Medications changed/added/deleted. Started ASA 81mg daily, Lipitor 80mg daily, cefadroxil 500mg " po BID x 5 days, plavix 75mg po daily, nicoderm 15mg/24hr patch TD once daily.  Changed levemir to 18 units SC to once daily.   New Prescriptions filled after discharge: yes  Discharge summary reviewed:  yes  Pending test results at discharge reviewed:   no  Follow up appointments scheduled:  yes              with Podiatry Dr. Peace and Wound care  Follow up labs/tests ordered:   no  Home Health ordered on discharge:   yes  Home Health company name: [facility]  DME ordered at discharge:   no  How patient is feeling since discharge from the hospital?  Has been lost to f/u for over a year due to many problems. At some point she developed a ulcer in her foot. Has been seeing Dr. ELIAS Peace weekly, and has been getting wound care on her foot. She will do anything to save her foot and has been told that she will need IV anbx due to lack of improvement. Having wound changes three times a week. Pt needs something for pain but doesn't want anything strong. Tylenol is not working. Her glucometer is currently broken and needs another on. Pt wearing boots on both feet. Living at [facility].  Has noticed mass in right breast the last month that are mildly tender. Due for eye exam and has never had cscope.  Patient follow up phone call documented on separate encounter.       Office Visit on 05/18/2023   Component Date Value Ref Range Status    Aerobic Bacterial Culture 05/18/2023  (A)   Final                    Value:ENTEROBACTER CLOACAE  Few      Anaerobic Culture 05/18/2023 No anaerobes isolated   Final   No results displayed because visit has over 200 results.      Admission on 04/19/2023, Discharged on 04/21/2023   Component Date Value Ref Range Status    HIV 1/2 Ag/Ab 04/19/2023 Non-reactive  Non-reactive Final    Hepatitis C Ab 04/19/2023 Non-reactive  Non-reactive Final    POCT Glucose 04/19/2023 231 (H)  70 - 110 mg/dL Final    WBC 04/19/2023 13.00 (H)  3.90 - 12.70 K/uL Final    RBC 04/19/2023 3.87 (L)   4.00 - 5.40 M/uL Final    Hemoglobin 04/19/2023 11.8 (L)  12.0 - 16.0 g/dL Final    Hematocrit 04/19/2023 36.5 (L)  37.0 - 48.5 % Final    MCV 04/19/2023 94  82 - 98 fL Final    MCH 04/19/2023 30.5  27.0 - 31.0 pg Final    MCHC 04/19/2023 32.3  32.0 - 36.0 g/dL Final    RDW 04/19/2023 13.4  11.5 - 14.5 % Final    Platelets 04/19/2023 342  150 - 450 K/uL Final    MPV 04/19/2023 11.6  9.2 - 12.9 fL Final    Immature Granulocytes 04/19/2023 0.3  0.0 - 0.5 % Final    Gran # (ANC) 04/19/2023 10.8 (H)  1.8 - 7.7 K/uL Final    Immature Grans (Abs) 04/19/2023 0.04  0.00 - 0.04 K/uL Final    Lymph # 04/19/2023 1.5  1.0 - 4.8 K/uL Final    Mono # 04/19/2023 0.7  0.3 - 1.0 K/uL Final    Eos # 04/19/2023 0.0  0.0 - 0.5 K/uL Final    Baso # 04/19/2023 0.04  0.00 - 0.20 K/uL Final    nRBC 04/19/2023 0  0 /100 WBC Final    Gran % 04/19/2023 82.7 (H)  38.0 - 73.0 % Final    Lymph % 04/19/2023 11.5 (L)  18.0 - 48.0 % Final    Mono % 04/19/2023 5.1  4.0 - 15.0 % Final    Eosinophil % 04/19/2023 0.1  0.0 - 8.0 % Final    Basophil % 04/19/2023 0.3  0.0 - 1.9 % Final    Differential Method 04/19/2023 Automated   Final    Sodium 04/19/2023 131 (L)  136 - 145 mmol/L Final    Potassium 04/19/2023 5.0  3.5 - 5.1 mmol/L Final    Chloride 04/19/2023 97  95 - 110 mmol/L Final    CO2 04/19/2023 23  23 - 29 mmol/L Final    Glucose 04/19/2023 238 (H)  70 - 110 mg/dL Final    BUN 04/19/2023 14  8 - 23 mg/dL Final    Creatinine 04/19/2023 0.8  0.5 - 1.4 mg/dL Final    Calcium 04/19/2023 10.1  8.7 - 10.5 mg/dL Final    Total Protein 04/19/2023 7.4  6.0 - 8.4 g/dL Final    Albumin 04/19/2023 3.4 (L)  3.5 - 5.2 g/dL Final    Total Bilirubin 04/19/2023 0.6  0.1 - 1.0 mg/dL Final    Alkaline Phosphatase 04/19/2023 114  55 - 135 U/L Final    AST 04/19/2023 11  10 - 40 U/L Final    ALT 04/19/2023 10  10 - 44 U/L Final    Anion Gap 04/19/2023 11  8 - 16 mmol/L Final    eGFR 04/19/2023 >60  >60 mL/min/1.73 m^2 Final    Beta-Hydroxybutyrate 04/19/2023 2.8  (H)  0.0 - 0.5 mmol/L Final    Sed Rate 04/19/2023 75 (H)  0 - 20 mm/Hr Final    CRP 04/19/2023 213.8 (H)  0.0 - 8.2 mg/L Final    Blood Culture, Routine 04/19/2023 No growth after 5 days.   Final    Blood Culture, Routine 04/19/2023 No growth after 5 days.   Final    POC PH 04/19/2023 7.366  7.35 - 7.45 Final    POC PCO2 04/19/2023 50.0 (H)  35 - 45 mmHg Final    POC PO2 04/19/2023 19 (L)  40 - 60 mmHg Final    POC HCO3 04/19/2023 28.6 (H)  24 - 28 mmol/L Final    POC BE 04/19/2023 3  -2 to 2 mmol/L Final    POC SATURATED O2 04/19/2023 27 (L)  95 - 100 % Final    POC TCO2 04/19/2023 30 (H)  24 - 29 mmol/L Final    Sample 04/19/2023 VENOUS   Final    Site 04/19/2023 Other   Final    Allens Test 04/19/2023 N/A   Final    DelSys 04/19/2023 Room Air   Final    Lactate (Lactic Acid) 04/19/2023 1.1  0.5 - 2.2 mmol/L Final    Aerobic Bacterial Culture 04/19/2023  (A)   Final                    Value:STAPHYLOCOCCUS AUREUS  Many      Anaerobic Culture 04/19/2023 No anaerobes isolated   Final    POCT Glucose 04/19/2023 317 (H)  70 - 110 mg/dL Final    TSH 04/20/2023 1.169  0.400 - 4.000 uIU/mL Final    Sodium 04/20/2023 129 (L)  136 - 145 mmol/L Final    Potassium 04/20/2023 4.3  3.5 - 5.1 mmol/L Final    Chloride 04/20/2023 98  95 - 110 mmol/L Final    CO2 04/20/2023 25  23 - 29 mmol/L Final    Glucose 04/20/2023 352 (H)  70 - 110 mg/dL Final    BUN 04/20/2023 15  8 - 23 mg/dL Final    Creatinine 04/20/2023 0.8  0.5 - 1.4 mg/dL Final    Calcium 04/20/2023 9.1  8.7 - 10.5 mg/dL Final    Anion Gap 04/20/2023 6 (L)  8 - 16 mmol/L Final    eGFR 04/20/2023 >60  >60 mL/min/1.73 m^2 Final    Magnesium 04/20/2023 1.6  1.6 - 2.6 mg/dL Final    WBC 04/20/2023 7.43  3.90 - 12.70 K/uL Final    RBC 04/20/2023 3.40 (L)  4.00 - 5.40 M/uL Final    Hemoglobin 04/20/2023 10.2 (L)  12.0 - 16.0 g/dL Final    Hematocrit 04/20/2023 32.2 (L)  37.0 - 48.5 % Final    MCV 04/20/2023 95  82 - 98 fL Final    MCH 04/20/2023 30.0  27.0 - 31.0 pg Final     MCHC 04/20/2023 31.7 (L)  32.0 - 36.0 g/dL Final    RDW 04/20/2023 13.3  11.5 - 14.5 % Final    Platelets 04/20/2023 324  150 - 450 K/uL Final    MPV 04/20/2023 11.9  9.2 - 12.9 fL Final    Immature Granulocytes 04/20/2023 0.4  0.0 - 0.5 % Final    Gran # (ANC) 04/20/2023 5.8  1.8 - 7.7 K/uL Final    Immature Grans (Abs) 04/20/2023 0.03  0.00 - 0.04 K/uL Final    Lymph # 04/20/2023 0.9 (L)  1.0 - 4.8 K/uL Final    Mono # 04/20/2023 0.6  0.3 - 1.0 K/uL Final    Eos # 04/20/2023 0.1  0.0 - 0.5 K/uL Final    Baso # 04/20/2023 0.04  0.00 - 0.20 K/uL Final    nRBC 04/20/2023 0  0 /100 WBC Final    Gran % 04/20/2023 78.7 (H)  38.0 - 73.0 % Final    Lymph % 04/20/2023 12.0 (L)  18.0 - 48.0 % Final    Mono % 04/20/2023 7.5  4.0 - 15.0 % Final    Eosinophil % 04/20/2023 0.9  0.0 - 8.0 % Final    Basophil % 04/20/2023 0.5  0.0 - 1.9 % Final    Differential Method 04/20/2023 Automated   Final    Prothrombin Time 04/20/2023 10.0  9.0 - 12.5 sec Final    INR 04/20/2023 0.9  0.8 - 1.2 Final    aPTT 04/20/2023 27.7  21.0 - 32.0 sec Final    POCT Glucose 04/20/2023 285 (H)  70 - 110 mg/dL Final    Vancomycin-Trough 04/20/2023 14.2  10.0 - 22.0 ug/mL Final    POCT Glucose 04/20/2023 199 (H)  70 - 110 mg/dL Final    POCT Glucose 04/20/2023 253 (H)  70 - 110 mg/dL Final    POCT Glucose 04/20/2023 250 (H)  70 - 110 mg/dL Final    Sodium 04/21/2023 132 (L)  136 - 145 mmol/L Final    Potassium 04/21/2023 4.0  3.5 - 5.1 mmol/L Final    Chloride 04/21/2023 100  95 - 110 mmol/L Final    CO2 04/21/2023 24  23 - 29 mmol/L Final    Glucose 04/21/2023 230 (H)  70 - 110 mg/dL Final    BUN 04/21/2023 11  8 - 23 mg/dL Final    Creatinine 04/21/2023 0.7  0.5 - 1.4 mg/dL Final    Calcium 04/21/2023 9.3  8.7 - 10.5 mg/dL Final    Anion Gap 04/21/2023 8  8 - 16 mmol/L Final    eGFR 04/21/2023 >60  >60 mL/min/1.73 m^2 Final    Magnesium 04/21/2023 1.7  1.6 - 2.6 mg/dL Final    WBC 04/21/2023 8.06  3.90 - 12.70 K/uL Final    RBC 04/21/2023 3.39 (L)   4.00 - 5.40 M/uL Final    Hemoglobin 04/21/2023 10.0 (L)  12.0 - 16.0 g/dL Final    Hematocrit 04/21/2023 31.5 (L)  37.0 - 48.5 % Final    MCV 04/21/2023 93  82 - 98 fL Final    MCH 04/21/2023 29.5  27.0 - 31.0 pg Final    MCHC 04/21/2023 31.7 (L)  32.0 - 36.0 g/dL Final    RDW 04/21/2023 13.3  11.5 - 14.5 % Final    Platelets 04/21/2023 311  150 - 450 K/uL Final    MPV 04/21/2023 11.9  9.2 - 12.9 fL Final    Immature Granulocytes 04/21/2023 0.5  0.0 - 0.5 % Final    Gran # (ANC) 04/21/2023 5.9  1.8 - 7.7 K/uL Final    Immature Grans (Abs) 04/21/2023 0.04  0.00 - 0.04 K/uL Final    Lymph # 04/21/2023 1.3  1.0 - 4.8 K/uL Final    Mono # 04/21/2023 0.6  0.3 - 1.0 K/uL Final    Eos # 04/21/2023 0.1  0.0 - 0.5 K/uL Final    Baso # 04/21/2023 0.03  0.00 - 0.20 K/uL Final    nRBC 04/21/2023 0  0 /100 WBC Final    Gran % 04/21/2023 73.6 (H)  38.0 - 73.0 % Final    Lymph % 04/21/2023 16.5 (L)  18.0 - 48.0 % Final    Mono % 04/21/2023 7.4  4.0 - 15.0 % Final    Eosinophil % 04/21/2023 1.6  0.0 - 8.0 % Final    Basophil % 04/21/2023 0.4  0.0 - 1.9 % Final    Differential Method 04/21/2023 Automated   Final    Hemoglobin A1C 04/21/2023 8.2 (H)  4.0 - 5.6 % Final    Estimated Avg Glucose 04/21/2023 189 (H)  68 - 131 mg/dL Final    POCT Glucose 04/21/2023 157 (H)  70 - 110 mg/dL Final    Vancomycin-Trough 04/21/2023 17.1  10.0 - 22.0 ug/mL Final    CRP 04/21/2023 82.7 (H)  0.0 - 8.2 mg/L Final    POCT Glucose 04/21/2023 179 (H)  70 - 110 mg/dL Final    POCT Glucose 04/21/2023 282 (H)  70 - 110 mg/dL Final    POCT Glucose 04/21/2023 194 (H)  70 - 110 mg/dL Final   No results displayed because visit has over 200 results.          Past Medical History:   Diagnosis Date    Diabetes mellitus, type 2      Past Surgical History:   Procedure Laterality Date    ANGIOGRAPHY OF LOWER EXTREMITY Left 4/25/2023    Procedure: Angiogram Extremity Unilateral;  Surgeon: Gilbert Sheppard MD;  Location: [facility] OR [address] [place] [address];  Service:  Vascular;  Laterality: Left;  28.4 min  487.45 mGy  75.9180 Gycm2  trans radial: 7 ml  dye: 126 ml      AUGMENTATION OF BREAST      INTRAMEDULLARY RODDING OF FEMUR Left 2/18/2023    Procedure: INSERTION, INTRAMEDULLARY ROXANNA, FEMUR (hana table -- synthes -- long nail -- have bovie and suction and large bone set);  Surgeon: Keanu Brand MD;  Location: [facility] OR;  Service: Orthopedics;  Laterality: Left;    PERCUTANEOUS TRANSLUMINAL ANGIOPLASTY Left 4/25/2023    Procedure: PTA (ANGIOPLASTY, PERCUTANEOUS, TRANSLUMINAL);  Surgeon: Gilbert Sheppard MD;  Location: [facility] OR [facility];  Service: Vascular;  Laterality: Left;  Tibial and popliteal angioplasty     Family History   Problem Relation Age of Onset    Heart disease Mother     Cancer Mother     Cataracts Mother     Hypertension Father     Heart disease Father     Cancer Sister     Heart disease Sister     Breast cancer Sister     Heart disease Brother        Marital Status:   Alcohol History:  reports current alcohol use.  Tobacco History:  reports that she has been smoking cigarettes. She has a 11.25 pack-year smoking history. She has never used smokeless tobacco.  Drug History:  reports no history of drug use.    Review of patient's allergies indicates:  No Known Allergies    Current Outpatient Medications:     acetaminophen (TYLENOL) 500 MG tablet, Take 1,000 mg by mouth every 6 (six) hours as needed for Pain., Disp: , Rfl:     aspirin 81 MG Chew, Chew and swallow 1 tablet (81 mg total) by mouth once daily., Disp: 30 tablet, Rfl: 2    atorvastatin (LIPITOR) 80 MG tablet, Take 1 tablet (80 mg total) by mouth every evening., Disp: 90 tablet, Rfl: 3    clopidogreL (PLAVIX) 75 mg tablet, Take 1 tablet (75 mg total) by mouth once daily., Disp: 30 tablet, Rfl: 2    doxycycline (MONODOX) 100 MG capsule, Take 1 capsule (100 mg total) by mouth 2 (two) times daily. for 14 days, Disp: 28 capsule, Rfl: 0    HYDROcodone-acetaminophen (NORCO) 5-325 mg per  "tablet, Take 1 tablet by mouth every 6 (six) hours as needed for Pain., Disp: 9 tablet, Rfl: 0    lisinopriL (PRINIVIL,ZESTRIL) 5 MG tablet, Take 5 mg by mouth once daily., Disp: , Rfl:     melatonin 10 mg Cap, Take 1 capsule by mouth every evening., Disp: , Rfl:     multivit-min-FA-lycopen-lutein 0.4 mg-300 mcg- 250 mcg Tab, Take 1 tablet by mouth once daily., Disp: , Rfl:     nicotine (NICODERM CQ) 14 mg/24 hr, Place 1 patch onto the skin once daily., Disp: 28 patch, Rfl: 0    pen needle, diabetic (PEN NEEDLE) 31 gauge x 3/16" Ndle, 1 application by Misc.(Non-Drug; Combo Route) route 2 (two) times a day., Disp: 200 each, Rfl: 0    blood sugar diagnostic Strp, To check BG two times daily, to use with insurance preferred meter, Disp: 200 each, Rfl: 1    blood-glucose meter kit, To check BG two times daily, to use with insurance preferred meter, Disp: 1 each, Rfl: 1    insulin aspart U-100 (NOVOLOG) 100 unit/mL (3 mL) InPn pen, Inject 0-5 Units into the skin before meals and at bedtime as needed (Hyperglycemia). Blood Glucose mg/dL                  Pre-meal                2200 151-200                0 unit                      0 unit 201-250                2 units                    1 unit 251-300                3 units                    1 unit 301-350                4 units                    2 units >350                     5 units                    3 units, Disp: , Rfl: 0    insulin detemir U-100, Levemir, 100 unit/mL (3 mL) SubQ InPn pen, Inject 18 Units into the skin once daily., Disp: , Rfl: 0    lancets Misc, To check BG two times daily, to use with insurance preferred meter, Disp: 200 each, Rfl: 1    traMADoL (ULTRAM) 50 mg tablet, Take 1 tablet (50 mg total) by mouth every 6 (six) hours as needed for Pain., Disp: 28 tablet, Rfl: 0    Review of Systems   Constitutional:  Negative for appetite change, fatigue, fever and unexpected weight change.   Respiratory:  Negative for cough, chest tightness, shortness " "of breath and wheezing.    Cardiovascular:  Negative for chest pain and leg swelling.   Gastrointestinal:  Negative for abdominal pain, constipation, nausea and vomiting.        -heartburn   Genitourinary:  Negative for difficulty urinating, dysuria, frequency and urgency.   Musculoskeletal:  Negative for arthralgias, back pain, myalgias and neck pain.   Skin:  Negative for rash.   Neurological:  Negative for dizziness, weakness, numbness and headaches.   Hematological:  Does not bruise/bleed easily.   Psychiatric/Behavioral:  Negative for dysphoric mood, sleep disturbance and suicidal ideas. The patient is not nervous/anxious.    All other systems reviewed and are negative.     Objective:      Vitals:    05/24/23 1347   BP: 120/72   Pulse: 90   SpO2: 99%   Weight: 72.6 kg (160 lb)   Height: 5' 3" (1.6 m)     Wt Readings from Last 6 Encounters:   05/24/23 72.6 kg (160 lb)   05/18/23 75.8 kg (167 lb)   04/21/23 76.1 kg (167 lb 12.3 oz)   04/20/23 72.5 kg (159 lb 13.3 oz)   04/21/23 72.1 kg (159 lb)   03/28/23 66.7 kg (147 lb)       Physical Exam  Vitals reviewed.   Constitutional:       General: She is not in acute distress.     Appearance: Normal appearance. She is well-developed.   HENT:      Head: Normocephalic and atraumatic.   Neck:      Thyroid: No thyromegaly.   Cardiovascular:      Rate and Rhythm: Normal rate and regular rhythm.      Heart sounds: Normal heart sounds. No murmur heard.    No friction rub.   Pulmonary:      Effort: Pulmonary effort is normal.      Breath sounds: Normal breath sounds. No wheezing or rales.   Chest:   Breasts:     Right: Mass present. No nipple discharge or tenderness.       Abdominal:      General: Bowel sounds are normal. There is no distension.      Palpations: Abdomen is soft.      Tenderness: There is no abdominal tenderness.   Musculoskeletal:      Cervical back: Neck supple.   Lymphadenopathy:      Cervical: No cervical adenopathy.   Skin:     General: Skin is warm and " dry.      Findings: No rash.   Neurological:      Mental Status: She is alert and oriented to person, place, and time.   Psychiatric:         Attention and Perception: She is attentive.         Speech: Speech normal.         Behavior: Behavior normal.         Thought Content: Thought content normal.         Judgment: Judgment normal.       Assessment:       1. Hospital discharge follow-up    2. PAD (peripheral artery disease)    3. Type 2 diabetes mellitus with diabetic polyneuropathy, with long-term current use of insulin    4. Essential (primary) hypertension    5. Smoker    6. Kidney lesion, native, right    7. Mass of upper outer quadrant of right breast    8. Breast implant in situ    9. Screening mammogram, encounter for    10. Screening for colon cancer    11. Hx of fracture of left hip    12. Menopause    13. Screening for osteoporosis    14. Walker as ambulation aid         Plan:       Hospital discharge follow-up    PAD (peripheral artery disease)  Comments:  Improved. Will continue to monitor symptoms.   Orders:  -     traMADoL (ULTRAM) 50 mg tablet; Take 1 tablet (50 mg total) by mouth every 6 (six) hours as needed for Pain.  Dispense: 28 tablet; Refill: 0    Type 2 diabetes mellitus with diabetic polyneuropathy, with long-term current use of insulin  Comments:  Uncontrolled. A1c 8.2%. Will reorder glucometer.  Orders:  -     Ambulatory referral/consult to Ophthalmology; Future; Expected date: 05/25/2023  -     insulin aspart U-100 (NOVOLOG) 100 unit/mL (3 mL) InPn pen; Inject 0-5 Units into the skin before meals and at bedtime as needed (Hyperglycemia). Blood Glucose  mg/dL                  Pre-meal                2200  151-200                0 unit                      0 unit  201-250                2 units                    1 unit  251-300                3 units                    1 unit  301-350                4 units                    2 units  >350                     5 units                    3  units; Refill: 0  -     insulin detemir U-100, Levemir, 100 unit/mL (3 mL) SubQ InPn pen; Inject 18 Units into the skin once daily.; Refill: 0  -     Hemoglobin A1C; Future; Expected date: 05/24/2023    Essential (primary) hypertension  Comments:  Controlled. Will continue to monitor BP on current medication regimen    Smoker  Comments:  Pt encouraged  to stop smoking     Kidney lesion, native, right  Comments:  Incident. 2.3cm. Thought to be bloody. Will repeat in 6-12 months    Mass of upper outer quadrant of right breast  Comments:  Will order diagnostic mammogram  Orders:  -     Mammo Digital Diagnostic Bilat w/ Florentino; Future; Expected date: 05/24/2023  -     US Breast Bilateral Limited; Future; Expected date: 05/24/2023    Breast implant in situ  -     Mammo Digital Diagnostic Bilat w/ Florentino; Future; Expected date: 05/24/2023  -     US Breast Bilateral Limited; Future; Expected date: 05/24/2023    Screening mammogram, encounter for  Comments:  Order sent to [facility]    Screening for colon cancer  Comments:  Will refer to GI.  Orders:  -     Cologuard Screening (Multitarget Stool DNA); Future; Expected date: 05/24/2023    Hx of fracture of left hip  Comments:  Will order prolia    Menopause  Comments:  DEXA order to [facility]  Orders:  -     DXA Bone Density Axial Skeleton 1 or more sites; Future; Expected date: 05/24/2023    Screening for osteoporosis  -     DXA Bone Density Axial Skeleton 1 or more sites; Future; Expected date: 05/24/2023    Walker as ambulation aid  Comments:  Encouraged to use walker in order to help prevent falls, imjury      Follow up in about 3 months (around 8/24/2023) for HTN, DM, LABS.

## 2023-05-24 NOTE — TELEPHONE ENCOUNTER
The patient's prescription has been approved and printed to sent to [facility] for processing

## 2023-05-31 ENCOUNTER — OFFICE VISIT (OUTPATIENT)
Dept: WOUND CARE | Facility: HOSPITAL | Age: 73
End: 2023-05-31
Attending: PODIATRIST
Payer: MEDICARE

## 2023-05-31 VITALS
RESPIRATION RATE: 19 BRPM | TEMPERATURE: 98 F | DIASTOLIC BLOOD PRESSURE: 84 MMHG | HEART RATE: 82 BPM | SYSTOLIC BLOOD PRESSURE: 154 MMHG

## 2023-05-31 DIAGNOSIS — L97.523 DIABETIC ULCER OF TOE OF LEFT FOOT ASSOCIATED WITH TYPE 2 DIABETES MELLITUS, WITH NECROSIS OF MUSCLE: ICD-10-CM

## 2023-05-31 DIAGNOSIS — L02.619 CELLULITIS AND ABSCESS OF FOOT: ICD-10-CM

## 2023-05-31 DIAGNOSIS — E11.621 DIABETIC ULCER OF TOE OF LEFT FOOT ASSOCIATED WITH TYPE 2 DIABETES MELLITUS, WITH NECROSIS OF MUSCLE: ICD-10-CM

## 2023-05-31 DIAGNOSIS — L97.524 ULCER OF LEFT FOOT WITH NECROSIS OF BONE: Primary | ICD-10-CM

## 2023-05-31 DIAGNOSIS — L97.523 ULCER OF LEFT FOOT WITH NECROSIS OF MUSCLE: ICD-10-CM

## 2023-05-31 DIAGNOSIS — I73.9 PAD (PERIPHERAL ARTERY DISEASE): ICD-10-CM

## 2023-05-31 DIAGNOSIS — F17.219 CIGARETTE NICOTINE DEPENDENCE WITH NICOTINE-INDUCED DISORDER: ICD-10-CM

## 2023-05-31 DIAGNOSIS — L03.119 CELLULITIS AND ABSCESS OF FOOT: ICD-10-CM

## 2023-05-31 DIAGNOSIS — Z91.89 AT HIGH RISK FOR INADEQUATE NUTRITIONAL INTAKE: ICD-10-CM

## 2023-05-31 DIAGNOSIS — R20.2 PARESTHESIAS: ICD-10-CM

## 2023-05-31 DIAGNOSIS — R09.89 DECREASED PEDAL PULSES: ICD-10-CM

## 2023-05-31 DIAGNOSIS — E11.621 TYPE 2 DIABETES MELLITUS WITH FOOT ULCER, WITH LONG-TERM CURRENT USE OF INSULIN: ICD-10-CM

## 2023-05-31 DIAGNOSIS — Z79.4 TYPE 2 DIABETES MELLITUS WITH FOOT ULCER, WITH LONG-TERM CURRENT USE OF INSULIN: ICD-10-CM

## 2023-05-31 DIAGNOSIS — L97.509 TYPE 2 DIABETES MELLITUS WITH FOOT ULCER, WITH LONG-TERM CURRENT USE OF INSULIN: ICD-10-CM

## 2023-05-31 DIAGNOSIS — I73.9 PVD (PERIPHERAL VASCULAR DISEASE): ICD-10-CM

## 2023-05-31 PROCEDURE — 4010F PR ACE/ARB THEARPY RXD/TAKEN: ICD-10-PCS | Mod: CPTII,,, | Performed by: PODIATRIST

## 2023-05-31 PROCEDURE — 99214 OFFICE O/P EST MOD 30 MIN: CPT | Mod: 25,,, | Performed by: PODIATRIST

## 2023-05-31 PROCEDURE — 3079F DIAST BP 80-89 MM HG: CPT | Mod: CPTII,,, | Performed by: PODIATRIST

## 2023-05-31 PROCEDURE — 4010F ACE/ARB THERAPY RXD/TAKEN: CPT | Mod: CPTII,,, | Performed by: PODIATRIST

## 2023-05-31 PROCEDURE — 3077F PR MOST RECENT SYSTOLIC BLOOD PRESSURE >= 140 MM HG: ICD-10-PCS | Mod: CPTII,,, | Performed by: PODIATRIST

## 2023-05-31 PROCEDURE — 1159F MED LIST DOCD IN RCRD: CPT | Mod: CPTII,,, | Performed by: PODIATRIST

## 2023-05-31 PROCEDURE — 99215 OFFICE O/P EST HI 40 MIN: CPT | Mod: 25 | Performed by: PODIATRIST

## 2023-05-31 PROCEDURE — 1126F PR PAIN SEVERITY QUANTIFIED, NO PAIN PRESENT: ICD-10-PCS | Mod: CPTII,,, | Performed by: PODIATRIST

## 2023-05-31 PROCEDURE — 11042 DBRDMT SUBQ TIS 1ST 20SQCM/<: CPT | Mod: 59,,, | Performed by: PODIATRIST

## 2023-05-31 PROCEDURE — 1160F RVW MEDS BY RX/DR IN RCRD: CPT | Mod: CPTII,,, | Performed by: PODIATRIST

## 2023-05-31 PROCEDURE — 11043 DBRDMT MUSC&/FSCA 1ST 20/<: CPT | Performed by: PODIATRIST

## 2023-05-31 PROCEDURE — 11042 PR DEBRIDEMENT, SKIN, SUB-Q TISSUE,=<20 SQ CM: ICD-10-PCS | Mod: 59,,, | Performed by: PODIATRIST

## 2023-05-31 PROCEDURE — 3052F HG A1C>EQUAL 8.0%<EQUAL 9.0%: CPT | Mod: CPTII,,, | Performed by: PODIATRIST

## 2023-05-31 PROCEDURE — 1160F PR REVIEW ALL MEDS BY PRESCRIBER/CLIN PHARMACIST DOCUMENTED: ICD-10-PCS | Mod: CPTII,,, | Performed by: PODIATRIST

## 2023-05-31 PROCEDURE — 3079F PR MOST RECENT DIASTOLIC BLOOD PRESSURE 80-89 MM HG: ICD-10-PCS | Mod: CPTII,,, | Performed by: PODIATRIST

## 2023-05-31 PROCEDURE — 3052F PR MOST RECENT HEMOGLOBIN A1C LEVEL 8.0 - < 9.0%: ICD-10-PCS | Mod: CPTII,,, | Performed by: PODIATRIST

## 2023-05-31 PROCEDURE — 3077F SYST BP >= 140 MM HG: CPT | Mod: CPTII,,, | Performed by: PODIATRIST

## 2023-05-31 PROCEDURE — 11042 DBRDMT SUBQ TIS 1ST 20SQCM/<: CPT | Mod: 59 | Performed by: PODIATRIST

## 2023-05-31 PROCEDURE — 1126F AMNT PAIN NOTED NONE PRSNT: CPT | Mod: CPTII,,, | Performed by: PODIATRIST

## 2023-05-31 PROCEDURE — 11043 PR DEBRIDEMENT, SKIN, SUB-Q TISSUE,MUSCLE,=<20 SQ CM: ICD-10-PCS | Mod: ,,, | Performed by: PODIATRIST

## 2023-05-31 PROCEDURE — 11043 DBRDMT MUSC&/FSCA 1ST 20/<: CPT | Mod: ,,, | Performed by: PODIATRIST

## 2023-05-31 PROCEDURE — 1159F PR MEDICATION LIST DOCUMENTED IN MEDICAL RECORD: ICD-10-PCS | Mod: CPTII,,, | Performed by: PODIATRIST

## 2023-05-31 PROCEDURE — 99214 PR OFFICE/OUTPT VISIT, EST, LEVL IV, 30-39 MIN: ICD-10-PCS | Mod: 25,,, | Performed by: PODIATRIST

## 2023-05-31 RX ORDER — DOXYCYCLINE 100 MG/1
100 CAPSULE ORAL EVERY 12 HOURS
Qty: 28 CAPSULE | Refills: 0 | Status: ON HOLD | OUTPATIENT
Start: 2023-05-31 | End: 2023-06-11 | Stop reason: HOSPADM

## 2023-05-31 RX ORDER — CLOPIDOGREL BISULFATE 75 MG/1
75 TABLET ORAL DAILY
Qty: 30 TABLET | Refills: 3 | Status: SHIPPED | OUTPATIENT
Start: 2023-05-31 | End: 2023-09-28

## 2023-05-31 NOTE — TELEPHONE ENCOUNTER
----- Message from Carri Lara sent at 5/31/2023  3:07 PM CDT -----  Patient called and stated that her she went to seen another doctor and they want her to have her blood thinner renewed . Please give her call at [phone]

## 2023-05-31 NOTE — TELEPHONE ENCOUNTER
Patient saw podiatry and told them she was out of her blood thinner and they advised for her to call us to get a refill on it.    Rx set up

## 2023-05-31 NOTE — PROGRESS NOTES
[address] [facility] [address]  [place] [address]  Phone: [phone]   [phone]    Patient's PCP:Raji Parkinson MD  Referring Provider: Aaareferral Self    Subjective:      Chief Complaint:: Wound Care, Wound Consult, Pressure Ulcer, Diabetic Foot Ulcer (chronic left heel diabetic foot ulcer and chronic left great toe diabetic foot ulcer), Venous Stasis, Wound Check, Wound Infection, Non-healing Wound, Osteomyelitis , Numbness, Foot Ulcer, Diabetes, and Difficulty Walking    HPI   Anastasiia Badillo is a 73 y.o. female with DMII with neuropathy, tobacco abuse, severe PVD, recent hospital admission, presents to clinic today with chronic left heel diabetic foot ulcer and chronic left great toe diabetic foot ulcer.    Patient was recently hospitalized for these wounds and vascular intervention was done.  Reviewed all notes from hospital stay.  Patient was under the care of a different podiatrist while she was hospitalized for these wounds.  Patient did not follow up with her podiatrist following hospital discharge and presents today for a 2nd opinion.    See wound docs documentation for full assessment and evaluation of all wounds.         Culture taken of wound at previous visit reviewed.  Discussed culture results with patient.  Refilled doxycycline.  Awaiting appointment with infectious disease       From review of chart it appears that bone culture taken from the left heel while patient was in the hospital on 04/27 was positive.  Patient not currently under the care of Infectious Disease for IV antibiotics for bone infection.     Referral placed to Infectious Disease, as patient will possibly need IV antibiotics.     Reviewed all notes from patients medical chart from last 12 months including recent hospital admission, including imaging, procedures, studies, progress notes,  cultures, antibiotic regimens, photos,  etc.         Referral placed to vascular surgery as patient is unable to follow with her  vascular surgeon from the hospital and needs to follow with a vascular surgeon in [place] due to transportation issues.  Patient states she ran out of her blood thinner medication.  Discussed with her that I recommend she contact her primary care physician regarding refill of blood thinner medication until she follows with vascular surgery, if primary care recommends continuing blood thinners until following with vascular.  Patient voiced an understanding.    Discussed with patient recommendation for smoking cessation for wound healing.  Counseled patient in detail regarding the need to stop smoking to aid in wound healing. Patient voiced an understanding.       Recent discharge summary  Patient Name: Anastasiia Badillo  MRN: [id number]  CASEY: [id number]  Patient Class: IP- Inpatient  Admission Date: 4/21/2023  Hospital Length of Stay: 6 days  Discharge Date and Time:  04/27/2023 1:20 PM  Attending Physician: Josselin Minaya MD   Discharging Provider: Gene Nguyen MD  Primary Care Provider: Raji Parkinson MD  [facility] Medicine Team: [facility] MED 2 Gene Nguyen MD  Primary Care Team: [facility] MED 2  HPI:   Ms. Badillo is a 72 y/o F with a past medical history significant for DM2, HTN, recent femur fracture, and tobacco use who presented to [facility] ED for L foot wounds.  She states they have been present for about 2 weeks, but are becoming worse. She noted some redness to the left great toe over the past couple of days. She was seen by home health who told her to come to the ED for a wound check. She denies ever having similar wounds, but she had an intramedullary denzel inserted into her left femur on 2/18/23 for a subtrochanteric fracture and has been rubbing her left lower leg against objects frequently due to the difficulty she is experiencing in moving her left leg since surgery. She was found to have WBC 13, ESR 75, . She was given 1x vancomycin and zosyn. She was admitted to Hospital Medicine service.  Started on vanc/cefepime. Arterial US BLE showed high grade stenosis SFA bilaterally and popliteal on the left. ID was consulted. Wound cx grew staph aureus, susceptibility pending. CTA w/ runoff showed RLE: 60% and greater stenosis of mid&distal SFA and high-grade stenosis distal PTA; LLE: 60% and greater stenosis mid&distal SFA. She also had a retroperitoneal US which showed 2.3 cm hypoechoic mass of the upper pole of the right kidney may represent a bloody cyst. Patient transferred to [facility] for vascular surgery eval  Procedure(s) (LRB):  Angiogram Extremity Unilateral (Left)  PTA (ANGIOPLASTY, PERCUTANEOUS, TRANSLUMINAL) (Left)   Hospital Course:   Admitted to medicine for management of LLE wound. Wound positive for MSSA, MRI negative for OM. ID consulted with recs for 10 days course with cefadroxil. Vascular planning for angiogram Tuesday. Pt tolerated procedure well. Having some tingling and pain in the LLE. Returned to vascular lab for follow-up evaluation. Pt tolerating procedure well with mild-moderate leg pain with tingling. Pt requiring Norco 10 3-4 times per day for 2 days after procedure. Pain down to 1. Will send home with short course of Norco 5 until she follows up with vascular surgery. Pt discharging to home.       1. Debridement of left heel diabetic foot ulcer with DNA culture taken and Debridement of left plantar great toe diabetic foot ulcer.See Wound Docs for assessment of wounds and procedure notes  2. Continue taking all medications as prescribed  3. Dressing changes  4. RTC one week   5. Counseled patient on increasing protein intake, not getting wound wet, keeping dressing clean dry and intact, following a healthy diet, elevating legs when able, removing pressure from wound    Total time spent for E&M 40  Total time for debridement 35 minutes     Proper ulcer care and the possible need for serial debridements, topical medications, specific dressings and biological engineered skin substitutes  if indicated.      Counseling/Education:  I provided patient education verbally regarding:   The aspects of diabetes and how it pertains to the feet. I explained the importance of proper diabetic foot care and how it is essential for the health of their feet.    I discussed the importance of knowing their Hemoglobin A1c and that the level needs to be as close to 6 as possible. I discussed the increase complications of high blood sugar including stroke, blindness, heart attack, kidney failure and loss of limb secondary to neuropathy and PVD.     With neuropathy, beware of any breaks in the skin or redness. These areas are not recognized early due to the numbness.    I discussed Diabetes, lower back issues, metabolic disorders, systemic causes, chemotherapy, vitamin deficiency, heavy metal exposure, as some of the causes. I also explained that as much as 40% of the time we can not find a cause. I discussed different treatments available to control the symptoms but which may not cure the problem.       Counseled patient on the aspects of diabetes and how it pertains to the feet.  I explained the importance of proper diabetic foot care and how it is essential for the health of their feet.      Shoe inspection. Patient instructed on proper foot hygeine. We discussed wearing proper shoe gear, daily foot inspections, never walking without protective shoe gear, never putting sharp instruments to feet, routine podiatric nail visits every 2-3 months.        Patient should call the office immediately if any signs of infection, such as fever, chills, sweats, increased redness or pain.    Patient was instructed to call the clinic or go to the emergency department if their symptoms do not improve, worsens, or if new symptoms develop.  Patient was advised that if any increased swelling, pain, or numbness arise to go immediately to the ED. Patient knows to call any time if an emergency arises. Shared decision making occurred and  patient verbalized understanding in agreement with this plan.       >50% of this > 75 minute visit was spent face to face educating/counseling the patient    I spent a total of 75 minutes on the day of the visit.This includes face to face time and non-face to face time preparing to see the patient (eg, review of tests), obtaining and/or reviewing separately obtained history, documenting clinical information in the electronic or other health record, independently interpreting results and communicating results to the patient/family/caregiver, or care coordinator.       Much of the documentation for this visit was completed in the Wound Docs system.  Please see the attached documentation for further details about the patient's care. Scanned under the Media tab.        Jenny Peace, DPSOHAM       Vitals:    05/31/23 1001   BP: (!) 154/84   Pulse: 82   Resp: 19   Temp: 98.4 °F (36.9 °C)   TempSrc: Temporal   PainSc: 0-No pain   PainLoc: Foot      Shoe Size:     Past Surgical History:   Procedure Laterality Date    ANGIOGRAPHY OF LOWER EXTREMITY Left 4/25/2023    Procedure: Angiogram Extremity Unilateral;  Surgeon: Gilbert Sheppard MD;  Location: [facility] OR [address] [place] [address];  Service: Vascular;  Laterality: Left;  28.4 min  487.45 mGy  75.9180 Gycm2  trans radial: 7 ml  dye: 126 ml      AUGMENTATION OF BREAST      INTRAMEDULLARY RODDING OF FEMUR Left 2/18/2023    Procedure: INSERTION, INTRAMEDULLARY ROXANNA, FEMUR (hana table -- synthes -- long nail -- have bovie and suction and large bone set);  Surgeon: Keanu Brand MD;  Location: [facility];  Service: Orthopedics;  Laterality: Left;    PERCUTANEOUS TRANSLUMINAL ANGIOPLASTY Left 4/25/2023    Procedure: PTA (ANGIOPLASTY, PERCUTANEOUS, TRANSLUMINAL);  Surgeon: Gilbert Sheppard MD;  Location: [facility] OR [address] [place] [address];  Service: Vascular;  Laterality: Left;  Tibial and popliteal angioplasty     Past Medical History:   Diagnosis Date    Diabetes mellitus, type 2      Family  History   Problem Relation Age of Onset    Heart disease Mother     Cancer Mother     Cataracts Mother     Hypertension Father     Heart disease Father     Cancer Sister     Heart disease Sister     Breast cancer Sister     Heart disease Brother         Social History:   Marital Status:   Alcohol History:  reports current alcohol use.  Tobacco History:  reports that she has been smoking cigarettes. She has a 11.25 pack-year smoking history. She has never used smokeless tobacco.  Drug History:  reports no history of drug use.    Review of patient's allergies indicates:  No Known Allergies    Current Outpatient Medications   Medication Sig Dispense Refill    acetaminophen (TYLENOL) 500 MG tablet Take 1,000 mg by mouth every 6 (six) hours as needed for Pain.      aspirin 81 MG Chew Chew and swallow 1 tablet (81 mg total) by mouth once daily. 30 tablet 2    atorvastatin (LIPITOR) 80 MG tablet Take 1 tablet (80 mg total) by mouth every evening. 90 tablet 3    blood sugar diagnostic Strp To check BG two times daily, to use with insurance preferred meter 200 each 1    blood-glucose meter kit To check BG two times daily, to use with insurance preferred meter 1 each 1    clopidogreL (PLAVIX) 75 mg tablet Take 1 tablet (75 mg total) by mouth once daily. 30 tablet 2    doxycycline (MONODOX) 100 MG capsule Take 1 capsule (100 mg total) by mouth 2 (two) times daily. for 14 days 28 capsule 0    doxycycline (VIBRAMYCIN) 100 MG Cap Take 1 capsule (100 mg total) by mouth every 12 (twelve) hours. for 14 days 28 capsule 0    HYDROcodone-acetaminophen (NORCO) 5-325 mg per tablet Take 1 tablet by mouth every 6 (six) hours as needed for Pain. 9 tablet 0    insulin aspart U-100 (NOVOLOG) 100 unit/mL (3 mL) InPn pen Inject 0-5 Units into the skin before meals and at bedtime as needed (Hyperglycemia). Blood Glucose  mg/dL                  Pre-meal                2200  151-200                0 unit                      0  "unit  201-250                2 units                    1 unit  251-300                3 units                    1 unit  301-350                4 units                    2 units  >350                     5 units                    3 units  0    insulin detemir U-100, Levemir, 100 unit/mL (3 mL) SubQ InPn pen Inject 18 Units into the skin once daily.  0    lancets [facility] To check BG two times daily, to use with insurance preferred meter 200 each 1    lisinopriL (PRINIVIL,ZESTRIL) 5 MG tablet Take 5 mg by mouth once daily.      melatonin 10 mg Cap Take 1 capsule by mouth every evening.      multivit-min-FA-lycopen-lutein 0.4 mg-300 mcg- 250 mcg Tab Take 1 tablet by mouth once daily.      nicotine (NICODERM CQ) 14 mg/24 hr Place 1 patch onto the skin once daily. 28 patch 0    pen needle, diabetic (PEN NEEDLE) 31 gauge x 3/16" Ndle 1 application by Misc.(Non-Drug; Combo Route) route 2 (two) times a day. 200 each 0    traMADoL (ULTRAM) 50 mg tablet Take 1 tablet (50 mg total) by mouth every 6 (six) hours as needed for Pain. 28 tablet 0     No current facility-administered medications for this visit.       Review of Systems   Constitutional:  Negative for chills, fatigue, fever and unexpected weight change.   HENT:  Negative for hearing loss and trouble swallowing.    Eyes:  Negative for photophobia and visual disturbance.   Respiratory:  Negative for cough, shortness of breath and wheezing.    Cardiovascular:  Negative for chest pain, palpitations and leg swelling.   Gastrointestinal:  Negative for abdominal pain and nausea.   Genitourinary:  Negative for dysuria and frequency.   Musculoskeletal:  Positive for gait problem. Negative for arthralgias, back pain, joint swelling and myalgias.   Skin:  Positive for wound. Negative for rash.   Neurological:  Positive for numbness. Negative for tremors, seizures, weakness and headaches.   Hematological:  Does not bruise/bleed easily.       Objective:        Physical Exam: "   Foot Exam  Physical Exam  Ortho/SPM Exam     Imaging:            Assessment:       1. Ulcer of left foot with necrosis of bone    2. Ulcer of left foot with necrosis of muscle    3. PAD (peripheral artery disease)    4. At high risk for inadequate nutritional intake    5. Decreased pedal pulses    6. Cellulitis and abscess of foot    7. Paresthesias    8. PVD (peripheral vascular disease)    9. Type 2 diabetes mellitus with foot ulcer, with long-term current use of insulin    10. Diabetic ulcer of toe of left foot associated with type 2 diabetes mellitus, with necrosis of muscle    11. Cigarette nicotine dependence with nicotine-induced disorder      Plan:   Ulcer of left foot with necrosis of bone  -     Ambulatory referral/consult to Infectious Disease; Future; Expected date: 06/07/2023  -     Ambulatory referral/consult to Vascular Surgery; Future; Expected date: 06/07/2023    Ulcer of left foot with necrosis of muscle    PAD (peripheral artery disease)    At high risk for inadequate nutritional intake    Decreased pedal pulses  -     Ambulatory referral/consult to Vascular Surgery; Future; Expected date: 06/07/2023    Cellulitis and abscess of foot  -     doxycycline (VIBRAMYCIN) 100 MG Cap; Take 1 capsule (100 mg total) by mouth every 12 (twelve) hours. for 14 days  Dispense: 28 capsule; Refill: 0    Paresthesias    PVD (peripheral vascular disease)  -     Ambulatory referral/consult to Vascular Surgery; Future; Expected date: 06/07/2023    Type 2 diabetes mellitus with foot ulcer, with long-term current use of insulin    Diabetic ulcer of toe of left foot associated with type 2 diabetes mellitus, with necrosis of muscle    Cigarette nicotine dependence with nicotine-induced disorder      Follow up in about 5 days (around 6/5/2023).    Procedures          Counseling:     I provided patient education verbally regarding:   Patient diagnosis, treatment options, as well as alternatives, risks, and benefits.      This note was created using Dragon voice recognition software that occasionally misinterpreted phrases or words.

## 2023-06-01 ENCOUNTER — TELEPHONE (OUTPATIENT)
Dept: FAMILY MEDICINE | Facility: CLINIC | Age: 73
End: 2023-06-01

## 2023-06-01 DIAGNOSIS — M18.0 PRIMARY OSTEOARTHRITIS OF BOTH FIRST CARPOMETACARPAL JOINTS: Primary | ICD-10-CM

## 2023-06-01 NOTE — TELEPHONE ENCOUNTER
This prescription has been approved and sent to   [id number] [place] - [address] [facility] [address] [facility] [address]  [address] [address]  Phone: [phone] Fax: [phone]

## 2023-06-01 NOTE — TELEPHONE ENCOUNTER
Tried calling pt in regards to her appt she no showed today and the pt doesn't have a voicemail box setup.

## 2023-06-02 ENCOUNTER — TELEPHONE (OUTPATIENT)
Dept: WOUND CARE | Facility: HOSPITAL | Age: 73
End: 2023-06-02
Payer: MEDICARE

## 2023-06-02 RX ORDER — LEVOFLOXACIN 750 MG/1
750 TABLET ORAL DAILY
Qty: 14 TABLET | Refills: 0 | Status: ON HOLD | OUTPATIENT
Start: 2023-06-02 | End: 2023-06-11 | Stop reason: HOSPADM

## 2023-06-05 ENCOUNTER — HOSPITAL ENCOUNTER (INPATIENT)
Facility: HOSPITAL | Age: 73
LOS: 9 days | Discharge: NURSING, REHAB OR LONG-TERM CARE | DRG: 464 | End: 2023-06-14
Attending: EMERGENCY MEDICINE | Admitting: INTERNAL MEDICINE
Payer: MEDICARE

## 2023-06-05 ENCOUNTER — HOSPITAL ENCOUNTER (EMERGENCY)
Facility: HOSPITAL | Age: 73
Discharge: ANOTHER HOSPITAL | End: 2023-06-05
Attending: EMERGENCY MEDICINE
Payer: MEDICARE

## 2023-06-05 ENCOUNTER — ANESTHESIA EVENT (OUTPATIENT)
Dept: SURGERY | Facility: HOSPITAL | Age: 73
DRG: 464 | End: 2023-06-05
Payer: MEDICARE

## 2023-06-05 VITALS
HEART RATE: 84 BPM | HEIGHT: 63 IN | DIASTOLIC BLOOD PRESSURE: 67 MMHG | RESPIRATION RATE: 16 BRPM | OXYGEN SATURATION: 95 % | TEMPERATURE: 99 F | SYSTOLIC BLOOD PRESSURE: 122 MMHG | BODY MASS INDEX: 28.35 KG/M2 | WEIGHT: 160 LBS

## 2023-06-05 DIAGNOSIS — M25.572 LEFT ANKLE PAIN: ICD-10-CM

## 2023-06-05 DIAGNOSIS — L97.529 FOOT ULCERATION, LEFT, WITH UNSPECIFIED SEVERITY: ICD-10-CM

## 2023-06-05 DIAGNOSIS — S82.202A TIBIA/FIBULA FRACTURE, LEFT, CLOSED, INITIAL ENCOUNTER: Primary | ICD-10-CM

## 2023-06-05 DIAGNOSIS — L08.9 DIABETIC FOOT INFECTION: ICD-10-CM

## 2023-06-05 DIAGNOSIS — S82.202A CLOSED FRACTURE OF LEFT TIBIA AND FIBULA, INITIAL ENCOUNTER: Primary | ICD-10-CM

## 2023-06-05 DIAGNOSIS — G62.9 NEUROPATHY: ICD-10-CM

## 2023-06-05 DIAGNOSIS — L97.509 TYPE 2 DIABETES MELLITUS WITH FOOT ULCER, WITH LONG-TERM CURRENT USE OF INSULIN: ICD-10-CM

## 2023-06-05 DIAGNOSIS — L97.422 CHRONIC ULCER OF LEFT HEEL WITH FAT LAYER EXPOSED: ICD-10-CM

## 2023-06-05 DIAGNOSIS — E11.621 TYPE 2 DIABETES MELLITUS WITH FOOT ULCER, WITH LONG-TERM CURRENT USE OF INSULIN: ICD-10-CM

## 2023-06-05 DIAGNOSIS — S82.402A CLOSED FRACTURE OF LEFT TIBIA AND FIBULA, INITIAL ENCOUNTER: Primary | ICD-10-CM

## 2023-06-05 DIAGNOSIS — Z79.4 TYPE 2 DIABETES MELLITUS WITH FOOT ULCER, WITH LONG-TERM CURRENT USE OF INSULIN: ICD-10-CM

## 2023-06-05 DIAGNOSIS — S82.402A TIBIA/FIBULA FRACTURE, LEFT, CLOSED, INITIAL ENCOUNTER: Primary | ICD-10-CM

## 2023-06-05 DIAGNOSIS — S82.872A CLOSED DISPLACED PILON FRACTURE OF LEFT TIBIA, INITIAL ENCOUNTER: ICD-10-CM

## 2023-06-05 DIAGNOSIS — S82.872D CLOSED DISPLACED PILON FRACTURE OF LEFT TIBIA WITH ROUTINE HEALING: ICD-10-CM

## 2023-06-05 DIAGNOSIS — R07.9 CHEST PAIN: ICD-10-CM

## 2023-06-05 DIAGNOSIS — E11.628 DIABETIC FOOT INFECTION: ICD-10-CM

## 2023-06-05 DIAGNOSIS — Z01.811 PRE-OP CHEST EXAM: ICD-10-CM

## 2023-06-05 PROBLEM — M18.0 PRIMARY OSTEOARTHRITIS OF BOTH FIRST CARPOMETACARPAL JOINTS: Status: ACTIVE | Noted: 2023-06-05

## 2023-06-05 LAB
ALBUMIN SERPL BCP-MCNC: 3.3 G/DL (ref 3.5–5.2)
ALP SERPL-CCNC: 96 U/L (ref 55–135)
ALT SERPL W/O P-5'-P-CCNC: 8 U/L (ref 10–44)
ANION GAP SERPL CALC-SCNC: 10 MMOL/L (ref 8–16)
AST SERPL-CCNC: 12 U/L (ref 10–40)
BASOPHILS # BLD AUTO: 0.06 K/UL (ref 0–0.2)
BASOPHILS NFR BLD: 0.5 % (ref 0–1.9)
BILIRUB SERPL-MCNC: 0.8 MG/DL (ref 0.1–1)
BUN SERPL-MCNC: 22 MG/DL (ref 8–23)
CALCIUM SERPL-MCNC: 9.7 MG/DL (ref 8.7–10.5)
CHLORIDE SERPL-SCNC: 99 MMOL/L (ref 95–110)
CO2 SERPL-SCNC: 24 MMOL/L (ref 23–29)
CREAT SERPL-MCNC: 0.9 MG/DL (ref 0.5–1.4)
CRP SERPL-MCNC: 127.5 MG/L (ref 0–8.2)
DIFFERENTIAL METHOD: ABNORMAL
EOSINOPHIL # BLD AUTO: 0 K/UL (ref 0–0.5)
EOSINOPHIL NFR BLD: 0.3 % (ref 0–8)
ERYTHROCYTE [DISTWIDTH] IN BLOOD BY AUTOMATED COUNT: 13.6 % (ref 11.5–14.5)
EST. GFR  (NO RACE VARIABLE): >60 ML/MIN/1.73 M^2
ESTIMATED AVG GLUCOSE: 246 MG/DL (ref 68–131)
GLUCOSE SERPL-MCNC: 235 MG/DL (ref 70–110)
HBA1C MFR BLD: 10.2 % (ref 4–5.6)
HCT VFR BLD AUTO: 35.4 % (ref 37–48.5)
HGB BLD-MCNC: 11.9 G/DL (ref 12–16)
IMM GRANULOCYTES # BLD AUTO: 0.04 K/UL (ref 0–0.04)
IMM GRANULOCYTES NFR BLD AUTO: 0.4 % (ref 0–0.5)
INR PPP: 1 (ref 0.8–1.2)
LYMPHOCYTES # BLD AUTO: 1 K/UL (ref 1–4.8)
LYMPHOCYTES NFR BLD: 8.8 % (ref 18–48)
MAGNESIUM SERPL-MCNC: 1.8 MG/DL (ref 1.6–2.6)
MCH RBC QN AUTO: 29.8 PG (ref 27–31)
MCHC RBC AUTO-ENTMCNC: 33.6 G/DL (ref 32–36)
MCV RBC AUTO: 89 FL (ref 82–98)
MONOCYTES # BLD AUTO: 1 K/UL (ref 0.3–1)
MONOCYTES NFR BLD: 9.2 % (ref 4–15)
NEUTROPHILS # BLD AUTO: 9.2 K/UL (ref 1.8–7.7)
NEUTROPHILS NFR BLD: 80.8 % (ref 38–73)
NRBC BLD-RTO: 0 /100 WBC
PHOSPHATE SERPL-MCNC: 4.5 MG/DL (ref 2.7–4.5)
PLATELET # BLD AUTO: 340 K/UL (ref 150–450)
PMV BLD AUTO: 11.5 FL (ref 9.2–12.9)
POTASSIUM SERPL-SCNC: 4.9 MMOL/L (ref 3.5–5.1)
PREALB SERPL-MCNC: 11 MG/DL (ref 20–43)
PROT SERPL-MCNC: 6.9 G/DL (ref 6–8.4)
PROTHROMBIN TIME: 10.6 SEC (ref 9–12.5)
RBC # BLD AUTO: 4 M/UL (ref 4–5.4)
SODIUM SERPL-SCNC: 133 MMOL/L (ref 136–145)
WBC # BLD AUTO: 11.34 K/UL (ref 3.9–12.7)

## 2023-06-05 PROCEDURE — 36415 COLL VENOUS BLD VENIPUNCTURE: CPT | Performed by: EMERGENCY MEDICINE

## 2023-06-05 PROCEDURE — 83735 ASSAY OF MAGNESIUM: CPT

## 2023-06-05 PROCEDURE — 85610 PROTHROMBIN TIME: CPT

## 2023-06-05 PROCEDURE — 25000003 PHARM REV CODE 250: Performed by: STUDENT IN AN ORGANIZED HEALTH CARE EDUCATION/TRAINING PROGRAM

## 2023-06-05 PROCEDURE — 84145 PROCALCITONIN (PCT): CPT | Performed by: STUDENT IN AN ORGANIZED HEALTH CARE EDUCATION/TRAINING PROGRAM

## 2023-06-05 PROCEDURE — 96365 THER/PROPH/DIAG IV INF INIT: CPT

## 2023-06-05 PROCEDURE — 93010 ELECTROCARDIOGRAM REPORT: CPT | Mod: ,,, | Performed by: INTERNAL MEDICINE

## 2023-06-05 PROCEDURE — 96375 TX/PRO/DX INJ NEW DRUG ADDON: CPT | Mod: 59

## 2023-06-05 PROCEDURE — 93005 ELECTROCARDIOGRAM TRACING: CPT

## 2023-06-05 PROCEDURE — 93010 EKG 12-LEAD: ICD-10-PCS | Mod: ,,, | Performed by: INTERNAL MEDICINE

## 2023-06-05 PROCEDURE — 25000003 PHARM REV CODE 250: Performed by: EMERGENCY MEDICINE

## 2023-06-05 PROCEDURE — 99285 EMERGENCY DEPT VISIT HI MDM: CPT | Mod: 25

## 2023-06-05 PROCEDURE — 29515 APPLICATION SHORT LEG SPLINT: CPT | Mod: LT

## 2023-06-05 PROCEDURE — 85025 COMPLETE CBC W/AUTO DIFF WBC: CPT | Performed by: EMERGENCY MEDICINE

## 2023-06-05 PROCEDURE — 96374 THER/PROPH/DIAG INJ IV PUSH: CPT

## 2023-06-05 PROCEDURE — 99285 EMERGENCY DEPT VISIT HI MDM: CPT | Mod: ,,, | Performed by: EMERGENCY MEDICINE

## 2023-06-05 PROCEDURE — 82962 GLUCOSE BLOOD TEST: CPT

## 2023-06-05 PROCEDURE — 63600175 PHARM REV CODE 636 W HCPCS

## 2023-06-05 PROCEDURE — 99285 PR EMERGENCY DEPT VISIT,LEVEL V: ICD-10-PCS | Mod: ,,, | Performed by: EMERGENCY MEDICINE

## 2023-06-05 PROCEDURE — 84134 ASSAY OF PREALBUMIN: CPT

## 2023-06-05 PROCEDURE — 84100 ASSAY OF PHOSPHORUS: CPT

## 2023-06-05 PROCEDURE — 12000002 HC ACUTE/MED SURGE SEMI-PRIVATE ROOM

## 2023-06-05 PROCEDURE — 63600175 PHARM REV CODE 636 W HCPCS: Performed by: EMERGENCY MEDICINE

## 2023-06-05 PROCEDURE — 80053 COMPREHEN METABOLIC PANEL: CPT | Performed by: EMERGENCY MEDICINE

## 2023-06-05 PROCEDURE — 99284 EMERGENCY DEPT VISIT MOD MDM: CPT | Mod: 25,27

## 2023-06-05 PROCEDURE — 83036 HEMOGLOBIN GLYCOSYLATED A1C: CPT

## 2023-06-05 PROCEDURE — 25000003 PHARM REV CODE 250

## 2023-06-05 PROCEDURE — 86140 C-REACTIVE PROTEIN: CPT | Performed by: EMERGENCY MEDICINE

## 2023-06-05 RX ORDER — NALOXONE HCL 0.4 MG/ML
0.02 VIAL (ML) INJECTION
Status: DISCONTINUED | OUTPATIENT
Start: 2023-06-05 | End: 2023-06-14 | Stop reason: HOSPADM

## 2023-06-05 RX ORDER — MORPHINE SULFATE 4 MG/ML
4 INJECTION, SOLUTION INTRAMUSCULAR; INTRAVENOUS EVERY 6 HOURS PRN
Status: DISCONTINUED | OUTPATIENT
Start: 2023-06-05 | End: 2023-06-07

## 2023-06-05 RX ORDER — IBUPROFEN 200 MG
1 TABLET ORAL DAILY
Status: DISCONTINUED | OUTPATIENT
Start: 2023-06-06 | End: 2023-06-05

## 2023-06-05 RX ORDER — LISINOPRIL 5 MG/1
5 TABLET ORAL DAILY
Status: DISCONTINUED | OUTPATIENT
Start: 2023-06-06 | End: 2023-06-14 | Stop reason: HOSPADM

## 2023-06-05 RX ORDER — CLOPIDOGREL BISULFATE 75 MG/1
75 TABLET ORAL DAILY
Status: DISCONTINUED | OUTPATIENT
Start: 2023-06-06 | End: 2023-06-05

## 2023-06-05 RX ORDER — SODIUM CHLORIDE 0.9 % (FLUSH) 0.9 %
10 SYRINGE (ML) INJECTION EVERY 12 HOURS PRN
Status: DISCONTINUED | OUTPATIENT
Start: 2023-06-05 | End: 2023-06-05

## 2023-06-05 RX ORDER — INSULIN ASPART 100 [IU]/ML
0-5 INJECTION, SOLUTION INTRAVENOUS; SUBCUTANEOUS
Status: DISCONTINUED | OUTPATIENT
Start: 2023-06-05 | End: 2023-06-14 | Stop reason: HOSPADM

## 2023-06-05 RX ORDER — NAPROXEN SODIUM 220 MG/1
81 TABLET, FILM COATED ORAL DAILY
Status: DISCONTINUED | OUTPATIENT
Start: 2023-06-06 | End: 2023-06-06

## 2023-06-05 RX ORDER — HYDROMORPHONE HYDROCHLORIDE 2 MG/ML
1 INJECTION, SOLUTION INTRAMUSCULAR; INTRAVENOUS; SUBCUTANEOUS
Status: COMPLETED | OUTPATIENT
Start: 2023-06-05 | End: 2023-06-05

## 2023-06-05 RX ORDER — ONDANSETRON 2 MG/ML
4 INJECTION INTRAMUSCULAR; INTRAVENOUS
Status: COMPLETED | OUTPATIENT
Start: 2023-06-05 | End: 2023-06-05

## 2023-06-05 RX ORDER — ACETAMINOPHEN 325 MG/1
650 TABLET ORAL EVERY 8 HOURS PRN
Status: DISCONTINUED | OUTPATIENT
Start: 2023-06-05 | End: 2023-06-06

## 2023-06-05 RX ORDER — LIDOCAINE HYDROCHLORIDE 10 MG/ML
10 INJECTION INFILTRATION; PERINEURAL ONCE
Status: COMPLETED | OUTPATIENT
Start: 2023-06-05 | End: 2023-06-05

## 2023-06-05 RX ORDER — SODIUM CHLORIDE 0.9 % (FLUSH) 0.9 %
10 SYRINGE (ML) INJECTION
Status: DISCONTINUED | OUTPATIENT
Start: 2023-06-05 | End: 2023-06-14 | Stop reason: HOSPADM

## 2023-06-05 RX ORDER — GLUCAGON 1 MG
1 KIT INJECTION
Status: DISCONTINUED | OUTPATIENT
Start: 2023-06-05 | End: 2023-06-14 | Stop reason: HOSPADM

## 2023-06-05 RX ORDER — TALC
9 POWDER (GRAM) TOPICAL NIGHTLY
Status: DISCONTINUED | OUTPATIENT
Start: 2023-06-05 | End: 2023-06-14 | Stop reason: HOSPADM

## 2023-06-05 RX ORDER — FENTANYL CITRATE 50 UG/ML
50 INJECTION, SOLUTION INTRAMUSCULAR; INTRAVENOUS ONCE
Status: COMPLETED | OUTPATIENT
Start: 2023-06-05 | End: 2023-06-05

## 2023-06-05 RX ORDER — ATORVASTATIN CALCIUM 40 MG/1
80 TABLET, FILM COATED ORAL NIGHTLY
Status: DISCONTINUED | OUTPATIENT
Start: 2023-06-05 | End: 2023-06-14 | Stop reason: HOSPADM

## 2023-06-05 RX ORDER — ONDANSETRON 2 MG/ML
4 INJECTION INTRAMUSCULAR; INTRAVENOUS EVERY 8 HOURS PRN
Status: DISCONTINUED | OUTPATIENT
Start: 2023-06-05 | End: 2023-06-07

## 2023-06-05 RX ORDER — METOPROLOL TARTRATE 1 MG/ML
5 INJECTION, SOLUTION INTRAVENOUS
Status: DISCONTINUED | OUTPATIENT
Start: 2023-06-05 | End: 2023-06-05

## 2023-06-05 RX ORDER — ONDANSETRON 4 MG/1
4 TABLET, ORALLY DISINTEGRATING ORAL EVERY 8 HOURS PRN
Status: DISCONTINUED | OUTPATIENT
Start: 2023-06-05 | End: 2023-06-14 | Stop reason: HOSPADM

## 2023-06-05 RX ORDER — SIMETHICONE 80 MG
1 TABLET,CHEWABLE ORAL 4 TIMES DAILY PRN
Status: DISCONTINUED | OUTPATIENT
Start: 2023-06-05 | End: 2023-06-14 | Stop reason: HOSPADM

## 2023-06-05 RX ORDER — POLYETHYLENE GLYCOL 3350 17 G/17G
17 POWDER, FOR SOLUTION ORAL 3 TIMES DAILY PRN
Status: DISCONTINUED | OUTPATIENT
Start: 2023-06-05 | End: 2023-06-10

## 2023-06-05 RX ADMIN — ONDANSETRON 4 MG: 2 INJECTION INTRAMUSCULAR; INTRAVENOUS at 05:06

## 2023-06-05 RX ADMIN — LIDOCAINE HYDROCHLORIDE 10 ML: 10 INJECTION, SOLUTION INFILTRATION; PERINEURAL at 11:06

## 2023-06-05 RX ADMIN — FENTANYL CITRATE 50 MCG: 50 INJECTION INTRAMUSCULAR; INTRAVENOUS at 08:06

## 2023-06-05 RX ADMIN — Medication 9 MG: at 11:06

## 2023-06-05 RX ADMIN — FENTANYL CITRATE 50 MCG: 50 INJECTION INTRAMUSCULAR; INTRAVENOUS at 10:06

## 2023-06-05 RX ADMIN — ATORVASTATIN CALCIUM 80 MG: 40 TABLET, FILM COATED ORAL at 11:06

## 2023-06-05 RX ADMIN — HYDROMORPHONE HYDROCHLORIDE 1 MG: 2 INJECTION, SOLUTION INTRAMUSCULAR; INTRAVENOUS; SUBCUTANEOUS at 02:06

## 2023-06-05 RX ADMIN — PIPERACILLIN AND TAZOBACTAM 4.5 G: 4; .5 INJECTION, POWDER, LYOPHILIZED, FOR SOLUTION INTRAVENOUS; PARENTERAL at 03:06

## 2023-06-05 NOTE — ED NOTES
Patient resting in bed with eyes closed, chest rise is symmetrical, respirations are regular and unlabored. DELBERT GONGORA

## 2023-06-05 NOTE — ED NOTES
Report given to Manuel. Upon transfer to [facility] ED, patient is AAOx4, no cardiac or respiratory complications. No needs or questions at this time.

## 2023-06-05 NOTE — ED NOTES
Short leg splint applied to patient per Dr. Brice, secondary to wound vac in place. Patient tolerated well.

## 2023-06-05 NOTE — ED PROVIDER NOTES
Encounter Date: 6/5/2023       History     Chief Complaint   Patient presents with    Ankle Pain     Patient stated she was going to take a nap when she slipped off her bed and hit her ankle. Patient does have a wound vac on her ankle. She stated she did have hip surgery approx. 5 months ago on left side. Wound and ankle as well is on the left side. She denies any hip pain.      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  Wound positive for MSSA with negative MRI for OM. ID evaluated and recommended 10 day course of cefadroxil.  Patient was subsequently converted to doxycycline and then levofloxacin after DNA wound culture results.  Patient reports sliding down while ambulating with her walker to the bathroom and twisting her foot.  She reports increasing pain overnight in the left ankle that is throbbing in intensity.  She reports having appointment today with Dr. Peace.  Review of patient's allergies indicates:  No Known Allergies  Past Medical History:   Diagnosis Date    Diabetes mellitus, type 2      Past Surgical History:   Procedure Laterality Date    ANGIOGRAPHY OF LOWER EXTREMITY Left 4/25/2023    Procedure: Angiogram Extremity Unilateral;  Surgeon: Gilbert Sheppard MD;  Location: [address];  Service: Vascular;  Laterality: Left;  28.4 min  487.45 mGy  75.9180 Gycm2  trans radial: 7 ml  dye: 126 ml      AUGMENTATION OF BREAST      INTRAMEDULLARY RODDING OF FEMUR Left 2/18/2023    Procedure: INSERTION, INTRAMEDULLARY ROXANNA, FEMUR (hana table -- synthes -- long nail -- have bovie and suction and large bone set);  Surgeon: Keanu Brand MD;  Location: [facility] OR;  Service: Orthopedics;  Laterality: Left;    PERCUTANEOUS TRANSLUMINAL ANGIOPLASTY Left  4/25/2023    Procedure: PTA (ANGIOPLASTY, PERCUTANEOUS, TRANSLUMINAL);  Surgeon: Gilbert Sheppard MD;  Location: [facility] OR [address] [place] [address];  Service: Vascular;  Laterality: Left;  Tibial and popliteal angioplasty     Family History   Problem Relation Age of Onset    Heart disease Mother     Cancer Mother     Cataracts Mother     Hypertension Father     Heart disease Father     Cancer Sister     Heart disease Sister     Breast cancer Sister     Heart disease Brother      Social History     Tobacco Use    Smoking status: Every Day     Packs/day: 0.25     Years: 45.00     Pack years: 11.25     Types: Cigarettes    Smokeless tobacco: Never   Substance Use Topics    Alcohol use: Yes    Drug use: Never     Review of Systems   Constitutional:  Negative for fever.   HENT:  Negative for sore throat.    Respiratory:  Negative for shortness of breath.    Cardiovascular:  Negative for chest pain.   Gastrointestinal:  Negative for nausea.   Genitourinary:  Negative for dysuria.   Musculoskeletal:  Positive for arthralgias, gait problem and joint swelling. Negative for back pain.   Skin:  Negative for rash.   Neurological:  Negative for weakness.   Hematological:  Does not bruise/bleed easily.     Physical Exam     Initial Vitals [06/05/23 1151]   BP Pulse Resp Temp SpO2   (!) 124/58 89 16 98.5 °F (36.9 °C) 95 %      MAP       --         Physical Exam    Nursing note and vitals reviewed.  Constitutional: She appears well-developed and well-nourished. No distress.   HENT:   Head: Normocephalic and atraumatic.   Eyes: EOM are normal. Pupils are equal, round, and reactive to light.   Neck: Neck supple.   Cardiovascular:  Normal rate.           Pulmonary/Chest: No respiratory distress.   Abdominal: Abdomen is soft.   Musculoskeletal:         General: Normal range of motion.      Cervical back: Neck supple.      Comments: Wound VAC placed on left ankle extending to he left great toe.  Purulent appearing drainage under the Tegaderm  emanating from the toe.  There is erythema and swelling of the ankle and foot.  Diminished pulses.  Capillary refill is brisk.  Foot is warm.     Neurological: She is alert and oriented to person, place, and time.   Skin: Skin is warm and dry.                 ED Course   Splint Application    Date/Time: 6/5/2023 5:22 PM  Performed by: Keanu Brice MD  Authorized by: Keanu Brice MD   Location details: left leg  Splint type: short leg  Post-procedure: The splinted body part was neurovascularly unchanged following the procedure.  Patient tolerance: Patient tolerated the procedure well with no immediate complications      Labs Reviewed   CBC W/ AUTO DIFFERENTIAL - Abnormal; Notable for the following components:       Result Value    Hemoglobin 11.9 (*)     Hematocrit 35.4 (*)     Gran # (ANC) 9.2 (*)     Gran % 80.8 (*)     Lymph % 8.8 (*)     All other components within normal limits   COMPREHENSIVE METABOLIC PANEL - Abnormal; Notable for the following components:    Sodium 133 (*)     Glucose 235 (*)     Albumin 3.3 (*)     ALT 8 (*)     All other components within normal limits   C-REACTIVE PROTEIN - Abnormal; Notable for the following components:    .5 (*)     All other components within normal limits          Imaging Results               X-Ray Ankle Complete Left (Final result)  Result time 06/05/23 14:21:38      Final result by Jimmie Chin MD (06/05/23 14:21:38)                   Impression:      Comminuted and angulated distal tibia and fibular fractures      Electronically signed by: Jimmie Chin MD  Date:    06/05/2023  Time:    14:21               Narrative:    EXAMINATION:  XR ANKLE COMPLETE 3 VIEW LEFT    CLINICAL HISTORY:  Pain in left ankle and joints of left foot    TECHNIQUE:  AP, lateral and oblique views of the left ankle were performed.    COMPARISON:  Foot images are performed the same day    FINDINGS:  Medially angulated comminuted fractures of the distal tibial  metaphysis and diametaphysis in the distal fibular diametaphysis are evident.  The ankle mortise is grossly intact.  Regional soft tissue swelling is present. This report was flagged in Epic as abnormal.                                        X-Ray Foot Complete Left (Final result)  Result time 06/05/23 13:14:36      Final result by Haily Resendiz MD (06/05/23 13:14:36)                   Impression:      Distal tibia and fibular fractures appearing acute, displaced and recommend x-ray of the ankle for evaluation.    This report was flagged in Epic as abnormal.      Electronically signed by: Hialy Resendiz MD  Date:    06/05/2023  Time:    13:14               Narrative:    EXAMINATION:  XR FOOT COMPLETE 3 VIEW LEFT    CLINICAL HISTORY:  .  Pain in left ankle and joints of left foot    TECHNIQUE:  AP, lateral and oblique views of the left foot were performed.    COMPARISON:  04/19/2023    FINDINGS:  displaced distal tibia fibular fractures appearing acute. Recommend images of the ankle    Soft tissue wound posterior heel. Small plantar calcaneal spur and enthesophytes at site of insertion of the Achilles tendon.                                       Medications   HYDROmorphone (PF) injection 1 mg (1 mg Intravenous Given 6/5/23 1422)   piperacillin-tazobactam (ZOSYN) 4.5 g in dextrose 5 % in water (D5W) 5 % 100 mL IVPB (MB+) (0 g Intravenous Stopped 6/5/23 1705)     Medical Decision Making:   History:   Old Medical Records: I decided to obtain old medical records.  Initial Assessment:   73-year-old woman with known diabetic foot wound with wound VAC on outpatient antibiotics presents emergency department after fall complaining of left ankle pain.  Patient is found to have a displaced comminuted and angulated distal left tibia and fibular fracture.  Her wounds still appears infected draining purulent fluid from the wound VAC.  She is afebrile with a normal white blood cell count of 11 K. pain is controlled with  Dilaudid in the ED. her renal function is normal with creatinine 0.9 CRP is elevated at 127 which is higher than previously worrisome for worsening infection.  She is given IV Zosyn in the emergency department given recent culture sensitivities.  Placed in a posterior splint, unable to place stirrup due to compression of the wound VAC.  Patient will be transfer to [facility] for orthopedic evaluation given complicated nature of fracture in the setting of an ongoing infection  after discussed with orthopedics here at [facility].           ED Course as of 06/05/23 1723   Mon Jun 05, 2023   1331 Spoke with Dr. Brand.  Unfortunately his very complicated situation because the patient has an ongoing infection with a wound VAC in the heel and toe.  She also lives in an apartment complex on the 3rd floor and states she can not operate a wheelchair.  He recommends transfer to [facility]. [AS]   1635 Spoke with Dr. Mahmood who accepts patient for transfer to [facility] ED. [AS]      ED Course User Index  [AS] Keanu Brice MD                   Clinical Impression:   Final diagnoses:  [M25.572] Left ankle pain  [S82.202A, S82.402A] Closed fracture of left tibia and fibula, initial encounter (Primary)        ED Disposition Condition    Transfer to Another Facility Stable                Keanu Brice MD  06/05/23 1723

## 2023-06-06 ENCOUNTER — EXTERNAL HOME HEALTH (OUTPATIENT)
Dept: HOME HEALTH SERVICES | Facility: HOSPITAL | Age: 73
End: 2023-06-06
Payer: MEDICARE

## 2023-06-06 ENCOUNTER — ANESTHESIA (OUTPATIENT)
Dept: SURGERY | Facility: HOSPITAL | Age: 73
DRG: 464 | End: 2023-06-06
Payer: MEDICARE

## 2023-06-06 PROBLEM — L03.032 CELLULITIS OF GREAT TOE OF LEFT FOOT: Status: RESOLVED | Noted: 2023-04-19 | Resolved: 2023-06-06

## 2023-06-06 PROBLEM — A41.9 SEPSIS: Status: RESOLVED | Noted: 2023-04-19 | Resolved: 2023-06-06

## 2023-06-06 PROBLEM — S72.22XA CLOSED LEFT SUBTROCHANTERIC FEMUR FRACTURE, INITIAL ENCOUNTER: Status: RESOLVED | Noted: 2023-02-18 | Resolved: 2023-06-06

## 2023-06-06 PROBLEM — Z79.4 TYPE 2 DIABETES MELLITUS WITH HYPERGLYCEMIA, WITH LONG-TERM CURRENT USE OF INSULIN: Status: ACTIVE | Noted: 2023-06-06

## 2023-06-06 PROBLEM — L97.529: Status: ACTIVE | Noted: 2023-06-06

## 2023-06-06 PROBLEM — N28.9 KIDNEY LESION, NATIVE, RIGHT: Status: RESOLVED | Noted: 2023-04-21 | Resolved: 2023-06-06

## 2023-06-06 PROBLEM — D62 ACUTE BLOOD LOSS ANEMIA: Status: RESOLVED | Noted: 2023-02-19 | Resolved: 2023-06-06

## 2023-06-06 PROBLEM — Z71.89 ACP (ADVANCE CARE PLANNING): Status: RESOLVED | Noted: 2023-02-17 | Resolved: 2023-06-06

## 2023-06-06 PROBLEM — N63.11 MASS OF UPPER OUTER QUADRANT OF RIGHT BREAST: Status: ACTIVE | Noted: 2023-04-27

## 2023-06-06 PROBLEM — E11.65 TYPE 2 DIABETES MELLITUS WITH HYPERGLYCEMIA, WITH LONG-TERM CURRENT USE OF INSULIN: Status: ACTIVE | Noted: 2023-06-06

## 2023-06-06 PROBLEM — G62.9 NEUROPATHY: Status: ACTIVE | Noted: 2023-06-06

## 2023-06-06 PROBLEM — Z01.818 PRE-OP EVALUATION: Status: RESOLVED | Noted: 2023-04-24 | Resolved: 2023-06-06

## 2023-06-06 PROBLEM — Z72.0 TOBACCO USE: Status: ACTIVE | Noted: 2023-06-06

## 2023-06-06 LAB
ANION GAP SERPL CALC-SCNC: 11 MMOL/L (ref 8–16)
BUN SERPL-MCNC: 26 MG/DL (ref 8–23)
CALCIUM SERPL-MCNC: 9.7 MG/DL (ref 8.7–10.5)
CHLORIDE SERPL-SCNC: 93 MMOL/L (ref 95–110)
CO2 SERPL-SCNC: 27 MMOL/L (ref 23–29)
CREAT SERPL-MCNC: 1.2 MG/DL (ref 0.5–1.4)
ERYTHROCYTE [DISTWIDTH] IN BLOOD BY AUTOMATED COUNT: 13.7 % (ref 11.5–14.5)
EST. GFR  (NO RACE VARIABLE): 48.1 ML/MIN/1.73 M^2
GLUCOSE SERPL-MCNC: 400 MG/DL (ref 70–110)
GRAM STN SPEC: NORMAL
GRAM STN SPEC: NORMAL
HCT VFR BLD AUTO: 34.8 % (ref 37–48.5)
HGB BLD-MCNC: 11 G/DL (ref 12–16)
MAGNESIUM SERPL-MCNC: 1.8 MG/DL (ref 1.6–2.6)
MCH RBC QN AUTO: 28.8 PG (ref 27–31)
MCHC RBC AUTO-ENTMCNC: 31.6 G/DL (ref 32–36)
MCV RBC AUTO: 91 FL (ref 82–98)
PLATELET # BLD AUTO: 317 K/UL (ref 150–450)
PMV BLD AUTO: 11.8 FL (ref 9.2–12.9)
POCT GLUCOSE: 146 MG/DL (ref 70–110)
POCT GLUCOSE: 257 MG/DL (ref 70–110)
POCT GLUCOSE: 260 MG/DL (ref 70–110)
POCT GLUCOSE: 292 MG/DL (ref 70–110)
POCT GLUCOSE: 302 MG/DL (ref 70–110)
POCT GLUCOSE: 335 MG/DL (ref 70–110)
POTASSIUM SERPL-SCNC: 5.4 MMOL/L (ref 3.5–5.1)
PROCALCITONIN SERPL IA-MCNC: 0.08 NG/ML
RBC # BLD AUTO: 3.82 M/UL (ref 4–5.4)
SODIUM SERPL-SCNC: 131 MMOL/L (ref 136–145)
WBC # BLD AUTO: 8.2 K/UL (ref 3.9–12.7)

## 2023-06-06 PROCEDURE — 36000711: Performed by: ORTHOPAEDIC SURGERY

## 2023-06-06 PROCEDURE — 99223 PR INITIAL HOSPITAL CARE,LEVL III: ICD-10-PCS | Mod: ,,, | Performed by: STUDENT IN AN ORGANIZED HEALTH CARE EDUCATION/TRAINING PROGRAM

## 2023-06-06 PROCEDURE — 87070 CULTURE OTHR SPECIMN AEROBIC: CPT

## 2023-06-06 PROCEDURE — 99223 1ST HOSP IP/OBS HIGH 75: CPT | Mod: ,,, | Performed by: PODIATRIST

## 2023-06-06 PROCEDURE — 99223 PR INITIAL HOSPITAL CARE,LEVL III: ICD-10-PCS | Mod: 57,,, | Performed by: ORTHOPAEDIC SURGERY

## 2023-06-06 PROCEDURE — 36415 COLL VENOUS BLD VENIPUNCTURE: CPT | Performed by: STUDENT IN AN ORGANIZED HEALTH CARE EDUCATION/TRAINING PROGRAM

## 2023-06-06 PROCEDURE — 63600175 PHARM REV CODE 636 W HCPCS: Performed by: NURSE ANESTHETIST, CERTIFIED REGISTERED

## 2023-06-06 PROCEDURE — 25000003 PHARM REV CODE 250: Performed by: NURSE ANESTHETIST, CERTIFIED REGISTERED

## 2023-06-06 PROCEDURE — 63600175 PHARM REV CODE 636 W HCPCS: Performed by: STUDENT IN AN ORGANIZED HEALTH CARE EDUCATION/TRAINING PROGRAM

## 2023-06-06 PROCEDURE — 27000221 HC OXYGEN, UP TO 24 HOURS

## 2023-06-06 PROCEDURE — 25000003 PHARM REV CODE 250

## 2023-06-06 PROCEDURE — D9220A PRA ANESTHESIA: ICD-10-PCS | Mod: ANES,,, | Performed by: ANESTHESIOLOGY

## 2023-06-06 PROCEDURE — 27201423 OPTIME MED/SURG SUP & DEVICES STERILE SUPPLY: Performed by: ORTHOPAEDIC SURGERY

## 2023-06-06 PROCEDURE — 87077 CULTURE AEROBIC IDENTIFY: CPT

## 2023-06-06 PROCEDURE — 20690 PR APPLY BONE UNIPLANE,EXT FIX DEV: ICD-10-PCS | Mod: 51,LT,, | Performed by: ORTHOPAEDIC SURGERY

## 2023-06-06 PROCEDURE — 80048 BASIC METABOLIC PNL TOTAL CA: CPT | Performed by: STUDENT IN AN ORGANIZED HEALTH CARE EDUCATION/TRAINING PROGRAM

## 2023-06-06 PROCEDURE — 21400001 HC TELEMETRY ROOM

## 2023-06-06 PROCEDURE — 82962 GLUCOSE BLOOD TEST: CPT | Performed by: ORTHOPAEDIC SURGERY

## 2023-06-06 PROCEDURE — C1769 GUIDE WIRE: HCPCS | Performed by: ORTHOPAEDIC SURGERY

## 2023-06-06 PROCEDURE — 71000033 HC RECOVERY, INTIAL HOUR: Performed by: ORTHOPAEDIC SURGERY

## 2023-06-06 PROCEDURE — 37000009 HC ANESTHESIA EA ADD 15 MINS: Performed by: ORTHOPAEDIC SURGERY

## 2023-06-06 PROCEDURE — 87186 SC STD MICRODIL/AGAR DIL: CPT

## 2023-06-06 PROCEDURE — 99900035 HC TECH TIME PER 15 MIN (STAT)

## 2023-06-06 PROCEDURE — 99223 1ST HOSP IP/OBS HIGH 75: CPT | Mod: ,,, | Performed by: STUDENT IN AN ORGANIZED HEALTH CARE EDUCATION/TRAINING PROGRAM

## 2023-06-06 PROCEDURE — 99223 PR INITIAL HOSPITAL CARE,LEVL III: ICD-10-PCS | Mod: ,,, | Performed by: PODIATRIST

## 2023-06-06 PROCEDURE — 71000015 HC POSTOP RECOV 1ST HR: Performed by: ORTHOPAEDIC SURGERY

## 2023-06-06 PROCEDURE — D9220A PRA ANESTHESIA: Mod: ANES,,, | Performed by: ANESTHESIOLOGY

## 2023-06-06 PROCEDURE — 25000003 PHARM REV CODE 250: Performed by: STUDENT IN AN ORGANIZED HEALTH CARE EDUCATION/TRAINING PROGRAM

## 2023-06-06 PROCEDURE — 25000003 PHARM REV CODE 250: Performed by: INTERNAL MEDICINE

## 2023-06-06 PROCEDURE — 87075 CULTR BACTERIA EXCEPT BLOOD: CPT

## 2023-06-06 PROCEDURE — 99900031 HC PATIENT EDUCATION (STAT)

## 2023-06-06 PROCEDURE — D9220A PRA ANESTHESIA: ICD-10-PCS | Mod: CRNA,,, | Performed by: NURSE ANESTHETIST, CERTIFIED REGISTERED

## 2023-06-06 PROCEDURE — 87205 SMEAR GRAM STAIN: CPT

## 2023-06-06 PROCEDURE — 36000710: Performed by: ORTHOPAEDIC SURGERY

## 2023-06-06 PROCEDURE — 27828 PR OPEN TREATMENT FRACTURE DISTAL TIBIA & FIBULA: ICD-10-PCS | Mod: LT,,, | Performed by: ORTHOPAEDIC SURGERY

## 2023-06-06 PROCEDURE — C1713 ANCHOR/SCREW BN/BN,TIS/BN: HCPCS | Performed by: ORTHOPAEDIC SURGERY

## 2023-06-06 PROCEDURE — 99223 1ST HOSP IP/OBS HIGH 75: CPT | Mod: 57,,, | Performed by: ORTHOPAEDIC SURGERY

## 2023-06-06 PROCEDURE — 20690 APPL UNIPLN UNI EXT FIXJ SYS: CPT | Mod: 51,LT,, | Performed by: ORTHOPAEDIC SURGERY

## 2023-06-06 PROCEDURE — 94761 N-INVAS EAR/PLS OXIMETRY MLT: CPT

## 2023-06-06 PROCEDURE — 85027 COMPLETE CBC AUTOMATED: CPT | Performed by: STUDENT IN AN ORGANIZED HEALTH CARE EDUCATION/TRAINING PROGRAM

## 2023-06-06 PROCEDURE — D9220A PRA ANESTHESIA: Mod: CRNA,,, | Performed by: NURSE ANESTHETIST, CERTIFIED REGISTERED

## 2023-06-06 PROCEDURE — 37000008 HC ANESTHESIA 1ST 15 MINUTES: Performed by: ORTHOPAEDIC SURGERY

## 2023-06-06 PROCEDURE — 27828 TREAT LOWER LEG FRACTURE: CPT | Mod: LT,,, | Performed by: ORTHOPAEDIC SURGERY

## 2023-06-06 PROCEDURE — 83735 ASSAY OF MAGNESIUM: CPT | Performed by: STUDENT IN AN ORGANIZED HEALTH CARE EDUCATION/TRAINING PROGRAM

## 2023-06-06 PROCEDURE — 63600175 PHARM REV CODE 636 W HCPCS

## 2023-06-06 DEVICE — CLAMP PIN EXT FIXATOR 4/5/6MM: Type: IMPLANTABLE DEVICE | Site: ANKLE | Status: FUNCTIONAL

## 2023-06-06 DEVICE — SCREW AXSOS CORTEX TI 3.5X75MM: Type: IMPLANTABLE DEVICE | Site: ANKLE | Status: FUNCTIONAL

## 2023-06-06 DEVICE — PIN HALF APEX 4/5MM 40X150MM: Type: IMPLANTABLE DEVICE | Site: ANKLE | Status: FUNCTIONAL

## 2023-06-06 DEVICE — PIN EXT FIX APEX 5X150MM SS: Type: IMPLANTABLE DEVICE | Site: ANKLE | Status: FUNCTIONAL

## 2023-06-06 DEVICE — SCREW BONE NON LOCK 3.5X55MM: Type: IMPLANTABLE DEVICE | Site: ANKLE | Status: FUNCTIONAL

## 2023-06-06 DEVICE — IMPLANTABLE DEVICE: Type: IMPLANTABLE DEVICE | Site: ANKLE | Status: FUNCTIONAL

## 2023-06-06 DEVICE — ROD CONNECT EX FIX TIB 11X250M: Type: IMPLANTABLE DEVICE | Site: ANKLE | Status: FUNCTIONAL

## 2023-06-06 DEVICE — SCREW BONE NON LOCK 3.5X14MM: Type: IMPLANTABLE DEVICE | Site: ANKLE | Status: FUNCTIONAL

## 2023-06-06 DEVICE — ROD CONNECT EX FIX TIB 11X350M: Type: IMPLANTABLE DEVICE | Site: ANKLE | Status: FUNCTIONAL

## 2023-06-06 DEVICE — IMPLANTABLE DEVICE: Type: IMPLANTABLE DEVICE | Site: FIBULA | Status: FUNCTIONAL

## 2023-06-06 DEVICE — SCREW BONE ASNIS III 4X40MM: Type: IMPLANTABLE DEVICE | Site: ANKLE | Status: FUNCTIONAL

## 2023-06-06 RX ORDER — MUPIROCIN 20 MG/G
OINTMENT TOPICAL
Status: DISCONTINUED | OUTPATIENT
Start: 2023-06-06 | End: 2023-06-14 | Stop reason: HOSPADM

## 2023-06-06 RX ORDER — ACETAMINOPHEN 500 MG
1000 TABLET ORAL EVERY 8 HOURS
Status: DISCONTINUED | OUTPATIENT
Start: 2023-06-06 | End: 2023-06-14 | Stop reason: HOSPADM

## 2023-06-06 RX ORDER — OXYCODONE AND ACETAMINOPHEN 5; 325 MG/1; MG/1
1 TABLET ORAL
Status: DISCONTINUED | OUTPATIENT
Start: 2023-06-06 | End: 2023-06-07

## 2023-06-06 RX ORDER — ROCURONIUM BROMIDE 10 MG/ML
INJECTION, SOLUTION INTRAVENOUS
Status: DISCONTINUED | OUTPATIENT
Start: 2023-06-06 | End: 2023-06-06

## 2023-06-06 RX ORDER — PROPOFOL 10 MG/ML
VIAL (ML) INTRAVENOUS
Status: DISCONTINUED | OUTPATIENT
Start: 2023-06-06 | End: 2023-06-06

## 2023-06-06 RX ORDER — MIDAZOLAM HYDROCHLORIDE 1 MG/ML
0.5 INJECTION INTRAMUSCULAR; INTRAVENOUS
Status: DISCONTINUED | OUTPATIENT
Start: 2023-06-06 | End: 2023-06-07

## 2023-06-06 RX ORDER — HYDROMORPHONE HYDROCHLORIDE 2 MG/ML
INJECTION, SOLUTION INTRAMUSCULAR; INTRAVENOUS; SUBCUTANEOUS
Status: DISCONTINUED | OUTPATIENT
Start: 2023-06-06 | End: 2023-06-06

## 2023-06-06 RX ORDER — ONDANSETRON 2 MG/ML
4 INJECTION INTRAMUSCULAR; INTRAVENOUS DAILY PRN
Status: DISCONTINUED | OUTPATIENT
Start: 2023-06-06 | End: 2023-06-07

## 2023-06-06 RX ORDER — PHENYLEPHRINE HYDROCHLORIDE 10 MG/ML
INJECTION INTRAVENOUS
Status: DISCONTINUED | OUTPATIENT
Start: 2023-06-06 | End: 2023-06-06

## 2023-06-06 RX ORDER — FENTANYL CITRATE 50 UG/ML
INJECTION, SOLUTION INTRAMUSCULAR; INTRAVENOUS
Status: DISCONTINUED | OUTPATIENT
Start: 2023-06-06 | End: 2023-06-06

## 2023-06-06 RX ORDER — LIDOCAINE HYDROCHLORIDE 20 MG/ML
INJECTION, SOLUTION EPIDURAL; INFILTRATION; INTRACAUDAL; PERINEURAL
Status: DISCONTINUED | OUTPATIENT
Start: 2023-06-06 | End: 2023-06-06

## 2023-06-06 RX ORDER — DEXAMETHASONE SODIUM PHOSPHATE 4 MG/ML
INJECTION, SOLUTION INTRA-ARTICULAR; INTRALESIONAL; INTRAMUSCULAR; INTRAVENOUS; SOFT TISSUE
Status: DISCONTINUED | OUTPATIENT
Start: 2023-06-06 | End: 2023-06-06

## 2023-06-06 RX ORDER — CHOLECALCIFEROL (VITAMIN D3) 25 MCG
1000 TABLET ORAL DAILY
Status: DISCONTINUED | OUTPATIENT
Start: 2023-06-07 | End: 2023-06-14 | Stop reason: HOSPADM

## 2023-06-06 RX ORDER — HYDROMORPHONE HYDROCHLORIDE 1 MG/ML
0.2 INJECTION, SOLUTION INTRAMUSCULAR; INTRAVENOUS; SUBCUTANEOUS EVERY 5 MIN PRN
Status: DISCONTINUED | OUTPATIENT
Start: 2023-06-06 | End: 2023-06-07

## 2023-06-06 RX ORDER — ASPIRIN 81 MG/1
81 TABLET ORAL 2 TIMES DAILY
Status: DISCONTINUED | OUTPATIENT
Start: 2023-06-06 | End: 2023-06-14 | Stop reason: HOSPADM

## 2023-06-06 RX ORDER — FENTANYL CITRATE 50 UG/ML
25 INJECTION, SOLUTION INTRAMUSCULAR; INTRAVENOUS
Status: DISCONTINUED | OUTPATIENT
Start: 2023-06-06 | End: 2023-06-07

## 2023-06-06 RX ORDER — SODIUM CHLORIDE 0.9 % (FLUSH) 0.9 %
3 SYRINGE (ML) INJECTION
Status: DISCONTINUED | OUTPATIENT
Start: 2023-06-06 | End: 2023-06-07

## 2023-06-06 RX ORDER — PREGABALIN 75 MG/1
75 CAPSULE ORAL 2 TIMES DAILY
Status: DISCONTINUED | OUTPATIENT
Start: 2023-06-06 | End: 2023-06-14 | Stop reason: HOSPADM

## 2023-06-06 RX ORDER — FAMOTIDINE 20 MG/1
20 TABLET, FILM COATED ORAL 2 TIMES DAILY PRN
Status: DISCONTINUED | OUTPATIENT
Start: 2023-06-06 | End: 2023-06-14 | Stop reason: HOSPADM

## 2023-06-06 RX ORDER — HALOPERIDOL 5 MG/ML
0.5 INJECTION INTRAMUSCULAR EVERY 10 MIN PRN
Status: DISCONTINUED | OUTPATIENT
Start: 2023-06-06 | End: 2023-06-07

## 2023-06-06 RX ORDER — ONDANSETRON 2 MG/ML
INJECTION INTRAMUSCULAR; INTRAVENOUS
Status: DISCONTINUED | OUTPATIENT
Start: 2023-06-06 | End: 2023-06-06

## 2023-06-06 RX ADMIN — ACETAMINOPHEN 1000 MG: 500 TABLET ORAL at 05:06

## 2023-06-06 RX ADMIN — ROCURONIUM BROMIDE 40 MG: 10 INJECTION, SOLUTION INTRAVENOUS at 12:06

## 2023-06-06 RX ADMIN — ONDANSETRON 4 MG: 2 INJECTION INTRAMUSCULAR; INTRAVENOUS at 01:06

## 2023-06-06 RX ADMIN — INSULIN ASPART 5 UNITS: 100 INJECTION, SOLUTION INTRAVENOUS; SUBCUTANEOUS at 06:06

## 2023-06-06 RX ADMIN — LIDOCAINE HYDROCHLORIDE 100 MG: 20 INJECTION, SOLUTION EPIDURAL; INFILTRATION; INTRACAUDAL at 12:06

## 2023-06-06 RX ADMIN — HYDROMORPHONE HYDROCHLORIDE 0.2 MG: 2 INJECTION, SOLUTION INTRAMUSCULAR; INTRAVENOUS; SUBCUTANEOUS at 04:06

## 2023-06-06 RX ADMIN — ASPIRIN 81 MG: 81 TABLET, COATED ORAL at 08:06

## 2023-06-06 RX ADMIN — ROCURONIUM BROMIDE 20 MG: 10 INJECTION, SOLUTION INTRAVENOUS at 01:06

## 2023-06-06 RX ADMIN — ATORVASTATIN CALCIUM 80 MG: 40 TABLET, FILM COATED ORAL at 08:06

## 2023-06-06 RX ADMIN — PHENYLEPHRINE HYDROCHLORIDE 100 MCG: 10 INJECTION INTRAVENOUS at 01:06

## 2023-06-06 RX ADMIN — SODIUM CHLORIDE: 0.9 INJECTION, SOLUTION INTRAVENOUS at 11:06

## 2023-06-06 RX ADMIN — SODIUM CHLORIDE 1000 ML: 9 INJECTION, SOLUTION INTRAVENOUS at 09:06

## 2023-06-06 RX ADMIN — LISINOPRIL 5 MG: 5 TABLET ORAL at 09:06

## 2023-06-06 RX ADMIN — FENTANYL CITRATE 100 MCG: 50 INJECTION, SOLUTION INTRAMUSCULAR; INTRAVENOUS at 12:06

## 2023-06-06 RX ADMIN — PROPOFOL 100 MG: 10 INJECTION, EMULSION INTRAVENOUS at 12:06

## 2023-06-06 RX ADMIN — MORPHINE SULFATE 4 MG: 4 INJECTION INTRAVENOUS at 08:06

## 2023-06-06 RX ADMIN — ASPIRIN 81 MG 81 MG: 81 TABLET ORAL at 09:06

## 2023-06-06 RX ADMIN — ONDANSETRON 4 MG: 2 INJECTION INTRAMUSCULAR; INTRAVENOUS at 12:06

## 2023-06-06 RX ADMIN — PHENYLEPHRINE HYDROCHLORIDE 200 MCG: 10 INJECTION INTRAVENOUS at 01:06

## 2023-06-06 RX ADMIN — PREGABALIN 75 MG: 75 CAPSULE ORAL at 08:06

## 2023-06-06 RX ADMIN — Medication 9 MG: at 08:06

## 2023-06-06 RX ADMIN — PREGABALIN 75 MG: 75 CAPSULE ORAL at 09:06

## 2023-06-06 RX ADMIN — CEFAZOLIN 2 G: 2 INJECTION, POWDER, FOR SOLUTION INTRAMUSCULAR; INTRAVENOUS at 12:06

## 2023-06-06 RX ADMIN — PHENYLEPHRINE HYDROCHLORIDE 200 MCG: 10 INJECTION INTRAVENOUS at 03:06

## 2023-06-06 RX ADMIN — INSULIN DETEMIR 18 UNITS: 100 INJECTION, SOLUTION SUBCUTANEOUS at 09:06

## 2023-06-06 RX ADMIN — SODIUM CHLORIDE: 0.9 INJECTION, SOLUTION INTRAVENOUS at 02:06

## 2023-06-06 RX ADMIN — PHENYLEPHRINE HYDROCHLORIDE 200 MCG: 10 INJECTION INTRAVENOUS at 04:06

## 2023-06-06 RX ADMIN — SODIUM CHLORIDE: 0.9 INJECTION, SOLUTION INTRAVENOUS at 12:06

## 2023-06-06 RX ADMIN — ACETAMINOPHEN 1000 MG: 500 TABLET ORAL at 09:06

## 2023-06-06 RX ADMIN — DEXAMETHASONE SODIUM PHOSPHATE 4 MG: 4 INJECTION INTRA-ARTICULAR; INTRALESIONAL; INTRAMUSCULAR; INTRAVENOUS; SOFT TISSUE at 12:06

## 2023-06-06 RX ADMIN — SUGAMMADEX 200 MG: 100 INJECTION, SOLUTION INTRAVENOUS at 04:06

## 2023-06-06 RX ADMIN — CEFAZOLIN 2 G: 2 INJECTION, POWDER, FOR SOLUTION INTRAMUSCULAR; INTRAVENOUS at 08:06

## 2023-06-06 RX ADMIN — INSULIN ASPART 1 UNITS: 100 INJECTION, SOLUTION INTRAVENOUS; SUBCUTANEOUS at 09:06

## 2023-06-06 RX ADMIN — PHENYLEPHRINE HYDROCHLORIDE 100 MCG: 10 INJECTION INTRAVENOUS at 12:06

## 2023-06-06 RX ADMIN — PHENYLEPHRINE HYDROCHLORIDE 200 MCG: 10 INJECTION INTRAVENOUS at 02:06

## 2023-06-06 NOTE — HPI
Alycia Badillo is a 73-year-old woman with a past medical history of peripheral artery disease s/p revascularization and type 2 diabetes mellitus c/b neuropathy and chronic lower extremity wounds who presents as a transfer from [facility] for distal fractures of the tibia and fibula.  Of note, patient was hospitalized at [facility] in April 2023 for severe peripheral artery disease and underwent revascularization.  The patient was discharged and was sent to inpatient rehabilitation.  She said that she developed a pressure ulcer on her left heel.  She also has a wound on the plantar surface of her left great toe.  She has been following with a podiatrist in addition to a Wound Care clinic.  The patient said that she left inpatient rehabilitation and went to live at home with her son.  She says that she normally ambulates with a walker.  Approximately three days ago, she said she had just taken her nightly melatonin and was getting ready to fall asleep in bed.  However, she noted that she needed to use the bathroom and went to reach for the walker next to the bed.  She said that the walker was not locked and so she slid and fell to the ground hitting her left lower extremity.  She said that she began having pain and swelling to her left lower extremity.  The pain became so unbearable that she lost the ability to ambulate.  This prompted her to seek care in the emergency department at [facility].  Imaging at the [facility] reveals a comminuted and angulated distal tibia and fibular fracture.  It was also noted that she was having purulent drainage from her lower extremity wounds.  Of note, per chart review, the patient had a left heel bone biopsy performed by her podiatrist on April 27th.  Patient's bone biopsy grew Staphylococcus aureus.  She was seen in the Wound Care Clinic and placed on levofloxacin and doxycycline but did not appear to be followed by the Infectious  Disease Service.    In the emergency department, the patient was noted to have stable vital signs upon arrival.  Per report, the patient received IV antibiotics at [facility] prior to transfer.  She was evaluated by Orthopedic Surgery at [facility] who is planning surgical intervention on June 6th.  Patient was admitted to Hospital Medicine for further management.

## 2023-06-06 NOTE — NURSING
Nurses Note -- 4 Eyes      6/6/2023   1:34 AM      Skin assessed during: Admit      [] No Altered Skin Integrity Present    []Prevention Measures Documented      [] Yes- Altered Skin Integrity Present or Discovered   [] LDA Added if Not in Epic (Describe Wound)   [x] New Altered Skin Integrity was Present on Admit and Documented in LDA   [x] Wound Image Taken    Wound Care Consulted? No    Attending Nurse:  Maged Khanna RN     Second RN/Staff Member:  TARIQ Gregorio

## 2023-06-06 NOTE — CARE UPDATE
Chart reviewed. Patient admitted early this am with closed displaced left pilon and distal fibula fractures as transfer from [facility]. Fractures were reduced and splinted by Ortho. Patient to go to OR today by Ortho for ORIF of left pilon fracture. Patient NPO and non-weight bearing to left leg. Patient's blood sugars are elevated at 300-400. Patient reports she normally takes Insulin Levemir at home at 18 units daily to treat her diabetes but reports her blood sugar meter broke several weeks ago and not monitoring her blood sugars at home. HgA1C 10.2% on admit so diabetes not controlled as outpatient. Will give Levemir 18 units subcutaneous x 1 dose this am. Need to closely monitor blood sugars as need tight blood sugar control to promote good wound healing post-op and decrease risk of post-op infection. Patient also in past month as noted a lump in right breast. Patient ronnie have palpable ump in right upper outer quadrant on exam. Patient has know bilateral breast implants in place. Patient's primary care physician is aware and had ordered to have outpatient ultrasound of breasts and mammogram done and have not been done yet. Patient to follow-up as outpatient with her primary care physician for further work-up and evaluation.       LOLI BARKER MD  Attending Staff Physician   Department of Hospital Medicine, [facility]  Pager: [phone]  Spectralink: [phone]

## 2023-06-06 NOTE — H&P
[facility] - Emergency Dept  [facility] Medicine  History & Physical    Patient Name: Anastasiia Badillo  MRN: [id number]  Patient Class: IP- Inpatient  Admission Date: 6/5/2023  Attending Physician: Millicent Funes MD   Primary Care Provider: Raji Parkinson MD    Patient information was obtained from patient, past medical records and ER records.     Subjective:     Principal Problem:Closed left pilon fracture    Chief Complaint:   Chief Complaint   Patient presents with    [facility] transfer     Tib/fib fracture with previous femur fracture. Wound vac in place        HPI: Alycia Badillo is a 73-year-old woman with a past medical history of peripheral artery disease s/p revascularization and type 2 diabetes mellitus c/b neuropathy and chronic lower extremity wounds who presents as a transfer from [facility] for distal fractures of the tibia and fibula.  Of note, patient was hospitalized at [facility] in April 2023 for severe peripheral artery disease and underwent revascularization.  The patient was discharged and was sent to inpatient rehabilitation.  She said that she developed a pressure ulcer on her left heel.  She also has a wound on the plantar surface of her left great toe.  She has been following with a podiatrist in addition to a Wound Care clinic.  The patient said that she left inpatient rehabilitation and went to live at home with her son.  She says that she normally ambulates with a walker.  Approximately three days ago, she said she had just taken her nightly melatonin and was getting ready to fall asleep in bed.  However, she noted that she needed to use the bathroom and went to reach for the walker next to the bed.  She said that the walker was not locked and so she slid and fell to the ground hitting her left lower extremity.  She said that she began having pain and swelling to her left lower extremity.  The pain became so unbearable that she lost the ability to ambulate.  This  prompted her to seek care in the emergency department at [facility].  Imaging at the neighboring [facility] reveals a comminuted and angulated distal tibia and fibular fracture.  It was also noted that she was having purulent drainage from her lower extremity wounds.  Of note, per chart review, the patient had a left heel bone biopsy performed by her podiatrist on April 27th.  Patient's bone biopsy grew Staphylococcus aureus.  She was seen in the Wound Care Clinic and placed on levofloxacin and doxycycline but did not appear to be followed by the Infectious Disease Service.    In the emergency department, the patient was noted to have stable vital signs upon arrival.  Per report, the patient received IV antibiotics at [facility] prior to transfer.  She was evaluated by Orthopedic Surgery at [facility] who is planning surgical intervention on June 6th.  Patient was admitted to Hospital Medicine for further management.      Past Medical History:   Diagnosis Date    Closed left pilon fracture 6/5/2023    Diabetes mellitus, type 2        Past Surgical History:   Procedure Laterality Date    ANGIOGRAPHY OF LOWER EXTREMITY Left 4/25/2023    Procedure: Angiogram Extremity Unilateral;  Surgeon: Gilbert Sheppard MD;  Location: [facility] OR [address] [place] [address];  Service: Vascular;  Laterality: Left;  28.4 min  487.45 mGy  75.9180 Gycm2  trans radial: 7 ml  dye: 126 ml      AUGMENTATION OF BREAST      INTRAMEDULLARY RODDING OF FEMUR Left 2/18/2023    Procedure: INSERTION, INTRAMEDULLARY ROXANNA, FEMUR (hana table -- synthes -- long nail -- have bovie and suction and large bone set);  Surgeon: Keanu Brand MD;  Location: [facility];  Service: Orthopedics;  Laterality: Left;    PERCUTANEOUS TRANSLUMINAL ANGIOPLASTY Left 4/25/2023    Procedure: PTA (ANGIOPLASTY, PERCUTANEOUS, TRANSLUMINAL);  Surgeon: Gilbert Sheppard MD;  Location: [facility] OR [address] [place] [address];  Service: Vascular;  Laterality: Left;   Tibial and popliteal angioplasty       Review of patient's allergies indicates:  No Known Allergies    Current Facility-Administered Medications on File Prior to Encounter   Medication    [COMPLETED] HYDROmorphone (PF) injection 1 mg    [COMPLETED] ondansetron injection 4 mg    [COMPLETED] piperacillin-tazobactam (ZOSYN) 4.5 g in dextrose 5 % in water (D5W) 5 % 100 mL IVPB (MB+)     Current Outpatient Medications on File Prior to Encounter   Medication Sig    acetaminophen (TYLENOL) 500 MG tablet Take 1,000 mg by mouth every 6 (six) hours as needed for Pain.    aspirin 81 MG Chew Chew and swallow 1 tablet (81 mg total) by mouth once daily.    atorvastatin (LIPITOR) 80 MG tablet Take 1 tablet (80 mg total) by mouth every evening.    blood sugar diagnostic Strp To check BG two times daily, to use with insurance preferred meter    blood-glucose meter kit To check BG two times daily, to use with insurance preferred meter    clopidogreL (PLAVIX) 75 mg tablet Take 1 tablet (75 mg total) by mouth once daily.    doxycycline (VIBRAMYCIN) 100 MG Cap Take 1 capsule (100 mg total) by mouth every 12 (twelve) hours. for 14 days    HYDROcodone-acetaminophen (NORCO) 5-325 mg per tablet Take 1 tablet by mouth every 6 (six) hours as needed for Pain.    insulin aspart U-100 (NOVOLOG) 100 unit/mL (3 mL) InPn pen Inject 0-5 Units into the skin before meals and at bedtime as needed (Hyperglycemia). Blood Glucose  mg/dL                  Pre-meal                2200  151-200                0 unit                      0 unit  201-250                2 units                    1 unit  251-300                3 units                    1 unit  301-350                4 units                    2 units  >350                     5 units                    3 units    insulin detemir U-100, Levemir, 100 unit/mL (3 mL) SubQ InPn pen Inject 18 Units into the skin once daily.    lancets Misc To check BG two times daily, to use with  "insurance preferred meter    levoFLOXacin (LEVAQUIN) 750 MG tablet Take 1 tablet (750 mg total) by mouth once daily. for 14 days    lisinopriL (PRINIVIL,ZESTRIL) 5 MG tablet Take 5 mg by mouth once daily.    melatonin 10 mg Cap Take 1 capsule by mouth every evening.    multivit-min-FA-lycopen-lutein 0.4 mg-300 mcg- 250 mcg Tab Take 1 tablet by mouth once daily.    nicotine (NICODERM CQ) 14 mg/24 hr Place 1 patch onto the skin once daily.    pen needle, diabetic (PEN NEEDLE) 31 gauge x 3/16" Ndle 1 application by Misc.(Non-Drug; Combo Route) route 2 (two) times a day.    traMADoL (ULTRAM) 50 mg tablet Take 1 tablet (50 mg total) by mouth every 6 (six) hours as needed for Pain.     Family History       Problem Relation (Age of Onset)    Breast cancer Sister    Cancer Mother, Sister    Cataracts Mother    Heart disease Mother, Father, Sister, Brother    Hypertension Father          Tobacco Use    Smoking status: Every Day     Packs/day: 0.25     Years: 45.00     Pack years: 11.25     Types: Cigarettes    Smokeless tobacco: Never   Substance and Sexual Activity    Alcohol use: Yes    Drug use: Never    Sexual activity: Not on file     Review of Systems   Constitutional:  Negative for chills, fatigue and fever.   HENT:  Negative for congestion, postnasal drip, rhinorrhea and sore throat.    Eyes:  Negative for photophobia and visual disturbance.   Respiratory:  Negative for cough, chest tightness and shortness of breath.    Cardiovascular:  Negative for chest pain and leg swelling.   Gastrointestinal:  Negative for abdominal pain, diarrhea, nausea and vomiting.   Endocrine: Negative for polyphagia and polyuria.   Genitourinary:  Negative for difficulty urinating, dysuria, frequency and urgency.   Musculoskeletal:  Positive for arthralgias, gait problem and joint swelling.   Skin:  Positive for wound.   Neurological:  Negative for seizures, numbness and headaches.   Psychiatric/Behavioral:  Negative for " agitation, behavioral problems and confusion.    Objective:     Vital Signs (Most Recent):  Temp: 98.4 °F (36.9 °C) (06/05/23 1830)  Pulse: 88 (06/05/23 2145)  Resp: 14 (06/05/23 2250)  BP: (!) 160/73 (06/05/23 2145)  SpO2: 95 % (06/05/23 2145) Vital Signs (24h Range):  Temp:  [98.4 °F (36.9 °C)-98.9 °F (37.2 °C)] 98.4 °F (36.9 °C)  Pulse:  [84-89] 88  Resp:  [12-20] 14  SpO2:  [95 %] 95 %  BP: (122-179)/(58-86) 160/73     Weight: 72.6 kg (160 lb)  Body mass index is 28.34 kg/m².     Physical Exam  Vitals reviewed.   Constitutional:       General: She is not in acute distress.     Appearance: Normal appearance. She is normal weight. She is not ill-appearing, toxic-appearing or diaphoretic.      Comments: Pleasant woman in no acute distress. Cooperative with examination and conversant with interview.   HENT:      Head: Normocephalic and atraumatic.      Right Ear: External ear normal.      Left Ear: External ear normal.      Nose: No congestion or rhinorrhea.      Mouth/Throat:      Mouth: Mucous membranes are moist.      Pharynx: Oropharynx is clear. No oropharyngeal exudate or posterior oropharyngeal erythema.   Eyes:      General:         Right eye: No discharge.         Left eye: No discharge.      Extraocular Movements: Extraocular movements intact.   Cardiovascular:      Rate and Rhythm: Normal rate and regular rhythm.      Heart sounds: No murmur heard.    No friction rub. No gallop.      Comments: Poor pulses in right lower extremity.  Pulmonary:      Effort: Pulmonary effort is normal. No respiratory distress.      Breath sounds: Normal breath sounds. No stridor. No wheezing, rhonchi or rales.   Abdominal:      General: Abdomen is flat. There is no distension.      Palpations: Abdomen is soft. There is no mass.      Tenderness: There is no abdominal tenderness. There is no guarding or rebound.      Hernia: No hernia is present.   Musculoskeletal:      Cervical back: Normal range of motion and neck supple.       Right lower leg: No edema.   Skin:     General: Skin is warm and dry.      Findings: Bruising present.      Comments: Ecchymoses noted on bilateral upper extremities bilaterally.   Neurological:      General: No focal deficit present.      Mental Status: She is alert and oriented to person, place, and time. Mental status is at baseline.      Sensory: No sensory deficit.      Motor: No weakness.   Psychiatric:         Mood and Affect: Mood normal.         Behavior: Behavior normal.              Significant Labs: All pertinent labs within the past 24 hours have been reviewed.  CBC:   Recent Labs   Lab 06/05/23  1346   WBC 11.34   HGB 11.9*   HCT 35.4*        CMP:   Recent Labs   Lab 06/05/23  1346   *   K 4.9   CL 99   CO2 24   *   BUN 22   CREATININE 0.9   CALCIUM 9.7   PROT 6.9   ALBUMIN 3.3*   BILITOT 0.8   ALKPHOS 96   AST 12   ALT 8*   ANIONGAP 10       Significant Imaging: I have reviewed all pertinent imaging results/findings within the past 24 hours.    Assessment/Plan:     * Closed left pilon fracture  Presenting after fall and found to have comminuted and angulated distal tibia and fibular fractures. Evaluated by Orthopedic Surgery service, she was reduced and placed into a short leg splint. She will require operative intervention for this injury. Planning for surgical intervention 6/6.  - Orthopedic Surgery following, appreciate recommendations  - multimodal pain control with acetaminophen 1 g q8h and pregabalin 75 mg BID  - IV morphine 4 mg q6h PRN breakthrough pain  - holding antiplatelet medication and DVT prophylaxis at this time     Surgical Risk Assessment     Active cardiac issues:  Active decompensated heart failure? No   Unstable angina?  No   Significant uncontrolled arrhythmias? No   Severe valvular heart disease-Aortic or Mitral Stenosis? No   Recent MI or coronary revascularization < 30 days? No     Cardiac Risk Factors  High risk surgery? No   History of CAD/ischemic  heart disease? No   History of cerebrovascular disease? No   History of compensated heart failure? No   Type 2 diabetes requiring insulin? Yes   Serum Creatinine > 2? No   Total cardiac risk factors 1     Based on   Functional METs <4    < 4 METs -unable to walk > 2 blocks on level ground without stopping due to symptoms  - eating, dressing, toileting, walking indoors, light housework. POOR   > 4 METs -climbing > 1 flight of stairs without stopping  -walking up hill > 1-2 blocks  -scrubbing floors  -moving furniture  - golf, bowling, dancing or tennis  -running short distance MODERATE to EXCELLENT           Foot ulcerations of left lower extremity  Follows with Podiatry clinic. Previously had heel ulcer bone biopsy which grew Staphylococcus aureus and was prescribed doxycycline and levofloxacin from Wound Care clinic but not followed by Infectious Disease clinic. Afebrile, without leukocytosis. CRP elevated 127.  - Podiatry consulted, appreciate recommendations  - Infectious Disease consulted, appreciate recommendations  - given previous bone biopsy, will defer antibiotic decisions to Infectious Disease service given patient's stability    Tobacco use  Patient declined nicotine patch. Still actively smoking at home.      PAD (peripheral artery disease)  Known severe peripheral arterial disease. Was seen by Vascular Surgery during last hospitalization and had revascularization of left lower extremity. Holding dual antiplatelet medication at this time given scheduled operative intervention. Resume home atorvastatin 80 mg daily.    Type 2 diabetes mellitus with foot ulcer, with long-term current use of insulin  Patient's FSGs are uncontrolled due to hyperglycemia on current medication regimen.    Last A1c reviewed-   Lab Results   Component Value Date    HGBA1C 10.2 (H) 06/05/2023     Most recent fingerstick glucose reviewed-   Recent Labs   Lab 06/05/23 2120   POCTGLUCOSE 302*     Current correctional scale  Low      Maintain anti-hyperglycemic dose as follows-   Antihyperglycemics (From admission, onward)    Start     Stop Route Frequency Ordered    06/05/23 2345  insulin aspart U-100 pen 0-5 Units         -- SubQ Before meals & nightly PRN 06/05/23 2246        Hold Oral hypoglycemics while patient is in the hospital. Holding patient's home insulin at this time given NPO status for likely surgical intervention. Can resume as needed for hyperglycemia while inpatient.      VTE Risk Mitigation (From admission, onward)         Ordered     Reason for No Pharmacological VTE Prophylaxis  Once        Question:  Reasons:  Answer:  Risk of Bleeding  Comment:  Ortho planning surgery in AM    06/05/23 2246     IP VTE HIGH RISK PATIENT  Once         06/05/23 2246     Place sequential compression device  Until discontinued         06/05/23 2246              Code Status: FULL    Ryan Baltazar MD  Department of Hospital Medicine  [facility] - Emergency Dept

## 2023-06-06 NOTE — OP NOTE
OPERATIVE NOTE    DATE OF PROCEDURE:  06/06/2023    PREOPERATIVE DIAGNOSIS:   Left distal tibia pilon fracture, closed, displaced, initial encounter  Left distal fibula shaft fracture, closed, displaced, initial encounter  Poorly controlled diabetes with neuropathy    POSTOPERATIVE DIAGNOSIS:   Left distal tibia pilon fracture, closed, displaced, initial encounter  Left distal fibula shaft fracture, closed, displaced, initial encounter  Poorly controlled diabetes with neuropathy  Osteoporosis, poor quality bone    PROCEDURE:   Open reduction internal fixation left pilon fracture with fixation of tibia and fibula  External fixator placement left lower extremity    SURGEON:   Gilbert Mahmood MD    ASSISTANT:    MD Dhaval Vela MD    ANESTHESIA:   General    EBL:    75mL    COMPLICATIONS:  none    IMPLANTS:   Fibula  Arthrex fibulock nail, 3.8 x130mm  Arthrex 2.7mm cortical screw, x2  Vasquez variax2, 3.5mm cortical screw, x3  Tibia  [place]  Axsos3, 3.5mm cortical screw, x2  [place] 4.0mm partially threaded cannulated screw, x1  External fixator  Vasquez - Hidalgo frame  5.0mm blunt tipped schanz pin, x2  Calcaneal pin, x1  Foot pin, x1  Multiple rods and connectors    SPECIMENS:   none    INDICATIONS FOR PROCEDURE:  73-year-old female, poorly controlled diabetic, with neuropathy, peripheral vascular disease, chronic left foot wounds including heel wound and toe wound, with a fall from chair 06/04/2023, immediate pain left ankle, deformity, inability bear weight.  Presented outside hospital, where left pilon fracture was identified.  She was then transferred to our facility for further care 06/05/2023.    Lives at home alone  Community ambulator with walker  Diabetes, hemoglobin A1c 10, diabetic foot wounds, and dense neuropathy to mid calf  Smoker  Peripheral vascular disease, with recent revascularization April 2023  Denies history of cancer, heart attack, stroke, blood clot    Counseled  patient on diagnosis of left pilon fracture with corresponding comminuted distal fibular fracture with very poor bone quality, poor remaining distal bone for fixation, chronic wounds on her foot, poorly-controlled diabetes, tobacco use, peripheral vascular disease.  Discussed that she is at very high risk for wound complications, loss of limb following this injury, and surgical intervention.      Discussed non operative management.  Given the very distal unstable nature of the fracture, and be very difficult to control alignment, angulation with a splint alone, this would likely result in persistent malalignment, malunion, nonunion, potentially further wound issues, also difficulty obtaining wound care/dressing changes to her current foot ulcers.      Discussed operative intervention the form of open reduction internal fixation, or external fixation, or a combination of both.  Traditional approaches would likely require moderate surgical dissection, wound issues, potential for limited minimally invasive incisions, with percutaneous surgery in the form of fibular nail, percutaneous medial malleolar screws, independent screws along the articular surface, as well as syndesmotic fixation, and potentially supplemental external fixator placement.  Hopefully this would improve her alignment, allow appropriate healing, and also minimize soft tissue risks.      Also discussed potential for ringed external fixator placement, potentially with minimally invasive periarticular fixation.  This for to be chosen, we would initially placed in external fixator, followed by staged later ringed external fixator after it was constructed.    The risks, benefits, and alternatives to surgery were discussed with the patient and/or family.    Specific risks discussed included, but were not limited to:  Loss of limb, wound complications need for revision surgery, removal of hardware, chronic osteomyelitis, chronic draining wounds, chronic  pain, poor alignment, poor function, damage to nearby structures, including neurovascular structures leading to loss of function or loss of limb, bleeding, need for blood transfusion, pain, stiffness, scarring, numbness, tingling, weakness, compartment syndrome, malunion/nonunion, hardware failure, hardware prominence, infection, need for multiple staged procedures, prolonged antibiotics, iatrogenic fracture, heterotopic ossification, arthritis, a variety of medical complications including but not limited to heart attack, stroke, deep venous thrombosis, pulmonary embolism, prolonged hospitalization, prolonged intubation, and death.   Patient and/or family expressed an understanding and desires to proceed with surgery.   All questions were answered.  No guarantees were implied or stated.  Informed consent was obtained.      OPERATIVE PROCEDURE:  Patient met in the preoperative hold area and the correct site and side of surgery being the left lower extremity were marked and verified.  Patient brought back to the operative suite.  General anesthesia smoothly induced.  Patient transferred over to operative table.  Placed in supine position. All bony prominences were appropriately padded.  Patient received 2 g Ancef for preoperative antibiotics.  The left lower extremity was then prepped and draped in normal sterile fashion.    Time-out was performed verifying the correct patient, site/side of surgery, surgical consent, radiographs as applicable, preop antibiotics, necessary equipment, anticipated blood loss, length of procedure, postoperative disposition.      We started by evaluating the left great toe wound and left heel wound.  There was no purulent drainage.  These were occluded from the surgical field with use of gauze, Coban, Tegaderm.      We then assessed our ability to reduce the fracture with manual traction, reduction maneuvers, and noted that we were able to obtain very good alignment of the distal tibia  pilon fracture with closed reduction maneuvers alone.  In regards to the fibular fracture, there was some residual displacement in the sagittal plane.  We felt that this would need to be controlled with a percutaneously placed clamp.  We then planned for serial fixation of the fibula with a fibular nail, followed by medial malleolar bicortical screws, potentially independent screws for the articular surface, and then syndesmosis screws for further fixation.  Following this we would likely also placed an external fixator for further support, and also to allow access to the heel wound for wound care.    We 1st turned our attention open reduction internal fixation of the fibula.  We used percutaneous stab incisions, fluoroscopic guidance.  We placed a point reduction clamp along the fibula both anterior and posteriorly, reduce this and sagittal plane.  We then used fluoroscopic guidance made percutaneous stab incision distal to the tip of the fibula.  We drilled with a 2.5 mm drill bit to open up the distal aspect of the fibula at the tip, advanced this across the fracture into the fibular shaft.  This was confirmed on fluoroscopic imaging.  Drill bit was removed, we placed this with a wire for the fibular nail.  We then reamed the distal aspect with the large Reamer, and then the proximal aspect of the shaft with the smaller Reamer.  This was all performed while holding manual reduction maneuvers.  Next we inserted the appropriate size fibular nail into the distal fibula, across fracture site into the shaft.  The fibula nail in place did not hold adequate reduction of the pilon fracture.    Next we turned our attention to fixation of the tibia via percutaneous incision at the medial malleolus.  This allowed us drill from the tip of the medial malleolus, and placed a bicortical screw across the fracture site exiting the lateral aspect of the tibia.  A long bicortical 3.5 mm screw was then placed.  This was then  repeated with another screw through the same percutaneous stab incision, both which obtain strong bicortical purchase.  Both screws were drilled and placed while holding the reduction maneuver.    We then returned our attention back to the fibular fixate.  The fracture was still not quite stable, and when placing these screws, we held our reduction maneuver.  The fibular nail was seated to the appropriate depth.  We used the outrigger guide in the distal wire to gauge our depth.  We then tightened down the proximal phalanges with the set screw.  Next we used the outrigger guide, percutaneous stab incisions, cannula, and we placed 2.7 mm screws x3.  Of note to the screws would be later changed out for other screws.    We then reassessed fracture alignment, hardware placement, planned for placement of multiple syndesmotic screws.  Based on the fracture characteristics, location of the holes within the nail, we planned to exchange out the more proximal lateral to medial screw for a longer syndesmosis screw.  Prior screws removed.  We drilled with a 2.5 mm drill bit utilizing the guide engaging the fibula, through the nail, into the tibia.  Due to the constraints of hardware, we utilized a Vasquez VariAx 3.5 mm screw of appropriate length.  Next we used the syndesmosis screw holes, aiming from posterior lateral to anteromedial.  We then made percutaneous stab incisions, using outrigger guide, drilled through the fibula, nail, tibia, obtaining Quadra cortical purchase, likewise using Vasquez 3.5 mm cortical screws.  After placement of the syndesmotic screws, we had more robust fixation.  The more distal lateral to medial screws through the oblong hole the nail was slightly long, it was removed.  We attempted to place an appropriate sized 2.7 mm screw, however there was no purchase.  We then placed an appropriate size Vasquez 3.5 mm cortical screw, obtaining solid purchase in the fibula and the nail.    We then reassessed  fracture reduction hardware placement both visually and on fluoroscopic imaging were satisfied, however there was some anterior component of the distal tibia articular surface that seemed it would benefit from fixation given the fracture lines.  We made a percutaneous stab incision over the anterior lateral aspect of the distal tibia based on CT and fluoroscopic imaging.  We bluntly dissected down retracting neurovascular tendinous structures the bone was encountered.  We then placed a wire for 4.0 mm cannulated screw from anterior laterally, aiming slightly medial and posterior.  Wire was measured, and then appropriate sized partially threaded 4.0 mm cannulated screws placed.  Fluoroscopy indicated appropriate screw placement.    Of note during the procedure patient had very poor quality bone consistent with osteoporosis.    Once again reassessed fracture reduction, hardware placement were satisfied.  Due to the patient's known neuropathy, and very distal fracture with poor bone quality, we felt that she would benefit from additional supportive fixation with an external fixator.  This would also allow access to the heel wound for wound care and dressing changes.      Wounds irrigated with saline.  Hemostasis achieved as needed with electrocautery.  Subcutaneous tissue closed with 3-0 Vicryl.  Skin closed with 3-0 nylon.  Adaptic gauze, Tegaderm dressing applied.    We then turned our attention to external fixator placement.  We made percutaneous stab incisions on the medial aspect of the tibia.  We soft tissue guide, drilled bicortically.  Blunt-tip Schanz pins were then placed by hand, obtaining solid bicortical purchase, as gauged by fluoroscopy.  We then used fluoroscopy, placed a calcaneal pin from medial to lateral through percutaneous stab incision.  We then used fluoroscopic guidance, percutaneous stab incision and placed a medial cuneiform pin.  We then hooked up multiple carbon fiber rods and connectors,  obtain appropriate dorsiflexion of the foot, and tightened down all the bars and clamps.  In order to elevate the heel off the bed, further kickstand was placed and secured with additional bars and clamps.    At the conclusion of procedure the patient had soft and compressible compartments, brisk cap refill, palpable DP/PT pulse in the operative extremity.    Prior to final closure all counts were confirmed to be correct.  Patient tolerated the procedure well without any complications, was awoken from anesthesia, transferred PACU for further recovery.    POSTOPERATIVE PLAN:  73-year-old female, poorly controlled diabetic, with neuropathy, peripheral vascular disease, chronic left foot wounds including heel wound and toe wound, with a fall from chair 06/04/2023, immediate pain left ankle, deformity, inability bear weight.  Presented outside hospital, where left pilon fracture was identified.  She was then transferred to our facility for further care 06/05/2023.    6.6.23 - ORIF L pilon + ex fix    Plan for removal of ex fix 4-6 wks postop  If current construct does not have enough stability for healing, will consider transition to ringed fixator    Abx x 24hrs (further abx per ID for foot wounds)  Asa 81mg BID x12 wks for DVT prophylaxsis  NWB LLE x 3 months postop    Hospitalist comanagement  Consider endocrinology consult for assistance w/ Glc control  Podiatry consult for assistance w/ foot wounds  Smoking cessation  Ca, Vit D, boost  Fragility fx clinic referral    X-rays at subsequent followups:  L ankle    Follow-up postop  1 week - wound check, XR  2 weeks - XR, consider planning removal of ex fix vs transition to ringed fixator  3 weeks - suture removal, XR  6 weeks, 3 months, 6 months, 1 year    =====================  Gilbert Mahmood MD  Orthopaedic Surgery

## 2023-06-06 NOTE — HPI
"Ms Badillo is a 73 year old female with PMH of uncontrolled DM2, neuropathy, tobacco use, left hip fracture s/p intramedullary denzel, PAD with recent LLE revascularization  with Dr. Sheppard (04/2023), chronic lower extremity wounds who was transferred from [facility] with distal fractures of tibia and fibula as well as worsening LE wounds. Per chart review, about 3 days ago, pt sustained a fall after getting out of bed. Reports she fell onto her left lower extremity. She presented to [facility] d/t pain.     To note, pt was recently followed by ID service for left lower foot wound. Imaging negative for OM. Wound culture was + MSSA. Bone culture obtained on 4/27, + for MSSA and staph epi. Path negative for osteo. Was treated x10 days for SSTI with cefadroxil. Left foot wound culture from 5/18 + e cloacae. Pt was started on doxy and then levaquin per podiatry.     Since admission, pt afebrile, no leukocytosis. Elevated CRP. HA1c 10. CT of left ankle noting fracture of distal tibia, as well as skin ulcer overlying left posterior calcaneus, no evidence of bony involvement.     Pt is currently on piperacillin tazobactam. ID was consulted d/t  "osteomyelitis of Wounds of Left Lower Extremity - patient had bone biopsy 4/27 growing Staph Aureus".        "

## 2023-06-06 NOTE — ASSESSMENT & PLAN NOTE
Known severe peripheral arterial disease. Was seen by Vascular Surgery during last hospitalization and had revascularization of left lower extremity. Holding dual antiplatelet medication at this time given scheduled operative intervention. Resume home atorvastatin 80 mg daily.

## 2023-06-06 NOTE — SUBJECTIVE & OBJECTIVE
Principal Problem:Closed left pilon fracture    Principal Orthopedic Problem: same    Interval History: No acute events overnight.  Vitals within normal limits.  Pain controlled.  NPO since midnight.  Anticipate OR today.    Review of patient's allergies indicates:  No Known Allergies    Current Facility-Administered Medications   Medication    acetaminophen tablet 1,000 mg    aspirin chewable tablet 81 mg    atorvastatin tablet 80 mg    dextrose 10% bolus 125 mL 125 mL    dextrose 10% bolus 250 mL 250 mL    famotidine tablet 20 mg    glucagon (human recombinant) injection 1 mg    insulin aspart U-100 pen 0-5 Units    lisinopriL tablet 5 mg    melatonin tablet 9 mg    morphine injection 4 mg    naloxone 0.4 mg/mL injection 0.02 mg    ondansetron disintegrating tablet 4 mg    ondansetron injection 4 mg    polyethylene glycol packet 17 g    pregabalin capsule 75 mg    simethicone chewable tablet 80 mg    sodium chloride 0.9% flush 10 mL     Objective:     Vital Signs (Most Recent):  Temp: 98.7 °F (37.1 °C) (06/06/23 0348)  Pulse: 86 (06/06/23 0454)  Resp: 20 (06/06/23 0348)  BP: (!) 168/79 (06/06/23 0348)  SpO2: 96 % (06/06/23 0348) Vital Signs (24h Range):  Temp:  [98.1 °F (36.7 °C)-98.9 °F (37.2 °C)] 98.7 °F (37.1 °C)  Pulse:  [84-92] 86  Resp:  [12-20] 20  SpO2:  [95 %-100 %] 96 %  BP: (122-179)/(58-86) 168/79     Weight: 72.6 kg (160 lb)     Body mass index is 28.34 kg/m².      Intake/Output Summary (Last 24 hours) at 6/6/2023 0542  Last data filed at 6/6/2023 0500  Gross per 24 hour   Intake --   Output 250 ml   Net -250 ml        Ortho/SPM Exam  Gen: NAD, sitting comfortably in bed  CV: regular rate  Resp: non-labored respirations    LLE:  Short leg splint clean, dry, and intact  Wiggles toes  Sensation intact to light touch throughout all exposed toes, but diminished (baseline)  Cap refill < 2 sec in all toes     Significant Labs: All pertinent labs within the past 24 hours have been reviewed.    Significant  Imaging: I have reviewed and interpreted all pertinent imaging results/findings.

## 2023-06-06 NOTE — OPERATIVE NOTE ADDENDUM
Patient return from surgery / PACU in bed . Alert oriented x4 . Tele box placed on patient  no noted SOB ,Left leg elevated on pillow noted ex fix to right left noted gauze dressing around toe . Purwick reapplied at this time . FCD to right foot intact

## 2023-06-06 NOTE — CONSULTS
[facility] - Surgery  Podiatry  Consult Note    Patient Name: Anastasiia Badillo  MRN: [id number]  Admission Date: 6/5/2023  Hospital Length of Stay: 1 days  Attending Physician: Millicent Funes MD  Primary Care Provider: Raji Parkinson MD     Inpatient consult to Podiatry  Consult performed by: Mina Fleming DPM  Consult ordered by: Ryan Baltazar MD        Subjective:     History of Present Illness:  73 year old female with T2DM w/ associated neuropathy, tobbaco, PVD, hx of diabetic foot wounds. She [facility] with complaints of ankle pain after falling out of bed and admitted to  for closed left tibial pilon fracture with orthopedic planning surgical intervention today 06/06. Patient was last seen by [facility] podiatry inpatient service in late april of this year for chronic left heel wound. At that time a bone biopsy of the left calcaneus was preformed and the cultures resulted Staph aureus and epidermidis; although the pathologty report was negative, and patient finished a 2 week Abx course for SSTI per ID. She had an angiogram and was set up for outpatient vascualr surgery follow up.   Left great toe and right heel full thickness foot wounds.     BP levated, other vital signs stable, patient afebrile, without leukocytosis WBC 8.2, cratinine 1.2, Sodium decreased at 131, Potasiam elevated at 5.4, Glucose 400, .5,  HgA1c 10.2, Procal wnl.       Scheduled Meds:   acetaminophen  1,000 mg Oral Q8H    aspirin  81 mg Oral Daily    atorvastatin  80 mg Oral QHS    insulin detemir U-100  18 Units Subcutaneous Daily    lisinopriL  5 mg Oral Daily    melatonin  9 mg Oral Nightly    pregabalin  75 mg Oral BID     Continuous Infusions:  PRN Meds:ceFAZolin (ANCEF) IVPB, dextrose 10%, dextrose 10%, famotidine, fentaNYL, glucagon (human recombinant), insulin aspart U-100, midazolam, morphine, mupirocin, naloxone, ondansetron, ondansetron, polyethylene glycol, simethicone, sodium  chloride 0.9%    Review of patient's allergies indicates:  No Known Allergies     Past Medical History:   Diagnosis Date    Closed left pilon fracture 6/5/2023    Diabetes mellitus, type 2      Past Surgical History:   Procedure Laterality Date    ANGIOGRAPHY OF LOWER EXTREMITY Left 4/25/2023    Procedure: Angiogram Extremity Unilateral;  Surgeon: Gilbert Sheppard MD;  Location: [facility] OR [address] [place] [address];  Service: Vascular;  Laterality: Left;  28.4 min  487.45 mGy  75.9180 Gycm2  trans radial: 7 ml  dye: 126 ml      AUGMENTATION OF BREAST      INTRAMEDULLARY RODDING OF FEMUR Left 2/18/2023    Procedure: INSERTION, INTRAMEDULLARY ROXANNA, FEMUR (hana table -- synthes -- long nail -- have bovie and suction and large bone set);  Surgeon: Keanu Brand MD;  Location: [facility];  Service: Orthopedics;  Laterality: Left;    PERCUTANEOUS TRANSLUMINAL ANGIOPLASTY Left 4/25/2023    Procedure: PTA (ANGIOPLASTY, PERCUTANEOUS, TRANSLUMINAL);  Surgeon: Gilbert Sheppard MD;  [facility];  Service: Vascular;  Laterality: Left;  Tibial and popliteal angioplasty       Family History       Problem Relation (Age of Onset)    Breast cancer Sister    Cancer Mother, Sister    Cataracts Mother    Heart disease Mother, Father, Sister, Brother    Hypertension Father          Tobacco Use    Smoking status: Every Day     Packs/day: 0.25     Years: 45.00     Pack years: 11.25     Types: Cigarettes    Smokeless tobacco: Never   Substance and Sexual Activity    Alcohol use: Yes    Drug use: Never    Sexual activity: Not on file     Review of Systems   Constitutional:  Negative for chills, fatigue and fever.   HENT:  Negative for congestion, postnasal drip, rhinorrhea and sore throat.    Eyes:  Negative for photophobia and visual disturbance.   Respiratory:  Negative for cough, chest tightness and shortness of breath.    Cardiovascular:  Negative for chest pain and leg swelling.   Gastrointestinal:  Negative for  abdominal pain, diarrhea, nausea and vomiting.   Endocrine: Negative for polyphagia and polyuria.   Genitourinary:  Negative for difficulty urinating, dysuria, frequency and urgency.   Musculoskeletal:  Positive for arthralgias, gait problem and joint swelling.   Skin:  Positive for wound.   Neurological:  Positive for numbness. Negative for seizures and headaches.   Psychiatric/Behavioral:  Negative for agitation, behavioral problems and confusion.    Objective:     Vital Signs (Most Recent):  Temp: 97.7 °F (36.5 °C) (06/06/23 1130)  Pulse: 83 (06/06/23 1130)  Resp: 17 (06/06/23 1130)  BP: 117/65 (06/06/23 1130)  SpO2: (!) 94 % (06/06/23 1130) Vital Signs (24h Range):  Temp:  [97.7 °F (36.5 °C)-98.9 °F (37.2 °C)] 97.7 °F (36.5 °C)  Pulse:  [83-92] 83  Resp:  [12-20] 17  SpO2:  [94 %-100 %] 94 %  BP: (117-179)/(65-86) 117/65     Weight: 72.6 kg (160 lb)  Body mass index is 28.34 kg/m².    Foot Exam    General  General Appearance: appears stated age and healthy   Orientation: alert and oriented to person, place, and time       Right Foot/Ankle     Inspection and Palpation  Ecchymosis: none  Tenderness: none   Swelling: none   Skin Exam: skin intact;     Neurovascular  Dorsalis pedis: absent  Posterior tibial: absent  Saphenous nerve sensation: diminished  Tibial nerve sensation: diminished  Superficial peroneal nerve sensation: diminished  Deep peroneal nerve sensation: diminished  Sural nerve sensation: diminished      Left Foot/Ankle      Inspection and Palpation  Skin Exam: drainage and ulcer; skin not intact, no cellulitis, no abnormal color and no erythema     Neurovascular  Dorsalis pedis: absent  Posterior tibial: absent  Saphenous nerve sensation: diminished  Tibial nerve sensation: diminished  Superficial peroneal nerve sensation: diminished  Deep peroneal nerve sensation: diminished  Sural nerve sensation: diminished    Comments  Scant serous drainage from full-thickness left hallux wound, wound base  granular in nature without hyperkeratotic periwound, no malodor, no fluctuance, no crepitation, negative probe to bone    Posterior splint/Phillips compression dressing in place at left lower extremity.  Unable to assess full-thickness heel ulceration, recently debrided about a week ago with media and chart.    Laboratory:  A1C:   Recent Labs   Lab 02/17/23  1415 04/21/23  0429 06/05/23  1957   HGBA1C 8.9* 8.2* 10.2*     CBC:   Recent Labs   Lab 06/06/23  0411   WBC 8.20   RBC 3.82*   HGB 11.0*   HCT 34.8*      MCV 91   MCH 28.8   MCHC 31.6*     CMP:   Recent Labs   Lab 06/05/23  1346 06/06/23  0411   * 400*   CALCIUM 9.7 9.7   ALBUMIN 3.3*  --    PROT 6.9  --    * 131*   K 4.9 5.4*   CO2 24 27   CL 99 93*   BUN 22 26*   CREATININE 0.9 1.2   ALKPHOS 96  --    ALT 8*  --    AST 12  --    BILITOT 0.8  --      CRP:   Recent Labs   Lab 06/05/23  1346   .5*     Microbiology Results (last 7 days)       Procedure Component Value Units Date/Time    Gram stain [[id number]] Collected: 06/06/23 1051    Order Status: Sent Specimen: Wound from Toe, Left Foot Updated: 06/06/23 1131    Aerobic culture [[id number]] Collected: 06/06/23 1051    Order Status: Sent Specimen: Wound from Toe, Left Foot Updated: 06/06/23 1131    Culture, Anaerobe [[id number]] Collected: 06/06/23 1051    Order Status: Sent Specimen: Wound from Toe, Left Foot Updated: 06/06/23 1130          Specimen (24h ago, onward)      None          All pertinent labs reviewed within the last 24 hours.    Diagnostic Results:  I have reviewed all pertinent imaging results/findings within the past 24 hours.    Clinical Findings:            Assessment/Plan:     Orthopedic  Foot ulcerations of left lower extremity  IDSA uninfected diabetic with ulcerations, full thickness plantar left great toe and plantar/posterior heel without signs of infection noted at the left great toe, the heel the heel will need further evaluation after surgical intervention per  Orthopedic surgery. Left lower extremity angiogram with intervention to the popliteal and posterior tibial arteries 04/25/2023.    Baseline left foot radiographs ordered   Left great toe wound culture obtained   Left great toe wound cleansed and dressed per Podiatry  Left lower extremity nonweightbearing  Nursing wound care routine ordered   Recommend heel offloading boot or floating the right heel at all times while in bed  Podiatry will follow      Thank you for your consult. I will follow-up with patient. Please contact us if you have any additional questions.    Mina Fleming DPM, PGY-2  Podiatry / Foot and Ankle Surgery   Page # [phone]  Secure chat preferred

## 2023-06-06 NOTE — PLAN OF CARE
Safety checklist completed. Surgery team was ready to take patient back. Armband verified, consents verified, SCDs already on. , surgery team and Dr. Victor aware.

## 2023-06-06 NOTE — ASSESSMENT & PLAN NOTE
"Pt unable to be seen as she was in OR. POC below.      73 year old female with PMH of uncontrolled DM2, neuropathy, tobacco use, left hip fracture s/p intramedullary denzel, PAD with recent LLE revascularization  with Dr. Sheppard (04/2023), chronic lower extremity wounds who was transferred from [facility] with distal fractures of tibia and fibula as well as worsening LE wounds.      To note, pt was recently followed by ID service for left lower foot wound. Wound culture was + MSSA. Bone culture obtained on 4/27, + for MSSA and staph epi. Path negative for osteo. Was treated x10 days for SSTI with cefadroxil. Left foot wound culture from 5/18 + e cloacae. Pt was started on doxy and then levaquin per podiatry.      Afebrile, no leukocytosis. Elevated CRP. HA1c 10. CT of left ankle noting fracture of distal tibia, as well as skin ulcer overlying left posterior calcaneus, no evidence of bony involvement. MRI of hindfoot from 4/2023 noting potential early osteo of posterior calcaneous.      Ortho surgery following, planning for OR today for ORIF versus ex fix left ankle.     Pt is currently on piperacillin tazobactam. ID was consulted d/t  "osteomyelitis of Wounds of Left Lower Extremity - patient had bone biopsy 4/27 growing Staph Aureus".    Recommendations:   - Will follow for podiatry evaluation   - Recommend Ancef 2g IV q8hr (CrCl 40) to cover MSSA and staph epi osteo.   - Will plan x6 weeks of IV abx for osteo.   - Plan reviewed with ID staff. ID will follow.   "

## 2023-06-06 NOTE — ED NOTES
Patient identifiers for Anastasiia Badillo 73 y.o. female checked and correct.  Chief Complaint   [facility]     Tib/fib fracture with previous femur fracture. Wound vac in place     Past Medical History:   Diagnosis Date    Diabetes mellitus, type 2      Allergies reported: Review of patient's allergies indicates:  No Known Allergies      LOC: Patient is awake, alert, and aware of environment with an appropriate affect. Patient is oriented x 4 and speaking appropriately.  APPEARANCE: Patient resting comfortably and in no acute distress. Patient is clean and well groomed, patient's clothing is properly fastened.  HEENT:   SKIN: The skin is warm and dry. Patient has normal skin turgor and moist mucus membranes.   MUSKULOSKELETAL: Pulses intact. Diminished pulses and numbness to LLE. LLE is wrapped in clear dressing and wound vac is detached.  RESPIRATORY: Airway is open and patent. Respirations are spontaneous and non-labored with normal effort and rate.  CARDIAC: Patient has a normal rate and rhythm. 88 on cardiac monitor. No peripheral edema noted. + 2 bilateral radial and RLE pulses, + 1 LLE pulse.  ABDOMEN: No distention noted. Soft and non-tender upon palpation.  NEUROLOGICAL: pupils 3 mm, PERRL. Facial expression is symmetrical. Hand grasps are equal bilaterally. Normal sensation in all extremities when touched with finger.

## 2023-06-06 NOTE — ANESTHESIA PREPROCEDURE EVALUATION
[facility]  Anesthesia Pre-Operative Evaluation         Patient Name: Anastasiia Badillo  YOB: 1950  MRN: [id number]    SUBJECTIVE:     Pre-operative evaluation for Procedure(s) (LRB):  ORIF, FRACTURE, PILON, LEFT, Diving board, C-arm clock side, Cornish, Ancef (Left)  APPLICATION, EXTERNAL FIXATION DEVICE, LEFT ANKLE, Cornish (Left)     06/05/2023    Anastasiia Badillo is a 73 y.o. female w/ a significant PMHx of DM2, HTN, PVD, and tobacco abuse. She presented for evaluation of displaced left tibia fibula fracture and purulent discharge from a diabetic foot wound. Antibiotic therapy started. Decision made to pursue operative intervention.    Patient now presents for the above procedure(s).      LDA:        Peripheral IV - Single Lumen 06/05/23 1915 20 G Anterior;Right Forearm (Active)   Site Assessment Clean;Dry;Intact 06/05/23 1915   Dressing Status Clean;Dry;Intact 06/05/23 1915   Number of days: 0     Prev airway: 02/18/23; Placement Time: 1031 (created via procedure documentation); Method of Intubation: Video Laryngoscopy; Mask Ventilation: Easy; Intubated: Postinduction; Blade: Bell #3; Airway Device Size: 7.0; Cuff Inflation: Minimal occlusive pressure; Placement Verified By: Capnometry; Complicating Factors: None    Drips: None documented.      Patient Active Problem List   Diagnosis    Type 2 diabetes mellitus with foot ulcer, with long-term current use of insulin    Essential (primary) hypertension    ACP (advance care planning)    Closed left subtrochanteric femur fracture, initial encounter    Acute blood loss anemia    Nicotine dependence    Diabetic foot ulcer    Cellulitis of great toe of left foot    Sepsis    Popliteal artery stenosis, left    Kidney lesion, native, right    PAD (peripheral artery disease)    Pre-op evaluation    Breast mass    Eschar of foot    Primary osteoarthritis of both first carpometacarpal joints    Closed left pilon  fracture       Review of patient's allergies indicates:  No Known Allergies    Current Inpatient Medications:   [START ON 6/6/2023] aspirin  81 mg Oral Daily    atorvastatin  80 mg Oral QHS    fentaNYL  50 mcg Intravenous Once    LIDOcaine HCL 10 mg/ml (1%)  10 mL Other Once    [START ON 6/6/2023] lisinopriL  5 mg Oral Daily    melatonin  9 mg Oral Nightly       No current facility-administered medications on file prior to encounter.     Current Outpatient Medications on File Prior to Encounter   Medication Sig Dispense Refill    acetaminophen (TYLENOL) 500 MG tablet Take 1,000 mg by mouth every 6 (six) hours as needed for Pain.      aspirin 81 MG Chew Chew and swallow 1 tablet (81 mg total) by mouth once daily. 30 tablet 2    atorvastatin (LIPITOR) 80 MG tablet Take 1 tablet (80 mg total) by mouth every evening. 90 tablet 3    blood sugar diagnostic Strp To check BG two times daily, to use with insurance preferred meter 200 each 1    blood-glucose meter kit To check BG two times daily, to use with insurance preferred meter 1 each 1    clopidogreL (PLAVIX) 75 mg tablet Take 1 tablet (75 mg total) by mouth once daily. 30 tablet 3    doxycycline (VIBRAMYCIN) 100 MG Cap Take 1 capsule (100 mg total) by mouth every 12 (twelve) hours. for 14 days 28 capsule 0    HYDROcodone-acetaminophen (NORCO) 5-325 mg per tablet Take 1 tablet by mouth every 6 (six) hours as needed for Pain. 9 tablet 0    insulin aspart U-100 (NOVOLOG) 100 unit/mL (3 mL) InPn pen Inject 0-5 Units into the skin before meals and at bedtime as needed (Hyperglycemia). Blood Glucose  mg/dL                  Pre-meal                2200  151-200                0 unit                      0 unit  201-250                2 units                    1 unit  251-300                3 units                    1 unit  301-350                4 units                    2 units  >350                     5 units                    3 units  0    insulin  "detemir U-100, Levemir, 100 unit/mL (3 mL) SubQ InPn pen Inject 18 Units into the skin once daily.  0    lancets [facility] To check BG two times daily, to use with insurance preferred meter 200 each 1    levoFLOXacin (LEVAQUIN) 750 MG tablet Take 1 tablet (750 mg total) by mouth once daily. for 14 days 14 tablet 0    lisinopriL (PRINIVIL,ZESTRIL) 5 MG tablet Take 5 mg by mouth once daily.      melatonin 10 mg Cap Take 1 capsule by mouth every evening.      multivit-min-FA-lycopen-lutein 0.4 mg-300 mcg- 250 mcg Tab Take 1 tablet by mouth once daily.      nicotine (NICODERM CQ) 14 mg/24 hr Place 1 patch onto the skin once daily. 28 patch 0    pen needle, diabetic (PEN NEEDLE) 31 gauge x 3/16" Ndle 1 application by Misc.(Non-Drug; Combo Route) route 2 (two) times a day. 200 each 0    traMADoL (ULTRAM) 50 mg tablet Take 1 tablet (50 mg total) by mouth every 6 (six) hours as needed for Pain. 28 tablet 0       Past Surgical History:   Procedure Laterality Date    ANGIOGRAPHY OF LOWER EXTREMITY Left 4/25/2023    Procedure: Angiogram Extremity Unilateral;  Surgeon: Gilbert Sheppard MD;  Location: [facility] OR [address] [place] [address];  Service: Vascular;  Laterality: Left;  28.4 min  487.45 mGy  75.9180 Gycm2  trans radial: 7 ml  dye: 126 ml      AUGMENTATION OF BREAST      INTRAMEDULLARY RODDING OF FEMUR Left 2/18/2023    Procedure: INSERTION, INTRAMEDULLARY ROXANNA, FEMUR (hana table -- synthes -- long nail -- have bovie and suction and large bone set);  Surgeon: Keanu Brand MD;  [facility];  Service: Orthopedics;  Laterality: Left;    PERCUTANEOUS TRANSLUMINAL ANGIOPLASTY Left 4/25/2023    Procedure: PTA (ANGIOPLASTY, PERCUTANEOUS, TRANSLUMINAL);  Surgeon: Gilbert Sheppard MD;  Location: [facility] OR [address] [place] [address];  Service: Vascular;  Laterality: Left;  Tibial and popliteal angioplasty       Social History:  Tobacco Use: High Risk    Smoking Tobacco Use: Every Day    Smokeless Tobacco Use: Never    Passive " Exposure: Not on file      Alcohol Use: Unknown    Frequency of Alcohol Consumption: Patient refused    Average Number of Drinks: Patient refused    Frequency of Binge Drinking: Patient refused        OBJECTIVE:     Vital Signs Range (Last 24H):  Temp:  [36.9 °C (98.4 °F)-37.2 °C (98.9 °F)]   Pulse:  [84-89]   Resp:  [12-20]   BP: (122-179)/(58-86)   SpO2:  [95 %]       Significant Labs:  Lab Results   Component Value Date    WBC 11.34 06/05/2023    HGB 11.9 (L) 06/05/2023    HCT 35.4 (L) 06/05/2023     06/05/2023    CHOL 203 (H) 04/15/2021    TRIG 67 04/15/2021     04/15/2021    ALT 8 (L) 06/05/2023    AST 12 06/05/2023     (L) 06/05/2023    K 4.9 06/05/2023    CL 99 06/05/2023    CREATININE 0.9 06/05/2023    BUN 22 06/05/2023    CO2 24 06/05/2023    TSH 1.169 04/20/2023    INR 1.0 06/05/2023    HGBA1C 10.2 (H) 06/05/2023    MICROALBUR 10.4 04/15/2021       Diagnostic Studies: No relevant studies.    EKG:   Results for orders placed or performed during the hospital encounter of 02/17/23   EKG 12-lead    Collection Time: 02/17/23  2:07 PM    Narrative    Test Reason : Z01.810,    Vent. Rate : 101 BPM     Atrial Rate : 101 BPM     P-R Int : 130 ms          QRS Dur : 086 ms      QT Int : 352 ms       P-R-T Axes : 087 075 057 degrees     QTc Int : 456 ms    Sinus tachycardia  Nonspecific ST abnormality  Abnormal ECG  No previous ECGs available  Confirmed by Jensen Mason MD ([id number]) on 2/20/2023 1:22:59 PM    Referred By: MACIEL   SELF           Confirmed By:Jensen Mason MD       2D ECHO:  TTE:  No results found for this or any previous visit.    SERENITY:  No results found for this or any previous visit.    ASSESSMENT/PLAN:           Pre-op Assessment    I have reviewed the Patient Summary Reports.     I have reviewed the Nursing Notes.    I have reviewed the Medications.     Review of Systems  Anesthesia Hx:  No problems with previous Anesthesia  History of prior surgery of interest to airway  management or planning:  Denies Personal Hx of Anesthesia complications.   Social:  Smoker    Hematology/Oncology:     Oncology Normal   Hematology Comments: May have taken plavix Saturday, not sure but definitely not since, On it for PAD    EENT/Dental:EENT/Dental Normal   Cardiovascular:   Hypertension, well controlled Denies MI.    Denies Angina. PVD    Pulmonary:  Pulmonary Normal  Denies COPD.  Denies Asthma.  Denies Shortness of breath.    Renal/:   Denies Chronic Renal Disease.  Cr. 1.2, patient not aware of any problems but chart reports renal mass   Hepatic/GI:  Hepatic/GI Normal Denies Liver Disease.    Musculoskeletal:  Musculoskeletal Normal    Neurological:  Neurology Normal Denies TIA.  Denies CVA. Denies Seizures.    Endocrine:   Diabetes, type 2, using insulin        Physical Exam  General: Well nourished, Alert and Oriented    Airway:  Mallampati: III   Mouth Opening: Normal  TM Distance: Normal  Tongue: Normal  Neck ROM: Normal ROM    Dental:  Dentures    Chest/Lungs:  Clear to auscultation, Normal Respiratory Rate    Heart:  Rate: Normal  Rhythm: Regular Rhythm  Sounds: Normal        Anesthesia Plan  Type of Anesthesia, risks & benefits discussed:    Anesthesia Type: Gen ETT, Regional  Intra-op Monitoring Plan: Standard ASA Monitors  Post Op Pain Control Plan: multimodal analgesia and IV/PO Opioids PRN  Induction:  IV  Airway Plan: Direct, Post-Induction  Informed Consent: Informed consent signed with the Patient and all parties understand the risks and agree with anesthesia plan.  All questions answered.   ASA Score: 3  Day of Surgery Review of History & Physical: H&P Update referred to the surgeon/provider.    Ready For Surgery From Anesthesia Perspective.     .

## 2023-06-06 NOTE — ASSESSMENT & PLAN NOTE
Follows with Podiatry clinic. Previously had heel ulcer bone biopsy which grew Staphylococcus aureus and was prescribed doxycycline and levofloxacin from Wound Care clinic but not followed by Infectious Disease clinic. Afebrile, without leukocytosis. CRP elevated 127.  - Podiatry consulted, appreciate recommendations  - Infectious Disease consulted, appreciate recommendations  - given previous bone biopsy, will defer antibiotic decisions to Infectious Disease service given patient's stability

## 2023-06-06 NOTE — ED PROVIDER NOTES
Encounter Date: 6/5/2023       History     Chief Complaint   Patient presents with    [facility] transfer     Tib/fib fracture with previous femur fracture. Wound vac in place     73-year-old female PMH of type 2 diabetes with neuropathy, PVD, tobacco abuse with  chronic diabetic foot ulcer with necrosis of muscle and bone and chronic left great toe diabetic foot ulcer with recent vascular intervention at [facility] and hospitalization.  Presents via transfer from [facility] due to newly displaced tibia fibula fx along with concern of purulent discharge from diabetic foot wound  Patient received IV antibiotics at prior facility and was sent to [facility] for orthopedic surgery consultation.  Patient states tib/ fib injury occurred due to mechanical trip 3 days ago that continued to worsen in presentation with pain and swelling.  She has been following wound care for wound VAC assessment.  She was also previously placed on outpatient antibiotics due to wound cultures positive for MSSA.  Patient does endorse episodes of nausea and emesis today in route to [facility] ED. She however states not experiencing fever chills or weakness.    Review of patient's allergies indicates:  No Known Allergies  Past Medical History:   Diagnosis Date    Diabetes mellitus, type 2      Past Surgical History:   Procedure Laterality Date    ANGIOGRAPHY OF LOWER EXTREMITY Left 4/25/2023    Procedure: Angiogram Extremity Unilateral;  Surgeon: Gilbert Sheppard MD;  Location: [facility] OR [address] [place] [address];  Service: Vascular;  Laterality: Left;  28.4 min  487.45 mGy  75.9180 Gycm2  trans radial: 7 ml  dye: 126 ml      AUGMENTATION OF BREAST      INTRAMEDULLARY RODDING OF FEMUR Left 2/18/2023    Procedure: INSERTION, INTRAMEDULLARY ROXANNA, FEMUR (hana table -- synthes -- long nail -- have bovie and suction and large bone set);  Surgeon: Keanu Brand MD;  Location: [facility] OR;  Service: Orthopedics;  Laterality: Left;    PERCUTANEOUS  TRANSLUMINAL ANGIOPLASTY Left 4/25/2023    Procedure: PTA (ANGIOPLASTY, PERCUTANEOUS, TRANSLUMINAL);  Surgeon: Gilbert Sheppard MD;  Location: [facility] OR [address] [place] [address];  Service: Vascular;  Laterality: Left;  Tibial and popliteal angioplasty     Family History   Problem Relation Age of Onset    Heart disease Mother     Cancer Mother     Cataracts Mother     Hypertension Father     Heart disease Father     Cancer Sister     Heart disease Sister     Breast cancer Sister     Heart disease Brother      Social History     Tobacco Use    Smoking status: Every Day     Packs/day: 0.25     Years: 45.00     Pack years: 11.25     Types: Cigarettes    Smokeless tobacco: Never   Substance Use Topics    Alcohol use: Yes    Drug use: Never     Review of Systems   Constitutional:  Negative for chills and fever.   Gastrointestinal:  Positive for nausea and vomiting.   Musculoskeletal:  Positive for arthralgias, gait problem, joint swelling and myalgias.   Skin:  Positive for wound.   Allergic/Immunologic: Negative for immunocompromised state.   Neurological:  Negative for weakness.     Physical Exam     Initial Vitals [06/05/23 1830]   BP Pulse Resp Temp SpO2   (!) 146/74 88 20 98.4 °F (36.9 °C) 95 %      MAP       --         Physical Exam    Constitutional: She is not diaphoretic. No distress.   HENT:   Head: Normocephalic and atraumatic.   Eyes: Conjunctivae and EOM are normal.   Neck:   Normal range of motion.  Cardiovascular:  Normal rate.           Pulmonary/Chest: No respiratory distress.   Abdominal: She exhibits no distension.   Musculoskeletal:      Cervical back: Normal range of motion.      Comments: Left lower extremity placed in short splint, patient has decreased sensation of the greater left toe.  Wound VAC is still in placed.  Patient was able to move toes.      Neurological: She is alert and oriented to person, place, and time.   Skin: Skin is warm and dry.   Psychiatric: She has a normal mood and affect. Thought  content normal.         See previous photos from initial ED visit  ED Course   Procedures  Labs Reviewed   HEMOGLOBIN A1C - Abnormal; Notable for the following components:       Result Value    Hemoglobin A1C 10.2 (*)     Estimated Avg Glucose 246 (*)     All other components within normal limits   PREALBUMIN - Abnormal; Notable for the following components:    Prealbumin 11 (*)     All other components within normal limits   MAGNESIUM   PHOSPHORUS   PROTIME-INR   POCT GLUCOSE MONITORING CONTINUOUS          Imaging Results              X-Ray Chest 1 View (Final result)  Result time 06/05/23 21:12:06      Final result by Zach Ordonez MD (06/05/23 21:12:06)                   Impression:      Nodular density on the left may relate to an area of nodular infiltrate or atelectasis however follow-up is recommended.    Mild accentuation of interstitial markings.    Additional areas likely relating to atelectasis and or scarring.      Electronically signed by: Zach Ordonez  Date:    06/05/2023  Time:    21:12               Narrative:    EXAMINATION:  XR CHEST 1 VIEW    CLINICAL HISTORY:  pre-op;    TECHNIQUE:  Single frontal view of the chest was performed.    COMPARISON:  Chest radiograph February 17, 2023    FINDINGS:  Single chest view is submitted.  The cardiomediastinal silhouette and hilar regions appears stable.  Mild prominent appearance of the pulmonary vascular appears stable may relate to a baseline appearance.    There are linear densities bilaterally that may relate to areas of scarring and atelectatic change.  Mild focal nodular opacity on the left appears to have developed in the interim, could relate to an area of somewhat nodular infiltrate or atelectasis however follow-up is recommended.    Mild accentuation of the interstitial markings is noted.  There is no evidence for dense consolidation, no evidence for pleural effusion or pneumothorax.    The osseous structures appear intact, chronic changes  are noted.                                       X-Ray Tibia Fibula 2 View Left (Final result)  Result time 06/05/23 21:10:25      Final result by Vianey Juares MD (06/05/23 21:10:25)                   Impression:      Splint overlying angulated distal left tibial and fibular for acute fracture as described.      Electronically signed by: Vianey Juares  Date:    06/05/2023  Time:    21:10               Narrative:    EXAMINATION:  XR TIBIA FIBULA 2 VIEW LEFT    CLINICAL HISTORY:  fracture;    TECHNIQUE:  AP and lateral views of the left tibia and fibula were performed.    COMPARISON:  Left tibia fibula views earlier today, 06/05/2023.    FINDINGS:  Splint overlies the distal fibula and tibia diaphysis acute fracture with lateral angulation of the distal fragments and impaction.  There is no ankle dislocation.                                       Medications   fentaNYL 50 mcg/mL injection 50 mcg (50 mcg Intravenous Given 6/5/23 2038)     Medical Decision Making:   Differential Diagnosis:   Wound concerning for osteomyelitis, failure of outpatient antibiotics  Closed displaced tibular/fibula fracture requiring surgical intervention  ED Management:  Ms Baeza is a 73-year-old woman with known diabetic foot wound with wound VAC on outpatient antibiotics, also with newly displaced tibial fibular fracture transferred to [facility] for orthopedic evaluation.  Patient received lab, IV antibiotics and imaging at initial ED.  Patient was evaluated by ortho surgery on arrival w/ likelihood planned surgical intervention for tomorrow.  Patient will be admitted to Hospital Medicine for continued IV antibiotics due to diabetic foot wound failure on outpatient antibiotics.   Pt discussed with supervising physician              Attending Attestation:     Physician Attestation Statement for NP/PA:       Other NP/PA Attestation Additions:      Medical Decision Making: I have reviewed and concur with the PA's history, physical,  assessment, and plan.  I have not personally interviewed and examined the patient at bedside. Case was discussed at the time the patient was already seen and splinted by orthopedic surgery with plan for  admission                            Clinical Impression:   Final diagnoses:  [S82.202A, S82.402A] Tibia/fibula fracture, left, closed, initial encounter (Primary)  [T81.30XA] Wound disruption, initial encounter        ED Disposition Condition    Admit Stable                Bell Lawson PA-C  06/05/23 [id number]       Ly Feng MD  06/06/23 [id number]

## 2023-06-06 NOTE — PLAN OF CARE
"Pt unable to be seen as she was in OR. POC below.      73 year old female with PMH of uncontrolled DM2, neuropathy, tobacco use, left hip fracture s/p intramedullary denzel, PAD with recent LLE revascularization  with Dr. Sheppard (04/2023), chronic lower extremity wounds who was transferred from [facility] with distal fractures of tibia and fibula as well as worsening LE wounds.      To note, pt was recently followed by ID service for left lower foot wound. Wound culture was + MSSA. Bone culture obtained on 4/27, + for MSSA and staph epi. Path negative for osteo. Was treated x10 days for SSTI with cefadroxil. Left foot wound culture from 5/18 + e cloacae. Pt was started on doxy and then levaquin per podiatry.      Afebrile, no leukocytosis. Elevated CRP. HA1c 10. CT of left ankle noting fracture of distal tibia, as well as skin ulcer overlying left posterior calcaneus, no evidence of bony involvement. MRI of hindfoot from 4/2023 noting potential early osteo of posterior calcaneous.      Ortho surgery following, planning for OR today for ORIF versus ex fix left ankle.     Pt is currently on piperacillin tazobactam. ID was consulted d/t  "osteomyelitis of Wounds of Left Lower Extremity - patient had bone biopsy 4/27 growing Staph Aureus".    Recommendations:   - Will follow for podiatry evaluation   - Recommend Ancef 2g IV q8hr (CrCl 40) to cover MSSA and staph epi osteo.   - Will plan x6 weeks of IV abx for osteo.   - Plan reviewed with ID staff. ID will follow.   "

## 2023-06-06 NOTE — ASSESSMENT & PLAN NOTE
IDSA uninfected diabetic with ulcerations, full thickness plantar left great toe and plantar/posterior heel without signs of infection noted at the left great toe, the heel the heel will need further evaluation after surgical intervention per Orthopedic surgery. Left lower extremity angiogram with intervention to the popliteal and posterior tibial arteries 04/25/2023.    Baseline left foot radiographs ordered   Left great toe wound culture obtained   Left great toe wound cleansed and dressed per Podiatry  Left lower extremity nonweightbearing  Nursing wound care routine ordered   Recommend heel offloading boot or floating the right heel at all times while in bed  Podiatry will follow

## 2023-06-06 NOTE — SUBJECTIVE & OBJECTIVE
Past Medical History:   Diagnosis Date    Diabetes mellitus, type 2        Past Surgical History:   Procedure Laterality Date    ANGIOGRAPHY OF LOWER EXTREMITY Left 4/25/2023    Procedure: Angiogram Extremity Unilateral;  Surgeon: Gilbert Sheppard MD;  Location: [facility] OR [address] [place] [address];  Service: Vascular;  Laterality: Left;  28.4 min  487.45 mGy  75.9180 Gycm2  trans radial: 7 ml  dye: 126 ml      AUGMENTATION OF BREAST      INTRAMEDULLARY RODDING OF FEMUR Left 2/18/2023    Procedure: INSERTION, INTRAMEDULLARY ROXANNA, FEMUR (hana table -- synthes -- long nail -- have bovie and suction and large bone set);  Surgeon: Keanu Brand MD;  Location: [facility] OR;  Service: Orthopedics;  Laterality: Left;    PERCUTANEOUS TRANSLUMINAL ANGIOPLASTY Left 4/25/2023    Procedure: PTA (ANGIOPLASTY, PERCUTANEOUS, TRANSLUMINAL);  Surgeon: Gilbert Sheppard MD;  Location: [facility] OR [address] [place] [address];  Service: Vascular;  Laterality: Left;  Tibial and popliteal angioplasty       Review of patient's allergies indicates:  No Known Allergies    Current Facility-Administered Medications   Medication    fentaNYL 50 mcg/mL injection 50 mcg    LIDOcaine HCL 10 mg/ml (1%) injection 10 mL    sodium chloride 0.9% flush 10 mL     Current Outpatient Medications   Medication Sig    acetaminophen (TYLENOL) 500 MG tablet Take 1,000 mg by mouth every 6 (six) hours as needed for Pain.    aspirin 81 MG Chew Chew and swallow 1 tablet (81 mg total) by mouth once daily.    atorvastatin (LIPITOR) 80 MG tablet Take 1 tablet (80 mg total) by mouth every evening.    blood sugar diagnostic Strp To check BG two times daily, to use with insurance preferred meter    blood-glucose meter kit To check BG two times daily, to use with insurance preferred meter    clopidogreL (PLAVIX) 75 mg tablet Take 1 tablet (75 mg total) by mouth once daily.    doxycycline (VIBRAMYCIN) 100 MG Cap Take 1 capsule (100 mg total) by mouth every 12 (twelve) hours. for 14 days     "HYDROcodone-acetaminophen (NORCO) 5-325 mg per tablet Take 1 tablet by mouth every 6 (six) hours as needed for Pain.    insulin aspart U-100 (NOVOLOG) 100 unit/mL (3 mL) InPn pen Inject 0-5 Units into the skin before meals and at bedtime as needed (Hyperglycemia). Blood Glucose  mg/dL                  Pre-meal                2200  151-200                0 unit                      0 unit  201-250                2 units                    1 unit  251-300                3 units                    1 unit  301-350                4 units                    2 units  >350                     5 units                    3 units    insulin detemir U-100, Levemir, 100 unit/mL (3 mL) SubQ InPn pen Inject 18 Units into the skin once daily.    lancets [facility] To check BG two times daily, to use with insurance preferred meter    levoFLOXacin (LEVAQUIN) 750 MG tablet Take 1 tablet (750 mg total) by mouth once daily. for 14 days    lisinopriL (PRINIVIL,ZESTRIL) 5 MG tablet Take 5 mg by mouth once daily.    melatonin 10 mg Cap Take 1 capsule by mouth every evening.    multivit-min-FA-lycopen-lutein 0.4 mg-300 mcg- 250 mcg Tab Take 1 tablet by mouth once daily.    nicotine (NICODERM CQ) 14 mg/24 hr Place 1 patch onto the skin once daily.    pen needle, diabetic (PEN NEEDLE) 31 gauge x 3/16" Ndle 1 application by Misc.(Non-Drug; Combo Route) route 2 (two) times a day.    traMADoL (ULTRAM) 50 mg tablet Take 1 tablet (50 mg total) by mouth every 6 (six) hours as needed for Pain.     Family History       Problem Relation (Age of Onset)    Breast cancer Sister    Cancer Mother, Sister    Cataracts Mother    Heart disease Mother, Father, Sister, Brother    Hypertension Father          Tobacco Use    Smoking status: Every Day     Packs/day: 0.25     Years: 45.00     Pack years: 11.25     Types: Cigarettes    Smokeless tobacco: Never   Substance and Sexual Activity    Alcohol use: Yes    Drug use: Never    Sexual activity: Not on file "     ROS  Constitutional: Denies fever/chills   Neurological: Denies numbness/tingling (any exceptions noted in orthopaedic exam)    Psychiatric/Behavioral: Denies change in normal mood   Eyes: Denies change in vision   Cardiovascular: Denies chest pain   Respiratory: Denies shortness of breath   Hematologic/Lymphatic: Denies easy bleeding/bruising    Skin: Denies new rash or skin lesions    Gastrointestinal: Denies nausea/vomitting/diarrhea, change in bowel habits, abdominal pain    Allergic/Immunologic: Denies adverse reactions to current medications   Musculoskeletal: see HPI     Objective:     Vital Signs (Most Recent):  Temp: 98.4 °F (36.9 °C) (06/05/23 1830)  Pulse: 88 (06/05/23 2145)  Resp: 16 (06/05/23 2145)  BP: (!) 160/73 (06/05/23 2145)  SpO2: 95 % (06/05/23 2145) Vital Signs (24h Range):  Temp:  [98.4 °F (36.9 °C)-98.9 °F (37.2 °C)] 98.4 °F (36.9 °C)  Pulse:  [84-89] 88  Resp:  [12-20] 16  SpO2:  [95 %] 95 %  BP: (122-179)/(58-86) 160/73     Weight: 72.6 kg (160 lb)     Body mass index is 28.34 kg/m².    Ortho/SPM Exam  General: no acute distress, appears stated age     Neuro: alert and oriented x3   Psych: normal mood   Head: normocephalic, atraumatic.    Eyes: no scleral icterus   Mouth: moist mucous membranes   Cardiovascular: extremities warm and well perfused   Lungs: breathing comfortably, equal chest rise bilat   Skin: clean, dry, intact (any exceptions noted in below musculoskeletal exam)    Musculoskeletal:  LUE:  - Skin intact throughout, no open wounds  - No swelling  - No ecchymosis, erythema, or signs of cellulitis  - NonTTP throughout  - AROM and PROM of the shoulder, elbow, wrist, and hand intact without pain  - Axillary/AIN/PIN/Radial/Median/Ulnar nerves assessed in isolation and are without deficit  - SILT throughout  - Compartments soft  - 2+ radial artery pulse  - Capillary Refill < 2 sec    RUE:  - Skin intact throughout, no open wounds  - No swelling  - No ecchymosis, erythema, or  signs of cellulitis  - NonTTP throughout  - AROM and PROM of the shoulder, elbow, wrist, and hand intact without pain  - Axillary/AIN/PIN/Radial/Median/Ulnar nerves assessed in isolation and are without deficit  - SILT throughout  - Compartments soft  - 2+ radial artery pulse  - Capillary Refill < 2 sec    LLE:  - Diabetic ulcers over the plantar aspect of the great toe and posterior aspect of the calcaneus (see media pictures below for additional details), skin otherwise intact  - No ecchymosis, erythema, or signs of cellulitis  - TTP around the ankle and distal leg, otherwise nonTTP throughout  - ROM ankle deferred due to known fracture  - AROM and PROM of the hip, knee, and foot intact without pain  - TA/EHL/Gastroc/FHL assessed in isolation and are without deficit  - SILT throughout although sensation decreased at the ankle and below  - Compartments soft  - 2+ DP and PT pulses  - Capillary Refill < 2 sec      (Left heel)      (Left great toe)    RLE:  - Skin intact throughout, no open wounds  - No swelling  - No ecchymosis, erythema, or signs of cellulitis  - NonTTP throughout  - AROM and PROM of the hip, knee, ankle, and foot intact without pain  - TA/EHL/Gastroc/FHL assessed in isolation and are without deficit  - SILT throughout  - Compartments soft  - 2+ DP and PT pulses  - Capillary Refill < 2 sec    Spine/pelvis/axial body:  No tenderness to palpation of cervical, thoracic, or lumbar spine  No pain with compression of pelvis    Significant Labs: All pertinent labs within the past 24 hours have been reviewed.    Significant Imaging: I have reviewed and interpreted all pertinent imaging results/findings.  X-ray left ankle and tibia/fibula with a pilon fracture and distal fibula fracture with valgus angulation

## 2023-06-06 NOTE — SUBJECTIVE & OBJECTIVE
Scheduled Meds:   acetaminophen  1,000 mg Oral Q8H    aspirin  81 mg Oral Daily    atorvastatin  80 mg Oral QHS    insulin detemir U-100  18 Units Subcutaneous Daily    lisinopriL  5 mg Oral Daily    melatonin  9 mg Oral Nightly    pregabalin  75 mg Oral BID     Continuous Infusions:  PRN Meds:ceFAZolin (ANCEF) IVPB, dextrose 10%, dextrose 10%, famotidine, fentaNYL, glucagon (human recombinant), insulin aspart U-100, midazolam, morphine, mupirocin, naloxone, ondansetron, ondansetron, polyethylene glycol, simethicone, sodium chloride 0.9%    Review of patient's allergies indicates:  No Known Allergies     Past Medical History:   Diagnosis Date    Closed left pilon fracture 6/5/2023    Diabetes mellitus, type 2      Past Surgical History:   Procedure Laterality Date    ANGIOGRAPHY OF LOWER EXTREMITY Left 4/25/2023    Procedure: Angiogram Extremity Unilateral;  Surgeon: Gilbert Sheppard MD;  Location: [facility] OR [address] [place] [address];  Service: Vascular;  Laterality: Left;  28.4 min  487.45 mGy  75.9180 Gycm2  trans radial: 7 ml  dye: 126 ml      AUGMENTATION OF BREAST      INTRAMEDULLARY RODDING OF FEMUR Left 2/18/2023    Procedure: INSERTION, INTRAMEDULLARY ROXANNA, FEMUR (hana table -- synthes -- long nail -- have bovie and suction and large bone set);  Surgeon: Keanu Brand MD;  Location: [facility];  Service: Orthopedics;  Laterality: Left;    PERCUTANEOUS TRANSLUMINAL ANGIOPLASTY Left 4/25/2023    Procedure: PTA (ANGIOPLASTY, PERCUTANEOUS, TRANSLUMINAL);  Surgeon: Gilbert Sheppard MD;  Location: [address];  Service: Vascular;  Laterality: Left;  Tibial and popliteal angioplasty       Family History       Problem Relation (Age of Onset)    Breast cancer Sister    Cancer Mother, Sister    Cataracts Mother    Heart disease Mother, Father, Sister, Brother    Hypertension Father          Tobacco Use    Smoking status: Every Day     Packs/day: 0.25     Years: 45.00     Pack years: 11.25     Types: Cigarettes     Smokeless tobacco: Never   Substance and Sexual Activity    Alcohol use: Yes    Drug use: Never    Sexual activity: Not on file     Review of Systems   Constitutional:  Negative for chills, fatigue and fever.   HENT:  Negative for congestion, postnasal drip, rhinorrhea and sore throat.    Eyes:  Negative for photophobia and visual disturbance.   Respiratory:  Negative for cough, chest tightness and shortness of breath.    Cardiovascular:  Negative for chest pain and leg swelling.   Gastrointestinal:  Negative for abdominal pain, diarrhea, nausea and vomiting.   Endocrine: Negative for polyphagia and polyuria.   Genitourinary:  Negative for difficulty urinating, dysuria, frequency and urgency.   Musculoskeletal:  Positive for arthralgias, gait problem and joint swelling.   Skin:  Positive for wound.   Neurological:  Positive for numbness. Negative for seizures and headaches.   Psychiatric/Behavioral:  Negative for agitation, behavioral problems and confusion.    Objective:     Vital Signs (Most Recent):  Temp: 97.7 °F (36.5 °C) (06/06/23 1130)  Pulse: 83 (06/06/23 1130)  Resp: 17 (06/06/23 1130)  BP: 117/65 (06/06/23 1130)  SpO2: (!) 94 % (06/06/23 1130) Vital Signs (24h Range):  Temp:  [97.7 °F (36.5 °C)-98.9 °F (37.2 °C)] 97.7 °F (36.5 °C)  Pulse:  [83-92] 83  Resp:  [12-20] 17  SpO2:  [94 %-100 %] 94 %  BP: (117-179)/(65-86) 117/65     Weight: 72.6 kg (160 lb)  Body mass index is 28.34 kg/m².    Foot Exam    General  General Appearance: appears stated age and healthy   Orientation: alert and oriented to person, place, and time       Right Foot/Ankle     Inspection and Palpation  Ecchymosis: none  Tenderness: none   Swelling: none   Skin Exam: skin intact;     Neurovascular  Dorsalis pedis: absent  Posterior tibial: absent  Saphenous nerve sensation: diminished  Tibial nerve sensation: diminished  Superficial peroneal nerve sensation: diminished  Deep peroneal nerve sensation: diminished  Sural nerve sensation:  diminished      Left Foot/Ankle      Inspection and Palpation  Skin Exam: drainage and ulcer; skin not intact, no cellulitis, no abnormal color and no erythema     Neurovascular  Dorsalis pedis: absent  Posterior tibial: absent  Saphenous nerve sensation: diminished  Tibial nerve sensation: diminished  Superficial peroneal nerve sensation: diminished  Deep peroneal nerve sensation: diminished  Sural nerve sensation: diminished    Comments  Scant serous drainage from full-thickness left hallux wound, wound base granular in nature without hyperkeratotic periwound, no malodor, no fluctuance, no crepitation, negative probe to bone    Posterior splint/Phillips compression dressing in place at left lower extremity.  Unable to assess full-thickness heel ulceration, recently debrided about a week ago with media and chart.    Laboratory:  A1C:   Recent Labs   Lab 02/17/23  1415 04/21/23  0429 06/05/23  1957   HGBA1C 8.9* 8.2* 10.2*     CBC:   Recent Labs   Lab 06/06/23  0411   WBC 8.20   RBC 3.82*   HGB 11.0*   HCT 34.8*      MCV 91   MCH 28.8   MCHC 31.6*     CMP:   Recent Labs   Lab 06/05/23  1346 06/06/23  0411   * 400*   CALCIUM 9.7 9.7   ALBUMIN 3.3*  --    PROT 6.9  --    * 131*   K 4.9 5.4*   CO2 24 27   CL 99 93*   BUN 22 26*   CREATININE 0.9 1.2   ALKPHOS 96  --    ALT 8*  --    AST 12  --    BILITOT 0.8  --      CRP:   Recent Labs   Lab 06/05/23  1346   .5*     Microbiology Results (last 7 days)       Procedure Component Value Units Date/Time    Gram stain [[id number]] Collected: 06/06/23 1051    Order Status: Sent Specimen: Wound from Toe, Left Foot Updated: 06/06/23 1131    Aerobic culture [[id number]] Collected: 06/06/23 1051    Order Status: Sent Specimen: Wound from Toe, Left Foot Updated: 06/06/23 1131    Culture, Anaerobe [[id number]] Collected: 06/06/23 1051    Order Status: Sent Specimen: Wound from Toe, Left Foot Updated: 06/06/23 1130          Specimen (24h ago, onward)      None           All pertinent labs reviewed within the last 24 hours.    Diagnostic Results:  I have reviewed all pertinent imaging results/findings within the past 24 hours.    Clinical Findings:

## 2023-06-06 NOTE — CONSULTS
[facility] - Emergency Dept  Orthopedics  Consult Note    Patient Name: Anastasiia Badillo  MRN: [id number]  Admission Date: 6/5/2023  Hospital Length of Stay: 0 days  Attending Provider: Millicent Funes MD  Primary Care Provider: Raji Parkinson MD    Patient information was obtained from patient, past medical records, ER records and primary team.     Inpatient consult to Orthopedic Surgery  Consult performed by: Austen Anderson MD  Consult ordered by: Bell Lawson PA-C        Subjective:     Principal Problem:Closed left pilon fracture    Chief Complaint:   Chief Complaint   Patient presents with    [facility]     Tib/fib fracture with previous femur fracture. Wound vac in place        HPI: Anastasiia Badillo is a 73 y.o. female with past medical history of type 2 diabetes with neuropathy, peripheral vascular disease s/p LLE balloon angioplasty, and chronic diabetic foot ulcers being treated with antibiotics and serial wound VAC changes presenting to the [facility] [place] [place] ED as a transfer for evaluation of a left distal tibia/fibula fracture. The patient reports that she slid out of a chair on the morning of 6/4/23 and had immediate pain to her left leg/ankle.  She denies head trauma and loss of consciousness.  She denies other injuries from the fall.  Due to persistent pain today, she presented to an outside hospital for evaluation.  X-rays obtained in the ED showed a left pilon fracture and left distal fibula fracture.  The patient was then transferred to [facility] [place] [place] for a higher level of care.    Orthopedics was consulted for evaluation upon arrival.  Prior to this injury, the patient states that she was ambulatory in a boot.  The patient reports that she is able to feel her left foot, although her sensation is decreased.        Past Medical History:   Diagnosis Date    Diabetes mellitus, type 2        Past Surgical History:   Procedure Laterality Date    ANGIOGRAPHY OF LOWER EXTREMITY Left 4/25/2023    Procedure:  Angiogram Extremity Unilateral;  Surgeon: Gilbert Sheppard MD;  Location: [facility] OR 2ND FLR;  Service: Vascular;  Laterality: Left;  28.4 min  487.45 mGy  75.9180 Gycm2  trans radial: 7 ml  dye: 126 ml      AUGMENTATION OF BREAST      INTRAMEDULLARY RODDING OF FEMUR Left 2/18/2023    Procedure: INSERTION, INTRAMEDULLARY ROXANNA, FEMUR (hana table -- synthes -- long nail -- have bovie and suction and large bone set);  Surgeon: Keanu Brand MD;  Location: [facility] OR;  Service: Orthopedics;  Laterality: Left;    PERCUTANEOUS TRANSLUMINAL ANGIOPLASTY Left 4/25/2023    Procedure: PTA (ANGIOPLASTY, PERCUTANEOUS, TRANSLUMINAL);  Surgeon: Gilbert Sheppard MD;  Location: [facility] OR 2ND FLR;  Service: Vascular;  Laterality: Left;  Tibial and popliteal angioplasty       Review of patient's allergies indicates:  No Known Allergies    Current Facility-Administered Medications   Medication    fentaNYL 50 mcg/mL injection 50 mcg    LIDOcaine HCL 10 mg/ml (1%) injection 10 mL    sodium chloride 0.9% flush 10 mL     Current Outpatient Medications   Medication Sig    acetaminophen (TYLENOL) 500 MG tablet Take 1,000 mg by mouth every 6 (six) hours as needed for Pain.    aspirin 81 MG Chew Chew and swallow 1 tablet (81 mg total) by mouth once daily.    atorvastatin (LIPITOR) 80 MG tablet Take 1 tablet (80 mg total) by mouth every evening.    blood sugar diagnostic Strp To check BG two times daily, to use with insurance preferred meter    blood-glucose meter kit To check BG two times daily, to use with insurance preferred meter    clopidogreL (PLAVIX) 75 mg tablet Take 1 tablet (75 mg total) by mouth once daily.    doxycycline (VIBRAMYCIN) 100 MG Cap Take 1 capsule (100 mg total) by mouth every 12 (twelve) hours. for 14 days    HYDROcodone-acetaminophen (NORCO) 5-325 mg per tablet Take 1 tablet by mouth every 6 (six) hours as needed for Pain.    insulin aspart U-100 (NOVOLOG) 100 unit/mL (3 mL) InPn pen  "Inject 0-5 Units into the skin before meals and at bedtime as needed (Hyperglycemia). Blood Glucose  mg/dL                  Pre-meal                2200  151-200                0 unit                      0 unit  201-250                2 units                    1 unit  251-300                3 units                    1 unit  301-350                4 units                    2 units  >350                     5 units                    3 units    insulin detemir U-100, Levemir, 100 unit/mL (3 mL) SubQ InPn pen Inject 18 Units into the skin once daily.    lancets [facility] To check BG two times daily, to use with insurance preferred meter    levoFLOXacin (LEVAQUIN) 750 MG tablet Take 1 tablet (750 mg total) by mouth once daily. for 14 days    lisinopriL (PRINIVIL,ZESTRIL) 5 MG tablet Take 5 mg by mouth once daily.    melatonin 10 mg Cap Take 1 capsule by mouth every evening.    multivit-min-FA-lycopen-lutein 0.4 mg-300 mcg- 250 mcg Tab Take 1 tablet by mouth once daily.    nicotine (NICODERM CQ) 14 mg/24 hr Place 1 patch onto the skin once daily.    pen needle, diabetic (PEN NEEDLE) 31 gauge x 3/16" Ndle 1 application by Misc.(Non-Drug; Combo Route) route 2 (two) times a day.    traMADoL (ULTRAM) 50 mg tablet Take 1 tablet (50 mg total) by mouth every 6 (six) hours as needed for Pain.     Family History       Problem Relation (Age of Onset)    Breast cancer Sister    Cancer Mother, Sister    Cataracts Mother    Heart disease Mother, Father, Sister, Brother    Hypertension Father          Tobacco Use    Smoking status: Every Day     Packs/day: 0.25     Years: 45.00     Pack years: 11.25     Types: Cigarettes    Smokeless tobacco: Never   Substance and Sexual Activity    Alcohol use: Yes    Drug use: Never    Sexual activity: Not on file     ROS  Constitutional: Denies fever/chills   Neurological: Denies numbness/tingling (any exceptions noted in orthopaedic exam)    Psychiatric/Behavioral: Denies change in " normal mood   Eyes: Denies change in vision   Cardiovascular: Denies chest pain   Respiratory: Denies shortness of breath   Hematologic/Lymphatic: Denies easy bleeding/bruising    Skin: Denies new rash or skin lesions    Gastrointestinal: Denies nausea/vomitting/diarrhea, change in bowel habits, abdominal pain    Allergic/Immunologic: Denies adverse reactions to current medications   Musculoskeletal: see HPI     Objective:     Vital Signs (Most Recent):  Temp: 98.4 °F (36.9 °C) (06/05/23 1830)  Pulse: 88 (06/05/23 2145)  Resp: 16 (06/05/23 2145)  BP: (!) 160/73 (06/05/23 2145)  SpO2: 95 % (06/05/23 2145) Vital Signs (24h Range):  Temp:  [98.4 °F (36.9 °C)-98.9 °F (37.2 °C)] 98.4 °F (36.9 °C)  Pulse:  [84-89] 88  Resp:  [12-20] 16  SpO2:  [95 %] 95 %  BP: (122-179)/(58-86) 160/73     Weight: 72.6 kg (160 lb)     Body mass index is 28.34 kg/m².    Ortho/SPM Exam  General: no acute distress, appears stated age     Neuro: alert and oriented x3   Psych: normal mood   Head: normocephalic, atraumatic.    Eyes: no scleral icterus   Mouth: moist mucous membranes   Cardiovascular: extremities warm and well perfused   Lungs: breathing comfortably, equal chest rise bilat   Skin: clean, dry, intact (any exceptions noted in below musculoskeletal exam)    Musculoskeletal:  LUE:  - Skin intact throughout, no open wounds  - No swelling  - No ecchymosis, erythema, or signs of cellulitis  - NonTTP throughout  - AROM and PROM of the shoulder, elbow, wrist, and hand intact without pain  - Axillary/AIN/PIN/Radial/Median/Ulnar nerves assessed in isolation and are without deficit  - SILT throughout  - Compartments soft  - 2+ radial artery pulse  - Capillary Refill < 2 sec    RUE:  - Skin intact throughout, no open wounds  - No swelling  - No ecchymosis, erythema, or signs of cellulitis  - NonTTP throughout  - AROM and PROM of the shoulder, elbow, wrist, and hand intact without pain  - Axillary/AIN/PIN/Radial/Median/Ulnar nerves assessed in  isolation and are without deficit  - SILT throughout  - Compartments soft  - 2+ radial artery pulse  - Capillary Refill < 2 sec    LLE:  - Diabetic ulcers over the plantar aspect of the great toe and posterior aspect of the calcaneus (see media pictures below for additional details), skin otherwise intact  - No ecchymosis, erythema, or signs of cellulitis  - TTP around the ankle and distal leg, otherwise nonTTP throughout  - ROM ankle deferred due to known fracture  - AROM and PROM of the hip, knee, and foot intact without pain  - TA/EHL/Gastroc/FHL assessed in isolation and are without deficit  - SILT throughout although sensation decreased at the ankle and below  - Compartments soft  - 2+ DP and PT pulses  - Capillary Refill < 2 sec      (Left heel)      (Left great toe)    RLE:  - Skin intact throughout, no open wounds  - No swelling  - No ecchymosis, erythema, or signs of cellulitis  - NonTTP throughout  - AROM and PROM of the hip, knee, ankle, and foot intact without pain  - TA/EHL/Gastroc/FHL assessed in isolation and are without deficit  - SILT throughout  - Compartments soft  - 2+ DP and PT pulses  - Capillary Refill < 2 sec    Spine/pelvis/axial body:  No tenderness to palpation of cervical, thoracic, or lumbar spine  No pain with compression of pelvis    Significant Labs: All pertinent labs within the past 24 hours have been reviewed.    Significant Imaging: I have reviewed and interpreted all pertinent imaging results/findings.  X-ray left ankle and tibia/fibula with a pilon fracture and distal fibula fracture with valgus angulation    Assessment/Plan:     * Closed left pilon fracture  Anastasiia Badillo is a 73 y.o. female with a left pilon fracture and associated left distal fibula fracture. She is closed and neurovascularly intact at her baseline (decreased sensation below the ankle). She has diabetic foot wounds over her posterior calcaneus and plantar aspect of the great toe.  She was reduced and  placed into a short leg splint. See procedure note for additional details.  She will require operative intervention for this injury.  We discussed the risks and benefits of surgical intervention including but not limited to infection, bleeding, damage to surrounding structures, malunion, nonunion, and pin site infection. They wish to proceed with surgery.  Informed consent obtained at bedside. Patient marked.    Plan:  Possible OR 6/6/23 for ORIF left tibia/fibula vs ex-fix left ankle pending soft tissues at the time of surgery  Diet: NPO at midnight  Pain: multimodal + ice + elevation  Antibiotics: per primary, pre-operative antibiotics ordered  DVT PPx: hold chemical prophylaxis prior to surgery  PT/OT: NWB LLE. Keep splint clean, dry, and intact  Imaging: CT ordered for preoperative planning    Dispo: pending surgery    Procedure note: left ankle reduction  After time out was performed and patient ID, side, and site were verified, the area was sterilly prepped in the standard fashion. A 22-gauge needle was introduced into the fracture site without complication with aspiration of hematoma for confirmation. 10cc of 1% lidocaine was then injected to the fracture site without difficulty. After adequate analgesia, the fracture was closed reduced and adequate reduction was obtained. A short leg splint was applied and then post-reduction films were obtained which verified maintenance of the reduction. The patient tolerated the procedure well with no complications. Blood loss was minimal.      Austen Anderson MD  Orthopedics  [facility] - Emergency Dept

## 2023-06-06 NOTE — ASSESSMENT & PLAN NOTE
Presenting after fall and found to have comminuted and angulated distal tibia and fibular fractures. Evaluated by Orthopedic Surgery service, she was reduced and placed into a short leg splint. She will require operative intervention for this injury. Planning for surgical intervention 6/6.  - Orthopedic Surgery following, appreciate recommendations  - multimodal pain control with acetaminophen 1 g q8h and pregabalin 75 mg BID  - IV morphine 4 mg q6h PRN breakthrough pain  - holding antiplatelet medication and DVT prophylaxis at this time     Surgical Risk Assessment     Active cardiac issues:  Active decompensated heart failure? No   Unstable angina?  No   Significant uncontrolled arrhythmias? No   Severe valvular heart disease-Aortic or Mitral Stenosis? No   Recent MI or coronary revascularization < 30 days? No     Cardiac Risk Factors  High risk surgery? No   History of CAD/ischemic heart disease? No   History of cerebrovascular disease? No   History of compensated heart failure? No   Type 2 diabetes requiring insulin? Yes   Serum Creatinine > 2? No   Total cardiac risk factors 1     Based on   Functional METs <4    < 4 METs -unable to walk > 2 blocks on level ground without stopping due to symptoms  - eating, dressing, toileting, walking indoors, light housework. POOR   > 4 METs -climbing > 1 flight of stairs without stopping  -walking up hill > 1-2 blocks  -scrubbing floors  -moving furniture  - golf, bowling, dancing or tennis  -running short distance MODERATE to EXCELLENT

## 2023-06-06 NOTE — ED TRIAGE NOTES
72 y/o F presents to ER as transfer from [facility] for diabetic foot ulcer. Pt has ulcer in L foot, has h/x L femur f/x and diabetes. L foot is currently wrapped in clear dressing with wound vac turned off. Pt states that wound doctor wants to rewrap the extremity. Pt endorses numbness, tingling, decreased motor abilities to foot and 10/10 pain.

## 2023-06-06 NOTE — TRANSFER OF CARE
"Anesthesia Transfer of Care Note    Patient: Anastasiia Badillo    Procedure(s) Performed: Procedure(s) (LRB):  ORIF, FRACTURE, PILON, LEFT (Left)  APPLICATION, EXTERNAL FIXATION DEVICE, LEFT ANKLE, [place] (Left)    Patient location: PACU    Anesthesia Type: general    Transport from OR: Transported from OR on 6-10 L/min O2 by face mask with adequate spontaneous ventilation    Post pain: adequate analgesia    Post assessment: tolerated procedure well and no apparent anesthetic complications    Post vital signs: stable    Level of consciousness: awake    Nausea/Vomiting: no nausea/vomiting    Complications: none    Transfer of care protocol was followed      Last vitals:   Visit Vitals  BP (!) 117/59 (BP Location: Left arm)   Pulse 81   Temp 36.8 °C (98.2 °F) (Oral)   Resp 18   Ht 5' 3" (1.6 m)   Wt 72.6 kg (160 lb 1.9 oz)   SpO2 (!) 94%   Breastfeeding No   BMI 28.36 kg/m²     "

## 2023-06-06 NOTE — ASSESSMENT & PLAN NOTE
Anastasiia Badillo is a 73 y.o. female with a left pilon fracture and associated left distal fibula fracture. She is closed and neurovascularly intact at her baseline (decreased sensation below the ankle). She has diabetic foot wounds over her posterior calcaneus and plantar aspect of the great toe.  She was reduced and placed into a short leg splint. See procedure note for additional details.  She will require operative intervention for this injury.  We discussed the risks and benefits of surgical intervention including but not limited to infection, bleeding, damage to surrounding structures, malunion, nonunion, and pin site infection. They wish to proceed with surgery.  Informed consent obtained at bedside. Patient marked.    Plan:  Possible OR 6/6/23 for ORIF left tibia/fibula vs ex-fix left ankle pending soft tissues at the time of surgery  Diet: NPO at midnight  Pain: multimodal + ice + elevation  Antibiotics: per primary, pre-operative antibiotics ordered  DVT PPx: hold chemical prophylaxis prior to surgery  PT/OT: NWB LLE. Keep splint clean, dry, and intact  Imaging: CT ordered for preoperative planning    Dispo: pending surgery    Procedure note: left ankle reduction  After time out was performed and patient ID, side, and site were verified, the area was sterilly prepped in the standard fashion. A 22-gauge needle was introduced into the fracture site without complication with aspiration of hematoma for confirmation. 10cc of 1% lidocaine was then injected to the fracture site without difficulty. After adequate analgesia, the fracture was closed reduced and adequate reduction was obtained. A short leg splint was applied and then post-reduction films were obtained which verified maintenance of the reduction. The patient tolerated the procedure well with no complications. Blood loss was minimal.

## 2023-06-06 NOTE — ASSESSMENT & PLAN NOTE
Patient's FSGs are uncontrolled due to hyperglycemia on current medication regimen.    Last A1c reviewed-   Lab Results   Component Value Date    HGBA1C 10.2 (H) 06/05/2023     Most recent fingerstick glucose reviewed-   Recent Labs   Lab 06/05/23 2120   POCTGLUCOSE 302*     Current correctional scale  Low     Maintain anti-hyperglycemic dose as follows-   Antihyperglycemics (From admission, onward)    Start     Stop Route Frequency Ordered    06/05/23 2345  insulin aspart U-100 pen 0-5 Units         -- SubQ Before meals & nightly PRN 06/05/23 2246        Hold Oral hypoglycemics while patient is in the hospital. Holding patient's home insulin at this time given NPO status for likely surgical intervention. Can resume as needed for hyperglycemia while inpatient.

## 2023-06-06 NOTE — HPI
73 year old female with T2DM w/ associated neuropathy, tobbaco, PVD, hx of diabetic foot wounds. She presetned to [facility] ED as miroslava green with complaints of ankle pain after falling out of bed and admitted to  for closed left tibial pilon fracture with orthopedic planning surgical intervention today 06/06. Patient was last seen by [facility] podiatry inpatient service in late april of this year for chronic left heel wound. At that time a bone biopsy of the left calcaneus was preformed and the cultures resulted Staph aureus and epidermidis; although the pathologty report was negative, and patient finished a 2 week Abx course for SSTI per ID. She had an angiogram and was set up for outpatient vascualr surgery follow up.   Left great toe and right heel full thickness foot wounds.     BP levated, other vital signs stable, patient afebrile, without leukocytosis WBC 8.2, cratinine 1.2, Sodium decreased at 131, Potasiam elevated at 5.4, Glucose 400, .5,  HgA1c 10.2, Procal wnl.

## 2023-06-06 NOTE — SUBJECTIVE & OBJECTIVE
Past Medical History:   Diagnosis Date    Closed left pilon fracture 6/5/2023    Diabetes mellitus, type 2        Past Surgical History:   Procedure Laterality Date    ANGIOGRAPHY OF LOWER EXTREMITY Left 4/25/2023    Procedure: Angiogram Extremity Unilateral;  Surgeon: Gilbert Sheppard MD;  Location: [facility] OR [address] [place] [address];  Service: Vascular;  Laterality: Left;  28.4 min  487.45 mGy  75.9180 Gycm2  trans radial: 7 ml  dye: 126 ml      AUGMENTATION OF BREAST      INTRAMEDULLARY RODDING OF FEMUR Left 2/18/2023    Procedure: INSERTION, INTRAMEDULLARY ROXANNA, FEMUR (hana table -- synthes -- long nail -- have bovie and suction and large bone set);  Surgeon: Keanu Brand MD;  Location: [facility] OR;  Service: Orthopedics;  Laterality: Left;    PERCUTANEOUS TRANSLUMINAL ANGIOPLASTY Left 4/25/2023    Procedure: PTA (ANGIOPLASTY, PERCUTANEOUS, TRANSLUMINAL);  Surgeon: Gilbert Sheppard MD;  Location: [facility] OR [address] [place] [address];  Service: Vascular;  Laterality: Left;  Tibial and popliteal angioplasty       Review of patient's allergies indicates:  No Known Allergies    Current Facility-Administered Medications on File Prior to Encounter   Medication    [COMPLETED] HYDROmorphone (PF) injection 1 mg    [COMPLETED] ondansetron injection 4 mg    [COMPLETED] piperacillin-tazobactam (ZOSYN) 4.5 g in dextrose 5 % in water (D5W) 5 % 100 mL IVPB (MB+)     Current Outpatient Medications on File Prior to Encounter   Medication Sig    acetaminophen (TYLENOL) 500 MG tablet Take 1,000 mg by mouth every 6 (six) hours as needed for Pain.    aspirin 81 MG Chew Chew and swallow 1 tablet (81 mg total) by mouth once daily.    atorvastatin (LIPITOR) 80 MG tablet Take 1 tablet (80 mg total) by mouth every evening.    blood sugar diagnostic Strp To check BG two times daily, to use with insurance preferred meter    blood-glucose meter kit To check BG two times daily, to use with insurance preferred meter    clopidogreL (PLAVIX) 75 mg tablet Take 1  "tablet (75 mg total) by mouth once daily.    doxycycline (VIBRAMYCIN) 100 MG Cap Take 1 capsule (100 mg total) by mouth every 12 (twelve) hours. for 14 days    HYDROcodone-acetaminophen (NORCO) 5-325 mg per tablet Take 1 tablet by mouth every 6 (six) hours as needed for Pain.    insulin aspart U-100 (NOVOLOG) 100 unit/mL (3 mL) InPn pen Inject 0-5 Units into the skin before meals and at bedtime as needed (Hyperglycemia). Blood Glucose  mg/dL                  Pre-meal                2200  151-200                0 unit                      0 unit  201-250                2 units                    1 unit  251-300                3 units                    1 unit  301-350                4 units                    2 units  >350                     5 units                    3 units    insulin detemir U-100, Levemir, 100 unit/mL (3 mL) SubQ InPn pen Inject 18 Units into the skin once daily.    lancets [facility] To check BG two times daily, to use with insurance preferred meter    levoFLOXacin (LEVAQUIN) 750 MG tablet Take 1 tablet (750 mg total) by mouth once daily. for 14 days    lisinopriL (PRINIVIL,ZESTRIL) 5 MG tablet Take 5 mg by mouth once daily.    melatonin 10 mg Cap Take 1 capsule by mouth every evening.    multivit-min-FA-lycopen-lutein 0.4 mg-300 mcg- 250 mcg Tab Take 1 tablet by mouth once daily.    nicotine (NICODERM CQ) 14 mg/24 hr Place 1 patch onto the skin once daily.    pen needle, diabetic (PEN NEEDLE) 31 gauge x 3/16" Ndle 1 application by Misc.(Non-Drug; Combo Route) route 2 (two) times a day.    traMADoL (ULTRAM) 50 mg tablet Take 1 tablet (50 mg total) by mouth every 6 (six) hours as needed for Pain.     Family History       Problem Relation (Age of Onset)    Breast cancer Sister    Cancer Mother, Sister    Cataracts Mother    Heart disease Mother, Father, Sister, Brother    Hypertension Father          Tobacco Use    Smoking status: Every Day     Packs/day: 0.25     Years: 45.00     Pack years: 11.25     " Types: Cigarettes    Smokeless tobacco: Never   Substance and Sexual Activity    Alcohol use: Yes    Drug use: Never    Sexual activity: Not on file     Review of Systems   Constitutional:  Negative for chills, fatigue and fever.   HENT:  Negative for congestion, postnasal drip, rhinorrhea and sore throat.    Eyes:  Negative for photophobia and visual disturbance.   Respiratory:  Negative for cough, chest tightness and shortness of breath.    Cardiovascular:  Negative for chest pain and leg swelling.   Gastrointestinal:  Negative for abdominal pain, diarrhea, nausea and vomiting.   Endocrine: Negative for polyphagia and polyuria.   Genitourinary:  Negative for difficulty urinating, dysuria, frequency and urgency.   Musculoskeletal:  Positive for arthralgias, gait problem and joint swelling.   Skin:  Positive for wound.   Neurological:  Negative for seizures, numbness and headaches.   Psychiatric/Behavioral:  Negative for agitation, behavioral problems and confusion.    Objective:     Vital Signs (Most Recent):  Temp: 98.4 °F (36.9 °C) (06/05/23 1830)  Pulse: 88 (06/05/23 2145)  Resp: 14 (06/05/23 2250)  BP: (!) 160/73 (06/05/23 2145)  SpO2: 95 % (06/05/23 2145) Vital Signs (24h Range):  Temp:  [98.4 °F (36.9 °C)-98.9 °F (37.2 °C)] 98.4 °F (36.9 °C)  Pulse:  [84-89] 88  Resp:  [12-20] 14  SpO2:  [95 %] 95 %  BP: (122-179)/(58-86) 160/73     Weight: 72.6 kg (160 lb)  Body mass index is 28.34 kg/m².     Physical Exam  Vitals reviewed.   Constitutional:       General: She is not in acute distress.     Appearance: Normal appearance. She is normal weight. She is not ill-appearing, toxic-appearing or diaphoretic.      Comments: Pleasant woman in no acute distress. Cooperative with examination and conversant with interview.   HENT:      Head: Normocephalic and atraumatic.      Right Ear: External ear normal.      Left Ear: External ear normal.      Nose: No congestion or rhinorrhea.      Mouth/Throat:      Mouth: Mucous  membranes are moist.      Pharynx: Oropharynx is clear. No oropharyngeal exudate or posterior oropharyngeal erythema.   Eyes:      General:         Right eye: No discharge.         Left eye: No discharge.      Extraocular Movements: Extraocular movements intact.   Cardiovascular:      Rate and Rhythm: Normal rate and regular rhythm.      Heart sounds: No murmur heard.    No friction rub. No gallop.      Comments: Poor pulses in right lower extremity.  Pulmonary:      Effort: Pulmonary effort is normal. No respiratory distress.      Breath sounds: Normal breath sounds. No stridor. No wheezing, rhonchi or rales.   Abdominal:      General: Abdomen is flat. There is no distension.      Palpations: Abdomen is soft. There is no mass.      Tenderness: There is no abdominal tenderness. There is no guarding or rebound.      Hernia: No hernia is present.   Musculoskeletal:      Cervical back: Normal range of motion and neck supple.      Right lower leg: No edema.   Skin:     General: Skin is warm and dry.      Findings: Bruising present.      Comments: Ecchymoses noted on bilateral upper extremities bilaterally.   Neurological:      General: No focal deficit present.      Mental Status: She is alert and oriented to person, place, and time. Mental status is at baseline.      Sensory: No sensory deficit.      Motor: No weakness.   Psychiatric:         Mood and Affect: Mood normal.         Behavior: Behavior normal.              Significant Labs: All pertinent labs within the past 24 hours have been reviewed.  CBC:   Recent Labs   Lab 06/05/23  1346   WBC 11.34   HGB 11.9*   HCT 35.4*        CMP:   Recent Labs   Lab 06/05/23  1346   *   K 4.9   CL 99   CO2 24   *   BUN 22   CREATININE 0.9   CALCIUM 9.7   PROT 6.9   ALBUMIN 3.3*   BILITOT 0.8   ALKPHOS 96   AST 12   ALT 8*   ANIONGAP 10       Significant Imaging: I have reviewed all pertinent imaging results/findings within the past 24 hours.

## 2023-06-06 NOTE — PROGRESS NOTES
[facility]  Orthopedics  Progress Note    Patient Name: Anastasiia Badillo  MRN: [id number]  Admission Date: 6/5/2023  Hospital Length of Stay: 1 days  Attending Provider: Millicent Funes MD  Primary Care Provider: Raji Parkinson MD     Subjective:     Principal Problem:Closed left pilon fracture    Principal Orthopedic Problem: same    Interval History: No acute events overnight.  Vitals within normal limits.  Pain controlled.  NPO since midnight.  Anticipate OR today.    Review of patient's allergies indicates:  No Known Allergies    Current Facility-Administered Medications   Medication    acetaminophen tablet 1,000 mg    aspirin chewable tablet 81 mg    atorvastatin tablet 80 mg    dextrose 10% bolus 125 mL 125 mL    dextrose 10% bolus 250 mL 250 mL    famotidine tablet 20 mg    glucagon (human recombinant) injection 1 mg    insulin aspart U-100 pen 0-5 Units    lisinopriL tablet 5 mg    melatonin tablet 9 mg    morphine injection 4 mg    naloxone 0.4 mg/mL injection 0.02 mg    ondansetron disintegrating tablet 4 mg    ondansetron injection 4 mg    polyethylene glycol packet 17 g    pregabalin capsule 75 mg    simethicone chewable tablet 80 mg    sodium chloride 0.9% flush 10 mL     Objective:     Vital Signs (Most Recent):  Temp: 98.7 °F (37.1 °C) (06/06/23 0348)  Pulse: 86 (06/06/23 0454)  Resp: 20 (06/06/23 0348)  BP: (!) 168/79 (06/06/23 0348)  SpO2: 96 % (06/06/23 0348) Vital Signs (24h Range):  Temp:  [98.1 °F (36.7 °C)-98.9 °F (37.2 °C)] 98.7 °F (37.1 °C)  Pulse:  [84-92] 86  Resp:  [12-20] 20  SpO2:  [95 %-100 %] 96 %  BP: (122-179)/(58-86) 168/79     Weight: 72.6 kg (160 lb)     Body mass index is 28.34 kg/m².      Intake/Output Summary (Last 24 hours) at 6/6/2023 0542  Last data filed at 6/6/2023 0500  Gross per 24 hour   Intake --   Output 250 ml   Net -250 ml        Ortho/SPM Exam  Gen: NAD, sitting comfortably in bed  CV: regular rate  Resp: non-labored respirations    LLE:  Short leg  splint clean, dry, and intact  Wiggles toes  Sensation intact to light touch throughout all exposed toes, but diminished (baseline)  Cap refill < 2 sec in all toes     Significant Labs: All pertinent labs within the past 24 hours have been reviewed.    Significant Imaging: I have reviewed and interpreted all pertinent imaging results/findings.    Assessment/Plan:     * Closed left pilon fracture  Anastasiia Badillo is a 73 y.o. female with a left pilon fracture and associated left distal fibula fracture. She is closed and neurovascularly intact at her baseline (decreased sensation below the ankle). She has diabetic foot wounds over her posterior calcaneus and plantar aspect of the great toe.      Admitted to Hospital Medicine for perioperative management.  Plan for OR today for ORIF versus ex fix left ankle.  NPO since midnight.    Plan:  Diet: NPO since midnight  Pain: multimodal + ice + elevation  Antibiotics: per primary, pre-operative antibiotics ordered  DVT PPx: hold chemical prophylaxis prior to surgery  PT/OT: NWB LLE. Keep splint clean, dry, and intact  Labs:  Hemoglobin 11.  Glucose 400 this morning - will try to decrease before surgery.    Dispo: pending glucose control and surgery          Austen Anderson MD  Orthopedics  [facility]

## 2023-06-06 NOTE — ANESTHESIA PROCEDURE NOTES
Intubation    Date/Time: 6/6/2023 12:30 PM  Performed by: Vanessa Chapman CRNA  Authorized by: Dhaval Victor Jr., MD     Intubation:     Induction:  Intravenous    Intubated:  Postinduction    Mask Ventilation:  Easy mask    Attempts:  1    Attempted By:  CRNA    Method of Intubation:  Video laryngoscopy    Blade:  Bell 3    Laryngeal View Grade: Grade I - full view of cords      Difficult Airway Encountered?: No      Complications:  None    Airway Device:  Oral endotracheal tube    Airway Device Size:  7.0    Style/Cuff Inflation:  Cuffed (inflated to minimal occlusive pressure)    Tube secured:  21    Secured at:  The lips    Placement Verified By:  Capnometry    Complicating Factors:  None    Findings Post-Intubation:  BS equal bilateral

## 2023-06-06 NOTE — HPI
Anastasiia Badillo is a 73 y.o. female with past medical history of type 2 diabetes with neuropathy, peripheral vascular disease s/p LLE balloon angioplasty, and chronic diabetic foot ulcers being treated with antibiotics and serial wound VAC changes presenting to the [facility] ED as a transfer for evaluation of a left distal tibia/fibula fracture. The patient reports that she slid out of a chair on the morning of 6/4/23 and had immediate pain to her left leg/ankle.  She denies head trauma and loss of consciousness.  She denies other injuries from the fall.  Due to persistent pain today, she presented to an outside hospital for evaluation.  X-rays obtained in the ED showed a left pilon fracture and left distal fibula fracture.  The patient was then transferred to [facility] for a higher level of care.    Orthopedics was consulted for evaluation upon arrival.  Prior to this injury, the patient states that she was ambulatory in a boot.  The patient reports that she is able to feel her left foot, although her sensation is decreased.

## 2023-06-06 NOTE — ASSESSMENT & PLAN NOTE
Anastasiia Badillo is a 73 y.o. female with a left pilon fracture and associated left distal fibula fracture. She is closed and neurovascularly intact at her baseline (decreased sensation below the ankle). She has diabetic foot wounds over her posterior calcaneus and plantar aspect of the great toe.      Admitted to Hospital Medicine for perioperative management.  Plan for OR today for ORIF versus ex fix left ankle.  NPO since midnight.    Plan:  Diet: NPO since midnight  Pain: multimodal + ice + elevation  Antibiotics: per primary, pre-operative antibiotics ordered  DVT PPx: hold chemical prophylaxis prior to surgery  PT/OT: ANABELLA MAURICE. Keep splint clean, dry, and intact    Dispo: pending surgery

## 2023-06-06 NOTE — CONSULTS
[facility] - Surgery  Infectious Disease  Consult Note    Patient Name: Anastasiia Badillo  MRN: [id number]  Admission Date: 6/5/2023  Hospital Length of Stay: 1 days  Attending Physician: Millicent Funes MD  Primary Care Provider: Raji Parkinson MD     Isolation Status: No active isolations    Consult received. Pt unable to be seen today as she is in OR. See plan of care note from 6/6.     Inpatient consult to Infectious Diseases  Consult performed by: Leonardo Johns NP  Consult ordered by: MD Leonardo Montalvo NP  Infectious Disease  [facility] - Surgery             61F with post surgical changes on CT scan after cranioplasty, presenting with episode of "confusion" as per , pt states she wasn't responding to him because she was upset regarding lost jewelry. pt's upgraded to me due to concern for stroke, appreciate nsg, neurology consultation, no e/o stroke, ct scan with post surgical changes only (reviewed by dr hill) no indication for repeat admission, plan for dc home with keppra 1500 mg bid and close f/u with dr hill tomorrow am. sent rx to pharmacy ua neg for infection

## 2023-06-07 ENCOUNTER — DOCUMENT SCAN (OUTPATIENT)
Dept: HOME HEALTH SERVICES | Facility: HOSPITAL | Age: 73
End: 2023-06-07
Payer: MEDICARE

## 2023-06-07 DIAGNOSIS — S82.872D CLOSED DISPLACED PILON FRACTURE OF LEFT TIBIA WITH ROUTINE HEALING, SUBSEQUENT ENCOUNTER: Primary | ICD-10-CM

## 2023-06-07 PROBLEM — L97.522 CHRONIC ULCER OF GREAT TOE OF LEFT FOOT WITH FAT LAYER EXPOSED: Status: ACTIVE | Noted: 2023-06-07

## 2023-06-07 PROBLEM — R23.4 ESCHAR OF FOOT: Status: RESOLVED | Noted: 2023-04-27 | Resolved: 2023-06-07

## 2023-06-07 PROBLEM — L97.422 CHRONIC ULCER OF LEFT HEEL WITH FAT LAYER EXPOSED: Status: ACTIVE | Noted: 2023-06-06

## 2023-06-07 PROBLEM — E11.621 DIABETIC FOOT ULCER: Status: RESOLVED | Noted: 2023-04-19 | Resolved: 2023-06-07

## 2023-06-07 PROBLEM — L97.509 DIABETIC FOOT ULCER: Status: RESOLVED | Noted: 2023-04-19 | Resolved: 2023-06-07

## 2023-06-07 PROBLEM — M86.172 ACUTE OSTEOMYELITIS OF LEFT CALCANEUS: Status: ACTIVE | Noted: 2023-06-07

## 2023-06-07 LAB
ANION GAP SERPL CALC-SCNC: 6 MMOL/L (ref 8–16)
BUN SERPL-MCNC: 29 MG/DL (ref 8–23)
CALCIUM SERPL-MCNC: 9.3 MG/DL (ref 8.7–10.5)
CHLORIDE SERPL-SCNC: 101 MMOL/L (ref 95–110)
CO2 SERPL-SCNC: 26 MMOL/L (ref 23–29)
CREAT SERPL-MCNC: 1.2 MG/DL (ref 0.5–1.4)
ERYTHROCYTE [DISTWIDTH] IN BLOOD BY AUTOMATED COUNT: 13.8 % (ref 11.5–14.5)
EST. GFR  (NO RACE VARIABLE): 48.1 ML/MIN/1.73 M^2
GLUCOSE SERPL-MCNC: 195 MG/DL (ref 70–110)
HCT VFR BLD AUTO: 31.8 % (ref 37–48.5)
HGB BLD-MCNC: 10 G/DL (ref 12–16)
MAGNESIUM SERPL-MCNC: 1.8 MG/DL (ref 1.6–2.6)
MCH RBC QN AUTO: 28.8 PG (ref 27–31)
MCHC RBC AUTO-ENTMCNC: 31.4 G/DL (ref 32–36)
MCV RBC AUTO: 92 FL (ref 82–98)
PLATELET # BLD AUTO: 294 K/UL (ref 150–450)
PMV BLD AUTO: 12.2 FL (ref 9.2–12.9)
POCT GLUCOSE: 214 MG/DL (ref 70–110)
POCT GLUCOSE: 218 MG/DL (ref 70–110)
POCT GLUCOSE: 98 MG/DL (ref 70–110)
POTASSIUM SERPL-SCNC: 4.7 MMOL/L (ref 3.5–5.1)
RBC # BLD AUTO: 3.47 M/UL (ref 4–5.4)
SODIUM SERPL-SCNC: 133 MMOL/L (ref 136–145)
WBC # BLD AUTO: 6.5 K/UL (ref 3.9–12.7)

## 2023-06-07 PROCEDURE — 97165 OT EVAL LOW COMPLEX 30 MIN: CPT

## 2023-06-07 PROCEDURE — 85027 COMPLETE CBC AUTOMATED: CPT | Performed by: STUDENT IN AN ORGANIZED HEALTH CARE EDUCATION/TRAINING PROGRAM

## 2023-06-07 PROCEDURE — 99222 1ST HOSP IP/OBS MODERATE 55: CPT | Mod: ,,, | Performed by: NURSE PRACTITIONER

## 2023-06-07 PROCEDURE — 99233 SBSQ HOSP IP/OBS HIGH 50: CPT | Mod: ,,, | Performed by: INTERNAL MEDICINE

## 2023-06-07 PROCEDURE — 21400001 HC TELEMETRY ROOM

## 2023-06-07 PROCEDURE — 99222 PR INITIAL HOSPITAL CARE,LEVL II: ICD-10-PCS | Mod: ,,, | Performed by: NURSE PRACTITIONER

## 2023-06-07 PROCEDURE — 99233 PR SUBSEQUENT HOSPITAL CARE,LEVL III: ICD-10-PCS | Mod: ,,, | Performed by: INTERNAL MEDICINE

## 2023-06-07 PROCEDURE — 97530 THERAPEUTIC ACTIVITIES: CPT

## 2023-06-07 PROCEDURE — 99233 SBSQ HOSP IP/OBS HIGH 50: CPT | Mod: ,,, | Performed by: REGISTERED NURSE

## 2023-06-07 PROCEDURE — 99233 PR SUBSEQUENT HOSPITAL CARE,LEVL III: ICD-10-PCS | Mod: ,,, | Performed by: REGISTERED NURSE

## 2023-06-07 PROCEDURE — 25000003 PHARM REV CODE 250

## 2023-06-07 PROCEDURE — 36415 COLL VENOUS BLD VENIPUNCTURE: CPT | Performed by: STUDENT IN AN ORGANIZED HEALTH CARE EDUCATION/TRAINING PROGRAM

## 2023-06-07 PROCEDURE — 63600175 PHARM REV CODE 636 W HCPCS: Performed by: STUDENT IN AN ORGANIZED HEALTH CARE EDUCATION/TRAINING PROGRAM

## 2023-06-07 PROCEDURE — 25000003 PHARM REV CODE 250: Performed by: STUDENT IN AN ORGANIZED HEALTH CARE EDUCATION/TRAINING PROGRAM

## 2023-06-07 PROCEDURE — 97162 PT EVAL MOD COMPLEX 30 MIN: CPT

## 2023-06-07 PROCEDURE — 11042 DBRDMT SUBQ TIS 1ST 20SQCM/<: CPT | Mod: ,,, | Performed by: PODIATRIST

## 2023-06-07 PROCEDURE — 99233 SBSQ HOSP IP/OBS HIGH 50: CPT | Mod: 25,,, | Performed by: PODIATRIST

## 2023-06-07 PROCEDURE — 94761 N-INVAS EAR/PLS OXIMETRY MLT: CPT

## 2023-06-07 PROCEDURE — 99233 PR SUBSEQUENT HOSPITAL CARE,LEVL III: ICD-10-PCS | Mod: 25,,, | Performed by: PODIATRIST

## 2023-06-07 PROCEDURE — 25000003 PHARM REV CODE 250: Performed by: INTERNAL MEDICINE

## 2023-06-07 PROCEDURE — 97112 NEUROMUSCULAR REEDUCATION: CPT

## 2023-06-07 PROCEDURE — 63600175 PHARM REV CODE 636 W HCPCS: Performed by: NURSE PRACTITIONER

## 2023-06-07 PROCEDURE — 83735 ASSAY OF MAGNESIUM: CPT | Performed by: STUDENT IN AN ORGANIZED HEALTH CARE EDUCATION/TRAINING PROGRAM

## 2023-06-07 PROCEDURE — 11042 PR DEBRIDEMENT, SKIN, SUB-Q TISSUE,=<20 SQ CM: ICD-10-PCS | Mod: ,,, | Performed by: PODIATRIST

## 2023-06-07 PROCEDURE — 80048 BASIC METABOLIC PNL TOTAL CA: CPT | Performed by: STUDENT IN AN ORGANIZED HEALTH CARE EDUCATION/TRAINING PROGRAM

## 2023-06-07 RX ORDER — METHOCARBAMOL 500 MG/1
500 TABLET, FILM COATED ORAL 4 TIMES DAILY
Status: DISCONTINUED | OUTPATIENT
Start: 2023-06-07 | End: 2023-06-14 | Stop reason: HOSPADM

## 2023-06-07 RX ORDER — INSULIN ASPART 100 [IU]/ML
6 INJECTION, SOLUTION INTRAVENOUS; SUBCUTANEOUS
Status: DISCONTINUED | OUTPATIENT
Start: 2023-06-07 | End: 2023-06-09

## 2023-06-07 RX ORDER — CLOPIDOGREL BISULFATE 75 MG/1
75 TABLET ORAL DAILY
Status: DISCONTINUED | OUTPATIENT
Start: 2023-06-08 | End: 2023-06-14 | Stop reason: HOSPADM

## 2023-06-07 RX ORDER — OXYCODONE HYDROCHLORIDE 10 MG/1
10 TABLET ORAL EVERY 4 HOURS PRN
Status: DISCONTINUED | OUTPATIENT
Start: 2023-06-07 | End: 2023-06-14 | Stop reason: HOSPADM

## 2023-06-07 RX ORDER — OXYCODONE HYDROCHLORIDE 5 MG/1
5 TABLET ORAL EVERY 4 HOURS PRN
Status: DISCONTINUED | OUTPATIENT
Start: 2023-06-07 | End: 2023-06-14 | Stop reason: HOSPADM

## 2023-06-07 RX ORDER — INSULIN ASPART 100 [IU]/ML
4 INJECTION, SOLUTION INTRAVENOUS; SUBCUTANEOUS
Status: DISCONTINUED | OUTPATIENT
Start: 2023-06-07 | End: 2023-06-07

## 2023-06-07 RX ORDER — OXYCODONE HYDROCHLORIDE 5 MG/1
5 TABLET ORAL EVERY 6 HOURS PRN
Status: DISCONTINUED | OUTPATIENT
Start: 2023-06-07 | End: 2023-06-07

## 2023-06-07 RX ADMIN — INSULIN ASPART 6 UNITS: 100 INJECTION, SOLUTION INTRAVENOUS; SUBCUTANEOUS at 04:06

## 2023-06-07 RX ADMIN — ACETAMINOPHEN 1000 MG: 500 TABLET ORAL at 05:06

## 2023-06-07 RX ADMIN — LISINOPRIL 5 MG: 5 TABLET ORAL at 08:06

## 2023-06-07 RX ADMIN — ATORVASTATIN CALCIUM 80 MG: 40 TABLET, FILM COATED ORAL at 08:06

## 2023-06-07 RX ADMIN — CEFAZOLIN 2 G: 2 INJECTION, POWDER, FOR SOLUTION INTRAMUSCULAR; INTRAVENOUS at 05:06

## 2023-06-07 RX ADMIN — PREGABALIN 75 MG: 75 CAPSULE ORAL at 08:06

## 2023-06-07 RX ADMIN — INSULIN ASPART 2 UNITS: 100 INJECTION, SOLUTION INTRAVENOUS; SUBCUTANEOUS at 12:06

## 2023-06-07 RX ADMIN — Medication 9 MG: at 08:06

## 2023-06-07 RX ADMIN — ACETAMINOPHEN 1000 MG: 500 TABLET ORAL at 01:06

## 2023-06-07 RX ADMIN — ASPIRIN 81 MG: 81 TABLET, COATED ORAL at 08:06

## 2023-06-07 RX ADMIN — ACETAMINOPHEN 1000 MG: 500 TABLET ORAL at 09:06

## 2023-06-07 RX ADMIN — INSULIN ASPART 2 UNITS: 100 INJECTION, SOLUTION INTRAVENOUS; SUBCUTANEOUS at 08:06

## 2023-06-07 RX ADMIN — METHOCARBAMOL 500 MG: 500 TABLET ORAL at 05:06

## 2023-06-07 RX ADMIN — CEFAZOLIN 2 G: 2 INJECTION, POWDER, FOR SOLUTION INTRAMUSCULAR; INTRAVENOUS at 09:06

## 2023-06-07 RX ADMIN — MORPHINE SULFATE 4 MG: 4 INJECTION INTRAVENOUS at 09:06

## 2023-06-07 RX ADMIN — CEFAZOLIN 2 G: 2 INJECTION, POWDER, FOR SOLUTION INTRAMUSCULAR; INTRAVENOUS at 01:06

## 2023-06-07 RX ADMIN — OXYCODONE HYDROCHLORIDE 5 MG: 5 TABLET ORAL at 06:06

## 2023-06-07 RX ADMIN — METHOCARBAMOL 500 MG: 500 TABLET ORAL at 08:06

## 2023-06-07 RX ADMIN — CHOLECALCIFEROL TAB 25 MCG (1000 UNIT) 1000 UNITS: 25 TAB at 08:06

## 2023-06-07 RX ADMIN — INSULIN ASPART 4 UNITS: 100 INJECTION, SOLUTION INTRAVENOUS; SUBCUTANEOUS at 12:06

## 2023-06-07 RX ADMIN — INSULIN DETEMIR 18 UNITS: 100 INJECTION, SOLUTION SUBCUTANEOUS at 08:06

## 2023-06-07 RX ADMIN — OXYCODONE HYDROCHLORIDE 10 MG: 10 TABLET ORAL at 05:06

## 2023-06-07 NOTE — HPI
Reason for Consult: Management of T2DM, Hyperglycemia     Surgical Procedure and Date: 6/6/23 Open reduction internal fixation left pilon fracture with fixation of tibia and fibula  External fixator placement left lower extremity    Diabetes diagnosis year: 2018    Home Diabetes Medications:  Levemir 18 units nightly. Novolog is ordered [facility]; but patient states she does not use.     How often checking glucose at home?  1-3 times per week  (checks at random times)  BG readings on regimen: 200's  Hypoglycemia on the regimen?  No  Missed doses on regimen?  No    Diabetes Complications include:     Hyperglycemia and Diabetic peripheral neuropathy     Complicating diabetes co morbidities:   HTN; HLD;      HPI:   Patient is a 73 y.o. female with a diagnosis of peripheral artery disease s/p revascularization and type 2 diabetes mellitus c/b neuropathy and chronic lower extremity wounds who presents as a transfer from [facility] for distal fractures of the tibia and fibula following a fall.  It was also noted that she had purulent drainage from her lower extremity wounds.  Of note, per chart review, the patient had a left heel bone biopsy performed by her podiatrist on April 27th.  Patient's bone biopsy grew Staphylococcus aureus.  She was seen in the [facility] and placed on levofloxacin and doxycycline but did not appear to be followed by the Infectious Disease Service.  She was evaluated by Orthopedic Surgery at [facility] who is planning surgical intervention on June 6th.  Patient was admitted to Hospital Medicine for further management.Endocrinology consulted for BG/ DM management.     Lab Results   Component Value Date    HGBA1C 10.2 (H) 06/05/2023

## 2023-06-07 NOTE — PROGRESS NOTES
[facility]  Orthopedics  Progress Note    Patient Name: Anastasiia Badillo  MRN: [id number]  Admission Date: 6/5/2023  Hospital Length of Stay: 2 days  Attending Provider: Millicent Funes MD  Primary Care Provider: Raji Parkinson MD  Follow-up For: Procedure(s) (LRB):  ORIF, FRACTURE, PILON, LEFT (Left)  APPLICATION, EXTERNAL FIXATION DEVICE, LEFT ANKLE, [place] (Left)    Post-Operative Day: 1 Day Post-Op  Subjective:     Principal Problem:Closed left pilon fracture    Principal Orthopedic Problem: same sp operative fixation and ex fix placement 6/6/23    Interval History: seen and evaluated. NAEON. Pain well controlled. Glucose improved to 200's w insulin management by endocrine    Review of patient's allergies indicates:  No Known Allergies    Current Facility-Administered Medications   Medication    acetaminophen tablet 1,000 mg    aspirin EC tablet 81 mg    atorvastatin tablet 80 mg    ceFAZolin 2 g in dextrose 5 % in water (D5W) 5 % 50 mL IVPB (MB+)    dextrose 10% bolus 125 mL 125 mL    dextrose 10% bolus 250 mL 250 mL    famotidine tablet 20 mg    fentaNYL 50 mcg/mL injection 25 mcg    glucagon (human recombinant) injection 1 mg    haloperidol lactate injection 0.5 mg    HYDROmorphone injection 0.2 mg    insulin aspart U-100 pen 0-5 Units    insulin aspart U-100 pen 4 Units    insulin detemir U-100 (Levemir) pen 18 Units    lisinopriL tablet 5 mg    melatonin tablet 9 mg    midazolam (VERSED) 1 mg/mL injection 0.5 mg    morphine injection 4 mg    mupirocin 2 % ointment    naloxone 0.4 mg/mL injection 0.02 mg    ondansetron disintegrating tablet 4 mg    ondansetron injection 4 mg    ondansetron injection 4 mg    oxyCODONE immediate release tablet 5 mg    polyethylene glycol packet 17 g    pregabalin capsule 75 mg    simethicone chewable tablet 80 mg    sodium chloride 0.9% flush 10 mL    sodium chloride 0.9% flush 3 mL    vitamin D 1000 units tablet 1,000 Units     Objective:     Vital Signs (Most  "Recent):  Temp: 98 °F (36.7 °C) (06/07/23 1111)  Pulse: 75 (06/07/23 1111)  Resp: 18 (06/07/23 1111)  BP: (!) 116/54 (06/07/23 1111)  SpO2: 97 % (06/07/23 1111) Vital Signs (24h Range):  Temp:  [97.4 °F (36.3 °C)-98.6 °F (37 °C)] 98 °F (36.7 °C)  Pulse:  [73-94] 75  Resp:  [10-23] 18  SpO2:  [89 %-99 %] 97 %  BP: (105-152)/(53-88) 116/54     Weight: 72.6 kg (160 lb 1.9 oz)  Height: 5' 3" (160 cm)  Body mass index is 28.36 kg/m².      Intake/Output Summary (Last 24 hours) at 6/7/2023 1112  Last data filed at 6/6/2023 1454  Gross per 24 hour   Intake 1000 ml   Output --   Net 1000 ml         Ortho/SPM Exam  Gen: NAD, sitting comfortably in bed  CV: regular rate  Resp: non-labored respirations    LLE:  Heel and great toe wounds are dressed   Ex fix in place, pin sites dressed  Surgical incisiosn dressed w some ss strike through   NVI to baseline- neuropathic to calf, can wiggles toes, foot and toes pink wwp     Significant Labs: BMP:   Recent Labs   Lab 06/07/23  0524   *   *   K 4.7      CO2 26   BUN 29*   CREATININE 1.2   CALCIUM 9.3   MG 1.8     CBC:   Recent Labs   Lab 06/05/23  1346 06/06/23  0411 06/07/23  0524   WBC 11.34 8.20 6.50   HGB 11.9* 11.0* 10.0*   HCT 35.4* 34.8* 31.8*    317 294     CMP:   Recent Labs   Lab 06/05/23  1346 06/06/23  0411 06/07/23  0524   * 131* 133*   K 4.9 5.4* 4.7   CL 99 93* 101   CO2 24 27 26   * 400* 195*   BUN 22 26* 29*   CREATININE 0.9 1.2 1.2   CALCIUM 9.7 9.7 9.3   PROT 6.9  --   --    ALBUMIN 3.3*  --   --    BILITOT 0.8  --   --    ALKPHOS 96  --   --    AST 12  --   --    ALT 8*  --   --    ANIONGAP 10 11 6*     All pertinent labs within the past 24 hours have been reviewed.    Significant Imaging: I have reviewed and interpreted all pertinent imaging results/findings.    Assessment/Plan:     * Closed left pilon fracture  73F w uncontrolled DM (A1C 10) w neuropathy to calves, PAD (sp recent LLE revascularization 5/2023), and chronic " left heela nd great toe wounds who fell 6/4 and has left pilon and left distal fibula fracture. She is closed and neurovascularly intact to baseline. She has diabetic foot wounds over her plantar heel and great toe.     -SP operative fixation of left pilon and fibula, left ankle external fixator placement 6/6/23 w Dr Mahmood    Recs  Multimodal pain regimen  NWB LLE in ex fix, elevate  BID pin site care for ex fix to start tomorrow  DVT ppx w ASA 81 bid, SCD RLE all times while in bed   PT/OT daily  ID following for abx recs for left foot wounds/possible osteo  Ancef IV per ID for previous left heel bone cultures +staph   Wound care for left heel/great toe wounds per podiatry   BG management per Endocrine   FU 2 wks post op w ortho            Michelle Antonio MD  Orthopedics  [facility]

## 2023-06-07 NOTE — PLAN OF CARE
Problem: Physical Therapy  Goal: Physical Therapy Goal  Description: Goals to be met by: 23     Patient will increase functional independence with mobility by performin. Supine to sit with Minimal Assistance  2. Sit to stand transfer with Minimal Assistance  3. Bed to chair transfer with Minimal Assistance using Rolling Walker.  4. Gait  x 100 feet with Minimal Assistance using Rolling Walker.   5. Pt will demonstrate understanding and adhere to NWB of L LE for all functional mobility.    Outcome: Ongoing, Progressing     Pt evaluated and goals established.

## 2023-06-07 NOTE — ASSESSMENT & PLAN NOTE
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 She is closed and neurovascularly intact to baseline. She has diabetic foot wounds over her plantar heel and great toe.     -SP operative fixation of left pilon and fibula, left ankle external fixator placement 6/6/23 w Dr Mahmood    Multimodal pain regimen  NWB LLE in ex fix, elevate  BID pin site care  DVT ppx w ASA 81 bid, SCD RLE all times while in bed   PT/OT daily  ID following for abx recs for left foot wounds/possible osteo   Ancef IV per ID for previous left heel bone cultures +staph, cipro for left great toe wound +pseudmonas  Wound care for left heel/great toe wounds per podiatry   BG management per Endocrine   FU 2 wks post op w ortho

## 2023-06-07 NOTE — ANESTHESIA POSTPROCEDURE EVALUATION
Anesthesia Post Evaluation    Patient: Anastasiia Badillo    Procedure(s) Performed: Procedure(s) (LRB):  ORIF, FRACTURE, PILON, LEFT (Left)  APPLICATION, EXTERNAL FIXATION DEVICE, LEFT ANKLE, Vasquez (Left)    Final Anesthesia Type: general      Patient location during evaluation: PACU  Patient participation: Yes- Able to Participate  Level of consciousness: awake and alert and oriented  Post-procedure vital signs: reviewed and stable  Pain management: adequate  Airway patency: patent    PONV status at discharge: No PONV  Anesthetic complications: no      Cardiovascular status: stable  Respiratory status: unassisted and spontaneous ventilation  Hydration status: euvolemic  Follow-up not needed.  Comments: Patient seen in PACU yesterday, note written today          Vitals Value Taken Time   /60 06/07/23 0814   Temp 37 °C (98.6 °F) 06/07/23 0741   Pulse 80 06/07/23 0741   Resp 18 06/07/23 0741   SpO2 94 % 06/07/23 0741         Event Time   Out of Recovery 06/06/2023 17:30:00         Pain/Pravin Score: Pain Rating Prior to Med Admin: 5 (6/7/2023  6:13 AM)  Pravin Score: 9 (6/6/2023  5:30 PM)

## 2023-06-07 NOTE — PLAN OF CARE
[facility] - Surgery  Initial Discharge Assessment       Primary Care Provider: Raji Parkinson MD    Admission Diagnosis: Diabetic foot infection [E11.628, L08.9]  Chest pain [R07.9]  Pre-op chest exam [Z01.811]  Closed displaced pilon fracture of left tibia, initial encounter [S82.872A]  Tibia/fibula fracture, left, closed, initial encounter [S82.202A, S82.402A]    Admission Date: 6/5/2023  Expected Discharge Date: 6/9/2023    Transition of Care Barriers: Mobility    Payor: [employer] MEDICARE / Plan: [employer] / Product Type: Medicare Advantage /     Extended Emergency Contact Information  Primary Emergency Contact: Yaya Badillo  Mobile Phone: [phone]  Relation: Son   needed? No    Discharge Plan A: Long-term acute care facility (LTAC)  Discharge Plan B: Rehab      [id number] - [place], [place] - [address] AT [facility] [address]  [address]  [place] [address]  Phone: [phone] Fax: [phone]      Initial Assessment (most recent)       Adult Discharge Assessment - 06/07/23 1042          Discharge Assessment    Assessment Type Discharge Planning Assessment     Confirmed/corrected address, phone number and insurance Yes     Confirmed Demographics Correct on Facesheet     Source of Information patient     Communicated CITLALI with patient/caregiver Yes     People in Home alone     Do you expect to return to your current living situation? Yes     Prior to hospitilization cognitive status: Alert/Oriented     Current cognitive status: Alert/Oriented     Equipment Currently Used at Home walker, rolling;wheelchair     Do you currently have service(s) that help you manage your care at home? Yes     Name and Contact number of agency [facility]     Is the pt/caregiver preference to resume services with current agency Yes     Do you take prescription medications? Yes     Do you have prescription coverage? Yes     Do you have any  problems affording any of your prescribed medications? No     Is the patient taking medications as prescribed? yes     How do you get to doctors appointments? family or friend will provide     Are you on dialysis? No     Do you take coumadin? No     Discharge Plan A Long-term acute care facility (LTAC)     Discharge Plan B Rehab     Discharge Plan discussed with: Patient     Transition of Care Barriers Mobility                   Current with [facility] and is happy with their services.  Was recently at [facility] approx. a month ago and was happy with their services.    Patient lives alone in a third floor apartment with elevator at [facility] an independent living facility. Patient states she will need placement and is unable to care for herself at home. Patient will need long term IV Abx therapy and pending therapy recommendations.

## 2023-06-07 NOTE — HOSPITAL COURSE
Patient taken to OR with Orthopedics on 6/6/2023 and had ORIF of left pilon fracture and ex fix placed for pilon fracture for stabilization of fracture by Dr. Gilbert Mahmood. Plan per Ortho is ex fix to stay in place for 4-6 weeks and then plan for removal by Ortho. According to Ortho, if current construct does not have enough stability for healing will consider transition to ringed fixator. Patient to be non-weight bearing to left lower extremity x 3 months post-op. Patient started on Aspirin 81 mg po BID and will need for 12 weeks as per Ortho for DVT prophylaxis. Endocrine consulted to assist in diabetes management. Podiatry consulted for left foot wounds and not no active infection and wound care as per Podiatry. Infectious Disease consulted as had previous positive cultures from left foot wounds and osteomyelitis. Infectious disease evaluated and recommending IV Cefazolin 2 grams every 8 hours for 6 weeks to treat osteomyelitis. PT/OT consulted. Patient on multimodal pain meds post-op.

## 2023-06-07 NOTE — ASSESSMENT & PLAN NOTE
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 She is closed and neurovascularly intact to baseline. She has diabetic foot wounds over her plantar heel and great toe.     -SP operative fixation of left pilon and fibula, left ankle external fixator placement 6/6/23 w Dr Mahmood    Plan:  Multimodal pain regimen  NWB LLE in ex fix, elevate  DVT ppx w ASA 81 bid, SCD RLE all times while in bed   PT/OT daily  ID following for abx recs for left foot wounds/possible osteo  Ancef IV per ID for previous left heel bone cultures +staph   Wound care for left heel/great toe wounds per podiatry   FU 2 wks post op w ortho

## 2023-06-07 NOTE — PLAN OF CARE
06/07/23 1514   Post-Acute Status   Post-Acute Authorization Placement   Post-Acute Placement Status Referrals Sent     SW faxed referrals to [facility] and [facility] via [facility] for review. SW will follow up as needed.      Hayde Jennings LMSW  Case Management   [facility]   Ext. [phone]

## 2023-06-07 NOTE — ASSESSMENT & PLAN NOTE
· Known severe peripheral arterial disease. Vascular Surgery during last hospitalization and had revascularization of left lower extremity in 4/2023 with percutaneous transluminal angioplasty of left popliteal artery and percutaneous transluminal angioplasty of the entire length of the left posterior tibial artery.  · Patient on Aspirin Plavix and high dose Lipitor to treat and will continue in hospital.

## 2023-06-07 NOTE — PT/OT/SLP EVAL
Occupational Therapy   Evaluation    Co-treatment performed due to patient's multiple deficits requiring two skilled therapists to appropriately and safely assess patient's strength and endurance while facilitating functional tasks in addition to accommodating for patient's activity tolerance.     Name: Anastasiia Badillo  MRN: [id number]  Admitting Diagnosis: Closed left pilon fracture  Recent Surgery: Procedure(s) (LRB):  ORIF, FRACTURE, PILON, LEFT (Left)  APPLICATION, EXTERNAL FIXATION DEVICE, LEFT ANKLE, Vasquez (Left) 1 Day Post-Op    Recommendations:     Discharge Recommendations: rehabilitation facility  Discharge Equipment Recommendations:  walker, rolling, bedside commode  Barriers to discharge:  Other (Comment) (current level of function)    Assessment:     Anastasiia Badillo is a 73 y.o. female with a medical diagnosis of Closed left pilon fracture.  She presents with following performance deficits affecting function: weakness, impaired endurance, impaired self care skills, impaired functional mobility, gait instability, impaired balance, decreased lower extremity function, decreased upper extremity function, decreased safety awareness, pain, orthopedic precautions.      Rehab Prognosis: Good; patient would benefit from acute skilled OT services to address these deficits and reach maximum level of function.       Plan:     Patient to be seen 4 x/week to address the above listed problems via self-care/home management, therapeutic activities, therapeutic exercises  Plan of Care Expires: 07/05/23  Plan of Care Reviewed with: patient    Subjective     Chief Complaint: pain w/ movement  Patient/Family Comments/goals: Return home    Occupational Profile:  Pt PLOF is independent w/ ADLs and functional mobility. Pt has hx of of L hip fx in February. Pt states that she has required increased assistance since this incident. Pt has a friend that comes by to assist as needed. Pt lives in a senior citizen apartment  complex that has an elevator.   Equipment Used at Home: wheelchair, rollator    Pain/Comfort:  Pain Rating 1: 6/10  Location - Side 1: Left  Location 1: leg  Pain Addressed 1: Pre-medicate for activity, Reposition, Distraction  Pain Rating Post-Intervention 1: 4/10    Patients cultural, spiritual, Samaritan conflicts given the current situation: no    Objective:     Communicated with: NSG prior to session.  Patient found supine with SCD, telemetry, PureWick, oxygen upon OT entry to room.    General Precautions: Standard, fall  Orthopedic Precautions: LLE non weight bearing  Braces: N/A (Ex fix on LLE)  Respiratory Status: Nasal cannula    Occupational Performance:    Bed Mobility:    Patient completed Rolling/Turning to Left with  maximal assistance and 2 persons  Patient completed Rolling/Turning to Right with maximal assistance  Patient completed Scooting/Bridging with maximal assistance and 2 persons  Patient completed Supine to Sit with maximal assistance and 2 persons  Patient completed Sit to Supine with maximal assistance and 2 persons    Functional Mobility/Transfers:  Pt unable to attempt sit>stand at this time. Pt BP sitting EOB 88/50 w/ increased need for assistance. Pt returned to supine and NSG notified.     Activities of Daily Living:  Upper Body Dressing: maximal assistance    Lower Body Dressing: total assistance    Toileting: total assistance      Cognitive/Visual Perceptual:  Cognitive/Psychosocial Skills:     -       Oriented to: Person and Place   -       Follows Commands/attention:Follows one-step commands    Physical Exam:  Upper Extremity Range of Motion:     -       Right Upper Extremity: WFL  -       Left Upper Extremity: WFL  Upper Extremity Strength:    -       Right Upper Extremity: Deficits: 3+/5  -       Left Upper Extremity: Deficits: 3+/5    AMPAC 6 Click ADL:  AMPAC Total Score: 12    Treatment & Education:  Role of OT, goals, POC, WB status    Patient left HOB elevated with all  lines intact, call button in reach, and NSG notified    GOALS:   Multidisciplinary Problems       Occupational Therapy Goals          Problem: Occupational Therapy    Goal Priority Disciplines Outcome Interventions   Occupational Therapy Goal     OT, PT/OT Ongoing, Progressing    Description: Goals to be met by: 7/5/23     Patient will increase functional independence with ADLs by performing:    UE Dressing with Set-up Assistance.  LE Dressing with Minimal Assistance.  Grooming while EOB with Set-up Assistance.  Toileting from bedside commode with Moderate Assistance for hygiene and clothing management.   Supine to sit with Minimal Assistance.  Toilet transfer to bedside commode with Moderate Assistance.                         History:     Past Medical History:   Diagnosis Date    Closed left pilon fracture 6/5/2023    Closed left subtrochanteric femur fracture, initial encounter 2/18/2023    Diabetes mellitus, type 2          Past Surgical History:   Procedure Laterality Date    ANGIOGRAPHY OF LOWER EXTREMITY Left 4/25/2023    Procedure: Angiogram Extremity Unilateral;  Surgeon: Gilbert Sheppard MD;  Location: [facility] OR [address] [place] [address];  Service: Vascular;  Laterality: Left;  28.4 min  487.45 mGy  75.9180 Gycm2  trans radial: 7 ml  dye: 126 ml      APPLICATION, EXTERNAL FIXATION DEVICE Left 6/6/2023    Procedure: APPLICATION, EXTERNAL FIXATION DEVICE, LEFT ANKLE, Mora;  Surgeon: Gilbert Mahmood MD;  Location: [address];  Service: Orthopedics;  Laterality: Left;    AUGMENTATION OF BREAST      INTRAMEDULLARY RODDING OF FEMUR Left 2/18/2023    Procedure: INSERTION, INTRAMEDULLARY ROXANNA, FEMUR (hana table -- synthes -- long nail -- have bovie and suction and large bone set);  Surgeon: Keanu Brand MD;  Location: [facility];  Service: Orthopedics;  Laterality: Left;    OPEN REDUCTION AND INTERNAL FIXATION (ORIF) OF PILON FRACTURE Left 6/6/2023    Procedure: ORIF, FRACTURE, PILON, LEFT;  Surgeon:  Gilbert Mahmood MD;  Location: [facility] OR [address] [place] [address];  Service: Orthopedics;  Laterality: Left;    PERCUTANEOUS TRANSLUMINAL ANGIOPLASTY Left 4/25/2023    Procedure: PTA (ANGIOPLASTY, PERCUTANEOUS, TRANSLUMINAL);  Surgeon: Gilbert Sheppard MD;  Location: [facility] OR [address] [place] [address];  Service: Vascular;  Laterality: Left;  Tibial and popliteal angioplasty       Time Tracking:     OT Date of Treatment: 06/07/23  OT Start Time: 0941  OT Stop Time: 1011  OT Total Time (min): 30 min    Billable Minutes:Evaluation 15  Neuromuscular Re-education 15    6/7/2023

## 2023-06-07 NOTE — ASSESSMENT & PLAN NOTE
Endocrinology consulted for BG management.   BG goal 140-180    Basal/prandial matching  - Levemir (Insulin Detemir) 18 units daily  - Novolog (Insulin Aspart) 6 units TIDWM and prn for BG excursions LDC SSI (150/50)  - BG checks AC/HS  - Hypoglycemia protocol in place  - If blood glucose greater than 300, please ask patient not to eat food or drink anything other than water until correctional insulin has brought it back below 250    ** Please notify Endocrine for any change and/or advance in diet**  ** Please call Endocrine for any BG related issues **    Discharge Planning:   TBD. Please notify endocrinology prior to discharge.

## 2023-06-07 NOTE — ASSESSMENT & PLAN NOTE
Chronic condition. Continue home Lisinopril 5 mg po daily to treat. Monitor vital signs every 4 hours. Target BP < 140/90.

## 2023-06-07 NOTE — ASSESSMENT & PLAN NOTE
Noted on admit. Patient in past month as noted a lump in right breast in upper outer quadrant. Patient does have palpable lump in right upper outer quadrant on exam. Patient has know bilateral breast implants in place. Patient's primary care physician is aware and had ordered to have outpatient ultrasound of breasts and mammogram done and have not been done yet. Patient to follow-up as outpatient with her primary care physician for further work-up and evaluation.

## 2023-06-07 NOTE — SUBJECTIVE & OBJECTIVE
Interval History: s/p exfix with ortho surgery. Wound cultures from left great toe pending. Pending podiatry eval of left heel wound.     Review of Systems   Constitutional:  Negative for chills, diaphoresis, fatigue and fever.   HENT: Negative.     Respiratory:  Negative for cough and shortness of breath.    Cardiovascular:  Negative for chest pain, palpitations and leg swelling.   Gastrointestinal:  Negative for constipation, diarrhea, nausea and vomiting.   Genitourinary:  Negative for difficulty urinating and dysuria.   Musculoskeletal:  Negative for arthralgias and myalgias.   Skin:  Positive for wound. Negative for color change.   Neurological:  Negative for dizziness and headaches.   Psychiatric/Behavioral:  Negative for agitation and confusion.    Objective:     Vital Signs (Most Recent):  Temp: 98 °F (36.7 °C) (06/07/23 1111)  Pulse: 74 (06/07/23 1112)  Resp: 18 (06/07/23 1111)  BP: (!) 116/54 (06/07/23 1111)  SpO2: 97 % (06/07/23 1111) Vital Signs (24h Range):  Temp:  [97.4 °F (36.3 °C)-98.6 °F (37 °C)] 98 °F (36.7 °C)  Pulse:  [73-94] 74  Resp:  [10-23] 18  SpO2:  [89 %-99 %] 97 %  BP: (105-152)/(53-88) 116/54     Weight: 72.6 kg (160 lb 1.9 oz)  Body mass index is 28.36 kg/m².    Estimated Creatinine Clearance: 40.5 mL/min (based on SCr of 1.2 mg/dL).     Physical Exam  Vitals reviewed.   Constitutional:       General: She is not in acute distress.     Appearance: Normal appearance. She is not ill-appearing.   HENT:      Head: Normocephalic.      Nose: Nose normal.      Mouth/Throat:      Mouth: Mucous membranes are moist.      Pharynx: Oropharynx is clear.   Eyes:      General:         Right eye: No discharge.         Left eye: No discharge.      Conjunctiva/sclera: Conjunctivae normal.   Cardiovascular:      Rate and Rhythm: Normal rate and regular rhythm.      Pulses: Normal pulses.      Heart sounds: Normal heart sounds.   Pulmonary:      Effort: Pulmonary effort is normal. No respiratory distress.       Breath sounds: Normal breath sounds.   Abdominal:      General: Bowel sounds are normal. There is no distension.      Palpations: Abdomen is soft.      Tenderness: There is no abdominal tenderness.   Musculoskeletal:         General: Normal range of motion.      Cervical back: Normal range of motion.      Right lower leg: No edema.      Left lower leg: No edema.   Skin:     General: Skin is warm.      Findings: No erythema or rash.      Comments: With LLE exfix   Neurological:      General: No focal deficit present.      Mental Status: She is alert and oriented to person, place, and time.      Motor: No weakness.      Gait: Gait normal.   Psychiatric:         Mood and Affect: Mood normal.         Behavior: Behavior normal.         Thought Content: Thought content normal.         Judgment: Judgment normal.        Significant Labs: All pertinent labs within the past 24 hours have been reviewed.    Significant Imaging: I have reviewed all pertinent imaging results/findings within the past 24 hours.

## 2023-06-07 NOTE — ASSESSMENT & PLAN NOTE
Type 2 diabetes mellitus with hyperglycemia, with long-term current use of insulin  Endocrine consulted to assist in management. Patient's FSGs are uncontrolled due to hyperglycemia on current medication regimen.    Last A1c reviewed-   Lab Results   Component Value Date    HGBA1C 10.2 (H) 06/05/2023     Most recent fingerstick glucose reviewed-   Recent Labs   Lab 06/06/23  1952 06/07/23  0637 06/07/23  1113 06/07/23  1629   POCTGLUCOSE 260* 214* 218* 98     Current correctional scale  Low     Maintain anti-hyperglycemic dose as follows as per Endocrine recs:  Antihyperglycemics (From admission, onward)    Start     Stop Route Frequency Ordered    06/07/23 1645  insulin aspart U-100 pen 6 Units         -- SubQ 3 times daily with meals 06/07/23 1453    06/06/23 0845  insulin detemir U-100 (Levemir) pen 18 Units         -- SubQ Daily 06/06/23 0839    06/05/23 2345  insulin aspart U-100 pen 0-5 Units         -- SubQ Before meals & nightly PRN 06/05/23 2246        Target blood sugar 140-180 in hospital.  Diabetic diet.  Monitor blood sugars 4 times daily with meals and at bedtime.

## 2023-06-07 NOTE — ASSESSMENT & PLAN NOTE
Chronic ulcer of great toe of left foot with fat layer exposed  Acute osteomyelitis of left calcaneus  Follows with Podiatry clinic. Previously had heel ulcer bone biopsy which grew Staphylococcus aureus and Staph epidermis and was prescribed Doxycycline and Levofloxacin from Wound Care clinic but not followed by Infectious Disease clinic. Afebrile, without leukocytosis. CRP elevated 127 on admit.   - Podiatry consulted, appreciate recommendations and continue wound care as per Podiatry to left heel and left first toe ulcers.  - Infectious Disease consulted, appreciate recommendations and they recommend 6 weeks of IV Cefazolin 2 grams every 8 hours to treat left heel osteomyelitis. Consult PICC line team for PICC line placement for long term antibiotics.

## 2023-06-07 NOTE — CARE UPDATE
Care Update:     Diet Adult Regular (IDDSI Level 7) [facility]     POCT Glucose   Date Value Ref Range Status   06/06/2023 146 (H) 70 - 110 mg/dL Final   06/06/2023 257 (H) 70 - 110 mg/dL Final   06/06/2023 292 (H) 70 - 110 mg/dL Final   06/06/2023 335 (H) 70 - 110 mg/dL Final   06/05/2023 302 (H) 70 - 110 mg/dL Final     Lab Results   Component Value Date    HGBA1C 10.2 (H) 06/05/2023       Endocrinology consulted for BG management.   BG goal 140-180    Diabetes Medications    BG checks AC/HS           insulin aspart U-100 (NOVOLOG) 100 unit/mL (3 mL) InPn pen Inject 0-5 Units into the skin before meals and at bedtime as needed (Hyperglycemia). Blood Glucose  mg/dL                  Pre-meal                2200  151-200                0 unit                      0 unit  201-250                2 units                    1 unit  251-300                3 units                    1 unit  301-350                4 units                    2 units  >350                     5 units                    3 units    insulin detemir U-100, Levemir, 100 unit/mL (3 mL) SubQ InPn pen Inject 18 Units into the skin once daily.          Hospital Medications               glucagon (human recombinant) injection 1 mg 1 mg, Intramuscular, As needed (PRN), Turn patient on their side, give IM, and NOTIFY MD IMMEDIATELY.<BR><BR>Feed the patient as soon as patient awakens and is able to swallow.    insulin aspart U-100 pen 0-5 Units 0-5 Units, Subcutaneous, Before meals &amp; nightly PRN, **LOW CORRECTION DOSE**<BR>Blood Glucose<BR>mg/dL                  Pre-meal                2200<BR>151-200                0 unit                      0 unit<BR>201-250                2 units                    1 unit<BR>251-300                3 units                    1 unit<BR>301-350                4 units                    2 units<BR>&gt;350                     5 units                    3 units<BR>Administer subcutaneously if needed at times  designated by monitoring schedule. <BR>DO NOT HOLD correction dose insulin for patients who are  NPO.<BR>&quot;HIGH ALERT MEDICATION&quot; - Administer with meals or TF/TPN.    insulin detemir U-100 (Levemir) pen 18 Units 18 Units, Subcutaneous, Daily, If Blood Glucose is less than 100 mg/dL at BEDTIME, give 16 grams (four glucose tablets) X 1 dose for patients who can take PO. Recheck BG in 30 minutes and call MD for insulin dose adjustments prior to administering insulin detemir (Levemir).<BR>&quot;HIGH ALERT MEDICATION&quot;              ** Please notify Endocrine for any change and/or advance in diet**  ** Please call Endocrine for any BG related issues **    Discharge Planning:   TBD. Please notify endocrinology prior to discharge.      Gurwinder Saba DNP, FNP-C  Department of Endocrinology  Inpatient Glycemic Management

## 2023-06-07 NOTE — SUBJECTIVE & OBJECTIVE
Interval History: Patient taken to OR with Orthopedics yesterday and had ORIF of left pilon fracture and ex fix placed for pilon fracture for stabilization of fracture by Dr. Gilbert Mahmood. Plan per Ortho is ex fix to stay in place for 4-6 weeks and then plan for removal by Ortho. According to Ortho, if current construct does not have enough stability for healing will consider transition to ringed fixator. Patient to be non-weight bearing to left lower extremity x 3 months post-op. Patient started on Aspirin 81 mg po BID and will need for 12 weeks as per Ortho for DVT prophylaxis. Endocrine consulted to assist in diabetes management as patient having issues with hyperglycemia as outpatient and inpatient and HgA1C 10.2% on admit. Patient states her glucose meter broke at home and was never replaced so does not know what her blood sugars have been running for about 2 months. Podiatry consulted for chronic left foot wounds involving left heel and left 1st toe and not no active infection and wound care as per Podiatry. Patient followed by Podiatry as outpatient. Infectious Disease consulted as had previous positive cultures from left foot wounds and osteomyelitis from 4/2023. Infectious disease evaluated and recommending IV Cefazolin 2 grams every 8 hours for 6 weeks to treat osteomyelitis. PT/OT consulted and to work with patient today. Patient on multimodal pain meds post-op and reports left ankle pain is controlled and 4/10.     Review of Systems   Constitutional:  Negative for fever.   Respiratory:  Negative for cough and shortness of breath.    Cardiovascular:  Negative for chest pain and leg swelling.   Gastrointestinal:  Negative for abdominal pain, constipation, diarrhea and nausea.   Musculoskeletal:  Positive for arthralgias (Left ankle).   Skin:  Positive for wound (Left foot).   Neurological:  Negative for dizziness and light-headedness.   Psychiatric/Behavioral:  Negative for agitation and confusion.     Objective:     Vital Signs (Most Recent):  Temp: 97.7 °F (36.5 °C) (06/07/23 1523)  Pulse: 83 (06/07/23 1531)  Resp: 18 (06/07/23 1523)  BP: 123/59 (06/07/23 1523)  SpO2: 99 % (06/07/23 1523) on room air Vital Signs (24h Range):  Temp:  [97.4 °F (36.3 °C)-98.6 °F (37 °C)] 97.7 °F (36.5 °C)  Pulse:  [73-94] 83  Resp:  [10-23] 18  SpO2:  [89 %-99 %] 99 %  BP: (105-152)/(53-88) 123/59     Weight: 72.6 kg (160 lb 1.9 oz)  Body mass index is 28.36 kg/m².  No intake or output data in the 24 hours ending 06/07/23 1553      Physical Exam  Vitals and nursing note reviewed.   Constitutional:       General: She is awake. She is not in acute distress.     Appearance: Normal appearance. She is normal weight. She is not ill-appearing.   Cardiovascular:      Rate and Rhythm: Normal rate and regular rhythm.      Heart sounds: Normal heart sounds. No murmur heard.    No friction rub. No gallop.   Pulmonary:      Effort: Pulmonary effort is normal. No respiratory distress.      Breath sounds: Normal breath sounds. No wheezing.   Chest:   Breasts:     Right: Mass (Small mobile hard lump in upper outer quadrant) present.   Abdominal:      General: Abdomen is flat. Bowel sounds are normal. There is no distension.      Palpations: Abdomen is soft.      Tenderness: There is no abdominal tenderness.   Musculoskeletal:      Right lower leg: No edema.      Left lower leg: No edema.      Comments: Ex fix in place to left ankle. Surgical bandages in place to left ankle and are clean and dry and intact.    Skin:     General: Skin is warm.      Findings: No erythema.      Comments: See photos in Epic of wounds to left foot   Neurological:      Mental Status: She is alert and oriented to person, place, and time.   Psychiatric:         Mood and Affect: Mood normal.         Behavior: Behavior normal. Behavior is cooperative.         Thought Content: Thought content normal.         Judgment: Judgment normal.           Significant Labs: A1C:    Recent Labs   Lab 02/17/23  1415 04/21/23  0429 06/05/23 1957   HGBA1C 8.9* 8.2* 10.2*     CBC:   Recent Labs   Lab 06/06/23 0411 06/07/23 0524   WBC 8.20 6.50   HGB 11.0* 10.0*   HCT 34.8* 31.8*    294     CMP:   Recent Labs   Lab 06/06/23 0411 06/07/23 0524   * 133*   K 5.4* 4.7   CL 93* 101   CO2 27 26   * 195*   BUN 26* 29*   CREATININE 1.2 1.2   CALCIUM 9.7 9.3   ANIONGAP 11 6*     POCT Glucose:   Recent Labs   Lab 06/06/23 1952 06/07/23  0637 06/07/23  1113   POCTGLUCOSE 260* 214* 218*       Significant Imaging: I have reviewed all pertinent imaging results/findings within the past 24 hours.

## 2023-06-07 NOTE — PLAN OF CARE
Problem: Occupational Therapy  Goal: Occupational Therapy Goal  Description: Goals to be met by: 7/5/23     Patient will increase functional independence with ADLs by performing:    UE Dressing with Set-up Assistance.  LE Dressing with Minimal Assistance.  Grooming while EOB with Set-up Assistance.  Toileting from bedside commode with Moderate Assistance for hygiene and clothing management.   Supine to sit with Minimal Assistance.  Toilet transfer to bedside commode with Moderate Assistance.    Outcome: Ongoing, Progressing

## 2023-06-07 NOTE — CONSULTS
[facility] - Surgery  Endocrinology  Diabetes Consult Note    Consult Requested by: Millicent Funes MD   Reason for admit: Closed left pilon fracture    HISTORY OF PRESENT ILLNESS:  Reason for Consult: Management of T2DM, Hyperglycemia     Surgical Procedure and Date: 6/6/23 Open reduction internal fixation left pilon fracture with fixation of tibia and fibula  External fixator placement left lower extremity    Diabetes diagnosis year: 2018    Home Diabetes Medications:  Levemir 18 units nightly. Novolog is ordered SSI LDC; but patient states she does not use.     How often checking glucose at home?  1-3 times per week  (checks at random times)  BG readings on regimen: 200's  Hypoglycemia on the regimen?  No  Missed doses on regimen?  No    Diabetes Complications include:     Hyperglycemia and Diabetic peripheral neuropathy     Complicating diabetes co morbidities:   HTN; HLD;      HPI:   Patient is a 73 y.o. female with a diagnosis of peripheral artery disease s/p revascularization and type 2 diabetes mellitus c/b neuropathy and chronic lower extremity wounds who presents as a transfer from [facility] for distal fractures of the tibia and fibula following a fall.  It was also noted that she had purulent drainage from her lower extremity wounds.  Of note, per chart review, the patient had a left heel bone biopsy performed by her podiatrist on April 27th.  Patient's bone biopsy grew Staphylococcus aureus.  She was seen in the [facility] and placed on levofloxacin and doxycycline but did not appear to be followed by the Infectious Disease Service.  She was evaluated by Orthopedic Surgery at [facility] who is planning surgical intervention on June 6th.  Patient was admitted to Hospital Medicine for further management.Endocrinology consulted for BG/ DM management.     Lab Results   Component Value Date    HGBA1C 10.2 (H) 06/05/2023               Interval HPI:   Overnight events: Remains in  530/530 A. BG at or above goal with scheduled insulin and prn correction scale. 1 Day Post-Op. Dexamethasone 4 mg x1.   Eating:  Diet Adult Regular (IDDSI Level 7) [facility]  Nausea: No  Hypoglycemia and intervention: No  Fever: No  TPN and/or TF: No    PMH, PSH, FH, SH  reviewed     ROS:    Review of Systems  Constitutional: Negative for weight changes.  Eyes: Negative for visual disturbance.  Respiratory: Negative for cough.   Cardiovascular: Negative for chest pain.  Gastrointestinal: + for nausea.  Endocrine: Negative for polyuria, polydipsia.  Musculoskeletal: Negative for back pain.  Skin: Negative for rash.  Neurological: Negative for syncope.  Psychiatric/Behavioral: Negative for depression.    Current Medications and/or Treatments Impacting Glycemic Control  Immunotherapy:    Immunosuppressants       None          Steroids:   Hormones (From admission, onward)      Start     Stop Route Frequency Ordered    06/05/23 2245  melatonin tablet 9 mg         -- Oral Nightly 06/05/23 2230          Pressors:    Autonomic Drugs (From admission, onward)      None          Hyperglycemia/Diabetes Medications:   Antihyperglycemics (From admission, onward)      Start     Stop Route Frequency Ordered    06/06/23 0845  insulin detemir U-100 (Levemir) pen 18 Units         -- SubQ Daily 06/06/23 0839    06/05/23 2345  insulin aspart U-100 pen 0-5 Units         -- SubQ Before meals & nightly PRN 06/05/23 2246             PHYSICAL EXAMINATION:  Vitals:    06/07/23 0613   BP:    Pulse:    Resp: 18   Temp:      Body mass index is 28.36 kg/m².     Physical Exam  Constitutional: Well developed, well nourished, NAD.  ENT: External ears no masses with nose patent; normal hearing.  Neck: Supple; trachea midline.  Cardiovascular: Normal heart sounds, no LE edema. DP +2 bilaterally.  Lungs: Normal effort; lungs anterior bilaterally clear to auscultation.  Abdomen: Soft, no masses, no hernias.  MS: No clubbing or cyanosis of nails  noted;  unable to assess gait.  Skin: No rashes, lesions, or ulcers; no nodules.   Psychiatric: Good judgement and insight; normal mood and affect.  Neurological: Cranial nerves are grossly intact.   Foot: nails in good condition, no amputations noted.           Labs Reviewed and Include   Recent Labs   Lab 06/07/23  0524   *   CALCIUM 9.3   *   K 4.7   CO2 26      BUN 29*   CREATININE 1.2     Lab Results   Component Value Date    WBC 6.50 06/07/2023    HGB 10.0 (L) 06/07/2023    HCT 31.8 (L) 06/07/2023    MCV 92 06/07/2023     06/07/2023     No results for input(s): TSH, FREET4 in the last 168 hours.  Lab Results   Component Value Date    HGBA1C 10.2 (H) 06/05/2023       Nutritional status:   Body mass index is 28.36 kg/m².  Lab Results   Component Value Date    ALBUMIN 3.3 (L) 06/05/2023    ALBUMIN 2.6 (L) 04/27/2023    ALBUMIN 2.6 (L) 04/26/2023     Lab Results   Component Value Date    PREALBUMIN 11 (L) 06/05/2023       Estimated Creatinine Clearance: 40.5 mL/min (based on SCr of 1.2 mg/dL).    Accu-Checks  Recent Labs     06/05/23  2120 06/06/23  0620 06/06/23  1124 06/06/23  1209 06/06/23  1828 06/06/23  1952 06/07/23  0637 06/07/23  1113   POCTGLUCOSE 302* 335* 292* 257* 146* 260* 214* 218*        ASSESSMENT and PLAN    Endocrine  Type 2 diabetes mellitus with foot ulcer, with long-term current use of insulin  Endocrinology consulted for BG management.   BG goal 140-180    Basal/prandial matching  - Levemir (Insulin Detemir) 18 units daily  - Novolog (Insulin Aspart) 6 units TIDWM and prn for BG excursions LDC SSI (150/50)  - BG checks AC/HS  - Hypoglycemia protocol in place  - If blood glucose greater than 300, please ask patient not to eat food or drink anything other than water until correctional insulin has brought it back below 250    ** Please notify Endocrine for any change and/or advance in diet**  ** Please call Endocrine for any BG related issues **    Discharge Planning:    TBD. Please notify endocrinology prior to discharge.        Orthopedic  * Closed left pilon fracture  Managed per primary.   Optimize BG control to improve wound healing        Foot ulcerations of left lower extremity  Optimize BG control.   Optimize BG control to improve wound healing          Plan discussed with patient, family, and RN at bedside.        Gurwinder Saba, DNP, FNP  Endocrinology  [facility] - Surgery

## 2023-06-07 NOTE — SUBJECTIVE & OBJECTIVE
"Principal Problem:Closed left pilon fracture    Principal Orthopedic Problem: same sp operative fixation and ex fix placement 6/6/23    Interval History: seen and evaluated. NAEON. Pain well controlled. Glucose improved to 200's w insulin management by endocrine    Review of patient's allergies indicates:  No Known Allergies    Current Facility-Administered Medications   Medication    acetaminophen tablet 1,000 mg    aspirin EC tablet 81 mg    atorvastatin tablet 80 mg    ceFAZolin 2 g in dextrose 5 % in water (D5W) 5 % 50 mL IVPB (MB+)    dextrose 10% bolus 125 mL 125 mL    dextrose 10% bolus 250 mL 250 mL    famotidine tablet 20 mg    fentaNYL 50 mcg/mL injection 25 mcg    glucagon (human recombinant) injection 1 mg    haloperidol lactate injection 0.5 mg    HYDROmorphone injection 0.2 mg    insulin aspart U-100 pen 0-5 Units    insulin aspart U-100 pen 4 Units    insulin detemir U-100 (Levemir) pen 18 Units    lisinopriL tablet 5 mg    melatonin tablet 9 mg    midazolam (VERSED) 1 mg/mL injection 0.5 mg    morphine injection 4 mg    mupirocin 2 % ointment    naloxone 0.4 mg/mL injection 0.02 mg    ondansetron disintegrating tablet 4 mg    ondansetron injection 4 mg    ondansetron injection 4 mg    oxyCODONE immediate release tablet 5 mg    polyethylene glycol packet 17 g    pregabalin capsule 75 mg    simethicone chewable tablet 80 mg    sodium chloride 0.9% flush 10 mL    sodium chloride 0.9% flush 3 mL    vitamin D 1000 units tablet 1,000 Units     Objective:     Vital Signs (Most Recent):  Temp: 98 °F (36.7 °C) (06/07/23 1111)  Pulse: 75 (06/07/23 1111)  Resp: 18 (06/07/23 1111)  BP: (!) 116/54 (06/07/23 1111)  SpO2: 97 % (06/07/23 1111) Vital Signs (24h Range):  Temp:  [97.4 °F (36.3 °C)-98.6 °F (37 °C)] 98 °F (36.7 °C)  Pulse:  [73-94] 75  Resp:  [10-23] 18  SpO2:  [89 %-99 %] 97 %  BP: (105-152)/(53-88) 116/54     Weight: 72.6 kg (160 lb 1.9 oz)  Height: 5' 3" (160 cm)  Body mass index is 28.36 " kg/m².      Intake/Output Summary (Last 24 hours) at 6/7/2023 1112  Last data filed at 6/6/2023 1454  Gross per 24 hour   Intake 1000 ml   Output --   Net 1000 ml          Ortho/SPM Exam  Gen: NAD, sitting comfortably in bed  CV: regular rate  Resp: non-labored respirations    LLE:  Heel and great toe wounds are dressed   Ex fix in place, pin sites dressed  Surgical incisiosn dressed w some ss strike through   NVI to baseline- neuropathic to calf, can wiggles toes, foot and toes pink wwp     Significant Labs: BMP:   Recent Labs   Lab 06/07/23  0524   *   *   K 4.7      CO2 26   BUN 29*   CREATININE 1.2   CALCIUM 9.3   MG 1.8     CBC:   Recent Labs   Lab 06/05/23  1346 06/06/23  0411 06/07/23  0524   WBC 11.34 8.20 6.50   HGB 11.9* 11.0* 10.0*   HCT 35.4* 34.8* 31.8*    317 294     CMP:   Recent Labs   Lab 06/05/23  1346 06/06/23  0411 06/07/23  0524   * 131* 133*   K 4.9 5.4* 4.7   CL 99 93* 101   CO2 24 27 26   * 400* 195*   BUN 22 26* 29*   CREATININE 0.9 1.2 1.2   CALCIUM 9.7 9.7 9.3   PROT 6.9  --   --    ALBUMIN 3.3*  --   --    BILITOT 0.8  --   --    ALKPHOS 96  --   --    AST 12  --   --    ALT 8*  --   --    ANIONGAP 10 11 6*     All pertinent labs within the past 24 hours have been reviewed.    Significant Imaging: I have reviewed and interpreted all pertinent imaging results/findings.

## 2023-06-07 NOTE — ASSESSMENT & PLAN NOTE
Patient declined nicotine patch. Still actively smoking at home. Patient counseled extensively on smoking cessation as has open wounds, severe PAD and now post-op from ankle fracture surgery and nicotine to impair wound healing and risk re-occlusion of peripheral  arteries in lower limbs.

## 2023-06-07 NOTE — ASSESSMENT & PLAN NOTE
" 73 year old female with PMH of uncontrolled DM2, neuropathy, tobacco use, left hip fracture s/p intramedullary denzel, PAD with recent LLE revascularization  with Dr. Sheppard (04/2023), chronic lower extremity wounds who was transferred from [facility] with distal fractures of tibia and fibula as well as worsening LE wounds.      To note, pt was recently followed by ID service for left lower foot wound. Wound culture was + MSSA. Bone culture obtained on 4/27, + for MSSA and staph epi. Path negative for osteo. Was treated x10 days for SSTI with cefadroxil. Left foot wound culture from left great toe 5/18 + e cloacae. Pt was started on doxy and then levaquin per podiatry.      Podiatry following during current hospitalization. Noting left great toe wound, skin breakdown but superficial. new culture obtained, pending. Pending evaluation of the calculous ulcer, was deferred d/t tib/fib fracture. Xray of left foot obtained, no mention of OM.      Afebrile, no leukocytosis. Elevated CRP. HA1c 10. CT of left ankle noting fracture of distal tibia, as well as skin ulcer overlying left posterior calcaneus, no evidence of bony involvement. MRI of hindfoot from 4/2023 noting potential early osteo of posterior calcaneous.      Ortho surgery following, s/p ORIF of tibia and fibula. External fixation placed with Dr. Mahmood.       Pt is on ancef IV. ID was consulted d/t  "osteomyelitis of Wounds of Left Lower Extremity - patient had bone biopsy 4/27 growing Staph Aureus".    Recommendations:   - Recommend Ancef 2g IV q8hr (CrCl 40) to cover MSSA and staph epi osteo.   - awaiting podiatry eval of left calcaneous   - will follow new culture data   - Will plan x6 weeks of IV abx for osteo based on previous images and bone culture data   - Plan reviewed with ID staff. ID will follow.   "

## 2023-06-07 NOTE — SUBJECTIVE & OBJECTIVE
Interval HPI:   Overnight events: Remains in 530/530 A. BG at or above goal with scheduled insulin and prn correction scale. 1 Day Post-Op. Dexamethasone 4 mg x1.   Eating:  Diet Adult Regular (IDDSI Level 7) [facility]  Nausea: No  Hypoglycemia and intervention: No  Fever: No  TPN and/or TF: No    PMH, PSH, FH, SH  reviewed     ROS:    Review of Systems  Constitutional: Negative for weight changes.  Eyes: Negative for visual disturbance.  Respiratory: Negative for cough.   Cardiovascular: Negative for chest pain.  Gastrointestinal: + for nausea.  Endocrine: Negative for polyuria, polydipsia.  Musculoskeletal: Negative for back pain.  Skin: Negative for rash.  Neurological: Negative for syncope.  Psychiatric/Behavioral: Negative for depression.    Current Medications and/or Treatments Impacting Glycemic Control  Immunotherapy:    Immunosuppressants       None          Steroids:   Hormones (From admission, onward)      Start     Stop Route Frequency Ordered    06/05/23 2245  melatonin tablet 9 mg         -- Oral Nightly 06/05/23 2230          Pressors:    Autonomic Drugs (From admission, onward)      None          Hyperglycemia/Diabetes Medications:   Antihyperglycemics (From admission, onward)      Start     Stop Route Frequency Ordered    06/06/23 0845  insulin detemir U-100 (Levemir) pen 18 Units         -- SubQ Daily 06/06/23 0839    06/05/23 2345  insulin aspart U-100 pen 0-5 Units         -- SubQ Before meals & nightly PRN 06/05/23 2246             PHYSICAL EXAMINATION:  Vitals:    06/07/23 0613   BP:    Pulse:    Resp: 18   Temp:      Body mass index is 28.36 kg/m².     Physical Exam  Constitutional: Well developed, well nourished, NAD.  ENT: External ears no masses with nose patent; normal hearing.  Neck: Supple; trachea midline.  Cardiovascular: Normal heart sounds, no LE edema. DP +2 bilaterally.  Lungs: Normal effort; lungs anterior bilaterally clear to auscultation.  Abdomen: Soft, no masses, no  hernias.  MS: No clubbing or cyanosis of nails noted;  unable to assess gait.  Skin: No rashes, lesions, or ulcers; no nodules.   Psychiatric: Good judgement and insight; normal mood and affect.  Neurological: Cranial nerves are grossly intact.   Foot: nails in good condition, no amputations noted.

## 2023-06-07 NOTE — ASSESSMENT & PLAN NOTE
Presenting after fall and found to have comminuted and angulated closed left distal tibia and fibular fractures. Evaluated by Orthopedic Surgery service, she was reduced and placed into a short leg splint. She will require operative intervention for this injury.   - Patient taken to OR on 6/6/2023 and underwent ORIF of pilon fracture as well as ex fix to stabilize fractures with Dr. Gilbert Mahmood.  - Patient post-op to be non weight bearing to left lower extremity x 3 months from surgery date on 6/6.   - Ortho to remove ex fix I 4-6 weeks but if construct fails then would have to convert to ring fixator as per Ortho.   - PT/OT consulted for gait training/transfer training and restoration of ADL's post-op.  - Routine pin site care to ex fix as per Ortho.  - Ice and elevate left lower extremity to help with swelling.   - Pain management with scheduled Tylenol 1000 mg po every 8 hours + Robaxin 500 mg po 4 times daily + Lyrica 75 mg po BID with Oxycodone  mg po every 4 hours prn for breakthrough pain.   - Orthopedics managing surgical site to left ankle area.   - Continue Aspirin 81 mg po BID for 12 weeks for DVT prophylaxis as per Ortho.  - Surgical bandages to remain in place to left ankle and to remian in place until Ortho clinic follow-up.

## 2023-06-07 NOTE — PROGRESS NOTES
[facility] Medicine  Progress Note    Patient Name: Anastasiia Badillo  MRN: [id number]  Patient Class: IP- Inpatient   Admission Date: 6/5/2023  Length of Stay: 2 days  Attending Physician: Millicent Funes MD  Primary Care Provider: Raji Parkinson MD        Subjective:     Principal Problem:Closed displaced pilon fracture of left tibia with routine healing        HPI:  Alycia Badillo is a 73-year-old woman with a past medical history of peripheral artery disease s/p revascularization and type 2 diabetes mellitus c/b neuropathy and chronic lower extremity wounds who presents as a transfer from [facility] for distal fractures of the tibia and fibula.  Of note, patient was hospitalized at [facility] in April 2023 for severe peripheral artery disease and underwent revascularization.  The patient was discharged and was sent to inpatient rehabilitation.  She said that she developed a pressure ulcer on her left heel.  She also has a wound on the plantar surface of her left great toe.  She has been following with a podiatrist in addition to a Wound Care clinic.  The patient said that she left inpatient rehabilitation and went to live at home with her son.  She says that she normally ambulates with a walker.  Approximately three days ago, she said she had just taken her nightly melatonin and was getting ready to fall asleep in bed.  However, she noted that she needed to use the bathroom and went to reach for the walker next to the bed.  She said that the walker was not locked and so she slid and fell to the ground hitting her left lower extremity.  She said that she began having pain and swelling to her left lower extremity.  The pain became so unbearable that she lost the ability to ambulate.  This prompted her to seek care in the emergency department at [facility].  Imaging at the [facility] reveals a comminuted and angulated distal tibia and fibular  fracture.  It was also noted that she was having purulent drainage from her lower extremity wounds.  Of note, per chart review, the patient had a left heel bone biopsy performed by her podiatrist on April 27th.  Patient's bone biopsy grew Staphylococcus aureus.  She was seen in the Wound Care Clinic and placed on levofloxacin and doxycycline but did not appear to be followed by the Infectious Disease Service.    In the emergency department, the patient was noted to have stable vital signs upon arrival.  Per report, the patient received IV antibiotics at [facility] prior to transfer.  She was evaluated by Orthopedic Surgery at [facility] who is planning surgical intervention on June 6th.  Patient was admitted to Hospital Medicine for further management.      Overview/Hospital Course:  Patient taken to OR with Orthopedics on 6/6/2023 and had ORIF of left pilon fracture and ex fix placed for pilon fracture for stabilization of fracture by Dr. Gilbert Mahmood. Plan per Ortho is ex fix to stay in place for 4-6 weeks and then plan for removal by Ortho. According to Ortho, if current construct does not have enough stability for healing will consider transition to ringed fixator. Patient to be non-weight bearing to left lower extremity x 3 months post-op. Patient started on Aspirin 81 mg po BID and will need for 12 weeks as per Ortho for DVT prophylaxis. Endocrine consulted to assist in diabetes management. Podiatry consulted for left foot wounds and not no active infection and wound care as per Podiatry. Infectious Disease consulted as had previous positive cultures from left foot wounds and osteomyelitis. Infectious disease evaluated and recommending IV Cefazolin 2 grams every 8 hours for 6 weeks to treat osteomyelitis. PT/OT consulted. Patient on multimodal pain meds post-op.         Interval History: Patient taken to OR with Orthopedics yesterday and had ORIF of left pilon fracture and ex fix  placed for pilon fracture for stabilization of fracture by Dr. Gilbert Mahmood. Plan per Ortho is ex fix to stay in place for 4-6 weeks and then plan for removal by Ortho. According to Ortho, if current construct does not have enough stability for healing will consider transition to ringed fixator. Patient to be non-weight bearing to left lower extremity x 3 months post-op. Patient started on Aspirin 81 mg po BID and will need for 12 weeks as per Ortho for DVT prophylaxis. Endocrine consulted to assist in diabetes management as patient having issues with hyperglycemia as outpatient and inpatient and HgA1C 10.2% on admit. Patient states her glucose meter broke at home and was never replaced so does not know what her blood sugars have been running for about 2 months. Podiatry consulted for chronic left foot wounds involving left heel and left 1st toe and not no active infection and wound care as per Podiatry. Patient followed by Podiatry as outpatient. Infectious Disease consulted as had previous positive cultures from left foot wounds and osteomyelitis from 4/2023. Infectious disease evaluated and recommending IV Cefazolin 2 grams every 8 hours for 6 weeks to treat osteomyelitis. PT/OT consulted and to work with patient today. Patient on multimodal pain meds post-op and reports left ankle pain is controlled and 4/10.     Review of Systems   Constitutional:  Negative for fever.   Respiratory:  Negative for cough and shortness of breath.    Cardiovascular:  Negative for chest pain and leg swelling.   Gastrointestinal:  Negative for abdominal pain, constipation, diarrhea and nausea.   Musculoskeletal:  Positive for arthralgias (Left ankle).   Skin:  Positive for wound (Left foot).   Neurological:  Negative for dizziness and light-headedness.   Psychiatric/Behavioral:  Negative for agitation and confusion.    Objective:     Vital Signs (Most Recent):  Temp: 97.7 °F (36.5 °C) (06/07/23 1523)  Pulse: 83 (06/07/23  1531)  Resp: 18 (06/07/23 1523)  BP: 123/59 (06/07/23 1523)  SpO2: 99 % (06/07/23 1523) on room air Vital Signs (24h Range):  Temp:  [97.4 °F (36.3 °C)-98.6 °F (37 °C)] 97.7 °F (36.5 °C)  Pulse:  [73-94] 83  Resp:  [10-23] 18  SpO2:  [89 %-99 %] 99 %  BP: (105-152)/(53-88) 123/59     Weight: 72.6 kg (160 lb 1.9 oz)  Body mass index is 28.36 kg/m².  No intake or output data in the 24 hours ending 06/07/23 1553      Physical Exam  Vitals and nursing note reviewed.   Constitutional:       General: She is awake. She is not in acute distress.     Appearance: Normal appearance. She is normal weight. She is not ill-appearing.   Cardiovascular:      Rate and Rhythm: Normal rate and regular rhythm.      Heart sounds: Normal heart sounds. No murmur heard.    No friction rub. No gallop.   Pulmonary:      Effort: Pulmonary effort is normal. No respiratory distress.      Breath sounds: Normal breath sounds. No wheezing.   Chest:   Breasts:     Right: Mass (Small mobile hard lump in upper outer quadrant) present.   Abdominal:      General: Abdomen is flat. Bowel sounds are normal. There is no distension.      Palpations: Abdomen is soft.      Tenderness: There is no abdominal tenderness.   Musculoskeletal:      Right lower leg: No edema.      Left lower leg: No edema.      Comments: Ex fix in place to left ankle. Surgical bandages in place to left ankle and are clean and dry and intact.    Skin:     General: Skin is warm.      Findings: No erythema.      Comments: See photos in Epic of wounds to left foot   Neurological:      Mental Status: She is alert and oriented to person, place, and time.   Psychiatric:         Mood and Affect: Mood normal.         Behavior: Behavior normal. Behavior is cooperative.         Thought Content: Thought content normal.         Judgment: Judgment normal.           Significant Labs: A1C:   Recent Labs   Lab 02/17/23  1415 04/21/23  0429 06/05/23 1957   HGBA1C 8.9* 8.2* 10.2*     CBC:   Recent Labs    Lab 06/06/23 0411 06/07/23  0524   WBC 8.20 6.50   HGB 11.0* 10.0*   HCT 34.8* 31.8*    294     CMP:   Recent Labs   Lab 06/06/23 0411 06/07/23  0524   * 133*   K 5.4* 4.7   CL 93* 101   CO2 27 26   * 195*   BUN 26* 29*   CREATININE 1.2 1.2   CALCIUM 9.7 9.3   ANIONGAP 11 6*     POCT Glucose:   Recent Labs   Lab 06/06/23 1952 06/07/23  0637 06/07/23  1113   POCTGLUCOSE 260* 214* 218*       Significant Imaging: I have reviewed all pertinent imaging results/findings within the past 24 hours.      Assessment/Plan:      * Closed displaced pilon fracture of left tibia with routine healing s/p ORIF of pilon and ex fix on 6/6/2023  Presenting after fall and found to have comminuted and angulated closed left distal tibia and fibular fractures. Evaluated by Orthopedic Surgery service, she was reduced and placed into a short leg splint. She will require operative intervention for this injury.   - Patient taken to OR on 6/6/2023 and underwent ORIF of pilon fracture as well as ex fix to stabilize fractures with Dr. Gilbert Mahmood.  - Patient post-op to be non weight bearing to left lower extremity x 3 months from surgery date on 6/6.   - Ortho to remove ex fix I 4-6 weeks but if construct fails then would have to convert to ring fixator as per Ortho.   - PT/OT consulted for gait training/transfer training and restoration of ADL's post-op.  - Routine pin site care to ex fix as per Ortho.  - Ice and elevate left lower extremity to help with swelling.   - Pain management with scheduled Tylenol 1000 mg po every 8 hours + Robaxin 500 mg po 4 times daily + Lyrica 75 mg po BID with Oxycodone  mg po every 4 hours prn for breakthrough pain.   - Orthopedics managing surgical site to left ankle area.   - Continue Aspirin 81 mg po BID for 12 weeks for DVT prophylaxis as per Ortho.  - Surgical bandages to remain in place to left ankle and to remian in place until Ortho clinic follow-up.         Type 2  diabetes mellitus with foot ulcer, with long-term current use of insulin  Type 2 diabetes mellitus with hyperglycemia, with long-term current use of insulin  Endocrine consulted to assist in management. Patient's FSGs are uncontrolled due to hyperglycemia on current medication regimen.    Last A1c reviewed-   Lab Results   Component Value Date    HGBA1C 10.2 (H) 06/05/2023     Most recent fingerstick glucose reviewed-   Recent Labs   Lab 06/06/23  1952 06/07/23  0637 06/07/23  1113 06/07/23  1629   POCTGLUCOSE 260* 214* 218* 98     Current correctional scale  Low     Maintain anti-hyperglycemic dose as follows as per Endocrine recs:  Antihyperglycemics (From admission, onward)    Start     Stop Route Frequency Ordered    06/07/23 1645  insulin aspart U-100 pen 6 Units         -- SubQ 3 times daily with meals 06/07/23 1453    06/06/23 0845  insulin detemir U-100 (Levemir) pen 18 Units         -- SubQ Daily 06/06/23 0839    06/05/23 2345  insulin aspart U-100 pen 0-5 Units         -- SubQ Before meals & nightly PRN 06/05/23 2246        Target blood sugar 140-180 in hospital.  Diabetic diet.  Monitor blood sugars 4 times daily with meals and at bedtime.     Chronic ulcer of left heel with fat layer exposed  Chronic ulcer of great toe of left foot with fat layer exposed  Acute osteomyelitis of left calcaneus  Follows with Podiatry clinic. Previously had heel ulcer bone biopsy which grew Staphylococcus aureus and Staph epidermis and was prescribed Doxycycline and Levofloxacin from Wound Care clinic but not followed by Infectious Disease clinic. Afebrile, without leukocytosis. CRP elevated 127 on admit.   - Podiatry consulted, appreciate recommendations and continue wound care as per Podiatry to left heel and left first toe ulcers.  - Infectious Disease consulted, appreciate recommendations and they recommend 6 weeks of IV Cefazolin 2 grams every 8 hours to treat left heel osteomyelitis. Consult PICC line team for PICC  line placement for long term antibiotics.     PAD (peripheral artery disease)  · Known severe peripheral arterial disease. Vascular Surgery during last hospitalization and had revascularization of left lower extremity in 4/2023 with percutaneous transluminal angioplasty of left popliteal artery and percutaneous transluminal angioplasty of the entire length of the left posterior tibial artery.  · Patient on Aspirin Plavix and high dose Lipitor to treat and will continue in hospital.     Essential (primary) hypertension  Chronic condition. Continue home Lisinopril 5 mg po daily to treat. Monitor vital signs every 4 hours. Target BP < 140/90.       Tobacco use  Patient declined nicotine patch. Still actively smoking at home. Patient counseled extensively on smoking cessation as has open wounds, severe PAD and now post-op from ankle fracture surgery and nicotine to impair wound healing and risk re-occlusion of peripheral  arteries in lower limbs.     Mass of upper outer quadrant of right breast  Noted on admit. Patient in past month as noted a lump in right breast in upper outer quadrant. Patient does have palpable lump in right upper outer quadrant on exam. Patient has know bilateral breast implants in place. Patient's primary care physician is aware and had ordered to have outpatient ultrasound of breasts and mammogram done and have not been done yet. Patient to follow-up as outpatient with her primary care physician for further work-up and evaluation.         VTE Risk Mitigation (From admission, onward)         Ordered     Reason for No Pharmacological VTE Prophylaxis  Once        Question:  Reasons:  Answer:  Risk of Bleeding  Comment:  Ortho planning surgery in AM    06/05/23 2246     IP VTE HIGH RISK PATIENT  Once         06/05/23 2246     Place sequential compression device  Until discontinued         06/05/23 2246                Discharge Planning   CITLALI: 6/9/2023     Code Status: Full Code   Is the patient  medically ready for discharge?: No    Reason for patient still in hospital (select all that apply): Patient trending condition  Discharge Plan A: Long-term acute care facility (LTAC) vs. IP Rehab         Millicent Funes MD  Department of Hospital Medicine   [facility] - Surgery

## 2023-06-07 NOTE — PT/OT/SLP EVAL
Physical Therapy Co-Evaluation and Co-Treatment    Patient Name:  Anastasiia Badillo   MRN:  [id number]    Recommendations:     Discharge Recommendations: rehabilitation facility   Discharge Equipment Recommendations: walker, rolling   Barriers to discharge: Decreased caregiver support    Assessment:     Anastasiia Badillo is a 73 y.o. female admitted with a medical diagnosis of Closed left pilon fracture.  She presents with the following impairments/functional limitations: weakness, impaired endurance, decreased lower extremity function, impaired functional mobility, pain, impaired balance. Pt educated on NWB of L LE prior to all functional mobility being performed. Pt completed supine to sit with maxA. Pt required maxA to maintain upright sitting posture and demonstrated posterior trunk lean without support. Pt verbalized dizziness with EOB sitting that did not resolve with rest. BP taken and noted to be at 88/50 resulting in termination of session and maxA x 2 to return from sitting to supine. Pt is progressing towards goals and would benefit from continued skilled therapy to return to PLOF.    Rehab Prognosis: Fair; patient would benefit from acute skilled PT services to address these deficits and reach maximum level of function.    Recent Surgery: Procedure(s) (LRB):  ORIF, FRACTURE, PILON, LEFT (Left)  APPLICATION, EXTERNAL FIXATION DEVICE, LEFT ANKLE, Vasquez (Left) 1 Day Post-Op    Plan:     During this hospitalization, patient to be seen 5 x/week to address the identified rehab impairments via gait training, therapeutic activities, therapeutic exercises, neuromuscular re-education and progress toward the following goals:    Plan of Care Expires:  07/07/23    Subjective     Chief Complaint: pain in L LE  Patient/Family Comments/goals: return to PLOF  Pain/Comfort:  Pain Rating 1: 6/10  Location - Side 1: Left  Location 1: leg  Pain Addressed 1: Pre-medicate for activity, Distraction, Reposition  Pain Rating  Post-Intervention 1: 4/10 (not rated due pt demonstrating increased confusion at termination of session)    Patients cultural, spiritual, Christianity conflicts given the current situation: no    Living Environment:  Pt lives alone in an apartment complex for seniors with nursing staff available for help but does not provide 24 hour care. Pt reports she uses an elevator to navigate multiple levels within apartment complex. Pt reports friends can assist her when needed at her apartment.  Prior to admission, patients level of function was independent with use of rollator and occasional use of wheelchair. Pt reports that she does not like to use WC often due to difficulty with self-propelling with B UE due to pain.  Equipment used at home: wheelchair, rollator.  DME owned (not currently used): wheelchair.  Upon discharge, patient will have assistance from friends.    Objective:     Communicated with RN prior to session.  Patient found HOB elevated with SCD, telemetry, PureWick, oxygen  upon PT entry to room.    General Precautions: Standard, fall  Orthopedic Precautions:LLE non weight bearing   Braces: N/A  Respiratory Status: Nasal cannula, flow 2 L/min    Exams:  Cognitive Exam:  Patient is oriented to Person, Place, Time, Situation, and however, pt demonstrated increased confusion throughout treatment demonstrating inappropriate/prolonged responses to questions  Gross Motor Coordination:  WFL  RLE ROM: WFL  RLE Strength: WFL  LLE ROM: WFL  LLE Strength: 2+/5    Functional Mobility:  Bed Mobility:     Supine to Sit: maximal assistance and of 2 persons  Sit to Supine: maximal assistance and of 2 persons  Balance: Pt required maxA to maintain upright sitting balance but demonstrated ability to maintain EOB sitting with SBA with UE support for < 15 seconds. Pt demonstrated posterior trunk lean without support.        AM-PAC 6 CLICK MOBILITY  Total Score:11       Treatment & Education:  Pt required two skilled therapist  in order to promote optimal pt safety while performing and assessing functional mobility. Pt found in with HOB elevated and L LE propped under pillows. Pt provided education on NWB L LE status and role of PT. Pt verbalized apprehension about performing mobility today. Pt reassured and agreeable to attempt functional mobility. Pt required maxA x 2 for supine to sitting. Pt reported feeling dizzy at EOB sitting. Pt required maxA to maintain upright sitting balance but demonstrated ability to maintain EOB sitting with SBA with UE support for < 15 seconds. Pt demonstrated posterior trunk lean without support.  Dizziness did not resolve with prolonged rest in seated position. BP taken and noted to be at 88/50 resulting in termination of activities and returning patient to supine with maxA x 2.    Patient left HOB elevated with all lines intact and call button in reach.    GOALS:   Multidisciplinary Problems       Physical Therapy Goals          Problem: Physical Therapy    Goal Priority Disciplines Outcome Goal Variances Interventions   Physical Therapy Goal     PT, PT/OT Ongoing, Progressing     Description: Goals to be met by: 23     Patient will increase functional independence with mobility by performin. Supine to sit with Minimal Assistance  2. Sit to stand transfer with Minimal Assistance  3. Bed to chair transfer with Minimal Assistance using Rolling Walker.  4. Gait  x 100 feet with Minimal Assistance using Rolling Walker.   5. Pt will demonstrate understanding and adhere to NWB of L LE for all functional mobility.                         History:     Past Medical History:   Diagnosis Date    Closed left pilon fracture 2023    Closed left subtrochanteric femur fracture, initial encounter 2023    Diabetes mellitus, type 2        Past Surgical History:   Procedure Laterality Date    ANGIOGRAPHY OF LOWER EXTREMITY Left 2023    Procedure: Angiogram Extremity Unilateral;  Surgeon: Gilbert  SEVERIANO Sheppard MD;  Location: [facility] OR [facility] FLR;  Service: Vascular;  Laterality: Left;  28.4 min  487.45 mGy  75.9180 Gycm2  trans radial: 7 ml  dye: 126 ml      APPLICATION, EXTERNAL FIXATION DEVICE Left 6/6/2023    Procedure: APPLICATION, EXTERNAL FIXATION DEVICE, LEFT ANKLE, Kirby;  Surgeon: Gilbert Mahmood MD;  Location: [facility] OR [facility] FLR;  Service: Orthopedics;  Laterality: Left;    AUGMENTATION OF BREAST      INTRAMEDULLARY RODDING OF FEMUR Left 2/18/2023    Procedure: INSERTION, INTRAMEDULLARY ROXANNA, FEMUR (hana table -- synthes -- long nail -- have bovie and suction and large bone set);  Surgeon: Keanu Brand MD;  Location: Health system OR;  Service: Orthopedics;  Laterality: Left;    OPEN REDUCTION AND INTERNAL FIXATION (ORIF) OF PILON FRACTURE Left 6/6/2023    Procedure: ORIF, FRACTURE, PILON, LEFT;  Surgeon: Gilbert Mahmood MD;  [facility];  Service: Orthopedics;  Laterality: Left;    PERCUTANEOUS TRANSLUMINAL ANGIOPLASTY Left 4/25/2023    Procedure: PTA (ANGIOPLASTY, PERCUTANEOUS, TRANSLUMINAL);  Surgeon: Gilbert Sheppard MD;  [facility];  Service: Vascular;  Laterality: Left;  Tibial and popliteal angioplasty       Time Tracking:     PT Received On: 06/07/23  PT Start Time: 0939     PT Stop Time: 1010  PT Total Time (min): 31 min     Billable Minutes: Evaluation 8 and Therapeutic Activity 23 06/07/2023

## 2023-06-07 NOTE — PROGRESS NOTES
"[facility]  Infectious Disease  Progress Note    Patient Name: Anastasiia Badillo  MRN: [id number]  Admission Date: 6/5/2023  Length of Stay: 2 days  Attending Physician: Millicent Funes MD  Primary Care Provider: Raji Parkinson MD    Isolation Status: No active isolations  Assessment/Plan:      Orthopedic  Foot ulcerations of left lower extremity   73 year old female with PMH of uncontrolled DM2, neuropathy, tobacco use, left hip fracture s/p intramedullary denzel, PAD with recent LLE revascularization  with Dr. Sheppard (04/2023), chronic lower extremity wounds who [facility] distal fractures of tibia and fibula as well as worsening LE wounds.      To note, pt was recently followed by ID service for left lower foot wound. Wound culture was + MSSA. Bone culture obtained on 4/27, + for MSSA and staph epi. Path negative for osteo. Was treated x10 days for SSTI with cefadroxil. Left foot wound culture from left great toe 5/18 + e cloacae. Pt was started on doxy and then levaquin per podiatry.      Podiatry following during current hospitalization. Noting left great toe wound, skin breakdown but superficial. new culture obtained, pending. Pending evaluation of the calculous ulcer, was deferred d/t tib/fib fracture. Xray of left foot obtained, no mention of OM.      Afebrile, no leukocytosis. Elevated CRP. HA1c 10. CT of left ankle noting fracture of distal tibia, as well as skin ulcer overlying left posterior calcaneus, no evidence of bony involvement. MRI of hindfoot from 4/2023 noting potential early osteo of posterior calcaneous.      Ortho surgery following, s/p ORIF of tibia and fibula. External fixation placed with Dr. Mahmood.       Pt is on ancef IV. ID was consulted d/t  "osteomyelitis of Wounds of Left Lower Extremity - patient had bone biopsy 4/27 growing Staph Aureus".    Recommendations:   - Recommend Ancef 2g IV q8hr (CrCl 40) to cover MSSA and staph epi osteo.   - awaiting " "podiatry eval of left calcaneous   - will follow new culture data   - Will plan x6 weeks of IV abx for osteo based on previous images and bone culture data   - Plan reviewed with ID staff. ID will follow.             Thank you for your consult. I will follow-up with patient. Please contact us if you have any additional questions.    Leonardo Jon NP  Infectious Disease  [facility]    Subjective:     Principal Problem:Closed left pilon fracture    HPI: Ms Badillo is a 73 year old female with PMH of uncontrolled DM2, neuropathy, tobacco use, left hip fracture s/p intramedullary denzel, PAD with recent LLE revascularization  with Dr. Sheppard (04/2023), chronic lower extremity wounds who was transferred from [facility] with distal fractures of tibia and fibula as well as worsening LE wounds. Per chart review, about 3 days ago, pt sustained a fall after getting out of bed. Reports she fell onto her left lower extremity. She presented to [facility] d/t pain.     To note, pt was recently followed by ID service for left lower foot wound. Imaging negative for OM. Wound culture was + MSSA. Bone culture obtained on 4/27, + for MSSA and staph epi. Path negative for osteo. Was treated x10 days for SSTI with cefadroxil. Left foot wound culture from 5/18 + e cloacae. Pt was started on doxy and then levaquin per podiatry.     Since admission, pt afebrile, no leukocytosis. Elevated CRP. HA1c 10. CT of left ankle noting fracture of distal tibia, as well as skin ulcer overlying left posterior calcaneus, no evidence of bony involvement.     Pt is currently on piperacillin tazobactam. ID was consulted d/t  "osteomyelitis of Wounds of Left Lower Extremity - patient had bone biopsy 4/27 growing Staph Aureus".          Interval History: s/p exfix with ortho surgery. Wound cultures from left great toe pending. Pending podiatry eval of left heel wound.     Review of Systems   Constitutional:  Negative for chills, diaphoresis, fatigue " and fever.   HENT: Negative.     Respiratory:  Negative for cough and shortness of breath.    Cardiovascular:  Negative for chest pain, palpitations and leg swelling.   Gastrointestinal:  Negative for constipation, diarrhea, nausea and vomiting.   Genitourinary:  Negative for difficulty urinating and dysuria.   Musculoskeletal:  Negative for arthralgias and myalgias.   Skin:  Positive for wound. Negative for color change.   Neurological:  Negative for dizziness and headaches.   Psychiatric/Behavioral:  Negative for agitation and confusion.    Objective:     Vital Signs (Most Recent):  Temp: 98 °F (36.7 °C) (06/07/23 1111)  Pulse: 74 (06/07/23 1112)  Resp: 18 (06/07/23 1111)  BP: (!) 116/54 (06/07/23 1111)  SpO2: 97 % (06/07/23 1111) Vital Signs (24h Range):  Temp:  [97.4 °F (36.3 °C)-98.6 °F (37 °C)] 98 °F (36.7 °C)  Pulse:  [73-94] 74  Resp:  [10-23] 18  SpO2:  [89 %-99 %] 97 %  BP: (105-152)/(53-88) 116/54     Weight: 72.6 kg (160 lb 1.9 oz)  Body mass index is 28.36 kg/m².    Estimated Creatinine Clearance: 40.5 mL/min (based on SCr of 1.2 mg/dL).     Physical Exam  Vitals reviewed.   Constitutional:       General: She is not in acute distress.     Appearance: Normal appearance. She is not ill-appearing.   HENT:      Head: Normocephalic.      Nose: Nose normal.      Mouth/Throat:      Mouth: Mucous membranes are moist.      Pharynx: Oropharynx is clear.   Eyes:      General:         Right eye: No discharge.         Left eye: No discharge.      Conjunctiva/sclera: Conjunctivae normal.   Cardiovascular:      Rate and Rhythm: Normal rate and regular rhythm.      Pulses: Normal pulses.      Heart sounds: Normal heart sounds.   Pulmonary:      Effort: Pulmonary effort is normal. No respiratory distress.      Breath sounds: Normal breath sounds.   Abdominal:      General: Bowel sounds are normal. There is no distension.      Palpations: Abdomen is soft.      Tenderness: There is no abdominal tenderness.    Musculoskeletal:         General: Normal range of motion.      Cervical back: Normal range of motion.      Right lower leg: No edema.      Left lower leg: No edema.   Skin:     General: Skin is warm.      Findings: No erythema or rash.      Comments: With LLE exfix   Neurological:      General: No focal deficit present.      Mental Status: She is alert and oriented to person, place, and time.      Motor: No weakness.      Gait: Gait normal.   Psychiatric:         Mood and Affect: Mood normal.         Behavior: Behavior normal.         Thought Content: Thought content normal.         Judgment: Judgment normal.        Significant Labs: All pertinent labs within the past 24 hours have been reviewed.    Significant Imaging: I have reviewed all pertinent imaging results/findings within the past 24 hours.

## 2023-06-08 ENCOUNTER — DOCUMENT SCAN (OUTPATIENT)
Dept: HOME HEALTH SERVICES | Facility: HOSPITAL | Age: 73
End: 2023-06-08
Payer: MEDICARE

## 2023-06-08 LAB
ANION GAP SERPL CALC-SCNC: 8 MMOL/L (ref 8–16)
BUN SERPL-MCNC: 31 MG/DL (ref 8–23)
CALCIUM SERPL-MCNC: 8.7 MG/DL (ref 8.7–10.5)
CHLORIDE SERPL-SCNC: 98 MMOL/L (ref 95–110)
CO2 SERPL-SCNC: 25 MMOL/L (ref 23–29)
CREAT SERPL-MCNC: 1.1 MG/DL (ref 0.5–1.4)
ERYTHROCYTE [DISTWIDTH] IN BLOOD BY AUTOMATED COUNT: 13.6 % (ref 11.5–14.5)
EST. GFR  (NO RACE VARIABLE): 53.4 ML/MIN/1.73 M^2
GLUCOSE SERPL-MCNC: 177 MG/DL (ref 70–110)
HCT VFR BLD AUTO: 30.1 % (ref 37–48.5)
HGB BLD-MCNC: 9.6 G/DL (ref 12–16)
MCH RBC QN AUTO: 29 PG (ref 27–31)
MCHC RBC AUTO-ENTMCNC: 31.9 G/DL (ref 32–36)
MCV RBC AUTO: 91 FL (ref 82–98)
PLATELET # BLD AUTO: 299 K/UL (ref 150–450)
PMV BLD AUTO: 12.1 FL (ref 9.2–12.9)
POCT GLUCOSE: 125 MG/DL (ref 70–110)
POCT GLUCOSE: 201 MG/DL (ref 70–110)
POCT GLUCOSE: 208 MG/DL (ref 70–110)
POTASSIUM SERPL-SCNC: 4.7 MMOL/L (ref 3.5–5.1)
RBC # BLD AUTO: 3.31 M/UL (ref 4–5.4)
SODIUM SERPL-SCNC: 131 MMOL/L (ref 136–145)
WBC # BLD AUTO: 7.77 K/UL (ref 3.9–12.7)

## 2023-06-08 PROCEDURE — 25000003 PHARM REV CODE 250: Performed by: INTERNAL MEDICINE

## 2023-06-08 PROCEDURE — 97530 THERAPEUTIC ACTIVITIES: CPT | Mod: CO

## 2023-06-08 PROCEDURE — 99233 PR SUBSEQUENT HOSPITAL CARE,LEVL III: ICD-10-PCS | Mod: ,,, | Performed by: HOSPITALIST

## 2023-06-08 PROCEDURE — 99233 SBSQ HOSP IP/OBS HIGH 50: CPT | Mod: ,,, | Performed by: REGISTERED NURSE

## 2023-06-08 PROCEDURE — 76937 US GUIDE VASCULAR ACCESS: CPT

## 2023-06-08 PROCEDURE — 36573 INSJ PICC RS&I 5 YR+: CPT

## 2023-06-08 PROCEDURE — 99233 PR SUBSEQUENT HOSPITAL CARE,LEVL III: ICD-10-PCS | Mod: ,,, | Performed by: REGISTERED NURSE

## 2023-06-08 PROCEDURE — 21400001 HC TELEMETRY ROOM

## 2023-06-08 PROCEDURE — 25000003 PHARM REV CODE 250

## 2023-06-08 PROCEDURE — 85027 COMPLETE CBC AUTOMATED: CPT | Performed by: STUDENT IN AN ORGANIZED HEALTH CARE EDUCATION/TRAINING PROGRAM

## 2023-06-08 PROCEDURE — A4216 STERILE WATER/SALINE, 10 ML: HCPCS | Performed by: HOSPITALIST

## 2023-06-08 PROCEDURE — 97530 THERAPEUTIC ACTIVITIES: CPT | Mod: CQ

## 2023-06-08 PROCEDURE — 25000003 PHARM REV CODE 250: Performed by: STUDENT IN AN ORGANIZED HEALTH CARE EDUCATION/TRAINING PROGRAM

## 2023-06-08 PROCEDURE — 94761 N-INVAS EAR/PLS OXIMETRY MLT: CPT

## 2023-06-08 PROCEDURE — 36415 COLL VENOUS BLD VENIPUNCTURE: CPT | Performed by: STUDENT IN AN ORGANIZED HEALTH CARE EDUCATION/TRAINING PROGRAM

## 2023-06-08 PROCEDURE — 99233 SBSQ HOSP IP/OBS HIGH 50: CPT | Mod: ,,, | Performed by: HOSPITALIST

## 2023-06-08 PROCEDURE — 99232 SBSQ HOSP IP/OBS MODERATE 35: CPT | Mod: ,,, | Performed by: NURSE PRACTITIONER

## 2023-06-08 PROCEDURE — 63600175 PHARM REV CODE 636 W HCPCS: Performed by: STUDENT IN AN ORGANIZED HEALTH CARE EDUCATION/TRAINING PROGRAM

## 2023-06-08 PROCEDURE — 99232 PR SUBSEQUENT HOSPITAL CARE,LEVL II: ICD-10-PCS | Mod: ,,, | Performed by: NURSE PRACTITIONER

## 2023-06-08 PROCEDURE — 99900035 HC TECH TIME PER 15 MIN (STAT)

## 2023-06-08 PROCEDURE — C1751 CATH, INF, PER/CENT/MIDLINE: HCPCS

## 2023-06-08 PROCEDURE — 25000003 PHARM REV CODE 250: Performed by: HOSPITALIST

## 2023-06-08 PROCEDURE — 97535 SELF CARE MNGMENT TRAINING: CPT | Mod: CO

## 2023-06-08 PROCEDURE — 25000003 PHARM REV CODE 250: Performed by: REGISTERED NURSE

## 2023-06-08 PROCEDURE — 80048 BASIC METABOLIC PNL TOTAL CA: CPT | Performed by: STUDENT IN AN ORGANIZED HEALTH CARE EDUCATION/TRAINING PROGRAM

## 2023-06-08 RX ORDER — CIPROFLOXACIN 750 MG/1
750 TABLET, FILM COATED ORAL EVERY 12 HOURS
Status: DISCONTINUED | OUTPATIENT
Start: 2023-06-08 | End: 2023-06-13

## 2023-06-08 RX ORDER — SODIUM CHLORIDE 0.9 % (FLUSH) 0.9 %
10 SYRINGE (ML) INJECTION EVERY 6 HOURS
Status: DISCONTINUED | OUTPATIENT
Start: 2023-06-08 | End: 2023-06-14 | Stop reason: HOSPADM

## 2023-06-08 RX ORDER — SODIUM CHLORIDE 0.9 % (FLUSH) 0.9 %
10 SYRINGE (ML) INJECTION
Status: DISCONTINUED | OUTPATIENT
Start: 2023-06-08 | End: 2023-06-14 | Stop reason: HOSPADM

## 2023-06-08 RX ADMIN — OXYCODONE HYDROCHLORIDE 10 MG: 10 TABLET ORAL at 08:06

## 2023-06-08 RX ADMIN — ACETAMINOPHEN 1000 MG: 500 TABLET ORAL at 05:06

## 2023-06-08 RX ADMIN — INSULIN ASPART 6 UNITS: 100 INJECTION, SOLUTION INTRAVENOUS; SUBCUTANEOUS at 04:06

## 2023-06-08 RX ADMIN — LISINOPRIL 5 MG: 5 TABLET ORAL at 08:06

## 2023-06-08 RX ADMIN — ASPIRIN 81 MG: 81 TABLET, COATED ORAL at 08:06

## 2023-06-08 RX ADMIN — ACETAMINOPHEN 1000 MG: 500 TABLET ORAL at 09:06

## 2023-06-08 RX ADMIN — CEFAZOLIN 2 G: 2 INJECTION, POWDER, FOR SOLUTION INTRAMUSCULAR; INTRAVENOUS at 12:06

## 2023-06-08 RX ADMIN — OXYCODONE HYDROCHLORIDE 10 MG: 10 TABLET ORAL at 12:06

## 2023-06-08 RX ADMIN — INSULIN ASPART 6 UNITS: 100 INJECTION, SOLUTION INTRAVENOUS; SUBCUTANEOUS at 11:06

## 2023-06-08 RX ADMIN — INSULIN ASPART 6 UNITS: 100 INJECTION, SOLUTION INTRAVENOUS; SUBCUTANEOUS at 06:06

## 2023-06-08 RX ADMIN — Medication 10 ML: at 06:06

## 2023-06-08 RX ADMIN — INSULIN ASPART 2 UNITS: 100 INJECTION, SOLUTION INTRAVENOUS; SUBCUTANEOUS at 11:06

## 2023-06-08 RX ADMIN — METHOCARBAMOL 500 MG: 500 TABLET ORAL at 08:06

## 2023-06-08 RX ADMIN — CEFAZOLIN 2 G: 2 INJECTION, POWDER, FOR SOLUTION INTRAMUSCULAR; INTRAVENOUS at 08:06

## 2023-06-08 RX ADMIN — CLOPIDOGREL BISULFATE 75 MG: 75 TABLET ORAL at 08:06

## 2023-06-08 RX ADMIN — ATORVASTATIN CALCIUM 80 MG: 40 TABLET, FILM COATED ORAL at 08:06

## 2023-06-08 RX ADMIN — CIPROFLOXACIN 750 MG: 750 TABLET, FILM COATED ORAL at 08:06

## 2023-06-08 RX ADMIN — METHOCARBAMOL 500 MG: 500 TABLET ORAL at 04:06

## 2023-06-08 RX ADMIN — ACETAMINOPHEN 1000 MG: 500 TABLET ORAL at 03:06

## 2023-06-08 RX ADMIN — Medication 9 MG: at 08:06

## 2023-06-08 RX ADMIN — METHOCARBAMOL 500 MG: 500 TABLET ORAL at 12:06

## 2023-06-08 RX ADMIN — Medication 10 ML: at 12:06

## 2023-06-08 RX ADMIN — PREGABALIN 75 MG: 75 CAPSULE ORAL at 08:06

## 2023-06-08 RX ADMIN — INSULIN DETEMIR 18 UNITS: 100 INJECTION, SOLUTION SUBCUTANEOUS at 08:06

## 2023-06-08 RX ADMIN — CHOLECALCIFEROL TAB 25 MCG (1000 UNIT) 1000 UNITS: 25 TAB at 08:06

## 2023-06-08 RX ADMIN — CEFAZOLIN 2 G: 2 INJECTION, POWDER, FOR SOLUTION INTRAMUSCULAR; INTRAVENOUS at 05:06

## 2023-06-08 RX ADMIN — FAMOTIDINE 20 MG: 20 TABLET, FILM COATED ORAL at 08:06

## 2023-06-08 NOTE — PROCEDURES
"Anastasiia Badillo is a 73 y.o. female patient.    Temp: 97.6 °F (36.4 °C) (06/08/23 1131)  Pulse: 86 (06/08/23 1138)  Resp: 16 (06/08/23 1131)  BP: (!) 116/59 (06/08/23 1131)  SpO2: (!) 94 % (06/08/23 1131)  Weight: 72.9 kg (160 lb 11.5 oz) (06/07/23 2042)  Height: 5' 2.99" (160 cm) (06/07/23 2042)    PICC  Date/Time: 6/8/2023 11:22 AM  Performed by: Marco Perkins RN  Assisting provider: Nereida Hernandez RN  Consent Done: Yes  Time out: Immediately prior to procedure a time out was called to verify the correct patient, procedure, equipment, support staff and site/side marked as required  Indications: med administration and vascular access  Anesthesia: local infiltration  Local anesthetic: lidocaine 1% without epinephrine  Anesthetic Total (mL): 3  Description of findings: PICC  Preparation: skin prepped with ChloraPrep  Skin prep agent dried: skin prep agent completely dried prior to procedure  Sterile barriers: all five maximum sterile barriers used - cap, mask, sterile gown, sterile gloves, and large sterile sheet  Hand hygiene: hand hygiene performed prior to central venous catheter insertion  Location details: right basilic  Catheter size: 5 Fr  Catheter Length: 35cm    Ultrasound guidance: yes  Vessel Caliber: medium and patent, compressibility normal  Vascular Doppler: not done  Needle advanced into vessel with real time Ultrasound guidance.  Guidewire confirmed in vessel.  Image recorded and saved.  Sterile sheath used.  Number of attempts: 1  Post-procedure: blood return through all ports, chlorhexidine patch and sterile dressing applied  Technical procedures used: 3CG  Specimens: No  Implants: No  Assessment: placement verified by x-ray  Complications: none        Name   6/8/2023    "

## 2023-06-08 NOTE — SUBJECTIVE & OBJECTIVE
"Interval HPI:   Overnight events: No acute events overnight. Patient in room 530/530 A. Blood glucose stable. BG at and above goal on current insulin regimen (SSI, prandial, and basal insulin ). Steroid use- None. 2 Days Post-Op  Renal function- Normal   Vasopressors-  None       Endocrine will continue to follow and manage insulin orders inpatient.         Diet diabetic [facility]; 2000 Calorie; Thin     Eatin%  Nausea: No  Hypoglycemia and intervention: No  Fever: No  TPN and/or TF: No      /72   Pulse 82   Temp 97.9 °F (36.6 °C)   Resp 18   Ht 5' 2.99" (1.6 m)   Wt 72.9 kg (160 lb 11.5 oz)   SpO2 95%   Breastfeeding No   BMI 28.48 kg/m²     Labs Reviewed and Include    Recent Labs   Lab 23  0504   *   CALCIUM 8.7   *   K 4.7   CO2 25   CL 98   BUN 31*   CREATININE 1.1     Lab Results   Component Value Date    WBC 7.77 2023    HGB 9.6 (L) 2023    HCT 30.1 (L) 2023    MCV 91 2023     2023     No results for input(s): TSH, FREET4 in the last 168 hours.  Lab Results   Component Value Date    HGBA1C 10.2 (H) 2023       Nutritional status:   Body mass index is 28.48 kg/m².  Lab Results   Component Value Date    ALBUMIN 3.3 (L) 2023    ALBUMIN 2.6 (L) 2023    ALBUMIN 2.6 (L) 2023     Lab Results   Component Value Date    PREALBUMIN 11 (L) 2023       Estimated Creatinine Clearance: 44.2 mL/min (based on SCr of 1.1 mg/dL).    Accu-Checks  Recent Labs     23  0620 23  1124 23  1209 23  1828 23  1952 23  0637 23  1113 23  1629 23  0029 23  0643   POCTGLUCOSE 335* 292* 257* 146* 260* 214* 218* 98 125* 208*       Current Medications and/or Treatments Impacting Glycemic Control  Immunotherapy:    Immunosuppressants       None          Steroids:   Hormones (From admission, onward)      Start     Stop Route Frequency Ordered    23 2245  melatonin " tablet 9 mg         -- Oral Nightly 06/05/23 2230          Pressors:    Autonomic Drugs (From admission, onward)      None          Hyperglycemia/Diabetes Medications:   Antihyperglycemics (From admission, onward)      Start     Stop Route Frequency Ordered    06/09/23 0900  insulin detemir U-100 (Levemir) pen 22 Units         -- SubQ Daily 06/08/23 0932    06/07/23 1645  insulin aspart U-100 pen 6 Units         -- SubQ 3 times daily with meals 06/07/23 1453    06/05/23 2345  insulin aspart U-100 pen 0-5 Units         -- SubQ Before meals & nightly PRN 06/05/23 2244

## 2023-06-08 NOTE — SUBJECTIVE & OBJECTIVE
Interval History: s/p exfix with ortho surgery. Wound cultures from left great toe + pseudomonas. Previous bone culture + MSSA, staph epi     Review of Systems   Constitutional:  Negative for chills, diaphoresis, fatigue and fever.   HENT: Negative.     Respiratory:  Negative for cough and shortness of breath.    Cardiovascular:  Negative for chest pain, palpitations and leg swelling.   Gastrointestinal:  Negative for constipation, diarrhea, nausea and vomiting.   Genitourinary:  Negative for difficulty urinating and dysuria.   Musculoskeletal:  Negative for arthralgias and myalgias.   Skin:  Positive for wound. Negative for color change.   Neurological:  Negative for dizziness and headaches.   Psychiatric/Behavioral:  Negative for agitation and confusion.    Objective:     Vital Signs (Most Recent):  Temp: 97.6 °F (36.4 °C) (06/08/23 1131)  Pulse: 86 (06/08/23 1138)  Resp: 18 (06/08/23 1230)  BP: (!) 116/59 (06/08/23 1131)  SpO2: (!) 94 % (06/08/23 1131) Vital Signs (24h Range):  Temp:  [97.6 °F (36.4 °C)-99.1 °F (37.3 °C)] 97.6 °F (36.4 °C)  Pulse:  [76-86] 86  Resp:  [16-18] 18  SpO2:  [94 %-99 %] 94 %  BP: ()/(54-83) 116/59     Weight: 72.9 kg (160 lb 11.5 oz)  Body mass index is 28.48 kg/m².    Estimated Creatinine Clearance: 44.2 mL/min (based on SCr of 1.1 mg/dL).     Physical Exam  Vitals reviewed.   Constitutional:       General: She is not in acute distress.     Appearance: Normal appearance. She is not ill-appearing.   HENT:      Head: Normocephalic.      Nose: Nose normal.      Mouth/Throat:      Mouth: Mucous membranes are moist.      Pharynx: Oropharynx is clear.   Eyes:      General:         Right eye: No discharge.         Left eye: No discharge.      Conjunctiva/sclera: Conjunctivae normal.   Cardiovascular:      Rate and Rhythm: Normal rate and regular rhythm.      Pulses: Normal pulses.      Heart sounds: Normal heart sounds.   Pulmonary:      Effort: Pulmonary effort is normal. No  respiratory distress.      Breath sounds: Normal breath sounds.   Abdominal:      General: Bowel sounds are normal. There is no distension.      Palpations: Abdomen is soft.      Tenderness: There is no abdominal tenderness.   Musculoskeletal:         General: Normal range of motion.      Cervical back: Normal range of motion.      Right lower leg: No edema.      Left lower leg: No edema.   Skin:     General: Skin is warm.      Findings: No erythema or rash.      Comments: With LLE exfix   Neurological:      General: No focal deficit present.      Mental Status: She is alert and oriented to person, place, and time.      Motor: No weakness.      Gait: Gait normal.   Psychiatric:         Mood and Affect: Mood normal.         Behavior: Behavior normal.         Thought Content: Thought content normal.         Judgment: Judgment normal.        Significant Labs: All pertinent labs within the past 24 hours have been reviewed.    Significant Imaging: I have reviewed all pertinent imaging results/findings within the past 24 hours.

## 2023-06-08 NOTE — ASSESSMENT & PLAN NOTE
----- Message from Kathryn Guerrero MD sent at 7/19/2019 10:05 AM CDT -----  Please notify the patient of normal results.  Follow-up as planned.   Optimize BG control.   Optimize BG control to improve wound healing

## 2023-06-08 NOTE — PROGRESS NOTES
[facility]  Orthopedics  Progress Note    Patient Name: Anastasiia Badillo  MRN: [id number]  Admission Date: 6/5/2023  Hospital Length of Stay: 3 days  Attending Provider: Sonia Kim MD  Primary Care Provider: Raji Parkinson MD  Follow-up For: Procedure(s) (LRB):  ORIF, FRACTURE, PILON, LEFT (Left)  APPLICATION, EXTERNAL FIXATION DEVICE, LEFT ANKLE, [place] (Left)    Post-Operative Day: 2 Days Post-Op  Subjective:     Principal Problem:Closed displaced pilon fracture of left tibia with routine healing    Principal Orthopedic Problem: same sp operative fixation and ex fix placement 6/6/23    Interval History:  Doing ok. NAEON. Pain well controlled. Glucose improved to 180's w insulin management by endocrine. Hgb 9.6. Worked with PT yesterday and did ok. Rec SNF     Review of patient's allergies indicates:  No Known Allergies    Current Facility-Administered Medications   Medication    acetaminophen tablet 1,000 mg    aspirin EC tablet 81 mg    atorvastatin tablet 80 mg    ceFAZolin 2 g in dextrose 5 % in water (D5W) 5 % 50 mL IVPB (MB+)    clopidogreL tablet 75 mg    dextrose 10% bolus 125 mL 125 mL    dextrose 10% bolus 250 mL 250 mL    famotidine tablet 20 mg    glucagon (human recombinant) injection 1 mg    insulin aspart U-100 pen 0-5 Units    insulin aspart U-100 pen 6 Units    insulin detemir U-100 (Levemir) pen 18 Units    lisinopriL tablet 5 mg    melatonin tablet 9 mg    methocarbamoL tablet 500 mg    mupirocin 2 % ointment    naloxone 0.4 mg/mL injection 0.02 mg    ondansetron disintegrating tablet 4 mg    oxyCODONE immediate release tablet 5 mg    oxyCODONE immediate release tablet Tab 10 mg    polyethylene glycol packet 17 g    pregabalin capsule 75 mg    simethicone chewable tablet 80 mg    sodium chloride 0.9% flush 10 mL    vitamin D 1000 units tablet 1,000 Units     Objective:     Vital Signs (Most Recent):  Temp: 99.1 °F (37.3 °C) (06/08/23 0530)  Pulse: 84 (06/08/23 0530)  Resp: 18  "(06/08/23 0530)  BP: 137/83 (06/08/23 0530)  SpO2: 97 % (06/08/23 0530) Vital Signs (24h Range):  Temp:  [97.7 °F (36.5 °C)-99.1 °F (37.3 °C)] 99.1 °F (37.3 °C)  Pulse:  [74-84] 84  Resp:  [16-18] 18  SpO2:  [94 %-99 %] 97 %  BP: ()/(53-83) 137/83     Weight: 72.9 kg (160 lb 11.5 oz)  Height: 5' 2.99" (160 cm)  Body mass index is 28.48 kg/m².    No intake or output data in the 24 hours ending 06/08/23 0558    Ortho/SPM Exam  Gen: NAD, sitting comfortably in bed  CV: regular rate  Resp: non-labored respirations     LLE:  Heel and great toe wounds are dressed   Ex fix in place, pin sites dressed  Surgical incisiosn dressed w some ss strike through   NVI to baseline- neuropathic to calf, can wiggles toes, foot and toes pink wwp      Significant Labs: CBC:   Recent Labs   Lab 06/07/23  0524 06/08/23  0504   WBC 6.50 7.77   HGB 10.0* 9.6*   HCT 31.8* 30.1*    299     CMP:   Recent Labs   Lab 06/07/23  0524 06/08/23  0504   * 131*   K 4.7 4.7    98   CO2 26 25   * 177*   BUN 29* 31*   CREATININE 1.2 1.1   CALCIUM 9.3 8.7   ANIONGAP 6* 8     All pertinent labs within the past 24 hours have been reviewed.    Significant Imaging: I have reviewed all pertinent imaging results/findings.    Assessment/Plan:     * Closed left pilon fracture  73F w uncontrolled DM (A1C 10) w neuropathy to calves, PAD (sp recent LLE revascularization 5/2023), and chronic left heela nd great toe wounds who fell 6/4 and has left pilon and left distal fibula fracture. She is closed and neurovascularly intact to baseline. She has diabetic foot wounds over her plantar heel and great toe.      -SP operative fixation of left pilon and fibula, left ankle external fixator placement 6/6/23 w Dr Mahmood     Recs  Multimodal pain regimen  NWB LLE in ex fix, elevate  BID pin site care for ex fix today  DVT ppx w ASA 81 bid, SCD RLE all times while in bed   PT/OT daily  ID following for abx recs for left foot wounds/possible " osteo  Ancef IV per ID for previous left heel bone cultures +staph   Wound care for left heel/great toe wounds per podiatry   BG management per Endocrine   FU 2 wks post op w ortho    Kala Daniels PA-C  Orthopedics  [facility]

## 2023-06-08 NOTE — PROGRESS NOTES
"[facility]  Adult Nutrition  Progress Note    SUMMARY       Recommendations  1. Continue Diabetic Diet - texture per SLP   2. Continue Boost Glucose Control TID   3. RD following.    Goals: Meet % EEN/EPN by RD f/u date  Nutrition Goal Status: new  Communication of RD Recs: other (comment) (POC)    Assessment and Plan  Nutrition Problem  Altered nutrition related lab results    Related to (etiology):   Uncontrolled DM    Signs and Symptoms (as evidenced by):   A1c 10.2%      Interventions/Recommendations (treatment strategy):  Collaboration of nutrition care with other providers  Nutrition Education (CHO Counting For People With Diabetes)    Nutrition Diagnosis Status:   New    Reason for Assessment  Reason For Assessment:  (new A1c)  Diagnosis: other (see comments) (fracture of tibia)  Relevant Medical History: HTN, T2DM, PAD  Interdisciplinary Rounds: did not attend  General Information Comments:  6/8: RD visited for new a1c. Spoke with pt at bedside. Reported she's had a good appetite, consuming 50-75% of meals. No n/v/c/s or issues with chewing or swallowing. Noted decrease in appetite since Febuary. Per chart review, no significant weight loss. CHO counting for people with Diabetes given on 6/8. Discussed CHOs and how to count them at each meal. Handout provided with foods containing CHO, tips for CHo counting, and a sample menu left at bedside. Pt verbalized understanding. All questions answered. Pt appears nourished, NFPE not warranteed at this time.  Nutrition Discharge Planning: pending clinical course    Nutrition Risk Screen  Nutrition Risk Screen: no indicators present    Nutrition/Diet History  Patient Reported Diet/Restrictions/Preferences: diabetic diet  Spiritual, Cultural Beliefs, Amish Practices, Values that Affect Care: no  Factors Affecting Nutritional Intake: None identified at this time    Anthropometrics  Temp: 97.6 °F (36.4 °C)  Height Method: Stated  Height: 5' 2.99" (160 " cm)  Height (inches): 62.99 in  Weight Method: Bed Scale  Weight: 72.9 kg (160 lb 11.5 oz)  Weight (lb): 160.72 lb  Ideal Body Weight (IBW), Female: 114.95 lb  % Ideal Body Weight, Female (lb): 139.82 %  BMI (Calculated): 28.5     Lab/Procedures/Meds  Pertinent Labs Reviewed: reviewed  Pertinent Labs Comments: Na 131, BUN 31, eGFR 53.4, glucose 177, ALT 8, Chol 203, A1c 10.2%  Pertinent Medications Reviewed: reviewed  Pertinent Medications Comments: atorvastatin, Vit. D    Estimated/Assessed Needs  Weight Used For Calorie Calculations: 72.9 kg (160 lb 11.5 oz)  Energy Calorie Requirements (kcal): 1449  Energy Need Method: Freeborn-St Jeor (1.2 PAL)  Protein Requirements: 73 g (1.0 g/kg)  Weight Used For Protein Calculations: 72.9 kg (160 lb 11.5 oz)  Fluid Requirements (mL): 1 ml/kcal or per MD  Estimated Fluid Requirement Method: RDA Method  RDA Method (mL): 1449  CHO Requirement: 181 g    Nutrition Prescription Ordered  Current Diet Order: diabetic, thin  Oral Nutrition Supplement: Boost Glucose Control    Evaluation of Received Nutrient/Fluid Intake  I/O: N/A  Energy Calories Required: not meeting needs  Protein Required: not meeting needs  Fluid Required: not meeting needs  Tolerance: tolerating  % Intake of Estimated Energy Needs: 50 - 75 %  % Meal Intake: 50 - 75 %    Nutrition Risk  Level of Risk/Frequency of Follow-up:  (1x week)     Monitor and Evaluation  Food and Nutrient Intake: energy intake, food and beverage intake  Food and Nutrient Adminstration: diet order  Knowledge/Beliefs/Attitudes: food and nutrition knowledge/skill, beliefs and attitudes  Physical Activity and Function: nutrition-related ADLs and IADLs  Anthropometric Measurements: height/length, weight, weight change, body mass index  Biochemical Data, Medical Tests and Procedures: electrolyte and renal panel, gastrointestinal profile, inflammatory profile, lipid profile, glucose/endocrine profile  Nutrition-Focused Physical Findings: overall  appearance     Nutrition Follow-Up  RD Follow-up?: Yes      Nabila Monreal, Dietetic Intern

## 2023-06-08 NOTE — PT/OT/SLP PROGRESS
Physical Therapy Treatment    Patient Name:  Anastasiia Badillo   MRN:  [id number]    Recommendations:     Discharge Recommendations: rehabilitation facility  Discharge Equipment Recommendations: walker, rolling, bedside commode  Barriers to discharge: Decreased caregiver support    Assessment:     Anastasiia Badillo is a 73 y.o. female admitted with a medical diagnosis of Closed displaced pilon fracture of left tibia with routine healing.  She presents with the following impairments/functional limitations: weakness, impaired endurance, decreased lower extremity function, impaired functional mobility, pain, impaired balance .Pt  tolerated treatment fairly well and is progressing slowly with mobility. pt limited due to fatigue. Patient remains appropriate for continued skilled services within the acute environment and goals remain appropriate.      Rehab Prognosis: Good; patient would benefit from acute skilled PT services to address these deficits and reach maximum level of function.    Recent Surgery: Procedure(s) (LRB):  ORIF, FRACTURE, PILON, LEFT (Left)  APPLICATION, EXTERNAL FIXATION DEVICE, LEFT ANKLE, [place] (Left) 2 Days Post-Op    Plan:     During this hospitalization, patient to be seen 5 x/week to address the identified rehab impairments via gait training, therapeutic activities, therapeutic exercises, neuromuscular re-education and progress toward the following goals:    Plan of Care Expires:  07/07/23    Subjective     Chief Complaint: fatigue  Patient/Family Comments/goals: I just got done moving around I am tired  Pain/Comfort:  Pain Rating 1:  (not rated)  Location - Side 1: Left  Location 1: leg  Pain Addressed 1: Pre-medicate for activity, Distraction, Reposition      Objective:     Communicated with RN prior to session.  Patient found HOB elevated with  (SCD; telemetry; PureWick; oxygen) upon PT entry to room.     General Precautions: Standard, fall  Orthopedic Precautions: LLE non weight  bearing  Braces: N/A (Ex fix on LLE)  Respiratory Status: Room air     Functional Mobility:  Bed Mobility:     Supine to Sit: maximal assistance and of 2 persons  Sit to Supine: maximal assistance and of 2 persons  Balance: Pt required SBA to modA to maintain upright sitting balance but demonstrated ability to maintain EOB sitting with UE support Pt demonstrated posterior trunk lean without support.    Transfers:     Sit to Stand:  maximal assistance and of 2 persons with rolling walker with (A) to maintain NWB L LE, pt able to come to partial stand due to fear of falling and not letting go of bed rail to transition to RW      AM-PAC 6 CLICK MOBILITY  Turning over in bed (including adjusting bedclothes, sheets and blankets)?: 3  Sitting down on and standing up from a chair with arms (e.g., wheelchair, bedside commode, etc.): 2  Moving from lying on back to sitting on the side of the bed?: 2  Moving to and from a bed to a chair (including a wheelchair)?: 2  Need to walk in hospital room?: 1  Climbing 3-5 steps with a railing?: 1  Basic Mobility Total Score: 11       Treatment & Education:  Therapist provided instruction and educated of  patient on progress, safety,d/c,PT POC,   proper body mechanics, energy conservation, and fall prevention strategies during tasks listed above, on the effects of prolonged immobility and the importance of performing OOB activity and exercises to promote healing and reduce recovery time    Updated white board with appropriate PT mobility information for medical team notification     Donned an extra gown  Bedside table in front of patient and area set up for function, convenience, and safety. RN aware of patient's mobility needs and status. Questions/concerns addressed within PTA scope of practice; patient  with no further questions. Time was provided for active listening, discussion of health disposition, and discussion of safe discharge. Pt?verbalized?agreement .  Co-tx performed with  OT due to pt's need for 2 skilled therapists to ensure pt and staff safety and to accommodate for pt's activity tolerance      Patient left HOB elevated with all lines intact, call button in reach, and nsg notified..    GOALS:   Multidisciplinary Problems       Physical Therapy Goals          Problem: Physical Therapy    Goal Priority Disciplines Outcome Goal Variances Interventions   Physical Therapy Goal     PT, PT/OT Ongoing, Progressing     Description: Goals to be met by: 23     Patient will increase functional independence with mobility by performin. Supine to sit with Minimal Assistance  2. Sit to stand transfer with Minimal Assistance  3. Bed to chair transfer with Minimal Assistance using Rolling Walker.  4. Gait  x 100 feet with Minimal Assistance using Rolling Walker.   5. Pt will demonstrate understanding and adhere to NWB of L LE for all functional mobility.                         Time Tracking:     PT Received On: 23  PT Start Time: 1135     PT Stop Time: 1200  PT Total Time (min): 25 min     Billable Minutes: Therapeutic Activity 25    Treatment Type: Treatment  PT/PTA: PTA     Number of PTA visits since last PT visit: 2023

## 2023-06-08 NOTE — ASSESSMENT & PLAN NOTE
Presenting after fall and found to have comminuted and angulated closed left distal tibia and fibular fractures. Evaluated by Orthopedic Surgery service, she was reduced and placed into a short leg splint. She will require operative intervention for this injury.   - Patient taken to OR on 6/6/2023 and underwent ORIF of pilon fracture as well as ex fix to stabilize fractures with Dr. Gilbert Mahmood.  - Patient post-op to be non weight bearing to left lower extremity x 3 months from surgery date on 6/6.   - Ortho to remove ex fix I 4-6 weeks but if construct fails then would have to convert to ring fixator as per Ortho.   - PT/OT consulted for gait training/transfer training and restoration of ADL's post-op.  - Routine pin site care to ex fix as per Ortho.  - Ice and elevate left lower extremity to help with swelling.   - Pain management with scheduled Tylenol 1000 mg po every 8 hours + Robaxin 500 mg po 4 times daily + Lyrica 75 mg po BID with Oxycodone  mg po every 4 hours prn for breakthrough pain.   - Orthopedics managing surgical site to left ankle area.   - Continue Aspirin 81 mg po BID for 12 weeks for DVT prophylaxis as per Ortho. End date aug 30th then back to daily for PVD  - Surgical bandages to remain in place to left ankle and to remian in place until Ortho clinic follow-up.

## 2023-06-08 NOTE — PLAN OF CARE
Patient denied for Rehab placement. SW sent updated clinicals for LTAC placement, [facility] and [facility]. SW to follow.    [facility]. Pt [facility]. SW provided pt list at bedside.    [facility] Accepted ([place] #[phone]) following.     Hayde Jennings LMSW  Case Management   [facility]   Ext. [phone]

## 2023-06-08 NOTE — ASSESSMENT & PLAN NOTE
· Known severe peripheral arterial disease. Vascular Surgery during last hospitalization and had revascularization of left lower extremity in 4/2023 with percutaneous transluminal angioplasty of left popliteal artery and percutaneous transluminal angioplasty of the entire length of the left posterior tibial artery.  · Patient on Aspirin Plavix and high dose Lipitor to treat and will continue in hospital.   · Needs BID asa for 12 weeks until aug 30 then back to daily  · Will resume plavix 6/9 if okay per ortho

## 2023-06-08 NOTE — PROGRESS NOTES
[facility] - Surgery  Endocrinology  Progress Note    Admit Date: 6/5/2023     Reason for Consult: Management of T2DM, Hyperglycemia     Surgical Procedure and Date: 6/6/23 Open reduction internal fixation left pilon fracture with fixation of tibia and fibula  External fixator placement left lower extremity    Diabetes diagnosis year: 2018    Home Diabetes Medications:  Levemir 18 units nightly. Novolog is ordered SSI LDC; but patient states she does not use.     How often checking glucose at home?  1-3 times per week  (checks at random times)  BG readings on regimen: 200's  Hypoglycemia on the regimen?  No  Missed doses on regimen?  No    Diabetes Complications include:     Hyperglycemia and Diabetic peripheral neuropathy     Complicating diabetes co morbidities:   HTN; HLD;      HPI:   Patient is a 73 y.o. female with a diagnosis of peripheral artery disease s/p revascularization and type 2 diabetes mellitus c/b neuropathy and chronic lower extremity wounds who presents as a transfer from [facility] for distal fractures of the tibia and fibula following a fall.  It was also noted that she had purulent drainage from her lower extremity wounds.  Of note, per chart review, the patient had a left heel bone biopsy performed by her podiatrist on April 27th.  Patient's bone biopsy grew Staphylococcus aureus.  She was seen in the Wound Care Clinic and placed on levofloxacin and doxycycline but did not appear to be followed by the Infectious Disease Service.  She was evaluated by Orthopedic Surgery at [facility] who is planning surgical intervention on June 6th.  Patient was admitted to Hospital Medicine for further management.Endocrinology consulted for BG/ DM management.     Lab Results   Component Value Date    HGBA1C 10.2 (H) 06/05/2023               Interval HPI:   Overnight events: No acute events overnight. Patient in room 530/530 A. Blood glucose stable. BG at and above goal on current insulin  "regimen (SSI, prandial, and basal insulin ). Steroid use- None. 2 Days Post-Op  Renal function- Normal   Vasopressors-  None       Endocrine will continue to follow and manage insulin orders inpatient.         Diet diabetic [facility];  Calorie; Thin     Eatin%  Nausea: No  Hypoglycemia and intervention: No  Fever: No  TPN and/or TF: No      /72   Pulse 82   Temp 97.9 °F (36.6 °C)   Resp 18   Ht 5' 2.99" (1.6 m)   Wt 72.9 kg (160 lb 11.5 oz)   SpO2 95%   Breastfeeding No   BMI 28.48 kg/m²     Labs Reviewed and Include    Recent Labs   Lab 23  0504   *   CALCIUM 8.7   *   K 4.7   CO2 25   CL 98   BUN 31*   CREATININE 1.1     Lab Results   Component Value Date    WBC 7.77 2023    HGB 9.6 (L) 2023    HCT 30.1 (L) 2023    MCV 91 2023     2023     No results for input(s): TSH, FREET4 in the last 168 hours.  Lab Results   Component Value Date    HGBA1C 10.2 (H) 2023       Nutritional status:   Body mass index is 28.48 kg/m².  Lab Results   Component Value Date    ALBUMIN 3.3 (L) 2023    ALBUMIN 2.6 (L) 2023    ALBUMIN 2.6 (L) 2023     Lab Results   Component Value Date    PREALBUMIN 11 (L) 2023       Estimated Creatinine Clearance: 44.2 mL/min (based on SCr of 1.1 mg/dL).    Accu-Checks  Recent Labs     23  0620 23  1124 23  1209 23  1828 23  1952 23  0637 23  1113 23  1629 23  0029 23  0643   POCTGLUCOSE 335* 292* 257* 146* 260* 214* 218* 98 125* 208*       Current Medications and/or Treatments Impacting Glycemic Control  Immunotherapy:    Immunosuppressants       None          Steroids:   Hormones (From admission, onward)      Start     Stop Route Frequency Ordered    23 2245  melatonin tablet 9 mg         -- Oral Nightly 23 223          Pressors:    Autonomic Drugs (From admission, onward)      None          Hyperglycemia/Diabetes " Medications:   Antihyperglycemics (From admission, onward)      Start     Stop Route Frequency Ordered    06/09/23 0900  insulin detemir U-100 (Levemir) pen 22 Units         -- SubQ Daily 06/08/23 0932    06/07/23 1645  insulin aspart U-100 pen 6 Units         -- SubQ 3 times daily with meals 06/07/23 1453    06/05/23 2345  insulin aspart U-100 pen 0-5 Units         -- SubQ Before meals & nightly PRN 06/05/23 2246            ASSESSMENT and PLAN    Endocrine  Type 2 diabetes mellitus with foot ulcer, with long-term current use of insulin  Endocrinology consulted for BG management.   BG goal 140-180    - Levemir (Insulin Detemir) 22 units daily (20% increase due to fasting blood glucose above goal)   - Novolog (Insulin Aspart) 6 units TIDWM and prn for BG excursions LDC SSI (150/50)  - BG checks AC/HS  - Hypoglycemia protocol in place  - If blood glucose greater than 300, please ask patient not to eat food or drink anything other than water until correctional insulin has brought it back below 250    ** Please notify Endocrine for any change and/or advance in diet**  ** Please call Endocrine for any BG related issues **    Discharge Planning:   TBD. Please notify endocrinology prior to discharge.        Orthopedic  * Closed displaced pilon fracture of left tibia with routine healing s/p ORIF of pilon and ex fix on 6/6/2023  Managed per primary.   Optimize BG control to improve wound healing        Chronic ulcer of left heel with fat layer exposed  Optimize BG control.   Optimize BG control to improve wound healing           Gurwinder Saba, DNP, FNP  Endocrinology  [facility] - Surgery

## 2023-06-08 NOTE — ASSESSMENT & PLAN NOTE
Chronic ulcer of great toe of left foot with fat layer exposed  Acute osteomyelitis of left calcaneus  Follows with Podiatry clinic. Previously had heel ulcer bone biopsy which grew Staphylococcus aureus and Staph epidermis and was prescribed Doxycycline and Levofloxacin from Wound Care clinic but not followed by Infectious Disease clinic. Afebrile, without leukocytosis. CRP elevated 127 on admit.   - Podiatry consulted, appreciate recommendations and continue wound care as per Podiatry to left heel and left first toe ulcers. Wound care recs per podiatry note on 6/7 in place.   - Infectious Disease consulted, appreciate recommendations and they recommend 6 weeks of IV Cefazolin 2 grams every 8 hours to treat left heel osteomyelitis. picc placed 6/8, awaiting antibitoic changes today given pseudomonas in wound will adjust per ID

## 2023-06-08 NOTE — ASSESSMENT & PLAN NOTE
Type 2 diabetes mellitus with hyperglycemia, with long-term current use of insulin  Endocrine consulted to assist in management. Patient's FSGs are uncontrolled due to hyperglycemia on current medication regimen.    Last A1c reviewed-   Lab Results   Component Value Date    HGBA1C 10.2 (H) 06/05/2023     Most recent fingerstick glucose reviewed-   Recent Labs   Lab 06/07/23  1629 06/08/23  0029 06/08/23  0643 06/08/23  1132   POCTGLUCOSE 98 125* 208* 201*     Current correctional scale  Low     Maintain anti-hyperglycemic dose as follows as per Endocrine recs:  Antihyperglycemics (From admission, onward)    Start     Stop Route Frequency Ordered    06/09/23 0900  insulin detemir U-100 (Levemir) pen 22 Units         -- SubQ Daily 06/08/23 0932    06/07/23 1645  insulin aspart U-100 pen 6 Units         -- SubQ 3 times daily with meals 06/07/23 1453    06/05/23 2345  insulin aspart U-100 pen 0-5 Units         -- SubQ Before meals & nightly PRN 06/05/23 2246        Target blood sugar 140-180 in hospital.  Diabetic diet.  Monitor blood sugars 4 times daily with meals and at bedtime.   Appreciate endo assisting as she reports was not controlled at home and likely contributor to poor wound healing of her LLE prior to admit

## 2023-06-08 NOTE — PROGRESS NOTES
[facility] - Surgery  Podiatry  Progress Note    Patient Name: Anastasiia Badillo  MRN: [id number]  Admission Date: 6/5/2023  Hospital Length of Stay: 2 days  Attending Physician: Sonia Kim MD  Primary Care Provider: Raji Parkinson MD     Subjective:     Interval History: 1 day s/p L pilon ORIF per ortho. NAEON. VSS. AF. WBC 6.5, Na 133. Wound cultures pending. Patient resting comfortably with LLE elevated. Relays well controlled pain.     Follow-up For: Procedure(s) (LRB):  ORIF, FRACTURE, PILON, LEFT (Left)  APPLICATION, EXTERNAL FIXATION DEVICE, LEFT ANKLE, [place] (Left)    Post-Operative Day: 1 Day Post-Op    Scheduled Meds:   acetaminophen  1,000 mg Oral Q8H    aspirin  81 mg Oral BID    atorvastatin  80 mg Oral QHS    ceFAZolin (ANCEF) IVPB  2 g Intravenous Q8H    [START ON 6/8/2023] clopidogreL  75 mg Oral Daily    insulin aspart U-100  6 Units Subcutaneous TIDWM    insulin detemir U-100  18 Units Subcutaneous Daily    lisinopriL  5 mg Oral Daily    melatonin  9 mg Oral Nightly    methocarbamoL  500 mg Oral QID    pregabalin  75 mg Oral BID    vitamin D  1,000 Units Oral Daily     Continuous Infusions:  PRN Meds:dextrose 10%, dextrose 10%, famotidine, glucagon (human recombinant), insulin aspart U-100, mupirocin, naloxone, ondansetron, oxyCODONE, oxyCODONE, polyethylene glycol, simethicone, sodium chloride 0.9%    Review of Systems   Constitutional:  Negative for fever.   Respiratory:  Negative for cough and shortness of breath.    Cardiovascular:  Negative for chest pain and leg swelling.   Gastrointestinal:  Negative for abdominal pain, constipation, diarrhea and nausea.   Musculoskeletal:  Positive for arthralgias (Left ankle).   Skin:  Positive for wound (Left foot).   Neurological:  Negative for dizziness and light-headedness.   Psychiatric/Behavioral:  Negative for agitation and confusion.    Objective:     Vital Signs (Most Recent):  Temp: 97.8 °F (36.6 °C) (06/07/23 2042)  Pulse: 82  (06/07/23 2042)  Resp: 16 (06/07/23 2042)  BP: (!) 110/54 (06/07/23 2042)  SpO2: 99 % (06/07/23 2042) Vital Signs (24h Range):  Temp:  [97.4 °F (36.3 °C)-98.6 °F (37 °C)] 97.8 °F (36.6 °C)  Pulse:  [74-83] 82  Resp:  [16-18] 16  SpO2:  [94 %-99 %] 99 %  BP: (105-124)/(53-68) 110/54     Weight: 72.6 kg (160 lb 1.9 oz)  Body mass index is 28.36 kg/m².    Foot Exam    General  General Appearance: appears stated age and healthy   Orientation: alert and oriented to person, place, and time       Right Foot/Ankle     Inspection and Palpation  Ecchymosis: none  Tenderness: none   Swelling: none   Skin Exam: skin intact;     Neurovascular  Dorsalis pedis: absent  Posterior tibial: absent  Saphenous nerve sensation: diminished  Tibial nerve sensation: diminished  Superficial peroneal nerve sensation: diminished  Deep peroneal nerve sensation: diminished  Sural nerve sensation: diminished      Left Foot/Ankle      Inspection and Palpation  Ecchymosis: none  Skin Exam: drainage and ulcer; skin not intact, no cellulitis, no abnormal color and no erythema     Neurovascular  Dorsalis pedis: absent  Posterior tibial: absent  Saphenous nerve sensation: diminished  Tibial nerve sensation: diminished  Superficial peroneal nerve sensation: diminished  Deep peroneal nerve sensation: diminished  Sural nerve sensation: diminished    Comments  Scant serous drainage from full-thickness left hallux wound, wound base granular in nature without hyperkeratotic periwound, no malodor, no fluctuance, no crepitation, negative probe to bone    Heel ulceration appears stable, base 65/35 granular to fibrotic. No mal odor, serosanguinous drainage, thin layer of soft tissue overlying bone centrally. Negative probe to bone.     Laboratory:  CBC:   Recent Labs   Lab 06/07/23  0524   WBC 6.50   RBC 3.47*   HGB 10.0*   HCT 31.8*      MCV 92   MCH 28.8   MCHC 31.4*     CMP:   Recent Labs   Lab 06/05/23  1346 06/06/23  0411 06/07/23  0524   *    < > 195*   CALCIUM 9.7   < > 9.3   ALBUMIN 3.3*  --   --    PROT 6.9  --   --    *   < > 133*   K 4.9   < > 4.7   CO2 24   < > 26   CL 99   < > 101   BUN 22   < > 29*   CREATININE 0.9   < > 1.2   ALKPHOS 96  --   --    ALT 8*  --   --    AST 12  --   --    BILITOT 0.8  --   --     < > = values in this interval not displayed.     Microbiology Results (last 7 days)       Procedure Component Value Units Date/Time    Culture, Anaerobe [[id number]] Collected: 06/06/23 1051    Order Status: Completed Specimen: Wound from Toe, Left Foot Updated: 06/07/23 1303     Anaerobic Culture Culture in progress    Narrative:      L great toe wound    Aerobic culture [[id number]] Collected: 06/06/23 1051    Order Status: Completed Specimen: Wound from Toe, Left Foot Updated: 06/07/23 0850     Aerobic Bacterial Culture Insufficient incubation, culture in progress    Narrative:      Left great toe wound    Gram stain [[id number]] Collected: 06/06/23 1051    Order Status: Completed Specimen: Wound from Toe, Left Foot Updated: 06/06/23 2011     Gram Stain Result Rare WBC's      No organisms seen    Narrative:      Left great toe wound          All pertinent labs reviewed within the last 24 hours.    Diagnostic Results:  I have reviewed all pertinent imaging results/findings within the past 24 hours.    Left foot xray without soft tissue emphysema or definitive signs of OM.     Clinical Findings:            Assessment/Plan:     Orthopedic  Chronic ulcer of left heel with fat layer exposed  IDSA uninfected diabetic with ulcerations, full thickness plantar left great toe and plantar/posterior heel without signs of infection noted at the left great toe, the heel the heel will need further evaluation after surgical intervention per Orthopedic surgery. Left lower extremity angiogram with intervention to the popliteal and posterior tibial arteries 04/25/2023. S/P L Tibial closed pilon ORIF with ex fix per ortho. Left foot xray without gas or  definitive signs of OM. Wound cultures pending.     Plan for local wound care, no other intervention planned per podiatry  With patient consent excisional debridment of Left heel ulceration preformed without incident. Left great toe wound cleansed. Wounds dressed with Alexia AG.   Left lower extremity nonweightbearing  Nursing wound care routine ordered   Podiatry will follow    Discharge recs:   Wound care: 2x/week. Cleanse L great toe and heel wounds with vashe. Dry. Apply Alexia Ag to wound base, activate with several drops of saline, follow mepilex border.   Please arrange follow up with patient's podiatrist RAYA DudleyM within 2 weeks of discharge   KAYLENE Fleming DPM, PGY-2  Podiatry / Foot and Ankle Surgery   Page # [phone]  Secure chat preferred

## 2023-06-08 NOTE — CONSULTS
Double lumen PICC to right basilic vein.  35 cm in length, 24 cm arm circumference and 0 cm exposed.   Lot # [id number].

## 2023-06-08 NOTE — ASSESSMENT & PLAN NOTE
IDSA uninfected diabetic with ulcerations, full thickness plantar left great toe and plantar/posterior heel without signs of infection noted at the left great toe, the heel the heel will need further evaluation after surgical intervention per Orthopedic surgery. Left lower extremity angiogram with intervention to the popliteal and posterior tibial arteries 04/25/2023. S/P L Tibial closed pilon ORIF with ex fix per ortho. Left foot xray without gas or definitive signs of OM. Wound cultures pending.     Plan for local wound care, no other intervention planned per podiatry  With patient consent excisional debridment of Left heel ulceration preformed without incident. Left great toe wound cleansed. Wounds dressed with Alexia AG.   Left lower extremity nonweightbearing  Nursing wound care routine ordered   Podiatry will follow    Discharge recs:   Wound care: 2x/week. Cleanse L great toe and heel wounds with vashe. Dry. Apply Alexia Ag to wound base, activate with several drops of saline, follow mepilex border.   Please arrange follow up with patient's podiatrist Dr. Jenny Peace DPM within 2 weeks of discharge   KAYLENE KYLE

## 2023-06-08 NOTE — ASSESSMENT & PLAN NOTE
" 73 year old female with PMH of uncontrolled DM2, neuropathy, tobacco use, left hip fracture s/p intramedullary denzel, PAD with recent LLE revascularization  with Dr. Sheppard (04/2023), chronic lower extremity wounds who was transferred from [facility] with distal fractures of tibia and fibula as well as worsening LE wounds.      To note, pt was recently followed by ID service for left lower foot wound. Wound culture was + MSSA. Bone culture obtained on 4/27, + for MSSA and staph epi. Path negative for osteo. Was treated x10 days for SSTI with cefadroxil. Left foot wound culture from left great toe 5/18 + e cloacae. Pt was started on doxy and then levaquin per podiatry.      Podiatry following during current hospitalization. Noting left great toe wound, skin breakdown but superficial. new culture obtained, + pseudomonas. Podiatry noting stable heel ulceration with thin layer of tissue overlying the bone. Negative probe to bone. Xray of left foot obtained, no mention of OM. CT from 6/6 without mention of OM as well.      Afebrile, no leukocytosis. Elevated CRP. HA1c 10. CT of left ankle noting fracture of distal tibia, as well as skin ulcer overlying left posterior calcaneus, no evidence of bony involvement. MRI of hindfoot from 4/2023 noting potential early osteo of posterior calcaneous.      Ortho surgery following, s/p ORIF of tibia and fibula. External fixation placed with Dr. Mahmood.       Pt is on ancef IV. ID was consulted d/t  "osteomyelitis of Wounds of Left Lower Extremity - patient had bone biopsy 4/27 growing Staph Aureus".    Recommendations:   - Recommend Ancef 2g IV q8hr (CrCl 44) x6 weeks to cover MSSA and staph epi osteo.   - Will add Cipro 750 mg PO q12hr x2 weeks for SSTI. .   - will follow for pseudomonas ID and sensitivity report.   - Plan reviewed with ID staff. ID will follow.   "

## 2023-06-08 NOTE — SUBJECTIVE & OBJECTIVE
Interval History: she reports pain a little higher this morning and planning to take pain med after she eats breakfast this morning. Discussed plan for likely 6 weeks IV antibiotics per recs from ID and podiatry recs from yesterday as they were able to see hel given now has ex fix and the splint was removed with surgery yeterday. Is NWB for 3 months and asa bid  x 12 weeks per ortho then go back to q d for known PVD with recent balloon angioplsty in April. Was on plavix prior to admit so will resume tomorrow if ortho okay with this. Ex fix to remain for 4-6 weeks. Needs a wound check in 1 week and has pin site care BID now in interim. New CX data with pseudomonas updated by podiatry and ID aware also and to adjust antibiotics further today. Endocrine assisting with glucose as needs tighter glucose control for wound healing from surgery and for infection management long term. Picc placed today for long term planning. SW workng on lTACs given will have wound care antibiotics and PT/OT will be more appropriate for this level of care.      Review of Systems   Constitutional:  Negative for fever.   Respiratory:  Negative for cough and shortness of breath.    Cardiovascular:  Negative for chest pain and leg swelling.   Gastrointestinal:  Negative for abdominal pain, constipation, diarrhea and nausea.   Musculoskeletal:  Positive for arthralgias (Left ankle).   Skin:  Positive for wound (Left foot).   Neurological:  Negative for dizziness and light-headedness.   Psychiatric/Behavioral:  Negative for agitation and confusion.    Objective:     Vital Signs (Most Recent):  Temp: 97.7 °F (36.5 °C) (06/07/23 1523)  Pulse: 83 (06/07/23 1531)  Resp: 18 (06/07/23 1523)  BP: 123/59 (06/07/23 1523)  SpO2: 99 % (06/07/23 1523) on room air Vital Signs (24h Range):  Temp:  [97.6 °F (36.4 °C)-99.1 °F (37.3 °C)] 97.6 °F (36.4 °C)  Pulse:  [76-86] 86  Resp:  [16-18] 16  SpO2:  [94 %-99 %] 94 %  BP: ()/(54-83) 116/59     Weight: 72.9  kg (160 lb 11.5 oz)  Body mass index is 28.48 kg/m².  No intake or output data in the 24 hours ending 06/08/23 1154      Physical Exam  Vitals and nursing note reviewed.   Constitutional:       General: She is awake. She is not in acute distress.     Appearance: Normal appearance. She is normal weight. She is not ill-appearing.   Cardiovascular:      Rate and Rhythm: Normal rate and regular rhythm.      Heart sounds: Normal heart sounds. No murmur heard.    No friction rub. No gallop.   Pulmonary:      Effort: Pulmonary effort is normal. No respiratory distress.      Breath sounds: Normal breath sounds. No wheezing.   Chest:   Breasts:     Right: Mass (Small mobile hard lump in upper outer quadrant) present.   Abdominal:      General: Abdomen is flat. Bowel sounds are normal. There is no distension.      Palpations: Abdomen is soft.      Tenderness: There is no abdominal tenderness.   Musculoskeletal:      Right lower leg: No edema.      Left lower leg: No edema.      Comments: Ex fix in place to left ankle. Surgical bandages in place to left ankle and are clean and dry and intact.    Skin:     General: Skin is warm.      Findings: No erythema.      Comments: See photos in Epic of wounds to left foot   Neurological:      Mental Status: She is alert and oriented to person, place, and time.   Psychiatric:         Mood and Affect: Mood normal.         Behavior: Behavior normal. Behavior is cooperative.         Thought Content: Thought content normal.         Judgment: Judgment normal.           Significant Labs: A1C:   Recent Labs   Lab 02/17/23  1415 04/21/23  0429 06/05/23 1957   HGBA1C 8.9* 8.2* 10.2*       CBC:   Recent Labs   Lab 06/07/23  0524 06/08/23  0504   WBC 6.50 7.77   HGB 10.0* 9.6*   HCT 31.8* 30.1*    299       CMP:   Recent Labs   Lab 06/07/23  0524 06/08/23  0504   * 131*   K 4.7 4.7    98   CO2 26 25   * 177*   BUN 29* 31*   CREATININE 1.2 1.1   CALCIUM 9.3 8.7   ANIONGAP  6* 8       POCT Glucose:   Recent Labs   Lab 06/08/23  0029 06/08/23  0643 06/08/23  1132   POCTGLUCOSE 125* 208* 201*         Significant Imaging: I have reviewed all pertinent imaging results/findings within the past 24 hours.

## 2023-06-08 NOTE — PT/OT/SLP PROGRESS
"Occupational Therapy   Co-Treatment with Physical Therapy    Name: Anastasiia Badillo  MRN: [id number]  Admitting Diagnosis:  Closed displaced pilon fracture of left tibia with routine healing  2 Days Post-Op  Procedure(s):  ORIF, FRACTURE, PILON, LEFT  APPLICATION, EXTERNAL FIXATION DEVICE, LEFT ANKLE, Vasquez   Recommendations:     Discharge Recommendations: rehabilitation facility  Discharge Equipment Recommendations:  walker, rolling, bedside commode  Barriers to discharge:   (increased skilled assistance required)    Assessment:     Anastasiia Badillo is a 73 y.o. female with a medical diagnosis of Closed displaced pilon fracture of left tibia with routine healing.  She presents with the following performance deficits affecting function are weakness, impaired endurance, impaired self care skills, decreased safety awareness, gait instability, impaired functional mobility, impaired balance, pain, decreased lower extremity function, decreased coordination, decreased ROM, orthopedic precautions. Patient agreeable to OT session and tolerated fair secondary to c/o significant pain. Patient was able to progress to x1 standing trial today requiring 2 person (A) at this time. Patient would benefit from continued OT services to address deficits and progress towards goals. Continue OT POC.    Rehab Prognosis:  Good; patient would benefit from acute skilled OT services to address these deficits and reach maximum level of function.       Plan:     Patient to be seen 4 x/week to address the above listed problems via self-care/home management, therapeutic activities, therapeutic exercises  Plan of Care Expires: 07/05/23  Plan of Care Reviewed with: patient    Subjective     Chief Complaint: c/o pain  Patient/Family Comments/goals: "I just got my meds."  Pain/Comfort:  Pain Rating 1: 9/10  Location - Side 1: Left  Location - Orientation 1: lower  Location 1: leg  Pain Addressed 1: Reposition, Distraction, Cessation of Activity " (nurse in room to premedicate prior to mobility)    Objective:     Communicated with: nurse jozef and OTR prior to session.  Patient found HOB elevated with peripheral IV, PureWick, telemetry, FCD, external fixator upon OT entry to room.  A client care conference was completed by the OTR and the HAUSER prior to treatment by the HAUSER to discuss the patient's POC and current status.    General Precautions: Standard, fall    Orthopedic Precautions:LLE non weight bearing  Braces: N/A (Ex fix on LLE)  Respiratory Status: Room air     Occupational Performance:     Bed Mobility:    Patient completed Scooting anteriorly to EOB with moderate assistance  Scooting in supine using overhead rails with SBA  Patient completed Supine to Sit with maximal assistance and 2 persons  Patient completed Sit to Supine with maximal assistance and 2 persons     Functional Mobility/Transfers:  Patient completed Sit <> Stand Transfer with maximal assistance and of 2 persons  with  rolling walker and (A) with L LE to comply with NWBing    Functional Mobility: patient deferred 2/2 pain and fatigue    Activities of Daily Living:  Grooming: contact guard assistance seated EOB  Upper Body Dressing: maximal assistance don clean gown posteriorly seated EOB      AMPAC 6 Click ADL: 12    Treatment & Education:  Patient educated on OT POC, goals, and current progress  Reviewed orthopedic precautions  Patient required CGA to Mod(A) for static sitting balance 2/2 weak core and poor endurance  Addressed all patient questions/concerns within HAUSER scope of practice.   Co-treatment performed with PTA due to patient's complexity and benefit of 2 skilled therapists to facilitate functional and safe occupational performance, accommodate patient's activity tolerance, and maximize patient's participation in therapy.     Patient left HOB elevated with all lines intact, call button in reach, and L LE elevated    GOALS:   Multidisciplinary Problems       Occupational  Therapy Goals          Problem: Occupational Therapy    Goal Priority Disciplines Outcome Interventions   Occupational Therapy Goal     OT, PT/OT Ongoing, Progressing    Description: Goals to be met by: 7/5/23     Patient will increase functional independence with ADLs by performing:    UE Dressing with Set-up Assistance.  LE Dressing with Minimal Assistance.  Grooming while EOB with Set-up Assistance.  Toileting from bedside commode with Moderate Assistance for hygiene and clothing management.   Supine to sit with Minimal Assistance.  Toilet transfer to bedside commode with Moderate Assistance.                         Time Tracking:     OT Date of Treatment: 06/08/23  OT Start Time: 1236  OT Stop Time: 1302  OT Total Time (min): 26 min    Billable Minutes:Self Care/Home Management 10  Therapeutic Activity 16    OT/DARION: DARION     Number of DARION visits since last OT visit: 1 6/8/2023

## 2023-06-08 NOTE — ASSESSMENT & PLAN NOTE
Endocrinology consulted for BG management.   BG goal 140-180    - Levemir (Insulin Detemir) 22 units daily (20% increase due to fasting blood glucose above goal)   - Novolog (Insulin Aspart) 6 units TIDWM and prn for BG excursions LDC SSI (150/50)  - BG checks AC/HS  - Hypoglycemia protocol in place  - If blood glucose greater than 300, please ask patient not to eat food or drink anything other than water until correctional insulin has brought it back below 250    ** Please notify Endocrine for any change and/or advance in diet**  ** Please call Endocrine for any BG related issues **    Discharge Planning:   TBD. Please notify endocrinology prior to discharge.

## 2023-06-08 NOTE — PROGRESS NOTES
"[facility]  Infectious Disease  Progress Note    Patient Name: Anastasiia Badillo  MRN: [id number]  Admission Date: 6/5/2023  Length of Stay: 3 days  Attending Physician: Sonia Kim MD  Primary Care Provider: Raji Parkinson MD    Isolation Status: No active isolations  Assessment/Plan:      Orthopedic  Chronic ulcer of left heel with fat layer exposed   73 year old female with PMH of uncontrolled DM2, neuropathy, tobacco use, left hip fracture s/p intramedullary denzel, PAD with recent LLE revascularization  with Dr. Sheppard (04/2023), chronic lower extremity wounds who was transferred from [facility] with distal fractures of tibia and fibula as well as worsening LE wounds.      To note, pt was recently followed by ID service for left lower foot wound. Wound culture was + MSSA. Bone culture obtained on 4/27, + for MSSA and staph epi. Path negative for osteo. Was treated x10 days for SSTI with cefadroxil. Left foot wound culture from left great toe 5/18 + e cloacae. Pt was started on doxy and then levaquin per podiatry.      Podiatry following during current hospitalization. Noting left great toe wound, skin breakdown but superficial. new culture obtained, + pseudomonas. Podiatry noting stable heel ulceration with thin layer of tissue overlying the bone. Negative probe to bone. Xray of left foot obtained, no mention of OM. CT from 6/6 without mention of OM as well.      Afebrile, no leukocytosis. Elevated CRP. HA1c 10. CT of left ankle noting fracture of distal tibia, as well as skin ulcer overlying left posterior calcaneus, no evidence of bony involvement. MRI of hindfoot from 4/2023 noting potential early osteo of posterior calcaneous.      Ortho surgery following, s/p ORIF of tibia and fibula. External fixation placed with Dr. Mahmood.       Pt is on ancef IV. ID was consulted d/t  "osteomyelitis of Wounds of Left Lower Extremity - patient had bone biopsy 4/27 growing Staph Aureus".    Recommendations: " "  - Recommend Ancef 2g IV q8hr (CrCl 44) x6 weeks to cover MSSA and staph epi osteo.   - Will add Cipro 750 mg PO q12hr x2 weeks for SSTI. .   - will follow for pseudomonas ID and sensitivity report.   - Plan reviewed with ID staff. ID will follow.             Thank you for your consult. I will follow-up with patient. Please contact us if you have any additional questions.    Leonardo Jon, NP  Infectious Disease  [facility] - [facility]    Subjective:     Principal Problem:Closed displaced pilon fracture of left tibia with routine healing    HPI: Ms Badillo is a 73 year old female with PMH of uncontrolled DM2, neuropathy, tobacco use, left hip fracture s/p intramedullary denzel, PAD with recent LLE revascularization  with Dr. Sheppard (04/2023), chronic lower extremity wounds who was transferred from [facility] with distal fractures of tibia and fibula as well as worsening LE wounds. Per chart review, about 3 days ago, pt sustained a fall after getting out of bed. Reports she fell onto her left lower extremity. She presented to [facility] d/t pain.     To note, pt was recently followed by ID service for left lower foot wound. Imaging negative for OM. Wound culture was + MSSA. Bone culture obtained on 4/27, + for MSSA and staph epi. Path negative for osteo. Was treated x10 days for SSTI with cefadroxil. Left foot wound culture from 5/18 + e cloacae. Pt was started on doxy and then levaquin per podiatry.     Since admission, pt afebrile, no leukocytosis. Elevated CRP. HA1c 10. CT of left ankle noting fracture of distal tibia, as well as skin ulcer overlying left posterior calcaneus, no evidence of bony involvement.     Pt is currently on piperacillin tazobactam. ID was consulted d/t  "osteomyelitis of Wounds of Left Lower Extremity - patient had bone biopsy 4/27 growing Staph Aureus".          Interval History: s/p exfix with ortho surgery. Wound cultures from left great toe + pseudomonas. Previous bone culture + MSSA, " staph epi     Review of Systems   Constitutional:  Negative for chills, diaphoresis, fatigue and fever.   HENT: Negative.     Respiratory:  Negative for cough and shortness of breath.    Cardiovascular:  Negative for chest pain, palpitations and leg swelling.   Gastrointestinal:  Negative for constipation, diarrhea, nausea and vomiting.   Genitourinary:  Negative for difficulty urinating and dysuria.   Musculoskeletal:  Negative for arthralgias and myalgias.   Skin:  Positive for wound. Negative for color change.   Neurological:  Negative for dizziness and headaches.   Psychiatric/Behavioral:  Negative for agitation and confusion.    Objective:     Vital Signs (Most Recent):  Temp: 97.6 °F (36.4 °C) (06/08/23 1131)  Pulse: 86 (06/08/23 1138)  Resp: 18 (06/08/23 1230)  BP: (!) 116/59 (06/08/23 1131)  SpO2: (!) 94 % (06/08/23 1131) Vital Signs (24h Range):  Temp:  [97.6 °F (36.4 °C)-99.1 °F (37.3 °C)] 97.6 °F (36.4 °C)  Pulse:  [76-86] 86  Resp:  [16-18] 18  SpO2:  [94 %-99 %] 94 %  BP: ()/(54-83) 116/59     Weight: 72.9 kg (160 lb 11.5 oz)  Body mass index is 28.48 kg/m².    Estimated Creatinine Clearance: 44.2 mL/min (based on SCr of 1.1 mg/dL).     Physical Exam  Vitals reviewed.   Constitutional:       General: She is not in acute distress.     Appearance: Normal appearance. She is not ill-appearing.   HENT:      Head: Normocephalic.      Nose: Nose normal.      Mouth/Throat:      Mouth: Mucous membranes are moist.      Pharynx: Oropharynx is clear.   Eyes:      General:         Right eye: No discharge.         Left eye: No discharge.      Conjunctiva/sclera: Conjunctivae normal.   Cardiovascular:      Rate and Rhythm: Normal rate and regular rhythm.      Pulses: Normal pulses.      Heart sounds: Normal heart sounds.   Pulmonary:      Effort: Pulmonary effort is normal. No respiratory distress.      Breath sounds: Normal breath sounds.   Abdominal:      General: Bowel sounds are normal. There is no  distension.      Palpations: Abdomen is soft.      Tenderness: There is no abdominal tenderness.   Musculoskeletal:         General: Normal range of motion.      Cervical back: Normal range of motion.      Right lower leg: No edema.      Left lower leg: No edema.   Skin:     General: Skin is warm.      Findings: No erythema or rash.      Comments: With LLE exfix   Neurological:      General: No focal deficit present.      Mental Status: She is alert and oriented to person, place, and time.      Motor: No weakness.      Gait: Gait normal.   Psychiatric:         Mood and Affect: Mood normal.         Behavior: Behavior normal.         Thought Content: Thought content normal.         Judgment: Judgment normal.        Significant Labs: All pertinent labs within the past 24 hours have been reviewed.    Significant Imaging: I have reviewed all pertinent imaging results/findings within the past 24 hours.

## 2023-06-08 NOTE — PLAN OF CARE
Recommendations  1. Continue Diabetic Diet - texture per SLP   2. Continue Boost Glucose Control TID   3. RD following.     Goals: Meet % EEN/EPN by RD f/u date  Nutrition Goal Status: new  Communication of RD Recs: other (comment) (POC)    Nabila Monreal, Dietetic Intern

## 2023-06-08 NOTE — SUBJECTIVE & OBJECTIVE
Subjective:     Interval History: 1 day s/p L pilon ORIF per ortho. AGATA. VSS. AF. WBC 6.5, Na 133. Wound cultures pending. Patient resting comfortably with LLE elevated. Relays well controlled pain.     Follow-up For: Procedure(s) (LRB):  ORIF, FRACTURE, PILON, LEFT (Left)  APPLICATION, EXTERNAL FIXATION DEVICE, LEFT ANKLE, Vasquez (Left)    Post-Operative Day: 1 Day Post-Op    Scheduled Meds:   acetaminophen  1,000 mg Oral Q8H    aspirin  81 mg Oral BID    atorvastatin  80 mg Oral QHS    ceFAZolin (ANCEF) IVPB  2 g Intravenous Q8H    [START ON 6/8/2023] clopidogreL  75 mg Oral Daily    insulin aspart U-100  6 Units Subcutaneous TIDWM    insulin detemir U-100  18 Units Subcutaneous Daily    lisinopriL  5 mg Oral Daily    melatonin  9 mg Oral Nightly    methocarbamoL  500 mg Oral QID    pregabalin  75 mg Oral BID    vitamin D  1,000 Units Oral Daily     Continuous Infusions:  PRN Meds:dextrose 10%, dextrose 10%, famotidine, glucagon (human recombinant), insulin aspart U-100, mupirocin, naloxone, ondansetron, oxyCODONE, oxyCODONE, polyethylene glycol, simethicone, sodium chloride 0.9%    Review of Systems   Constitutional:  Negative for fever.   Respiratory:  Negative for cough and shortness of breath.    Cardiovascular:  Negative for chest pain and leg swelling.   Gastrointestinal:  Negative for abdominal pain, constipation, diarrhea and nausea.   Musculoskeletal:  Positive for arthralgias (Left ankle).   Skin:  Positive for wound (Left foot).   Neurological:  Negative for dizziness and light-headedness.   Psychiatric/Behavioral:  Negative for agitation and confusion.    Objective:     Vital Signs (Most Recent):  Temp: 97.8 °F (36.6 °C) (06/07/23 2042)  Pulse: 82 (06/07/23 2042)  Resp: 16 (06/07/23 2042)  BP: (!) 110/54 (06/07/23 2042)  SpO2: 99 % (06/07/23 2042) Vital Signs (24h Range):  Temp:  [97.4 °F (36.3 °C)-98.6 °F (37 °C)] 97.8 °F (36.6 °C)  Pulse:  [74-83] 82  Resp:  [16-18] 16  SpO2:  [94 %-99 %] 99  %  BP: (105-124)/(53-68) 110/54     Weight: 72.6 kg (160 lb 1.9 oz)  Body mass index is 28.36 kg/m².    Foot Exam    General  General Appearance: appears stated age and healthy   Orientation: alert and oriented to person, place, and time       Right Foot/Ankle     Inspection and Palpation  Ecchymosis: none  Tenderness: none   Swelling: none   Skin Exam: skin intact;     Neurovascular  Dorsalis pedis: absent  Posterior tibial: absent  Saphenous nerve sensation: diminished  Tibial nerve sensation: diminished  Superficial peroneal nerve sensation: diminished  Deep peroneal nerve sensation: diminished  Sural nerve sensation: diminished      Left Foot/Ankle      Inspection and Palpation  Ecchymosis: none  Skin Exam: drainage and ulcer; skin not intact, no cellulitis, no abnormal color and no erythema     Neurovascular  Dorsalis pedis: absent  Posterior tibial: absent  Saphenous nerve sensation: diminished  Tibial nerve sensation: diminished  Superficial peroneal nerve sensation: diminished  Deep peroneal nerve sensation: diminished  Sural nerve sensation: diminished    Comments  Scant serous drainage from full-thickness left hallux wound, wound base granular in nature without hyperkeratotic periwound, no malodor, no fluctuance, no crepitation, negative probe to bone    Heel ulceration appears stable, base 65/35 granular to fibrotic. No mal odor, serosanguinous drainage, thin layer of soft tissue overlying bone centrally. Negative probe to bone.     Laboratory:  CBC:   Recent Labs   Lab 06/07/23  0524   WBC 6.50   RBC 3.47*   HGB 10.0*   HCT 31.8*      MCV 92   MCH 28.8   MCHC 31.4*     CMP:   Recent Labs   Lab 06/05/23  1346 06/06/23  0411 06/07/23  0524   *   < > 195*   CALCIUM 9.7   < > 9.3   ALBUMIN 3.3*  --   --    PROT 6.9  --   --    *   < > 133*   K 4.9   < > 4.7   CO2 24   < > 26   CL 99   < > 101   BUN 22   < > 29*   CREATININE 0.9   < > 1.2   ALKPHOS 96  --   --    ALT 8*  --   --    AST 12   --   --    BILITOT 0.8  --   --     < > = values in this interval not displayed.     Microbiology Results (last 7 days)       Procedure Component Value Units Date/Time    Culture, Anaerobe [[id number]] Collected: 06/06/23 1051    Order Status: Completed Specimen: Wound from Toe, Left Foot Updated: 06/07/23 1303     Anaerobic Culture Culture in progress    Narrative:      L great toe wound    Aerobic culture [[id number]] Collected: 06/06/23 1051    Order Status: Completed Specimen: Wound from Toe, Left Foot Updated: 06/07/23 0850     Aerobic Bacterial Culture Insufficient incubation, culture in progress    Narrative:      Left great toe wound    Gram stain [[id number]] Collected: 06/06/23 1051    Order Status: Completed Specimen: Wound from Toe, Left Foot Updated: 06/06/23 2011     Gram Stain Result Rare WBC's      No organisms seen    Narrative:      Left great toe wound          All pertinent labs reviewed within the last 24 hours.    Diagnostic Results:  I have reviewed all pertinent imaging results/findings within the past 24 hours.    Left foot xray without soft tissue emphysema or definitive signs of OM.     Clinical Findings:

## 2023-06-09 LAB
BACTERIA SPEC AEROBE CULT: ABNORMAL
POCT GLUCOSE: 160 MG/DL (ref 70–110)
POCT GLUCOSE: 188 MG/DL (ref 70–110)
POCT GLUCOSE: 205 MG/DL (ref 70–110)
POCT GLUCOSE: 233 MG/DL (ref 70–110)
POCT GLUCOSE: 239 MG/DL (ref 70–110)
POCT GLUCOSE: 281 MG/DL (ref 70–110)

## 2023-06-09 PROCEDURE — 25000003 PHARM REV CODE 250: Performed by: REGISTERED NURSE

## 2023-06-09 PROCEDURE — 97530 THERAPEUTIC ACTIVITIES: CPT | Mod: CO

## 2023-06-09 PROCEDURE — 25000003 PHARM REV CODE 250: Performed by: STUDENT IN AN ORGANIZED HEALTH CARE EDUCATION/TRAINING PROGRAM

## 2023-06-09 PROCEDURE — 97530 THERAPEUTIC ACTIVITIES: CPT | Mod: CQ

## 2023-06-09 PROCEDURE — 97535 SELF CARE MNGMENT TRAINING: CPT | Mod: CO

## 2023-06-09 PROCEDURE — 21400001 HC TELEMETRY ROOM

## 2023-06-09 PROCEDURE — A4216 STERILE WATER/SALINE, 10 ML: HCPCS | Performed by: HOSPITALIST

## 2023-06-09 PROCEDURE — 25000003 PHARM REV CODE 250: Performed by: INTERNAL MEDICINE

## 2023-06-09 PROCEDURE — 25000003 PHARM REV CODE 250: Performed by: HOSPITALIST

## 2023-06-09 PROCEDURE — 99233 SBSQ HOSP IP/OBS HIGH 50: CPT | Mod: ,,, | Performed by: REGISTERED NURSE

## 2023-06-09 PROCEDURE — 25000003 PHARM REV CODE 250

## 2023-06-09 PROCEDURE — 99233 PR SUBSEQUENT HOSPITAL CARE,LEVL III: ICD-10-PCS | Mod: ,,, | Performed by: REGISTERED NURSE

## 2023-06-09 PROCEDURE — 63600175 PHARM REV CODE 636 W HCPCS: Performed by: STUDENT IN AN ORGANIZED HEALTH CARE EDUCATION/TRAINING PROGRAM

## 2023-06-09 PROCEDURE — 99232 PR SUBSEQUENT HOSPITAL CARE,LEVL II: ICD-10-PCS | Mod: ,,, | Performed by: NURSE PRACTITIONER

## 2023-06-09 PROCEDURE — 97110 THERAPEUTIC EXERCISES: CPT | Mod: CQ

## 2023-06-09 PROCEDURE — 99233 PR SUBSEQUENT HOSPITAL CARE,LEVL III: ICD-10-PCS | Mod: ,,, | Performed by: HOSPITALIST

## 2023-06-09 PROCEDURE — 99233 SBSQ HOSP IP/OBS HIGH 50: CPT | Mod: ,,, | Performed by: HOSPITALIST

## 2023-06-09 PROCEDURE — 99232 SBSQ HOSP IP/OBS MODERATE 35: CPT | Mod: ,,, | Performed by: NURSE PRACTITIONER

## 2023-06-09 RX ORDER — INSULIN ASPART 100 [IU]/ML
7 INJECTION, SOLUTION INTRAVENOUS; SUBCUTANEOUS
Status: DISCONTINUED | OUTPATIENT
Start: 2023-06-09 | End: 2023-06-10

## 2023-06-09 RX ADMIN — CIPROFLOXACIN 750 MG: 750 TABLET, FILM COATED ORAL at 08:06

## 2023-06-09 RX ADMIN — ACETAMINOPHEN 1000 MG: 500 TABLET ORAL at 06:06

## 2023-06-09 RX ADMIN — ATORVASTATIN CALCIUM 80 MG: 40 TABLET, FILM COATED ORAL at 08:06

## 2023-06-09 RX ADMIN — Medication 10 ML: at 12:06

## 2023-06-09 RX ADMIN — ASPIRIN 81 MG: 81 TABLET, COATED ORAL at 08:06

## 2023-06-09 RX ADMIN — INSULIN ASPART 1 UNITS: 100 INJECTION, SOLUTION INTRAVENOUS; SUBCUTANEOUS at 08:06

## 2023-06-09 RX ADMIN — OXYCODONE HYDROCHLORIDE 10 MG: 10 TABLET ORAL at 11:06

## 2023-06-09 RX ADMIN — INSULIN ASPART 7 UNITS: 100 INJECTION, SOLUTION INTRAVENOUS; SUBCUTANEOUS at 04:06

## 2023-06-09 RX ADMIN — ACETAMINOPHEN 1000 MG: 500 TABLET ORAL at 02:06

## 2023-06-09 RX ADMIN — CLOPIDOGREL BISULFATE 75 MG: 75 TABLET ORAL at 08:06

## 2023-06-09 RX ADMIN — Medication 10 ML: at 11:06

## 2023-06-09 RX ADMIN — CEFAZOLIN 2 G: 2 INJECTION, POWDER, FOR SOLUTION INTRAMUSCULAR; INTRAVENOUS at 09:06

## 2023-06-09 RX ADMIN — Medication 10 ML: at 05:06

## 2023-06-09 RX ADMIN — METHOCARBAMOL 500 MG: 500 TABLET ORAL at 01:06

## 2023-06-09 RX ADMIN — INSULIN ASPART 6 UNITS: 100 INJECTION, SOLUTION INTRAVENOUS; SUBCUTANEOUS at 11:06

## 2023-06-09 RX ADMIN — METHOCARBAMOL 500 MG: 500 TABLET ORAL at 08:06

## 2023-06-09 RX ADMIN — ACETAMINOPHEN 1000 MG: 500 TABLET ORAL at 09:06

## 2023-06-09 RX ADMIN — Medication 10 ML: at 01:06

## 2023-06-09 RX ADMIN — Medication 9 MG: at 08:06

## 2023-06-09 RX ADMIN — PREGABALIN 75 MG: 75 CAPSULE ORAL at 08:06

## 2023-06-09 RX ADMIN — OXYCODONE HYDROCHLORIDE 10 MG: 10 TABLET ORAL at 06:06

## 2023-06-09 RX ADMIN — INSULIN ASPART 2 UNITS: 100 INJECTION, SOLUTION INTRAVENOUS; SUBCUTANEOUS at 04:06

## 2023-06-09 RX ADMIN — INSULIN ASPART 6 UNITS: 100 INJECTION, SOLUTION INTRAVENOUS; SUBCUTANEOUS at 06:06

## 2023-06-09 RX ADMIN — LISINOPRIL 5 MG: 5 TABLET ORAL at 08:06

## 2023-06-09 RX ADMIN — CHOLECALCIFEROL TAB 25 MCG (1000 UNIT) 1000 UNITS: 25 TAB at 08:06

## 2023-06-09 RX ADMIN — CEFAZOLIN 2 G: 2 INJECTION, POWDER, FOR SOLUTION INTRAMUSCULAR; INTRAVENOUS at 06:06

## 2023-06-09 RX ADMIN — CEFAZOLIN 2 G: 2 INJECTION, POWDER, FOR SOLUTION INTRAMUSCULAR; INTRAVENOUS at 01:06

## 2023-06-09 RX ADMIN — METHOCARBAMOL 500 MG: 500 TABLET ORAL at 05:06

## 2023-06-09 RX ADMIN — Medication 10 ML: at 06:06

## 2023-06-09 NOTE — PLAN OF CARE
"PHN:  "Anastasiia Badillo- ltac  MD wants ltac case to go to [facility]- does your md want to do p2p? will need to send by 430 -   Or we can approve snf now if yall would like snf instead "    Notified Dr. Kim of above, pending her decision.   Dr. Kim would like to complete P2P- attempting to set up.    2:10p   SNF referrals sent as back up plan pending P2P results.    "

## 2023-06-09 NOTE — PROGRESS NOTES
"[facility] - Surgery  Infectious Disease  Progress Note    Patient Name: Anastasiia Badillo  MRN: [id number]  Admission Date: 6/5/2023  Length of Stay: 4 days  Attending Physician: Sonia Kim MD  Primary Care Provider: Raji Parkinson MD    Isolation Status: No active isolations  Assessment/Plan:      Orthopedic  Chronic ulcer of left heel with fat layer exposed   73 year old female with PMH of uncontrolled DM2, neuropathy, tobacco use, left hip fracture s/p intramedullary denzel, PAD with recent LLE revascularization  with Dr. Sheppard (04/2023), chronic lower extremity wounds who was transferred from [facility] with distal fractures of tibia and fibula as well as worsening LE wounds.      To note, pt was recently followed by ID service for left lower foot wound. Wound culture was + MSSA. Bone culture obtained on 4/27, + for MSSA and staph epi. Path negative for osteo. Was treated x10 days for SSTI with cefadroxil. Left foot wound culture from left great toe 5/18 + e cloacae. Pt was started on doxy and then levaquin per podiatry.      Podiatry following during current hospitalization. Noting left great toe wound, skin breakdown but superficial. new culture obtained, + pseudomonas aeruginosa, FQ sensitive. (previous culture + e cloacae). Podiatry noting stable heel ulceration with thin layer of tissue overlying the bone. Negative probe to bone. Xray of left foot obtained, no mention of OM. CT from 6/6 without mention of OM as well.      Afebrile, no leukocytosis. HDS.     Ortho surgery following, s/p ORIF of tibia and fibula. External fixation placed with Dr. Mahmood.       Pt is on ancef IV. ID was consulted d/t  "osteomyelitis of Wounds of Left Lower Extremity - patient had bone biopsy 4/27 growing Staph Aureus".    Recommendations:   - Recommend Ancef 2g IV q8hr (CrCl 44) x6 weeks to cover MSSA and staph epi osteo. EOC 7/17/23  - Continue Cipro 750 mg PO q12hr x2 weeks for SSTI. . EOC 6/22  - Plan reviewed " with ID staff. ID will sign off. See OPAT below.     Outpatient Antibiotic Therapy Plan:    Please send referral to [facility].    1) Infection: left calcaneal osteo and left great toe SSTI    2) Discharge Antibiotics:    Intravenous antibiotics:   Ancef 6g IV CI (CrCl 44)     Oral antibiotics:   Cipro 750 mg PO q 12 hours (CrCl 44)    3) Therapy Duration:      Estimated end date of IV antibiotics:     Ancef until 7/17/23  Cipro until 6/22/23      4) Outpatient Weekly Labs:    Order the following labs to be drawn on Mondays:    CBC   CMP    CRP        5) Fax Lab Results to Infectious Diseases Provider: BALJINDER Fihser FNP-C    [facility] Fax Number: [phone]    6) Outpatient Infectious Diseases Follow-up     Follow-up appointment will be arranged by the ID clinic and will be found in the patient's appointments tab.     Prior to discharge, please ensure the patient's follow-up has been scheduled.     If there is still no follow-up scheduled prior to discharge, please send an EPIC message to Diana Tracy in Infectious Diseases.                      Thank you for your consult. I will sign off. Please contact us if you have any additional questions.    Leonardo Jon NP  Infectious Disease  [facility] - Surgery    Subjective:     Principal Problem:Closed displaced pilon fracture of left tibia with routine healing    HPI: Ms Badillo is a 73 year old female with PMH of uncontrolled DM2, neuropathy, tobacco use, left hip fracture s/p intramedullary denzel, PAD with recent LLE revascularization  with Dr. Sheppard (04/2023), chronic lower extremity wounds who was transferred from [facility] with distal fractures of tibia and fibula as well as worsening LE wounds. Per chart review, about 3 days ago, pt sustained a fall after getting out of bed. Reports she fell onto her left lower extremity. She presented to [facility] d/t pain.     To note, pt was recently followed by ID  "service for left lower foot wound. Imaging negative for OM. Wound culture was + MSSA. Bone culture obtained on 4/27, + for MSSA and staph epi. Path negative for osteo. Was treated x10 days for SSTI with cefadroxil. Left foot wound culture from 5/18 + e cloacae. Pt was started on doxy and then levaquin per podiatry.     Since admission, pt afebrile, no leukocytosis. Elevated CRP. HA1c 10. CT of left ankle noting fracture of distal tibia, as well as skin ulcer overlying left posterior calcaneus, no evidence of bony involvement.     Pt is currently on piperacillin tazobactam. ID was consulted d/t  "osteomyelitis of Wounds of Left Lower Extremity - patient had bone biopsy 4/27 growing Staph Aureus".          Interval History: s/p exfix with ortho surgery. Wound cultures from left great toe + pseudomonas. Previous bone culture + MSSA, staph epi     Review of Systems   Constitutional:  Negative for chills, diaphoresis, fatigue and fever.   HENT: Negative.     Respiratory:  Negative for cough and shortness of breath.    Cardiovascular:  Negative for chest pain, palpitations and leg swelling.   Gastrointestinal:  Negative for constipation, diarrhea, nausea and vomiting.   Genitourinary:  Negative for difficulty urinating and dysuria.   Musculoskeletal:  Negative for arthralgias and myalgias.   Skin:  Positive for wound. Negative for color change.   Neurological:  Negative for dizziness and headaches.   Psychiatric/Behavioral:  Negative for agitation and confusion.    Objective:     Vital Signs (Most Recent):  Temp: 97.9 °F (36.6 °C) (06/09/23 1138)  Pulse: 82 (06/09/23 1138)  Resp: 20 (06/09/23 1152)  BP: 117/65 (06/09/23 1138)  SpO2: (!) 93 % (06/09/23 1138) Vital Signs (24h Range):  Temp:  [97 °F (36.1 °C)-98.2 °F (36.8 °C)] 97.9 °F (36.6 °C)  Pulse:  [74-88] 82  Resp:  [16-20] 20  SpO2:  [93 %-98 %] 93 %  BP: (117-169)/(65-83) 117/65     Weight: 72.9 kg (160 lb 11.5 oz)  Body mass index is 28.48 kg/m².    Estimated " Creatinine Clearance: 44.2 mL/min (based on SCr of 1.1 mg/dL).     Physical Exam  Vitals reviewed.   Constitutional:       General: She is not in acute distress.     Appearance: Normal appearance. She is not ill-appearing.   HENT:      Head: Normocephalic.      Nose: Nose normal.      Mouth/Throat:      Mouth: Mucous membranes are moist.      Pharynx: Oropharynx is clear.   Eyes:      General:         Right eye: No discharge.         Left eye: No discharge.      Conjunctiva/sclera: Conjunctivae normal.   Cardiovascular:      Rate and Rhythm: Normal rate and regular rhythm.      Pulses: Normal pulses.      Heart sounds: Normal heart sounds.   Pulmonary:      Effort: Pulmonary effort is normal. No respiratory distress.      Breath sounds: Normal breath sounds.   Abdominal:      General: Bowel sounds are normal. There is no distension.      Palpations: Abdomen is soft.      Tenderness: There is no abdominal tenderness.   Musculoskeletal:         General: Normal range of motion.      Cervical back: Normal range of motion.      Right lower leg: No edema.      Left lower leg: No edema.   Skin:     General: Skin is warm.      Findings: No erythema or rash.      Comments: With LLE exfix   Neurological:      General: No focal deficit present.      Mental Status: She is alert and oriented to person, place, and time.      Motor: No weakness.      Gait: Gait normal.   Psychiatric:         Mood and Affect: Mood normal.         Behavior: Behavior normal.         Thought Content: Thought content normal.         Judgment: Judgment normal.        Significant Labs: All pertinent labs within the past 24 hours have been reviewed.    Significant Imaging: I have reviewed all pertinent imaging results/findings within the past 24 hours.

## 2023-06-09 NOTE — SUBJECTIVE & OBJECTIVE
Interval History: she repots breakfast is late again today and has been an issue with the floor this week it seems. Awaiting final ID recs as on cipro for 2 weeks and ancef for 6 weeks s plan from yesterday with PICC placed. Had some slight oozing of blood from surgical site on ankle on xam but very small amount. Discussed planning for ltac for wound care needs as well as therapy needs and antibiotics and awaiting acceptance and final recs here for placement.       Review of Systems   Constitutional:  Negative for fever.   Respiratory:  Negative for cough and shortness of breath.    Cardiovascular:  Negative for chest pain and leg swelling.   Gastrointestinal:  Negative for abdominal pain, constipation, diarrhea and nausea.   Musculoskeletal:  Positive for arthralgias (Left ankle).   Skin:  Positive for wound (Left foot).   Neurological:  Negative for dizziness and light-headedness.   Psychiatric/Behavioral:  Negative for agitation and confusion.    Objective:     Vital Signs (Most Recent):  Temp: 97.7 °F (36.5 °C) (06/07/23 1523)  Pulse: 83 (06/07/23 1531)  Resp: 18 (06/07/23 1523)  BP: 123/59 (06/07/23 1523)  SpO2: 99 % (06/07/23 1523) on room air Vital Signs (24h Range):  Temp:  [97 °F (36.1 °C)-98.2 °F (36.8 °C)] 97.9 °F (36.6 °C)  Pulse:  [74-88] 82  Resp:  [16-20] 20  SpO2:  [93 %-98 %] 93 %  BP: (117-169)/(65-83) 117/65     Weight: 72.9 kg (160 lb 11.5 oz)  Body mass index is 28.48 kg/m².    Intake/Output Summary (Last 24 hours) at 6/9/2023 1203  Last data filed at 6/9/2023 1149  Gross per 24 hour   Intake --   Output 500 ml   Net -500 ml         Physical Exam  Vitals and nursing note reviewed.   Constitutional:       General: She is awake. She is not in acute distress.     Appearance: Normal appearance. She is normal weight. She is not ill-appearing.   Cardiovascular:      Rate and Rhythm: Normal rate and regular rhythm.      Heart sounds: Normal heart sounds. No murmur heard.    No friction rub. No gallop.    Pulmonary:      Effort: Pulmonary effort is normal. No respiratory distress.      Breath sounds: Normal breath sounds. No wheezing.   Chest:   Breasts:     Right: Mass (Small mobile hard lump in upper outer quadrant) present.   Abdominal:      General: Abdomen is flat. Bowel sounds are normal. There is no distension.      Palpations: Abdomen is soft.      Tenderness: There is no abdominal tenderness.   Musculoskeletal:      Right lower leg: No edema.      Left lower leg: No edema.      Comments: Ex fix in place to left ankle. Surgical bandages in place to left ankle and are clean and dry and intact. Small amount of blood oozing from surgical sutures   Skin:     General: Skin is warm.      Findings: No erythema.      Comments: See photos in Epic of wounds to left foot   Neurological:      Mental Status: She is alert and oriented to person, place, and time.   Psychiatric:         Mood and Affect: Mood normal.         Behavior: Behavior normal. Behavior is cooperative.         Thought Content: Thought content normal.         Judgment: Judgment normal.           Significant Labs: A1C:   Recent Labs   Lab 02/17/23  1415 04/21/23  0429 06/05/23  1957   HGBA1C 8.9* 8.2* 10.2*       CBC:   Recent Labs   Lab 06/08/23  0504   WBC 7.77   HGB 9.6*   HCT 30.1*          CMP:   Recent Labs   Lab 06/08/23  0504   *   K 4.7   CL 98   CO2 25   *   BUN 31*   CREATININE 1.1   CALCIUM 8.7   ANIONGAP 8       POCT Glucose:   Recent Labs   Lab 06/09/23  0459 06/09/23  0617 06/09/23  1139   POCTGLUCOSE 239* 205* 188*         Significant Imaging: I have reviewed all pertinent imaging results/findings within the past 24 hours.

## 2023-06-09 NOTE — PROGRESS NOTES
[facility] - [facility]  Endocrinology  Progress Note    Admit Date: 6/5/2023     Reason for Consult: Management of T2DM, Hyperglycemia     Surgical Procedure and Date: 6/6/23 Open reduction internal fixation left pilon fracture with fixation of tibia and fibula  External fixator placement left lower extremity    Diabetes diagnosis year: 2018    Home Diabetes Medications:  Levemir 18 units nightly. Novolog is ordered SSI LDC; but patient states she does not use.     How often checking glucose at home?  1-3 times per week  (checks at random times)  BG readings on regimen: 200's  Hypoglycemia on the regimen?  No  Missed doses on regimen?  No    Diabetes Complications include:     Hyperglycemia and Diabetic peripheral neuropathy     Complicating diabetes co morbidities:   HTN; HLD;      HPI:   Patient is a 73 y.o. female with a diagnosis of peripheral artery disease s/p revascularization and type 2 diabetes mellitus c/b neuropathy and chronic lower extremity wounds who presents as a transfer from [facility] for distal fractures of the tibia and fibula following a fall.  It was also noted that she had purulent drainage from her lower extremity wounds.  Of note, per chart review, the patient had a left heel bone biopsy performed by her podiatrist on April 27th.  Patient's bone biopsy grew Staphylococcus aureus.  She was seen in the Wound Care Clinic and placed on levofloxacin and doxycycline but did not appear to be followed by the Infectious Disease Service.  She was evaluated by Orthopedic Surgery at [facility] who is planning surgical intervention on June 6th.  Patient was admitted to Hospital Medicine for further management.Endocrinology consulted for BG/ DM management.     Lab Results   Component Value Date    HGBA1C 10.2 (H) 06/05/2023               Interval HPI:   Overnight events: NAEON. POD 3. BG at or slightly above goal ranges on current SQ insulin regimen. Diet diabetic [facility]; 2000  "Calorie; Thin    Eatin%  Nausea: No  Hypoglycemia and intervention: No  Fever: No  TPN and/or TF: No  If yes, type of TF/TPN and rate: n/a    BP (!) 162/83 (BP Location: Left arm, Patient Position: Lying)   Pulse 83   Temp 98.2 °F (36.8 °C) (Oral)   Resp 18   Ht 5' 2.99" (1.6 m)   Wt 72.9 kg (160 lb 11.5 oz)   SpO2 97%   Breastfeeding No   BMI 28.48 kg/m²     Labs Reviewed and Include    No results for input(s): GLU, CALCIUM, ALBUMIN, PROT, NA, K, CO2, CL, BUN, CREATININE, ALKPHOS, ALT, AST, BILITOT in the last 24 hours.  Lab Results   Component Value Date    WBC 7.77 2023    HGB 9.6 (L) 2023    HCT 30.1 (L) 2023    MCV 91 2023     2023     No results for input(s): TSH, FREET4 in the last 168 hours.  Lab Results   Component Value Date    HGBA1C 10.2 (H) 2023       Nutritional status:   Body mass index is 28.48 kg/m².  Lab Results   Component Value Date    ALBUMIN 3.3 (L) 2023    ALBUMIN 2.6 (L) 2023    ALBUMIN 2.6 (L) 2023     Lab Results   Component Value Date    PREALBUMIN 11 (L) 2023       Estimated Creatinine Clearance: 44.2 mL/min (based on SCr of 1.1 mg/dL).    Accu-Checks  Recent Labs     23  1952 23  0637 23  1113 23  1629 23  0029 23  0643 23  1132 23  1515 23  0459 23  0617   POCTGLUCOSE 260* 214* 218* 98 125* 208* 201* 160* 239* 205*       Current Medications and/or Treatments Impacting Glycemic Control  Immunotherapy:    Immunosuppressants       None          Steroids:   Hormones (From admission, onward)      Start     Stop Route Frequency Ordered    23 2245  melatonin tablet 9 mg         -- Oral Nightly 23 2230          Pressors:    Autonomic Drugs (From admission, onward)      None          Hyperglycemia/Diabetes Medications:   Antihyperglycemics (From admission, onward)      Start     Stop Route Frequency Ordered    23 0900  insulin detemir " U-100 (Levemir) pen 22 Units         -- SubQ Daily 06/08/23 0932    06/07/23 1645  insulin aspart U-100 pen 6 Units         -- SubQ 3 times daily with meals 06/07/23 1453    06/05/23 2345  insulin aspart U-100 pen 0-5 Units         -- SubQ Before meals & nightly PRN 06/05/23 2246            ASSESSMENT and PLAN    Endocrine  Type 2 diabetes mellitus with foot ulcer, with long-term current use of insulin  Endocrinology consulted for BG management.   BG goal 140-180    - Levemir (Insulin Detemir) 22 units daily (20% increase due to fasting blood glucose above goal)   - Novolog (Insulin Aspart) 7 units TIDWM and prn for BG excursions LDC SSI (150/50) Basal/Prandial match   - BG checks AC/HS  - Hypoglycemia protocol in place  - If blood glucose greater than 300, please ask patient not to eat food or drink anything other than water until correctional insulin has brought it back below 250    ** Please notify Endocrine for any change and/or advance in diet**  ** Please call Endocrine for any BG related issues **    Discharge Planning:   TBD. Please notify endocrinology prior to discharge.        Orthopedic  * Closed displaced pilon fracture of left tibia with routine healing s/p ORIF of pilon and ex fix on 6/6/2023  Managed per primary.   Optimize BG control to improve wound healing        Chronic ulcer of left heel with fat layer exposed  Optimize BG control.   Optimize BG control to improve wound healing          Hannha Chappell, NP  Endocrinology  [facility] - Surgery

## 2023-06-09 NOTE — ASSESSMENT & PLAN NOTE
· Known severe peripheral arterial disease. Vascular Surgery during last hospitalization and had revascularization of left lower extremity in 4/2023 with percutaneous transluminal angioplasty of left popliteal artery and percutaneous transluminal angioplasty of the entire length of the left posterior tibial artery.  · Patient on Aspirin Plavix and high dose Lipitor to treat and will continue in hospital.   · Needs BID asa for 12 weeks until aug 30 then back to daily  · Will resume plavix 6/9  Ortho ok with it

## 2023-06-09 NOTE — SUBJECTIVE & OBJECTIVE
"Interval HPI:   Overnight events: NAEON. POD 3. BG at or slightly above goal ranges on current SQ insulin regimen. Diet diabetic [facility];  Calorie; Thin    Eatin%  Nausea: No  Hypoglycemia and intervention: No  Fever: No  TPN and/or TF: No  If yes, type of TF/TPN and rate: n/a    BP (!) 162/83 (BP Location: Left arm, Patient Position: Lying)   Pulse 83   Temp 98.2 °F (36.8 °C) (Oral)   Resp 18   Ht 5' 2.99" (1.6 m)   Wt 72.9 kg (160 lb 11.5 oz)   SpO2 97%   Breastfeeding No   BMI 28.48 kg/m²     Labs Reviewed and Include    No results for input(s): GLU, CALCIUM, ALBUMIN, PROT, NA, K, CO2, CL, BUN, CREATININE, ALKPHOS, ALT, AST, BILITOT in the last 24 hours.  Lab Results   Component Value Date    WBC 7.77 2023    HGB 9.6 (L) 2023    HCT 30.1 (L) 2023    MCV 91 2023     2023     No results for input(s): TSH, FREET4 in the last 168 hours.  Lab Results   Component Value Date    HGBA1C 10.2 (H) 2023       Nutritional status:   Body mass index is 28.48 kg/m².  Lab Results   Component Value Date    ALBUMIN 3.3 (L) 2023    ALBUMIN 2.6 (L) 2023    ALBUMIN 2.6 (L) 2023     Lab Results   Component Value Date    PREALBUMIN 11 (L) 2023       Estimated Creatinine Clearance: 44.2 mL/min (based on SCr of 1.1 mg/dL).    Accu-Checks  Recent Labs     23  1952 23  0637 23  1113 23  1629 23  0029 23  0643 23  1132 23  1515 23  0459 23  0617   POCTGLUCOSE 260* 214* 218* 98 125* 208* 201* 160* 239* 205*       Current Medications and/or Treatments Impacting Glycemic Control  Immunotherapy:    Immunosuppressants       None          Steroids:   Hormones (From admission, onward)      Start     Stop Route Frequency Ordered    23  melatonin tablet 9 mg         -- Oral Nightly 23          Pressors:    Autonomic Drugs (From admission, onward)      None      "     Hyperglycemia/Diabetes Medications:   Antihyperglycemics (From admission, onward)      Start     Stop Route Frequency Ordered    06/09/23 0900  insulin detemir U-100 (Levemir) pen 22 Units         -- SubQ Daily 06/08/23 0932    06/07/23 1645  insulin aspart U-100 pen 6 Units         -- SubQ 3 times daily with meals 06/07/23 1453    06/05/23 2345  insulin aspart U-100 pen 0-5 Units         -- SubQ Before meals & nightly PRN 06/05/23 2244

## 2023-06-09 NOTE — PROGRESS NOTES
[facility] Medicine  Progress Note    Patient Name: Anastasiia Bdaillo  MRN: [id number]  Patient Class: IP- Inpatient   Admission Date: 6/5/2023  Length of Stay: 4 days  Attending Physician: Sonia Kim MD  Primary Care Provider: Raji Parkinson MD        Subjective:     Principal Problem:Closed displaced pilon fracture of left tibia with routine healing        HPI:  Alycia Badillo is a 73-year-old woman with a past medical history of peripheral artery disease s/p revascularization and type 2 diabetes mellitus c/b neuropathy and chronic lower extremity wounds who presents as a transfer from [facility] for distal fractures of the tibia and fibula.  Of note, patient was hospitalized at [facility] in April 2023 for severe peripheral artery disease and underwent revascularization.  The patient was discharged and was sent to inpatient rehabilitation.  She said that she developed a pressure ulcer on her left heel.  She also has a wound on the plantar surface of her left great toe.  She has been following with a podiatrist in addition to a Wound Care clinic.  The patient said that she left inpatient rehabilitation and went to live at home with her son.  She says that she normally ambulates with a walker.  Approximately three days ago, she said she had just taken her nightly melatonin and was getting ready to fall asleep in bed.  However, she noted that she needed to use the bathroom and went to reach for the walker next to the bed.  She said that the walker was not locked and so she slid and fell to the ground hitting her left lower extremity.  She said that she began having pain and swelling to her left lower extremity.  The pain became so unbearable that she lost the ability to ambulate.  This prompted her to seek care in the emergency department at [facility].  Imaging at the [facility] reveals a comminuted and angulated distal tibia and fibular  fracture.  It was also noted that she was having purulent drainage from her lower extremity wounds.  Of note, per chart review, the patient had a left heel bone biopsy performed by her podiatrist on April 27th.  Patient's bone biopsy grew Staphylococcus aureus.  She was seen in the Wound Care Clinic and placed on levofloxacin and doxycycline but did not appear to be followed by the Infectious Disease Service.    In the emergency department, the patient was noted to have stable vital signs upon arrival.  Per report, the patient received IV antibiotics at [facility] prior to transfer.  She was evaluated by Orthopedic Surgery at [facility] who is planning surgical intervention on June 6th.  Patient was admitted to Hospital Medicine for further management.      Overview/Hospital Course:  Patient taken to OR with Orthopedics on 6/6/2023 and had ORIF of left pilon fracture and ex fix placed for pilon fracture for stabilization of fracture by Dr. Gilbert Mahmood. Plan per Ortho is ex fix to stay in place for 4-6 weeks and then plan for removal by Ortho. According to Ortho, if current construct does not have enough stability for healing will consider transition to ringed fixator. Patient to be non-weight bearing to left lower extremity x 3 months post-op. Patient started on Aspirin 81 mg po BID and will need for 12 weeks as per Ortho for DVT prophylaxis. Endocrine consulted to assist in diabetes management. Podiatry consulted for left foot wounds and not no active infection and wound care as per Podiatry. Infectious Disease consulted as had previous positive cultures from left foot wounds and osteomyelitis. Infectious disease evaluated and recommending IV Cefazolin 2 grams every 8 hours for 6 weeks to treat osteomyelitis. PT/OT consulted. Patient on multimodal pain meds post-op.         Interval History: she repots breakfast is late again today and has been an issue with the floor this week it  seems. Awaiting final ID recs as on cipro for 2 weeks and ancef for 6 weeks s plan from yesterday with PICC placed. Had some slight oozing of blood from surgical site on ankle on xam but very small amount. Discussed planning for ltac for wound care needs as well as therapy needs and antibiotics and awaiting acceptance and final recs here for placement.       Review of Systems   Constitutional:  Negative for fever.   Respiratory:  Negative for cough and shortness of breath.    Cardiovascular:  Negative for chest pain and leg swelling.   Gastrointestinal:  Negative for abdominal pain, constipation, diarrhea and nausea.   Musculoskeletal:  Positive for arthralgias (Left ankle).   Skin:  Positive for wound (Left foot).   Neurological:  Negative for dizziness and light-headedness.   Psychiatric/Behavioral:  Negative for agitation and confusion.    Objective:     Vital Signs (Most Recent):  Temp: 97.7 °F (36.5 °C) (06/07/23 1523)  Pulse: 83 (06/07/23 1531)  Resp: 18 (06/07/23 1523)  BP: 123/59 (06/07/23 1523)  SpO2: 99 % (06/07/23 1523) on room air Vital Signs (24h Range):  Temp:  [97 °F (36.1 °C)-98.2 °F (36.8 °C)] 97.9 °F (36.6 °C)  Pulse:  [74-88] 82  Resp:  [16-20] 20  SpO2:  [93 %-98 %] 93 %  BP: (117-169)/(65-83) 117/65     Weight: 72.9 kg (160 lb 11.5 oz)  Body mass index is 28.48 kg/m².    Intake/Output Summary (Last 24 hours) at 6/9/2023 1203  Last data filed at 6/9/2023 1149  Gross per 24 hour   Intake --   Output 500 ml   Net -500 ml         Physical Exam  Vitals and nursing note reviewed.   Constitutional:       General: She is awake. She is not in acute distress.     Appearance: Normal appearance. She is normal weight. She is not ill-appearing.   Cardiovascular:      Rate and Rhythm: Normal rate and regular rhythm.      Heart sounds: Normal heart sounds. No murmur heard.    No friction rub. No gallop.   Pulmonary:      Effort: Pulmonary effort is normal. No respiratory distress.      Breath sounds: Normal  breath sounds. No wheezing.   Chest:   Breasts:     Right: Mass (Small mobile hard lump in upper outer quadrant) present.   Abdominal:      General: Abdomen is flat. Bowel sounds are normal. There is no distension.      Palpations: Abdomen is soft.      Tenderness: There is no abdominal tenderness.   Musculoskeletal:      Right lower leg: No edema.      Left lower leg: No edema.      Comments: Ex fix in place to left ankle. Surgical bandages in place to left ankle and are clean and dry and intact. Small amount of blood oozing from surgical sutures   Skin:     General: Skin is warm.      Findings: No erythema.      Comments: See photos in Epic of wounds to left foot   Neurological:      Mental Status: She is alert and oriented to person, place, and time.   Psychiatric:         Mood and Affect: Mood normal.         Behavior: Behavior normal. Behavior is cooperative.         Thought Content: Thought content normal.         Judgment: Judgment normal.           Significant Labs: A1C:   Recent Labs   Lab 02/17/23  1415 04/21/23  0429 06/05/23  1957   HGBA1C 8.9* 8.2* 10.2*       CBC:   Recent Labs   Lab 06/08/23  0504   WBC 7.77   HGB 9.6*   HCT 30.1*          CMP:   Recent Labs   Lab 06/08/23  0504   *   K 4.7   CL 98   CO2 25   *   BUN 31*   CREATININE 1.1   CALCIUM 8.7   ANIONGAP 8       POCT Glucose:   Recent Labs   Lab 06/09/23  0459 06/09/23  0617 06/09/23  1139   POCTGLUCOSE 239* 205* 188*         Significant Imaging: I have reviewed all pertinent imaging results/findings within the past 24 hours.      Assessment/Plan:      * Closed displaced pilon fracture of left tibia with routine healing s/p ORIF of pilon and ex fix on 6/6/2023  Presenting after fall and found to have comminuted and angulated closed left distal tibia and fibular fractures. Evaluated by Orthopedic Surgery service, she was reduced and placed into a short leg splint. She will require operative intervention for this injury.   -  Patient taken to OR on 6/6/2023 and underwent ORIF of pilon fracture as well as ex fix to stabilize fractures with Dr. Gilbert Mahmood.  - Patient post-op to be non weight bearing to left lower extremity x 3 months from surgery date on 6/6.   - Ortho to remove ex fix I 4-6 weeks but if construct fails then would have to convert to ring fixator as per Ortho.   - PT/OT consulted for gait training/transfer training and restoration of ADL's post-op.  - Routine pin site care to ex fix as per Ortho.  - Ice and elevate left lower extremity to help with swelling.   - Pain management with scheduled Tylenol 1000 mg po every 8 hours + Robaxin 500 mg po 4 times daily + Lyrica 75 mg po BID with Oxycodone  mg po every 4 hours prn for breakthrough pain.   - Orthopedics managing surgical site to left ankle area.   - Continue Aspirin 81 mg po BID for 12 weeks for DVT prophylaxis as per Ortho. End date aug 30th then back to daily for PVD  - Surgical bandages to remain in place to left ankle and to remian in place until Ortho clinic follow-up.         Acute osteomyelitis of left calcaneus  Plan for 6 weeks ancef and likely  2 weeks cipro for soft tissue infection and osteo with ancef for longer period. Final ID recs pending now      Tobacco use  Patient declined nicotine patch. Still actively smoking at home. Patient counseled extensively on smoking cessation as has open wounds, severe PAD and now post-op from ankle fracture surgery and nicotine to impair wound healing and risk re-occlusion of peripheral  arteries in lower limbs.     Chronic ulcer of left heel with fat layer exposed  Chronic ulcer of great toe of left foot with fat layer exposed  Acute osteomyelitis of left calcaneus  Follows with Podiatry clinic. Previously had heel ulcer bone biopsy which grew Staphylococcus aureus and Staph epidermis and was prescribed Doxycycline and Levofloxacin from Wound Care clinic but not followed by Infectious Disease clinic.  Afebrile, without leukocytosis. CRP elevated 127 on admit.   - Podiatry consulted, appreciate recommendations and continue wound care as per Podiatry to left heel and left first toe ulcers. Wound care recs per podiatry note on 6/7 in place.   - Infectious Disease consulted, appreciate recommendations and they recommend 6 weeks of IV Cefazolin 2 grams every 8 hours to treat left heel osteomyelitis. picc placed 6/8, awaiting antibitoic changes today given pseudomonas in wound will adjust per ID    Mass of upper outer quadrant of right breast  Noted on admit. Patient in past month as noted a lump in right breast in upper outer quadrant. Patient does have palpable lump in right upper outer quadrant on exam. Patient has know bilateral breast implants in place. Patient's primary care physician is aware and had ordered to have outpatient ultrasound of breasts and mammogram done and have not been done yet. Patient to follow-up as outpatient with her primary care physician for further work-up and evaluation.       PAD (peripheral artery disease)  · Known severe peripheral arterial disease. Vascular Surgery during last hospitalization and had revascularization of left lower extremity in 4/2023 with percutaneous transluminal angioplasty of left popliteal artery and percutaneous transluminal angioplasty of the entire length of the left posterior tibial artery.  · Patient on Aspirin Plavix and high dose Lipitor to treat and will continue in hospital.   · Needs BID asa for 12 weeks until aug 30 then back to daily  · Will resume plavix 6/9  Ortho ok with it    Essential (primary) hypertension  Chronic condition. Continue home Lisinopril 5 mg po daily to treat. Monitor vital signs every 4 hours. Target BP < 140/90.       Type 2 diabetes mellitus with foot ulcer, with long-term current use of insulin  Type 2 diabetes mellitus with hyperglycemia, with long-term current use of insulin  Endocrine consulted to assist in management.  Patient's FSGs are uncontrolled due to hyperglycemia on current medication regimen.    Last A1c reviewed-   Lab Results   Component Value Date    HGBA1C 10.2 (H) 06/05/2023     Most recent fingerstick glucose reviewed-   Recent Labs   Lab 06/07/23  1629 06/08/23  0029 06/08/23  0643 06/08/23  1132   POCTGLUCOSE 98 125* 208* 201*     Current correctional scale  Low     Maintain anti-hyperglycemic dose as follows as per Endocrine recs:  Antihyperglycemics (From admission, onward)    Start     Stop Route Frequency Ordered    06/09/23 0900  insulin detemir U-100 (Levemir) pen 22 Units         -- SubQ Daily 06/08/23 0932    06/07/23 1645  insulin aspart U-100 pen 6 Units         -- SubQ 3 times daily with meals 06/07/23 1453    06/05/23 2345  insulin aspart U-100 pen 0-5 Units         -- SubQ Before meals & nightly PRN 06/05/23 2246        Target blood sugar 140-180 in hospital.  Diabetic diet.  Monitor blood sugars 4 times daily with meals and at bedtime.   Appreciate endo assisting as she reports was not controlled at home and likely contributor to poor wound healing of her LLE prior to admit      VTE Risk Mitigation (From admission, onward)         Ordered     Reason for No Pharmacological VTE Prophylaxis  Once        Question:  Reasons:  Answer:  Risk of Bleeding  Comment:  Ortho planning surgery in AM    06/05/23 2246     IP VTE HIGH RISK PATIENT  Once         06/05/23 2246     Place sequential compression device  Until discontinued         06/05/23 2246                Discharge Planning   CITLALI: 6/9/2023     Code Status: Full Code   Is the patient medically ready for discharge?: No    Reason for patient still in hospital (select all that apply): Patient trending condition  Discharge Plan A: Long-term acute care facility (LTAC)                  Sonia Kim MD  Department of Hospital Medicine   [facility] - Surgery

## 2023-06-09 NOTE — PT/OT/SLP PROGRESS
Physical Therapy Treatment    Patient Name:  Anastasiia Badillo   MRN:  [id number]    Recommendations:     Discharge Recommendations: rehabilitation facility  Discharge Equipment Recommendations: walker, rolling, bedside commode  Barriers to discharge: Decreased caregiver support    Assessment:     Anastasiia Badillo is a 73 y.o. female admitted with a medical diagnosis of Closed displaced pilon fracture of left tibia with routine healing.  She presents with the following impairments/functional limitations: weakness, impaired endurance, decreased lower extremity function, impaired functional mobility, pain, impaired balance . presents with  improved overall functional mobility requiring decreased assistance and increased activity tolerance to perform functional mobility.Pt would continue to benefit from skilled PT to address overall functional mobility, to return to functional baseline and goals. Goals remain appropriate.      Rehab Prognosis: Good; patient would benefit from acute skilled PT services to address these deficits and reach maximum level of function.    Recent Surgery: Procedure(s) (LRB):  ORIF, FRACTURE, PILON, LEFT (Left)  APPLICATION, EXTERNAL FIXATION DEVICE, LEFT ANKLE, Vasquez (Left) 3 Days Post-Op    Plan:     During this hospitalization, patient to be seen 5 x/week to address the identified rehab impairments via gait training, therapeutic activities, therapeutic exercises, neuromuscular re-education and progress toward the following goals:    Plan of Care Expires:  07/07/23    Subjective     Chief Complaint: weakness and fear of falling    Pain/Comfort:  Pain Rating 1:  (not rated)  Location - Side 1: Left  Location - Orientation 1: lower  Location 1: leg  Pain Addressed 1: Reposition, Distraction, Pre-medicate for activity      Objective:     Communicated with RN prior to session.  Patient found HOB elevated with  (SCD; telemetry; PureWick; oxygen) upon PT entry to room.     General Precautions:  Standard, fall  Orthopedic Precautions: LLE non weight bearing  Braces:  (Ex fix on LLE)  Respiratory Status: Room air     Functional Mobility:  Bed Mobility:     Scooting: moderate assistance  Supine to Sit: moderate assistance  Sit to Supine: moderate assistance  Transfers:     Sit to Stand:  moderate assistance and of 2 persons with rolling walker x 2 trials with vcs for hand placement and physical (A) for maintaining NWB L LE       AM-PAC 6 CLICK MOBILITY  Turning over in bed (including adjusting bedclothes, sheets and blankets)?: 3  Sitting down on and standing up from a chair with arms (e.g., wheelchair, bedside commode, etc.): 2  Moving from lying on back to sitting on the side of the bed?: 2  Moving to and from a bed to a chair (including a wheelchair)?: 2  Need to walk in hospital room?: 1  Climbing 3-5 steps with a railing?: 1  Basic Mobility Total Score: 11       Treatment & Education:  Therapist provided instruction and educated of  patient on progress, safety,d/c,PT POC,   proper body mechanics, energy conservation, and fall prevention strategies during tasks listed above, on the effects of prolonged immobility and the importance of performing OOB activity and exercises to promote healing and reduce recovery time     Updated white board with appropriate PT mobility information for medical team notification   seated B LE therex x 10 reps with A/AAROM   Donned an extra gown  Bedside table in front of patient and area set up for function, convenience, and safety. RN aware of patient's mobility needs and status. Questions/concerns addressed within PTA scope of practice; patient  with no further questions. Time was provided for active listening, discussion of health disposition, and discussion of safe discharge. Pt?verbalized?agreement .  Co-tx performed with OT due to pt's need for 2 skilled therapists to ensure pt and staff safety and to accommodate for pt's activity tolerance        Patient left HOB  elevated with all lines intact, call button in reach, and nsg notified  GOALS:   Multidisciplinary Problems       Physical Therapy Goals          Problem: Physical Therapy    Goal Priority Disciplines Outcome Goal Variances Interventions   Physical Therapy Goal     PT, PT/OT Ongoing, Progressing     Description: Goals to be met by: 23     Patient will increase functional independence with mobility by performin. Supine to sit with Minimal Assistance  2. Sit to stand transfer with Minimal Assistance  3. Bed to chair transfer with Minimal Assistance using Rolling Walker.  4. Gait  x 100 feet with Minimal Assistance using Rolling Walker.   5. Pt will demonstrate understanding and adhere to NWB of L LE for all functional mobility.                         Time Tracking:     PT Received On: 23  PT Start Time: 1103     PT Stop Time: 1127  PT Total Time (min): 24 min     Billable Minutes: Therapeutic Activity 12 and Therapeutic Exercise 12    Treatment Type: Treatment  PT/PTA: PTA     Number of PTA visits since last PT visit: 2     2023

## 2023-06-09 NOTE — ASSESSMENT & PLAN NOTE
Plan for 6 weeks ancef and likely  2 weeks cipro for soft tissue infection and osteo with ancef for longer period. Final ID recs pending now

## 2023-06-09 NOTE — SUBJECTIVE & OBJECTIVE
Principal Problem:Closed displaced pilon fracture of left tibia with routine healing    Principal Orthopedic Problem: same sp operative fixation and ex fix placement 6/6/23    Interval History: seen and evaluated. She's a little bit down. Feels she didn't do well w therapy, find the ex fix cumbersome. ID started her on cipro in addition the the ancef due to cultures from her left great toe wound psotive for pseudomas. BG has been reasonably well controlled w endocrine assistance    Review of patient's allergies indicates:  No Known Allergies    Current Facility-Administered Medications   Medication    acetaminophen tablet 1,000 mg    aspirin EC tablet 81 mg    atorvastatin tablet 80 mg    ceFAZolin 2 g in dextrose 5 % in water (D5W) 5 % 50 mL IVPB (MB+)    ciprofloxacin HCl tablet 750 mg    clopidogreL tablet 75 mg    dextrose 10% bolus 125 mL 125 mL    dextrose 10% bolus 250 mL 250 mL    famotidine tablet 20 mg    glucagon (human recombinant) injection 1 mg    insulin aspart U-100 pen 0-5 Units    insulin aspart U-100 pen 7 Units    insulin detemir U-100 (Levemir) pen 22 Units    lisinopriL tablet 5 mg    melatonin tablet 9 mg    methocarbamoL tablet 500 mg    mupirocin 2 % ointment    naloxone 0.4 mg/mL injection 0.02 mg    ondansetron disintegrating tablet 4 mg    oxyCODONE immediate release tablet 5 mg    oxyCODONE immediate release tablet Tab 10 mg    polyethylene glycol packet 17 g    pregabalin capsule 75 mg    simethicone chewable tablet 80 mg    sodium chloride 0.9% flush 10 mL    sodium chloride 0.9% flush 10 mL    And    sodium chloride 0.9% flush 10 mL    vitamin D 1000 units tablet 1,000 Units     Objective:     Vital Signs (Most Recent):  Temp: 98.2 °F (36.8 °C) (06/09/23 1616)  Pulse: 83 (06/09/23 1616)  Resp: 20 (06/09/23 1152)  BP: 133/69 (06/09/23 1616)  SpO2: (!) 94 % (06/09/23 1616) Vital Signs (24h Range):  Temp:  [97.9 °F (36.6 °C)-98.2 °F (36.8 °C)] 98.2 °F (36.8 °C)  Pulse:  [74-86] 83  Resp:  " [17-20] 20  SpO2:  [93 %-98 %] 94 %  BP: (117-169)/(65-83) 133/69     Weight: 72.9 kg (160 lb 11.5 oz)  Height: 5' 2.99" (160 cm)  Body mass index is 28.48 kg/m².      Intake/Output Summary (Last 24 hours) at 6/9/2023 1653  Last data filed at 6/9/2023 1149  Gross per 24 hour   Intake --   Output 500 ml   Net -500 ml          Ortho/SPM Exam  Gen: NAD, sitting comfortably in bed  CV: regular rate  Resp: non-labored respirations    LLE:  Heel and great toe wounds are dressed   Ex fix in place, pin sites clean and dry w no signs of infection  Surgical incisiosn dressed w some ss strike through   NVI to baseline- neuropathic to calf, can wiggles toes, foot and toes pink wwp     Significant Labs: BMP:   Recent Labs   Lab 06/08/23  0504   *   *   K 4.7   CL 98   CO2 25   BUN 31*   CREATININE 1.1   CALCIUM 8.7       CBC:   Recent Labs   Lab 06/08/23  0504   WBC 7.77   HGB 9.6*   HCT 30.1*          CMP:   Recent Labs   Lab 06/08/23  0504   *   K 4.7   CL 98   CO2 25   *   BUN 31*   CREATININE 1.1   CALCIUM 8.7   ANIONGAP 8       All pertinent labs within the past 24 hours have been reviewed.    Significant Imaging: I have reviewed and interpreted all pertinent imaging results/findings.  "

## 2023-06-09 NOTE — PT/OT/SLP PROGRESS
Occupational Therapy   Co-Treatment with PT    Name: Anastasiia Badillo  MRN: [id number]  Admitting Diagnosis:  Closed displaced pilon fracture of left tibia with routine healing  3 Days Post-Op  Procedure(s):  ORIF, FRACTURE, PILON, LEFT  APPLICATION, EXTERNAL FIXATION DEVICE, LEFT ANKLE, [place]     Recommendations:     Discharge Recommendations: rehabilitation facility  Discharge Equipment Recommendations:  walker, rolling, bedside commode  Barriers to discharge:   (increased skilled assistance required)    Assessment:     Anastasiia Badillo is a 73 y.o. female with a medical diagnosis of Closed displaced pilon fracture of left tibia with routine healing.  She presents with the following performance deficits affecting function are weakness, impaired endurance, impaired self care skills, impaired functional mobility, gait instability, impaired balance, pain, decreased safety awareness, decreased lower extremity function, decreased upper extremity function, decreased coordination, decreased ROM, orthopedic precautions.     Rehab Prognosis:  Good; patient would benefit from acute skilled OT services to address these deficits and reach maximum level of function.       Plan:     Patient to be seen 4 x/week to address the above listed problems via self-care/home management, therapeutic activities, therapeutic exercises  Plan of Care Expires: 07/05/23  Plan of Care Reviewed with: patient    Subjective     Chief Complaint: c/o pain when seated EOB  Patient/Family Comments/goals: get better  Pain/Comfort:  Pain Rating 1:  (unrated)  Location - Side 1: Left  Location - Orientation 1: distal  Location 1: leg  Pain Addressed 1: Reposition, Distraction, Cessation of Activity    Objective:     Communicated with: nurse prior to session.  Patient found HOB elevated with external fixator, SCD, PureWick, telemetry upon OT entry to room.    General Precautions: Standard, fall    Orthopedic Precautions:LLE non weight bearing  Braces:  N/A (Ex fix on LLE)  Respiratory Status: Room air     Occupational Performance:     Bed Mobility:    Patient completed Scooting/Bridging with moderate assistance  Patient completed Supine to Sit with moderate assistance and max cues for sequencing, hand placement, pt with posterior lean  Patient completed Sit to Supine with moderate assistance BLE management    Functional Mobility/Transfers:  Patient completed Sit <> Stand Transfer with moderate assistance and of 2 persons  with  rolling walker and (A) with LE to maintain NWBing   Functional Mobility: deferred 2/2 fatigue    Activities of Daily Living:  Upper Body Dressing: minimum assistance don gown  Grooming: CGA/Min(A) for posterior lean seated EOB to wash face      AMPA 6 Click ADL: 12    Treatment & Education:  Patient educated on OT POC, goals, and current progress  Reviewed orthopedic precautions  Patient required CGA to Mod(A) for static sitting balance 2/2 weak core and poor endurance  Addressed all patient questions/concerns within [facility] scope of practice.   Co-treatment performed with PTA due to patient's complexity and benefit of 2 skilled therapists to facilitate functional and safe occupational performance, accommodate patient's activity tolerance, and maximize patient's participation in therapy.     Patient left HOB elevated with all lines intact and call button in reach    GOALS:   Multidisciplinary Problems       Occupational Therapy Goals          Problem: Occupational Therapy    Goal Priority Disciplines Outcome Interventions   Occupational Therapy Goal     OT, PT/OT Ongoing, Progressing    Description: Goals to be met by: 7/5/23     Patient will increase functional independence with ADLs by performing:    UE Dressing with Set-up Assistance.  LE Dressing with Minimal Assistance.  Grooming while EOB with Set-up Assistance.  Toileting from bedside commode with Moderate Assistance for hygiene and clothing management.   Supine to sit with Minimal  Assistance.  Toilet transfer to bedside commode with Moderate Assistance.                         Time Tracking:     OT Date of Treatment: 06/09/23  OT Start Time: 1105  OT Stop Time: 1128  OT Total Time (min): 23 min    Billable Minutes:Self Care/Home Management 8  Therapeutic Activity 15    OT/DARION: DARION     Number of DARION visits since last OT visit: 2    6/9/2023

## 2023-06-09 NOTE — PROGRESS NOTES
[facility]  Orthopedics  Progress Note    Patient Name: Anastasiia Badillo  MRN: [id number]  Admission Date: 6/5/2023  Hospital Length of Stay: 4 days  Attending Provider: Sonia Kim MD  Primary Care Provider: Raji Parkinson MD  Follow-up For: Procedure(s) (LRB):  ORIF, FRACTURE, PILON, LEFT (Left)  APPLICATION, EXTERNAL FIXATION DEVICE, LEFT ANKLE, [place] (Left)    Post-Operative Day: 3 Days Post-Op  Subjective:     Principal Problem:Closed displaced pilon fracture of left tibia with routine healing    Principal Orthopedic Problem: same sp operative fixation and ex fix placement 6/6/23    Interval History: seen and evaluated. She's a little bit down. Feels she didn't do well w therapy, find the ex fix cumbersome. ID started her on cipro in addition the the ancef due to cultures from her left great toe wound psotive for pseudomas. BG has been reasonably well controlled w endocrine assistance    Review of patient's allergies indicates:  No Known Allergies    Current Facility-Administered Medications   Medication    acetaminophen tablet 1,000 mg    aspirin EC tablet 81 mg    atorvastatin tablet 80 mg    ceFAZolin 2 g in dextrose 5 % in water (D5W) 5 % 50 mL IVPB (MB+)    ciprofloxacin HCl tablet 750 mg    clopidogreL tablet 75 mg    dextrose 10% bolus 125 mL 125 mL    dextrose 10% bolus 250 mL 250 mL    famotidine tablet 20 mg    glucagon (human recombinant) injection 1 mg    insulin aspart U-100 pen 0-5 Units    insulin aspart U-100 pen 7 Units    insulin detemir U-100 (Levemir) pen 22 Units    lisinopriL tablet 5 mg    melatonin tablet 9 mg    methocarbamoL tablet 500 mg    mupirocin 2 % ointment    naloxone 0.4 mg/mL injection 0.02 mg    ondansetron disintegrating tablet 4 mg    oxyCODONE immediate release tablet 5 mg    oxyCODONE immediate release tablet Tab 10 mg    polyethylene glycol packet 17 g    pregabalin capsule 75 mg    simethicone chewable tablet 80 mg    sodium  "chloride 0.9% flush 10 mL    sodium chloride 0.9% flush 10 mL    And    sodium chloride 0.9% flush 10 mL    vitamin D 1000 units tablet 1,000 Units     Objective:     Vital Signs (Most Recent):  Temp: 98.2 °F (36.8 °C) (06/09/23 1616)  Pulse: 83 (06/09/23 1616)  Resp: 20 (06/09/23 1152)  BP: 133/69 (06/09/23 1616)  SpO2: (!) 94 % (06/09/23 1616) Vital Signs (24h Range):  Temp:  [97.9 °F (36.6 °C)-98.2 °F (36.8 °C)] 98.2 °F (36.8 °C)  Pulse:  [74-86] 83  Resp:  [17-20] 20  SpO2:  [93 %-98 %] 94 %  BP: (117-169)/(65-83) 133/69     Weight: 72.9 kg (160 lb 11.5 oz)  Height: 5' 2.99" (160 cm)  Body mass index is 28.48 kg/m².      Intake/Output Summary (Last 24 hours) at 6/9/2023 1653  Last data filed at 6/9/2023 1149  Gross per 24 hour   Intake --   Output 500 ml   Net -500 ml         Ortho/SPM Exam  Gen: NAD, sitting comfortably in bed  CV: regular rate  Resp: non-labored respirations    LLE:  Heel and great toe wounds are dressed   Ex fix in place, pin sites clean and dry w no signs of infection  Surgical incisiosn dressed w some ss strike through   NVI to baseline- neuropathic to calf, can wiggles toes, foot and toes pink wwp     Significant Labs: BMP:   Recent Labs   Lab 06/08/23  0504   *   *   K 4.7   CL 98   CO2 25   BUN 31*   CREATININE 1.1   CALCIUM 8.7       CBC:   Recent Labs   Lab 06/08/23  0504   WBC 7.77   HGB 9.6*   HCT 30.1*          CMP:   Recent Labs   Lab 06/08/23  0504   *   K 4.7   CL 98   CO2 25   *   BUN 31*   CREATININE 1.1   CALCIUM 8.7   ANIONGAP 8       All pertinent labs within the past 24 hours have been reviewed.    Significant Imaging: I have reviewed and interpreted all pertinent imaging results/findings.    Assessment/Plan:     * Closed displaced pilon fracture of left tibia with routine healing s/p ORIF of pilon and ex fix on 6/6/2023  73F w uncontrolled DM (A1C 10) w neuropathy to calves, PAD (sp recent LLE revascularization 5/2023), and chronic left " heela nd great toe wounds who fell 6/4 and has left pilon and left distal fibula fracture. She is closed and neurovascularly intact to baseline. She has diabetic foot wounds over her plantar heel and great toe.     -SP operative fixation of left pilon and fibula, left ankle external fixator placement 6/6/23 w Dr Mahmood    Multimodal pain regimen  NWB LLE in ex fix, elevate  BID pin site care  DVT ppx w ASA 81 bid, SCD RLE all times while in bed   PT/OT daily  ID following for abx recs for left foot wounds/possible osteo   Ancef IV per ID for previous left heel bone cultures +staph, cipro for left great toe wound +pseudmonas  Wound care for left heel/great toe wounds per podiatry   BG management per Endocrine   FU 2 wks post op w ortho          Michelle Antonio MD  Orthopedics  [facility] - [facility]

## 2023-06-09 NOTE — SUBJECTIVE & OBJECTIVE
Interval History: s/p exfix with ortho surgery. Wound cultures from left great toe + pseudomonas. Previous bone culture + MSSA, staph epi     Review of Systems   Constitutional:  Negative for chills, diaphoresis, fatigue and fever.   HENT: Negative.     Respiratory:  Negative for cough and shortness of breath.    Cardiovascular:  Negative for chest pain, palpitations and leg swelling.   Gastrointestinal:  Negative for constipation, diarrhea, nausea and vomiting.   Genitourinary:  Negative for difficulty urinating and dysuria.   Musculoskeletal:  Negative for arthralgias and myalgias.   Skin:  Positive for wound. Negative for color change.   Neurological:  Negative for dizziness and headaches.   Psychiatric/Behavioral:  Negative for agitation and confusion.    Objective:     Vital Signs (Most Recent):  Temp: 97.9 °F (36.6 °C) (06/09/23 1138)  Pulse: 82 (06/09/23 1138)  Resp: 20 (06/09/23 1152)  BP: 117/65 (06/09/23 1138)  SpO2: (!) 93 % (06/09/23 1138) Vital Signs (24h Range):  Temp:  [97 °F (36.1 °C)-98.2 °F (36.8 °C)] 97.9 °F (36.6 °C)  Pulse:  [74-88] 82  Resp:  [16-20] 20  SpO2:  [93 %-98 %] 93 %  BP: (117-169)/(65-83) 117/65     Weight: 72.9 kg (160 lb 11.5 oz)  Body mass index is 28.48 kg/m².    Estimated Creatinine Clearance: 44.2 mL/min (based on SCr of 1.1 mg/dL).     Physical Exam  Vitals reviewed.   Constitutional:       General: She is not in acute distress.     Appearance: Normal appearance. She is not ill-appearing.   HENT:      Head: Normocephalic.      Nose: Nose normal.      Mouth/Throat:      Mouth: Mucous membranes are moist.      Pharynx: Oropharynx is clear.   Eyes:      General:         Right eye: No discharge.         Left eye: No discharge.      Conjunctiva/sclera: Conjunctivae normal.   Cardiovascular:      Rate and Rhythm: Normal rate and regular rhythm.      Pulses: Normal pulses.      Heart sounds: Normal heart sounds.   Pulmonary:      Effort: Pulmonary effort is normal. No respiratory  distress.      Breath sounds: Normal breath sounds.   Abdominal:      General: Bowel sounds are normal. There is no distension.      Palpations: Abdomen is soft.      Tenderness: There is no abdominal tenderness.   Musculoskeletal:         General: Normal range of motion.      Cervical back: Normal range of motion.      Right lower leg: No edema.      Left lower leg: No edema.   Skin:     General: Skin is warm.      Findings: No erythema or rash.      Comments: With LLE exfix   Neurological:      General: No focal deficit present.      Mental Status: She is alert and oriented to person, place, and time.      Motor: No weakness.      Gait: Gait normal.   Psychiatric:         Mood and Affect: Mood normal.         Behavior: Behavior normal.         Thought Content: Thought content normal.         Judgment: Judgment normal.        Significant Labs: All pertinent labs within the past 24 hours have been reviewed.    Significant Imaging: I have reviewed all pertinent imaging results/findings within the past 24 hours.

## 2023-06-09 NOTE — ASSESSMENT & PLAN NOTE
Endocrinology consulted for BG management.   BG goal 140-180    - Levemir (Insulin Detemir) 22 units daily (20% increase due to fasting blood glucose above goal)   - Novolog (Insulin Aspart) 7 units TIDWM and prn for BG excursions LDC SSI (150/50) Basal/Prandial match   - BG checks AC/HS  - Hypoglycemia protocol in place  - If blood glucose greater than 300, please ask patient not to eat food or drink anything other than water until correctional insulin has brought it back below 250    ** Please notify Endocrine for any change and/or advance in diet**  ** Please call Endocrine for any BG related issues **    Discharge Planning:   TBD. Please notify endocrinology prior to discharge.

## 2023-06-10 LAB
ALBUMIN SERPL BCP-MCNC: 2.4 G/DL (ref 3.5–5.2)
ALP SERPL-CCNC: 106 U/L (ref 55–135)
ALT SERPL W/O P-5'-P-CCNC: <5 U/L (ref 10–44)
ANION GAP SERPL CALC-SCNC: 8 MMOL/L (ref 8–16)
AST SERPL-CCNC: 12 U/L (ref 10–40)
BASOPHILS # BLD AUTO: 0.04 K/UL (ref 0–0.2)
BASOPHILS NFR BLD: 0.7 % (ref 0–1.9)
BILIRUB SERPL-MCNC: 0.3 MG/DL (ref 0.1–1)
BUN SERPL-MCNC: 28 MG/DL (ref 8–23)
CALCIUM SERPL-MCNC: 9.4 MG/DL (ref 8.7–10.5)
CHLORIDE SERPL-SCNC: 99 MMOL/L (ref 95–110)
CO2 SERPL-SCNC: 24 MMOL/L (ref 23–29)
CREAT SERPL-MCNC: 1 MG/DL (ref 0.5–1.4)
DIFFERENTIAL METHOD: ABNORMAL
EOSINOPHIL # BLD AUTO: 0.2 K/UL (ref 0–0.5)
EOSINOPHIL NFR BLD: 3 % (ref 0–8)
ERYTHROCYTE [DISTWIDTH] IN BLOOD BY AUTOMATED COUNT: 13.5 % (ref 11.5–14.5)
EST. GFR  (NO RACE VARIABLE): 59.9 ML/MIN/1.73 M^2
GLUCOSE SERPL-MCNC: 288 MG/DL (ref 70–110)
HCT VFR BLD AUTO: 29.1 % (ref 37–48.5)
HGB BLD-MCNC: 9.4 G/DL (ref 12–16)
IMM GRANULOCYTES # BLD AUTO: 0.04 K/UL (ref 0–0.04)
IMM GRANULOCYTES NFR BLD AUTO: 0.7 % (ref 0–0.5)
LYMPHOCYTES # BLD AUTO: 1.2 K/UL (ref 1–4.8)
LYMPHOCYTES NFR BLD: 19.5 % (ref 18–48)
MCH RBC QN AUTO: 28.5 PG (ref 27–31)
MCHC RBC AUTO-ENTMCNC: 32.3 G/DL (ref 32–36)
MCV RBC AUTO: 88 FL (ref 82–98)
MONOCYTES # BLD AUTO: 1 K/UL (ref 0.3–1)
MONOCYTES NFR BLD: 17.1 % (ref 4–15)
NEUTROPHILS # BLD AUTO: 3.5 K/UL (ref 1.8–7.7)
NEUTROPHILS NFR BLD: 59 % (ref 38–73)
NRBC BLD-RTO: 0 /100 WBC
PLATELET # BLD AUTO: 341 K/UL (ref 150–450)
PMV BLD AUTO: 12.2 FL (ref 9.2–12.9)
POCT GLUCOSE: 130 MG/DL (ref 70–110)
POCT GLUCOSE: 278 MG/DL (ref 70–110)
POCT GLUCOSE: 294 MG/DL (ref 70–110)
POCT GLUCOSE: 296 MG/DL (ref 70–110)
POTASSIUM SERPL-SCNC: 5.1 MMOL/L (ref 3.5–5.1)
PROT SERPL-MCNC: 6 G/DL (ref 6–8.4)
RBC # BLD AUTO: 3.3 M/UL (ref 4–5.4)
SODIUM SERPL-SCNC: 131 MMOL/L (ref 136–145)
WBC # BLD AUTO: 5.95 K/UL (ref 3.9–12.7)

## 2023-06-10 PROCEDURE — 99233 SBSQ HOSP IP/OBS HIGH 50: CPT | Mod: ,,, | Performed by: HOSPITALIST

## 2023-06-10 PROCEDURE — 99232 PR SUBSEQUENT HOSPITAL CARE,LEVL II: ICD-10-PCS | Mod: ,,, | Performed by: NURSE PRACTITIONER

## 2023-06-10 PROCEDURE — 25000003 PHARM REV CODE 250: Performed by: STUDENT IN AN ORGANIZED HEALTH CARE EDUCATION/TRAINING PROGRAM

## 2023-06-10 PROCEDURE — 25000003 PHARM REV CODE 250

## 2023-06-10 PROCEDURE — 25000003 PHARM REV CODE 250: Performed by: REGISTERED NURSE

## 2023-06-10 PROCEDURE — 99232 SBSQ HOSP IP/OBS MODERATE 35: CPT | Mod: ,,, | Performed by: NURSE PRACTITIONER

## 2023-06-10 PROCEDURE — A4216 STERILE WATER/SALINE, 10 ML: HCPCS | Performed by: HOSPITALIST

## 2023-06-10 PROCEDURE — 99233 PR SUBSEQUENT HOSPITAL CARE,LEVL III: ICD-10-PCS | Mod: ,,, | Performed by: HOSPITALIST

## 2023-06-10 PROCEDURE — 25000003 PHARM REV CODE 250: Performed by: HOSPITALIST

## 2023-06-10 PROCEDURE — 85025 COMPLETE CBC W/AUTO DIFF WBC: CPT | Performed by: HOSPITALIST

## 2023-06-10 PROCEDURE — 25000003 PHARM REV CODE 250: Performed by: INTERNAL MEDICINE

## 2023-06-10 PROCEDURE — 21400001 HC TELEMETRY ROOM

## 2023-06-10 PROCEDURE — 80053 COMPREHEN METABOLIC PANEL: CPT | Performed by: HOSPITALIST

## 2023-06-10 PROCEDURE — 63600175 PHARM REV CODE 636 W HCPCS: Performed by: STUDENT IN AN ORGANIZED HEALTH CARE EDUCATION/TRAINING PROGRAM

## 2023-06-10 RX ORDER — AMOXICILLIN 250 MG
1 CAPSULE ORAL DAILY
Status: DISCONTINUED | OUTPATIENT
Start: 2023-06-10 | End: 2023-06-14 | Stop reason: HOSPADM

## 2023-06-10 RX ORDER — POLYETHYLENE GLYCOL 3350 17 G/17G
17 POWDER, FOR SOLUTION ORAL DAILY
Status: DISCONTINUED | OUTPATIENT
Start: 2023-06-10 | End: 2023-06-14 | Stop reason: HOSPADM

## 2023-06-10 RX ORDER — INSULIN ASPART 100 [IU]/ML
9 INJECTION, SOLUTION INTRAVENOUS; SUBCUTANEOUS
Status: DISCONTINUED | OUTPATIENT
Start: 2023-06-10 | End: 2023-06-11

## 2023-06-10 RX ADMIN — ASPIRIN 81 MG: 81 TABLET, COATED ORAL at 09:06

## 2023-06-10 RX ADMIN — Medication 10 ML: at 01:06

## 2023-06-10 RX ADMIN — ACETAMINOPHEN 1000 MG: 500 TABLET ORAL at 05:06

## 2023-06-10 RX ADMIN — CIPROFLOXACIN 750 MG: 750 TABLET, FILM COATED ORAL at 09:06

## 2023-06-10 RX ADMIN — METHOCARBAMOL 500 MG: 500 TABLET ORAL at 01:06

## 2023-06-10 RX ADMIN — PREGABALIN 75 MG: 75 CAPSULE ORAL at 09:06

## 2023-06-10 RX ADMIN — METHOCARBAMOL 500 MG: 500 TABLET ORAL at 09:06

## 2023-06-10 RX ADMIN — Medication 10 ML: at 07:06

## 2023-06-10 RX ADMIN — POLYETHYLENE GLYCOL 3350 17 G: 17 POWDER, FOR SOLUTION ORAL at 09:06

## 2023-06-10 RX ADMIN — ATORVASTATIN CALCIUM 80 MG: 40 TABLET, FILM COATED ORAL at 09:06

## 2023-06-10 RX ADMIN — METHOCARBAMOL 500 MG: 500 TABLET ORAL at 04:06

## 2023-06-10 RX ADMIN — INSULIN ASPART 3 UNITS: 100 INJECTION, SOLUTION INTRAVENOUS; SUBCUTANEOUS at 12:06

## 2023-06-10 RX ADMIN — INSULIN ASPART 7 UNITS: 100 INJECTION, SOLUTION INTRAVENOUS; SUBCUTANEOUS at 06:06

## 2023-06-10 RX ADMIN — CEFAZOLIN 2 G: 2 INJECTION, POWDER, FOR SOLUTION INTRAMUSCULAR; INTRAVENOUS at 05:06

## 2023-06-10 RX ADMIN — LISINOPRIL 5 MG: 5 TABLET ORAL at 09:06

## 2023-06-10 RX ADMIN — CEFAZOLIN 2 G: 2 INJECTION, POWDER, FOR SOLUTION INTRAMUSCULAR; INTRAVENOUS at 09:06

## 2023-06-10 RX ADMIN — INSULIN ASPART 9 UNITS: 100 INJECTION, SOLUTION INTRAVENOUS; SUBCUTANEOUS at 12:06

## 2023-06-10 RX ADMIN — INSULIN ASPART 3 UNITS: 100 INJECTION, SOLUTION INTRAVENOUS; SUBCUTANEOUS at 06:06

## 2023-06-10 RX ADMIN — CHOLECALCIFEROL TAB 25 MCG (1000 UNIT) 1000 UNITS: 25 TAB at 09:06

## 2023-06-10 RX ADMIN — SENNOSIDES AND DOCUSATE SODIUM 1 TABLET: 50; 8.6 TABLET ORAL at 09:06

## 2023-06-10 RX ADMIN — INSULIN ASPART 9 UNITS: 100 INJECTION, SOLUTION INTRAVENOUS; SUBCUTANEOUS at 04:06

## 2023-06-10 RX ADMIN — OXYCODONE HYDROCHLORIDE 10 MG: 10 TABLET ORAL at 05:06

## 2023-06-10 RX ADMIN — OXYCODONE HYDROCHLORIDE 10 MG: 10 TABLET ORAL at 10:06

## 2023-06-10 RX ADMIN — INSULIN ASPART 3 UNITS: 100 INJECTION, SOLUTION INTRAVENOUS; SUBCUTANEOUS at 04:06

## 2023-06-10 RX ADMIN — ACETAMINOPHEN 1000 MG: 500 TABLET ORAL at 01:06

## 2023-06-10 RX ADMIN — CEFAZOLIN 2 G: 2 INJECTION, POWDER, FOR SOLUTION INTRAMUSCULAR; INTRAVENOUS at 01:06

## 2023-06-10 RX ADMIN — Medication 10 ML: at 06:06

## 2023-06-10 RX ADMIN — ACETAMINOPHEN 1000 MG: 500 TABLET ORAL at 09:06

## 2023-06-10 RX ADMIN — Medication 10 ML: at 11:06

## 2023-06-10 RX ADMIN — CLOPIDOGREL BISULFATE 75 MG: 75 TABLET ORAL at 09:06

## 2023-06-10 RX ADMIN — POLYETHYLENE GLYCOL 3350 17 G: 17 POWDER, FOR SOLUTION ORAL at 12:06

## 2023-06-10 RX ADMIN — Medication 9 MG: at 09:06

## 2023-06-10 RX ADMIN — OXYCODONE HYDROCHLORIDE 10 MG: 10 TABLET ORAL at 07:06

## 2023-06-10 NOTE — ASSESSMENT & PLAN NOTE
Plan for 6 weeks ancef and likely  2 weeks cipro for soft tissue infection and osteo with ancef for longer period. Final ID recs with end date for cipro of 6/22 and end date of ancef for 7/17 with weekly cbc, crp and cmp faxed to ID clinic on discharge.  picc placed

## 2023-06-10 NOTE — SUBJECTIVE & OBJECTIVE
Interval History:  she reports feeling okay this morning, taking her pills slowly 1 by 1 as they sometimes get stuck in her throat if she takes them all at once. Nurse tram at bedside giving her miralax as added as no bowel regimen was on board and she has not had a BM yet. She reports that its normal for her to go days without one but want to get one going I told her either way. Final ID recs given yesterday for cipro until 6/22 and ancef until 7/17. Insurance requested p2p for ltac and I told her that they may only approve a SNF so they did not get back to us on riday for a p2p request so will f/u with CM Monday regading this. No major new issues she reports this morning, pain a little btter but hurts when she moves      Review of Systems   Constitutional:  Negative for fever.   Respiratory:  Negative for cough and shortness of breath.    Cardiovascular:  Negative for chest pain and leg swelling.   Gastrointestinal:  Negative for abdominal pain, constipation, diarrhea and nausea.   Musculoskeletal:  Positive for arthralgias (Left ankle).   Skin:  Positive for wound (Left foot).   Neurological:  Negative for dizziness and light-headedness.   Psychiatric/Behavioral:  Negative for agitation and confusion.    Objective:     Vital Signs (Most Recent):  Temp: 97.7 °F (36.5 °C) (06/07/23 1523)  Pulse: 83 (06/07/23 1531)  Resp: 18 (06/07/23 1523)  BP: 123/59 (06/07/23 1523)  SpO2: 99 % (06/07/23 1523) on room air Vital Signs (24h Range):  Temp:  [97 °F (36.1 °C)-98.2 °F (36.8 °C)] 97.5 °F (36.4 °C)  Pulse:  [80-87] 80  Resp:  [16-20] 16  SpO2:  [94 %-98 %] 98 %  BP: (123-147)/(60-83) 123/62     Weight: 72.9 kg (160 lb 11.5 oz)  Body mass index is 28.48 kg/m².    Intake/Output Summary (Last 24 hours) at 6/10/2023 1148  Last data filed at 6/10/2023 8408  Gross per 24 hour   Intake --   Output 1000 ml   Net -1000 ml           Physical Exam  Vitals and nursing note reviewed.   Constitutional:       General: She is awake. She  is not in acute distress.     Appearance: Normal appearance. She is normal weight. She is not ill-appearing.   Cardiovascular:      Rate and Rhythm: Normal rate and regular rhythm.      Heart sounds: Normal heart sounds. No murmur heard.    No friction rub. No gallop.   Pulmonary:      Effort: Pulmonary effort is normal. No respiratory distress.      Breath sounds: Normal breath sounds. No wheezing.   Chest:   Breasts:     Right: Mass (Small mobile hard lump in upper outer quadrant) present.   Abdominal:      General: Abdomen is flat. Bowel sounds are normal. There is no distension.      Palpations: Abdomen is soft.      Tenderness: There is no abdominal tenderness.   Musculoskeletal:      Right lower leg: No edema.      Left lower leg: No edema.      Comments: Ex fix in place to left ankle. Surgical bandages in place to left ankle and are clean and dry and intact. Sutures covered by gauze   Skin:     General: Skin is warm.      Findings: No erythema.      Comments: See photos in Epic of wounds to left foot   Neurological:      Mental Status: She is alert and oriented to person, place, and time.   Psychiatric:         Mood and Affect: Mood normal.         Behavior: Behavior normal. Behavior is cooperative.         Thought Content: Thought content normal.         Judgment: Judgment normal.           Significant Labs: A1C:   Recent Labs   Lab 02/17/23  1415 04/21/23  0429 06/05/23  1957   HGBA1C 8.9* 8.2* 10.2*       CBC:   Recent Labs   Lab 06/10/23  0605   WBC 5.95   HGB 9.4*   HCT 29.1*          CMP:   Recent Labs   Lab 06/10/23  0605   *   K 5.1   CL 99   CO2 24   *   BUN 28*   CREATININE 1.0   CALCIUM 9.4   PROT 6.0   ALBUMIN 2.4*   BILITOT 0.3   ALKPHOS 106   AST 12   ALT <5*   ANIONGAP 8       POCT Glucose:   Recent Labs   Lab 06/09/23  2044 06/10/23  0601 06/10/23  1056   POCTGLUCOSE 281* 294* 296*         Significant Imaging: I have reviewed all pertinent imaging results/findings within  the past 24 hours.

## 2023-06-10 NOTE — PROGRESS NOTES
[facility] - [facility]  Endocrinology  Progress Note    Admit Date: 6/5/2023     Reason for Consult: Management of T2DM, Hyperglycemia     Surgical Procedure and Date: 6/6/23 Open reduction internal fixation left pilon fracture with fixation of tibia and fibula  External fixator placement left lower extremity    Diabetes diagnosis year: 2018    Home Diabetes Medications:  Levemir 18 units nightly. Novolog is ordered SSI LDC; but patient states she does not use.     How often checking glucose at home?  1-3 times per week  (checks at random times)  BG readings on regimen: 200's  Hypoglycemia on the regimen?  No  Missed doses on regimen?  No    Diabetes Complications include:     Hyperglycemia and Diabetic peripheral neuropathy     Complicating diabetes co morbidities:   HTN; HLD;      HPI:   Patient is a 73 y.o. female with a diagnosis of peripheral artery disease s/p revascularization and type 2 diabetes mellitus c/b neuropathy and chronic lower extremity wounds who presents as a transfer from [facility] for distal fractures of the tibia and fibula following a fall.  It was also noted that she had purulent drainage from her lower extremity wounds.  Of note, per chart review, the patient had a left heel bone biopsy performed by her podiatrist on April 27th.  Patient's bone biopsy grew Staphylococcus aureus.  She was seen in the Wound Care Clinic and placed on levofloxacin and doxycycline but did not appear to be followed by the Infectious Disease Service.  She was evaluated by Orthopedic Surgery at [facility] who is planning surgical intervention on June 6th.  Patient was admitted to Hospital Medicine for further management.Endocrinology consulted for BG/ DM management.     Lab Results   Component Value Date    HGBA1C 10.2 (H) 06/05/2023               Interval HPI:   Overnight events: POD 4. BG above goal ranges on current SQ insulin regimen.Drinking regular boost this morning at time of rounds.  Diet  "diabetic [facility];  Calorie; Thin    Eatin%  Nausea: No  Hypoglycemia and intervention: No  Fever: No  TPN and/or TF: No  If yes, type of TF/TPN and rate: n/a    /83 (BP Location: Left arm, Patient Position: Lying)   Pulse 82   Temp 97.5 °F (36.4 °C) (Oral)   Resp 18   Ht 5' 2.99" (1.6 m)   Wt 72.9 kg (160 lb 11.5 oz)   SpO2 96%   Breastfeeding No   BMI 28.48 kg/m²     Labs Reviewed and Include    Recent Labs   Lab 06/10/23  06   *   CALCIUM 9.4   ALBUMIN 2.4*   PROT 6.0   *   K 5.1   CO2 24   CL 99   BUN 28*   CREATININE 1.0   ALKPHOS 106   ALT <5*   AST 12   BILITOT 0.3     Lab Results   Component Value Date    WBC 5.95 06/10/2023    HGB 9.4 (L) 06/10/2023    HCT 29.1 (L) 06/10/2023    MCV 88 06/10/2023     06/10/2023     No results for input(s): TSH, FREET4 in the last 168 hours.  Lab Results   Component Value Date    HGBA1C 10.2 (H) 2023       Nutritional status:   Body mass index is 28.48 kg/m².  Lab Results   Component Value Date    ALBUMIN 2.4 (L) 06/10/2023    ALBUMIN 3.3 (L) 2023    ALBUMIN 2.6 (L) 2023     Lab Results   Component Value Date    PREALBUMIN 11 (L) 2023       Estimated Creatinine Clearance: 48.6 mL/min (based on SCr of 1 mg/dL).    Accu-Checks  Recent Labs     23  0029 23  0643 23  1132 23  1515 23  0459 23  0617 23  1139 23  1617 23  2044 06/10/23  0601   POCTGLUCOSE 125* 208* 201* 160* 239* 205* 188* 233* 281* 294*       Current Medications and/or Treatments Impacting Glycemic Control  Immunotherapy:    Immunosuppressants       None          Steroids:   Hormones (From admission, onward)      Start     Stop Route Frequency Ordered    23 2245  melatonin tablet 9 mg         -- Oral Nightly 23 223          Pressors:    Autonomic Drugs (From admission, onward)      None          Hyperglycemia/Diabetes Medications:   Antihyperglycemics (From admission, " onward)      Start     Stop Route Frequency Ordered    06/10/23 1130  insulin aspart U-100 pen 9 Units         -- SubQ 3 times daily with meals 06/10/23 0903    06/10/23 0915  insulin detemir U-100 (Levemir) pen 26 Units         -- SubQ Daily 06/10/23 0902    06/05/23 2345  insulin aspart U-100 pen 0-5 Units         -- SubQ Before meals & nightly PRN 06/05/23 2246            ASSESSMENT and PLAN    Endocrine  Type 2 diabetes mellitus with foot ulcer, with long-term current use of insulin  Endocrinology consulted for BG management.   BG goal 140-180    -Increase  Levemir (Insulin Detemir) to 26 units daily (20% increase due to fasting blood glucose above goal)   -Increase  Novolog (Insulin Aspart) to 9 units TIDWM and prn for BG excursions LDC SSI (150/50) Basal/Prandial match   - BG checks AC/HS  - Hypoglycemia protocol in place  - If blood glucose greater than 300, please ask patient not to eat food or drink anything other than water until correctional insulin has brought it back below 250    ** Please notify Endocrine for any change and/or advance in diet**  ** Please call Endocrine for any BG related issues **    Discharge Planning:   TBD. Please notify endocrinology prior to discharge.        Orthopedic  * Closed displaced pilon fracture of left tibia with routine healing s/p ORIF of pilon and ex fix on 6/6/2023  Managed per primary.   Optimize BG control to improve wound healing        Chronic ulcer of left heel with fat layer exposed  Optimize BG control.   Optimize BG control to improve wound healing          Hannah Chappell NP  Endocrinology  [facility] - Surgery

## 2023-06-10 NOTE — PROGRESS NOTES
[facility] - Surgery  Orthopedics  Progress Note    Patient Name: Anastasiia Badillo  MRN: [id number]  Admission Date: 6/5/2023  Hospital Length of Stay: 4 days  Attending Provider: Sonia Kim MD  Primary Care Provider: Raji Parkinson MD  Follow-up For: Procedure(s) (LRB):  ORIF, FRACTURE, PILON, LEFT (Left)  APPLICATION, EXTERNAL FIXATION DEVICE, LEFT ANKLE, [place] (Left)    Post-Operative Day: 4 Days Post-Op  Subjective:     Principal Problem:Closed displaced pilon fracture of left tibia with routine healing    Principal Orthopedic Problem: Same    Interval History: NAEON, patient stable, pain controlled. No acute complaints this morning. Pin sites clean and dry     Review of patient's allergies indicates:  No Known Allergies    Current Facility-Administered Medications   Medication    acetaminophen tablet 1,000 mg    aspirin EC tablet 81 mg    atorvastatin tablet 80 mg    ceFAZolin 2 g in dextrose 5 % in water (D5W) 5 % 50 mL IVPB (MB+)    ciprofloxacin HCl tablet 750 mg    clopidogreL tablet 75 mg    dextrose 10% bolus 125 mL 125 mL    dextrose 10% bolus 250 mL 250 mL    famotidine tablet 20 mg    glucagon (human recombinant) injection 1 mg    insulin aspart U-100 pen 0-5 Units    insulin aspart U-100 pen 7 Units    insulin detemir U-100 (Levemir) pen 22 Units    lisinopriL tablet 5 mg    melatonin tablet 9 mg    methocarbamoL tablet 500 mg    mupirocin 2 % ointment    naloxone 0.4 mg/mL injection 0.02 mg    ondansetron disintegrating tablet 4 mg    oxyCODONE immediate release tablet 5 mg    oxyCODONE immediate release tablet Tab 10 mg    polyethylene glycol packet 17 g    pregabalin capsule 75 mg    simethicone chewable tablet 80 mg    sodium chloride 0.9% flush 10 mL    sodium chloride 0.9% flush 10 mL    And    sodium chloride 0.9% flush 10 mL    vitamin D 1000 units tablet 1,000 Units     Objective:     Vital Signs (Most Recent):  Temp: 97 °F (36.1 °C) (06/09/23 2004)  Pulse: 85 (06/09/23 2004)  Resp:  "16 (06/09/23 2004)  BP: 127/60 (06/09/23 2004)  SpO2: 95 % (06/09/23 2004) Vital Signs (24h Range):  Temp:  [97 °F (36.1 °C)-98.2 °F (36.8 °C)] 97 °F (36.1 °C)  Pulse:  [77-86] 85  Resp:  [16-20] 16  SpO2:  [93 %-98 %] 95 %  BP: (117-169)/(60-83) 127/60     Weight: 72.9 kg (160 lb 11.5 oz)  Height: 5' 2.99" (160 cm)  Body mass index is 28.48 kg/m².      Intake/Output Summary (Last 24 hours) at 6/9/2023 2248  Last data filed at 6/9/2023 1706  Gross per 24 hour   Intake --   Output 900 ml   Net -900 ml       Physical Exam  Ortho/SPM Exam  Gen: NAD, sitting comfortably in bed  CV: regular rate  Resp: non-labored respirations     LLE:  Heel and great toe wounds are dressed   Ex fix in place, pin sites clean and dry w no signs of infection  Surgical incisiosn dressed w some ss strike through   NVI to baseline- neuropathic to calf, can wiggles toes, foot and toes pink wwp    Significant Labs: A1C:   Recent Labs   Lab 02/17/23  1415 04/21/23  0429 06/05/23  1957   HGBA1C 8.9* 8.2* 10.2*     CBC:   Recent Labs   Lab 06/08/23  0504   WBC 7.77   HGB 9.6*   HCT 30.1*        CMP:   Recent Labs   Lab 06/08/23  0504   *   K 4.7   CL 98   CO2 25   *   BUN 31*   CREATININE 1.1   CALCIUM 8.7   ANIONGAP 8     POCT Glucose:   Recent Labs   Lab 06/09/23  1139 06/09/23  1617 06/09/23  2044   POCTGLUCOSE 188* 233* 281*       All pertinent labs within the past 24 hours have been reviewed.      Significant Imaging: I have reviewed and interpreted all pertinent imaging results/findings.   Assessment/Plan:     * Closed displaced pilon fracture of left tibia with routine healing s/p ORIF of pilon and ex fix on 6/6/2023  73F w uncontrolled DM (A1C 10) w neuropathy to calves, PAD (sp recent LLE revascularization 5/2023), and chronic left heela nd great toe wounds who fell 6/4 and has left pilon and left distal fibula fracture. She is closed and neurovascularly intact to baseline. She has diabetic foot wounds over her plantar " heel and great toe.     -SP operative fixation of left pilon and fibula, left ankle external fixator placement 6/6/23 w Dr Mahmood    Multimodal pain regimen  NWB LLE in ex fix, elevate  BID pin site care  DVT ppx w ASA 81 bid, SCD RLE all times while in bed   PT/OT daily  ID following for abx recs for left foot wounds/possible osteo   Ancef IV per ID for previous left heel bone cultures +staph, cipro for left great toe wound +pseudmonas  Wound care for left heel/great toe wounds per podiatry   BG management per Endocrine   FU 2 wks post op w ortho        Tong Murphy MD  Orthopedics  [facility] - Surgery

## 2023-06-10 NOTE — SUBJECTIVE & OBJECTIVE
"Interval HPI:   Overnight events: POD 4. BG above goal ranges on current SQ insulin regimen.Drinking regular boost this morning at time of rounds.  Diet diabetic [facility]; 2000 Calorie; Thin    Eatin%  Nausea: No  Hypoglycemia and intervention: No  Fever: No  TPN and/or TF: No  If yes, type of TF/TPN and rate: n/a    /83 (BP Location: Left arm, Patient Position: Lying)   Pulse 82   Temp 97.5 °F (36.4 °C) (Oral)   Resp 18   Ht 5' 2.99" (1.6 m)   Wt 72.9 kg (160 lb 11.5 oz)   SpO2 96%   Breastfeeding No   BMI 28.48 kg/m²     Labs Reviewed and Include    Recent Labs   Lab 06/10/23  0605   *   CALCIUM 9.4   ALBUMIN 2.4*   PROT 6.0   *   K 5.1   CO2 24   CL 99   BUN 28*   CREATININE 1.0   ALKPHOS 106   ALT <5*   AST 12   BILITOT 0.3     Lab Results   Component Value Date    WBC 5.95 06/10/2023    HGB 9.4 (L) 06/10/2023    HCT 29.1 (L) 06/10/2023    MCV 88 06/10/2023     06/10/2023     No results for input(s): TSH, FREET4 in the last 168 hours.  Lab Results   Component Value Date    HGBA1C 10.2 (H) 2023       Nutritional status:   Body mass index is 28.48 kg/m².  Lab Results   Component Value Date    ALBUMIN 2.4 (L) 06/10/2023    ALBUMIN 3.3 (L) 2023    ALBUMIN 2.6 (L) 2023     Lab Results   Component Value Date    PREALBUMIN 11 (L) 2023       Estimated Creatinine Clearance: 48.6 mL/min (based on SCr of 1 mg/dL).    Accu-Checks  Recent Labs     23  0029 23  0643 23  1132 23  1515 23  0459 23  0617 23  1139 23  1617 23  2044 06/10/23  0601   POCTGLUCOSE 125* 208* 201* 160* 239* 205* 188* 233* 281* 294*       Current Medications and/or Treatments Impacting Glycemic Control  Immunotherapy:    Immunosuppressants       None          Steroids:   Hormones (From admission, onward)      Start     Stop Route Frequency Ordered    23  melatonin tablet 9 mg         -- Oral Nightly 230 "          Pressors:    Autonomic Drugs (From admission, onward)      None          Hyperglycemia/Diabetes Medications:   Antihyperglycemics (From admission, onward)      Start     Stop Route Frequency Ordered    06/10/23 1130  insulin aspart U-100 pen 9 Units         -- SubQ 3 times daily with meals 06/10/23 0903    06/10/23 0915  insulin detemir U-100 (Levemir) pen 26 Units         -- SubQ Daily 06/10/23 0902    06/05/23 2345  insulin aspart U-100 pen 0-5 Units         -- SubQ Before meals & nightly PRN 06/05/23 5882

## 2023-06-10 NOTE — NURSING
Pin site care dressing changed, surgical incision dressing changed with xeroform, gauze and tegaderm. Scheduled and PRN insuline given. PRN miralax given at midnight for constipation.

## 2023-06-10 NOTE — PROGRESS NOTES
[facility] Medicine  Progress Note    Patient Name: Anastasiia Badillo  MRN: [id number]  Patient Class: IP- Inpatient   Admission Date: 6/5/2023  Length of Stay: 5 days  Attending Physician: Sonia Kim MD  Primary Care Provider: Raji Parkinson MD        Subjective:     Principal Problem:Closed displaced pilon fracture of left tibia with routine healing        HPI:  Alycia Badillo is a 73-year-old woman with a past medical history of peripheral artery disease s/p revascularization and type 2 diabetes mellitus c/b neuropathy and chronic lower extremity wounds who presents as a transfer from [facility] for distal fractures of the tibia and fibula.  Of note, patient was hospitalized at [facility] in April 2023 for severe peripheral artery disease and underwent revascularization.  The patient was discharged and was sent to inpatient rehabilitation.  She said that she developed a pressure ulcer on her left heel.  She also has a wound on the plantar surface of her left great toe.  She has been following with a podiatrist in addition to a Wound Care clinic.  The patient said that she left inpatient rehabilitation and went to live at home with her son.  She says that she normally ambulates with a walker.  Approximately three days ago, she said she had just taken her nightly melatonin and was getting ready to fall asleep in bed.  However, she noted that she needed to use the bathroom and went to reach for the walker next to the bed.  She said that the walker was not locked and so she slid and fell to the ground hitting her left lower extremity.  She said that she began having pain and swelling to her left lower extremity.  The pain became so unbearable that she lost the ability to ambulate.  This prompted her to seek care in the emergency department at [facility].  Imaging at the [facility] reveals a comminuted and angulated distal tibia and fibular  fracture.  It was also noted that she was having purulent drainage from her lower extremity wounds.  Of note, per chart review, the patient had a left heel bone biopsy performed by her podiatrist on April 27th.  Patient's bone biopsy grew Staphylococcus aureus.  She was seen in the Wound Care Clinic and placed on levofloxacin and doxycycline but did not appear to be followed by the Infectious Disease Service.    In the emergency department, the patient was noted to have stable vital signs upon arrival.  Per report, the patient received IV antibiotics at [facility] prior to transfer.  She was evaluated by Orthopedic Surgery at [facility] who is planning surgical intervention on June 6th.  Patient was admitted to Hospital Medicine for further management.      Overview/Hospital Course:  Patient taken to OR with Orthopedics on 6/6/2023 and had ORIF of left pilon fracture and ex fix placed for pilon fracture for stabilization of fracture by Dr. Gilbert Mahmood. Plan per Ortho is ex fix to stay in place for 4-6 weeks and then plan for removal by Ortho. According to Ortho, if current construct does not have enough stability for healing will consider transition to ringed fixator. Patient to be non-weight bearing to left lower extremity x 3 months post-op. Patient started on Aspirin 81 mg po BID and will need for 12 weeks as per Ortho for DVT prophylaxis. Endocrine consulted to assist in diabetes management. Podiatry consulted for left foot wounds and not no active infection and wound care as per Podiatry. Infectious Disease consulted as had previous positive cultures from left foot wounds and osteomyelitis. Infectious disease evaluated and recommending IV Cefazolin 2 grams every 8 hours for 6 weeks to treat osteomyelitis. PT/OT consulted. Patient on multimodal pain meds post-op.         Interval History:  she reports feeling okay this morning, taking her pills slowly 1 by 1 as they sometimes get  stuck in her throat if she takes them all at once. Nurse tram at bedside giving her miralax as added as no bowel regimen was on board and she has not had a BM yet. She reports that its normal for her to go days without one but want to get one going I told her either way. Final ID recs given yesterday for cipro until 6/22 and ancef until 7/17. Insurance requested p2p for ltac and I told her that they may only approve a SNF so they did not get back to us on riday for a p2p request so will f/u with CM Monday regading this. No major new issues she reports this morning, pain a little btter but hurts when she moves      Review of Systems   Constitutional:  Negative for fever.   Respiratory:  Negative for cough and shortness of breath.    Cardiovascular:  Negative for chest pain and leg swelling.   Gastrointestinal:  Negative for abdominal pain, constipation, diarrhea and nausea.   Musculoskeletal:  Positive for arthralgias (Left ankle).   Skin:  Positive for wound (Left foot).   Neurological:  Negative for dizziness and light-headedness.   Psychiatric/Behavioral:  Negative for agitation and confusion.    Objective:     Vital Signs (Most Recent):  Temp: 97.7 °F (36.5 °C) (06/07/23 1523)  Pulse: 83 (06/07/23 1531)  Resp: 18 (06/07/23 1523)  BP: 123/59 (06/07/23 1523)  SpO2: 99 % (06/07/23 1523) on room air Vital Signs (24h Range):  Temp:  [97 °F (36.1 °C)-98.2 °F (36.8 °C)] 97.5 °F (36.4 °C)  Pulse:  [80-87] 80  Resp:  [16-20] 16  SpO2:  [94 %-98 %] 98 %  BP: (123-147)/(60-83) 123/62     Weight: 72.9 kg (160 lb 11.5 oz)  Body mass index is 28.48 kg/m².    Intake/Output Summary (Last 24 hours) at 6/10/2023 1146  Last data filed at 6/10/2023 0964  Gross per 24 hour   Intake --   Output 1000 ml   Net -1000 ml           Physical Exam  Vitals and nursing note reviewed.   Constitutional:       General: She is awake. She is not in acute distress.     Appearance: Normal appearance. She is normal weight. She is not ill-appearing.    Cardiovascular:      Rate and Rhythm: Normal rate and regular rhythm.      Heart sounds: Normal heart sounds. No murmur heard.    No friction rub. No gallop.   Pulmonary:      Effort: Pulmonary effort is normal. No respiratory distress.      Breath sounds: Normal breath sounds. No wheezing.   Chest:   Breasts:     Right: Mass (Small mobile hard lump in upper outer quadrant) present.   Abdominal:      General: Abdomen is flat. Bowel sounds are normal. There is no distension.      Palpations: Abdomen is soft.      Tenderness: There is no abdominal tenderness.   Musculoskeletal:      Right lower leg: No edema.      Left lower leg: No edema.      Comments: Ex fix in place to left ankle. Surgical bandages in place to left ankle and are clean and dry and intact. Sutures covered by gauze   Skin:     General: Skin is warm.      Findings: No erythema.      Comments: See photos in Epic of wounds to left foot   Neurological:      Mental Status: She is alert and oriented to person, place, and time.   Psychiatric:         Mood and Affect: Mood normal.         Behavior: Behavior normal. Behavior is cooperative.         Thought Content: Thought content normal.         Judgment: Judgment normal.           Significant Labs: A1C:   Recent Labs   Lab 02/17/23  1415 04/21/23  0429 06/05/23  1957   HGBA1C 8.9* 8.2* 10.2*       CBC:   Recent Labs   Lab 06/10/23  0605   WBC 5.95   HGB 9.4*   HCT 29.1*          CMP:   Recent Labs   Lab 06/10/23  0605   *   K 5.1   CL 99   CO2 24   *   BUN 28*   CREATININE 1.0   CALCIUM 9.4   PROT 6.0   ALBUMIN 2.4*   BILITOT 0.3   ALKPHOS 106   AST 12   ALT <5*   ANIONGAP 8       POCT Glucose:   Recent Labs   Lab 06/09/23  2044 06/10/23  0601 06/10/23  1056   POCTGLUCOSE 281* 294* 296*         Significant Imaging: I have reviewed all pertinent imaging results/findings within the past 24 hours.      Assessment/Plan:      * Closed displaced pilon fracture of left tibia with routine  healing s/p ORIF of pilon and ex fix on 6/6/2023  Presenting after fall and found to have comminuted and angulated closed left distal tibia and fibular fractures. Evaluated by Orthopedic Surgery service, she was reduced and placed into a short leg splint. She will require operative intervention for this injury.   - Patient taken to OR on 6/6/2023 and underwent ORIF of pilon fracture as well as ex fix to stabilize fractures with Dr. Gilbert Mahmood.  - Patient post-op to be non weight bearing to left lower extremity x 3 months from surgery date on 6/6.   - Ortho to remove ex fix I 4-6 weeks but if construct fails then would have to convert to ring fixator as per Ortho.   - PT/OT consulted for gait training/transfer training and restoration of ADL's post-op.  - Routine pin site care to ex fix as per Ortho.  - Ice and elevate left lower extremity to help with swelling.   - Pain management with scheduled Tylenol 1000 mg po every 8 hours + Robaxin 500 mg po 4 times daily + Lyrica 75 mg po BID with Oxycodone  mg po every 4 hours prn for breakthrough pain.   - Orthopedics managing surgical site to left ankle area.   - Continue Aspirin 81 mg po BID for 12 weeks for DVT prophylaxis as per Ortho. End date aug 30th then back to daily for PVD  - Surgical bandages to remain in place to left ankle and to remian in place until Ortho clinic follow-up.         Acute osteomyelitis of left calcaneus  Plan for 6 weeks ancef and likely  2 weeks cipro for soft tissue infection and osteo with ancef for longer period. Final ID recs with end date for cipro of 6/22 and end date of ancef for 7/17 with weekly cbc, crp and cmp faxed to ID clinic on discharge.  picc placed      Tobacco use  Patient declined nicotine patch. Still actively smoking at home. Patient counseled extensively on smoking cessation as has open wounds, severe PAD and now post-op from ankle fracture surgery and nicotine to impair wound healing and risk  re-occlusion of peripheral  arteries in lower limbs.     Chronic ulcer of left heel with fat layer exposed  Chronic ulcer of great toe of left foot with fat layer exposed  Acute osteomyelitis of left calcaneus  Follows with Podiatry clinic. Previously had heel ulcer bone biopsy which grew Staphylococcus aureus and Staph epidermis and was prescribed Doxycycline and Levofloxacin from Wound Care clinic but not followed by Infectious Disease clinic. Afebrile, without leukocytosis. CRP elevated 127 on admit.   - Podiatry consulted, appreciate recommendations and continue wound care as per Podiatry to left heel and left first toe ulcers. Wound care recs per podiatry note on 6/7 in place.   - Infectious Disease consulted, appreciate recommendations and they recommend 6 weeks of IV Cefazolin 2 grams every 8 hours to treat left heel osteomyelitis. picc placed 6/8, awaiting antibitoic changes today given pseudomonas in wound will adjust per ID    Mass of upper outer quadrant of right breast  Noted on admit. Patient in past month as noted a lump in right breast in upper outer quadrant. Patient does have palpable lump in right upper outer quadrant on exam. Patient has know bilateral breast implants in place. Patient's primary care physician is aware and had ordered to have outpatient ultrasound of breasts and mammogram done and have not been done yet. Patient to follow-up as outpatient with her primary care physician for further work-up and evaluation.       PAD (peripheral artery disease)  · Known severe peripheral arterial disease. Vascular Surgery during last hospitalization and had revascularization of left lower extremity in 4/2023 with percutaneous transluminal angioplasty of left popliteal artery and percutaneous transluminal angioplasty of the entire length of the left posterior tibial artery.  · Patient on Aspirin Plavix and high dose Lipitor to treat and will continue in hospital.   · Needs BID asa for 12 weeks until  aug 30 then back to daily  · Will resume plavix 6/9  Ortho ok with it    Essential (primary) hypertension  Chronic condition. Continue home Lisinopril 5 mg po daily to treat. Monitor vital signs every 4 hours. Target BP < 140/90.       Type 2 diabetes mellitus with foot ulcer, with long-term current use of insulin  Type 2 diabetes mellitus with hyperglycemia, with long-term current use of insulin  Endocrine consulted to assist in management. Patient's FSGs are uncontrolled due to hyperglycemia on current medication regimen.    Last A1c reviewed-   Lab Results   Component Value Date    HGBA1C 10.2 (H) 06/05/2023     Most recent fingerstick glucose reviewed-   Recent Labs   Lab 06/07/23  1629 06/08/23  0029 06/08/23  0643 06/08/23  1132   POCTGLUCOSE 98 125* 208* 201*     Current correctional scale  Low     Maintain anti-hyperglycemic dose as follows as per Endocrine recs:  Antihyperglycemics (From admission, onward)    Start     Stop Route Frequency Ordered    06/09/23 0900  insulin detemir U-100 (Levemir) pen 22 Units         -- SubQ Daily 06/08/23 0932    06/07/23 1645  insulin aspart U-100 pen 6 Units         -- SubQ 3 times daily with meals 06/07/23 1453    06/05/23 2345  insulin aspart U-100 pen 0-5 Units         -- SubQ Before meals & nightly PRN 06/05/23 2246        Target blood sugar 140-180 in hospital.  Diabetic diet.  Monitor blood sugars 4 times daily with meals and at bedtime.   Appreciate endo assisting as she reports was not controlled at home and likely contributor to poor wound healing of her LLE prior to admit      VTE Risk Mitigation (From admission, onward)         Ordered     Reason for No Pharmacological VTE Prophylaxis  Once        Question:  Reasons:  Answer:  Risk of Bleeding  Comment:  Ortho planning surgery in AM    06/05/23 2246     IP VTE HIGH RISK PATIENT  Once         06/05/23 2246     Place sequential compression device  Until discontinued         06/05/23 2246                Discharge  Planning   CITLALI: 6/9/2023     Code Status: Full Code   Is the patient medically ready for discharge?: No    Reason for patient still in hospital (select all that apply): Patient trending condition  Discharge Plan A: Long-term acute care facility (LTAC)                  Sonia Kim MD  Department of Hospital Medicine   [facility] - Surgery

## 2023-06-10 NOTE — ASSESSMENT & PLAN NOTE
Endocrinology consulted for BG management.   BG goal 140-180    -Increase  Levemir (Insulin Detemir) to 26 units daily (20% increase due to fasting blood glucose above goal)   -Increase  Novolog (Insulin Aspart) to 9 units TIDWM and prn for BG excursions LDC SSI (150/50) Basal/Prandial match   - BG checks AC/HS  - Hypoglycemia protocol in place  - If blood glucose greater than 300, please ask patient not to eat food or drink anything other than water until correctional insulin has brought it back below 250    ** Please notify Endocrine for any change and/or advance in diet**  ** Please call Endocrine for any BG related issues **    Discharge Planning:   TBD. Please notify endocrinology prior to discharge.

## 2023-06-11 LAB
ALBUMIN SERPL BCP-MCNC: 2.3 G/DL (ref 3.5–5.2)
ALP SERPL-CCNC: 93 U/L (ref 55–135)
ALT SERPL W/O P-5'-P-CCNC: <5 U/L (ref 10–44)
ANION GAP SERPL CALC-SCNC: 9 MMOL/L (ref 8–16)
AST SERPL-CCNC: 12 U/L (ref 10–40)
BASOPHILS # BLD AUTO: 0.08 K/UL (ref 0–0.2)
BASOPHILS NFR BLD: 0.9 % (ref 0–1.9)
BILIRUB SERPL-MCNC: 0.2 MG/DL (ref 0.1–1)
BUN SERPL-MCNC: 27 MG/DL (ref 8–23)
CALCIUM SERPL-MCNC: 9.3 MG/DL (ref 8.7–10.5)
CHLORIDE SERPL-SCNC: 99 MMOL/L (ref 95–110)
CO2 SERPL-SCNC: 24 MMOL/L (ref 23–29)
CREAT SERPL-MCNC: 0.9 MG/DL (ref 0.5–1.4)
DIFFERENTIAL METHOD: ABNORMAL
EOSINOPHIL # BLD AUTO: 0.2 K/UL (ref 0–0.5)
EOSINOPHIL NFR BLD: 2.1 % (ref 0–8)
ERYTHROCYTE [DISTWIDTH] IN BLOOD BY AUTOMATED COUNT: 13.5 % (ref 11.5–14.5)
EST. GFR  (NO RACE VARIABLE): >60 ML/MIN/1.73 M^2
GLUCOSE SERPL-MCNC: 138 MG/DL (ref 70–110)
HCT VFR BLD AUTO: 30.3 % (ref 37–48.5)
HGB BLD-MCNC: 9.5 G/DL (ref 12–16)
IMM GRANULOCYTES # BLD AUTO: 0.13 K/UL (ref 0–0.04)
IMM GRANULOCYTES NFR BLD AUTO: 1.4 % (ref 0–0.5)
LYMPHOCYTES # BLD AUTO: 1.2 K/UL (ref 1–4.8)
LYMPHOCYTES NFR BLD: 13.3 % (ref 18–48)
MCH RBC QN AUTO: 28.8 PG (ref 27–31)
MCHC RBC AUTO-ENTMCNC: 31.4 G/DL (ref 32–36)
MCV RBC AUTO: 92 FL (ref 82–98)
MONOCYTES # BLD AUTO: 1.4 K/UL (ref 0.3–1)
MONOCYTES NFR BLD: 15.2 % (ref 4–15)
NEUTROPHILS # BLD AUTO: 6.1 K/UL (ref 1.8–7.7)
NEUTROPHILS NFR BLD: 67.1 % (ref 38–73)
NRBC BLD-RTO: 0 /100 WBC
PLATELET # BLD AUTO: 364 K/UL (ref 150–450)
PMV BLD AUTO: 12 FL (ref 9.2–12.9)
POCT GLUCOSE: 130 MG/DL (ref 70–110)
POCT GLUCOSE: 149 MG/DL (ref 70–110)
POCT GLUCOSE: 164 MG/DL (ref 70–110)
POTASSIUM SERPL-SCNC: 5.2 MMOL/L (ref 3.5–5.1)
PROT SERPL-MCNC: 6 G/DL (ref 6–8.4)
RBC # BLD AUTO: 3.3 M/UL (ref 4–5.4)
SODIUM SERPL-SCNC: 132 MMOL/L (ref 136–145)
WBC # BLD AUTO: 9.02 K/UL (ref 3.9–12.7)

## 2023-06-11 PROCEDURE — 80053 COMPREHEN METABOLIC PANEL: CPT | Performed by: HOSPITALIST

## 2023-06-11 PROCEDURE — A4216 STERILE WATER/SALINE, 10 ML: HCPCS | Performed by: HOSPITALIST

## 2023-06-11 PROCEDURE — 25000003 PHARM REV CODE 250: Performed by: STUDENT IN AN ORGANIZED HEALTH CARE EDUCATION/TRAINING PROGRAM

## 2023-06-11 PROCEDURE — 99233 SBSQ HOSP IP/OBS HIGH 50: CPT | Mod: ,,, | Performed by: HOSPITALIST

## 2023-06-11 PROCEDURE — 99233 PR SUBSEQUENT HOSPITAL CARE,LEVL III: ICD-10-PCS | Mod: ,,, | Performed by: HOSPITALIST

## 2023-06-11 PROCEDURE — 63600175 PHARM REV CODE 636 W HCPCS: Performed by: STUDENT IN AN ORGANIZED HEALTH CARE EDUCATION/TRAINING PROGRAM

## 2023-06-11 PROCEDURE — 25000003 PHARM REV CODE 250: Performed by: INTERNAL MEDICINE

## 2023-06-11 PROCEDURE — 25000003 PHARM REV CODE 250: Performed by: REGISTERED NURSE

## 2023-06-11 PROCEDURE — 25000003 PHARM REV CODE 250: Performed by: HOSPITALIST

## 2023-06-11 PROCEDURE — 85025 COMPLETE CBC W/AUTO DIFF WBC: CPT | Performed by: HOSPITALIST

## 2023-06-11 PROCEDURE — 11000001 HC ACUTE MED/SURG PRIVATE ROOM

## 2023-06-11 PROCEDURE — 25000003 PHARM REV CODE 250

## 2023-06-11 PROCEDURE — 94761 N-INVAS EAR/PLS OXIMETRY MLT: CPT

## 2023-06-11 RX ORDER — OXYCODONE HYDROCHLORIDE 5 MG/1
5 TABLET ORAL EVERY 4 HOURS PRN
Qty: 10 TABLET | Refills: 0 | Status: ON HOLD | OUTPATIENT
Start: 2023-06-11 | End: 2023-07-07 | Stop reason: HOSPADM

## 2023-06-11 RX ORDER — AMOXICILLIN 250 MG
1 CAPSULE ORAL DAILY
Status: ON HOLD
Start: 2023-06-12 | End: 2023-07-07 | Stop reason: HOSPADM

## 2023-06-11 RX ORDER — ONDANSETRON 4 MG/1
4 TABLET, ORALLY DISINTEGRATING ORAL EVERY 8 HOURS PRN
Start: 2023-06-11

## 2023-06-11 RX ORDER — PREGABALIN 75 MG/1
75 CAPSULE ORAL 2 TIMES DAILY
Qty: 60 CAPSULE | Refills: 0 | Status: ON HOLD | OUTPATIENT
Start: 2023-06-11 | End: 2023-07-07 | Stop reason: HOSPADM

## 2023-06-11 RX ORDER — INSULIN ASPART 100 [IU]/ML
0-5 INJECTION, SOLUTION INTRAVENOUS; SUBCUTANEOUS
Refills: 0 | Status: ON HOLD
Start: 2023-06-11 | End: 2023-07-07 | Stop reason: HOSPADM

## 2023-06-11 RX ORDER — OXYCODONE HYDROCHLORIDE 10 MG/1
10 TABLET ORAL EVERY 4 HOURS PRN
Qty: 10 TABLET | Refills: 0 | Status: ON HOLD | OUTPATIENT
Start: 2023-06-11 | End: 2023-07-07 | Stop reason: HOSPADM

## 2023-06-11 RX ORDER — LISINOPRIL 5 MG/1
5 TABLET ORAL DAILY
Qty: 90 TABLET | Refills: 3 | Status: ON HOLD
Start: 2023-06-12 | End: 2023-07-07 | Stop reason: HOSPADM

## 2023-06-11 RX ORDER — ACETAMINOPHEN 500 MG
1000 TABLET ORAL EVERY 8 HOURS
Refills: 0 | Status: ON HOLD
Start: 2023-06-11 | End: 2023-07-07 | Stop reason: HOSPADM

## 2023-06-11 RX ORDER — FAMOTIDINE 20 MG/1
20 TABLET, FILM COATED ORAL 2 TIMES DAILY PRN
Status: ON HOLD
Start: 2023-06-11 | End: 2023-07-07 | Stop reason: HOSPADM

## 2023-06-11 RX ORDER — LACTULOSE 10 G/15ML
15 SOLUTION ORAL ONCE
Status: COMPLETED | OUTPATIENT
Start: 2023-06-11 | End: 2023-06-11

## 2023-06-11 RX ORDER — ASPIRIN 81 MG/1
81 TABLET ORAL 2 TIMES DAILY
Refills: 0 | Status: ON HOLD
Start: 2023-06-11 | End: 2023-07-07 | Stop reason: HOSPADM

## 2023-06-11 RX ORDER — INSULIN ASPART 100 [IU]/ML
10 INJECTION, SOLUTION INTRAVENOUS; SUBCUTANEOUS
Status: DISCONTINUED | OUTPATIENT
Start: 2023-06-11 | End: 2023-06-12

## 2023-06-11 RX ORDER — POLYETHYLENE GLYCOL 3350 17 G/17G
17 POWDER, FOR SOLUTION ORAL DAILY
Refills: 0 | Status: ON HOLD
Start: 2023-06-12 | End: 2023-07-07 | Stop reason: HOSPADM

## 2023-06-11 RX ORDER — METHOCARBAMOL 500 MG/1
500 TABLET, FILM COATED ORAL 4 TIMES DAILY
Qty: 40 TABLET | Refills: 0 | Status: ON HOLD
Start: 2023-06-11 | End: 2023-07-07 | Stop reason: HOSPADM

## 2023-06-11 RX ORDER — TALC
9 POWDER (GRAM) TOPICAL NIGHTLY
Refills: 0
Start: 2023-06-11

## 2023-06-11 RX ORDER — CIPROFLOXACIN 750 MG/1
750 TABLET, FILM COATED ORAL EVERY 12 HOURS
Status: ON HOLD
Start: 2023-06-11 | End: 2023-07-07 | Stop reason: HOSPADM

## 2023-06-11 RX ORDER — INSULIN ASPART 100 [IU]/ML
10 INJECTION, SOLUTION INTRAVENOUS; SUBCUTANEOUS
Refills: 0
Start: 2023-06-11 | End: 2023-06-12 | Stop reason: SDUPTHER

## 2023-06-11 RX ADMIN — Medication 10 ML: at 05:06

## 2023-06-11 RX ADMIN — PREGABALIN 75 MG: 75 CAPSULE ORAL at 08:06

## 2023-06-11 RX ADMIN — INSULIN ASPART 10 UNITS: 100 INJECTION, SOLUTION INTRAVENOUS; SUBCUTANEOUS at 05:06

## 2023-06-11 RX ADMIN — ASPIRIN 81 MG: 81 TABLET, COATED ORAL at 09:06

## 2023-06-11 RX ADMIN — ASPIRIN 81 MG: 81 TABLET, COATED ORAL at 08:06

## 2023-06-11 RX ADMIN — PREGABALIN 75 MG: 75 CAPSULE ORAL at 10:06

## 2023-06-11 RX ADMIN — Medication 10 ML: at 12:06

## 2023-06-11 RX ADMIN — METHOCARBAMOL 500 MG: 500 TABLET ORAL at 08:06

## 2023-06-11 RX ADMIN — CHOLECALCIFEROL TAB 25 MCG (1000 UNIT) 1000 UNITS: 25 TAB at 08:06

## 2023-06-11 RX ADMIN — LACTULOSE 15 G: 20 SOLUTION ORAL at 08:06

## 2023-06-11 RX ADMIN — METHOCARBAMOL 500 MG: 500 TABLET ORAL at 04:06

## 2023-06-11 RX ADMIN — ACETAMINOPHEN 1000 MG: 500 TABLET ORAL at 09:06

## 2023-06-11 RX ADMIN — INSULIN ASPART 10 UNITS: 100 INJECTION, SOLUTION INTRAVENOUS; SUBCUTANEOUS at 12:06

## 2023-06-11 RX ADMIN — OXYCODONE HYDROCHLORIDE 10 MG: 10 TABLET ORAL at 09:06

## 2023-06-11 RX ADMIN — Medication 9 MG: at 09:06

## 2023-06-11 RX ADMIN — CEFAZOLIN 2 G: 2 INJECTION, POWDER, FOR SOLUTION INTRAMUSCULAR; INTRAVENOUS at 05:06

## 2023-06-11 RX ADMIN — POLYETHYLENE GLYCOL 3350 17 G: 17 POWDER, FOR SOLUTION ORAL at 08:06

## 2023-06-11 RX ADMIN — CLOPIDOGREL BISULFATE 75 MG: 75 TABLET ORAL at 08:06

## 2023-06-11 RX ADMIN — LISINOPRIL 5 MG: 5 TABLET ORAL at 08:06

## 2023-06-11 RX ADMIN — CIPROFLOXACIN 750 MG: 750 TABLET, FILM COATED ORAL at 08:06

## 2023-06-11 RX ADMIN — SENNOSIDES AND DOCUSATE SODIUM 1 TABLET: 50; 8.6 TABLET ORAL at 08:06

## 2023-06-11 RX ADMIN — OXYCODONE HYDROCHLORIDE 10 MG: 10 TABLET ORAL at 08:06

## 2023-06-11 RX ADMIN — ATORVASTATIN CALCIUM 80 MG: 40 TABLET, FILM COATED ORAL at 09:06

## 2023-06-11 RX ADMIN — CEFAZOLIN 2 G: 2 INJECTION, POWDER, FOR SOLUTION INTRAMUSCULAR; INTRAVENOUS at 12:06

## 2023-06-11 RX ADMIN — CEFAZOLIN 2 G: 2 INJECTION, POWDER, FOR SOLUTION INTRAMUSCULAR; INTRAVENOUS at 09:06

## 2023-06-11 RX ADMIN — METHOCARBAMOL 500 MG: 500 TABLET ORAL at 09:06

## 2023-06-11 RX ADMIN — CIPROFLOXACIN 750 MG: 750 TABLET, FILM COATED ORAL at 09:06

## 2023-06-11 RX ADMIN — INSULIN ASPART 9 UNITS: 100 INJECTION, SOLUTION INTRAVENOUS; SUBCUTANEOUS at 08:06

## 2023-06-11 RX ADMIN — METHOCARBAMOL 500 MG: 500 TABLET ORAL at 12:06

## 2023-06-11 RX ADMIN — ACETAMINOPHEN 1000 MG: 500 TABLET ORAL at 02:06

## 2023-06-11 RX ADMIN — OXYCODONE HYDROCHLORIDE 10 MG: 10 TABLET ORAL at 05:06

## 2023-06-11 NOTE — SUBJECTIVE & OBJECTIVE
Interval History:  she reports feeling well this AM. No BM yet so lactulose added, she does not want to do a supposotry yet but we discussed if still none tomorrow will have to add that to the regimen as has to have a BM before going to another facility. Will discsus with CM tomorrow about facility planning as insurance reportd would approve a SNF and requested a p2p for an ltac. She denies new issues this morning, awaiting breakfast as has been coming late.    Review of Systems   Constitutional:  Negative for fever.   Respiratory:  Negative for cough and shortness of breath.    Cardiovascular:  Negative for chest pain and leg swelling.   Gastrointestinal:  Negative for abdominal pain, constipation, diarrhea and nausea.   Musculoskeletal:  Positive for arthralgias (Left ankle).   Skin:  Positive for wound (Left foot).   Neurological:  Negative for dizziness and light-headedness.   Psychiatric/Behavioral:  Negative for agitation and confusion.    Objective:     Vital Signs (Most Recent):  Temp: 97.7 °F (36.5 °C) (06/07/23 1523)  Pulse: 83 (06/07/23 1531)  Resp: 18 (06/07/23 1523)  BP: 123/59 (06/07/23 1523)  SpO2: 99 % (06/07/23 1523) on room air Vital Signs (24h Range):  Temp:  [97.2 °F (36.2 °C)-98.3 °F (36.8 °C)] 97.9 °F (36.6 °C)  Pulse:  [77-88] 77  Resp:  [16-18] 18  SpO2:  [95 %-100 %] 96 %  BP: (136-155)/(63-82) 147/82     Weight: 72.9 kg (160 lb 11.5 oz)  Body mass index is 28.48 kg/m².    Intake/Output Summary (Last 24 hours) at 6/11/2023 1119  Last data filed at 6/10/2023 1528  Gross per 24 hour   Intake --   Output 300 ml   Net -300 ml           Physical Exam  Vitals and nursing note reviewed.   Constitutional:       General: She is awake. She is not in acute distress.     Appearance: Normal appearance. She is normal weight. She is not ill-appearing.   Cardiovascular:      Rate and Rhythm: Normal rate and regular rhythm.      Heart sounds: Normal heart sounds. No murmur heard.    No friction rub. No  gallop.   Pulmonary:      Effort: Pulmonary effort is normal. No respiratory distress.      Breath sounds: Normal breath sounds. No wheezing.   Chest:   Breasts:     Right: Mass (Small mobile hard lump in upper outer quadrant) present.   Abdominal:      General: Abdomen is flat. Bowel sounds are normal. There is no distension.      Palpations: Abdomen is soft.      Tenderness: There is no abdominal tenderness.   Musculoskeletal:      Right lower leg: No edema.      Left lower leg: No edema.      Comments: Ex fix in place to left ankle. Surgical bandages in place to left ankle and are clean and dry and intact. Sutures covered by gauze   Skin:     General: Skin is warm.      Findings: No erythema.      Comments: See photos in Epic of wounds to left foot   Neurological:      Mental Status: She is alert and oriented to person, place, and time.   Psychiatric:         Mood and Affect: Mood normal.         Behavior: Behavior normal. Behavior is cooperative.         Thought Content: Thought content normal.         Judgment: Judgment normal.           Significant Labs: A1C:   Recent Labs   Lab 02/17/23  1415 04/21/23  0429 06/05/23  1957   HGBA1C 8.9* 8.2* 10.2*       CBC:   Recent Labs   Lab 06/10/23  0605 06/11/23  0707   WBC 5.95 9.02   HGB 9.4* 9.5*   HCT 29.1* 30.3*    364       CMP:   Recent Labs   Lab 06/10/23  0605 06/11/23  0707   * 132*   K 5.1 5.2*   CL 99 99   CO2 24 24   * 138*   BUN 28* 27*   CREATININE 1.0 0.9   CALCIUM 9.4 9.3   PROT 6.0 6.0   ALBUMIN 2.4* 2.3*   BILITOT 0.3 0.2   ALKPHOS 106 93   AST 12 12   ALT <5* <5*   ANIONGAP 8 9       POCT Glucose:   Recent Labs   Lab 06/10/23  1633 06/10/23  2227 06/11/23  0701   POCTGLUCOSE 278* 130* 130*         Significant Imaging: I have reviewed all pertinent imaging results/findings within the past 24 hours.

## 2023-06-11 NOTE — PLAN OF CARE
Transfer ORDERS    06/14/2023  [facility] - SURGERY  [address] [address]  Dept: [phone]  Loc: [phone]     Admit to LTAC :      Diagnoses:  Active Hospital Problems    Diagnosis  POA    *Closed displaced pilon fracture of left tibia with routine healing s/p ORIF of pilon and ex fix on 6/6/2023 [S82.872D]  Not Applicable    Chronic ulcer of great toe of left foot with fat layer exposed [L97.522]  Yes    Acute osteomyelitis of left calcaneus [M86.172]  Yes    Chronic ulcer of left heel with fat layer exposed [L97.422]  Yes    Tobacco use [Z72.0]  Yes    Type 2 diabetes mellitus with hyperglycemia, with long-term current use of insulin [E11.65, Z79.4]  Not Applicable    Neuropathy [G62.9]  Yes    Mass of upper outer quadrant of right breast [N63.11]  Yes    PAD (peripheral artery disease) [I73.9]  Yes    Type 2 diabetes mellitus with foot ulcer, with long-term current use of insulin [E11.621, L97.509, Z79.4]  Not Applicable    Essential (primary) hypertension [I10]  Yes      Resolved Hospital Problems   No resolved problems to display.       Patient is homebound due to:  Closed displaced pilon fracture of left tibia with routine healing    Allergies:Review of patient's allergies indicates:  No Known Allergies    Vitals:  Every shift    Diet: diabetic diet: 2000 calorie + glucose control house supplement TID with meals    Activities:   NWB to LLE  3 months. Ex fix for 3-6 weeks.     Goals of Care Treatment Preferences:  Code Status: Full Code      Labs:  CBC, CMP, CRP every Monday drawn via PICC and faxed to [facility] ID clinic at [phone] [address]    Nursing Precautions:  Fall and Pressure ulcer prevention    Consults:   PT to evaluate and treat- 3 times a week, OT to evaluate and treat- 3 times a week, and Wound Care     Miscellaneous Care: Diabetes Care: Diabetes: Check blood sugar. Fingerstick blood sugar AC and HS  Sliding  Scale/Hypoglycemia Protocol: For FSG<80, give 1 amp D50 or 1 glucose tablet. For FSG , do nothing. For -200, give 1 unit of novolog in addition to pre-meal insulin. For -250, give 2 units of novolog in addition to pre-meal insulin. For -300, give 3 units of novolog in addition to pre-meal insulin. For 301-350, give 4 units of novolog in addition to pre-meal insulin. For 351-400, give 5 units of novolog in addition to pre-meal insulin. For FSG >400, give 5 units of novolog in addition to pre-meal insulin and please call MD                   Diabetes Care:  SN to perform and educate Diabetic management with blood glucose monitoring:, Fingerstick blood sugar AC and HS, and Report CBG < 60 or > 350 to physician.      Wound care-     Cover left ankle surgical incisions with xeroform, gauze, and tegaderm PRN for drainage    Clean pin sites to ex fix on LLE with 50% peroxide/50% NS and then wrap pins with kerlex daily.    Twice a week: Cleanse L great toe and heel wounds with vashe. Dry. Apply Alexia Ag to wound base, activate with several drops of saline, follow mepilex border.           Medications: Discontinue all previous medication orders, if any. See new list below.     Medication List        START taking these medications      aspirin 81 MG EC tablet  Commonly known as: ECOTRIN  Take 1 tablet (81 mg total) by mouth 2 (two) times a day. - end date august 30, 2023  Replaces: aspirin 81 MG Chew     ciprofloxacin HCl 750 MG tablet  Commonly known as: CIPRO  Take 1 tablet (750 mg total) by mouth every 12 (twelve) hours. End date 6/22/23     dextrose 5 % in water (D5W) 5 % PgBk 50 mL with ceFAZolin 2 gram SolR 2 g  Inject 2 g into the vein every 8 (eight) hours. End date 7/17/23     famotidine 20 MG tablet  Commonly known as: PEPCID  Take 1 tablet (20 mg total) by mouth 2 (two) times daily as needed (Heartburn).     melatonin 3 mg tablet  Commonly known as: MELATIN  Take 3 tablets (9 mg total)  by mouth nightly.  Replaces: melatonin 10 mg Cap     methocarbamoL 500 MG Tab  Commonly known as: ROBAXIN  Take 1 tablet (500 mg total) by mouth 4 (four) times daily.     ondansetron 4 MG Tbdl  Commonly known as: ZOFRAN-ODT  Take 1 tablet (4 mg total) by mouth every 8 (eight) hours as needed (nausea).     * oxyCODONE 5 MG immediate release tablet  Commonly known as: ROXICODONE  Take 1 tablet (5 mg total) by mouth every 4 (four) hours as needed (pain scale 4-6).     * oxyCODONE 10 mg Tab immediate release tablet  Commonly known as: ROXICODONE  Take 1 tablet (10 mg total) by mouth every 4 (four) hours as needed (pain sclae 7-10).     polyethylene glycol 17 gram Pwpk  Commonly known as: GLYCOLAX  Take 17 g by mouth once daily.     pregabalin 75 MG capsule  Commonly known as: LYRICA  Take 1 capsule (75 mg total) by mouth 2 (two) times daily.     senna-docusate 8.6-50 mg 8.6-50 mg per tablet  Commonly known as: PERICOLACE  Take 1 tablet by mouth once daily.     vitamin D 1000 units Tab  Commonly known as: VITAMIN D3  Take 1 tablet (1,000 Units total) by mouth once daily.           * This list has 2 medication(s) that are the same as other medications prescribed for you. Read the directions carefully, and ask your doctor or other care provider to review them with you.                CHANGE how you take these medications      acetaminophen 500 MG tablet  Commonly known as: TYLENOL  Take 2 tablets (1,000 mg total) by mouth every 8 (eight) hours.  What changed:   when to take this  reasons to take this     * insulin aspart U-100 100 unit/mL (3 mL) Inpn pen  Commonly known as: NovoLOG  Inject 0-5 Units into the skin before meals and at bedtime as needed (Hyperglycemia).  What changed: additional instructions     * insulin aspart U-100 100 unit/mL (3 mL) Inpn pen  Commonly known as: NovoLOG  Inject 6 Units into the skin 3 (three) times daily with meals.  What changed: You were already taking a medication with the same name,  "and this prescription was added. Make sure you understand how and when to take each.           * This list has 2 medication(s) that are the same as other medications prescribed for you. Read the directions carefully, and ask your doctor or other care provider to review them with you.                CONTINUE taking these medications      atorvastatin 80 MG tablet  Commonly known as: LIPITOR  Take 1 tablet (80 mg total) by mouth every evening.     blood sugar diagnostic Strp  To check BG two times daily, to use with insurance preferred meter     blood-glucose meter kit  To check BG two times daily, to use with insurance preferred meter     clopidogreL 75 mg tablet  Commonly known as: PLAVIX  Take 1 tablet (75 mg total) by mouth once daily.     insulin detemir U-100 (Levemir) 100 unit/mL (3 mL) Inpn pen  Inject 18 Units into the skin once daily.     lancets Misc  To check BG two times daily, to use with insurance preferred meter     lisinopriL 5 MG tablet  Commonly known as: PRINIVIL,ZESTRIL  Take 1 tablet (5 mg total) by mouth once daily.     pen needle, diabetic 31 gauge x 3/16" Ndle  Commonly known as: PEN NEEDLE  1 application by Misc.(Non-Drug; Combo Route) route 2 (two) times a day.            STOP taking these medications      aspirin 81 MG Chew  Replaced by: aspirin 81 MG EC tablet     doxycycline 100 MG Cap  Commonly known as: VIBRAMYCIN     HYDROcodone-acetaminophen 5-325 mg per tablet  Commonly known as: NORCO     levoFLOXacin 750 MG tablet  Commonly known as: LEVAQUIN     melatonin 10 mg Cap  Replaced by: melatonin 3 mg tablet     multivit-min-FA-lycopen-lutein 0.4 mg-300 mcg- 250 mcg Tab     nicotine 14 mg/24 hr  Commonly known as: NICODERM CQ     traMADoL 50 mg tablet  Commonly known as: ULTRAM                Immunizations Administered as of 6/11/2023       Name Date Dose VIS Date Route Exp Date    COVID-19, MRNA, LN-S, PF (Moderna) 11/12/2021 -- -- -- --    COVID-19, MRNA, LN-S, PF (Moderna) 2/3/2021 -- " -- -- --    COVID-19, MRNA, LN-S, PF (Moderna) 1/6/2021 -- -- -- --            This patient has had both covid vaccinations    Some patients may experience side effects after vaccination.  These may include fever, headache, muscle or joint aches.  Most symptoms resolve with 24-48 hours and do not require urgent medical evaluation unless they persist for more than 72 hours or symptoms are concerning for an unrelated medical condition.          _________________________________  Sonia Kim MD  06/14/2023

## 2023-06-11 NOTE — PROGRESS NOTES
[facility] Medicine  Progress Note    Patient Name: Anastasiia Badillo  MRN: [id number]  Patient Class: IP- Inpatient   Admission Date: 6/5/2023  Length of Stay: 6 days  Attending Physician: Sonia Kim MD  Primary Care Provider: Raji Parkinson MD        Subjective:     Principal Problem:Closed displaced pilon fracture of left tibia with routine healing        HPI:  Alycia Badillo is a 73-year-old woman with a past medical history of peripheral artery disease s/p revascularization and type 2 diabetes mellitus c/b neuropathy and chronic lower extremity wounds who presents as a transfer from [facility] for distal fractures of the tibia and fibula.  Of note, patient was hospitalized at [facility] in April 2023 for severe peripheral artery disease and underwent revascularization.  The patient was discharged and was sent to inpatient rehabilitation.  She said that she developed a pressure ulcer on her left heel.  She also has a wound on the plantar surface of her left great toe.  She has been following with a podiatrist in addition to a Wound Care clinic.  The patient said that she left inpatient rehabilitation and went to live at home with her son.  She says that she normally ambulates with a walker.  Approximately three days ago, she said she had just taken her nightly melatonin and was getting ready to fall asleep in bed.  However, she noted that she needed to use the bathroom and went to reach for the walker next to the bed.  She said that the walker was not locked and so she slid and fell to the ground hitting her left lower extremity.  She said that she began having pain and swelling to her left lower extremity.  The pain became so unbearable that she lost the ability to ambulate.  This prompted her to seek care in the emergency department at [facility].  Imaging at the [facility] reveals a comminuted and angulated distal tibia and fibular  fracture.  It was also noted that she was having purulent drainage from her lower extremity wounds.  Of note, per chart review, the patient had a left heel bone biopsy performed by her podiatrist on April 27th.  Patient's bone biopsy grew Staphylococcus aureus.  She was seen in the Wound Care Clinic and placed on levofloxacin and doxycycline but did not appear to be followed by the Infectious Disease Service.    In the emergency department, the patient was noted to have stable vital signs upon arrival.  Per report, the patient received IV antibiotics at [facility] prior to transfer.  She was evaluated by Orthopedic Surgery at [facility] who is planning surgical intervention on June 6th.  Patient was admitted to Hospital Medicine for further management.      Overview/Hospital Course:  Patient taken to OR with Orthopedics on 6/6/2023 and had ORIF of left pilon fracture and ex fix placed for pilon fracture for stabilization of fracture by Dr. Gilbert Mahmood. Plan per Ortho is ex fix to stay in place for 4-6 weeks and then plan for removal by Ortho. According to Ortho, if current construct does not have enough stability for healing will consider transition to ringed fixator. Patient to be non-weight bearing to left lower extremity x 3 months post-op. Patient started on Aspirin 81 mg po BID and will need for 12 weeks as per Ortho for DVT prophylaxis. Endocrine consulted to assist in diabetes management. Podiatry consulted for left foot wounds and not no active infection and wound care as per Podiatry. Infectious Disease consulted as had previous positive cultures from left foot wounds and osteomyelitis. Infectious disease evaluated and recommending IV Cefazolin 2 grams every 8 hours for 6 weeks to treat osteomyelitis. PT/OT consulted. Patient on multimodal pain meds post-op.         Interval History:  she reports feeling well this AM. No BM yet so lactulose added, she does not want to do a  supposotry yet but we discussed if still none tomorrow will have to add that to the regimen as has to have a BM before going to another facility. Will discsus with CM tomorrow about facility planning as insurance reportd would approve a SNF and requested a p2p for an ltac. She denies new issues this morning, awaiting breakfast as has been coming late.    Review of Systems   Constitutional:  Negative for fever.   Respiratory:  Negative for cough and shortness of breath.    Cardiovascular:  Negative for chest pain and leg swelling.   Gastrointestinal:  Negative for abdominal pain, constipation, diarrhea and nausea.   Musculoskeletal:  Positive for arthralgias (Left ankle).   Skin:  Positive for wound (Left foot).   Neurological:  Negative for dizziness and light-headedness.   Psychiatric/Behavioral:  Negative for agitation and confusion.    Objective:     Vital Signs (Most Recent):  Temp: 97.7 °F (36.5 °C) (06/07/23 1523)  Pulse: 83 (06/07/23 1531)  Resp: 18 (06/07/23 1523)  BP: 123/59 (06/07/23 1523)  SpO2: 99 % (06/07/23 1523) on room air Vital Signs (24h Range):  Temp:  [97.2 °F (36.2 °C)-98.3 °F (36.8 °C)] 97.9 °F (36.6 °C)  Pulse:  [77-88] 77  Resp:  [16-18] 18  SpO2:  [95 %-100 %] 96 %  BP: (136-155)/(63-82) 147/82     Weight: 72.9 kg (160 lb 11.5 oz)  Body mass index is 28.48 kg/m².    Intake/Output Summary (Last 24 hours) at 6/11/2023 1119  Last data filed at 6/10/2023 1528  Gross per 24 hour   Intake --   Output 300 ml   Net -300 ml           Physical Exam  Vitals and nursing note reviewed.   Constitutional:       General: She is awake. She is not in acute distress.     Appearance: Normal appearance. She is normal weight. She is not ill-appearing.   Cardiovascular:      Rate and Rhythm: Normal rate and regular rhythm.      Heart sounds: Normal heart sounds. No murmur heard.    No friction rub. No gallop.   Pulmonary:      Effort: Pulmonary effort is normal. No respiratory distress.      Breath sounds: Normal  breath sounds. No wheezing.   Chest:   Breasts:     Right: Mass (Small mobile hard lump in upper outer quadrant) present.   Abdominal:      General: Abdomen is flat. Bowel sounds are normal. There is no distension.      Palpations: Abdomen is soft.      Tenderness: There is no abdominal tenderness.   Musculoskeletal:      Right lower leg: No edema.      Left lower leg: No edema.      Comments: Ex fix in place to left ankle. Surgical bandages in place to left ankle and are clean and dry and intact. Sutures covered by gauze   Skin:     General: Skin is warm.      Findings: No erythema.      Comments: See photos in Epic of wounds to left foot   Neurological:      Mental Status: She is alert and oriented to person, place, and time.   Psychiatric:         Mood and Affect: Mood normal.         Behavior: Behavior normal. Behavior is cooperative.         Thought Content: Thought content normal.         Judgment: Judgment normal.           Significant Labs: A1C:   Recent Labs   Lab 02/17/23  1415 04/21/23  0429 06/05/23  1957   HGBA1C 8.9* 8.2* 10.2*       CBC:   Recent Labs   Lab 06/10/23  0605 06/11/23  0707   WBC 5.95 9.02   HGB 9.4* 9.5*   HCT 29.1* 30.3*    364       CMP:   Recent Labs   Lab 06/10/23  0605 06/11/23  0707   * 132*   K 5.1 5.2*   CL 99 99   CO2 24 24   * 138*   BUN 28* 27*   CREATININE 1.0 0.9   CALCIUM 9.4 9.3   PROT 6.0 6.0   ALBUMIN 2.4* 2.3*   BILITOT 0.3 0.2   ALKPHOS 106 93   AST 12 12   ALT <5* <5*   ANIONGAP 8 9       POCT Glucose:   Recent Labs   Lab 06/10/23  1633 06/10/23  2227 06/11/23  0701   POCTGLUCOSE 278* 130* 130*         Significant Imaging: I have reviewed all pertinent imaging results/findings within the past 24 hours.      Assessment/Plan:      * Closed displaced pilon fracture of left tibia with routine healing s/p ORIF of pilon and ex fix on 6/6/2023  Presenting after fall and found to have comminuted and angulated closed left distal tibia and fibular fractures.  Evaluated by Orthopedic Surgery service, she was reduced and placed into a short leg splint. She will require operative intervention for this injury.   - Patient taken to OR on 6/6/2023 and underwent ORIF of pilon fracture as well as ex fix to stabilize fractures with Dr. Gilbert Mahmood.  - Patient post-op to be non weight bearing to left lower extremity x 3 months from surgery date on 6/6.   - Ortho to remove ex fix I 4-6 weeks but if construct fails then would have to convert to ring fixator as per Ortho.   - PT/OT consulted for gait training/transfer training and restoration of ADL's post-op.  - Routine pin site care to ex fix as per Ortho.  - Ice and elevate left lower extremity to help with swelling.   - Pain management with scheduled Tylenol 1000 mg po every 8 hours + Robaxin 500 mg po 4 times daily + Lyrica 75 mg po BID with Oxycodone  mg po every 4 hours prn for breakthrough pain.   - Orthopedics managing surgical site to left ankle area.   - Continue Aspirin 81 mg po BID for 12 weeks for DVT prophylaxis as per Ortho. End date aug 30th then back to daily for PVD  - Surgical bandages to remain in place to left ankle and to remian in place until Ortho clinic follow-up.         Acute osteomyelitis of left calcaneus  Plan for 6 weeks ancef and likely  2 weeks cipro for soft tissue infection and osteo with ancef for longer period. Final ID recs with end date for cipro of 6/22 and end date of ancef for 7/17 with weekly cbc, crp and cmp faxed to ID clinic on discharge.  picc placed      Tobacco use  Patient declined nicotine patch. Still actively smoking at home. Patient counseled extensively on smoking cessation as has open wounds, severe PAD and now post-op from ankle fracture surgery and nicotine to impair wound healing and risk re-occlusion of peripheral  arteries in lower limbs.     Chronic ulcer of left heel with fat layer exposed  Chronic ulcer of great toe of left foot with fat layer  exposed  Acute osteomyelitis of left calcaneus  Follows with Podiatry clinic. Previously had heel ulcer bone biopsy which grew Staphylococcus aureus and Staph epidermis and was prescribed Doxycycline and Levofloxacin from Wound Care clinic but not followed by Infectious Disease clinic. Afebrile, without leukocytosis. CRP elevated 127 on admit.   - Podiatry consulted, appreciate recommendations and continue wound care as per Podiatry to left heel and left first toe ulcers. Wound care recs per podiatry note on 6/7 in place.   - Infectious Disease consulted, appreciate recommendations and they recommend 6 weeks of IV Cefazolin 2 grams every 8 hours to treat left heel osteomyelitis. picc placed 6/8, awaiting antibitoic changes today given pseudomonas in wound will adjust per ID    Mass of upper outer quadrant of right breast  Noted on admit. Patient in past month as noted a lump in right breast in upper outer quadrant. Patient does have palpable lump in right upper outer quadrant on exam. Patient has know bilateral breast implants in place. Patient's primary care physician is aware and had ordered to have outpatient ultrasound of breasts and mammogram done and have not been done yet. Patient to follow-up as outpatient with her primary care physician for further work-up and evaluation.       PAD (peripheral artery disease)  · Known severe peripheral arterial disease. Vascular Surgery during last hospitalization and had revascularization of left lower extremity in 4/2023 with percutaneous transluminal angioplasty of left popliteal artery and percutaneous transluminal angioplasty of the entire length of the left posterior tibial artery.  · Patient on Aspirin Plavix and high dose Lipitor to treat and will continue in hospital.   · Needs BID asa for 12 weeks until aug 30 then back to daily  · Will resume plavix 6/9  Ortho ok with it    Essential (primary) hypertension  Chronic condition. Continue home Lisinopril 5 mg po  daily to treat. Monitor vital signs every 4 hours. Target BP < 140/90.       Type 2 diabetes mellitus with foot ulcer, with long-term current use of insulin  Type 2 diabetes mellitus with hyperglycemia, with long-term current use of insulin  Endocrine consulted to assist in management. Patient's FSGs are uncontrolled due to hyperglycemia on current medication regimen.    Last A1c reviewed-   Lab Results   Component Value Date    HGBA1C 10.2 (H) 06/05/2023     Most recent fingerstick glucose reviewed-   Recent Labs   Lab 06/07/23  1629 06/08/23  0029 06/08/23  0643 06/08/23  1132   POCTGLUCOSE 98 125* 208* 201*     Current correctional scale  Low     Maintain anti-hyperglycemic dose as follows as per Endocrine recs:  Antihyperglycemics (From admission, onward)    Start     Stop Route Frequency Ordered    06/09/23 0900  insulin detemir U-100 (Levemir) pen 22 Units         -- SubQ Daily 06/08/23 0932    06/07/23 1645  insulin aspart U-100 pen 6 Units         -- SubQ 3 times daily with meals 06/07/23 1453    06/05/23 2345  insulin aspart U-100 pen 0-5 Units         -- SubQ Before meals & nightly PRN 06/05/23 2246        Target blood sugar 140-180 in hospital.  Diabetic diet.  Monitor blood sugars 4 times daily with meals and at bedtime.   Appreciate endo assisting as she reports was not controlled at home and likely contributor to poor wound healing of her LLE prior to admit      VTE Risk Mitigation (From admission, onward)         Ordered     Reason for No Pharmacological VTE Prophylaxis  Once        Question:  Reasons:  Answer:  Risk of Bleeding  Comment:  Ortho planning surgery in AM    06/05/23 2246     IP VTE HIGH RISK PATIENT  Once         06/05/23 2246     Place sequential compression device  Until discontinued         06/05/23 2246                Discharge Planning   CITLALI: 6/12/2023     Code Status: Full Code   Is the patient medically ready for discharge?: Yes    Reason for patient still in hospital (select all  that apply): Patient trending condition  Discharge Plan A: Long-term acute care facility (LTAC)                  Sonia Kim MD  Department of Hospital Medicine   [facility] - Surgery

## 2023-06-11 NOTE — CARE UPDATE
BG goal 140-180    -A1C   Lab Results   Component Value Date    HGBA1C 10.2 (H) 06/05/2023       -HOME REGIMEN: Levemir 18 units nightly. Novolog is ordered SSI LDC; but patient states she does not use.     -INPATIENT REGIMEN: Levemir 26 units daily, Novolog 9 units TID with meals and correction scale     -GLUCOSE TREND FOR THE PAST 24HRS: 130-278    -NO HYPOGYCEMIAS NOTED     -TOLERATING 100% OF PO DIET     -Diet: Diet diabetic [facility]; 2000 Calorie; Thin      -Steroids - N/A    -Tube Feeds - N/A      NAEON. BG at and above goal ranges on current SQ insulin regimen. POD 5. No changes to plan of care at this time.     Plan:  -Levemir (Insulin Detemir) 26 units daily   -Increase Novolog (Insulin Aspart)  to 10 units TIDWM and prn for BG excursions LDC SSI (150/50)   - BG checks AC/HS  - Hypoglycemia protocol in place  - If blood glucose greater than 300, please ask patient not to eat food or drink anything other than water until correctional insulin has brought it back below 250    Discharge planning:TBD    Endocrine to continue to follow    ** Please call Endocrine for any BG related issues **

## 2023-06-11 NOTE — PROGRESS NOTES
[facility] - Surgery  Orthopedics  Progress Note    Patient Name: Anastasiia Badillo  MRN: [id number]  Admission Date: 6/5/2023  Hospital Length of Stay: 6 days  Attending Provider: Sonia Kim MD  Primary Care Provider: Raji Parkinson MD  Follow-up For: Procedure(s) (LRB):  ORIF, FRACTURE, PILON, LEFT (Left)  APPLICATION, EXTERNAL FIXATION DEVICE, LEFT ANKLE, [place] (Left)    Post-Operative Day: 5 Days Post-Op  Subjective:     Principal Problem:Closed displaced pilon fracture of left tibia with routine healing    Principal Orthopedic Problem: Same    Interval History: NAEON, patient stable, pain controlled. No acute complaints this morning. Pin sites clean and dry     Review of patient's allergies indicates:  No Known Allergies    Current Facility-Administered Medications   Medication    acetaminophen tablet 1,000 mg    aspirin EC tablet 81 mg    atorvastatin tablet 80 mg    ceFAZolin 2 g in dextrose 5 % in water (D5W) 5 % 50 mL IVPB (MB+)    ciprofloxacin HCl tablet 750 mg    clopidogreL tablet 75 mg    dextrose 10% bolus 125 mL 125 mL    dextrose 10% bolus 250 mL 250 mL    famotidine tablet 20 mg    glucagon (human recombinant) injection 1 mg    insulin aspart U-100 pen 0-5 Units    insulin aspart U-100 pen 9 Units    insulin detemir U-100 (Levemir) pen 26 Units    lisinopriL tablet 5 mg    melatonin tablet 9 mg    methocarbamoL tablet 500 mg    mupirocin 2 % ointment    naloxone 0.4 mg/mL injection 0.02 mg    ondansetron disintegrating tablet 4 mg    oxyCODONE immediate release tablet 5 mg    oxyCODONE immediate release tablet Tab 10 mg    polyethylene glycol packet 17 g    pregabalin capsule 75 mg    senna-docusate 8.6-50 mg per tablet 1 tablet    simethicone chewable tablet 80 mg    sodium chloride 0.9% flush 10 mL    sodium chloride 0.9% flush 10 mL    And    sodium chloride 0.9% flush 10 mL    vitamin D 1000 units tablet 1,000 Units     Objective:     Vital Signs (Most Recent):  Temp: 98.2 °F (36.8 °C)  "(06/11/23 0515)  Pulse: 79 (06/11/23 0515)  Resp: 18 (06/11/23 0515)  BP: (!) 155/73 (06/11/23 0515)  SpO2: 98 % (06/11/23 0515) Vital Signs (24h Range):  Temp:  [97.2 °F (36.2 °C)-98.3 °F (36.8 °C)] 98.2 °F (36.8 °C)  Pulse:  [79-88] 79  Resp:  [16-18] 18  SpO2:  [95 %-100 %] 98 %  BP: (123-155)/(62-83) 155/73     Weight: 72.9 kg (160 lb 11.5 oz)  Height: 5' 2.99" (160 cm)  Body mass index is 28.48 kg/m².      Intake/Output Summary (Last 24 hours) at 6/11/2023 0656  Last data filed at 6/10/2023 1528  Gross per 24 hour   Intake --   Output 600 ml   Net -600 ml       Physical Exam  Ortho/SPM Exam  Gen: NAD, sitting comfortably in bed  CV: regular rate  Resp: non-labored respirations     LLE:  Heel and great toe wounds are dressed   Ex fix in place, pin sites clean and dry w no signs of infection  Surgical incisiosn dressed w some ss strike through   NVI to baseline- neuropathic to calf, can wiggles toes, foot and toes pink wwp    Significant Labs: A1C:   Recent Labs   Lab 02/17/23  1415 04/21/23  0429 06/05/23 1957   HGBA1C 8.9* 8.2* 10.2*     CBC:   Recent Labs   Lab 06/10/23  0605   WBC 5.95   HGB 9.4*   HCT 29.1*        CMP:   Recent Labs   Lab 06/10/23  0605   *   K 5.1   CL 99   CO2 24   *   BUN 28*   CREATININE 1.0   CALCIUM 9.4   PROT 6.0   ALBUMIN 2.4*   BILITOT 0.3   ALKPHOS 106   AST 12   ALT <5*   ANIONGAP 8     POCT Glucose:   Recent Labs   Lab 06/10/23  1056 06/10/23  1633 06/10/23  2227   POCTGLUCOSE 296* 278* 130*       All pertinent labs within the past 24 hours have been reviewed.      Significant Imaging: I have reviewed and interpreted all pertinent imaging results/findings.   Assessment/Plan:     * Closed displaced pilon fracture of left tibia with routine healing s/p ORIF of pilon and ex fix on 6/6/2023  73F w uncontrolled DM (A1C 10) w neuropathy to calves, PAD (sp recent LLE revascularization 5/2023), and chronic left heela nd great toe wounds who fell 6/4 and has left " pilon and left distal fibula fracture. She is closed and neurovascularly intact to baseline. She has diabetic foot wounds over her plantar heel and great toe.     -SP operative fixation of left pilon and fibula, left ankle external fixator placement 6/6/23 w Dr Mahmood    Multimodal pain regimen  NWB LLE in ex fix, elevate  BID pin site care  DVT ppx w ASA 81 bid, SCD RLE all times while in bed   PT/OT daily  ID following for abx recs for left foot wounds/possible osteo   Ancef IV per ID for previous left heel bone cultures +staph, cipro for left great toe wound +pseudmonas  Wound care for left heel/great toe wounds per podiatry   BG management per Endocrine   FU 2 wks post op w ortho        Tong Murphy MD  Orthopedics  [facility] - Surgery

## 2023-06-12 LAB
ALBUMIN SERPL BCP-MCNC: 2.2 G/DL (ref 3.5–5.2)
ALP SERPL-CCNC: 96 U/L (ref 55–135)
ALT SERPL W/O P-5'-P-CCNC: <5 U/L (ref 10–44)
ANION GAP SERPL CALC-SCNC: 7 MMOL/L (ref 8–16)
AST SERPL-CCNC: 17 U/L (ref 10–40)
BACTERIA SPEC ANAEROBE CULT: NORMAL
BASOPHILS # BLD AUTO: 0.06 K/UL (ref 0–0.2)
BASOPHILS NFR BLD: 0.6 % (ref 0–1.9)
BILIRUB SERPL-MCNC: 0.2 MG/DL (ref 0.1–1)
BUN SERPL-MCNC: 31 MG/DL (ref 8–23)
CALCIUM SERPL-MCNC: 9 MG/DL (ref 8.7–10.5)
CHLORIDE SERPL-SCNC: 98 MMOL/L (ref 95–110)
CO2 SERPL-SCNC: 25 MMOL/L (ref 23–29)
CREAT SERPL-MCNC: 1.2 MG/DL (ref 0.5–1.4)
DIFFERENTIAL METHOD: ABNORMAL
EOSINOPHIL # BLD AUTO: 0.1 K/UL (ref 0–0.5)
EOSINOPHIL NFR BLD: 1 % (ref 0–8)
ERYTHROCYTE [DISTWIDTH] IN BLOOD BY AUTOMATED COUNT: 13.6 % (ref 11.5–14.5)
EST. GFR  (NO RACE VARIABLE): 48.1 ML/MIN/1.73 M^2
GLUCOSE SERPL-MCNC: 61 MG/DL (ref 70–110)
HCT VFR BLD AUTO: 27.9 % (ref 37–48.5)
HGB BLD-MCNC: 9.1 G/DL (ref 12–16)
IMM GRANULOCYTES # BLD AUTO: 0.31 K/UL (ref 0–0.04)
IMM GRANULOCYTES NFR BLD AUTO: 3.2 % (ref 0–0.5)
LYMPHOCYTES # BLD AUTO: 1.8 K/UL (ref 1–4.8)
LYMPHOCYTES NFR BLD: 18 % (ref 18–48)
MCH RBC QN AUTO: 28.7 PG (ref 27–31)
MCHC RBC AUTO-ENTMCNC: 32.6 G/DL (ref 32–36)
MCV RBC AUTO: 88 FL (ref 82–98)
MONOCYTES # BLD AUTO: 1.6 K/UL (ref 0.3–1)
MONOCYTES NFR BLD: 16.2 % (ref 4–15)
NEUTROPHILS # BLD AUTO: 6 K/UL (ref 1.8–7.7)
NEUTROPHILS NFR BLD: 61 % (ref 38–73)
NRBC BLD-RTO: 0 /100 WBC
PLATELET # BLD AUTO: 358 K/UL (ref 150–450)
PMV BLD AUTO: 11.7 FL (ref 9.2–12.9)
POCT GLUCOSE: 141 MG/DL (ref 70–110)
POCT GLUCOSE: 55 MG/DL (ref 70–110)
POCT GLUCOSE: 79 MG/DL (ref 70–110)
POTASSIUM SERPL-SCNC: 5.6 MMOL/L (ref 3.5–5.1)
PROT SERPL-MCNC: 5.9 G/DL (ref 6–8.4)
RBC # BLD AUTO: 3.17 M/UL (ref 4–5.4)
SODIUM SERPL-SCNC: 130 MMOL/L (ref 136–145)
WBC # BLD AUTO: 9.76 K/UL (ref 3.9–12.7)

## 2023-06-12 PROCEDURE — 97112 NEUROMUSCULAR REEDUCATION: CPT | Mod: CQ

## 2023-06-12 PROCEDURE — 99232 PR SUBSEQUENT HOSPITAL CARE,LEVL II: ICD-10-PCS | Mod: ,,, | Performed by: NURSE PRACTITIONER

## 2023-06-12 PROCEDURE — 99232 PR SUBSEQUENT HOSPITAL CARE,LEVL II: ICD-10-PCS | Mod: ,,, | Performed by: HOSPITALIST

## 2023-06-12 PROCEDURE — 11000001 HC ACUTE MED/SURG PRIVATE ROOM

## 2023-06-12 PROCEDURE — 25000003 PHARM REV CODE 250: Performed by: STUDENT IN AN ORGANIZED HEALTH CARE EDUCATION/TRAINING PROGRAM

## 2023-06-12 PROCEDURE — 25000003 PHARM REV CODE 250: Performed by: INTERNAL MEDICINE

## 2023-06-12 PROCEDURE — 97530 THERAPEUTIC ACTIVITIES: CPT | Mod: CQ

## 2023-06-12 PROCEDURE — 25000003 PHARM REV CODE 250: Performed by: REGISTERED NURSE

## 2023-06-12 PROCEDURE — 99232 SBSQ HOSP IP/OBS MODERATE 35: CPT | Mod: ,,, | Performed by: HOSPITALIST

## 2023-06-12 PROCEDURE — 25000003 PHARM REV CODE 250

## 2023-06-12 PROCEDURE — 80053 COMPREHEN METABOLIC PANEL: CPT | Performed by: HOSPITALIST

## 2023-06-12 PROCEDURE — 99232 SBSQ HOSP IP/OBS MODERATE 35: CPT | Mod: ,,, | Performed by: NURSE PRACTITIONER

## 2023-06-12 PROCEDURE — 63600175 PHARM REV CODE 636 W HCPCS: Performed by: STUDENT IN AN ORGANIZED HEALTH CARE EDUCATION/TRAINING PROGRAM

## 2023-06-12 PROCEDURE — 25000003 PHARM REV CODE 250: Performed by: HOSPITALIST

## 2023-06-12 PROCEDURE — 85025 COMPLETE CBC W/AUTO DIFF WBC: CPT | Performed by: HOSPITALIST

## 2023-06-12 PROCEDURE — A4216 STERILE WATER/SALINE, 10 ML: HCPCS | Performed by: HOSPITALIST

## 2023-06-12 PROCEDURE — 97535 SELF CARE MNGMENT TRAINING: CPT | Mod: CO

## 2023-06-12 PROCEDURE — 97530 THERAPEUTIC ACTIVITIES: CPT | Mod: CO

## 2023-06-12 PROCEDURE — 94761 N-INVAS EAR/PLS OXIMETRY MLT: CPT

## 2023-06-12 RX ORDER — LACTULOSE 10 G/15ML
200 SOLUTION ORAL; RECTAL ONCE
Status: COMPLETED | OUTPATIENT
Start: 2023-06-12 | End: 2023-06-12

## 2023-06-12 RX ORDER — INSULIN ASPART 100 [IU]/ML
8 INJECTION, SOLUTION INTRAVENOUS; SUBCUTANEOUS
Status: DISCONTINUED | OUTPATIENT
Start: 2023-06-12 | End: 2023-06-13

## 2023-06-12 RX ORDER — BISACODYL 10 MG
10 SUPPOSITORY, RECTAL RECTAL ONCE
Status: COMPLETED | OUTPATIENT
Start: 2023-06-12 | End: 2023-06-12

## 2023-06-12 RX ORDER — INSULIN ASPART 100 [IU]/ML
8 INJECTION, SOLUTION INTRAVENOUS; SUBCUTANEOUS
Refills: 0
Start: 2023-06-12 | End: 2023-06-13 | Stop reason: SDUPTHER

## 2023-06-12 RX ADMIN — Medication 9 MG: at 09:06

## 2023-06-12 RX ADMIN — CIPROFLOXACIN 750 MG: 750 TABLET, FILM COATED ORAL at 09:06

## 2023-06-12 RX ADMIN — ASPIRIN 81 MG: 81 TABLET, COATED ORAL at 09:06

## 2023-06-12 RX ADMIN — PREGABALIN 75 MG: 75 CAPSULE ORAL at 09:06

## 2023-06-12 RX ADMIN — CLOPIDOGREL BISULFATE 75 MG: 75 TABLET ORAL at 09:06

## 2023-06-12 RX ADMIN — METHOCARBAMOL 500 MG: 500 TABLET ORAL at 09:06

## 2023-06-12 RX ADMIN — ATORVASTATIN CALCIUM 80 MG: 40 TABLET, FILM COATED ORAL at 09:06

## 2023-06-12 RX ADMIN — INSULIN ASPART 10 UNITS: 100 INJECTION, SOLUTION INTRAVENOUS; SUBCUTANEOUS at 09:06

## 2023-06-12 RX ADMIN — Medication 10 ML: at 06:06

## 2023-06-12 RX ADMIN — CEFAZOLIN 2 G: 2 INJECTION, POWDER, FOR SOLUTION INTRAMUSCULAR; INTRAVENOUS at 09:06

## 2023-06-12 RX ADMIN — OXYCODONE HYDROCHLORIDE 10 MG: 10 TABLET ORAL at 12:06

## 2023-06-12 RX ADMIN — ACETAMINOPHEN 1000 MG: 500 TABLET ORAL at 09:06

## 2023-06-12 RX ADMIN — OXYCODONE HYDROCHLORIDE 10 MG: 10 TABLET ORAL at 08:06

## 2023-06-12 RX ADMIN — BISACODYL 10 MG: 10 SUPPOSITORY RECTAL at 10:06

## 2023-06-12 RX ADMIN — DEXTROSE MONOHYDRATE 125 ML: 100 INJECTION, SOLUTION INTRAVENOUS at 04:06

## 2023-06-12 RX ADMIN — ACETAMINOPHEN 1000 MG: 500 TABLET ORAL at 02:06

## 2023-06-12 RX ADMIN — CEFAZOLIN 2 G: 2 INJECTION, POWDER, FOR SOLUTION INTRAMUSCULAR; INTRAVENOUS at 12:06

## 2023-06-12 RX ADMIN — INSULIN ASPART 8 UNITS: 100 INJECTION, SOLUTION INTRAVENOUS; SUBCUTANEOUS at 12:06

## 2023-06-12 RX ADMIN — Medication 10 ML: at 12:06

## 2023-06-12 RX ADMIN — CHOLECALCIFEROL TAB 25 MCG (1000 UNIT) 1000 UNITS: 25 TAB at 09:06

## 2023-06-12 RX ADMIN — POLYETHYLENE GLYCOL 3350 17 G: 17 POWDER, FOR SOLUTION ORAL at 09:06

## 2023-06-12 RX ADMIN — SENNOSIDES AND DOCUSATE SODIUM 1 TABLET: 50; 8.6 TABLET ORAL at 09:06

## 2023-06-12 RX ADMIN — ACETAMINOPHEN 1000 MG: 500 TABLET ORAL at 05:06

## 2023-06-12 RX ADMIN — LISINOPRIL 5 MG: 5 TABLET ORAL at 09:06

## 2023-06-12 RX ADMIN — Medication 1 ENEMA: at 03:06

## 2023-06-12 RX ADMIN — METHOCARBAMOL 500 MG: 500 TABLET ORAL at 12:06

## 2023-06-12 RX ADMIN — OXYCODONE HYDROCHLORIDE 10 MG: 10 TABLET ORAL at 03:06

## 2023-06-12 RX ADMIN — CEFAZOLIN 2 G: 2 INJECTION, POWDER, FOR SOLUTION INTRAMUSCULAR; INTRAVENOUS at 05:06

## 2023-06-12 RX ADMIN — LACTULOSE 200 G: 10 SOLUTION ORAL at 09:06

## 2023-06-12 NOTE — SUBJECTIVE & OBJECTIVE
"Interval HPI:   Overnight events: POD 5. BG trending down with hypoglycemia noted on labs this morning. Plans for d/c to LTAC today. Diet diabetic [facility]; 2000 Calorie; Thin    Eatin%  Nausea: No  Hypoglycemia and intervention: Yes, on labs 61 this morning  Fever: No  TPN and/or TF: No  If yes, type of TF/TPN and rate: n/a    /64 (BP Location: Left arm, Patient Position: Lying)   Pulse 91   Temp 97 °F (36.1 °C) (Oral)   Resp 18   Ht 5' 2.99" (1.6 m)   Wt 72.9 kg (160 lb 11.5 oz)   SpO2 96%   Breastfeeding No   BMI 28.48 kg/m²     Labs Reviewed and Include    Recent Labs   Lab 23  05   GLU 61*   CALCIUM 9.0   ALBUMIN 2.2*   PROT 5.9*   *   K 5.6*   CO2 25   CL 98   BUN 31*   CREATININE 1.2   ALKPHOS 96   ALT <5*   AST 17   BILITOT 0.2     Lab Results   Component Value Date    WBC 9.76 2023    HGB 9.1 (L) 2023    HCT 27.9 (L) 2023    MCV 88 2023     2023     No results for input(s): TSH, FREET4 in the last 168 hours.  Lab Results   Component Value Date    HGBA1C 10.2 (H) 2023       Nutritional status:   Body mass index is 28.48 kg/m².  Lab Results   Component Value Date    ALBUMIN 2.2 (L) 2023    ALBUMIN 2.3 (L) 2023    ALBUMIN 2.4 (L) 06/10/2023     Lab Results   Component Value Date    PREALBUMIN 11 (L) 2023       Estimated Creatinine Clearance: 40.5 mL/min (based on SCr of 1.2 mg/dL).    Accu-Checks  Recent Labs     23  1617 23  2044 06/10/23  0601 06/10/23  1056 06/10/23  1633 06/10/23  2227 23  0701 23  1133 23  1521   POCTGLUCOSE 233* 281* 294* 296* 278* 130* 130* 149* 164*       Current Medications and/or Treatments Impacting Glycemic Control  Immunotherapy:    Immunosuppressants       None          Steroids:   Hormones (From admission, onward)      Start     Stop Route Frequency Ordered    23  melatonin tablet 9 mg         -- Oral Nightly 230      "     Pressors:    Autonomic Drugs (From admission, onward)      None          Hyperglycemia/Diabetes Medications:   Antihyperglycemics (From admission, onward)      Start     Stop Route Frequency Ordered    06/13/23 0900  insulin detemir U-100 (Levemir) pen 20 Units         -- SubQ Daily 06/12/23 1038    06/12/23 1130  insulin aspart U-100 pen 8 Units         -- SubQ 3 times daily with meals 06/12/23 1037    06/05/23 2345  insulin aspart U-100 pen 0-5 Units         -- SubQ Before meals & nightly PRN 06/05/23 5356

## 2023-06-12 NOTE — PT/OT/SLP PROGRESS
"Occupational Therapy   Co-Treatment with Physical Therapy    Name: Anastasiia Badillo  MRN: [id number]  Admitting Diagnosis:  Closed displaced pilon fracture of left tibia  6 Days Post-Op  Procedure(s):  ORIF, FRACTURE, PILON, LEFT  APPLICATION, EXTERNAL FIXATION DEVICE, LEFT ANKLE, Vasquez   Recommendations:     Discharge Recommendations: rehabilitation facility  Discharge Equipment Recommendations:  walker, rolling, bedside commode  Barriers to discharge:   (increased skilled assistance required)    Assessment:     Anastasiia Badillo is a 73 y.o. female with a medical diagnosis of Closed displaced pilon fracture of left tibia.  She presents with the following performance deficits affecting function are weakness, impaired endurance, impaired self care skills, impaired functional mobility, gait instability, impaired balance, pain, impaired cardiopulmonary response to activity, decreased safety awareness, decreased lower extremity function, decreased ROM, orthopedic precautions, decreased coordination, decreased upper extremity function.     Patient agreeable to OT session and tolerated fair. Patient is progressing fair with OT intervention but continues to have difficulty with sequencing and proper body mechanics affecting safety and compliance with NW[employer]ing/.  Patient would benefit from continued OT services to address deficits and progress towards goals. Continue OT POC.    Rehab Prognosis:  Good; patient would benefit from acute skilled OT services to address these deficits and reach maximum level of function.       Plan:     Patient to be seen 4 x/week to address the above listed problems via self-care/home management, therapeutic activities, therapeutic exercises  Plan of Care Expires: 07/05/23  Plan of Care Reviewed with: patient    Subjective     Chief Complaint: c/o pain and fatigue  Patient/Family Comments/goals: "I haven't had a BM."  Pain/Comfort:  Pain Rating 1:  (unrated)  Location - Side 1: Left  Location " - Orientation 1: distal  Location 1: leg  Pain Addressed 1: Reposition, Distraction, Cessation of Activity    Objective:     Communicated with: nurse ro prior to session.  Patient found HOB elevated with PureWick, external pacer, telemetry, SCD upon OT entry to room.    General Precautions: Standard, fall    Orthopedic Precautions:LLE non weight bearing  Braces: N/A (Ex fix on LLE)  Respiratory Status: Room air     Occupational Performance:     Bed Mobility:    Patient completed Scooting/Bridging with moderate assistance  Patient completed Supine to Sit with moderate assistance and trunk management and L LE support  Patient completed Sit to Supine with moderate assistance and trunk support and BLE management      Functional Mobility/Transfers:  Patient completed Sit <> Stand Transfer with moderate assistance and of 2 persons  with  rolling walker and max cues    2nd trial from EOB: Mod(A) of 2 persons using RW  Functional Mobility: deferred 2/2 episode of urinary incontinence during standing requiring perineal hygiene    Activities of Daily Living:  Upper Body Dressing: minimum assistance don gown posteriorly seated EOB  Toileting: total assistance (pt received soiled requiring perineal hygiene and brief change); performed in standing      [facility] 6 Click ADL: 13    Treatment & Education:  Patient educated on OT POC, goals, and current progress  Reviewed orthopedic precautions  Patient required CGA to Max(A) for static sitting balance 2/2 weak core and poor endurance; spontaneous posterior lean throughout EOB activities  Addressed all patient questions/concerns within [facility] scope of practice.   Co-treatment performed with PTA due to patient's complexity and benefit of 2 skilled therapists to facilitate functional and safe occupational performance, accommodate patient's activity tolerance, and maximize patient's participation in therapy.     Patient left HOB elevated with all lines intact, call button in reach, and  nurse notified    GOALS:   Multidisciplinary Problems       Occupational Therapy Goals          Problem: Occupational Therapy    Goal Priority Disciplines Outcome Interventions   Occupational Therapy Goal     OT, PT/OT Ongoing, Progressing    Description: Goals to be met by: 7/5/23     Patient will increase functional independence with ADLs by performing:    UE Dressing with Set-up Assistance.  LE Dressing with Minimal Assistance.  Grooming while EOB with Set-up Assistance.  Toileting from bedside commode with Moderate Assistance for hygiene and clothing management.   Supine to sit with Minimal Assistance.  Toilet transfer to bedside commode with Moderate Assistance.                         Time Tracking:     OT Date of Treatment: 06/12/23  OT Start Time: 1016  OT Stop Time: 1045  OT Total Time (min): 29 min    Billable Minutes:Self Care/Home Management 20  Therapeutic Activity 9    OT/DARION: DARION     Number of DARION visits since last OT visit: 3    6/12/2023

## 2023-06-12 NOTE — DISCHARGE SUMMARY
DISCHARGE SUMMARY  Hospital Medicine    [place] [facility]    Patient Name: Anastasiia Badillo  YOB: 1950    Admit Date: 6/5/2023    Discharge Date: 06/14/2023    Discharge Attending Physician: Sonia Kim MD     Principal Diagnoses:  Active Hospital Problems    Diagnosis  POA    *Closed displaced pilon fracture of left tibia [S82.872A]  Unknown    Chronic ulcer of great toe of left foot with fat layer exposed [L97.522]  Yes    Acute osteomyelitis of left calcaneus [M86.172]  Yes    Chronic ulcer of left heel with fat layer exposed [L97.422]  Yes    Tobacco use [Z72.0]  Yes    Type 2 diabetes mellitus with hyperglycemia, with long-term current use of insulin [E11.65, Z79.4]  Not Applicable    Neuropathy [G62.9]  Yes    Mass of upper outer quadrant of right breast [N63.11]  Yes    PAD (peripheral artery disease) [I73.9]  Yes    Type 2 diabetes mellitus with foot ulcer, with long-term current use of insulin [E11.621, L97.509, Z79.4]  Not Applicable    Essential (primary) hypertension [I10]  Yes      Resolved Hospital Problems   No resolved problems to display.       Discharged Condition:  improved    Interval history- discharged delayed due to severe constipation that required multiple enemas, linzess, and relistor on top of lactulose, miralax, senna previously to promote a BM overnight finally for her. Endo recs going down to 18 U of levemir and cont 6 U with meals now. Continue wound care and antibiotic therapies as prev planned. Labs yesterday were errors and repeat in PM and today were similar to previous lab results. She reports doing well and nursing to bring her coffee as they left it off her tray this morning for breakfast. Plan for LTAC today with close ortho f/u and podiatry and ID.    Temp:  [97 °F (36.1 °C)-99.2 °F (37.3 °C)]   Pulse:  [78-92]   Resp:  [16-18]   BP: (121-142)/(57-76)   SpO2:  [93 %-98 %]      Physical Exam  Vitals and nursing note reviewed.   Constitutional:        General: She is awake. She is not in acute distress.     Appearance: Normal appearance. She is normal weight. She is not ill-appearing.   Cardiovascular:      Rate and Rhythm: Normal rate and regular rhythm.      Heart sounds: Normal heart sounds. No murmur heard.    No friction rub. No gallop.   Pulmonary:      Effort: Pulmonary effort is normal. No respiratory distress.      Breath sounds: Normal breath sounds. No wheezing.   Chest:   Breasts:     Right: Mass (Small mobile hard lump in upper outer quadrant) present.   Abdominal:      General: Abdomen is flat. Bowel sounds are normal. There is no distension.      Palpations: Abdomen is soft.      Tenderness: There is no abdominal tenderness.   Musculoskeletal:      Right lower leg: No edema.      Left lower leg: No edema.      Comments: Ex fix in place to left ankle. Surgical bandages in place to left ankle and are clean and dry and intact. Sutures covered by gauze   Skin:     General: Skin is warm.      Findings: No erythema.      Comments: See photos in Epic of wounds to left foot   Neurological:      Mental Status: She is alert and oriented to person, place, and time.   Psychiatric:         Mood and Affect: Mood normal.         Behavior: Behavior normal. Behavior is cooperative.         Thought Content: Thought content normal.         Judgment: Judgment normal.        HOSPITAL COURSE:      Initial Presentation:     Alycia Badillo is a 73-year-old woman with a past medical history of peripheral artery disease s/p revascularization and type 2 diabetes mellitus c/b neuropathy and chronic lower extremity wounds who presents as a transfer from [facility] for distal fractures of the tibia and fibula.  Of note, patient was hospitalized at [facility] in April 2023 for severe peripheral artery disease and underwent revascularization.  The patient was discharged and was sent to inpatient rehabilitation.  She said that she developed a pressure ulcer on  her left heel.  She also has a wound on the plantar surface of her left great toe.  She has been following with a podiatrist in addition to a Wound Care clinic.  The patient said that she left inpatient rehabilitation and went to live at home with her son.  She says that she normally ambulates with a walker.  Approximately three days ago, she said she had just taken her nightly melatonin and was getting ready to fall asleep in bed.  However, she noted that she needed to use the bathroom and went to reach for the walker next to the bed.  She said that the walker was not locked and so she slid and fell to the ground hitting her left lower extremity.  She said that she began having pain and swelling to her left lower extremity.  The pain became so unbearable that she lost the ability to ambulate.  This prompted her to seek care in the emergency department at [facility].  Imaging at the [facility] reveals a comminuted and angulated distal tibia and fibular fracture.  It was also noted that she was having purulent drainage from her lower extremity wounds.  Of note, per chart review, the patient had a left heel bone biopsy performed by her podiatrist on April 27th.  Patient's bone biopsy grew Staphylococcus aureus.  She was seen in the Wound Care Clinic and placed on levofloxacin and doxycycline but did not appear to be followed by the Infectious Disease Service.     In the emergency department, the patient was noted to have stable vital signs upon arrival.  Per report, the patient received IV antibiotics at [facility] prior to transfer.  She was evaluated by Orthopedic Surgery at [facility] who is planning surgical intervention on June 6th.  Patient was admitted to Hospital Medicine for further management.    Course of Principle Problem for Admission:    Closed displaced pilon fracture of left tibia with routine healing s/p ORIF of pilon and ex fix on 6/6/2023  Presenting  after fall and found to have comminuted and angulated closed left distal tibia and fibular fractures. Evaluated by Orthopedic Surgery service, she was reduced and placed into a short leg splint. She will require operative intervention for this injury.   - Patient taken to OR on 6/6/2023 and underwent ORIF of pilon fracture as well as ex fix to stabilize fractures with Dr. Gilbert Mahmood.  - Patient post-op to be non weight bearing to left lower extremity x 3 months from surgery date on 6/6.   - Ortho to remove ex fix I 4-6 weeks but if construct fails then would have to convert to ring fixator as per Ortho.   - PT/OT consulted for gait training/transfer training and restoration of ADL's post-op.  - Routine pin site care to ex fix as per Ortho.  - Ice and elevate left lower extremity to help with swelling.   - Pain management with scheduled Tylenol 1000 mg po every 8 hours + Robaxin 500 mg po 4 times daily + Lyrica 75 mg po BID with Oxycodone  mg po every 4 hours prn for breakthrough pain.   - Orthopedics managing surgical site to left ankle area.   - Continue Aspirin 81 mg po BID for 12 weeks for DVT prophylaxis as per Ortho. End date aug 30th then back to daily for PVD  - Surgical bandages to remain in place to left ankle and to remian in place until Ortho clinic follow-up.            Acute osteomyelitis of left calcaneus  Plan for 6 weeks ancef and likely  2 weeks cipro for soft tissue infection and osteo with ancef for longer period. Final ID recs with end date for cipro of 6/22 and end date of ancef for 7/17 with weekly cbc, crp and cmp faxed to ID clinic on discharge.  picc placed        Tobacco use  Patient declined nicotine patch. Still actively smoking at home. Patient counseled extensively on smoking cessation as has open wounds, severe PAD and now post-op from ankle fracture surgery and nicotine to impair wound healing and risk re-occlusion of peripheral  arteries in lower limbs.      Chronic  ulcer of left heel with fat layer exposed  Chronic ulcer of great toe of left foot with fat layer exposed  Acute osteomyelitis of left calcaneus  Follows with Podiatry clinic. Previously had heel ulcer bone biopsy which grew Staphylococcus aureus and Staph epidermis and was prescribed Doxycycline and Levofloxacin from Wound Care clinic but not followed by Infectious Disease clinic. Afebrile, without leukocytosis. CRP elevated 127 on admit.   - Podiatry consulted, appreciate recommendations and continue wound care as per Podiatry to left heel and left first toe ulcers. Wound care recs per podiatry note on 6/7 in place.   - Infectious Disease consulted, appreciate recommendations and they recommend 6 weeks of IV Cefazolin 2 grams every 8 hours to treat left heel osteomyelitis. picc placed 6/8, and cont cefazolin for 6 weeks and cipro x 2 weeks to cover pseudomonas  Needs podiatry f/u 2 weeks once out of ltac per recs     Mass of upper outer quadrant of right breast  Noted on admit. Patient in past month as noted a lump in right breast in upper outer quadrant. Patient does have palpable lump in right upper outer quadrant on exam. Patient has know bilateral breast implants in place. Patient's primary care physician is aware and had ordered to have outpatient ultrasound of breasts and mammogram done and have not been done yet. Patient to follow-up as outpatient with her primary care physician for further work-up and evaluation.         PAD (peripheral artery disease)  Known severe peripheral arterial disease. Vascular Surgery during last hospitalization and had revascularization of left lower extremity in 4/2023 with percutaneous transluminal angioplasty of left popliteal artery and percutaneous transluminal angioplasty of the entire length of the left posterior tibial artery.  Patient on Aspirin Plavix and high dose Lipitor to treat and will continue in hospital.   Needs BID asa for 12 weeks until aug 30 then back to  daily  Will resume plavix 6/9  Ortho ok with it     Essential (primary) hypertension  Chronic condition. Continue home Lisinopril 5 mg po daily to treat. Monitor vital signs every 4 hours. Target BP < 140/90.         Type 2 diabetes mellitus with foot ulcer, with long-term current use of insulin  Type 2 diabetes mellitus with hyperglycemia, with long-term current use of insulin  Endocrine consulted to assist in management. Patient's FSGs are uncontrolled due to hyperglycemia on current medication regimen.     Last A1c reviewed-         Lab Results   Component Value Date     HGBA1C 10.2 (H) 06/05/2023      Most recent fingerstick glucose reviewed-          Recent Labs   Lab 06/07/23  1629 06/08/23  0029 06/08/23  0643 06/08/23  1132   POCTGLUCOSE 98 125* 208* 201*      Current correctional scale  Low      Maintain anti-hyperglycemic dose as follows as per Endocrine recs:            Antihyperglycemics (From admission, onward)     Start     Stop Route Frequency Ordered     06/09/23 0900   insulin detemir U-100 (Levemir) pen 22 Units         -- SubQ Daily 06/08/23 0932     06/07/23 1645   insulin aspart U-100 pen 6 Units         -- SubQ 3 times daily with meals 06/07/23 1453     06/05/23 2345   insulin aspart U-100 pen 0-5 Units         -- SubQ Before meals & nightly PRN 06/05/23 2246          Target blood sugar 140-180 in hospital.  Diabetic diet.  Monitor blood sugars 4 times daily with meals and at bedtime.   Appreciate endo assisting as she reports was not controlled at home and likely contributor to poor wound healing of her LLE prior to admit  18 U levemir and 6 U of novolog plan on discharge      Constipation  -required further stay before LTAC to get a BM despite being on miralax, senna and PRN lactulose, suppository and enema  -escalated to relistor and linzess and 1 further enema with ultimately successful BM prior to dicsharge  -cont daily miralax and senna daily without missing doses in order to prevent this  further while at [facility]              Consults: ortho, ID, endo, podiatry    Last CBC/BMP:    CBC/Anemia Labs: Coags:    Recent Labs   Lab 06/10/23  0605 06/11/23  0707 06/12/23 0522   WBC 5.95 9.02 9.76   HGB 9.4* 9.5* 9.1*   HCT 29.1* 30.3* 27.9*    364 358   MCV 88 92 88   RDW 13.5 13.5 13.6    Recent Labs   Lab 06/05/23 1958   INR 1.0        Chemistries:   Recent Labs   Lab 06/05/23 1957 06/06/23  0411 06/06/23  0411 06/07/23  0524 06/08/23  0504 06/10/23  0605 06/11/23  0707 06/12/23  0522   NA  --  131*   < > 133*   < > 131* 132* 130*   K  --  5.4*   < > 4.7   < > 5.1 5.2* 5.6*   CL  --  93*   < > 101   < > 99 99 98   CO2  --  27   < > 26   < > 24 24 25   BUN  --  26*   < > 29*   < > 28* 27* 31*   CREATININE  --  1.2   < > 1.2   < > 1.0 0.9 1.2   CALCIUM  --  9.7   < > 9.3   < > 9.4 9.3 9.0   PROT  --   --   --   --   --  6.0 6.0 5.9*   BILITOT  --   --   --   --   --  0.3 0.2 0.2   ALKPHOS  --   --   --   --   --  106 93 96   ALT  --   --   --   --   --  <5* <5* <5*   AST  --   --   --   --   --  12 12 17   MG 1.8 1.8  --  1.8  --   --   --   --    PHOS 4.5  --   --   --   --   --   --   --     < > = values in this interval not displayed.          Special Treatments/Procedures:   Procedure(s) (LRB):  ORIF, FRACTURE, PILON, LEFT (Left)  APPLICATION, EXTERNAL FIXATION DEVICE, LEFT ANKLE, Vasquez (Left)     Disposition: Long Term Care      Future Scheduled Appointments:  Future Appointments   Date Time Provider Department Center   6/14/2023 11:00 AM Ranjit Pascal NP [facility]   6/20/2023 11:15 AM Ranjit Pascal NP [facility]   6/28/2023 10:00 AM NOM XRORTHO2 485 LB LIMIT [facility]   6/28/2023 10:30 AM Gilbert Mahmood MD [facility]   7/27/2023  1:00 PM Leonardo Johns NP [facility] ID [facility]   8/30/2023  3:20 PM Raji Parkinson MD [facility]           Discharge Medication List:         Medication List        START taking  these medications      aspirin 81 MG EC tablet  Commonly known as: ECOTRIN  Take 1 tablet (81 mg total) by mouth 2 (two) times a day. - end date august 30, 2023  Replaces: aspirin 81 MG Chew     ciprofloxacin HCl 750 MG tablet  Commonly known as: CIPRO  Take 1 tablet (750 mg total) by mouth every 12 (twelve) hours. End date 6/22/23     dextrose 5 % in water (D5W) 5 % PgBk 50 mL with ceFAZolin 2 gram SolR 2 g  Inject 2 g into the vein every 8 (eight) hours. End date 7/17/23     famotidine 20 MG tablet  Commonly known as: PEPCID  Take 1 tablet (20 mg total) by mouth 2 (two) times daily as needed (Heartburn).     melatonin 3 mg tablet  Commonly known as: MELATIN  Take 3 tablets (9 mg total) by mouth nightly.  Replaces: melatonin 10 mg Cap     methocarbamoL 500 MG Tab  Commonly known as: ROBAXIN  Take 1 tablet (500 mg total) by mouth 4 (four) times daily.     ondansetron 4 MG Tbdl  Commonly known as: ZOFRAN-ODT  Take 1 tablet (4 mg total) by mouth every 8 (eight) hours as needed (nausea).     * oxyCODONE 5 MG immediate release tablet  Commonly known as: ROXICODONE  Take 1 tablet (5 mg total) by mouth every 4 (four) hours as needed (pain scale 4-6).     * oxyCODONE 10 mg Tab immediate release tablet  Commonly known as: ROXICODONE  Take 1 tablet (10 mg total) by mouth every 4 (four) hours as needed (pain sclae 7-10).     polyethylene glycol 17 gram Pwpk  Commonly known as: GLYCOLAX  Take 17 g by mouth once daily.     pregabalin 75 MG capsule  Commonly known as: LYRICA  Take 1 capsule (75 mg total) by mouth 2 (two) times daily.     senna-docusate 8.6-50 mg 8.6-50 mg per tablet  Commonly known as: PERICOLACE  Take 1 tablet by mouth once daily.     vitamin D 1000 units Tab  Commonly known as: VITAMIN D3  Take 1 tablet (1,000 Units total) by mouth once daily.           * This list has 2 medication(s) that are the same as other medications prescribed for you. Read the directions carefully, and ask your doctor or other care  "provider to review them with you.                CHANGE how you take these medications      acetaminophen 500 MG tablet  Commonly known as: TYLENOL  Take 2 tablets (1,000 mg total) by mouth every 8 (eight) hours.  What changed:   when to take this  reasons to take this     * insulin aspart U-100 100 unit/mL (3 mL) Inpn pen  Commonly known as: NovoLOG  Inject 0-5 Units into the skin before meals and at bedtime as needed (Hyperglycemia).  What changed: additional instructions     * insulin aspart U-100 100 unit/mL (3 mL) Inpn pen  Commonly known as: NovoLOG  Inject 6 Units into the skin 3 (three) times daily with meals.  What changed: You were already taking a medication with the same name, and this prescription was added. Make sure you understand how and when to take each.           * This list has 2 medication(s) that are the same as other medications prescribed for you. Read the directions carefully, and ask your doctor or other care provider to review them with you.                CONTINUE taking these medications      atorvastatin 80 MG tablet  Commonly known as: LIPITOR  Take 1 tablet (80 mg total) by mouth every evening.     blood sugar diagnostic Strp  To check BG two times daily, to use with insurance preferred meter     blood-glucose meter kit  To check BG two times daily, to use with insurance preferred meter     clopidogreL 75 mg tablet  Commonly known as: PLAVIX  Take 1 tablet (75 mg total) by mouth once daily.     insulin detemir U-100 (Levemir) 100 unit/mL (3 mL) Inpn pen  Inject 18 Units into the skin once daily.     lancets Misc  To check BG two times daily, to use with insurance preferred meter     lisinopriL 5 MG tablet  Commonly known as: PRINIVIL,ZESTRIL  Take 1 tablet (5 mg total) by mouth once daily.     pen needle, diabetic 31 gauge x 3/16" Ndle  Commonly known as: PEN NEEDLE  1 application by Misc.(Non-Drug; Combo Route) route 2 (two) times a day.            STOP taking these medications  "     aspirin 81 MG Chew  Replaced by: aspirin 81 MG EC tablet     doxycycline 100 MG Cap  Commonly known as: VIBRAMYCIN     HYDROcodone-acetaminophen 5-325 mg per tablet  Commonly known as: NORCO     levoFLOXacin 750 MG tablet  Commonly known as: LEVAQUIN     melatonin 10 mg Cap  Replaced by: melatonin 3 mg tablet     multivit-min-FA-lycopen-lutein 0.4 mg-300 mcg- 250 mcg Tab     nicotine 14 mg/24 hr  Commonly known as: NICODERM CQ     traMADoL 50 mg tablet  Commonly known as: ULTRAM               Where to Get Your Medications        You can get these medications from any pharmacy    Bring a paper prescription for each of these medications  oxyCODONE 10 mg Tab immediate release tablet  oxyCODONE 5 MG immediate release tablet  pregabalin 75 MG capsule       Information about where to get these medications is not yet available    Ask your nurse or doctor about these medications  acetaminophen 500 MG tablet  aspirin 81 MG EC tablet  ciprofloxacin HCl 750 MG tablet  dextrose 5 % in water (D5W) 5 % PgBk 50 mL with ceFAZolin 2 gram SolR 2 g  famotidine 20 MG tablet  insulin aspart U-100 100 unit/mL (3 mL) Inpn pen  insulin aspart U-100 100 unit/mL (3 mL) Inpn pen  insulin detemir U-100 (Levemir) 100 unit/mL (3 mL) Inpn pen  lisinopriL 5 MG tablet  melatonin 3 mg tablet  methocarbamoL 500 MG Tab  ondansetron 4 MG Tbdl  polyethylene glycol 17 gram Pwpk  senna-docusate 8.6-50 mg 8.6-50 mg per tablet  vitamin D 1000 units Tab         Patient Instructions:  Discharge Procedure Orders   Reason for not Ordering Smoking Cessation Referral     Order Specific Question Answer Comments   Reason for not ordering: Not medically appropriate at this time      Reason for not Prescribing Nicotine Replacement     Order Specific Question Answer Comments   Reason for not Prescribing: Not medically appropriate at this time        At the time of discharge patient was told to take all medications as prescribed, to keep all followup appointments,  and to call their primary care physician or return to the emergency room if they have any worsening or concerning symptoms.    I spent 35 minutes preparing the patients discharge    Signing Physician:  Sonia Kim MD

## 2023-06-12 NOTE — ASSESSMENT & PLAN NOTE
Endocrinology consulted for BG management.   BG goal 140-180    -Decrease Levemir (Insulin Detemir) to 20 units daily (20% dose decrease; fasting BG below goal) Hypoglycemia on labs this morning   -Decrease Novolog (Insulin Aspart) to 8 units TIDWM and prn for BG excursions LDC SSI (150/50) Basal/Prandial match   - BG checks AC/HS  - Hypoglycemia protocol in place  - If blood glucose greater than 300, please ask patient not to eat food or drink anything other than water until correctional insulin has brought it back below 250    ** Please notify Endocrine for any change and/or advance in diet**  ** Please call Endocrine for any BG related issues **    Discharge Planning:   Recommend d/c on the above inpatient regimen.

## 2023-06-12 NOTE — PT/OT/SLP PROGRESS
Physical Therapy co-Treatment    Patient Name:  Anastasiia Badillo   MRN:  [id number]    Recommendations:     Discharge Recommendations: rehabilitation facility  Discharge Equipment Recommendations: walker, rolling, bedside commode  Barriers to discharge: Decreased caregiver support    Assessment:     Anastasiia Badillo is a 73 y.o. female admitted with a medical diagnosis of Closed displaced pilon fracture of left tibia with routine healing.  She presents with the following impairments/functional limitations: weakness, impaired endurance, decreased lower extremity function, impaired functional mobility, pain, impaired balance .pt not following commands at times during treatment session while performing OOB/EOB mobility. Pt with frequent episode of POST lean and posterior LOB while sitting EOB requiring maxA to maintain. Pt would continue to benefit from skilled PT to address overall functional mobility, to return to functional baseline and goals. Goals remain appropriate    Rehab Prognosis: Good; patient would benefit from acute skilled PT services to address these deficits and reach maximum level of function.    Recent Surgery: Procedure(s) (LRB):  ORIF, FRACTURE, PILON, LEFT (Left)  APPLICATION, EXTERNAL FIXATION DEVICE, LEFT ANKLE, Vasquez (Left) 6 Days Post-Op    Plan:     During this hospitalization, patient to be seen 5 x/week to address the identified rehab impairments via gait training, therapeutic activities, therapeutic exercises, neuromuscular re-education and progress toward the following goals:    Plan of Care Expires:  07/07/23    Subjective     Chief Complaint: I can't do this    Pain/Comfort:  Pain Rating 1:  (unrated)  Location - Side 1: Left  Location - Orientation 1: distal  Location 1: leg  Pain Addressed 1: Reposition, Distraction, Cessation of Activity      Objective:     Communicated with RN prior to session.  Patient found HOB elevated with  (external fixator; SCD; PureWick; telemetry) upon PT  entry to room.     General Precautions: Standard, fall  Orthopedic Precautions: LLE non weight bearing  Braces:  (Ex fix on LLE)  Respiratory Status: Room air     Functional Mobility:  Bed Mobility:     Scooting: moderate assistance  Supine to Sit: moderate assistance  Sit to Supine: moderate assistance  Sitting EOB balance with CGA to maxA due to frequent posterior LOB and lean while sitting EOB  Transfers:     Sit to Stand:  moderate assistance and of 2 persons with rolling walker x 2 trials with vcs for hand placement and physical (A) for maintaining NWB L LE   Pericare performed during standing trial. Pt with another episode of urinary incontinence during standing    AM-PAC 6 CLICK MOBILITY  Turning over in bed (including adjusting bedclothes, sheets and blankets)?: 3  Sitting down on and standing up from a chair with arms (e.g., wheelchair, bedside commode, etc.): 2  Moving from lying on back to sitting on the side of the bed?: 2  Moving to and from a bed to a chair (including a wheelchair)?: 2  Need to walk in hospital room?: 1  Climbing 3-5 steps with a railing?: 1  Basic Mobility Total Score: 11       Treatment & Education:  Therapist provided instruction and educated of  patient on progress, safety,d/c,PT POC,   proper body mechanics, energy conservation, and fall prevention strategies during tasks listed above, on the effects of prolonged immobility and the importance of performing OOB activity and exercises to promote healing and reduce recovery time     Updated white board with appropriate PT mobility information for medical team notification      Donned an extra gown  Bedside table in front of patient and area set up for function, convenience, and safety. RN aware of patient's mobility needs and status. Questions/concerns addressed within PTA scope of practice; patient  with no further questions. Time was provided for active listening, discussion of health disposition, and discussion of safe discharge.  Pt?verbalized?agreement .  Co-tx performed with OT due to pt's need for 2 skilled therapists to ensure pt and staff safety and to accommodate for pt's activity tolerance        Patient left HOB elevated with all lines intact, call button in reach, and nsg notified  GOALS:   Multidisciplinary Problems       Physical Therapy Goals          Problem: Physical Therapy    Goal Priority Disciplines Outcome Goal Variances Interventions   Physical Therapy Goal     PT, PT/OT Ongoing, Progressing     Description: Goals to be met by: 23     Patient will increase functional independence with mobility by performin. Supine to sit with Minimal Assistance  2. Sit to stand transfer with Minimal Assistance  3. Bed to chair transfer with Minimal Assistance using Rolling Walker.  4. Gait  x 100 feet with Minimal Assistance using Rolling Walker.   5. Pt will demonstrate understanding and adhere to NWB of L LE for all functional mobility.                         Time Tracking:     PT Received On: 23  PT Start Time: 1013     PT Stop Time: 1052  PT Total Time (min): 39 min     Billable Minutes: Therapeutic Activity 23 and Neuromuscular Re-education 15    Treatment Type: Treatment  PT/PTA: PTA     Number of PTA visits since last PT visit: 3     2023

## 2023-06-12 NOTE — PLAN OF CARE
06/12/23 0928   Post-Acute Status   Post-Acute Authorization Placement   Post-Acute Placement Status   (Awaiting P2P)   Discharge Plan   Discharge Plan A Long-term acute care facility (LTAC)   Discharge Plan B Skilled Nursing Facility     JOHN sent message to Mickie with PHN, checking on status for P2P request for LTAC placement. Pt originally accepted to [facility]. SW to follow.    9:39 AM  PHN reports authorization obtain for LTAC placement. JOHN informed Roslyn #[phone]. SW to follow.    3:39 PM  Patient to admit to [facility], pending BM. Nurse to call report to #[phone].    Nurse please call on-call ( Pamela Orona  - [phone] or Fabian Mcclain -[phone]), after pt has BM.       Hayde Jennings, DEIRDRE  Case Management   [facility]   Ext. [phone]

## 2023-06-12 NOTE — PLAN OF CARE
CHW met with patient/family at bedside. Patient experience rounding completed and reviewed the following.     Do you know your discharge plan? Yes     If yes, what is the plan? SNF    Have you discussed your needs and preferences with your SW/CM? Yes   Assigned SW/CM notified of any patient/family needs or concerns.

## 2023-06-12 NOTE — PROGRESS NOTES
[facility]  Endocrinology  Progress Note    Admit Date: 6/5/2023     Reason for Consult: Management of T2DM, Hyperglycemia     Surgical Procedure and Date: 6/6/23 Open reduction internal fixation left pilon fracture with fixation of tibia and fibula  External fixator placement left lower extremity    Diabetes diagnosis year: 2018    Home Diabetes Medications:  Levemir 18 units nightly. Novolog is ordered SSI LDC; but patient states she does not use.     How often checking glucose at home?  1-3 times per week  (checks at random times)  BG readings on regimen: 200's  Hypoglycemia on the regimen?  No  Missed doses on regimen?  No    Diabetes Complications include:     Hyperglycemia and Diabetic peripheral neuropathy     Complicating diabetes co morbidities:   HTN; HLD;      HPI:   Patient is a 73 y.o. female with a diagnosis of peripheral artery disease s/p revascularization and type 2 diabetes mellitus c/b neuropathy and chronic lower extremity wounds who presents as a transfer from [facility] for distal fractures of the tibia and fibula following a fall.  It was also noted that she had purulent drainage from her lower extremity wounds.  Of note, per chart review, the patient had a left heel bone biopsy performed by her podiatrist on April 27th.  Patient's bone biopsy grew Staphylococcus aureus.  She was seen in the [facility] and placed on levofloxacin and doxycycline but did not appear to be followed by the Infectious Disease Service.  She was evaluated by Orthopedic Surgery at [facility] who is planning surgical intervention on June 6th.  Patient was admitted to Hospital Medicine for further management.Endocrinology consulted for BG/ DM management.     Lab Results   Component Value Date    HGBA1C 10.2 (H) 06/05/2023               Interval HPI:   Overnight events: POD 5. BG trending down with hypoglycemia noted on labs this morning. Plans for d/c to LTAC today. Diet diabetic  Ms. Carlson is a patient of Dr. Arnold and was last seen in [facility] 6/15/2017.      Subjective:   Patient ID:  Ángela Carlson is a 67 y.o. female who presents for follow-up of Hypertension  .   Problem List:  HTN since her mid 30s  Hypercholesterolemia  CACS 438  BILL - on CPAP    HPI:     Ms. Carlson is a 68yo female with CACs 438, HTN, and HLD here for follow up. Today she feels well. Ms. Carlson denies chest pain with exertion or at rest, palpitations, SOB, FUENTES, dizziness, syncope, leg edema, claudication, PND, or orthopnea. She is currently taking ASA 81 and atorvastatin 40mg for CAD management. LDL 71 on 9/21/2017. She is also taking losartan 100mg daily. Creatinine 0.8. Hepatic transaminases are within normal limits. BPs at home average 120s/80s. She uses a CPAP nightly. She does not regularly exercise but is interested in starting an exercise program. Carotid artery ultrasound, abdominal u/s, RACHEL were done yesterday via [facility]. Results will available in 2 weeks.    Recent Cardiac Tests:    Coronary CT (6/15/2017):  Agatston score 438.  Standard interpretation for a calcium score of 438 is as follows:   There is extensive atherosclerotic plaque burden. There is a high likelihood (>90%) of at least 1 significant coronary stenosis. Implications for cardiovascular risk are high. Recommendations include instituting very aggressive risk factor modification. Consider exercise or pharmacologic stress imaging to evaluate for inducible ischemia. When calculated for a female aged 66, a score of 438 places the patient at the greater than 90 percentile for significant cardiovascular risk.  2. Nodule measuring 0.5 cm within the superior segment of the right lower lobe. In a low risk patient, no further follow up is advised. If the patient has risk factors for lung cancer, consider followup chest CT in 12 months (6/2018), per Fleischner society guidelines.  3. Ascending aorta at the upper limits of normal in  "[facility];  Calorie; Thin    Eatin%  Nausea: No  Hypoglycemia and intervention: Yes, on labs 61 this morning  Fever: No  TPN and/or TF: No  If yes, type of TF/TPN and rate: n/a    /64 (BP Location: Left arm, Patient Position: Lying)   Pulse 91   Temp 97 °F (36.1 °C) (Oral)   Resp 18   Ht 5' 2.99" (1.6 m)   Wt 72.9 kg (160 lb 11.5 oz)   SpO2 96%   Breastfeeding No   BMI 28.48 kg/m²     Labs Reviewed and Include    Recent Labs   Lab 23  05   GLU 61*   CALCIUM 9.0   ALBUMIN 2.2*   PROT 5.9*   *   K 5.6*   CO2 25   CL 98   BUN 31*   CREATININE 1.2   ALKPHOS 96   ALT <5*   AST 17   BILITOT 0.2     Lab Results   Component Value Date    WBC 9.76 2023    HGB 9.1 (L) 2023    HCT 27.9 (L) 2023    MCV 88 2023     2023     No results for input(s): TSH, FREET4 in the last 168 hours.  Lab Results   Component Value Date    HGBA1C 10.2 (H) 2023       Nutritional status:   Body mass index is 28.48 kg/m².  Lab Results   Component Value Date    ALBUMIN 2.2 (L) 2023    ALBUMIN 2.3 (L) 2023    ALBUMIN 2.4 (L) 06/10/2023     Lab Results   Component Value Date    PREALBUMIN 11 (L) 2023       Estimated Creatinine Clearance: 40.5 mL/min (based on SCr of 1.2 mg/dL).    Accu-Checks  Recent Labs     23  1617 23  2044 06/10/23  0601 06/10/23  1056 06/10/23  1633 06/10/23  2227 23  0701 23  1133 23  1521   POCTGLUCOSE 233* 281* 294* 296* 278* 130* 130* 149* 164*       Current Medications and/or Treatments Impacting Glycemic Control  Immunotherapy:    Immunosuppressants       None          Steroids:   Hormones (From admission, onward)      Start     Stop Route Frequency Ordered    23 2245  melatonin tablet 9 mg         -- Oral Nightly 23 223          Pressors:    Autonomic Drugs (From admission, onward)      None          Hyperglycemia/Diabetes Medications:   Antihyperglycemics (From admission, " "size.    EKG (5/31/2017):  Normal sinus rhythm  Possible Left atrial enlargement  Septal infarct (cited on or before 18-OCT-2016)  Abnormal ECG  When compared with ECG of 18-OCT-2016 11:24,  No significant change was found    Current Outpatient Prescriptions   Medication Sig    5-hydroxytryptophan, 5-HTP, (NATROL 5-HTP) 50 mg Cap Take by mouth once daily. Pt taking one pill once a day.    aspirin (ECOTRIN) 81 MG EC tablet Take 1 tablet (81 mg total) by mouth once daily.    atorvastatin (LIPITOR) 40 MG tablet Take 1 tablet (40 mg total) by mouth once daily.    cholecalciferol, vitamin D3, (VITAMIN D3) 2,000 unit Cap Take 1 capsule by mouth once daily.    losartan (COZAAR) 100 MG tablet Take 1 tablet (100 mg total) by mouth once daily.    multivitamin (THERAGRAN) per tablet Take 1 tablet by mouth once daily.    turmeric root extract 500 mg Cap Take by mouth once daily. Pt taking one pill once a day.     No current facility-administered medications for this visit.      Review of Systems   Constitution: Positive for malaise/fatigue.   Eyes: Negative for blurred vision.   Cardiovascular: Negative for chest pain, claudication, dyspnea on exertion, irregular heartbeat, leg swelling, orthopnea, palpitations, paroxysmal nocturnal dyspnea and syncope.   Respiratory: Negative for shortness of breath.    Hematologic/Lymphatic: Negative for bleeding problem.   Skin: Negative for rash.   Musculoskeletal: Positive for arthritis and back pain. Negative for myalgias.   Gastrointestinal: Negative for abdominal pain, constipation, diarrhea and nausea.   Genitourinary: Negative for hematuria.   Neurological: Negative for headaches, loss of balance and numbness.   Psychiatric/Behavioral: Negative for altered mental status.   Allergic/Immunologic: Negative for persistent infections.     Objective:        /80   Pulse 60   Ht 5' 7" (1.702 m)   Wt 78.4 kg (172 lb 15.2 oz)   BMI 27.09 kg/m²     Physical Exam "   Constitutional: She is oriented to person, place, and time. She appears well-developed and well-nourished.   HENT:   Head: Normocephalic.   Nose: Nose normal.   Eyes: Pupils are equal, round, and reactive to light.   Neck: No JVD present. Carotid bruit is not present.   Cardiovascular: Normal rate, regular rhythm, S1 normal and S2 normal.  Exam reveals no gallop.    No murmur heard.  Pulses:       Carotid pulses are 2+ on the right side, and 2+ on the left side.       Radial pulses are 2+ on the right side, and 2+ on the left side.        Dorsalis pedis pulses are 2+ on the right side, and 2+ on the left side.   Pulmonary/Chest: Breath sounds normal. No respiratory distress.   Abdominal: Soft. Bowel sounds are normal. She exhibits no distension. There is no tenderness.   Musculoskeletal: Normal range of motion. She exhibits no edema.   Neurological: She is alert and oriented to person, place, and time.   Skin: Skin is warm and dry. No erythema.   Psychiatric: She has a normal mood and affect. Her speech is normal and behavior is normal.   Nursing note and vitals reviewed.    Lab Results   Component Value Date     09/21/2017    K 5.0 09/21/2017    MG 2.0 07/02/2014     09/21/2017    CO2 28 09/21/2017    BUN 13 09/21/2017    CREATININE 0.8 09/21/2017     09/21/2017    HGBA1C 6.0 01/18/2017    AST 22 09/21/2017    ALT 20 09/21/2017    ALBUMIN 3.8 01/18/2017    PROT 7.0 01/18/2017    BILITOT 0.2 01/18/2017    WBC 7.60 10/18/2016    HGB 13.8 10/18/2016    HCT 41.3 10/18/2016    MCV 89 10/18/2016     10/18/2016    TSH 2.622 01/18/2017    CHOL 138 09/21/2017    HDL 49 09/21/2017    LDLCALC 71.2 09/21/2017    TRIG 89 09/21/2017         Test(s) Reviewed  I have reviewed the following in detail:  [] Stress test   [] Angiography   [] Echocardiogram   [x] Labs   [] Other:       Assessment:         1. Coronary artery disease due to calcified coronary lesion. CACS 438. On ASA and atorvastatin 40mg.  onward)      Start     Stop Route Frequency Ordered    06/13/23 0900  insulin detemir U-100 (Levemir) pen 20 Units         -- SubQ Daily 06/12/23 1038    06/12/23 1130  insulin aspart U-100 pen 8 Units         -- SubQ 3 times daily with meals 06/12/23 1037    06/05/23 2345  insulin aspart U-100 pen 0-5 Units         -- SubQ Before meals & nightly PRN 06/05/23 2246            ASSESSMENT and PLAN    Endocrine  Type 2 diabetes mellitus with foot ulcer, with long-term current use of insulin  Endocrinology consulted for BG management.   BG goal 140-180    -Decrease Levemir (Insulin Detemir) to 20 units daily (20% dose decrease; fasting BG below goal) Hypoglycemia on labs this morning   -Decrease Novolog (Insulin Aspart) to 8 units TIDWM and prn for BG excursions LDC SSI (150/50) Basal/Prandial match   - BG checks AC/HS  - Hypoglycemia protocol in place  - If blood glucose greater than 300, please ask patient not to eat food or drink anything other than water until correctional insulin has brought it back below 250    ** Please notify Endocrine for any change and/or advance in diet**  ** Please call Endocrine for any BG related issues **    Discharge Planning:   Recommend d/c on the above inpatient regimen.       Orthopedic  * Closed displaced pilon fracture of left tibia with routine healing s/p ORIF of pilon and ex fix on 6/6/2023  Managed per primary.   Optimize BG control to improve wound healing        Chronic ulcer of left heel with fat layer exposed  Optimize BG control.   Optimize BG control to improve wound healing          Hannah Chappell NP  Endocrinology  [facility] - Surgery   Patient has no angina, SOB or other signs of ischemia.       2. Essential hypertension. Controlled on current medications per patient report of home measures.     3. Pure hypercholesterolemia. LDL 71.2 on atorvastatin 40mg.     4. BILL (obstructive sleep apnea). Compliant with nightly CPAP use.     Plan:     Ángela was seen today for hypertension.    Diagnoses and all orders for this visit:    Coronary artery disease due to calcified coronary lesion  -     Lipid panel; Future; Expected date: 03/26/2018  -     ALT (SGPT); Future; Expected date: 03/26/2018  -     AST (SGOT); Future; Expected date: 03/26/2018  -     Basic metabolic panel; Future; Expected date: 03/26/2018    Essential hypertension    Pure hypercholesterolemia  -     Lipid panel; Future; Expected date: 03/26/2018  -     ALT (SGPT); Future; Expected date: 03/26/2018  -     AST (SGOT); Future; Expected date: 03/26/2018    BILL (obstructive sleep apnea)    Continue current medications.  Patient has been encouraged to exercise a minimum of 30 minutes daily, 5 times per week. Target speed: walking 3-3.5 miles per hour on track  Patient advised to consume a low salt, heart healthy diet.     Return in about 6 months (around 3/26/2018).    ------------------------------------------------------------------    OLAMIDE De La Rosa, NP-C  Consult Cardiology

## 2023-06-12 NOTE — PROGRESS NOTES
[facility] - [facility]  Orthopedics  Progress Note    Patient Name: Anastasiia Badillo  MRN: [id number]  Admission Date: 6/5/2023  Hospital Length of Stay: 7 days  Attending Provider: Sonia Kim MD  Primary Care Provider: aRji Parkinson MD  Follow-up For: Procedure(s) (LRB):  ORIF, FRACTURE, PILON, LEFT (Left)  APPLICATION, EXTERNAL FIXATION DEVICE, LEFT ANKLE, [place] (Left)    Post-Operative Day: 5 Days Post-Op  Subjective:     Principal Problem:Closed displaced pilon fracture of left tibia with routine healing    Principal Orthopedic Problem: Same    Interval History: Afebrile, VSS, NAEON.  Pain has been reportedly well controlled.  Pin sites clean/dry.      Review of patient's allergies indicates:  No Known Allergies    Current Facility-Administered Medications   Medication    acetaminophen tablet 1,000 mg    aspirin EC tablet 81 mg    atorvastatin tablet 80 mg    bisacodyL suppository 10 mg    ceFAZolin 2 g in dextrose 5 % in water (D5W) 5 % 50 mL IVPB (MB+)    ciprofloxacin HCl tablet 750 mg    clopidogreL tablet 75 mg    dextrose 10% bolus 125 mL 125 mL    dextrose 10% bolus 250 mL 250 mL    famotidine tablet 20 mg    glucagon (human recombinant) injection 1 mg    insulin aspart U-100 pen 0-5 Units    insulin aspart U-100 pen 10 Units    insulin detemir U-100 (Levemir) pen 26 Units    lisinopriL tablet 5 mg    melatonin tablet 9 mg    methocarbamoL tablet 500 mg    mupirocin 2 % ointment    naloxone 0.4 mg/mL injection 0.02 mg    ondansetron disintegrating tablet 4 mg    oxyCODONE immediate release tablet 5 mg    oxyCODONE immediate release tablet Tab 10 mg    polyethylene glycol packet 17 g    pregabalin capsule 75 mg    senna-docusate 8.6-50 mg per tablet 1 tablet    simethicone chewable tablet 80 mg    sodium chloride 0.9% flush 10 mL    sodium chloride 0.9% flush 10 mL    And    sodium chloride 0.9% flush 10 mL    vitamin D 1000 units tablet 1,000 Units     Objective:     Vital Signs (Most  "Recent):  Temp: 98.1 °F (36.7 °C) (06/12/23 0508)  Pulse: 82 (06/12/23 0508)  Resp: 18 (06/12/23 0508)  BP: 124/62 (06/12/23 0508)  SpO2: 95 % (06/12/23 0508) Vital Signs (24h Range):  Temp:  [97.9 °F (36.6 °C)-99.2 °F (37.3 °C)] 98.1 °F (36.7 °C)  Pulse:  [82-92] 82  Resp:  [16-18] 18  SpO2:  [93 %-99 %] 95 %  BP: (121-135)/(57-63) 124/62     Weight: 72.9 kg (160 lb 11.5 oz)  Height: 5' 2.99" (160 cm)  Body mass index is 28.48 kg/m².      Intake/Output Summary (Last 24 hours) at 6/12/2023 0707  Last data filed at 6/11/2023 2108  Gross per 24 hour   Intake --   Output 600 ml   Net -600 ml       Physical Exam  Ortho/SPM Exam  Gen: NAD, sitting comfortably in bed  CV: regular rate  Resp: non-labored respirations     LLE:  Heel and great toe wounds are dressed   Ex fix in place, pin sites clean and dry w no signs of infection  Surgical incision dressing c/d/i   NVI to baseline- neuropathic to calf, can wiggles toes, foot and toes pink wwp    Significant Labs: A1C:   Recent Labs   Lab 02/17/23  1415 04/21/23  0429 06/05/23 1957   HGBA1C 8.9* 8.2* 10.2*     CBC:   Recent Labs   Lab 06/11/23  0707 06/12/23 0522   WBC 9.02 9.76   HGB 9.5* 9.1*   HCT 30.3* 27.9*    358     CMP:   Recent Labs   Lab 06/11/23  0707 06/12/23 0522   * 130*   K 5.2* 5.6*   CL 99 98   CO2 24 25   * 61*   BUN 27* 31*   CREATININE 0.9 1.2   CALCIUM 9.3 9.0   PROT 6.0 5.9*   ALBUMIN 2.3* 2.2*   BILITOT 0.2 0.2   ALKPHOS 93 96   AST 12 17   ALT <5* <5*   ANIONGAP 9 7*     POCT Glucose:   Recent Labs   Lab 06/11/23  0701 06/11/23  1133 06/11/23  1521   POCTGLUCOSE 130* 149* 164*       All pertinent labs within the past 24 hours have been reviewed.      Significant Imaging: I have reviewed and interpreted all pertinent imaging results/findings.   Assessment/Plan:     * Closed displaced pilon fracture of left tibia with routine healing s/p ORIF of pilon and ex fix on 6/6/2023  73F w uncontrolled DM (A1C 10) w neuropathy to calves, " PAD (sp recent LLE revascularization 5/2023), and chronic left heela nd great toe wounds who fell 6/4 and has left pilon and left distal fibula fracture. She is closed and neurovascularly intact to baseline. She has diabetic foot wounds over her plantar heel and great toe.     -SP operative fixation of left pilon and fibula, left ankle external fixator placement 6/6/23 w Dr Mahmood    Multimodal pain regimen  NWB LLE in ex fix, elevate  BID pin site care  DVT ppx w ASA 81 bid, SCD RLE all times while in bed   PT/OT daily  ID following for abx recs for left foot wounds/possible osteo   Ancef IV per ID for previous left heel bone cultures +staph, cipro for left great toe wound +pseudmonas  Wound care for left heel/great toe wounds per podiatry   BG management per Endocrine   FU 2 wks post op w ortho    Dispo: possible dc to LTAC today         TIFFANY Shelby MD  Orthopedics  [facility] - Surgery

## 2023-06-13 ENCOUNTER — TELEPHONE (OUTPATIENT)
Dept: ORTHOPEDICS | Facility: CLINIC | Age: 73
End: 2023-06-13
Payer: MEDICARE

## 2023-06-13 PROBLEM — R89.9 ABNORMAL LABORATORY TEST: Status: ACTIVE | Noted: 2023-06-13

## 2023-06-13 PROBLEM — K59.00 CONSTIPATION: Status: ACTIVE | Noted: 2023-06-13

## 2023-06-13 LAB
ALBUMIN SERPL BCP-MCNC: 1.6 G/DL (ref 3.5–5.2)
ALBUMIN SERPL BCP-MCNC: 2 G/DL (ref 3.5–5.2)
ALP SERPL-CCNC: 81 U/L (ref 55–135)
ALP SERPL-CCNC: 92 U/L (ref 55–135)
ALT SERPL W/O P-5'-P-CCNC: <5 U/L (ref 10–44)
ALT SERPL W/O P-5'-P-CCNC: <5 U/L (ref 10–44)
ANION GAP SERPL CALC-SCNC: 18 MMOL/L (ref 8–16)
ANION GAP SERPL CALC-SCNC: 6 MMOL/L (ref 8–16)
ANISOCYTOSIS BLD QL SMEAR: SLIGHT
ANISOCYTOSIS BLD QL SMEAR: SLIGHT
AST SERPL-CCNC: 13 U/L (ref 10–40)
AST SERPL-CCNC: 15 U/L (ref 10–40)
BASOPHILS # BLD AUTO: 0.06 K/UL (ref 0–0.2)
BASOPHILS NFR BLD: 0.8 % (ref 0–1.9)
BASOPHILS NFR BLD: 2 % (ref 0–1.9)
BILIRUB SERPL-MCNC: 0.2 MG/DL (ref 0.1–1)
BILIRUB SERPL-MCNC: 0.2 MG/DL (ref 0.1–1)
BUN SERPL-MCNC: 29 MG/DL (ref 8–23)
BUN SERPL-MCNC: 33 MG/DL (ref 8–23)
BURR CELLS BLD QL SMEAR: ABNORMAL
CALCIUM SERPL-MCNC: 7 MG/DL (ref 8.7–10.5)
CALCIUM SERPL-MCNC: 8.6 MG/DL (ref 8.7–10.5)
CHLORIDE SERPL-SCNC: 84 MMOL/L (ref 95–110)
CHLORIDE SERPL-SCNC: 97 MMOL/L (ref 95–110)
CO2 SERPL-SCNC: 19 MMOL/L (ref 23–29)
CO2 SERPL-SCNC: 24 MMOL/L (ref 23–29)
CREAT SERPL-MCNC: 1.2 MG/DL (ref 0.5–1.4)
CREAT SERPL-MCNC: 3.7 MG/DL (ref 0.5–1.4)
DIFFERENTIAL METHOD: ABNORMAL
DIFFERENTIAL METHOD: ABNORMAL
EOSINOPHIL # BLD AUTO: 0.1 K/UL (ref 0–0.5)
EOSINOPHIL NFR BLD: 1.1 % (ref 0–8)
EOSINOPHIL NFR BLD: 2 % (ref 0–8)
ERYTHROCYTE [DISTWIDTH] IN BLOOD BY AUTOMATED COUNT: 13.7 % (ref 11.5–14.5)
ERYTHROCYTE [DISTWIDTH] IN BLOOD BY AUTOMATED COUNT: 13.7 % (ref 11.5–14.5)
EST. GFR  (NO RACE VARIABLE): 12.5 ML/MIN/1.73 M^2
EST. GFR  (NO RACE VARIABLE): 48.1 ML/MIN/1.73 M^2
GIANT PLATELETS BLD QL SMEAR: PRESENT
GLUCOSE SERPL-MCNC: 207 MG/DL (ref 70–110)
GLUCOSE SERPL-MCNC: 770 MG/DL (ref 70–110)
HCT VFR BLD AUTO: 24.4 % (ref 37–48.5)
HCT VFR BLD AUTO: 26.4 % (ref 37–48.5)
HGB BLD-MCNC: 7.6 G/DL (ref 12–16)
HGB BLD-MCNC: 8.6 G/DL (ref 12–16)
IMM GRANULOCYTES # BLD AUTO: 0.32 K/UL (ref 0–0.04)
IMM GRANULOCYTES # BLD AUTO: ABNORMAL K/UL (ref 0–0.04)
IMM GRANULOCYTES NFR BLD AUTO: 4.5 % (ref 0–0.5)
IMM GRANULOCYTES NFR BLD AUTO: ABNORMAL % (ref 0–0.5)
LYMPHOCYTES # BLD AUTO: 1.3 K/UL (ref 1–4.8)
LYMPHOCYTES NFR BLD: 13 % (ref 18–48)
LYMPHOCYTES NFR BLD: 18.4 % (ref 18–48)
MCH RBC QN AUTO: 28.6 PG (ref 27–31)
MCH RBC QN AUTO: 29 PG (ref 27–31)
MCHC RBC AUTO-ENTMCNC: 31.1 G/DL (ref 32–36)
MCHC RBC AUTO-ENTMCNC: 32.6 G/DL (ref 32–36)
MCV RBC AUTO: 89 FL (ref 82–98)
MCV RBC AUTO: 92 FL (ref 82–98)
METAMYELOCYTES NFR BLD MANUAL: 1 %
MONOCYTES # BLD AUTO: 1.3 K/UL (ref 0.3–1)
MONOCYTES NFR BLD: 14 % (ref 4–15)
MONOCYTES NFR BLD: 17.6 % (ref 4–15)
NEUTROPHILS # BLD AUTO: 4.1 K/UL (ref 1.8–7.7)
NEUTROPHILS NFR BLD: 57.6 % (ref 38–73)
NEUTROPHILS NFR BLD: 66 % (ref 38–73)
NEUTS BAND NFR BLD MANUAL: 2 %
NRBC BLD-RTO: 0 /100 WBC
NRBC BLD-RTO: 0 /100 WBC
PLATELET # BLD AUTO: 319 K/UL (ref 150–450)
PLATELET # BLD AUTO: 322 K/UL (ref 150–450)
PLATELET BLD QL SMEAR: ABNORMAL
PLATELET BLD QL SMEAR: ABNORMAL
PMV BLD AUTO: 11.2 FL (ref 9.2–12.9)
PMV BLD AUTO: 12 FL (ref 9.2–12.9)
POCT GLUCOSE: 226 MG/DL (ref 70–110)
POCT GLUCOSE: 287 MG/DL (ref 70–110)
POCT GLUCOSE: 311 MG/DL (ref 70–110)
POCT GLUCOSE: 96 MG/DL (ref 70–110)
POIKILOCYTOSIS BLD QL SMEAR: SLIGHT
POTASSIUM SERPL-SCNC: 4.5 MMOL/L (ref 3.5–5.1)
POTASSIUM SERPL-SCNC: 5 MMOL/L (ref 3.5–5.1)
PROT SERPL-MCNC: 4.7 G/DL (ref 6–8.4)
PROT SERPL-MCNC: 5.5 G/DL (ref 6–8.4)
RBC # BLD AUTO: 2.66 M/UL (ref 4–5.4)
RBC # BLD AUTO: 2.97 M/UL (ref 4–5.4)
SODIUM SERPL-SCNC: 121 MMOL/L (ref 136–145)
SODIUM SERPL-SCNC: 127 MMOL/L (ref 136–145)
WBC # BLD AUTO: 6.57 K/UL (ref 3.9–12.7)
WBC # BLD AUTO: 7.12 K/UL (ref 3.9–12.7)

## 2023-06-13 PROCEDURE — 25000003 PHARM REV CODE 250: Performed by: HOSPITALIST

## 2023-06-13 PROCEDURE — 63600175 PHARM REV CODE 636 W HCPCS: Performed by: HOSPITALIST

## 2023-06-13 PROCEDURE — 99232 SBSQ HOSP IP/OBS MODERATE 35: CPT | Mod: ,,, | Performed by: NURSE PRACTITIONER

## 2023-06-13 PROCEDURE — 85007 BL SMEAR W/DIFF WBC COUNT: CPT | Performed by: HOSPITALIST

## 2023-06-13 PROCEDURE — 25000003 PHARM REV CODE 250: Performed by: STUDENT IN AN ORGANIZED HEALTH CARE EDUCATION/TRAINING PROGRAM

## 2023-06-13 PROCEDURE — A4216 STERILE WATER/SALINE, 10 ML: HCPCS | Performed by: HOSPITALIST

## 2023-06-13 PROCEDURE — 25000003 PHARM REV CODE 250: Performed by: REGISTERED NURSE

## 2023-06-13 PROCEDURE — 94761 N-INVAS EAR/PLS OXIMETRY MLT: CPT

## 2023-06-13 PROCEDURE — 85027 COMPLETE CBC AUTOMATED: CPT | Performed by: HOSPITALIST

## 2023-06-13 PROCEDURE — 85025 COMPLETE CBC W/AUTO DIFF WBC: CPT | Performed by: HOSPITALIST

## 2023-06-13 PROCEDURE — 99233 PR SUBSEQUENT HOSPITAL CARE,LEVL III: ICD-10-PCS | Mod: ,,, | Performed by: HOSPITALIST

## 2023-06-13 PROCEDURE — 80053 COMPREHEN METABOLIC PANEL: CPT | Performed by: HOSPITALIST

## 2023-06-13 PROCEDURE — 99233 SBSQ HOSP IP/OBS HIGH 50: CPT | Mod: ,,, | Performed by: HOSPITALIST

## 2023-06-13 PROCEDURE — 63600175 PHARM REV CODE 636 W HCPCS: Performed by: STUDENT IN AN ORGANIZED HEALTH CARE EDUCATION/TRAINING PROGRAM

## 2023-06-13 PROCEDURE — 99232 PR SUBSEQUENT HOSPITAL CARE,LEVL II: ICD-10-PCS | Mod: ,,, | Performed by: NURSE PRACTITIONER

## 2023-06-13 PROCEDURE — 25000003 PHARM REV CODE 250: Performed by: INTERNAL MEDICINE

## 2023-06-13 PROCEDURE — 11000001 HC ACUTE MED/SURG PRIVATE ROOM

## 2023-06-13 PROCEDURE — 25000003 PHARM REV CODE 250

## 2023-06-13 RX ORDER — CIPROFLOXACIN 750 MG/1
750 TABLET, FILM COATED ORAL EVERY 24 HOURS
Status: DISCONTINUED | OUTPATIENT
Start: 2023-06-14 | End: 2023-06-14

## 2023-06-13 RX ORDER — CHOLECALCIFEROL (VITAMIN D3) 25 MCG
1000 TABLET ORAL DAILY
Start: 2023-06-14

## 2023-06-13 RX ORDER — LACTULOSE 10 G/15ML
20 SOLUTION ORAL 2 TIMES DAILY
Status: DISCONTINUED | OUTPATIENT
Start: 2023-06-13 | End: 2023-06-14 | Stop reason: HOSPADM

## 2023-06-13 RX ORDER — INSULIN ASPART 100 [IU]/ML
6 INJECTION, SOLUTION INTRAVENOUS; SUBCUTANEOUS
Refills: 0
Start: 2023-06-13 | End: 2024-06-12

## 2023-06-13 RX ORDER — INSULIN ASPART 100 [IU]/ML
6 INJECTION, SOLUTION INTRAVENOUS; SUBCUTANEOUS
Status: DISCONTINUED | OUTPATIENT
Start: 2023-06-13 | End: 2023-06-14 | Stop reason: HOSPADM

## 2023-06-13 RX ADMIN — METHOCARBAMOL 500 MG: 500 TABLET ORAL at 01:06

## 2023-06-13 RX ADMIN — INSULIN ASPART 6 UNITS: 100 INJECTION, SOLUTION INTRAVENOUS; SUBCUTANEOUS at 11:06

## 2023-06-13 RX ADMIN — ACETAMINOPHEN 1000 MG: 500 TABLET ORAL at 10:06

## 2023-06-13 RX ADMIN — CIPROFLOXACIN 750 MG: 750 TABLET, FILM COATED ORAL at 08:06

## 2023-06-13 RX ADMIN — ASPIRIN 81 MG: 81 TABLET, COATED ORAL at 10:06

## 2023-06-13 RX ADMIN — LISINOPRIL 5 MG: 5 TABLET ORAL at 08:06

## 2023-06-13 RX ADMIN — METHYLNALTREXONE BROMIDE 5.4 MG: 12 INJECTION, SOLUTION SUBCUTANEOUS at 09:06

## 2023-06-13 RX ADMIN — METHOCARBAMOL 500 MG: 500 TABLET ORAL at 10:06

## 2023-06-13 RX ADMIN — Medication 10 ML: at 05:06

## 2023-06-13 RX ADMIN — INSULIN ASPART 4 UNITS: 100 INJECTION, SOLUTION INTRAVENOUS; SUBCUTANEOUS at 08:06

## 2023-06-13 RX ADMIN — Medication 10 ML: at 07:06

## 2023-06-13 RX ADMIN — METHOCARBAMOL 500 MG: 500 TABLET ORAL at 04:06

## 2023-06-13 RX ADMIN — ATORVASTATIN CALCIUM 80 MG: 40 TABLET, FILM COATED ORAL at 10:06

## 2023-06-13 RX ADMIN — CEFAZOLIN 2 G: 2 INJECTION, POWDER, FOR SOLUTION INTRAMUSCULAR; INTRAVENOUS at 06:06

## 2023-06-13 RX ADMIN — LACTULOSE 20 G: 20 SOLUTION ORAL at 08:06

## 2023-06-13 RX ADMIN — LINACLOTIDE 145 MCG: 145 CAPSULE, GELATIN COATED ORAL at 07:06

## 2023-06-13 RX ADMIN — Medication 9 MG: at 10:06

## 2023-06-13 RX ADMIN — ASPIRIN 81 MG: 81 TABLET, COATED ORAL at 08:06

## 2023-06-13 RX ADMIN — Medication 10 ML: at 11:06

## 2023-06-13 RX ADMIN — INSULIN ASPART 6 UNITS: 100 INJECTION, SOLUTION INTRAVENOUS; SUBCUTANEOUS at 04:06

## 2023-06-13 RX ADMIN — Medication 10 ML: at 12:06

## 2023-06-13 RX ADMIN — CEFAZOLIN 2 G: 2 INJECTION, POWDER, FOR SOLUTION INTRAMUSCULAR; INTRAVENOUS at 05:06

## 2023-06-13 RX ADMIN — POLYETHYLENE GLYCOL 3350 17 G: 17 POWDER, FOR SOLUTION ORAL at 08:06

## 2023-06-13 RX ADMIN — ACETAMINOPHEN 1000 MG: 500 TABLET ORAL at 01:06

## 2023-06-13 RX ADMIN — CLOPIDOGREL BISULFATE 75 MG: 75 TABLET ORAL at 08:06

## 2023-06-13 RX ADMIN — PREGABALIN 75 MG: 75 CAPSULE ORAL at 10:06

## 2023-06-13 RX ADMIN — INSULIN ASPART 2 UNITS: 100 INJECTION, SOLUTION INTRAVENOUS; SUBCUTANEOUS at 11:06

## 2023-06-13 RX ADMIN — METHOCARBAMOL 500 MG: 500 TABLET ORAL at 08:06

## 2023-06-13 RX ADMIN — CHOLECALCIFEROL TAB 25 MCG (1000 UNIT) 1000 UNITS: 25 TAB at 08:06

## 2023-06-13 RX ADMIN — ACETAMINOPHEN 1000 MG: 500 TABLET ORAL at 05:06

## 2023-06-13 RX ADMIN — SENNOSIDES AND DOCUSATE SODIUM 1 TABLET: 50; 8.6 TABLET ORAL at 08:06

## 2023-06-13 RX ADMIN — INSULIN ASPART 8 UNITS: 100 INJECTION, SOLUTION INTRAVENOUS; SUBCUTANEOUS at 08:06

## 2023-06-13 RX ADMIN — Medication 1 ENEMA: at 11:06

## 2023-06-13 RX ADMIN — LACTULOSE 20 G: 20 SOLUTION ORAL at 10:06

## 2023-06-13 RX ADMIN — OXYCODONE HYDROCHLORIDE 10 MG: 10 TABLET ORAL at 04:06

## 2023-06-13 NOTE — SUBJECTIVE & OBJECTIVE
Interval History: her labs are completely off and not at her normal at all- CBC and CMP with glucose 700s on CMP and 200s on accuchecks, and Cr in 3's but baseline 1's, Na 120s (from glucose), and hg 7s but normally in 9's. Suspect lab errors going on and repeat pending stat. She has not had a BM despite enema/suppository and oral supplements. Oral lactulose this AM and relistor for opiate induced constipation given this AM. KUB not showing obstruction or impaction and shows stool burden in colon and nearing rectum. Will f/u repeat labs and BM promotion after relistor. Ltac acceptance pending BM and ensure these labs are not true values    Review of Systems   Constitutional:  Negative for fever.   Respiratory:  Negative for cough and shortness of breath.    Cardiovascular:  Negative for chest pain and leg swelling.   Gastrointestinal:  Negative for abdominal pain, constipation, diarrhea and nausea.   Musculoskeletal:  Positive for arthralgias (Left ankle).   Skin:  Positive for wound (Left foot).   Neurological:  Negative for dizziness and light-headedness.   Psychiatric/Behavioral:  Negative for agitation and confusion.    Objective:     Vital Signs (Most Recent):  Temp: 97.7 °F (36.5 °C) (06/07/23 1523)  Pulse: 83 (06/07/23 1531)  Resp: 18 (06/07/23 1523)  BP: 123/59 (06/07/23 1523)  SpO2: 99 % (06/07/23 1523) on room air Vital Signs (24h Range):  Temp:  [97 °F (36.1 °C)-99.4 °F (37.4 °C)] 98.7 °F (37.1 °C)  Pulse:  [78-98] 86  Resp:  [16-18] 16  SpO2:  [92 %-96 %] 93 %  BP: ()/(53-64) 124/60     Weight: 72.9 kg (160 lb 11.5 oz)  Body mass index is 28.48 kg/m².    Intake/Output Summary (Last 24 hours) at 6/13/2023 1036  Last data filed at 6/12/2023 1700  Gross per 24 hour   Intake 638.55 ml   Output --   Net 638.55 ml           Physical Exam  Vitals and nursing note reviewed.   Constitutional:       General: She is awake. She is not in acute distress.     Appearance: Normal appearance. She is normal weight.  She is not ill-appearing.   Cardiovascular:      Rate and Rhythm: Normal rate and regular rhythm.      Heart sounds: Normal heart sounds. No murmur heard.    No friction rub. No gallop.   Pulmonary:      Effort: Pulmonary effort is normal. No respiratory distress.      Breath sounds: Normal breath sounds. No wheezing.   Chest:   Breasts:     Right: Mass (Small mobile hard lump in upper outer quadrant) present.   Abdominal:      General: Abdomen is flat. Bowel sounds are normal. There is no distension.      Palpations: Abdomen is soft.      Tenderness: There is no abdominal tenderness.   Musculoskeletal:      Right lower leg: No edema.      Left lower leg: No edema.      Comments: Ex fix in place to left ankle. Surgical bandages in place to left ankle and are clean and dry and intact. Sutures covered by gauze   Skin:     General: Skin is warm.      Findings: No erythema.      Comments: See photos in Epic of wounds to left foot   Neurological:      Mental Status: She is alert and oriented to person, place, and time.   Psychiatric:         Mood and Affect: Mood normal.         Behavior: Behavior normal. Behavior is cooperative.         Thought Content: Thought content normal.         Judgment: Judgment normal.           Significant Labs: A1C:   Recent Labs   Lab 02/17/23  1415 04/21/23  0429 06/05/23  1957   HGBA1C 8.9* 8.2* 10.2*       CBC:   Recent Labs   Lab 06/12/23  0522 06/13/23  0607   WBC 9.76 6.57   HGB 9.1* 7.6*   HCT 27.9* 24.4*    322       CMP:   Recent Labs   Lab 06/12/23  0522 06/13/23  0607   * 121*   K 5.6* 4.5   CL 98 84*   CO2 25 19*   GLU 61* 770*   BUN 31* 29*   CREATININE 1.2 3.7*   CALCIUM 9.0 7.0*   PROT 5.9* 4.7*   ALBUMIN 2.2* 1.6*   BILITOT 0.2 0.2   ALKPHOS 96 81   AST 17 13   ALT <5* <5*   ANIONGAP 7* 18*       POCT Glucose:   Recent Labs   Lab 06/12/23  1747 06/13/23  0637 06/13/23  0805   POCTGLUCOSE 141* 287* 311*         Significant Imaging: I have reviewed all pertinent  imaging results/findings within the past 24 hours.

## 2023-06-13 NOTE — ASSESSMENT & PLAN NOTE
Endocrinology consulted for BG management.   BG goal 140-180    -Decrease Levemir (Insulin Detemir) to 20 units daily (20% dose decrease; fasting BG below goal)   -Decrease Novolog (Insulin Aspart) to 6 units TIDWM and prn for BG excursions LDC SSI (150/50) Basal/Prandial match   - BG checks AC/HS  - Hypoglycemia protocol in place  - If blood glucose greater than 300, please ask patient not to eat food or drink anything other than water until correctional insulin has brought it back below 250    ** Please notify Endocrine for any change and/or advance in diet**  ** Please call Endocrine for any BG related issues **    Discharge Planning:   Recommend d/c on the above inpatient regimen.

## 2023-06-13 NOTE — ASSESSMENT & PLAN NOTE
Type 2 diabetes mellitus with hyperglycemia, with long-term current use of insulin  Endocrine consulted to assist in management. Patient's FSGs are uncontrolled due to hyperglycemia on current medication regimen.    Last A1c reviewed-   Lab Results   Component Value Date    HGBA1C 10.2 (H) 06/05/2023     Most recent fingerstick glucose reviewed-   Recent Labs   Lab 06/12/23  1610 06/12/23  1747 06/13/23  0637 06/13/23  0805   POCTGLUCOSE 55* 141* 287* 311*     Current correctional scale  Low     Maintain anti-hyperglycemic dose as follows as per Endocrine recs:  Antihyperglycemics (From admission, onward)    Start     Stop Route Frequency Ordered    06/13/23 1130  insulin aspart U-100 pen 6 Units         -- SubQ 3 times daily with meals 06/13/23 0855    06/13/23 0900  insulin detemir U-100 (Levemir) pen 20 Units         -- SubQ Daily 06/12/23 1038    06/05/23 2345  insulin aspart U-100 pen 0-5 Units         -- SubQ Before meals & nightly PRN 06/05/23 2246        Target blood sugar 140-180 in hospital.  Diabetic diet.  Monitor blood sugars 4 times daily with meals and at bedtime.   Appreciate endo assisting as she reports was not controlled at home and likely contributor to poor wound healing of her LLE prior to admit  Insulin dec on 6/12 for am hypoglycemia  Glucose in 200s today but cmp was very abnormal in 700s, suspect cmp is incorrect. Repeat pending

## 2023-06-13 NOTE — PROGRESS NOTES
[facility] - [facility]  Orthopedics  Progress Note    Patient Name: Anastasiia Badillo  MRN: [id number]  Admission Date: 6/5/2023  Hospital Length of Stay: 8 days  Attending Provider: Sonia Kim MD  Primary Care Provider: Raji Parkinson MD  Follow-up For: Procedure(s) (LRB):  ORIF, FRACTURE, PILON, LEFT (Left)  APPLICATION, EXTERNAL FIXATION DEVICE, LEFT ANKLE, [place] (Left)    Post-Operative Day: 5 Days Post-Op  Subjective:     Principal Problem:Closed displaced pilon fracture of left tibia    Principal Orthopedic Problem: Same    Interval History: Afebrile, VSS, NAEON.  Pain has been reportedly well controlled.  Pin sites clean/dry.      Review of patient's allergies indicates:  No Known Allergies    Current Facility-Administered Medications   Medication    acetaminophen tablet 1,000 mg    aspirin EC tablet 81 mg    atorvastatin tablet 80 mg    ceFAZolin 2 g in dextrose 5 % in water (D5W) 5 % 50 mL IVPB (MB+)    [START ON 6/14/2023] ciprofloxacin HCl tablet 750 mg    clopidogreL tablet 75 mg    dextrose 10% bolus 125 mL 125 mL    dextrose 10% bolus 250 mL 250 mL    famotidine tablet 20 mg    glucagon (human recombinant) injection 1 mg    insulin aspart U-100 pen 0-5 Units    insulin aspart U-100 pen 6 Units    insulin detemir U-100 (Levemir) pen 20 Units    lactulose 20 gram/30 mL solution Soln 20 g    lisinopriL tablet 5 mg    melatonin tablet 9 mg    methocarbamoL tablet 500 mg    mupirocin 2 % ointment    naloxone 0.4 mg/mL injection 0.02 mg    NON FORMULARY MEDICATION    ondansetron disintegrating tablet 4 mg    oxyCODONE immediate release tablet 5 mg    oxyCODONE immediate release tablet Tab 10 mg    polyethylene glycol packet 17 g    pregabalin capsule 75 mg    senna-docusate 8.6-50 mg per tablet 1 tablet    simethicone chewable tablet 80 mg    sodium chloride 0.9% flush 10 mL    sodium chloride 0.9% flush 10 mL    And    sodium chloride 0.9% flush 10 mL    vitamin D 1000 units tablet 1,000 Units  "    Objective:     Vital Signs (Most Recent):  Temp: 98.3 °F (36.8 °C) (06/13/23 1107)  Pulse: 86 (06/13/23 1107)  Resp: 16 (06/13/23 1107)  BP: 119/61 (06/13/23 1107)  SpO2: 95 % (06/13/23 1107) Vital Signs (24h Range):  Temp:  [97.3 °F (36.3 °C)-99.4 °F (37.4 °C)] 98.3 °F (36.8 °C)  Pulse:  [81-98] 86  Resp:  [16-18] 16  SpO2:  [92 %-95 %] 95 %  BP: ()/(53-61) 119/61     Weight: 72.9 kg (160 lb 11.5 oz)  Height: 5' 2.99" (160 cm)  Body mass index is 28.48 kg/m².      Intake/Output Summary (Last 24 hours) at 6/13/2023 1433  Last data filed at 6/12/2023 1700  Gross per 24 hour   Intake 352.55 ml   Output --   Net 352.55 ml       Physical Exam  Ortho/SPM Exam  Gen: NAD, sitting comfortably in bed  CV: regular rate  Resp: non-labored respirations     LLE:  Heel and great toe wounds are dressed   Ex fix in place, pin sites clean and dry w no signs of infection  Surgical incision dressing c/d/i   NVI to baseline- neuropathic to calf, can wiggles toes, foot and toes pink wwp    Significant Labs: A1C:   Recent Labs   Lab 02/17/23  1415 04/21/23  0429 06/05/23 1957   HGBA1C 8.9* 8.2* 10.2*     CBC:   Recent Labs   Lab 06/12/23  0522 06/13/23  0607 06/13/23  1205   WBC 9.76 6.57 7.12   HGB 9.1* 7.6* 8.6*   HCT 27.9* 24.4* 26.4*    322 319     CMP:   Recent Labs   Lab 06/12/23  0522 06/13/23  0607 06/13/23  1205   * 121* 127*   K 5.6* 4.5 5.0   CL 98 84* 97   CO2 25 19* 24   GLU 61* 770* 207*   BUN 31* 29* 33*   CREATININE 1.2 3.7* 1.2   CALCIUM 9.0 7.0*  --    PROT 5.9* 4.7* 5.5*   ALBUMIN 2.2* 1.6* 2.0*   BILITOT 0.2 0.2 0.2   ALKPHOS 96 81 92   AST 17 13 15   ALT <5* <5* <5*   ANIONGAP 7* 18* 6*     POCT Glucose:   Recent Labs   Lab 06/13/23  0637 06/13/23  0805 06/13/23  1108   POCTGLUCOSE 287* 311* 226*       All pertinent labs within the past 24 hours have been reviewed.      Significant Imaging: I have reviewed and interpreted all pertinent imaging results/findings.   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 She is closed and neurovascularly intact to baseline. She has diabetic foot wounds over her plantar heel and great toe.     -SP operative fixation of left pilon and fibula, left ankle external fixator placement 6/6/23 w Dr Mahmood    Multimodal pain regimen  NWB LLE in ex fix, elevate  BID pin site care  DVT ppx w ASA 81 bid, SCD RLE all times while in bed   PT/OT daily  Ancef IV per ID for previous left heel bone cultures +staph, cipro for left great toe wound +pseudmonas  Wound care for left heel/great toe wounds per podiatry   BG management per Endocrine   FU 2 wks post op w ortho    Dispo: DC to LTAC pending BM         TIFFANY Shelby MD  Orthopedics  [facility] - Surgery

## 2023-06-13 NOTE — NURSING
Patient received a suppository and a Fleet's enema during shift, but still has not had a bowel movement. Patient will receive a lactulose enema during night shift.

## 2023-06-13 NOTE — SUBJECTIVE & OBJECTIVE
"Interval HPI:   Overnight events: BG has been variable on current SQ insulin regimen. Hypoglycemia noted yesterday afternoon. Plans for d/c soon to LTAC. Diet diabetic [facility];  Calorie; Thin    Eatin%  Nausea: No  Hypoglycemia and intervention: No  Fever: No  TPN and/or TF: No  If yes, type of TF/TPN and rate: n/a    /61   Pulse 86   Temp 98.3 °F (36.8 °C)   Resp 16   Ht 5' 2.99" (1.6 m)   Wt 72.9 kg (160 lb 11.5 oz)   SpO2 95%   Breastfeeding No   BMI 28.48 kg/m²     Labs Reviewed and Include    Recent Labs   Lab 23  0607 23  1205   * 207*   CALCIUM 7.0*  --    ALBUMIN 1.6* 2.0*   PROT 4.7* 5.5*   * 127*   K 4.5 5.0   CO2 19* 24   CL 84* 97   BUN 29* 33*   CREATININE 3.7* 1.2   ALKPHOS 81 92   ALT <5* <5*   AST 13 15   BILITOT 0.2 0.2     Lab Results   Component Value Date    WBC 7.12 2023    HGB 8.6 (L) 2023    HCT 26.4 (L) 2023    MCV 89 2023     2023     No results for input(s): TSH, FREET4 in the last 168 hours.  Lab Results   Component Value Date    HGBA1C 10.2 (H) 2023       Nutritional status:   Body mass index is 28.48 kg/m².  Lab Results   Component Value Date    ALBUMIN 2.0 (L) 2023    ALBUMIN 1.6 (L) 2023    ALBUMIN 2.2 (L) 2023     Lab Results   Component Value Date    PREALBUMIN 11 (L) 2023       Estimated Creatinine Clearance: 40.5 mL/min (based on SCr of 1.2 mg/dL).    Accu-Checks  Recent Labs     06/10/23  2227 23  0701 23  1133 23  1521 23  1210 23  1610 23  1747 23  0637 23  0805 23  1108   POCTGLUCOSE 130* 130* 149* 164* 79 55* 141* 287* 311* 226*       Current Medications and/or Treatments Impacting Glycemic Control  Immunotherapy:    Immunosuppressants       None          Steroids:   Hormones (From admission, onward)      Start     Stop Route Frequency Ordered    23 8371  melatonin tablet 9 mg         -- Oral " Nightly 06/05/23 2230          Pressors:    Autonomic Drugs (From admission, onward)      None          Hyperglycemia/Diabetes Medications:   Antihyperglycemics (From admission, onward)      Start     Stop Route Frequency Ordered    06/13/23 1130  insulin aspart U-100 pen 6 Units         -- SubQ 3 times daily with meals 06/13/23 0855    06/13/23 0900  insulin detemir U-100 (Levemir) pen 20 Units         -- SubQ Daily 06/12/23 1038    06/05/23 2345  insulin aspart U-100 pen 0-5 Units         -- SubQ Before meals & nightly PRN 06/05/23 2240

## 2023-06-13 NOTE — PLAN OF CARE
06/13/23 0817   Post-Acute Status   Post-Acute Authorization Placement   Post-Acute Placement Status   (Pending BM)   Discharge Plan   Discharge Plan A Long-term acute care facility (LTAC)     Patient is pending BM to admit to [facility] [place]. Pt has yet to provide BM. SW to follow.    SW informed Priyanka (#[phone]) w/ [facility] of delay.     1:36 PM  Patient remains pending BM. Pt's nurse informed speaking to MD, ordering additional medications to assist.       Hayde Jennings, DEIRDRE  Case Management   [facility]   Ext. [phone]

## 2023-06-13 NOTE — ASSESSMENT & PLAN NOTE
Very abnormal labs on 6/13 AM not in line with her normal labs results with glucose 700s (200s on accuchecks) with associated low Na from glucose abnormality, with cr nearly 4 with baseline 1's and hg 7s usually 9s. Suspect incorrect labs and repeat stat pending.

## 2023-06-13 NOTE — SUBJECTIVE & OBJECTIVE
Interval History:  she is ready for dc to snf but did not have a bm so could not go. Insulin dc per endo recs for hypoglycemia on am bmp    Review of Systems   Constitutional:  Negative for fever.   Respiratory:  Negative for cough and shortness of breath.    Cardiovascular:  Negative for chest pain and leg swelling.   Gastrointestinal:  Negative for abdominal pain, constipation, diarrhea and nausea.   Musculoskeletal:  Positive for arthralgias (Left ankle).   Skin:  Positive for wound (Left foot).   Neurological:  Negative for dizziness and light-headedness.   Psychiatric/Behavioral:  Negative for agitation and confusion.    Objective:     Vital Signs (Most Recent):  Temp: 97.7 °F (36.5 °C) (06/07/23 1523)  Pulse: 83 (06/07/23 1531)  Resp: 18 (06/07/23 1523)  BP: 123/59 (06/07/23 1523)  SpO2: 99 % (06/07/23 1523) on room air Vital Signs (24h Range):  Temp:  [97 °F (36.1 °C)-99.4 °F (37.4 °C)] 99.4 °F (37.4 °C)  Pulse:  [78-98] 96  Resp:  [16-18] 16  SpO2:  [92 %-98 %] 92 %  BP: ()/(53-76) 121/59     Weight: 72.9 kg (160 lb 11.5 oz)  Body mass index is 28.48 kg/m².    Intake/Output Summary (Last 24 hours) at 6/13/2023 0706  Last data filed at 6/12/2023 1700  Gross per 24 hour   Intake 874.55 ml   Output --   Net 874.55 ml           Physical Exam  Vitals and nursing note reviewed.   Constitutional:       General: She is awake. She is not in acute distress.     Appearance: Normal appearance. She is normal weight. She is not ill-appearing.   Cardiovascular:      Rate and Rhythm: Normal rate and regular rhythm.      Heart sounds: Normal heart sounds. No murmur heard.    No friction rub. No gallop.   Pulmonary:      Effort: Pulmonary effort is normal. No respiratory distress.      Breath sounds: Normal breath sounds. No wheezing.   Chest:   Breasts:     Right: Mass (Small mobile hard lump in upper outer quadrant) present.   Abdominal:      General: Abdomen is flat. Bowel sounds are normal. There is no distension.       Palpations: Abdomen is soft.      Tenderness: There is no abdominal tenderness.   Musculoskeletal:      Right lower leg: No edema.      Left lower leg: No edema.      Comments: Ex fix in place to left ankle. Surgical bandages in place to left ankle and are clean and dry and intact. Sutures covered by gauze   Skin:     General: Skin is warm.      Findings: No erythema.      Comments: See photos in Epic of wounds to left foot   Neurological:      Mental Status: She is alert and oriented to person, place, and time.   Psychiatric:         Mood and Affect: Mood normal.         Behavior: Behavior normal. Behavior is cooperative.         Thought Content: Thought content normal.         Judgment: Judgment normal.           Significant Labs: A1C:   Recent Labs   Lab 02/17/23  1415 04/21/23  0429 06/05/23  1957   HGBA1C 8.9* 8.2* 10.2*       CBC:   Recent Labs   Lab 06/11/23  0707 06/12/23  0522   WBC 9.02 9.76   HGB 9.5* 9.1*   HCT 30.3* 27.9*    358       CMP:   Recent Labs   Lab 06/11/23  0707 06/12/23  0522   * 130*   K 5.2* 5.6*   CL 99 98   CO2 24 25   * 61*   BUN 27* 31*   CREATININE 0.9 1.2   CALCIUM 9.3 9.0   PROT 6.0 5.9*   ALBUMIN 2.3* 2.2*   BILITOT 0.2 0.2   ALKPHOS 93 96   AST 12 17   ALT <5* <5*   ANIONGAP 9 7*       POCT Glucose:   Recent Labs   Lab 06/12/23  1610 06/12/23  1747 06/13/23  0637   POCTGLUCOSE 55* 141* 287*         Significant Imaging: I have reviewed all pertinent imaging results/findings within the past 24 hours.

## 2023-06-13 NOTE — PLAN OF CARE
Repeat labs more in line with previous days labs, Na still slightly lower than prev.   No BM yet. Will try linzess addition non formularly, reaached out to pharm to see if can assist.

## 2023-06-13 NOTE — NURSING
Lactulose enema administered at 2112, still no BM at this time.     0101- No BM following enema. On-call MD hernandez.

## 2023-06-13 NOTE — PROGRESS NOTES
[facility] Medicine  Progress Note    Patient Name: Anastasiia Badillo  MRN: [id number]  Patient Class: IP- Inpatient   Admission Date: 6/5/2023  Length of Stay: 8 days  Attending Physician: Sonia Kim MD  Primary Care Provider: Raji Parkinson MD        Subjective:     Principal Problem:Closed displaced pilon fracture of left tibia with routine healing        HPI:  Alycia Badillo is a 73-year-old woman with a past medical history of peripheral artery disease s/p revascularization and type 2 diabetes mellitus c/b neuropathy and chronic lower extremity wounds who presents as a transfer from [facility] for distal fractures of the tibia and fibula.  Of note, patient was hospitalized at [facility] in April 2023 for severe peripheral artery disease and underwent revascularization.  The patient was discharged and was sent to inpatient rehabilitation.  She said that she developed a pressure ulcer on her left heel.  She also has a wound on the plantar surface of her left great toe.  She has been following with a podiatrist in addition to a Wound Care clinic.  The patient said that she left inpatient rehabilitation and went to live at home with her son.  She says that she normally ambulates with a walker.  Approximately three days ago, she said she had just taken her nightly melatonin and was getting ready to fall asleep in bed.  However, she noted that she needed to use the bathroom and went to reach for the walker next to the bed.  She said that the walker was not locked and so she slid and fell to the ground hitting her left lower extremity.  She said that she began having pain and swelling to her left lower extremity.  The pain became so unbearable that she lost the ability to ambulate.  This prompted her to seek care in the emergency department at [facility].  Imaging at the [facility] reveals a comminuted and angulated distal tibia and fibular  fracture.  It was also noted that she was having purulent drainage from her lower extremity wounds.  Of note, per chart review, the patient had a left heel bone biopsy performed by her podiatrist on April 27th.  Patient's bone biopsy grew Staphylococcus aureus.  She was seen in the Wound Care Clinic and placed on levofloxacin and doxycycline but did not appear to be followed by the Infectious Disease Service.    In the emergency department, the patient was noted to have stable vital signs upon arrival.  Per report, the patient received IV antibiotics at [facility] prior to transfer.  She was evaluated by Orthopedic Surgery at [facility] who is planning surgical intervention on June 6th.  Patient was admitted to Hospital Medicine for further management.      Overview/Hospital Course:  Patient taken to OR with Orthopedics on 6/6/2023 and had ORIF of left pilon fracture and ex fix placed for pilon fracture for stabilization of fracture by Dr. Gilbert Mahmood. Plan per Ortho is ex fix to stay in place for 4-6 weeks and then plan for removal by Ortho. According to Ortho, if current construct does not have enough stability for healing will consider transition to ringed fixator. Patient to be non-weight bearing to left lower extremity x 3 months post-op. Patient started on Aspirin 81 mg po BID and will need for 12 weeks as per Ortho for DVT prophylaxis. Endocrine consulted to assist in diabetes management. Podiatry consulted for left foot wounds and not no active infection and wound care as per Podiatry. Infectious Disease consulted as had previous positive cultures from left foot wounds and osteomyelitis. Infectious disease evaluated and recommending IV Cefazolin 2 grams every 8 hours for 6 weeks to treat osteomyelitis. PT/OT consulted. Patient on multimodal pain meds post-op.         Interval History: her labs are completely off and not at her normal at all- CBC and CMP with glucose 700s on CMP  and 200s on accuchecks, and Cr in 3's but baseline 1's, Na 120s (from glucose), and hg 7s but normally in 9's. Suspect lab errors going on and repeat pending stat. She has not had a BM despite enema/suppository and oral supplements. Oral lactulose this AM and relistor for opiate induced constipation given this AM. KUB not showing obstruction or impaction and shows stool burden in colon and nearing rectum. Will f/u repeat labs and BM promotion after relistor. Ltac acceptance pending BM and ensure these labs are not true values    Review of Systems   Constitutional:  Negative for fever.   Respiratory:  Negative for cough and shortness of breath.    Cardiovascular:  Negative for chest pain and leg swelling.   Gastrointestinal:  Negative for abdominal pain, constipation, diarrhea and nausea.   Musculoskeletal:  Positive for arthralgias (Left ankle).   Skin:  Positive for wound (Left foot).   Neurological:  Negative for dizziness and light-headedness.   Psychiatric/Behavioral:  Negative for agitation and confusion.    Objective:     Vital Signs (Most Recent):  Temp: 97.7 °F (36.5 °C) (06/07/23 1523)  Pulse: 83 (06/07/23 1531)  Resp: 18 (06/07/23 1523)  BP: 123/59 (06/07/23 1523)  SpO2: 99 % (06/07/23 1523) on room air Vital Signs (24h Range):  Temp:  [97 °F (36.1 °C)-99.4 °F (37.4 °C)] 98.7 °F (37.1 °C)  Pulse:  [78-98] 86  Resp:  [16-18] 16  SpO2:  [92 %-96 %] 93 %  BP: ()/(53-64) 124/60     Weight: 72.9 kg (160 lb 11.5 oz)  Body mass index is 28.48 kg/m².    Intake/Output Summary (Last 24 hours) at 6/13/2023 1036  Last data filed at 6/12/2023 1700  Gross per 24 hour   Intake 638.55 ml   Output --   Net 638.55 ml           Physical Exam  Vitals and nursing note reviewed.   Constitutional:       General: She is awake. She is not in acute distress.     Appearance: Normal appearance. She is normal weight. She is not ill-appearing.   Cardiovascular:      Rate and Rhythm: Normal rate and regular rhythm.      Heart  sounds: Normal heart sounds. No murmur heard.    No friction rub. No gallop.   Pulmonary:      Effort: Pulmonary effort is normal. No respiratory distress.      Breath sounds: Normal breath sounds. No wheezing.   Chest:   Breasts:     Right: Mass (Small mobile hard lump in upper outer quadrant) present.   Abdominal:      General: Abdomen is flat. Bowel sounds are normal. There is no distension.      Palpations: Abdomen is soft.      Tenderness: There is no abdominal tenderness.   Musculoskeletal:      Right lower leg: No edema.      Left lower leg: No edema.      Comments: Ex fix in place to left ankle. Surgical bandages in place to left ankle and are clean and dry and intact. Sutures covered by gauze   Skin:     General: Skin is warm.      Findings: No erythema.      Comments: See photos in Epic of wounds to left foot   Neurological:      Mental Status: She is alert and oriented to person, place, and time.   Psychiatric:         Mood and Affect: Mood normal.         Behavior: Behavior normal. Behavior is cooperative.         Thought Content: Thought content normal.         Judgment: Judgment normal.           Significant Labs: A1C:   Recent Labs   Lab 02/17/23  1415 04/21/23  0429 06/05/23  1957   HGBA1C 8.9* 8.2* 10.2*       CBC:   Recent Labs   Lab 06/12/23  0522 06/13/23  0607   WBC 9.76 6.57   HGB 9.1* 7.6*   HCT 27.9* 24.4*    322       CMP:   Recent Labs   Lab 06/12/23  0522 06/13/23  0607   * 121*   K 5.6* 4.5   CL 98 84*   CO2 25 19*   GLU 61* 770*   BUN 31* 29*   CREATININE 1.2 3.7*   CALCIUM 9.0 7.0*   PROT 5.9* 4.7*   ALBUMIN 2.2* 1.6*   BILITOT 0.2 0.2   ALKPHOS 96 81   AST 17 13   ALT <5* <5*   ANIONGAP 7* 18*       POCT Glucose:   Recent Labs   Lab 06/12/23  1747 06/13/23  0637 06/13/23  0805   POCTGLUCOSE 141* 287* 311*         Significant Imaging: I have reviewed all pertinent imaging results/findings within the past 24 hours.      Assessment/Plan:      * Closed displaced pilon fracture  of left tibia with routine healing  Presenting after fall and found to have comminuted and angulated closed left distal tibia and fibular fractures. Evaluated by Orthopedic Surgery service, she was reduced and placed into a short leg splint. She will require operative intervention for this injury.   - Patient taken to OR on 6/6/2023 and underwent ORIF of pilon fracture as well as ex fix to stabilize fractures with Dr. Gilbert Mahmood.  - Patient post-op to be non weight bearing to left lower extremity x 3 months from surgery date on 6/6.   - Ortho to remove ex fix I 4-6 weeks but if construct fails then would have to convert to ring fixator as per Ortho.   - PT/OT consulted for gait training/transfer training and restoration of ADL's post-op.  - Routine pin site care to ex fix as per Ortho.  - Ice and elevate left lower extremity to help with swelling.   - Pain management with scheduled Tylenol 1000 mg po every 8 hours + Robaxin 500 mg po 4 times daily + Lyrica 75 mg po BID with Oxycodone  mg po every 4 hours prn for breakthrough pain.   - Orthopedics managing surgical site to left ankle area.   - Continue Aspirin 81 mg po BID for 12 weeks for DVT prophylaxis as per Ortho. End date aug 30th then back to daily for PVD  - Surgical bandages to remain in place to left ankle and to remian in place until Ortho clinic follow-up.         Abnormal laboratory test  Very abnormal labs on 6/13 AM not in line with her normal labs results with glucose 700s (200s on accuchecks) with associated low Na from glucose abnormality, with cr nearly 4 with baseline 1's and hg 7s usually 9s. Suspect incorrect labs and repeat stat pending.      Constipation  Chronic issue, has failed oral therapies and failed enema and suppository and delayed dc on 6/12.   relistor and oral lactulose given 6/13 and sonny trend to see if relistor helps promote. kub not showing obstruction or dilated bowel loops and no impaction seen, stool burden in  colon and nearing rectum. Can also consider linzess.      Acute osteomyelitis of left calcaneus  Plan for 6 weeks ancef and likely  2 weeks cipro for soft tissue infection and osteo with ancef for longer period. Final ID recs with end date for cipro of 6/22 and end date of ancef for 7/17 with weekly cbc, crp and cmp faxed to ID clinic on discharge.  picc placed      Tobacco use  Patient declined nicotine patch. Still actively smoking at home. Patient counseled extensively on smoking cessation as has open wounds, severe PAD and now post-op from ankle fracture surgery and nicotine to impair wound healing and risk re-occlusion of peripheral  arteries in lower limbs.     Chronic ulcer of left heel with fat layer exposed  Chronic ulcer of great toe of left foot with fat layer exposed  Acute osteomyelitis of left calcaneus  Follows with Podiatry clinic. Previously had heel ulcer bone biopsy which grew Staphylococcus aureus and Staph epidermis and was prescribed Doxycycline and Levofloxacin from Wound Care clinic but not followed by Infectious Disease clinic. Afebrile, without leukocytosis. CRP elevated 127 on admit.   - Podiatry consulted, appreciate recommendations and continue wound care as per Podiatry to left heel and left first toe ulcers. Wound care recs per podiatry note on 6/7 in place.   - Infectious Disease consulted, appreciate recommendations and they recommend 6 weeks of IV Cefazolin 2 grams every 8 hours to treat left heel osteomyelitis. picc placed 6/8, awaiting antibitoic changes today given pseudomonas in wound will adjust per ID    Mass of upper outer quadrant of right breast  Noted on admit. Patient in past month as noted a lump in right breast in upper outer quadrant. Patient does have palpable lump in right upper outer quadrant on exam. Patient has know bilateral breast implants in place. Patient's primary care physician is aware and had ordered to have outpatient ultrasound of breasts and mammogram  done and have not been done yet. Patient to follow-up as outpatient with her primary care physician for further work-up and evaluation.       PAD (peripheral artery disease)  · Known severe peripheral arterial disease. Vascular Surgery during last hospitalization and had revascularization of left lower extremity in 4/2023 with percutaneous transluminal angioplasty of left popliteal artery and percutaneous transluminal angioplasty of the entire length of the left posterior tibial artery.  · Patient on Aspirin Plavix and high dose Lipitor to treat and will continue in hospital.   · Needs BID asa for 12 weeks until aug 30 then back to daily  · Will resume plavix 6/9  Ortho ok with it    Essential (primary) hypertension  Chronic condition. Continue home Lisinopril 5 mg po daily to treat. Monitor vital signs every 4 hours. Target BP < 140/90.       Type 2 diabetes mellitus with foot ulcer, with long-term current use of insulin  Type 2 diabetes mellitus with hyperglycemia, with long-term current use of insulin  Endocrine consulted to assist in management. Patient's FSGs are uncontrolled due to hyperglycemia on current medication regimen.    Last A1c reviewed-   Lab Results   Component Value Date    HGBA1C 10.2 (H) 06/05/2023     Most recent fingerstick glucose reviewed-   Recent Labs   Lab 06/12/23  1610 06/12/23  1747 06/13/23  0637 06/13/23  0805   POCTGLUCOSE 55* 141* 287* 311*     Current correctional scale  Low     Maintain anti-hyperglycemic dose as follows as per Endocrine recs:  Antihyperglycemics (From admission, onward)    Start     Stop Route Frequency Ordered    06/13/23 1130  insulin aspart U-100 pen 6 Units         -- SubQ 3 times daily with meals 06/13/23 0855    06/13/23 0900  insulin detemir U-100 (Levemir) pen 20 Units         -- SubQ Daily 06/12/23 1038    06/05/23 2345  insulin aspart U-100 pen 0-5 Units         -- SubQ Before meals & nightly PRN 06/05/23 2246        Target blood sugar 140-180 in  hospital.  Diabetic diet.  Monitor blood sugars 4 times daily with meals and at bedtime.   Appreciate endo assisting as she reports was not controlled at home and likely contributor to poor wound healing of her LLE prior to admit  Insulin dec on 6/12 for am hypoglycemia  Glucose in 200s today but cmp was very abnormal in 700s, suspect cmp is incorrect. Repeat pending      VTE Risk Mitigation (From admission, onward)         Ordered     Reason for No Pharmacological VTE Prophylaxis  Once        Question:  Reasons:  Answer:  Risk of Bleeding  Comment:  Ortho planning surgery in AM    06/05/23 2246     IP VTE HIGH RISK PATIENT  Once         06/05/23 2246     Place sequential compression device  Until discontinued         06/05/23 2246                Discharge Planning   CILTALI: 6/13/2023     Code Status: Full Code   Is the patient medically ready for discharge?: Yes    Reason for patient still in hospital (select all that apply): Patient trending condition  Discharge Plan A: Long-term acute care facility (LTAC)                  Sonia Kim MD  Department of Hospital Medicine   [facility] - Surgery

## 2023-06-13 NOTE — PROGRESS NOTES
Pharmacist Renal Dose Adjustment Note    Anastasiia Badillo is a 73 y.o. female being treated with the medication Ciprofloxacin    Patient Data:    Vital Signs (Most Recent):  Temp: 98.7 °F (37.1 °C) (06/13/23 0751)  Pulse: 86 (06/13/23 0751)  Resp: 16 (06/13/23 0751)  BP: 124/60 (06/13/23 0751)  SpO2: (Abnormal) 93 % (06/13/23 0751) Vital Signs (72h Range):  Temp:  [97 °F (36.1 °C)-99.4 °F (37.4 °C)]   Pulse:  [77-98]   Resp:  [16-18]   BP: ()/(53-82)   SpO2:  [92 %-100 %]      Recent Labs   Lab 06/11/23  0707 06/12/23  0522 06/13/23  0607   CREATININE 0.9 1.2 3.7*     Serum creatinine: 3.7 mg/dL (H) 06/13/23 0607  Estimated creatinine clearance: 13.1 mL/min (A)    Ciprofloxacin 750 mg every 12 hours will be changed to Ciprofloxacin 750 mg every 24 hours    Pharmacist's Name: Vikas Harry  Pharmacist's Extension: [phone]

## 2023-06-13 NOTE — PT/OT/SLP PROGRESS
Physical Therapy      Patient Name:  Anastasiia Badillo   MRN:  [id number]    Patient not seen today secondary to pt declining to participate. Will follow-up as appropriate according to POC.

## 2023-06-13 NOTE — ASSESSMENT & PLAN NOTE
Chronic issue, has failed oral therapies and failed enema and suppository and delayed dc on 6/12.

## 2023-06-13 NOTE — TELEPHONE ENCOUNTER
Called and cancel pt's appt due to pt being admitted. Pt's son Yaya says he his not sure when pt will be discharge and to callback next week to rescheduled pt's post-op appt with Ranjit.

## 2023-06-13 NOTE — PROGRESS NOTES
[facility] Medicine  Progress Note    Patient Name: Anastasiia aBdillo  MRN: [id number]  Patient Class: IP- Inpatient   Admission Date: 6/5/2023  Length of Stay: 8 days  Attending Physician: Sonia Kim MD  Primary Care Provider: Raji Parkinson MD        Subjective:     Principal Problem:Closed displaced pilon fracture of left tibia        HPI:  Alycia Badillo is a 73-year-old woman with a past medical history of peripheral artery disease s/p revascularization and type 2 diabetes mellitus c/b neuropathy and chronic lower extremity wounds who presents as a transfer from [facility] for distal fractures of the tibia and fibula.  Of note, patient was hospitalized at [facility] in April 2023 for severe peripheral artery disease and underwent revascularization.  The patient was discharged and was sent to inpatient rehabilitation.  She said that she developed a pressure ulcer on her left heel.  She also has a wound on the plantar surface of her left great toe.  She has been following with a podiatrist in addition to a Wound Care clinic.  The patient said that she left inpatient rehabilitation and went to live at home with her son.  She says that she normally ambulates with a walker.  Approximately three days ago, she said she had just taken her nightly melatonin and was getting ready to fall asleep in bed.  However, she noted that she needed to use the bathroom and went to reach for the walker next to the bed.  She said that the walker was not locked and so she slid and fell to the ground hitting her left lower extremity.  She said that she began having pain and swelling to her left lower extremity.  The pain became so unbearable that she lost the ability to ambulate.  This prompted her to seek care in the emergency department at [facility].  Imaging at the [facility] reveals a comminuted and angulated distal tibia and fibular fracture.  It was also  noted that she was having purulent drainage from her lower extremity wounds.  Of note, per chart review, the patient had a left heel bone biopsy performed by her podiatrist on April 27th.  Patient's bone biopsy grew Staphylococcus aureus.  She was seen in the Wound Care Clinic and placed on levofloxacin and doxycycline but did not appear to be followed by the Infectious Disease Service.    In the emergency department, the patient was noted to have stable vital signs upon arrival.  Per report, the patient received IV antibiotics at [facility] prior to transfer.  She was evaluated by Orthopedic Surgery at [facility] who is planning surgical intervention on June 6th.  Patient was admitted to Hospital Medicine for further management.      Overview/Hospital Course:  Patient taken to OR with Orthopedics on 6/6/2023 and had ORIF of left pilon fracture and ex fix placed for pilon fracture for stabilization of fracture by Dr. Gilbert Mahmood. Plan per Ortho is ex fix to stay in place for 4-6 weeks and then plan for removal by Ortho. According to Ortho, if current construct does not have enough stability for healing will consider transition to ringed fixator. Patient to be non-weight bearing to left lower extremity x 3 months post-op. Patient started on Aspirin 81 mg po BID and will need for 12 weeks as per Ortho for DVT prophylaxis. Endocrine consulted to assist in diabetes management. Podiatry consulted for left foot wounds and not no active infection and wound care as per Podiatry. Infectious Disease consulted as had previous positive cultures from left foot wounds and osteomyelitis. Infectious disease evaluated and recommending IV Cefazolin 2 grams every 8 hours for 6 weeks to treat osteomyelitis. PT/OT consulted. Patient on multimodal pain meds post-op.         Interval History:  she is ready for dc to snf but did not have a bm so could not go. Insulin dc per endo recs for hypoglycemia on am  bmp    Review of Systems   Constitutional:  Negative for fever.   Respiratory:  Negative for cough and shortness of breath.    Cardiovascular:  Negative for chest pain and leg swelling.   Gastrointestinal:  Negative for abdominal pain, constipation, diarrhea and nausea.   Musculoskeletal:  Positive for arthralgias (Left ankle).   Skin:  Positive for wound (Left foot).   Neurological:  Negative for dizziness and light-headedness.   Psychiatric/Behavioral:  Negative for agitation and confusion.    Objective:     Vital Signs (Most Recent):  Temp: 97.7 °F (36.5 °C) (06/07/23 1523)  Pulse: 83 (06/07/23 1531)  Resp: 18 (06/07/23 1523)  BP: 123/59 (06/07/23 1523)  SpO2: 99 % (06/07/23 1523) on room air Vital Signs (24h Range):  Temp:  [97 °F (36.1 °C)-99.4 °F (37.4 °C)] 99.4 °F (37.4 °C)  Pulse:  [78-98] 96  Resp:  [16-18] 16  SpO2:  [92 %-98 %] 92 %  BP: ()/(53-76) 121/59     Weight: 72.9 kg (160 lb 11.5 oz)  Body mass index is 28.48 kg/m².    Intake/Output Summary (Last 24 hours) at 6/13/2023 0706  Last data filed at 6/12/2023 1700  Gross per 24 hour   Intake 874.55 ml   Output --   Net 874.55 ml           Physical Exam  Vitals and nursing note reviewed.   Constitutional:       General: She is awake. She is not in acute distress.     Appearance: Normal appearance. She is normal weight. She is not ill-appearing.   Cardiovascular:      Rate and Rhythm: Normal rate and regular rhythm.      Heart sounds: Normal heart sounds. No murmur heard.    No friction rub. No gallop.   Pulmonary:      Effort: Pulmonary effort is normal. No respiratory distress.      Breath sounds: Normal breath sounds. No wheezing.   Chest:   Breasts:     Right: Mass (Small mobile hard lump in upper outer quadrant) present.   Abdominal:      General: Abdomen is flat. Bowel sounds are normal. There is no distension.      Palpations: Abdomen is soft.      Tenderness: There is no abdominal tenderness.   Musculoskeletal:      Right lower leg: No  edema.      Left lower leg: No edema.      Comments: Ex fix in place to left ankle. Surgical bandages in place to left ankle and are clean and dry and intact. Sutures covered by gauze   Skin:     General: Skin is warm.      Findings: No erythema.      Comments: See photos in Epic of wounds to left foot   Neurological:      Mental Status: She is alert and oriented to person, place, and time.   Psychiatric:         Mood and Affect: Mood normal.         Behavior: Behavior normal. Behavior is cooperative.         Thought Content: Thought content normal.         Judgment: Judgment normal.           Significant Labs: A1C:   Recent Labs   Lab 02/17/23  1415 04/21/23  0429 06/05/23  1957   HGBA1C 8.9* 8.2* 10.2*       CBC:   Recent Labs   Lab 06/11/23  0707 06/12/23  0522   WBC 9.02 9.76   HGB 9.5* 9.1*   HCT 30.3* 27.9*    358       CMP:   Recent Labs   Lab 06/11/23  0707 06/12/23  0522   * 130*   K 5.2* 5.6*   CL 99 98   CO2 24 25   * 61*   BUN 27* 31*   CREATININE 0.9 1.2   CALCIUM 9.3 9.0   PROT 6.0 5.9*   ALBUMIN 2.3* 2.2*   BILITOT 0.2 0.2   ALKPHOS 93 96   AST 12 17   ALT <5* <5*   ANIONGAP 9 7*       POCT Glucose:   Recent Labs   Lab 06/12/23  1610 06/12/23  1747 06/13/23  0637   POCTGLUCOSE 55* 141* 287*         Significant Imaging: I have reviewed all pertinent imaging results/findings within the past 24 hours.      Assessment/Plan:      * Closed displaced pilon fracture of left tibia  Presenting after fall and found to have comminuted and angulated closed left distal tibia and fibular fractures. Evaluated by Orthopedic Surgery service, she was reduced and placed into a short leg splint. She will require operative intervention for this injury.   - Patient taken to OR on 6/6/2023 and underwent ORIF of pilon fracture as well as ex fix to stabilize fractures with Dr. Gilbert Mahmood.  - Patient post-op to be non weight bearing to left lower extremity x 3 months from surgery date on 6/6.   -  Ortho to remove ex fix I 4-6 weeks but if construct fails then would have to convert to ring fixator as per Ortho.   - PT/OT consulted for gait training/transfer training and restoration of ADL's post-op.  - Routine pin site care to ex fix as per Ortho.  - Ice and elevate left lower extremity to help with swelling.   - Pain management with scheduled Tylenol 1000 mg po every 8 hours + Robaxin 500 mg po 4 times daily + Lyrica 75 mg po BID with Oxycodone  mg po every 4 hours prn for breakthrough pain.   - Orthopedics managing surgical site to left ankle area.   - Continue Aspirin 81 mg po BID for 12 weeks for DVT prophylaxis as per Ortho. End date aug 30th then back to daily for PVD  - Surgical bandages to remain in place to left ankle and to remian in place until Ortho clinic follow-up.         Constipation  Chronic issue, has failed oral therapies and failed enema and suppository and delayed dc on 6/12.       Acute osteomyelitis of left calcaneus  Plan for 6 weeks ancef and likely  2 weeks cipro for soft tissue infection and osteo with ancef for longer period. Final ID recs with end date for cipro of 6/22 and end date of ancef for 7/17 with weekly cbc, crp and cmp faxed to ID clinic on discharge.  picc placed      Tobacco use  Patient declined nicotine patch. Still actively smoking at home. Patient counseled extensively on smoking cessation as has open wounds, severe PAD and now post-op from ankle fracture surgery and nicotine to impair wound healing and risk re-occlusion of peripheral  arteries in lower limbs.     Chronic ulcer of left heel with fat layer exposed  Chronic ulcer of great toe of left foot with fat layer exposed  Acute osteomyelitis of left calcaneus  Follows with Podiatry clinic. Previously had heel ulcer bone biopsy which grew Staphylococcus aureus and Staph epidermis and was prescribed Doxycycline and Levofloxacin from Wound Care clinic but not followed by Infectious Disease clinic. Afebrile,  without leukocytosis. CRP elevated 127 on admit.   - Podiatry consulted, appreciate recommendations and continue wound care as per Podiatry to left heel and left first toe ulcers. Wound care recs per podiatry note on 6/7 in place.   - Infectious Disease consulted, appreciate recommendations and they recommend 6 weeks of IV Cefazolin 2 grams every 8 hours to treat left heel osteomyelitis. picc placed 6/8, awaiting antibitoic changes today given pseudomonas in wound will adjust per ID    Mass of upper outer quadrant of right breast  Noted on admit. Patient in past month as noted a lump in right breast in upper outer quadrant. Patient does have palpable lump in right upper outer quadrant on exam. Patient has know bilateral breast implants in place. Patient's primary care physician is aware and had ordered to have outpatient ultrasound of breasts and mammogram done and have not been done yet. Patient to follow-up as outpatient with her primary care physician for further work-up and evaluation.       PAD (peripheral artery disease)  · Known severe peripheral arterial disease. Vascular Surgery during last hospitalization and had revascularization of left lower extremity in 4/2023 with percutaneous transluminal angioplasty of left popliteal artery and percutaneous transluminal angioplasty of the entire length of the left posterior tibial artery.  · Patient on Aspirin Plavix and high dose Lipitor to treat and will continue in hospital.   · Needs BID asa for 12 weeks until aug 30 then back to daily  · Will resume plavix 6/9  Ortho ok with it    Essential (primary) hypertension  Chronic condition. Continue home Lisinopril 5 mg po daily to treat. Monitor vital signs every 4 hours. Target BP < 140/90.       Type 2 diabetes mellitus with foot ulcer, with long-term current use of insulin  Type 2 diabetes mellitus with hyperglycemia, with long-term current use of insulin  Endocrine consulted to assist in management. Patient's FSGs  are uncontrolled due to hyperglycemia on current medication regimen.    Last A1c reviewed-   Lab Results   Component Value Date    HGBA1C 10.2 (H) 06/05/2023     Most recent fingerstick glucose reviewed-   Recent Labs   Lab 06/12/23  1210 06/12/23  1610 06/12/23  1747 06/13/23  0637   POCTGLUCOSE 79 55* 141* 287*     Current correctional scale  Low     Maintain anti-hyperglycemic dose as follows as per Endocrine recs:  Antihyperglycemics (From admission, onward)    Start     Stop Route Frequency Ordered    06/13/23 0900  insulin detemir U-100 (Levemir) pen 20 Units         -- SubQ Daily 06/12/23 1038    06/12/23 1130  insulin aspart U-100 pen 8 Units         -- SubQ 3 times daily with meals 06/12/23 1037    06/05/23 2345  insulin aspart U-100 pen 0-5 Units         -- SubQ Before meals & nightly PRN 06/05/23 2246        Target blood sugar 140-180 in hospital.  Diabetic diet.  Monitor blood sugars 4 times daily with meals and at bedtime.   Appreciate endo assisting as she reports was not controlled at home and likely contributor to poor wound healing of her LLE prior to admit  Insulin dec on 6/12 for am hypoglycemia      VTE Risk Mitigation (From admission, onward)         Ordered     Reason for No Pharmacological VTE Prophylaxis  Once        Question:  Reasons:  Answer:  Risk of Bleeding  Comment:  Ortho planning surgery in AM    06/05/23 2246     IP VTE HIGH RISK PATIENT  Once         06/05/23 2246     Place sequential compression device  Until discontinued         06/05/23 2246                Discharge Planning   CITLALI: 6/12/2023     Code Status: Full Code   Is the patient medically ready for discharge?: Yes    Reason for patient still in hospital (select all that apply): Patient trending condition  Discharge Plan A: Long-term acute care facility (LTAC)                  Sonia Kim MD  Department of Hospital Medicine   [facility] - Surgery

## 2023-06-13 NOTE — ASSESSMENT & PLAN NOTE
Type 2 diabetes mellitus with hyperglycemia, with long-term current use of insulin  Endocrine consulted to assist in management. Patient's FSGs are uncontrolled due to hyperglycemia on current medication regimen.    Last A1c reviewed-   Lab Results   Component Value Date    HGBA1C 10.2 (H) 06/05/2023     Most recent fingerstick glucose reviewed-   Recent Labs   Lab 06/12/23  1210 06/12/23  1610 06/12/23  1747 06/13/23  0637   POCTGLUCOSE 79 55* 141* 287*     Current correctional scale  Low     Maintain anti-hyperglycemic dose as follows as per Endocrine recs:  Antihyperglycemics (From admission, onward)    Start     Stop Route Frequency Ordered    06/13/23 0900  insulin detemir U-100 (Levemir) pen 20 Units         -- SubQ Daily 06/12/23 1038    06/12/23 1130  insulin aspart U-100 pen 8 Units         -- SubQ 3 times daily with meals 06/12/23 1037    06/05/23 2345  insulin aspart U-100 pen 0-5 Units         -- SubQ Before meals & nightly PRN 06/05/23 2246        Target blood sugar 140-180 in hospital.  Diabetic diet.  Monitor blood sugars 4 times daily with meals and at bedtime.   Appreciate endo assisting as she reports was not controlled at home and likely contributor to poor wound healing of her LLE prior to admit  Insulin dec on 6/12 for am hypoglycemia

## 2023-06-13 NOTE — ASSESSMENT & PLAN NOTE
Chronic issue, has failed oral therapies and failed enema and suppository and delayed dc on 6/12.   relistor and oral lactulose given 6/13 and sonny trend to see if relistor helps promote. kub not showing obstruction or dilated bowel loops and no impaction seen, stool burden in colon and nearing rectum. Can also consider linzess.

## 2023-06-13 NOTE — PROGRESS NOTES
[facility] - Surgery  Endocrinology  Progress Note    Admit Date: 6/5/2023     Reason for Consult: Management of T2DM, Hyperglycemia     Surgical Procedure and Date: 6/6/23 Open reduction internal fixation left pilon fracture with fixation of tibia and fibula  External fixator placement left lower extremity    Diabetes diagnosis year: 2018    Home Diabetes Medications:  Levemir 18 units nightly. Novolog is ordered SSI LD; but patient states she does not use.     How often checking glucose at home?  1-3 times per week  (checks at random times)  BG readings on regimen: 200's  Hypoglycemia on the regimen?  No  Missed doses on regimen?  No    Diabetes Complications include:     Hyperglycemia and Diabetic peripheral neuropathy     Complicating diabetes co morbidities:   HTN; HLD;      HPI:   Patient is a 73 y.o. female with a diagnosis of peripheral artery disease s/p revascularization and type 2 diabetes mellitus c/b neuropathy and chronic lower extremity wounds who presents as a transfer from [facility] for distal fractures of the tibia and fibula following a fall.  It was also noted that she had purulent drainage from her lower extremity wounds.  Of note, per chart review, the patient had a left heel bone biopsy performed by her podiatrist on April 27th.  Patient's bone biopsy grew Staphylococcus aureus.  She was seen in the Wound Care Clinic and placed on levofloxacin and doxycycline but did not appear to be followed by the Infectious Disease Service.  She was evaluated by Orthopedic Surgery at [facility] who is planning surgical intervention on June 6th.  Patient was admitted to Hospital Medicine for further management.Endocrinology consulted for BG/ DM management.     Lab Results   Component Value Date    HGBA1C 10.2 (H) 06/05/2023               Interval HPI:   Overnight events: BG has been variable on current SQ insulin regimen. Hypoglycemia noted yesterday afternoon. Plans for d/c soon to  "LTAC. Diet diabetic [facility];  Calorie; Thin    Eatin%  Nausea: No  Hypoglycemia and intervention: No  Fever: No  TPN and/or TF: No  If yes, type of TF/TPN and rate: n/a    /61   Pulse 86   Temp 98.3 °F (36.8 °C)   Resp 16   Ht 5' 2.99" (1.6 m)   Wt 72.9 kg (160 lb 11.5 oz)   SpO2 95%   Breastfeeding No   BMI 28.48 kg/m²     Labs Reviewed and Include    Recent Labs   Lab 23  0607 23  1205   * 207*   CALCIUM 7.0*  --    ALBUMIN 1.6* 2.0*   PROT 4.7* 5.5*   * 127*   K 4.5 5.0   CO2 19* 24   CL 84* 97   BUN 29* 33*   CREATININE 3.7* 1.2   ALKPHOS 81 92   ALT <5* <5*   AST 13 15   BILITOT 0.2 0.2     Lab Results   Component Value Date    WBC 7.12 2023    HGB 8.6 (L) 2023    HCT 26.4 (L) 2023    MCV 89 2023     2023     No results for input(s): TSH, FREET4 in the last 168 hours.  Lab Results   Component Value Date    HGBA1C 10.2 (H) 2023       Nutritional status:   Body mass index is 28.48 kg/m².  Lab Results   Component Value Date    ALBUMIN 2.0 (L) 2023    ALBUMIN 1.6 (L) 2023    ALBUMIN 2.2 (L) 2023     Lab Results   Component Value Date    PREALBUMIN 11 (L) 2023       Estimated Creatinine Clearance: 40.5 mL/min (based on SCr of 1.2 mg/dL).    Accu-Checks  Recent Labs     06/10/23  2227 23  0701 23  1133 23  1521 23  1210 23  1610 23  1747 23  0637 23  0805 23  1108   POCTGLUCOSE 130* 130* 149* 164* 79 55* 141* 287* 311* 226*       Current Medications and/or Treatments Impacting Glycemic Control  Immunotherapy:    Immunosuppressants       None          Steroids:   Hormones (From admission, onward)      Start     Stop Route Frequency Ordered    23  melatonin tablet 9 mg         -- Oral Nightly 23 223          Pressors:    Autonomic Drugs (From admission, onward)      None          Hyperglycemia/Diabetes Medications: "   Antihyperglycemics (From admission, onward)      Start     Stop Route Frequency Ordered    06/13/23 1130  insulin aspart U-100 pen 6 Units         -- SubQ 3 times daily with meals 06/13/23 0855    06/13/23 0900  insulin detemir U-100 (Levemir) pen 20 Units         -- SubQ Daily 06/12/23 1038    06/05/23 2345  insulin aspart U-100 pen 0-5 Units         -- SubQ Before meals & nightly PRN 06/05/23 2246            ASSESSMENT and PLAN    Endocrine  Type 2 diabetes mellitus with foot ulcer, with long-term current use of insulin  Endocrinology consulted for BG management.   BG goal 140-180    -Decrease Levemir (Insulin Detemir) to 20 units daily (20% dose decrease; fasting BG below goal)   -Decrease Novolog (Insulin Aspart) to 6 units TIDWM and prn for BG excursions LDC SSI (150/50) Basal/Prandial match   - BG checks AC/HS  - Hypoglycemia protocol in place  - If blood glucose greater than 300, please ask patient not to eat food or drink anything other than water until correctional insulin has brought it back below 250    ** Please notify Endocrine for any change and/or advance in diet**  ** Please call Endocrine for any BG related issues **    Discharge Planning:   Recommend d/c on the above inpatient regimen.       Orthopedic  * Closed displaced pilon fracture of left tibia with routine healing  Managed per primary.   Optimize BG control to improve wound healing        Chronic ulcer of left heel with fat layer exposed  Optimize BG control.   Optimize BG control to improve wound healing          Hannah Chappell NP  Endocrinology  [facility] - Surgery

## 2023-06-14 VITALS
DIASTOLIC BLOOD PRESSURE: 65 MMHG | TEMPERATURE: 98 F | HEIGHT: 63 IN | HEART RATE: 87 BPM | RESPIRATION RATE: 16 BRPM | SYSTOLIC BLOOD PRESSURE: 141 MMHG | BODY MASS INDEX: 28.47 KG/M2 | WEIGHT: 160.69 LBS | OXYGEN SATURATION: 98 %

## 2023-06-14 LAB
ALBUMIN SERPL BCP-MCNC: 2 G/DL (ref 3.5–5.2)
ALP SERPL-CCNC: 90 U/L (ref 55–135)
ALT SERPL W/O P-5'-P-CCNC: <5 U/L (ref 10–44)
ANION GAP SERPL CALC-SCNC: 7 MMOL/L (ref 8–16)
AST SERPL-CCNC: 18 U/L (ref 10–40)
BASOPHILS # BLD AUTO: 0.06 K/UL (ref 0–0.2)
BASOPHILS NFR BLD: 0.7 % (ref 0–1.9)
BILIRUB SERPL-MCNC: 0.2 MG/DL (ref 0.1–1)
BUN SERPL-MCNC: 35 MG/DL (ref 8–23)
CALCIUM SERPL-MCNC: 8.8 MG/DL (ref 8.7–10.5)
CHLORIDE SERPL-SCNC: 99 MMOL/L (ref 95–110)
CO2 SERPL-SCNC: 22 MMOL/L (ref 23–29)
CREAT SERPL-MCNC: 1.1 MG/DL (ref 0.5–1.4)
DIFFERENTIAL METHOD: ABNORMAL
EOSINOPHIL # BLD AUTO: 0.1 K/UL (ref 0–0.5)
EOSINOPHIL NFR BLD: 1.5 % (ref 0–8)
ERYTHROCYTE [DISTWIDTH] IN BLOOD BY AUTOMATED COUNT: 13.8 % (ref 11.5–14.5)
EST. GFR  (NO RACE VARIABLE): 53.4 ML/MIN/1.73 M^2
GLUCOSE SERPL-MCNC: 84 MG/DL (ref 70–110)
HCT VFR BLD AUTO: 26.2 % (ref 37–48.5)
HGB BLD-MCNC: 8.4 G/DL (ref 12–16)
IMM GRANULOCYTES # BLD AUTO: 0.38 K/UL (ref 0–0.04)
IMM GRANULOCYTES NFR BLD AUTO: 4.3 % (ref 0–0.5)
LYMPHOCYTES # BLD AUTO: 1.4 K/UL (ref 1–4.8)
LYMPHOCYTES NFR BLD: 16 % (ref 18–48)
MCH RBC QN AUTO: 28.7 PG (ref 27–31)
MCHC RBC AUTO-ENTMCNC: 32.1 G/DL (ref 32–36)
MCV RBC AUTO: 89 FL (ref 82–98)
MONOCYTES # BLD AUTO: 1.4 K/UL (ref 0.3–1)
MONOCYTES NFR BLD: 15.4 % (ref 4–15)
NEUTROPHILS # BLD AUTO: 5.6 K/UL (ref 1.8–7.7)
NEUTROPHILS NFR BLD: 62.1 % (ref 38–73)
NRBC BLD-RTO: 0 /100 WBC
PLATELET # BLD AUTO: 369 K/UL (ref 150–450)
PMV BLD AUTO: 11.3 FL (ref 9.2–12.9)
POCT GLUCOSE: 106 MG/DL (ref 70–110)
POCT GLUCOSE: 181 MG/DL (ref 70–110)
POTASSIUM SERPL-SCNC: 5 MMOL/L (ref 3.5–5.1)
PROT SERPL-MCNC: 5.6 G/DL (ref 6–8.4)
RBC # BLD AUTO: 2.93 M/UL (ref 4–5.4)
SODIUM SERPL-SCNC: 128 MMOL/L (ref 136–145)
WBC # BLD AUTO: 8.94 K/UL (ref 3.9–12.7)

## 2023-06-14 PROCEDURE — 25000003 PHARM REV CODE 250: Performed by: HOSPITALIST

## 2023-06-14 PROCEDURE — 25000003 PHARM REV CODE 250: Performed by: STUDENT IN AN ORGANIZED HEALTH CARE EDUCATION/TRAINING PROGRAM

## 2023-06-14 PROCEDURE — 25000003 PHARM REV CODE 250: Performed by: INTERNAL MEDICINE

## 2023-06-14 PROCEDURE — 80053 COMPREHEN METABOLIC PANEL: CPT | Performed by: HOSPITALIST

## 2023-06-14 PROCEDURE — 25000003 PHARM REV CODE 250

## 2023-06-14 PROCEDURE — 99239 HOSP IP/OBS DSCHRG MGMT >30: CPT | Mod: ,,, | Performed by: HOSPITALIST

## 2023-06-14 PROCEDURE — 99239 PR HOSPITAL DISCHARGE DAY,>30 MIN: ICD-10-PCS | Mod: ,,, | Performed by: HOSPITALIST

## 2023-06-14 PROCEDURE — 63600175 PHARM REV CODE 636 W HCPCS: Performed by: HOSPITALIST

## 2023-06-14 PROCEDURE — 85025 COMPLETE CBC W/AUTO DIFF WBC: CPT | Performed by: HOSPITALIST

## 2023-06-14 PROCEDURE — A4216 STERILE WATER/SALINE, 10 ML: HCPCS | Performed by: HOSPITALIST

## 2023-06-14 RX ORDER — CIPROFLOXACIN 750 MG/1
750 TABLET, FILM COATED ORAL EVERY 12 HOURS
Status: DISCONTINUED | OUTPATIENT
Start: 2023-06-14 | End: 2023-06-14 | Stop reason: HOSPADM

## 2023-06-14 RX ADMIN — CEFAZOLIN 2 G: 2 INJECTION, POWDER, FOR SOLUTION INTRAMUSCULAR; INTRAVENOUS at 05:06

## 2023-06-14 RX ADMIN — INSULIN ASPART 6 UNITS: 100 INJECTION, SOLUTION INTRAVENOUS; SUBCUTANEOUS at 11:06

## 2023-06-14 RX ADMIN — OXYCODONE HYDROCHLORIDE 10 MG: 10 TABLET ORAL at 11:06

## 2023-06-14 RX ADMIN — PREGABALIN 75 MG: 75 CAPSULE ORAL at 09:06

## 2023-06-14 RX ADMIN — CIPROFLOXACIN 750 MG: 750 TABLET, FILM COATED ORAL at 09:06

## 2023-06-14 RX ADMIN — CHOLECALCIFEROL TAB 25 MCG (1000 UNIT) 1000 UNITS: 25 TAB at 09:06

## 2023-06-14 RX ADMIN — Medication 10 ML: at 12:06

## 2023-06-14 RX ADMIN — ASPIRIN 81 MG: 81 TABLET, COATED ORAL at 09:06

## 2023-06-14 RX ADMIN — ACETAMINOPHEN 1000 MG: 500 TABLET ORAL at 05:06

## 2023-06-14 RX ADMIN — Medication 10 ML: at 06:06

## 2023-06-14 RX ADMIN — METHOCARBAMOL 500 MG: 500 TABLET ORAL at 09:06

## 2023-06-14 RX ADMIN — CLOPIDOGREL BISULFATE 75 MG: 75 TABLET ORAL at 09:06

## 2023-06-14 RX ADMIN — LISINOPRIL 5 MG: 5 TABLET ORAL at 09:06

## 2023-06-14 RX ADMIN — LACTULOSE 20 G: 20 SOLUTION ORAL at 09:06

## 2023-06-14 NOTE — PHYSICIAN QUERY
"PT Name: Anastasiia Badillo  MR #: [id number]    DOCUMENTATION CLARIFICATION     CDS:  Rachel GAGNON, RN   Contact information:  [email or web address]  Query Date: June 14, 2023  By submitting this query, we are merely seeking further clarification of documentation. Please utilize your independent clinical judgment when addressing the question(s) below.    The Medical Record contains the following:   Indicator Supporting Clinical Findings Location in Medical Record   x Documentation of "Debridement" With patient consent excisional debridment of Left heel ulceration preformed without incident.     Nonviable tissues were debrided beyond the level of sub Q utilizing a  sterile No. 15 scalpel and forceps.  PN 6/7/2023      PN 6/7/2023    Documentation of "I&D"      Other       Excisional debridement is the surgical removal or cutting away of such tissue, necrosis, or slough and is classified to the root operation "Excision." Use of a sharp instrument does not always indicate that an excisional debridement was performed. Minor removal of loose fragments with scissors or using a sharp instrument to scrape away tissue is not an excisional debridement. Excisional debridement involves the use of a scalpel to remove devitalized tissue.  Nonexcisional debridement is the nonoperative brushing, irrigating, scrubbing, or washing of devitalized tissue, necrosis, slough, or foreign material. Most nonexcisional debridement procedures are classified to the root operation "Extraction" (pulling or stripping out or off all or a portion of a body part by the use of force).     Provider, please provide the depth of the excisional debridement of the left heel ulceration:    [  x ] Excisional Debridement of subcutaneous tissue and/or fascia   [   ] Excisional Debridement of muscle   [   ] Excisional Debridement of tendon   [    ] Excisional Debridement of  other (please specify):      Reference:    ICD-10-CM/PCS Coding Clinic Third " Quarter ICD-10, Effective with discharges: October 7, 2015 [facility] [employer] § Excisional and nonexcisional debridement (2015).    Form No. 86303

## 2023-06-14 NOTE — NURSING
Patient received oral lactulose and a subcutaneous shot of Relistor during shift, but still did not produce a bowel movement. Patient will receive a sodium phosphate enema tonight if there is no bowel movement by 11pm. No acute distress noted. Will report to oncoming nurse.

## 2023-06-14 NOTE — PLAN OF CARE
[facility]  Discharge Final Note    Primary Care Provider: Raji Parkinson MD    Expected Discharge Date: 6/14/2023    Final Discharge Note (most recent)       Final Note - 06/14/23 1215          Final Note    Assessment Type Final Discharge Note     Anticipated Discharge Disposition Long Term Acute Care   [facility]    Hospital Resources/Appts/Education Provided Provided patient/caregiver with written discharge plan information;Appointments scheduled by Navigator/Coordinator                   Future Appointments   Date Time Provider Department Center   6/20/2023 11:15 AM Ranjit Pascal NP [facility]   6/28/2023 10:00 AM [facility] [id number] 485 LB LIMIT [facility]   6/28/2023 10:30 AM Gilbert Mahmood MD [facility]   7/27/2023  1:00 PM Leonardo Johns NP [facility] ID [facility]   8/30/2023  3:20 PM Raji Parkinson MD [facility]     Important Message from Medicare  Important Message from Medicare regarding Discharge Appeal Rights: Given to patient/caregiver, Explained to patient/caregiver, Signed/date by patient/caregiver     Date IMM was signed: 06/12/23  Time IMM was signed: 1108    Contact Info       [facility] [address]   Specialty: Orthopedics    [address]  [facility] [address]   Phone: [phone]       Next Steps: Follow up    Instructions: 1 week follow up for wound check and xray    [id number]   Specialty: Podiatry    [address]  [facility] [address]   Phone: [phone]       Next Steps: Follow up    Instructions: needs 2 week follow up on hospital discharge

## 2023-06-14 NOTE — PLAN OF CARE
Spoke with Mariela from [facility].  Sent them updated Facility Transfer orders through [facility]\""  Mariela to call me back with nurse report number and transfer time.      - 11:14a  Mariela with [facility]. request pt transfer  Ambulance transportation set up through [facility] for 12:15pm  Nurse to uziel report to [phone]    Notified patient and Nurse Marsha of above.

## 2023-06-14 NOTE — PROGRESS NOTES
[facility]  Orthopedics  Progress Note    Patient Name: Anastasiia Badillo  MRN: [id number]  Admission Date: 6/5/2023  Hospital Length of Stay: 9 days  Attending Provider: Sonia Kim MD  Primary Care Provider: Raji Parkinson MD  Follow-up For: Procedure(s) (LRB):  ORIF, FRACTURE, PILON, LEFT (Left)  APPLICATION, EXTERNAL FIXATION DEVICE, LEFT ANKLE, [place] (Left)    Post-Operative Day: 5 Days Post-Op  Subjective:     Principal Problem:Closed displaced pilon fracture of left tibia    Principal Orthopedic Problem: Same    Interval History: Afebrile, VSS, NAEON.  Pain has been reportedly well controlled.  Pin sites clean/dry.      Review of patient's allergies indicates:  No Known Allergies    Current Facility-Administered Medications   Medication    acetaminophen tablet 1,000 mg    aspirin EC tablet 81 mg    atorvastatin tablet 80 mg    ceFAZolin 2 g in dextrose 5 % in water (D5W) 5 % 50 mL IVPB (MB+)    ciprofloxacin HCl tablet 750 mg    clopidogreL tablet 75 mg    dextrose 10% bolus 125 mL 125 mL    dextrose 10% bolus 250 mL 250 mL    famotidine tablet 20 mg    glucagon (human recombinant) injection 1 mg    insulin aspart U-100 pen 0-5 Units    insulin aspart U-100 pen 6 Units    insulin detemir U-100 (Levemir) pen 20 Units    lactulose 20 gram/30 mL solution Soln 20 g    linaCLOtide capsule 145 mcg    lisinopriL tablet 5 mg    melatonin tablet 9 mg    methocarbamoL tablet 500 mg    mupirocin 2 % ointment    naloxone 0.4 mg/mL injection 0.02 mg    ondansetron disintegrating tablet 4 mg    oxyCODONE immediate release tablet 5 mg    oxyCODONE immediate release tablet Tab 10 mg    polyethylene glycol packet 17 g    pregabalin capsule 75 mg    senna-docusate 8.6-50 mg per tablet 1 tablet    simethicone chewable tablet 80 mg    sodium chloride 0.9% flush 10 mL    sodium chloride 0.9% flush 10 mL    And    sodium chloride 0.9% flush 10 mL    sodium phosphates 19-7 gram/118 mL enema 1 enema    vitamin D  "1000 units tablet 1,000 Units     Objective:     Vital Signs (Most Recent):  Temp: 98.8 °F (37.1 °C) (06/14/23 0822)  Pulse: 87 (06/14/23 0822)  Resp: 16 (06/14/23 0822)  BP: (!) 140/68 (06/14/23 0822)  SpO2: 96 % (06/14/23 0822) Vital Signs (24h Range):  Temp:  [97.3 °F (36.3 °C)-98.8 °F (37.1 °C)] 98.8 °F (37.1 °C)  Pulse:  [83-90] 87  Resp:  [16] 16  SpO2:  [95 %-98 %] 96 %  BP: (109-140)/(55-68) 140/68     Weight: 72.9 kg (160 lb 11.5 oz)  Height: 5' 2.99" (160 cm)  Body mass index is 28.48 kg/m².      Intake/Output Summary (Last 24 hours) at 6/14/2023 0908  Last data filed at 6/13/2023 1840  Gross per 24 hour   Intake 286 ml   Output --   Net 286 ml       Physical Exam  Ortho/SPM Exam  Gen: NAD, sitting comfortably in bed  CV: regular rate  Resp: non-labored respirations     LLE:  Heel and great toe wounds are dressed   Ex fix in place, pin sites clean and dry w no signs of infection  Surgical incision dressing c/d/i   NVI to baseline- neuropathic to calf, can wiggles toes, foot and toes pink wwp    Significant Labs: A1C:   Recent Labs   Lab 02/17/23  1415 04/21/23  0429 06/05/23  1957   HGBA1C 8.9* 8.2* 10.2*     CBC:   Recent Labs   Lab 06/13/23  0607 06/13/23  1205 06/14/23  0543   WBC 6.57 7.12 8.94   HGB 7.6* 8.6* 8.4*   HCT 24.4* 26.4* 26.2*    319 369     CMP:   Recent Labs   Lab 06/13/23  0607 06/13/23  1205 06/14/23  0543   * 127* 128*   K 4.5 5.0 5.0   CL 84* 97 99   CO2 19* 24 22*   * 207* 84   BUN 29* 33* 35*   CREATININE 3.7* 1.2 1.1   CALCIUM 7.0* 8.6* 8.8   PROT 4.7* 5.5* 5.6*   ALBUMIN 1.6* 2.0* 2.0*   BILITOT 0.2 0.2 0.2   ALKPHOS 81 92 90   AST 13 15 18   ALT <5* <5* <5*   ANIONGAP 18* 6* 7*     POCT Glucose:   Recent Labs   Lab 06/13/23  0805 06/13/23  1108 06/13/23  1518   POCTGLUCOSE 311* 226* 96       All pertinent labs within the past 24 hours have been reviewed.      Significant Imaging: I have reviewed and interpreted all pertinent imaging results/findings. 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 She is closed and neurovascularly intact to baseline. She has diabetic foot wounds over her plantar heel and great toe.     -SP operative fixation of left pilon and fibula, left ankle external fixator placement 6/6/23 w Dr Mahmood    Multimodal pain regimen  NWB LLE in ex fix x3 months postop, elevate  BID pin site care  DVT ppx w ASA 81 bid x 12 weeks postop  PT/OT daily  Abx per ID recs  Wound care for left heel/great toe wounds per podiatry   BG management per Endocrine   FU 2wk postop scheduled for 6/20/23    Dispo: DC to LTAC today pending BM         TIFFANY Shelby MD  Orthopedics  [facility] - Surgery

## 2023-06-14 NOTE — PROGRESS NOTES
Pharmacist Renal Dose Adjustment Note    Anastasiia Baidllo is a 73 y.o. female being treated with the medication ciprofloxacin     Patient Data:    Vital Signs (Most Recent):  Temp: 98.1 °F (36.7 °C) (06/14/23 0509)  Pulse: 83 (06/14/23 0509)  Resp: 16 (06/14/23 0509)  BP: (!) 109/55 (06/14/23 0509)  SpO2: 96 % (06/14/23 0509) Vital Signs (72h Range):  Temp:  [97 °F (36.1 °C)-99.4 °F (37.4 °C)]   Pulse:  [78-98]   Resp:  [16-18]   BP: ()/(53-76)   SpO2:  [92 %-99 %]      Recent Labs   Lab 06/13/23  0607 06/13/23  1205 06/14/23  0543   CREATININE 3.7* 1.2 1.1     Serum creatinine: 1.1 mg/dL 06/14/23 0543  Estimated creatinine clearance: 44.2 mL/min    Ciprofloxacin 750 mg every 24 hr will be changed to ciprofloxacin 750 mg every 12 hrs.       Pharmacist's Name: Salma Barkley  Pharmacist's Extension: [phone]

## 2023-06-14 NOTE — PROGRESS NOTES
Pharmacist Renal Dose Adjustment Note    Anastasiia Badillo is a 73 y.o. female being treated with the medication cefazolin.     Patient Data:    Vital Signs (Most Recent):  Temp: 98.1 °F (36.7 °C) (06/14/23 0509)  Pulse: 83 (06/14/23 0509)  Resp: 16 (06/14/23 0509)  BP: (!) 109/55 (06/14/23 0509)  SpO2: 96 % (06/14/23 0509) Vital Signs (72h Range):  Temp:  [97 °F (36.1 °C)-99.4 °F (37.4 °C)]   Pulse:  [78-98]   Resp:  [16-18]   BP: ()/(53-76)   SpO2:  [92 %-99 %]      Recent Labs   Lab 06/13/23  0607 06/13/23  1205 06/14/23  0543   CREATININE 3.7* 1.2 1.1     Serum creatinine: 1.1 mg/dL 06/14/23 0543  Estimated creatinine clearance: 44.2 mL/min    Cefazolin 2g every 12 hr will be changed to cefazolin 2g every 8 hrs.       Pharmacist's Name: Salma Barkley  Pharmacist's Extension: [phone]

## 2023-06-15 ENCOUNTER — DOCUMENT SCAN (OUTPATIENT)
Dept: HOME HEALTH SERVICES | Facility: HOSPITAL | Age: 73
End: 2023-06-15
Payer: MEDICARE

## 2023-06-24 ENCOUNTER — ANESTHESIA EVENT (OUTPATIENT)
Dept: CARDIOLOGY | Facility: HOSPITAL | Age: 73
DRG: 853 | End: 2023-06-24
Payer: MEDICARE

## 2023-06-24 ENCOUNTER — ANESTHESIA (OUTPATIENT)
Dept: INTENSIVE CARE | Facility: HOSPITAL | Age: 73
DRG: 853 | End: 2023-06-24
Payer: MEDICARE

## 2023-06-24 ENCOUNTER — CLINICAL SUPPORT (OUTPATIENT)
Dept: CARDIOLOGY | Facility: HOSPITAL | Age: 73
DRG: 853 | End: 2023-06-24
Attending: INTERNAL MEDICINE
Payer: MEDICARE

## 2023-06-24 ENCOUNTER — ANESTHESIA EVENT (OUTPATIENT)
Dept: INTENSIVE CARE | Facility: HOSPITAL | Age: 73
DRG: 853 | End: 2023-06-24
Payer: MEDICARE

## 2023-06-24 ENCOUNTER — HOSPITAL ENCOUNTER (INPATIENT)
Facility: HOSPITAL | Age: 73
LOS: 13 days | Discharge: NURSING, REHAB OR LONG-TERM CARE | DRG: 853 | End: 2023-07-07
Attending: STUDENT IN AN ORGANIZED HEALTH CARE EDUCATION/TRAINING PROGRAM | Admitting: INTERNAL MEDICINE
Payer: MEDICARE

## 2023-06-24 ENCOUNTER — ANESTHESIA (OUTPATIENT)
Dept: CARDIOLOGY | Facility: HOSPITAL | Age: 73
DRG: 853 | End: 2023-06-24
Payer: MEDICARE

## 2023-06-24 DIAGNOSIS — N39.0 URINARY TRACT INFECTION WITHOUT HEMATURIA, SITE UNSPECIFIED: ICD-10-CM

## 2023-06-24 DIAGNOSIS — L97.509 TYPE 2 DIABETES MELLITUS WITH FOOT ULCER, WITH LONG-TERM CURRENT USE OF INSULIN: ICD-10-CM

## 2023-06-24 DIAGNOSIS — L97.423 SKIN ULCER OF LEFT HEEL WITH NECROSIS OF MUSCLE: ICD-10-CM

## 2023-06-24 DIAGNOSIS — L97.422 CHRONIC ULCER OF LEFT HEEL WITH FAT LAYER EXPOSED: ICD-10-CM

## 2023-06-24 DIAGNOSIS — I95.9 HYPOTENSION: ICD-10-CM

## 2023-06-24 DIAGNOSIS — R57.9 SHOCK: ICD-10-CM

## 2023-06-24 DIAGNOSIS — N39.0 UTI (URINARY TRACT INFECTION): ICD-10-CM

## 2023-06-24 DIAGNOSIS — R65.21 SEPTIC SHOCK: ICD-10-CM

## 2023-06-24 DIAGNOSIS — Z79.4 TYPE 2 DIABETES MELLITUS WITH FOOT ULCER, WITH LONG-TERM CURRENT USE OF INSULIN: ICD-10-CM

## 2023-06-24 DIAGNOSIS — I48.91 A-FIB: ICD-10-CM

## 2023-06-24 DIAGNOSIS — E11.621 DIABETIC FOOT ULCER: Primary | ICD-10-CM

## 2023-06-24 DIAGNOSIS — E11.621 TYPE 2 DIABETES MELLITUS WITH FOOT ULCER, WITH LONG-TERM CURRENT USE OF INSULIN: ICD-10-CM

## 2023-06-24 DIAGNOSIS — D64.9 ANEMIA, UNSPECIFIED TYPE: ICD-10-CM

## 2023-06-24 DIAGNOSIS — L97.509 DIABETIC FOOT ULCER: Primary | ICD-10-CM

## 2023-06-24 DIAGNOSIS — I49.9 ARRHYTHMIA: ICD-10-CM

## 2023-06-24 DIAGNOSIS — R07.9 CHEST PAIN: ICD-10-CM

## 2023-06-24 DIAGNOSIS — A41.9 SEPTIC SHOCK: ICD-10-CM

## 2023-06-24 PROBLEM — N17.0 ATN (ACUTE TUBULAR NECROSIS): Status: ACTIVE | Noted: 2023-06-24

## 2023-06-24 PROBLEM — R53.1 WEAKNESS: Status: ACTIVE | Noted: 2023-06-24

## 2023-06-24 PROBLEM — G93.41 ENCEPHALOPATHY, METABOLIC: Status: ACTIVE | Noted: 2023-06-24

## 2023-06-24 PROBLEM — E11.10 DKA, TYPE 2: Status: ACTIVE | Noted: 2023-06-24

## 2023-06-24 PROBLEM — D65: Status: ACTIVE | Noted: 2023-06-24

## 2023-06-24 LAB
ABO + RH BLD: NORMAL
ALBUMIN SERPL BCP-MCNC: 1.8 G/DL (ref 3.5–5.2)
ALLENS TEST: ABNORMAL
ALP SERPL-CCNC: 59 U/L (ref 55–135)
ALT SERPL W/O P-5'-P-CCNC: <5 U/L (ref 10–44)
ANION GAP SERPL CALC-SCNC: 11 MMOL/L (ref 8–16)
ANION GAP SERPL CALC-SCNC: 12 MMOL/L (ref 8–16)
ANION GAP SERPL CALC-SCNC: 18 MMOL/L (ref 8–16)
ANION GAP SERPL CALC-SCNC: 9 MMOL/L (ref 8–16)
APTT PPP: 55 SEC (ref 21–32)
APTT PPP: 58.7 SEC (ref 21–32)
AST SERPL-CCNC: 19 U/L (ref 10–40)
BACTERIA #/AREA URNS HPF: NEGATIVE /HPF
BASOPHILS # BLD AUTO: 0.05 K/UL (ref 0–0.2)
BASOPHILS NFR BLD: 0.2 % (ref 0–1.9)
BILIRUB SERPL-MCNC: 0.6 MG/DL (ref 0.1–1)
BILIRUB UR QL STRIP: NEGATIVE
BLD GP AB SCN CELLS X3 SERPL QL: NORMAL
BLD PROD TYP BPU: NORMAL
BLOOD UNIT EXPIRATION DATE: NORMAL
BLOOD UNIT TYPE CODE: 5100
BLOOD UNIT TYPE: NORMAL
BNP SERPL-MCNC: 106 PG/ML (ref 0–99)
BUN SERPL-MCNC: 82 MG/DL (ref 8–23)
BUN SERPL-MCNC: 83 MG/DL (ref 8–23)
BUN SERPL-MCNC: 86 MG/DL (ref 8–23)
BUN SERPL-MCNC: 90 MG/DL (ref 8–23)
CALCIUM SERPL-MCNC: 6.9 MG/DL (ref 8.7–10.5)
CALCIUM SERPL-MCNC: 7 MG/DL (ref 8.7–10.5)
CALCIUM SERPL-MCNC: 7.2 MG/DL (ref 8.7–10.5)
CALCIUM SERPL-MCNC: 7.4 MG/DL (ref 8.7–10.5)
CHLORIDE SERPL-SCNC: 104 MMOL/L (ref 95–110)
CHLORIDE SERPL-SCNC: 107 MMOL/L (ref 95–110)
CHLORIDE SERPL-SCNC: 108 MMOL/L (ref 95–110)
CHLORIDE SERPL-SCNC: 108 MMOL/L (ref 95–110)
CLARITY UR: ABNORMAL
CO2 SERPL-SCNC: 11 MMOL/L (ref 23–29)
CO2 SERPL-SCNC: 14 MMOL/L (ref 23–29)
CO2 SERPL-SCNC: 16 MMOL/L (ref 23–29)
CO2 SERPL-SCNC: 6 MMOL/L (ref 23–29)
CODING SYSTEM: NORMAL
COLOR UR: ABNORMAL
CREAT SERPL-MCNC: 3 MG/DL (ref 0.5–1.4)
CREAT SERPL-MCNC: 3.1 MG/DL (ref 0.5–1.4)
CROSSMATCH INTERPRETATION: NORMAL
DELSYS: ABNORMAL
DIFFERENTIAL METHOD: ABNORMAL
DISPENSE STATUS: NORMAL
EOSINOPHIL # BLD AUTO: 0.1 K/UL (ref 0–0.5)
EOSINOPHIL NFR BLD: 0.6 % (ref 0–8)
ERYTHROCYTE [DISTWIDTH] IN BLOOD BY AUTOMATED COUNT: 15 % (ref 11.5–14.5)
ERYTHROCYTE [SEDIMENTATION RATE] IN BLOOD BY WESTERGREN METHOD: 12 MM/H
EST. GFR  (NO RACE VARIABLE): 15.4 ML/MIN/1.73 M^2
EST. GFR  (NO RACE VARIABLE): 16 ML/MIN/1.73 M^2
FERRITIN SERPL-MCNC: 815 NG/ML (ref 20–300)
FIO2: 21
FLOW: 0
FLOW: 3
GIANT PLATELETS BLD QL SMEAR: PRESENT
GLUCOSE SERPL-MCNC: 147 MG/DL (ref 70–110)
GLUCOSE SERPL-MCNC: 180 MG/DL (ref 70–110)
GLUCOSE SERPL-MCNC: 200 MG/DL (ref 70–110)
GLUCOSE SERPL-MCNC: 211 MG/DL (ref 70–110)
GLUCOSE SERPL-MCNC: 227 MG/DL (ref 70–110)
GLUCOSE SERPL-MCNC: 229 MG/DL (ref 70–110)
GLUCOSE SERPL-MCNC: 253 MG/DL (ref 70–110)
GLUCOSE SERPL-MCNC: 268 MG/DL (ref 70–110)
GLUCOSE SERPL-MCNC: 270 MG/DL (ref 70–110)
GLUCOSE SERPL-MCNC: 306 MG/DL (ref 70–110)
GLUCOSE SERPL-MCNC: 317 MG/DL (ref 70–110)
GLUCOSE SERPL-MCNC: 332 MG/DL (ref 70–110)
GLUCOSE SERPL-MCNC: 89 MG/DL (ref 70–110)
GLUCOSE UR QL STRIP: NEGATIVE
HCO3 UR-SCNC: 11 MMOL/L (ref 24–28)
HCO3 UR-SCNC: 11.4 MMOL/L (ref 24–28)
HCO3 UR-SCNC: 6.4 MMOL/L (ref 24–28)
HCO3 UR-SCNC: 9.6 MMOL/L (ref 24–28)
HCO3 UR-SCNC: 9.8 MMOL/L (ref 24–28)
HCT VFR BLD AUTO: 20 % (ref 37–48.5)
HCT VFR BLD AUTO: 26 % (ref 37–48.5)
HGB BLD-MCNC: 6.7 G/DL (ref 12–16)
HGB BLD-MCNC: 8.6 G/DL (ref 12–16)
HGB BLD-MCNC: 8.8 G/DL (ref 12–16)
HGB UR QL STRIP: ABNORMAL
HYALINE CASTS #/AREA URNS LPF: 19 /LPF
HYPOCHROMIA BLD QL SMEAR: ABNORMAL
IMM GRANULOCYTES # BLD AUTO: 0.4 K/UL (ref 0–0.04)
IMM GRANULOCYTES NFR BLD AUTO: 1.6 % (ref 0–0.5)
INR PPP: 4.4 (ref 0.8–1.2)
INR PPP: 7 (ref 0.8–1.2)
IRON SERPL-MCNC: 24 UG/DL (ref 30–160)
KETONES UR QL STRIP: NEGATIVE
LACTATE SERPL-SCNC: 1.1 MMOL/L (ref 0.5–1.9)
LACTATE SERPL-SCNC: 1.1 MMOL/L (ref 0.5–1.9)
LACTATE SERPL-SCNC: 1.3 MMOL/L (ref 0.5–1.9)
LEUKOCYTE ESTERASE UR QL STRIP: ABNORMAL
LYMPHOCYTES # BLD AUTO: 0.8 K/UL (ref 1–4.8)
LYMPHOCYTES NFR BLD: 3.2 % (ref 18–48)
MCH RBC QN AUTO: 29 PG (ref 27–31)
MCHC RBC AUTO-ENTMCNC: 33.5 G/DL (ref 32–36)
MCV RBC AUTO: 87 FL (ref 82–98)
MICROSCOPIC COMMENT: ABNORMAL
MODE: ABNORMAL
MONOCYTES # BLD AUTO: 2.2 K/UL (ref 0.3–1)
MONOCYTES NFR BLD: 8.8 % (ref 4–15)
MRSA SCREEN BY PCR: NEGATIVE
NEUTROPHILS # BLD AUTO: 21 K/UL (ref 1.8–7.7)
NEUTROPHILS NFR BLD: 85.6 % (ref 38–73)
NITRITE UR QL STRIP: NEGATIVE
NRBC BLD-RTO: 0 /100 WBC
NUM UNITS TRANS PACKED RBC: NORMAL
OB PNL STL: POSITIVE
PCO2 BLDA: 19.8 MMHG (ref 35–45)
PCO2 BLDA: 22.2 MMHG (ref 35–45)
PCO2 BLDA: 23.4 MMHG (ref 35–45)
PCO2 BLDA: 24.7 MMHG (ref 35–45)
PCO2 BLDA: 29.9 MMHG (ref 35–45)
PH SMN: 7.12 [PH] (ref 7.35–7.45)
PH SMN: 7.17 [PH] (ref 7.35–7.45)
PH SMN: 7.2 [PH] (ref 7.35–7.45)
PH SMN: 7.25 [PH] (ref 7.35–7.45)
PH SMN: 7.3 [PH] (ref 7.35–7.45)
PH UR STRIP: 6 [PH] (ref 5–8)
PLATELET # BLD AUTO: 396 K/UL (ref 150–450)
PLATELET BLD QL SMEAR: ABNORMAL
PMV BLD AUTO: 11.4 FL (ref 9.2–12.9)
PO2 BLDA: 109 MMHG (ref 80–100)
PO2 BLDA: 117 MMHG (ref 80–100)
PO2 BLDA: 42 MMHG (ref 80–100)
PO2 BLDA: 82 MMHG (ref 80–100)
PO2 BLDA: 86 MMHG (ref 80–100)
POC BE: -15 MMOL/L
POC BE: -17 MMOL/L
POC BE: -17 MMOL/L
POC BE: -19 MMOL/L
POC BE: -23 MMOL/L
POC SATURATED O2: 65 % (ref 95–100)
POC SATURATED O2: 94 % (ref 95–100)
POC SATURATED O2: 95 % (ref 95–100)
POC SATURATED O2: 97 % (ref 95–100)
POC SATURATED O2: 97 % (ref 95–100)
POC TCO2: 10 MMOL/L (ref 23–27)
POC TCO2: 10 MMOL/L (ref 23–27)
POC TCO2: 12 MMOL/L (ref 23–27)
POC TCO2: 12 MMOL/L (ref 23–27)
POC TCO2: 7 MMOL/L (ref 23–27)
POTASSIUM SERPL-SCNC: 3.3 MMOL/L (ref 3.5–5.1)
POTASSIUM SERPL-SCNC: 3.5 MMOL/L (ref 3.5–5.1)
POTASSIUM SERPL-SCNC: 4.2 MMOL/L (ref 3.5–5.1)
POTASSIUM SERPL-SCNC: 4.7 MMOL/L (ref 3.5–5.1)
PROCALCITONIN SERPL IA-MCNC: 0.99 NG/ML (ref 0–0.5)
PROT SERPL-MCNC: 5 G/DL (ref 6–8.4)
PROT UR QL STRIP: ABNORMAL
PROTHROMBIN TIME: 45 SEC (ref 9–12.5)
PROTHROMBIN TIME: 68.6 SEC (ref 9–12.5)
RBC # BLD AUTO: 2.31 M/UL (ref 4–5.4)
RBC #/AREA URNS HPF: 1 /HPF (ref 0–4)
SAMPLE: ABNORMAL
SATURATED IRON: ABNORMAL % (ref 20–50)
SITE: ABNORMAL
SODIUM SERPL-SCNC: 131 MMOL/L (ref 136–145)
SP GR UR STRIP: 1.01 (ref 1–1.03)
SP02: 100
SPECIMEN OUTDATE: NORMAL
SQUAMOUS #/AREA URNS HPF: 38 /HPF
TOTAL IRON BINDING CAPACITY: ABNORMAL UG/DL (ref 250–450)
TRANSFERRIN SERPL-MCNC: <70 MG/DL (ref 200–375)
URN SPEC COLLECT METH UR: ABNORMAL
UROBILINOGEN UR STRIP-ACNC: NEGATIVE EU/DL
WBC # BLD AUTO: 24.48 K/UL (ref 3.9–12.7)
WBC #/AREA URNS HPF: 23 /HPF (ref 0–5)

## 2023-06-24 PROCEDURE — 84466 ASSAY OF TRANSFERRIN: CPT | Performed by: STUDENT IN AN ORGANIZED HEALTH CARE EDUCATION/TRAINING PROGRAM

## 2023-06-24 PROCEDURE — 63600175 PHARM REV CODE 636 W HCPCS: Performed by: INTERNAL MEDICINE

## 2023-06-24 PROCEDURE — 82010 KETONE BODYS QUAN: CPT | Performed by: HOSPITALIST

## 2023-06-24 PROCEDURE — 85014 HEMATOCRIT: CPT

## 2023-06-24 PROCEDURE — 87106 FUNGI IDENTIFICATION YEAST: CPT | Mod: 59 | Performed by: HOSPITALIST

## 2023-06-24 PROCEDURE — 93010 ELECTROCARDIOGRAM REPORT: CPT | Mod: 76,,, | Performed by: SPECIALIST

## 2023-06-24 PROCEDURE — 86920 COMPATIBILITY TEST SPIN: CPT | Performed by: STUDENT IN AN ORGANIZED HEALTH CARE EDUCATION/TRAINING PROGRAM

## 2023-06-24 PROCEDURE — 87040 BLOOD CULTURE FOR BACTERIA: CPT | Mod: 59 | Performed by: STUDENT IN AN ORGANIZED HEALTH CARE EDUCATION/TRAINING PROGRAM

## 2023-06-24 PROCEDURE — 36415 COLL VENOUS BLD VENIPUNCTURE: CPT | Performed by: STUDENT IN AN ORGANIZED HEALTH CARE EDUCATION/TRAINING PROGRAM

## 2023-06-24 PROCEDURE — 85014 HEMATOCRIT: CPT | Performed by: HOSPITALIST

## 2023-06-24 PROCEDURE — 81001 URINALYSIS AUTO W/SCOPE: CPT | Performed by: STUDENT IN AN ORGANIZED HEALTH CARE EDUCATION/TRAINING PROGRAM

## 2023-06-24 PROCEDURE — 37799 UNLISTED PX VASCULAR SURGERY: CPT

## 2023-06-24 PROCEDURE — 63600175 PHARM REV CODE 636 W HCPCS: Performed by: HOSPITALIST

## 2023-06-24 PROCEDURE — 87205 SMEAR GRAM STAIN: CPT | Performed by: HOSPITALIST

## 2023-06-24 PROCEDURE — 80053 COMPREHEN METABOLIC PANEL: CPT | Performed by: STUDENT IN AN ORGANIZED HEALTH CARE EDUCATION/TRAINING PROGRAM

## 2023-06-24 PROCEDURE — 25000003 PHARM REV CODE 250: Performed by: HOSPITALIST

## 2023-06-24 PROCEDURE — 87070 CULTURE OTHR SPECIMN AEROBIC: CPT | Performed by: HOSPITALIST

## 2023-06-24 PROCEDURE — P9016 RBC LEUKOCYTES REDUCED: HCPCS | Performed by: STUDENT IN AN ORGANIZED HEALTH CARE EDUCATION/TRAINING PROGRAM

## 2023-06-24 PROCEDURE — 25000003 PHARM REV CODE 250: Performed by: STUDENT IN AN ORGANIZED HEALTH CARE EDUCATION/TRAINING PROGRAM

## 2023-06-24 PROCEDURE — 85610 PROTHROMBIN TIME: CPT | Mod: 91 | Performed by: HOSPITALIST

## 2023-06-24 PROCEDURE — 25000003 PHARM REV CODE 250: Performed by: INTERNAL MEDICINE

## 2023-06-24 PROCEDURE — 85730 THROMBOPLASTIN TIME PARTIAL: CPT | Mod: 91 | Performed by: HOSPITALIST

## 2023-06-24 PROCEDURE — 92960 CARDIOVERSION ELECTRIC EXT: CPT | Mod: ,,, | Performed by: INTERNAL MEDICINE

## 2023-06-24 PROCEDURE — 86900 BLOOD TYPING SEROLOGIC ABO: CPT | Performed by: STUDENT IN AN ORGANIZED HEALTH CARE EDUCATION/TRAINING PROGRAM

## 2023-06-24 PROCEDURE — 83605 ASSAY OF LACTIC ACID: CPT | Mod: 91 | Performed by: HOSPITALIST

## 2023-06-24 PROCEDURE — 87641 MR-STAPH DNA AMP PROBE: CPT | Performed by: INTERNAL MEDICINE

## 2023-06-24 PROCEDURE — 96366 THER/PROPH/DIAG IV INF ADDON: CPT

## 2023-06-24 PROCEDURE — 36415 COLL VENOUS BLD VENIPUNCTURE: CPT | Performed by: HOSPITALIST

## 2023-06-24 PROCEDURE — 36600 WITHDRAWAL OF ARTERIAL BLOOD: CPT

## 2023-06-24 PROCEDURE — 63600175 PHARM REV CODE 636 W HCPCS: Performed by: STUDENT IN AN ORGANIZED HEALTH CARE EDUCATION/TRAINING PROGRAM

## 2023-06-24 PROCEDURE — 99223 1ST HOSP IP/OBS HIGH 75: CPT | Mod: ,,, | Performed by: INTERNAL MEDICINE

## 2023-06-24 PROCEDURE — 36620 INSERTION CATHETER ARTERY: CPT

## 2023-06-24 PROCEDURE — D9220A PRA ANESTHESIA: ICD-10-PCS | Mod: CRNA,,, | Performed by: NURSE ANESTHETIST, CERTIFIED REGISTERED

## 2023-06-24 PROCEDURE — 85018 HEMOGLOBIN: CPT | Performed by: HOSPITALIST

## 2023-06-24 PROCEDURE — 84145 PROCALCITONIN (PCT): CPT | Performed by: STUDENT IN AN ORGANIZED HEALTH CARE EDUCATION/TRAINING PROGRAM

## 2023-06-24 PROCEDURE — 82728 ASSAY OF FERRITIN: CPT | Performed by: STUDENT IN AN ORGANIZED HEALTH CARE EDUCATION/TRAINING PROGRAM

## 2023-06-24 PROCEDURE — 99900035 HC TECH TIME PER 15 MIN (STAT)

## 2023-06-24 PROCEDURE — 85730 THROMBOPLASTIN TIME PARTIAL: CPT | Performed by: INTERNAL MEDICINE

## 2023-06-24 PROCEDURE — 93005 ELECTROCARDIOGRAM TRACING: CPT | Performed by: SPECIALIST

## 2023-06-24 PROCEDURE — 99291 CRITICAL CARE FIRST HOUR: CPT

## 2023-06-24 PROCEDURE — 85018 HEMOGLOBIN: CPT | Mod: 91 | Performed by: INTERNAL MEDICINE

## 2023-06-24 PROCEDURE — 36556 INSERT NON-TUNNEL CV CATH: CPT

## 2023-06-24 PROCEDURE — 82330 ASSAY OF CALCIUM: CPT

## 2023-06-24 PROCEDURE — 87147 CULTURE TYPE IMMUNOLOGIC: CPT | Performed by: HOSPITALIST

## 2023-06-24 PROCEDURE — 63600175 PHARM REV CODE 636 W HCPCS: Performed by: NURSE PRACTITIONER

## 2023-06-24 PROCEDURE — 87106 FUNGI IDENTIFICATION YEAST: CPT | Performed by: STUDENT IN AN ORGANIZED HEALTH CARE EDUCATION/TRAINING PROGRAM

## 2023-06-24 PROCEDURE — 99223 PR INITIAL HOSPITAL CARE,LEVL III: ICD-10-PCS | Mod: ,,, | Performed by: INTERNAL MEDICINE

## 2023-06-24 PROCEDURE — 87086 URINE CULTURE/COLONY COUNT: CPT | Performed by: STUDENT IN AN ORGANIZED HEALTH CARE EDUCATION/TRAINING PROGRAM

## 2023-06-24 PROCEDURE — 20000000 HC ICU ROOM

## 2023-06-24 PROCEDURE — 92960 PR CARDIOVERSION, ELECTIVE;EXTERN: ICD-10-PCS | Mod: ,,, | Performed by: INTERNAL MEDICINE

## 2023-06-24 PROCEDURE — 83880 ASSAY OF NATRIURETIC PEPTIDE: CPT | Performed by: STUDENT IN AN ORGANIZED HEALTH CARE EDUCATION/TRAINING PROGRAM

## 2023-06-24 PROCEDURE — 96365 THER/PROPH/DIAG IV INF INIT: CPT

## 2023-06-24 PROCEDURE — 82803 BLOOD GASES ANY COMBINATION: CPT

## 2023-06-24 PROCEDURE — 36430 TRANSFUSION BLD/BLD COMPNT: CPT

## 2023-06-24 PROCEDURE — 80048 BASIC METABOLIC PNL TOTAL CA: CPT | Performed by: HOSPITALIST

## 2023-06-24 PROCEDURE — 94761 N-INVAS EAR/PLS OXIMETRY MLT: CPT

## 2023-06-24 PROCEDURE — 85610 PROTHROMBIN TIME: CPT | Performed by: INTERNAL MEDICINE

## 2023-06-24 PROCEDURE — 93010 EKG 12-LEAD: ICD-10-PCS | Mod: 76,,, | Performed by: SPECIALIST

## 2023-06-24 PROCEDURE — D9220A PRA ANESTHESIA: Mod: ANES,,, | Performed by: ANESTHESIOLOGY

## 2023-06-24 PROCEDURE — 85025 COMPLETE CBC W/AUTO DIFF WBC: CPT | Performed by: STUDENT IN AN ORGANIZED HEALTH CARE EDUCATION/TRAINING PROGRAM

## 2023-06-24 PROCEDURE — C9113 INJ PANTOPRAZOLE SODIUM, VIA: HCPCS | Performed by: INTERNAL MEDICINE

## 2023-06-24 PROCEDURE — 83605 ASSAY OF LACTIC ACID: CPT | Performed by: STUDENT IN AN ORGANIZED HEALTH CARE EDUCATION/TRAINING PROGRAM

## 2023-06-24 PROCEDURE — 96361 HYDRATE IV INFUSION ADD-ON: CPT

## 2023-06-24 PROCEDURE — 36556 INSERT NON-TUNNEL CV CATH: CPT | Mod: ,,, | Performed by: ANESTHESIOLOGY

## 2023-06-24 PROCEDURE — 36556 CENTRAL LINE: ICD-10-PCS | Mod: ,,, | Performed by: ANESTHESIOLOGY

## 2023-06-24 PROCEDURE — 96367 TX/PROPH/DG ADDL SEQ IV INF: CPT

## 2023-06-24 PROCEDURE — 92960 CARDIOVERSION ELECTRIC EXT: CPT | Performed by: INTERNAL MEDICINE

## 2023-06-24 PROCEDURE — 63600175 PHARM REV CODE 636 W HCPCS: Performed by: NURSE ANESTHETIST, CERTIFIED REGISTERED

## 2023-06-24 PROCEDURE — D9220A PRA ANESTHESIA: ICD-10-PCS | Mod: ANES,,, | Performed by: ANESTHESIOLOGY

## 2023-06-24 PROCEDURE — 25000003 PHARM REV CODE 250

## 2023-06-24 PROCEDURE — 37000008 HC ANESTHESIA 1ST 15 MINUTES: Performed by: INTERNAL MEDICINE

## 2023-06-24 PROCEDURE — 82272 OCCULT BLD FECES 1-3 TESTS: CPT | Performed by: INTERNAL MEDICINE

## 2023-06-24 PROCEDURE — 84132 ASSAY OF SERUM POTASSIUM: CPT

## 2023-06-24 PROCEDURE — D9220A PRA ANESTHESIA: Mod: CRNA,,, | Performed by: NURSE ANESTHETIST, CERTIFIED REGISTERED

## 2023-06-24 PROCEDURE — 84295 ASSAY OF SERUM SODIUM: CPT

## 2023-06-24 RX ORDER — PROPOFOL 10 MG/ML
VIAL (ML) INTRAVENOUS
Status: DISCONTINUED | OUTPATIENT
Start: 2023-06-24 | End: 2023-06-24

## 2023-06-24 RX ORDER — NALOXONE HCL 0.4 MG/ML
0.02 VIAL (ML) INJECTION
Status: DISCONTINUED | OUTPATIENT
Start: 2023-06-24 | End: 2023-07-07 | Stop reason: HOSPADM

## 2023-06-24 RX ORDER — ESMOLOL HYDROCHLORIDE 10 MG/ML
INJECTION INTRAVENOUS
Status: COMPLETED
Start: 2023-06-24 | End: 2023-06-24

## 2023-06-24 RX ORDER — SODIUM BICARBONATE 1 MEQ/ML
SYRINGE (ML) INTRAVENOUS
Status: DISPENSED
Start: 2023-06-24 | End: 2023-06-25

## 2023-06-24 RX ORDER — HYDROCODONE BITARTRATE AND ACETAMINOPHEN 500; 5 MG/1; MG/1
TABLET ORAL
Status: DISCONTINUED | OUTPATIENT
Start: 2023-06-24 | End: 2023-06-25

## 2023-06-24 RX ORDER — PANTOPRAZOLE SODIUM 40 MG/10ML
40 INJECTION, POWDER, LYOPHILIZED, FOR SOLUTION INTRAVENOUS 2 TIMES DAILY
Status: DISCONTINUED | OUTPATIENT
Start: 2023-06-24 | End: 2023-06-29

## 2023-06-24 RX ORDER — SODIUM CHLORIDE 9 MG/ML
INJECTION, SOLUTION INTRAVENOUS CONTINUOUS
Status: DISCONTINUED | OUTPATIENT
Start: 2023-06-24 | End: 2023-06-24

## 2023-06-24 RX ORDER — DEXTROSE MONOHYDRATE 100 MG/ML
INJECTION, SOLUTION INTRAVENOUS
Status: DISCONTINUED | OUTPATIENT
Start: 2023-06-24 | End: 2023-06-25

## 2023-06-24 RX ORDER — IBUPROFEN 200 MG
16 TABLET ORAL
Status: DISCONTINUED | OUTPATIENT
Start: 2023-06-24 | End: 2023-06-24

## 2023-06-24 RX ORDER — GLUCAGON 1 MG
1 KIT INJECTION
Status: CANCELLED | OUTPATIENT
Start: 2023-06-24

## 2023-06-24 RX ORDER — NOREPINEPHRINE BITARTRATE/D5W 4MG/250ML
0-3 PLASTIC BAG, INJECTION (ML) INTRAVENOUS CONTINUOUS
Status: DISCONTINUED | OUTPATIENT
Start: 2023-06-24 | End: 2023-06-26

## 2023-06-24 RX ORDER — IBUPROFEN 200 MG
24 TABLET ORAL
Status: DISCONTINUED | OUTPATIENT
Start: 2023-06-24 | End: 2023-06-24

## 2023-06-24 RX ORDER — GLUCAGON 1 MG
1 KIT INJECTION
Status: DISCONTINUED | OUTPATIENT
Start: 2023-06-24 | End: 2023-06-24

## 2023-06-24 RX ORDER — SODIUM BICARBONATE 1 MEQ/ML
SYRINGE (ML) INTRAVENOUS
Status: COMPLETED
Start: 2023-06-24 | End: 2023-06-24

## 2023-06-24 RX ORDER — PHENYLEPHRINE HYDROCHLORIDE 10 MG/ML
INJECTION INTRAVENOUS
Status: DISPENSED
Start: 2023-06-24 | End: 2023-06-24

## 2023-06-24 RX ORDER — DIGOXIN 0.25 MG/ML
250 INJECTION INTRAMUSCULAR; INTRAVENOUS ONCE
Status: COMPLETED | OUTPATIENT
Start: 2023-06-24 | End: 2023-06-24

## 2023-06-24 RX ORDER — ATROPINE SULFATE 0.1 MG/ML
INJECTION INTRAVENOUS
Status: COMPLETED
Start: 2023-06-24 | End: 2023-06-24

## 2023-06-24 RX ORDER — EPINEPHRINE 0.1 MG/ML
INJECTION INTRAVENOUS
Status: COMPLETED
Start: 2023-06-24 | End: 2023-06-24

## 2023-06-24 RX ORDER — SODIUM CHLORIDE 0.9 % (FLUSH) 0.9 %
10 SYRINGE (ML) INJECTION EVERY 12 HOURS PRN
Status: DISCONTINUED | OUTPATIENT
Start: 2023-06-24 | End: 2023-07-07 | Stop reason: HOSPADM

## 2023-06-24 RX ORDER — DEXTROSE MONOHYDRATE AND SODIUM CHLORIDE 5; .45 G/100ML; G/100ML
INJECTION, SOLUTION INTRAVENOUS CONTINUOUS PRN
Status: DISCONTINUED | OUTPATIENT
Start: 2023-06-24 | End: 2023-06-25

## 2023-06-24 RX ORDER — INSULIN ASPART 100 [IU]/ML
0-5 INJECTION, SOLUTION INTRAVENOUS; SUBCUTANEOUS EVERY 6 HOURS PRN
Status: DISCONTINUED | OUTPATIENT
Start: 2023-06-24 | End: 2023-06-24

## 2023-06-24 RX ORDER — MEROPENEM AND SODIUM CHLORIDE 1 G/50ML
1 INJECTION, SOLUTION INTRAVENOUS
Status: DISCONTINUED | OUTPATIENT
Start: 2023-06-24 | End: 2023-06-24

## 2023-06-24 RX ORDER — PHENYLEPHRINE HCL IN 0.9% NACL 20MG/250ML
0-5 PLASTIC BAG, INJECTION (ML) INTRAVENOUS CONTINUOUS
Status: DISCONTINUED | OUTPATIENT
Start: 2023-06-24 | End: 2023-06-24

## 2023-06-24 RX ADMIN — CEFEPIME 1 G: 1 INJECTION, POWDER, FOR SOLUTION INTRAMUSCULAR; INTRAVENOUS at 02:06

## 2023-06-24 RX ADMIN — SODIUM CHLORIDE 1572 ML: 0.9 INJECTION, SOLUTION INTRAVENOUS at 01:06

## 2023-06-24 RX ADMIN — AMIODARONE HYDROCHLORIDE 150 MG: 1.5 INJECTION, SOLUTION INTRAVENOUS at 10:06

## 2023-06-24 RX ADMIN — MEROPENEM 1 G: 1 INJECTION, POWDER, FOR SOLUTION INTRAVENOUS at 09:06

## 2023-06-24 RX ADMIN — SODIUM CHLORIDE 2 MCG/KG/MIN: 9 INJECTION, SOLUTION INTRAVENOUS at 09:06

## 2023-06-24 RX ADMIN — NOREPINEPHRINE BITARTRATE 0.05 MCG/KG/MIN: 4 INJECTION, SOLUTION INTRAVENOUS at 02:06

## 2023-06-24 RX ADMIN — NOREPINEPHRINE BITARTRATE 0.4 MCG/KG/MIN: 4 INJECTION, SOLUTION INTRAVENOUS at 02:06

## 2023-06-24 RX ADMIN — INSULIN HUMAN 0.2 UNITS/KG/HR: 1 INJECTION, SOLUTION INTRAVENOUS at 09:06

## 2023-06-24 RX ADMIN — AMIODARONE HYDROCHLORIDE 1 MG/MIN: 1.8 INJECTION, SOLUTION INTRAVENOUS at 04:06

## 2023-06-24 RX ADMIN — SODIUM CHLORIDE 500 ML: 0.9 INJECTION, SOLUTION INTRAVENOUS at 08:06

## 2023-06-24 RX ADMIN — PROPOFOL 50 MG: 10 INJECTION, EMULSION INTRAVENOUS at 01:06

## 2023-06-24 RX ADMIN — MEROPENEM 1 G: 1 INJECTION, POWDER, FOR SOLUTION INTRAVENOUS at 07:06

## 2023-06-24 RX ADMIN — ESMOLOL HYDROCHLORIDE 20 MG: 100 INJECTION, SOLUTION INTRAVENOUS at 11:06

## 2023-06-24 RX ADMIN — MEROPENEM 1 G: 1 INJECTION, POWDER, FOR SOLUTION INTRAVENOUS at 08:06

## 2023-06-24 RX ADMIN — PHYTONADIONE 10 MG: 10 INJECTION, EMULSION INTRAMUSCULAR; INTRAVENOUS; SUBCUTANEOUS at 11:06

## 2023-06-24 RX ADMIN — INSULIN HUMAN 0.1 UNITS/KG/HR: 1 INJECTION, SOLUTION INTRAVENOUS at 02:06

## 2023-06-24 RX ADMIN — SODIUM BICARBONATE: 84 INJECTION, SOLUTION INTRAVENOUS at 02:06

## 2023-06-24 RX ADMIN — INSULIN HUMAN 7.3 UNITS: 1 INJECTION, SOLUTION INTRAVENOUS at 02:06

## 2023-06-24 RX ADMIN — PANTOPRAZOLE SODIUM 40 MG: 40 INJECTION, POWDER, FOR SOLUTION INTRAVENOUS at 08:06

## 2023-06-24 RX ADMIN — SODIUM BICARBONATE 50 MEQ: 84 INJECTION, SOLUTION INTRAVENOUS at 01:06

## 2023-06-24 RX ADMIN — SODIUM BICARBONATE: 84 INJECTION, SOLUTION INTRAVENOUS at 11:06

## 2023-06-24 RX ADMIN — VANCOMYCIN HYDROCHLORIDE 1750 MG: 500 INJECTION, POWDER, LYOPHILIZED, FOR SOLUTION INTRAVENOUS at 05:06

## 2023-06-24 RX ADMIN — AMIODARONE HYDROCHLORIDE 1 MG/MIN: 1.8 INJECTION, SOLUTION INTRAVENOUS at 09:06

## 2023-06-24 RX ADMIN — Medication 5 MCG/KG/MIN: at 12:06

## 2023-06-24 RX ADMIN — SODIUM CHLORIDE 0.5 MCG/KG/MIN: 9 INJECTION, SOLUTION INTRAVENOUS at 11:06

## 2023-06-24 RX ADMIN — DIGOXIN 250 MCG: 0.25 INJECTION INTRAMUSCULAR; INTRAVENOUS at 12:06

## 2023-06-24 RX ADMIN — Medication 5 MCG/KG/MIN: at 02:06

## 2023-06-24 RX ADMIN — PANTOPRAZOLE SODIUM 40 MG: 40 INJECTION, POWDER, FOR SOLUTION INTRAVENOUS at 05:06

## 2023-06-24 RX ADMIN — SODIUM CHLORIDE: 0.9 INJECTION, SOLUTION INTRAVENOUS at 05:06

## 2023-06-24 RX ADMIN — SODIUM CHLORIDE 5 MCG/KG/MIN: 9 INJECTION, SOLUTION INTRAVENOUS at 02:06

## 2023-06-24 RX ADMIN — SODIUM CHLORIDE 1000 ML: 0.9 INJECTION, SOLUTION INTRAVENOUS at 11:06

## 2023-06-24 NOTE — ANESTHESIA PROCEDURE NOTES
Central Line    Diagnosis: Sepsis, A fib with rvr  Patient location during procedure: ICU  Timeout: 6/24/2023 11:24 AM  Procedure end time: 6/24/2023 11:37 AM    Staffing  Authorizing Provider: Hoang Sarmiento Jr., MD  Performing Provider: Hoang Sarmiento Jr., MD    Staffing  Performed: anesthesiologist   Anesthesiologist: Hoang Sarmiento Jr., MD  Anesthesiologist was present at the time of the procedure.  Preanesthetic Checklist  Completed: patient identified, IV checked, site marked, risks and benefits discussed, surgical consent, monitors and equipment checked, pre-op evaluation, timeout performed and anesthesia consent given  Indication   Indication: hemodynamic monitoring, vascular access, med administration     Anesthesia   local infiltration    Central Line   Skin Prep: skin prepped with ChloraPrep, skin prep agent completely dried prior to procedure  Sterile Barriers Followed: Yes    All five maximal barriers used- gloves, gown, cap, mask, and large sterile sheet    hand hygiene performed prior to central venous catheter insertion  Location: right internal jugular.   Catheter type: triple lumen  Catheter Size: 7 Fr  Inserted Catheter Length: 15 cm  Ultrasound: vascular probe with ultrasound   Vessel Caliber: medium, patent, compressibility normal  Needle advanced into vessel with real time Ultrasound guidance.  Guidewire confirmed in vessel.  sterile gel and probe cover used in ultrasound-guided central venous catheter insertion  Manometry: none  Insertion Attempts: 1   Securement:line sutured, chlorhexidine patch, sterile dressing applied and blood return through all ports    Post-Procedure   X-Ray: no pneumothorax on x-ray, placement verified by x-ray, tip termination and successful placement  Tip termination comments: SVC   Adverse Events:none      Guidewire Guidewire removed intact. Guidewire removed intact, verified with nurse.  Additional Notes  Guidewire removed. Sutured at 15 cm at the skin.  All ports aspirate dark venous non pulsatile blood that falls with gravity. All ports flush easily. Chest x- ray revealed good placement and no pneumothorax noted. Ok to use central line.

## 2023-06-24 NOTE — CONSULTS
[facility]  Department of Cardiology  Consult Note      PATIENT NAME: Anastasiia Badillo  MRN: [id number]  TODAY'S DATE: 06/24/2023  ADMIT DATE: 6/24/2023                          CONSULT REQUESTED BY: Brandon Martin MD    SUBJECTIVE     PRINCIPAL PROBLEM: Weakness      REASON FOR CONSULT:  AFIB With RVR      HPI:      Consulted for severe Hypotension and AFIB with RVR Rates over 200    FROM H AND P      Hypotension       [facility] of low blood pressure.           HPI: 72 y/o F with a past medical history significant for DM2, HTN, femur fracture, tibial fracture and tobacco use, L foot wounds,  high grade stenosis SFA bilaterally and popliteal on the left s/p baloon angioplasty L popliteal artery and left posterior tibial arery on DAPT.      Recently discharged from [facility] for fracture of left tibia, s/p ORIF 6/6, also finished antibiotics for acute osteomyelitis of left calcaneus ( Final ID recs with end date for cipro of 6/22 and end date of ancef for 7/17 )     Patient sent from rehab for hypotension.     In the ER was in septic shock, e/o UTI and anemic.      Pt is currently confused and in distress.            Review of patient's allergies indicates:  No Known Allergies    Past Medical History:   Diagnosis Date    Acute osteomyelitis of left calcaneus 6/7/2023    Chronic ulcer of great toe of left foot with fat layer exposed 6/7/2023    Closed left pilon fracture 6/5/2023    Closed left subtrochanteric femur fracture, initial encounter 2/18/2023    Diabetes mellitus, type 2     Essential (primary) hypertension 2/17/2023    Mass of upper outer quadrant of right breast 4/27/2023    Nicotine dependence 2/20/2023    PAD (peripheral artery disease) 4/22/2023    Tobacco use 6/6/2023    Type 2 diabetes mellitus with hyperglycemia, with long-term current use of insulin 6/6/2023     Past Surgical History:   Procedure Laterality Date    ANGIOGRAPHY OF LOWER EXTREMITY Left  4/25/2023    Procedure: Angiogram Extremity Unilateral;  Surgeon: Gilbert Sheppard MD;  Location: [facility] OR 2ND FLR;  Service: Vascular;  Laterality: Left;  28.4 min  487.45 mGy  75.9180 Gycm2  trans radial: 7 ml  dye: 126 ml      APPLICATION, EXTERNAL FIXATION DEVICE Left 6/6/2023    Procedure: APPLICATION, EXTERNAL FIXATION DEVICE, LEFT ANKLE, [place];  Surgeon: Gilbert Mahmood MD;  Location: [facility] OR [facility] FLR;  Service: Orthopedics;  Laterality: Left;    AUGMENTATION OF BREAST      INTRAMEDULLARY RODDING OF FEMUR Left 2/18/2023    Procedure: INSERTION, INTRAMEDULLARY ROXANNA, FEMUR (hana table -- synthes -- long nail -- have bovie and suction and large bone set);  Surgeon: Keanu Brand MD;  Location: [facility] OR;  Service: Orthopedics;  Laterality: Left;    OPEN REDUCTION AND INTERNAL FIXATION (ORIF) OF PILON FRACTURE Left 6/6/2023    Procedure: ORIF, FRACTURE, PILON, LEFT;  Surgeon: Gilbert Mahmood MD;  Location: [facility] OR [facility] FLR;  Service: Orthopedics;  Laterality: Left;    PERCUTANEOUS TRANSLUMINAL ANGIOPLASTY Left 4/25/2023    Procedure: PTA (ANGIOPLASTY, PERCUTANEOUS, TRANSLUMINAL);  Surgeon: Gilbert Sheppard MD;  Location: [facility] OR [facility] FLR;  Service: Vascular;  Laterality: Left;  Tibial and popliteal angioplasty     Social History     Tobacco Use    Smoking status: Every Day     Packs/day: 0.25     Years: 45.00     Pack years: 11.25     Types: Cigarettes    Smokeless tobacco: Never   Substance Use Topics    Alcohol use: Yes    Drug use: Never        REVIEW OF SYSTEMS  Per hpi     OBJECTIVE     VITAL SIGNS (Most Recent)  Temp: (!) 94.4 °F (34.7 °C) (06/24/23 0924)  Pulse: (!) 154 (06/24/23 1100)  Resp: 16 (06/24/23 1100)  BP: (!) 122/47 (06/24/23 1100)  SpO2: (!) 93 % (06/24/23 1100)    VENTILATION STATUS  Resp: 16 (06/24/23 1100)  SpO2: (!) 93 % (06/24/23 1100)           I & O (Last 24H):  Intake/Output Summary (Last 24 hours) at 6/24/2023 1141  Last data filed at 6/24/2023  0502  Gross per 24 hour   Intake 1922 ml   Output 1400 ml   Net 522 ml       WEIGHTS  Wt Readings from Last 3 Encounters:   06/24/23 0021 72.6 kg (160 lb)   06/08/23 1229 72.9 kg (160 lb 11.5 oz)   06/07/23 2042 72.9 kg (160 lb 11.5 oz)   06/06/23 1215 72.6 kg (160 lb 1.9 oz)   06/05/23 1830 72.6 kg (160 lb)   06/05/23 1737 72.6 kg (160 lb)       PHYSICAL EXAM  GENERAL: awake, alert, appears weak.   HEENT: Normocephalic.   NECK: No JVD. No bruit..    CARDIAC: irregular rate and rhythm. S1 is normal.S2 is normal.  CHEST ANATOMY: normal.   LUNGS: Clear to auscultation. No wheezing or rhonchi..   ABDOMEN: Soft       HOME MEDICATIONS:  No current facility-administered medications on file prior to encounter.     Current Outpatient Medications on File Prior to Encounter   Medication Sig Dispense Refill    acetaminophen (TYLENOL) 500 MG tablet Take 2 tablets (1,000 mg total) by mouth every 8 (eight) hours.  0    aspirin (ECOTRIN) 81 MG EC tablet Take 1 tablet (81 mg total) by mouth 2 (two) times a day. - end date august 30, 2023  0    atorvastatin (LIPITOR) 80 MG tablet Take 1 tablet (80 mg total) by mouth every evening. 90 tablet 3    blood sugar diagnostic Strp To check BG two times daily, to use with insurance preferred meter 200 each 1    blood-glucose meter kit To check BG two times daily, to use with insurance preferred meter 1 each 1    ciprofloxacin HCl (CIPRO) 750 MG tablet Take 1 tablet (750 mg total) by mouth every 12 (twelve) hours. End date 6/22/23      clopidogreL (PLAVIX) 75 mg tablet Take 1 tablet (75 mg total) by mouth once daily. 30 tablet 3    dextrose 5 % in water (D5W) 5 % PgBk 50 mL with ceFAZolin 2 gram SolR 2 g Inject 2 g into the vein every 8 (eight) hours. End date 7/17/23  0    famotidine (PEPCID) 20 MG tablet Take 1 tablet (20 mg total) by mouth 2 (two) times daily as needed (Heartburn).      insulin aspart U-100 (NOVOLOG) 100 unit/mL (3 mL) InPn pen Inject 0-5 Units into the skin before meals  "and at bedtime as needed (Hyperglycemia).  0    insulin aspart U-100 (NOVOLOG) 100 unit/mL (3 mL) InPn pen Inject 6 Units into the skin 3 (three) times daily with meals.  0    insulin detemir U-100, Levemir, 100 unit/mL (3 mL) SubQ InPn pen Inject 18 Units into the skin once daily.  0    lancets [facility] To check BG two times daily, to use with insurance preferred meter 200 each 1    lisinopriL (PRINIVIL,ZESTRIL) 5 MG tablet Take 1 tablet (5 mg total) by mouth once daily. 90 tablet 3    melatonin (MELATIN) 3 mg tablet Take 3 tablets (9 mg total) by mouth nightly.  0    methocarbamoL (ROBAXIN) 500 MG Tab Take 1 tablet (500 mg total) by mouth 4 (four) times daily. 40 tablet 0    ondansetron (ZOFRAN-ODT) 4 MG TbDL Take 1 tablet (4 mg total) by mouth every 8 (eight) hours as needed (nausea).      oxyCODONE (ROXICODONE) 10 mg Tab immediate release tablet Take 1 tablet (10 mg total) by mouth every 4 (four) hours as needed (pain sclae 7-10). 10 tablet 0    oxyCODONE (ROXICODONE) 5 MG immediate release tablet Take 1 tablet (5 mg total) by mouth every 4 (four) hours as needed (pain scale 4-6). 10 tablet 0    pen needle, diabetic (PEN NEEDLE) 31 gauge x 3/16" Ndle 1 application by Misc.(Non-Drug; Combo Route) route 2 (two) times a day. 200 each 0    polyethylene glycol (GLYCOLAX) 17 gram PwPk Take 17 g by mouth once daily.  0    pregabalin (LYRICA) 75 MG capsule Take 1 capsule (75 mg total) by mouth 2 (two) times daily. 60 capsule 0    senna-docusate 8.6-50 mg (PERICOLACE) 8.6-50 mg per tablet Take 1 tablet by mouth once daily.      vitamin D (VITAMIN D3) 1000 units Tab Take 1 tablet (1,000 Units total) by mouth once daily.         SCHEDULED MEDS:   amiodarone        amiodarone in dextrose        esmoloL        meropenem (MERREM) IVPB  1 g Intravenous Q12H    pantoprazole  40 mg Intravenous BID    PHENYLephrine        sodium chloride 0.9%  1,000 mL Intravenous Once       CONTINUOUS INFUSIONS:   sodium chloride 0.9% 125 mL/hr at " 06/24/23 0527    NORepinephrine bitartrate-D5W 0.3 mcg/kg/min (06/24/23 0940)    phenylephrine         PRN MEDS:sodium chloride, dextrose 50%, dextrose 50%, glucagon (human recombinant), glucose, glucose, insulin aspart U-100, naloxone, sodium chloride 0.9%    LABS AND DIAGNOSTICS     CBC LAST 3 DAYS  Recent Labs   Lab 06/24/23  0121 06/24/23  0907 06/24/23  1102   WBC 24.48*  --   --    RBC 2.31*  --   --    HGB 6.7* 8.6* 8.8*   HCT 20.0* 26.0*  --    MCV 87  --   --    MCH 29.0  --   --    MCHC 33.5  --   --    RDW 15.0*  --   --      --   --    MPV 11.4  --   --    GRAN 85.6*  21.0*  --   --    LYMPH 3.2*  0.8*  --   --    MONO 8.8  2.2*  --   --    BASO 0.05  --   --    NRBC 0  --   --        COAGULATION LAST 3 DAYS  Recent Labs   Lab 06/24/23 0524   INR 4.4*   APTT 55.0*       CHEMISTRY LAST 3 DAYS  Recent Labs   Lab 06/24/23 0121 06/24/23  0925 06/24/23  0937   *  --   --    K 4.2  --   --      --   --    CO2 16*  --   --    ANIONGAP 11  --   --    BUN 90*  --   --    CREATININE 3.0*  --   --    GLU 89  --   --    CALCIUM 7.4*  --   --    PH  --  7.175* 7.251*   ALBUMIN 1.8*  --   --    PROT 5.0*  --   --    ALKPHOS 59  --   --    ALT <5*  --   --    AST 19  --   --    BILITOT 0.6  --   --        CARDIAC PROFILE LAST 3 DAYS  Recent Labs   Lab 06/24/23 0121   *       ENDOCRINE LAST 3 DAYS  Recent Labs   Lab 06/24/23 0121   PROCAL 0.99*       LAST ARTERIAL BLOOD GAS  ABG  Recent Labs   Lab 06/24/23  0937   PH 7.251*   PO2 109*   PCO2 22.2*   HCO3 9.8*   BE -17       LAST 7 DAYS MICROBIOLOGY   Microbiology Results (last 7 days)       Procedure Component Value Units Date/Time    Blood culture x two cultures. Draw prior to antibiotics. [[id number]] Collected: 06/24/23 0209    Order Status: Completed Specimen: Blood from Peripheral, Forearm, Left Updated: 06/24/23 0917     Blood Culture, Routine No Growth to date    Narrative:      Aerobic and anaerobic    Blood culture x two  cultures. Draw prior to antibiotics. [[id number]] Collected: 06/24/23 0209    Order Status: Completed Specimen: Blood from Peripheral, Hand, Left Updated: 06/24/23 0917     Blood Culture, Routine No Growth to date    Narrative:      Aerobic and anaerobic    MRSA Screen by PCR [[id number]] Collected: 06/24/23 0524    Order Status: Completed Specimen: Nasopharyngeal Swab from Nasal Updated: 06/24/23 0646     MRSA SCREEN BY PCR Negative    Urine culture [[id number]] Collected: 06/24/23 0208    Order Status: No result Specimen: Urine Updated: 06/24/23 0308            MOST RECENT IMAGING  X-Ray Chest AP Portable  Chest single view    CLINICAL DATA: Sepsis    FINDINGS: AP view shows the heart to be mildly enlarged. The mediastinum is unremarkable. Right-sided PICC line extends to the superior vena cava. There is mild atelectasis at the right lung base, with ill-defined atelectasis or infiltrate at the lung base on the left. There are no significant pleural effusions. No acute osseous abnormality is identified.    IMPRESSION:  1. Atelectasis or infiltrate at the left lung base.  2. Mild right basilar atelectasis.  3. Mild cardiomegaly.    Electronically signed by:  Perfecto Castellanos MD  6/24/2023 8:28 AM CDT Workstation: [id number]  CT Abdomen Pelvis  Without Contrast  EXAM DESCRIPTION:  CT ABDOMEN PELVIS WITHOUT CONTRAST  RadLex: CT ABDOMEN PELVIS WITHOUT IV CONTRAST    CLINICAL HISTORY:  Weakness, increased WBC, decreased RBC, hypotensive, MYLES.    TECHNIQUE:  CT of the abdomen and pelvis without intravenous contrast.  All CT scans at this facility use dose modulation, iterative reconstruction, and/or weight based dosing when appropriate to reduce radiation dose to as low as reasonably achievable.    COMPARISON: None.    FINDINGS:    The visualized lower thoracic structures demonstrate minimal bilateral pleural effusions. There is mild bibasilar atelectasis. Coronary artery calcifications are present. There is subcutaneous  edema throughout the thorax.    Unenhanced images of the liver, spleen, and adrenal glands appear grossly unremarkable. Pancreas is not well assessed without contrast. Gallbladder is minimally distended. No renal stones are identified. There is a round exophytic lesion off the upper pole right kidney measuring 2.1 cm, with Hounsfield units averaging 59. This may relate to hyperdense cyst. There is mild left hydronephrosis and hydroureter. No definite ureteral or bladder stone is identified. Urinary bladder is decompressed with a Gee catheter in place. There are atherosclerotic calcifications along the abdominal aorta without evidence for aneurysmal dilatation. No bowel obstruction is seen. There is no free intraperitoneal air. The appendix appears unremarkable. There is colonic diverticulosis without definite CT evidence for acute diverticulitis. There is mild fecal loading throughout the colon. There is minimal free fluid in the posterior pelvis. There is moderate subcutaneous edema throughout the abdomen and pelvis extending into the thighs. There is an intramedullary denzel within the proximal left femur, with a compression screw through the left femoral neck. There is a left subtrochanteric fracture    IMPRESSION:  1.  Minimal gallbladder distention.  2.  Mild left hydroureteronephrosis, without definite ureteral stone seen. Urinary bladder decompressed.  3.  Anasarca.  4.  Colonic diverticulosis without definite CT evidence for acute diverticulitis.  5.  Possible hyperdense cyst at the upper pole right kidney. Follow-up nonemergent renal ultrasound could be performed.  6.  Post surgical changes at the left femur, with subtrochanteric fracture.  7.  Minimal bilateral pleural effusions and mild bibasilar atelectasis.    Electronically signed by:  Allan Fountain DO  6/24/2023 5:34 AM CDT Workstation: [id number]      ECHOCARDIOGRAM RESULTS (last 5)  No results found for this or any previous visit.      CURRENT/PREVIOUS  VISIT EKG  Results for orders placed or performed during the hospital encounter of 06/24/23   EKG 12-lead    Collection Time: 06/24/23 10:28 AM    Narrative    Test Reason : I48.91,    Vent. Rate : 165 BPM     Atrial Rate : 136 BPM     P-R Int : 000 ms          QRS Dur : 090 ms      QT Int : 278 ms       P-R-T Axes : 000 122 -53 degrees     QTc Int : 460 ms    Atrial fibrillation with rapid ventricular response with premature  ventricular or aberrantly conducted complexes  Right axis deviation  Pulmonary disease pattern  Septal infarct (cited on or before 24-JUN-2023)  Abnormal ECG  When compared with ECG of 24-JUN-2023 03:23,  Vent. rate has increased BY  70 BPM  Nonspecific T wave abnormality now evident in Inferior leads  Nonspecific T wave abnormality has replaced inverted T waves in Anterior  leads    Referred By: [email or web address]   SELF           Confirmed By:            ASSESSMENT/PLAN:     Active Hospital Problems    Diagnosis    *Weakness    Septic shock       ASSESSMENT & PLAN:     Afib with RVR  Septic Shock  Hypotension  Elevated INR 4.1  S/P ORIF  PAD S/P  baloon angioplasty L popliteal artery and left posterior tibial arery on DAPT.       RECOMMENDATIONS:    Change nore epi to hood synephrine  Continue amiodarone and fluids  Echo   If remains unstable will need to cardiovert      Addendum    Patient became more unstable  Call made to son Yaya ok to cardiovert patient and emergent cardioversion done at bedside with anesthesia she is now in ST  She will need NOAC when INR is back in regular range and no further signs of bleeding as hemoglobin was 6.7 yesterday.      Zo Al NP  Department of Cardiology  Date of Service: 06/24/2023      I have personally examined the patient, I have reviewed the Nurse Practitioner's history and physical, assessment, and plan. I have personally evaluated the patient at bedside and agree with the findings and made appropriate changes as necessary in recommendations.    Pt  had paroxysmal Afib (<12hr onset) with HR upto 200s. Pt was on 2 pressors for septic shock. Spoke to pts' son over the phone who agreed to cardioversion. Due to hemodynamic instability, emergent DCCV ( 200J x 1 ) was performed with conversion to SR. Consider anticoagulation when no contraindications.     Tami Almeida MD  Department of Cardiology  [facility]  6/24/23

## 2023-06-24 NOTE — H&P
[facility] - Emergency Dept    History & Physical      Patient Name: Anastasiia Badillo  MRN: [id number]  Admission Date: 6/24/2023  Attending Physician: Hitesh Rios MD   Primary Care Provider: Raji Parkinson MD         Patient information was obtained from patient, past medical records, and ER records.     Subjective:     Principal Problem:Weakness    Chief Complaint:   Chief Complaint   Patient presents with    Hypotension     [facility] of low blood pressure.          HPI: 72 y/o F with a past medical history significant for DM2, HTN, femur fracture, tibial fracture and tobacco use, L foot wounds,  high grade stenosis SFA bilaterally and popliteal on the left s/p baloon angioplasty L popliteal artery and left posterior tibial arery on DAPT.     [place] [facility] [place], s/p ORIF 6/6, also finished antibiotics for acute osteomyelitis of left calcaneus ( Final ID recs with end date for cipro of 6/22 and end date of ancef for 7/17 )    Patient sent from rehab for hypotension.    In the ER was in septic shock, e/o UTI and anemic.     Pt is currently confused and in distress.     Past Medical History:   Diagnosis Date    Acute osteomyelitis of left calcaneus 6/7/2023    Chronic ulcer of great toe of left foot with fat layer exposed 6/7/2023    Closed left pilon fracture 6/5/2023    Closed left subtrochanteric femur fracture, initial encounter 2/18/2023    Diabetes mellitus, type 2     Essential (primary) hypertension 2/17/2023    Mass of upper outer quadrant of right breast 4/27/2023    Nicotine dependence 2/20/2023    PAD (peripheral artery disease) 4/22/2023    Tobacco use 6/6/2023    Type 2 diabetes mellitus with hyperglycemia, with long-term current use of insulin 6/6/2023       Past Surgical History:   Procedure Laterality Date    ANGIOGRAPHY OF LOWER EXTREMITY Left 4/25/2023    Procedure: Angiogram Extremity Unilateral;  Surgeon:  Gilbert Sheppard MD;  Location: [facility] OR [facility];  Service: Vascular;  Laterality: Left;  28.4 min  487.45 mGy  75.9180 Gycm2  trans radial: 7 ml  dye: 126 ml      APPLICATION, EXTERNAL FIXATION DEVICE Left 6/6/2023    Procedure: APPLICATION, EXTERNAL FIXATION DEVICE, LEFT ANKLE, [place];  Surgeon: Gilbert Mahmood MD;  [facility];  Service: Orthopedics;  Laterality: Left;    AUGMENTATION OF BREAST      INTRAMEDULLARY RODDING OF FEMUR Left 2/18/2023    Procedure: INSERTION, INTRAMEDULLARY ROXANNA, FEMUR (hana table -- synthes -- long nail -- have bovie and suction and large bone set);  Surgeon: Keanu Brand MD;  [facility]: [facility] OR;  Service: Orthopedics;  Laterality: Left;    OPEN REDUCTION AND INTERNAL FIXATION (ORIF) OF PILON FRACTURE Left 6/6/2023    Procedure: ORIF, FRACTURE, PILON, LEFT;  Surgeon: Gilbert Mahmood MD;  [facility];  Service: Orthopedics;  Laterality: Left;    PERCUTANEOUS TRANSLUMINAL ANGIOPLASTY Left 4/25/2023    Procedure: PTA (ANGIOPLASTY, PERCUTANEOUS, TRANSLUMINAL);  Surgeon: Gilbert Sheppard MD;  [facility];  Service: Vascular;  Laterality: Left;  Tibial and popliteal angioplasty       Review of patient's allergies indicates:  No Known Allergies    No current facility-administered medications on file prior to encounter.     Current Outpatient Medications on File Prior to Encounter   Medication Sig    acetaminophen (TYLENOL) 500 MG tablet Take 2 tablets (1,000 mg total) by mouth every 8 (eight) hours.    aspirin (ECOTRIN) 81 MG EC tablet Take 1 tablet (81 mg total) by mouth 2 (two) times a day. - end date august 30, 2023    atorvastatin (LIPITOR) 80 MG tablet Take 1 tablet (80 mg total) by mouth every evening.    blood sugar diagnostic Strp To check BG two times daily, to use with insurance preferred meter    blood-glucose meter kit To check BG two times daily, to use with insurance preferred meter     "ciprofloxacin HCl (CIPRO) 750 MG tablet Take 1 tablet (750 mg total) by mouth every 12 (twelve) hours. End date 6/22/23    clopidogreL (PLAVIX) 75 mg tablet Take 1 tablet (75 mg total) by mouth once daily.    dextrose 5 % in water (D5W) 5 % PgBk 50 mL with ceFAZolin 2 gram SolR 2 g Inject 2 g into the vein every 8 (eight) hours. End date 7/17/23    famotidine (PEPCID) 20 MG tablet Take 1 tablet (20 mg total) by mouth 2 (two) times daily as needed (Heartburn).    insulin aspart U-100 (NOVOLOG) 100 unit/mL (3 mL) InPn pen Inject 0-5 Units into the skin before meals and at bedtime as needed (Hyperglycemia).    insulin aspart U-100 (NOVOLOG) 100 unit/mL (3 mL) InPn pen Inject 6 Units into the skin 3 (three) times daily with meals.    insulin detemir U-100, Levemir, 100 unit/mL (3 mL) SubQ InPn pen Inject 18 Units into the skin once daily.    lancets Mis To check BG two times daily, to use with insurance preferred meter    lisinopriL (PRINIVIL,ZESTRIL) 5 MG tablet Take 1 tablet (5 mg total) by mouth once daily.    melatonin (MELATIN) 3 mg tablet Take 3 tablets (9 mg total) by mouth nightly.    methocarbamoL (ROBAXIN) 500 MG Tab Take 1 tablet (500 mg total) by mouth 4 (four) times daily.    ondansetron (ZOFRAN-ODT) 4 MG TbDL Take 1 tablet (4 mg total) by mouth every 8 (eight) hours as needed (nausea).    oxyCODONE (ROXICODONE) 10 mg Tab immediate release tablet Take 1 tablet (10 mg total) by mouth every 4 (four) hours as needed (pain sclae 7-10).    oxyCODONE (ROXICODONE) 5 MG immediate release tablet Take 1 tablet (5 mg total) by mouth every 4 (four) hours as needed (pain scale 4-6).    pen needle, diabetic (PEN NEEDLE) 31 gauge x 3/16" Ndle 1 application by Misc.(Non-Drug; Combo Route) route 2 (two) times a day.    polyethylene glycol (GLYCOLAX) 17 gram PwPk Take 17 g by mouth once daily.    pregabalin (LYRICA) 75 MG capsule Take 1 capsule (75 mg total) by mouth 2 (two) times daily.    senna-docusate 8.6-50 mg " (PERICOLACE) 8.6-50 mg per tablet Take 1 tablet by mouth once daily.    vitamin D (VITAMIN D3) 1000 units Tab Take 1 tablet (1,000 Units total) by mouth once daily.     Family History       Problem Relation (Age of Onset)    Breast cancer Sister    Cancer Mother, Sister    Cataracts Mother    Heart disease Mother, Father, Sister, Brother    Hypertension Father          Tobacco Use    Smoking status: Every Day     Packs/day: 0.25     Years: 45.00     Pack years: 11.25     Types: Cigarettes    Smokeless tobacco: Never   Substance and Sexual Activity    Alcohol use: Yes    Drug use: Never    Sexual activity: Not on file     Review of Systems  Objective:     Vital Signs (Most Recent):  Temp: 96.5 °F (35.8 °C) (06/24/23 0246)  Pulse: 76 (06/24/23 0425)  Resp: 20 (06/24/23 0424)  BP: (!) 104/59 (06/24/23 0425)  SpO2: 97 % (06/24/23 0424) Vital Signs (24h Range):  Temp:  [96.5 °F (35.8 °C)-97.8 °F (36.6 °C)] 96.5 °F (35.8 °C)  Pulse:  [76-87] 76  Resp:  [15-21] 20  SpO2:  [97 %-100 %] 97 %  BP: ()/(38-59) 104/59     Weight: 72.6 kg (160 lb)  Body mass index is 28.35 kg/m².    Physical Exam  Constitutional:       Comments: Inattentive, confused   HENT:      Head: Normocephalic and atraumatic.      Right Ear: External ear normal.      Left Ear: External ear normal.      Nose: Nose normal.      Mouth/Throat:      Mouth: Mucous membranes are dry.   Eyes:      Extraocular Movements: Extraocular movements intact.      Pupils: Pupils are equal, round, and reactive to light.   Cardiovascular:      Rate and Rhythm: Normal rate. Rhythm irregular.      Pulses: Normal pulses.      Heart sounds: Normal heart sounds.   Pulmonary:      Effort: Pulmonary effort is normal.      Breath sounds: Normal breath sounds.   Abdominal:      General: Abdomen is flat. Bowel sounds are normal.      Palpations: Abdomen is soft.   Musculoskeletal:      Comments: External fixator device in place   Skin:     General: Skin is warm.      Capillary  Refill: Capillary refill takes less than 2 seconds.      Comments: Plantar ulcer 1st toe. Cool LE b/l   Neurological:      General: No focal deficit present.      Mental Status: She is disoriented.      Comments: Disoriented to place, time, not to name   Psychiatric:         Mood and Affect: Mood normal.         Behavior: Behavior normal.             CRANIAL NERVES     CN III, IV, VI   Pupils are equal, round, and reactive to light.    Significant Labs: All pertinent labs within the past 24 hours have been reviewed.    Significant Imaging: I have reviewed all pertinent imaging results/findings within the past 24 hours.    Assessment/Plan:     Active Diagnoses:    Diagnosis Date Noted POA    PRINCIPAL PROBLEM:  Weakness [R53.1] 06/24/2023 Unknown      Problems Resolved During this Admission:     VTE Risk Mitigation (From admission, onward)           Ordered     IP VTE HIGH RISK PATIENT  Once         06/24/23 0432     Place sequential compression device  Until discontinued         06/24/23 0432                  #Septic shock from UTI  # metabolic encephalopathy  #MYLES (Cr 3, baseline 1.1)  #Chronic hyponatremia  #Malnutrition-albumin 1.8  #Acute on chronic Anemia on aspirin #Plavix (baseline hgb 8.6)  #new onset afib  # non-anion gap metabolic acidosis, likely from renal failure  Admit to ICU  Vancomycin and cefepime, IVF, levophed  Check MRSA nares, discontinue vancomycin if negative  1u prbc  Check echo, consider cardiology csx for new onset afib but cannot anticoagulate  Obtain TLC  CT abd/p  NPO until speech therapy evaluation  Fall precautions, delirium precautions  Hold PTA aspirin Plavix  BID PPI  Check occult stool, iron studies  Full code      #[place] [facility] [place] ORIF 6/6/23 of pilon fracture as well as ex fix to stabilize fractures with Dr. Gilbert Mahmood.  - Patient post-op to be non weight bearing to left lower extremity x 3 months from surgery date on 6/6.   - Ortho to  remove ex fix I 4-6 weeks but if construct fails then would have to convert to ring fixator as per Ortho.     #Mass right upper outer quadrant right breast-outpatient follow-up with PMD  #Type 2 diabetes-SSI    Critical care time 40 minutes              Hitesh Rios MD  Department of Hospital Medicine   [facility] - Emergency Dept

## 2023-06-24 NOTE — ANESTHESIA POSTPROCEDURE EVALUATION
Anesthesia Post Evaluation    Patient: Anastasiia Badillo    Procedure(s) Performed: Procedure(s) (LRB):  Cardioversion or Defibrillation (N/A)    Final Anesthesia Type: general      Patient location during evaluation: ICU  Patient participation: No - Unable to Participate, Coma/Other Inability to Communicate  Level of consciousness: awake  Post-procedure vital signs: reviewed and stable  Pain management: adequate  Airway patency: patent    PONV status at discharge: No PONV  Anesthetic complications: no      Cardiovascular status: blood pressure returned to baseline, hemodynamically stable and stable  Respiratory status: unassisted, spontaneous ventilation and nasal cannula  Hydration status: euvolemic  Follow-up not needed.          Vitals Value Taken Time   /81 06/24/23 1530   Temp 34.7 °C (94.4 °F) 06/24/23 0924   Pulse 101 06/24/23 1543   Resp 20 06/24/23 1543   SpO2 90 % 06/24/23 1543   Vitals shown include unvalidated device data.      No case tracking events are documented in the log.      Pain/Pravin Score: No data recorded

## 2023-06-24 NOTE — ANESTHESIA PREPROCEDURE EVALUATION
06/24/2023  Anastasiia Badillo is a 73 y.o., female.      Pre-op Assessment    I have reviewed the Patient Summary Reports.     I have reviewed the Nursing Notes. I have reviewed the NPO Status.   I have reviewed the Medications.     Review of Systems  Anesthesia Hx:  No problems with previous Anesthesia  Neg history of prior surgery. Denies Family Hx of Anesthesia complications.   Denies Personal Hx of Anesthesia complications.   Social:  Alcohol Use, Smoker    Hematology/Oncology:  Hematology Normal   Oncology Normal   Hematology Comments: Sepsis    EENT/Dental:EENT/Dental Normal   Cardiovascular:   Hypertension Dysrhythmias atrial fibrillation PVD    Pulmonary:  Pulmonary Normal    Renal/:   Chronic Renal Disease, ARF Possible urosepsis   Hepatic/GI:  Hepatic/GI Normal    Musculoskeletal:  Musculoskeletal Normal    Neurological:  Neurology Normal Pt confused   Endocrine:   Diabetes, type 2    Dermatological:  Skin Normal    Psych:  Psychiatric Normal           Patient Active Problem List   Diagnosis    Type 2 diabetes mellitus with foot ulcer, with long-term current use of insulin    Essential (primary) hypertension    Nicotine dependence    Septic shock    Popliteal artery stenosis, left    PAD (peripheral artery disease)    Mass of upper outer quadrant of right breast    Primary osteoarthritis of both first carpometacarpal joints    Closed displaced pilon fracture of left tibia    Chronic ulcer of left heel with fat layer exposed    Tobacco use    Type 2 diabetes mellitus with hyperglycemia, with long-term current use of insulin    Neuropathy    Chronic ulcer of great toe of left foot with fat layer exposed    Acute osteomyelitis of left calcaneus    Constipation    Abnormal laboratory test    Weakness       Past Surgical History:   Procedure Laterality Date    ANGIOGRAPHY OF LOWER  EXTREMITY Left 4/25/2023    Procedure: Angiogram Extremity Unilateral;  Surgeon: Gilbert Sheppard MD;  Location: [facility] OR [facility] FLR;  Service: Vascular;  Laterality: Left;  28.4 min  487.45 mGy  75.9180 Gycm2  trans radial: 7 ml  dye: 126 ml      APPLICATION, EXTERNAL FIXATION DEVICE Left 6/6/2023    Procedure: APPLICATION, EXTERNAL FIXATION DEVICE, LEFT ANKLE, [place];  Surgeon: Gilbert Mahmood MD;  [facility];  Service: Orthopedics;  Laterality: Left;    AUGMENTATION OF BREAST      INTRAMEDULLARY RODDING OF FEMUR Left 2/18/2023    Procedure: INSERTION, INTRAMEDULLARY ROXANNA, FEMUR (hana table -- synthes -- long nail -- have bovie and suction and large bone set);  Surgeon: Keanu Brand MD;  Location: [facility] OR;  Service: Orthopedics;  Laterality: Left;    OPEN REDUCTION AND INTERNAL FIXATION (ORIF) OF PILON FRACTURE Left 6/6/2023    Procedure: ORIF, FRACTURE, PILON, LEFT;  Surgeon: Gilbert Mahmood MD;  [facility];  Service: Orthopedics;  Laterality: Left;    PERCUTANEOUS TRANSLUMINAL ANGIOPLASTY Left 4/25/2023    Procedure: PTA (ANGIOPLASTY, PERCUTANEOUS, TRANSLUMINAL);  Surgeon: Gilbert Sheppard MD;  [facility];  Service: Vascular;  Laterality: Left;  Tibial and popliteal angioplasty        Tobacco Use:  The patient  reports that she has been smoking cigarettes. She has a 11.25 pack-year smoking history. She has never used smokeless tobacco.     Results for orders placed or performed during the hospital encounter of 06/24/23   EKG 12-lead    Collection Time: 06/24/23  1:11 PM    Narrative    Test Reason : I48.91,    Vent. Rate : 113 BPM     Atrial Rate : 113 BPM     P-R Int : 136 ms          QRS Dur : 090 ms      QT Int : 310 ms       P-R-T Axes : 030 097 047 degrees     QTc Int : 425 ms    Sinus tachycardia with occasional Premature ventricular complexes  Rightward axis  Low voltage QRS  Septal infarct (cited on or before  24-JUN-2023)  Abnormal ECG  When compared with ECG of 24-JUN-2023 10:28,  Sinus rhythm has replaced Atrial fibrillation    Referred By: [email or web address]   SELF           Confirmed By:         Imaging Results          CT Abdomen Pelvis  Without Contrast (Final result)  Result time 06/24/23 05:34:07    Final result by Allan Fountain DO (06/24/23 05:34:07)                 Narrative:    EXAM DESCRIPTION:  CT ABDOMEN PELVIS WITHOUT CONTRAST  RadLex: CT ABDOMEN PELVIS WITHOUT IV CONTRAST    CLINICAL HISTORY:  Weakness, increased WBC, decreased RBC, hypotensive, MYLES.    TECHNIQUE:  CT of the abdomen and pelvis without intravenous contrast.  All CT scans at this facility use dose modulation, iterative reconstruction, and/or weight based dosing when appropriate to reduce radiation dose to as low as reasonably achievable.    COMPARISON: None.    FINDINGS:    The visualized lower thoracic structures demonstrate minimal bilateral pleural effusions. There is mild bibasilar atelectasis. Coronary artery calcifications are present. There is subcutaneous edema throughout the thorax.    Unenhanced images of the liver, spleen, and adrenal glands appear grossly unremarkable. Pancreas is not well assessed without contrast. Gallbladder is minimally distended. No renal stones are identified. There is a round exophytic lesion off the upper pole right kidney measuring 2.1 cm, with Hounsfield units averaging 59. This may relate to hyperdense cyst. There is mild left hydronephrosis and hydroureter. No definite ureteral or bladder stone is identified. Urinary bladder is decompressed with a Gee catheter in place. There are atherosclerotic calcifications along the abdominal aorta without evidence for aneurysmal dilatation. No bowel obstruction is seen. There is no free intraperitoneal air. The appendix appears unremarkable. There is colonic diverticulosis without definite CT evidence for acute diverticulitis. There is mild fecal loading throughout the  colon. There is minimal free fluid in the posterior pelvis. There is moderate subcutaneous edema throughout the abdomen and pelvis extending into the thighs. There is an intramedullary denzel within the proximal left femur, with a compression screw through the left femoral neck. There is a left subtrochanteric fracture    IMPRESSION:  1.  Minimal gallbladder distention.  2.  Mild left hydroureteronephrosis, without definite ureteral stone seen. Urinary bladder decompressed.  3.  Anasarca.  4.  Colonic diverticulosis without definite CT evidence for acute diverticulitis.  5.  Possible hyperdense cyst at the upper pole right kidney. Follow-up nonemergent renal ultrasound could be performed.  6.  Post surgical changes at the left femur, with subtrochanteric fracture.  7.  Minimal bilateral pleural effusions and mild bibasilar atelectasis.    Electronically signed by:  Allan Fountain DO  6/24/2023 5:34 AM CDT Workstation: [id number]                             X-Ray Chest AP Portable (Final result)  Result time 06/24/23 08:28:44    Final result by Perfecto Castellanos MD (06/24/23 08:28:44)                 Narrative:    Chest single view    CLINICAL DATA: Sepsis    FINDINGS: AP view shows the heart to be mildly enlarged. The mediastinum is unremarkable. Right-sided PICC line extends to the superior vena cava. There is mild atelectasis at the right lung base, with ill-defined atelectasis or infiltrate at the lung base on the left. There are no significant pleural effusions. No acute osseous abnormality is identified.    IMPRESSION:  1. Atelectasis or infiltrate at the left lung base.  2. Mild right basilar atelectasis.  3. Mild cardiomegaly.    Electronically signed by:  Perfecto Castellanos MD  6/24/2023 8:28 AM CDT Workstation: [id number]                               Lab Results   Component Value Date    WBC 24.48 (H) 06/24/2023    HGB 8.8 (L) 06/24/2023    HCT 26.0 (L) 06/24/2023    MCV 87 06/24/2023     06/24/2023      BMP  Lab Results   Component Value Date     (L) 06/24/2023    K 4.7 06/24/2023     06/24/2023    CO2 6 (LL) 06/24/2023    BUN 86 (H) 06/24/2023    CREATININE 3.0 (H) 06/24/2023    CALCIUM 7.2 (L) 06/24/2023    ANIONGAP 18 (H) 06/24/2023     (H) 06/24/2023    GLU 89 06/24/2023    GLU 84 06/14/2023       No results found for this or any previous visit.          Physical Exam  General: Confusion    Airway:  Mallampati: II   Mouth Opening: Normal  TM Distance: Normal  Tongue: Normal  Neck ROM: Normal ROM    Dental:  Intact    Chest/Lungs:  Clear to auscultation, Normal Respiratory Rate    Heart:  Rate: Tachycardia  Rhythm: Irregularly Irregular  Sounds: Normal        Anesthesia Plan  Type of Anesthesia, risks & benefits discussed:    Anesthesia Type: Gen Natural Airway  Intra-op Monitoring Plan: Standard ASA Monitors  Post Op Pain Control Plan:   (medical reason for not using multimodal pain management)  Induction:  IV  Informed Consent: Informed consent signed with the Patient representative and all parties understand the risks and agree with anesthesia plan.  All questions answered. Patient consented to blood products? Yes  ASA Score: 4 Emergent    Ready For Surgery From Anesthesia Perspective.     .

## 2023-06-24 NOTE — ED PROVIDER NOTES
Encounter Date: 6/24/2023       History     Chief Complaint   Patient presents with    Hypotension     Sent from [facility] for eval of low blood pressure.       73-year-old female brought in from [facility] for evaluation of low blood pressure.  Patient has altered mental status and was unable to give clear history of what happened.  Son reports he spoke with her yesterday and she was a bit confused but otherwise acting normally.  She is been in the facility to rehab after suffering left ankle fracture with ex fix.  Patient also has right-sided midline.    Review of patient's allergies indicates:  No Known Allergies  Past Medical History:   Diagnosis Date    Acute osteomyelitis of left calcaneus 6/7/2023    Chronic ulcer of great toe of left foot with fat layer exposed 6/7/2023    Closed left pilon fracture 6/5/2023    Closed left subtrochanteric femur fracture, initial encounter 2/18/2023    Diabetes mellitus, type 2     Essential (primary) hypertension 2/17/2023    Mass of upper outer quadrant of right breast 4/27/2023    Nicotine dependence 2/20/2023    PAD (peripheral artery disease) 4/22/2023    Tobacco use 6/6/2023    Type 2 diabetes mellitus with hyperglycemia, with long-term current use of insulin 6/6/2023     Past Surgical History:   Procedure Laterality Date    ANGIOGRAPHY OF LOWER EXTREMITY Left 4/25/2023    Procedure: Angiogram Extremity Unilateral;  Surgeon: Gilbert Sheppard MD;  Location: [facility] OR [address] [place] [address];  Service: Vascular;  Laterality: Left;  28.4 min  487.45 mGy  75.9180 Gycm2  trans radial: 7 ml  dye: 126 ml      APPLICATION, EXTERNAL FIXATION DEVICE Left 6/6/2023    Procedure: APPLICATION, EXTERNAL FIXATION DEVICE, LEFT ANKLE, Vasquez;  Surgeon: Gilbert Mahmood MD;  Location: [facility] OR [address] [place] [address];  Service: Orthopedics;  Laterality: Left;    AUGMENTATION OF BREAST      INTRAMEDULLARY RODDING OF FEMUR Left 2/18/2023    Procedure: INSERTION, INTRAMEDULLARY ROXANNA, FEMUR  (hana table -- synthes -- long nail -- have bovie and suction and large bone set);  Surgeon: Keanu Brand MD;  Location: [facility] OR;  Service: Orthopedics;  Laterality: Left;    OPEN REDUCTION AND INTERNAL FIXATION (ORIF) OF PILON FRACTURE Left 6/6/2023    Procedure: ORIF, FRACTURE, PILON, LEFT;  Surgeon: Gilbert Mahmood MD;  Location: [facility] OR [facility] FLR;  Service: Orthopedics;  Laterality: Left;    PERCUTANEOUS TRANSLUMINAL ANGIOPLASTY Left 4/25/2023    Procedure: PTA (ANGIOPLASTY, PERCUTANEOUS, TRANSLUMINAL);  Surgeon: Gilbert Sheppard MD;  Location: [facility] OR 2ND FLR;  Service: Vascular;  Laterality: Left;  Tibial and popliteal angioplasty     Family History   Problem Relation Age of Onset    Heart disease Mother     Cancer Mother     Cataracts Mother     Hypertension Father     Heart disease Father     Cancer Sister     Heart disease Sister     Breast cancer Sister     Heart disease Brother      Social History     Tobacco Use    Smoking status: Every Day     Packs/day: 0.25     Years: 45.00     Pack years: 11.25     Types: Cigarettes    Smokeless tobacco: Never   Substance Use Topics    Alcohol use: Yes    Drug use: Never     Review of Systems   Unable to perform ROS: Mental status change     Physical Exam     Initial Vitals   BP Pulse Resp Temp SpO2   06/24/23 0026 06/24/23 0026 06/24/23 0030 06/24/23 0033 06/24/23 0026   (!) 89/49 87 19 97.8 °F (36.6 °C) 99 %      MAP       --                Physical Exam    Constitutional: She appears well-developed and well-nourished. No distress.   Eyes: EOM are normal. Pupils are equal, round, and reactive to light.   Neck: No thyromegaly present.   Normal range of motion.  Cardiovascular:  Normal rate and regular rhythm.     Exam reveals no gallop and no friction rub.       No murmur heard.  Pulmonary/Chest: No respiratory distress. She has no wheezes. She has no rales.   Abdominal: Abdomen is soft. She exhibits no distension.   Musculoskeletal:       Cervical back: Normal range of motion.     Neurological: She is alert. She displays normal reflexes. No cranial nerve deficit.   Alert and oriented x2, disoriented to situation   Skin: No abscess noted. No pallor.   Right upper extremity midline       ED Course   Critical Care    Date/Time: 6/24/2023 4:53 AM  Performed by: Pete Cadet MD  Authorized by: Hitesh Rios MD   Direct patient critical care time: 20 minutes  Additional history critical care time: 15 minutes  Documentation critical care time: 10 minutes  Consulting other physicians critical care time: 10 minutes  Consult with family critical care time: 10 minutes  Total critical care time (exclusive of procedural time) : 65 minutes  Critical care was necessary to treat or prevent imminent or life-threatening deterioration of the following conditions: shock and sepsis.  Critical care was time spent personally by me on the following activities: blood draw for specimens, ordering and performing treatments and interventions, obtaining history from patient or surrogate, examination of patient, ordering and review of laboratory studies, evaluation of patient's response to treatment, ordering and review of radiographic studies and pulse oximetry.      Labs Reviewed   CBC W/ AUTO DIFFERENTIAL - Abnormal; Notable for the following components:       Result Value    WBC 24.48 (*)     RBC 2.31 (*)     Hemoglobin 6.7 (*)     Hematocrit 20.0 (*)     RDW 15.0 (*)     Immature Granulocytes 1.6 (*)     Gran # (ANC) 21.0 (*)     Immature Grans (Abs) 0.40 (*)     Lymph # 0.8 (*)     Mono # 2.2 (*)     Gran % 85.6 (*)     Lymph % 3.2 (*)     Platelet Estimate Clumped (*)     All other components within normal limits    Narrative:     H&H    critical result(s) called and verbal readback obtained from   TYRESE GUZMAN RN ER. by TC1 06/24/2023 01:41   COMPREHENSIVE METABOLIC PANEL - Abnormal; Notable for the following components:    Sodium 131 (*)     CO2 16 (*)     BUN  90 (*)     Creatinine 3.0 (*)     Calcium 7.4 (*)     Total Protein 5.0 (*)     Albumin 1.8 (*)     ALT <5 (*)     eGFR 16.0 (*)     All other components within normal limits   URINALYSIS, REFLEX TO URINE CULTURE - Abnormal; Notable for the following components:    Color, UA Orange (*)     Appearance, UA Cloudy (*)     Protein, UA 2+ (*)     Occult Blood UA 2+ (*)     Leukocytes, UA 3+ (*)     All other components within normal limits    Narrative:     Specimen Source->Urine   B-TYPE NATRIURETIC PEPTIDE - Abnormal; Notable for the following components:     (*)     All other components within normal limits   PROCALCITONIN - Abnormal; Notable for the following components:    Procalcitonin 0.99 (*)     All other components within normal limits   URINALYSIS MICROSCOPIC - Abnormal; Notable for the following components:    WBC, UA 23 (*)     Hyaline Casts, UA 19 (*)     All other components within normal limits    Narrative:     Specimen Source->Urine   FERRITIN - Abnormal; Notable for the following components:    Ferritin 815 (*)     All other components within normal limits   IRON AND TIBC - Abnormal; Notable for the following components:    Iron 24 (*)     Transferrin <70 (*)     All other components within normal limits   CULTURE, BLOOD   CULTURE, BLOOD   CULTURE, URINE   MRSA SCREEN BY PCR   LACTIC ACID, PLASMA   FERRITIN   IRON AND TIBC   LACTIC ACID, PLASMA   PROTIME-INR   APTT   OCCULT BLOOD X 1, STOOL   TYPE & SCREEN   POCT GLUCOSE MONITORING CONTINUOUS   PREPARE RBC SOFT        ECG Results              EKG 12-lead (In process)  Result time 06/24/23 05:26:33      In process by Interface, Lab In [facility] (06/24/23 05:26:33)                   Narrative:    Test Reason : I49.9,    Vent. Rate : 095 BPM     Atrial Rate : 096 BPM     P-R Int : 000 ms          QRS Dur : 092 ms      QT Int : 364 ms       P-R-T Axes : 000 052 043 degrees     QTc Int : 457 ms    Atrial fibrillation with premature ventricular or  aberrantly conducted  complexes  Low voltage QRS  Incomplete right bundle branch block  Septal infarct (cited on or before 24-JUN-2023)  Abnormal ECG  When compared with ECG of 24-JUN-2023 03:18,  Previous ECG has undetermined rhythm, needs review    Referred By: [email or web address]   SELF           Confirmed By:                                      EKG 12-lead (In process)  Result time 06/24/23 05:27:23      In process by Interface, Lab In [facility] (06/24/23 05:27:23)                   Narrative:    Test Reason : I95.9,    Vent. Rate : 087 BPM     Atrial Rate : 087 BPM     P-R Int : 130 ms          QRS Dur : 088 ms      QT Int : 344 ms       P-R-T Axes : 003 072 029 degrees     QTc Int : 413 ms    Normal sinus rhythm  Low voltage QRS  Septal infarct ,age undetermined  Abnormal ECG  When compared with ECG of 05-JUN-2023 19:33,  Septal infarct is now Present  Nonspecific T wave abnormality now evident in Anterior leads    Referred By: MACIEL   SELF           Confirmed By:                                   Imaging Results              CT Abdomen Pelvis  Without Contrast (In process)                      X-Ray Chest AP Portable (In process)                      Medications   vancomycin - pharmacy to dose (has no administration in time range)   vancomycin (VANCOCIN) 1,750 mg in dextrose 5 % (D5W) 500 mL IVPB (1,750 mg Intravenous New Bag 6/24/23 0527)   0.9%  NaCl infusion (for blood administration) (has no administration in time range)   NORepinephrine 4 mg in dextrose 5% 250 mL infusion (premix) (titrating) (0.1 mcg/kg/min × 72.6 kg Intravenous Rate/Dose Change 6/24/23 0312)   sodium chloride 0.9% flush 10 mL (has no administration in time range)   naloxone 0.4 mg/mL injection 0.02 mg (has no administration in time range)   glucose chewable tablet 16 g (has no administration in time range)   glucose chewable tablet 24 g (has no administration in time range)   dextrose 50% injection 12.5 g (has no administration in time range)  "  dextrose 50% injection 25 g (has no administration in time range)   glucagon (human recombinant) injection 1 mg (has no administration in time range)   0.9%  NaCl infusion ( Intravenous New Bag 6/24/23 0527)   cefepime 1g in dextrose 5% 100 mL IVPB (ready to mix) (has no administration in time range)   pantoprazole injection 40 mg (40 mg Intravenous Given 6/24/23 0524)   insulin aspart U-100 pen 0-5 Units (has no administration in time range)   sodium chloride 0.9% bolus 1,572 mL 1,572 mL (0 mLs Intravenous Stopped 6/24/23 0216)   cefepime 1g in dextrose 5% 100 mL IVPB (ready to mix) (0 g Intravenous Stopped 6/24/23 0240)     Medical Decision Making:   Clinical Tests:   Sepsis Perfusion Assessment: "I attest a sepsis perfusion exam was performed within 6 hours of sepsis, severe sepsis, or septic shock presentation, following fluid resuscitation."           ED Course as of 06/24/23 0534   Sat Jun 24, 2023   0325 I spoke with the patient's son, Yaya who reports the patient has had a slow decline over past few months or years.  He is had decreased contact with her because she lived in a group home during COVID.  A few weeks ago she had a fall, unclear exactly what happens, and prolonged downtime on the ground prior to being found with an ankle fracture. Since that time, she's had slow decompensation while in rehab for her left ankle fracture.  [BS]      ED Course User Index  [BS] Pete Cadet MD               73-year-old female with history of left ankle fracture status post ex fix, right-sided, currently in post acute Care presents for hypotension altered mental status.  Patient vitals within normal limits.  Patient is afebrile.  Patient has multiple sources for sepsis workup initiated broad-spectrum antibiotics, vancomycin, cefepime, fluid bolus, fluid cultures, urinalysis and urine culture.  BNP mildly elevated, doubt heart failure or cardiogenic shock.  Gee catheter placed and UA with ines pyuria, " likely the source of her infection.  Chest x-ray without acute cardiopulmonary consolidation.  Patient given fluid bolus without improvement in symptoms.  On recheck, patient became more hypotensive and more altered, pressor started.  Levophed titrated to a map of 65 with improvement in mentation.  Patient also has symptomatic anemia with hemoglobin of 6.5, treated with 1 unit packed red blood cells.  Patient also has acute kidney injury likely prerenal with BUN of 90 and creatinine of 3.0 doubt uremia as underlying cause of altered mental status given patient's response to blood pressure.  She is making urine so she is not in renal failure.  Will admit to the ICU given patient is on  pressors.       Clinical Impression:   Final diagnoses:  [I95.9] Hypotension  [R57.9] Shock (Primary)  [N39.0] Urinary tract infection without hematuria, site unspecified  [D64.9] Anemia, unspecified type  [I49.9] Arrhythmia        ED Disposition Condition    Admit Stable                Pete Cadet MD  06/24/23 0535       Pete Cadet MD  06/24/23 0502

## 2023-06-25 ENCOUNTER — CLINICAL SUPPORT (OUTPATIENT)
Dept: CARDIOLOGY | Facility: HOSPITAL | Age: 73
DRG: 853 | End: 2023-06-25
Payer: MEDICARE

## 2023-06-25 VITALS — WEIGHT: 160 LBS | HEIGHT: 63 IN | BODY MASS INDEX: 28.35 KG/M2

## 2023-06-25 PROBLEM — E87.20 NORMAL ANION GAP METABOLIC ACIDOSIS: Status: ACTIVE | Noted: 2023-06-25

## 2023-06-25 PROBLEM — E87.6 HYPOKALEMIA: Status: ACTIVE | Noted: 2023-06-25

## 2023-06-25 PROBLEM — D63.8 ANEMIA, CHRONIC DISEASE: Status: ACTIVE | Noted: 2023-06-24

## 2023-06-25 PROBLEM — E87.1 HYPONATREMIA: Status: ACTIVE | Noted: 2023-06-25

## 2023-06-25 LAB
ALBUMIN SERPL BCP-MCNC: 1.7 G/DL (ref 3.5–5.2)
ALLENS TEST: ABNORMAL
ALP SERPL-CCNC: 69 U/L (ref 55–135)
ALT SERPL W/O P-5'-P-CCNC: <5 U/L (ref 10–44)
ANION GAP SERPL CALC-SCNC: 11 MMOL/L (ref 8–16)
ANISOCYTOSIS BLD QL SMEAR: SLIGHT
AORTIC ROOT ANNULUS: 2.8 CM
AORTIC VALVE CUSP SEPERATION: 1.8 CM
APTT PPP: 49.3 SEC (ref 21–32)
AST SERPL-CCNC: 30 U/L (ref 10–40)
AV INDEX (PROSTH): 0.69
AV MEAN GRADIENT: 7 MMHG
AV PEAK GRADIENT: 13 MMHG
AV VALVE AREA: 2.15 CM2
AV VELOCITY RATIO: 0.52
BASOPHILS # BLD AUTO: 0.07 K/UL (ref 0–0.2)
BASOPHILS NFR BLD: 0 % (ref 0–1.9)
BASOPHILS NFR BLD: 0.2 % (ref 0–1.9)
BILIRUB DIRECT SERPL-MCNC: <0.1 MG/DL (ref 0.1–0.3)
BILIRUB SERPL-MCNC: 0.6 MG/DL (ref 0.1–1)
BSA FOR ECHO PROCEDURE: 1.8 M2
BUN SERPL-MCNC: 83 MG/DL (ref 8–23)
CALCIUM SERPL-MCNC: 7.1 MG/DL (ref 8.7–10.5)
CHLORIDE SERPL-SCNC: 107 MMOL/L (ref 95–110)
CHLORIDE UR-SCNC: 34 MMOL/L (ref 25–200)
CO2 SERPL-SCNC: 16 MMOL/L (ref 23–29)
CREAT SERPL-MCNC: 3.1 MG/DL (ref 0.5–1.4)
CREAT UR-MCNC: 59 MG/DL (ref 15–325)
CV ECHO LV RWT: 0.6 CM
DELSYS: ABNORMAL
DIFFERENTIAL METHOD: ABNORMAL
DIFFERENTIAL METHOD: ABNORMAL
DOP CALC AO PEAK VEL: 1.78 M/S
DOP CALC AO VTI: 31.8 CM
DOP CALC LVOT AREA: 3.1 CM2
DOP CALC LVOT DIAMETER: 2 CM
DOP CALC LVOT PEAK VEL: 0.92 M/S
DOP CALC LVOT STROKE VOLUME: 68.45 CM3
DOP CALC MV VTI: 30.6 CM
DOP CALCLVOT PEAK VEL VTI: 21.8 CM
E WAVE DECELERATION TIME: 195 MSEC
E/A RATIO: 0.91
E/E' RATIO: 8.73 M/S
ECHO LV POSTERIOR WALL: 0.94 CM (ref 0.6–1.1)
EJECTION FRACTION: 58 %
EOSINOPHIL # BLD AUTO: 0.1 K/UL (ref 0–0.5)
EOSINOPHIL NFR BLD: 0 % (ref 0–8)
EOSINOPHIL NFR BLD: 0.2 % (ref 0–8)
ERYTHROCYTE [DISTWIDTH] IN BLOOD BY AUTOMATED COUNT: 15.1 % (ref 11.5–14.5)
ERYTHROCYTE [DISTWIDTH] IN BLOOD BY AUTOMATED COUNT: 15.6 % (ref 11.5–14.5)
EST. GFR  (NO RACE VARIABLE): 15.4 ML/MIN/1.73 M^2
FIBRINOGEN PPP-MCNC: >800 MG/DL (ref 182–400)
FLOW: 2
FRACTIONAL SHORTENING: 30 % (ref 28–44)
GLUCOSE SERPL-MCNC: 102 MG/DL (ref 70–110)
GLUCOSE SERPL-MCNC: 105 MG/DL (ref 70–110)
GLUCOSE SERPL-MCNC: 108 MG/DL (ref 70–110)
GLUCOSE SERPL-MCNC: 113 MG/DL (ref 70–110)
GLUCOSE SERPL-MCNC: 114 MG/DL (ref 70–110)
GLUCOSE SERPL-MCNC: 116 MG/DL (ref 70–110)
GLUCOSE SERPL-MCNC: 121 MG/DL (ref 70–110)
GLUCOSE SERPL-MCNC: 130 MG/DL (ref 70–110)
GLUCOSE SERPL-MCNC: 137 MG/DL (ref 70–110)
GLUCOSE SERPL-MCNC: 153 MG/DL (ref 70–110)
GLUCOSE SERPL-MCNC: 64 MG/DL (ref 70–110)
GLUCOSE SERPL-MCNC: 70 MG/DL (ref 70–110)
GLUCOSE SERPL-MCNC: 76 MG/DL (ref 70–110)
GLUCOSE SERPL-MCNC: 79 MG/DL (ref 70–110)
GLUCOSE SERPL-MCNC: 81 MG/DL (ref 70–110)
GLUCOSE SERPL-MCNC: 86 MG/DL (ref 70–110)
GLUCOSE SERPL-MCNC: 91 MG/DL (ref 70–110)
GLUCOSE SERPL-MCNC: 92 MG/DL (ref 70–110)
HCO3 UR-SCNC: 16 MMOL/L (ref 24–28)
HCT VFR BLD AUTO: 23.8 % (ref 37–48.5)
HCT VFR BLD AUTO: 24.8 % (ref 37–48.5)
HGB BLD-MCNC: 8.2 G/DL (ref 12–16)
HGB BLD-MCNC: 8.2 G/DL (ref 12–16)
HYPOCHROMIA BLD QL SMEAR: ABNORMAL
IMM GRANULOCYTES # BLD AUTO: 0.82 K/UL (ref 0–0.04)
IMM GRANULOCYTES # BLD AUTO: ABNORMAL K/UL (ref 0–0.04)
IMM GRANULOCYTES NFR BLD AUTO: 2.8 % (ref 0–0.5)
IMM GRANULOCYTES NFR BLD AUTO: ABNORMAL % (ref 0–0.5)
INR PPP: 4 (ref 0.8–1.2)
INTERVENTRICULAR SEPTUM: 0.8 CM (ref 0.6–1.1)
LA MAJOR: 4.6 CM
LA MINOR: 3 CM
LA WIDTH: 3.2 CM
LDH SERPL L TO P-CCNC: 267 U/L (ref 110–260)
LEFT ATRIUM SIZE: 2.6 CM
LEFT ATRIUM VOLUME INDEX MOD: 25.9 ML/M2
LEFT ATRIUM VOLUME INDEX: 14.6 ML/M2
LEFT ATRIUM VOLUME MOD: 45.6 CM3
LEFT ATRIUM VOLUME: 25.68 CM3
LEFT INTERNAL DIMENSION IN SYSTOLE: 2.19 CM (ref 2.1–4)
LEFT VENTRICLE DIASTOLIC VOLUME INDEX: 21.88 ML/M2
LEFT VENTRICLE DIASTOLIC VOLUME: 38.5 ML
LEFT VENTRICLE MASS INDEX: 40 G/M2
LEFT VENTRICLE SYSTOLIC VOLUME INDEX: 9.1 ML/M2
LEFT VENTRICLE SYSTOLIC VOLUME: 16 ML
LEFT VENTRICULAR INTERNAL DIMENSION IN DIASTOLE: 3.12 CM (ref 3.5–6)
LEFT VENTRICULAR MASS: 70.84 G
LV LATERAL E/E' RATIO: 8 M/S
LV SEPTAL E/E' RATIO: 9.6 M/S
LVOT MG: 2 MMHG
LVOT MV: 0.63 CM/S
LYMPHOCYTES # BLD AUTO: 1.1 K/UL (ref 1–4.8)
LYMPHOCYTES NFR BLD: 2 % (ref 18–48)
LYMPHOCYTES NFR BLD: 3.6 % (ref 18–48)
MCH RBC QN AUTO: 29.6 PG (ref 27–31)
MCH RBC QN AUTO: 30 PG (ref 27–31)
MCHC RBC AUTO-ENTMCNC: 33.1 G/DL (ref 32–36)
MCHC RBC AUTO-ENTMCNC: 34.5 G/DL (ref 32–36)
MCV RBC AUTO: 87 FL (ref 82–98)
MCV RBC AUTO: 90 FL (ref 82–98)
MODE: ABNORMAL
MONOCYTES # BLD AUTO: 2.3 K/UL (ref 0.3–1)
MONOCYTES NFR BLD: 6 % (ref 4–15)
MONOCYTES NFR BLD: 7.7 % (ref 4–15)
MV MEAN GRADIENT: 2 MMHG
MV PEAK A VEL: 1.06 M/S
MV PEAK E VEL: 0.96 M/S
MV PEAK GRADIENT: 6 MMHG
MV STENOSIS PRESSURE HALF TIME: 57 MS
MV VALVE AREA BY CONTINUITY EQUATION: 2.24 CM2
MV VALVE AREA P 1/2 METHOD: 3.86 CM2
NEUTROPHILS # BLD AUTO: 25 K/UL (ref 1.8–7.7)
NEUTROPHILS NFR BLD: 82 % (ref 38–73)
NEUTROPHILS NFR BLD: 85.5 % (ref 38–73)
NEUTS BAND NFR BLD MANUAL: 10 %
NRBC BLD-RTO: 0 /100 WBC
NRBC BLD-RTO: 0 /100 WBC
OB PNL STL: POSITIVE
OHS LV EJECTION FRACTION SIMPSONS BIPLANE MOD: 7 %
PCO2 BLDA: 26.2 MMHG (ref 35–45)
PH SMN: 7.39 [PH] (ref 7.35–7.45)
PISA TR MAX VEL: 3.93 M/S
PLATELET # BLD AUTO: 407 K/UL (ref 150–450)
PLATELET # BLD AUTO: 408 K/UL (ref 150–450)
PLATELET BLD QL SMEAR: ABNORMAL
PMV BLD AUTO: 11.5 FL (ref 9.2–12.9)
PMV BLD AUTO: 11.7 FL (ref 9.2–12.9)
PO2 BLDA: 88 MMHG (ref 80–100)
POC BE: -9 MMOL/L
POC SATURATED O2: 97 % (ref 95–100)
POC TCO2: 17 MMOL/L (ref 23–27)
POTASSIUM SERPL-SCNC: 3.8 MMOL/L (ref 3.5–5.1)
POTASSIUM UR-SCNC: 22 MMOL/L (ref 15–95)
PROT SERPL-MCNC: 4.7 G/DL (ref 6–8.4)
PROT UR-MCNC: 331 MG/DL (ref 6–15)
PROTHROMBIN TIME: 40.8 SEC (ref 9–12.5)
PV MV: 0.89 M/S
PV PEAK VELOCITY: 1.25 CM/S
RA MAJOR: 4.81 CM
RA PRESSURE: 3 MMHG
RA WIDTH: 4.09 CM
RBC # BLD AUTO: 2.73 M/UL (ref 4–5.4)
RBC # BLD AUTO: 2.77 M/UL (ref 4–5.4)
RIGHT VENTRICULAR END-DIASTOLIC DIMENSION: 2.97 CM
RIGHT VENTRICULAR LENGTH IN DIASTOLE (APICAL 4-CHAMBER VIEW): 6.09 CM
RV MID DIAMA: 1.88 CM
SAMPLE: ABNORMAL
SITE: ABNORMAL
SODIUM SERPL-SCNC: 134 MMOL/L (ref 136–145)
SODIUM UR-SCNC: 39 MMOL/L (ref 20–250)
TDI LATERAL: 0.12 M/S
TDI SEPTAL: 0.1 M/S
TDI: 0.11 M/S
TR MAX PG: 62 MMHG
TV REST PULMONARY ARTERY PRESSURE: 65 MMHG
UUN UR-MCNC: 414 MG/DL (ref 140–1050)
WBC # BLD AUTO: 29.3 K/UL (ref 3.9–12.7)
WBC # BLD AUTO: 29.84 K/UL (ref 3.9–12.7)

## 2023-06-25 PROCEDURE — 85610 PROTHROMBIN TIME: CPT | Performed by: HOSPITALIST

## 2023-06-25 PROCEDURE — 93306 TTE W/DOPPLER COMPLETE: CPT | Mod: 26,,, | Performed by: SPECIALIST

## 2023-06-25 PROCEDURE — 85384 FIBRINOGEN ACTIVITY: CPT | Performed by: HOSPITALIST

## 2023-06-25 PROCEDURE — 25000003 PHARM REV CODE 250: Performed by: HOSPITALIST

## 2023-06-25 PROCEDURE — 83615 LACTATE (LD) (LDH) ENZYME: CPT | Performed by: HOSPITALIST

## 2023-06-25 PROCEDURE — 20000000 HC ICU ROOM

## 2023-06-25 PROCEDURE — 82803 BLOOD GASES ANY COMBINATION: CPT

## 2023-06-25 PROCEDURE — 93306 ECHO (CUPID ONLY): ICD-10-PCS | Mod: 26,,, | Performed by: SPECIALIST

## 2023-06-25 PROCEDURE — 27000221 HC OXYGEN, UP TO 24 HOURS

## 2023-06-25 PROCEDURE — 82570 ASSAY OF URINE CREATININE: CPT | Performed by: INTERNAL MEDICINE

## 2023-06-25 PROCEDURE — C9113 INJ PANTOPRAZOLE SODIUM, VIA: HCPCS | Performed by: INTERNAL MEDICINE

## 2023-06-25 PROCEDURE — 99232 SBSQ HOSP IP/OBS MODERATE 35: CPT | Mod: ,,, | Performed by: INTERNAL MEDICINE

## 2023-06-25 PROCEDURE — 87070 CULTURE OTHR SPECIMN AEROBIC: CPT | Performed by: HOSPITALIST

## 2023-06-25 PROCEDURE — 80048 BASIC METABOLIC PNL TOTAL CA: CPT | Performed by: INTERNAL MEDICINE

## 2023-06-25 PROCEDURE — 93306 TTE W/DOPPLER COMPLETE: CPT

## 2023-06-25 PROCEDURE — 25000003 PHARM REV CODE 250: Performed by: INTERNAL MEDICINE

## 2023-06-25 PROCEDURE — 85730 THROMBOPLASTIN TIME PARTIAL: CPT | Performed by: HOSPITALIST

## 2023-06-25 PROCEDURE — 37799 UNLISTED PX VASCULAR SURGERY: CPT

## 2023-06-25 PROCEDURE — 85025 COMPLETE CBC W/AUTO DIFF WBC: CPT | Performed by: HOSPITALIST

## 2023-06-25 PROCEDURE — 82436 ASSAY OF URINE CHLORIDE: CPT | Performed by: INTERNAL MEDICINE

## 2023-06-25 PROCEDURE — 82272 OCCULT BLD FECES 1-3 TESTS: CPT | Performed by: INTERNAL MEDICINE

## 2023-06-25 PROCEDURE — 84540 ASSAY OF URINE/UREA-N: CPT | Performed by: INTERNAL MEDICINE

## 2023-06-25 PROCEDURE — 99232 PR SUBSEQUENT HOSPITAL CARE,LEVL II: ICD-10-PCS | Mod: ,,, | Performed by: INTERNAL MEDICINE

## 2023-06-25 PROCEDURE — 36415 COLL VENOUS BLD VENIPUNCTURE: CPT | Performed by: HOSPITALIST

## 2023-06-25 PROCEDURE — 84156 ASSAY OF PROTEIN URINE: CPT | Performed by: INTERNAL MEDICINE

## 2023-06-25 PROCEDURE — 85007 BL SMEAR W/DIFF WBC COUNT: CPT | Performed by: INTERNAL MEDICINE

## 2023-06-25 PROCEDURE — 63600175 PHARM REV CODE 636 W HCPCS: Performed by: HOSPITALIST

## 2023-06-25 PROCEDURE — 99900035 HC TECH TIME PER 15 MIN (STAT)

## 2023-06-25 PROCEDURE — 84300 ASSAY OF URINE SODIUM: CPT | Performed by: INTERNAL MEDICINE

## 2023-06-25 PROCEDURE — 63600175 PHARM REV CODE 636 W HCPCS: Performed by: INTERNAL MEDICINE

## 2023-06-25 PROCEDURE — 84133 ASSAY OF URINE POTASSIUM: CPT | Performed by: INTERNAL MEDICINE

## 2023-06-25 PROCEDURE — 85027 COMPLETE CBC AUTOMATED: CPT | Performed by: INTERNAL MEDICINE

## 2023-06-25 PROCEDURE — 99900031 HC PATIENT EDUCATION (STAT)

## 2023-06-25 PROCEDURE — 94761 N-INVAS EAR/PLS OXIMETRY MLT: CPT

## 2023-06-25 PROCEDURE — 80076 HEPATIC FUNCTION PANEL: CPT | Performed by: HOSPITALIST

## 2023-06-25 RX ORDER — AMIODARONE HYDROCHLORIDE 200 MG/1
200 TABLET ORAL DAILY
Status: DISCONTINUED | OUTPATIENT
Start: 2023-06-25 | End: 2023-07-07 | Stop reason: HOSPADM

## 2023-06-25 RX ORDER — GLUCAGON 1 MG
1 KIT INJECTION
Status: DISCONTINUED | OUTPATIENT
Start: 2023-06-25 | End: 2023-06-26

## 2023-06-25 RX ORDER — INSULIN ASPART 100 [IU]/ML
0-5 INJECTION, SOLUTION INTRAVENOUS; SUBCUTANEOUS EVERY 4 HOURS PRN
Status: DISCONTINUED | OUTPATIENT
Start: 2023-06-25 | End: 2023-06-26

## 2023-06-25 RX ADMIN — DEXTROSE MONOHYDRATE 12.5 G: 25 INJECTION, SOLUTION INTRAVENOUS at 05:06

## 2023-06-25 RX ADMIN — PANTOPRAZOLE SODIUM 40 MG: 40 INJECTION, POWDER, FOR SOLUTION INTRAVENOUS at 08:06

## 2023-06-25 RX ADMIN — MEROPENEM 1 G: 1 INJECTION, POWDER, FOR SOLUTION INTRAVENOUS at 08:06

## 2023-06-25 RX ADMIN — SODIUM BICARBONATE: 84 INJECTION, SOLUTION INTRAVENOUS at 09:06

## 2023-06-25 RX ADMIN — SODIUM BICARBONATE: 84 INJECTION, SOLUTION INTRAVENOUS at 07:06

## 2023-06-25 RX ADMIN — DEXTROSE MONOHYDRATE 12.5 G: 25 INJECTION, SOLUTION INTRAVENOUS at 07:06

## 2023-06-25 RX ADMIN — AMIODARONE HYDROCHLORIDE 0.5 MG/MIN: 1.8 INJECTION, SOLUTION INTRAVENOUS at 02:06

## 2023-06-25 RX ADMIN — AMIODARONE HYDROCHLORIDE 200 MG: 200 TABLET ORAL at 03:06

## 2023-06-25 RX ADMIN — SODIUM CHLORIDE 0.15 MCG/KG/MIN: 9 INJECTION, SOLUTION INTRAVENOUS at 09:06

## 2023-06-25 RX ADMIN — INSULIN DETEMIR 9 UNITS: 100 INJECTION, SOLUTION SUBCUTANEOUS at 08:06

## 2023-06-25 NOTE — PLAN OF CARE
[facility]  Initial Discharge Assessment       Primary Care Provider: Raji Parkinson MD    Admission Diagnosis: Shock [R57.9]    Admission Date: 6/24/2023  Expected Discharge Date: TBD  Spoke with patient's son to complete assessment. Patient is currently under sedation. Return to LTAC at [facility] upon discharge. Facility staff will transport patient when discharged.    Transition of Care Barriers: None    Payor: [employer] / Plan: [employer] / Product Type: Medicare Advantage /     Extended Emergency Contact Information  Primary Emergency Contact: NbandYaya jaffe  Mobile Phone: [phone]  Relation: Son   needed? No    Discharge Plan A: Long-term acute care facility (LTAC)  Discharge Plan B: Long-term acute care facility (LTAC)      [id number] - [place] - [address] AT [facility] [address]  [address]  [place] [address]  Phone: [phone] Fax: [phone]      Initial Assessment (most recent)       Adult Discharge Assessment - 06/25/23 1006          Discharge Assessment    Confirmed/corrected address, phone number and insurance Yes     Source of Information family     If unable to respond/provide information was family/caregiver contacted? Yes     Contact Name/Number Yaya Badillo (son) [phone]     When was your last doctors appointment? --   one month ago    Does patient/caregiver understand observation status Yes     Communicated CITLALI with patient/caregiver Yes     Reason For Admission UTI     People in Home --   Patient resides at [facility].    Facility Arrived From: [facility] (LT)     Do you expect to return to your current living situation? Other (see comments)   Patient is expected to stay at [facility] for rehab until 7/4.    Do you have help at home or someone to help you manage your care at home? Yes   Nursing home staff at  [facility], nursing staff at [facility]    Who are your caregiver(s) and their phone number(s)? Yaya Badillo (son) [phone]     Prior to hospitilization cognitive status: Alert/Oriented     Current cognitive status: Coma/Sedated/Intubated     Walking or Climbing Stairs ambulation difficulty, requires equipment     Mobility Management Patient uses a wheelchair to ambulate at this time.     Dressing/Bathing bathing difficulty, requires equipment;bathing difficulty, assistance 1 person     Dressing/Bathing Management Nursing staff at [facility] currently and at [facility] when discharged from [facility]. Patient uses a wheelchair, has a shower chair and grab bars in tub.     Do you have any problems with: Errands/Grocery;Needs other help   Nursing staff and son Yaya Badillo [phone]    Specify other help assistance needed with ambulation     Home Accessibility wheelchair accessible     Home Layout Able to live on 1st floor     Equipment Currently Used at Home bedside commode;wheelchair;wound care supplies;grab bar;walker, standard     Readmission within 30 days? No     Patient currently being followed by outpatient case management? No     Is the pt/caregiver preference to resume services with current agency Yes     Do you take prescription medications? Yes     Do you have prescription coverage? Yes     Coverage [employer]     Do you have any problems affording any of your prescribed medications? No     Is the patient taking medications as prescribed? yes     Who is going to help you get home at discharge? facility transport     Are you on dialysis? No     Do you take coumadin? No     Discharge Plan A Long-term acute care facility (LTAC)     Discharge Plan B Long-term acute care facility (LTAC)     DME Needed Upon Discharge  none     Discharge Plan discussed with: Adult children     Transition of Care Barriers None

## 2023-06-25 NOTE — PROGRESS NOTES
Nurses Note -- 4 Eyes      6/24/2023   7:09 PM      Skin assessed during: Admit      [] No Altered Skin Integrity Present    []Prevention Measures Documented      [x] Yes- Altered Skin Integrity Present or Discovered   [x] LDA Added if Not in Epic (Describe Wound)   [x] New Altered Skin Integrity was Present on Admit and Documented in LDA   [x] Wound Image Taken    Wound Care Consulted? Yes    Attending Nurse:  Belinda Carbajal RN     Second RN/Staff Member:  Alexander Byers RN

## 2023-06-25 NOTE — PROGRESS NOTES
[facility]  Department of Cardiology  Consult Note      PATIENT NAME: Anastasiia Badillo  MRN: [id number]  TODAY'S DATE: 06/25/2023  ADMIT DATE: 6/24/2023                          CONSULT REQUESTED BY: Brandon Martin MD    SUBJECTIVE     PRINCIPAL PROBLEM: UTI (urinary tract infection)      REASON FOR CONSULT:  AFIB With RVR      6/25/2023    Patient In NSR with PACs.  Only on one pressor now neosynepherine.      HPI:      Consulted for severe Hypotension and AFIB with RVR Rates over 200    FROM H AND P      Hypotension       [facility] of low blood pressure.           HPI: 74 y/o F with a past medical history significant for DM2, HTN, femur fracture, tibial fracture and tobacco use, L foot wounds,  high grade stenosis SFA bilaterally and popliteal on the left s/p baloon angioplasty L popliteal artery and left posterior tibial arery on DAPT.      Recently discharged from [facility] [place] for fracture of left tibia, s/p ORIF 6/6, also finished antibiotics for acute osteomyelitis of left calcaneus ( Final ID recs with end date for cipro of 6/22 and end date of ancef for 7/17 )     Patient sent from rehab for hypotension.     In the ER was in septic shock, e/o UTI and anemic.      Pt is currently confused and in distress.            Review of patient's allergies indicates:  No Known Allergies    Past Medical History:   Diagnosis Date    Acute osteomyelitis of left calcaneus 6/7/2023    Chronic ulcer of great toe of left foot with fat layer exposed 6/7/2023    Closed left pilon fracture 6/5/2023    Closed left subtrochanteric femur fracture, initial encounter 2/18/2023    Diabetes mellitus, type 2     Essential (primary) hypertension 2/17/2023    Mass of upper outer quadrant of right breast 4/27/2023    Nicotine dependence 2/20/2023    PAD (peripheral artery disease) 4/22/2023    Tobacco use 6/6/2023    Type 2 diabetes mellitus with hyperglycemia, with long-term current use of insulin  6/6/2023     Past Surgical History:   Procedure Laterality Date    ANGIOGRAPHY OF LOWER EXTREMITY Left 4/25/2023    Procedure: Angiogram Extremity Unilateral;  Surgeon: Gilbert Sheppard MD;  Location: [facility] OR [facility] FLR;  Service: Vascular;  Laterality: Left;  28.4 min  487.45 mGy  75.9180 Gycm2  trans radial: 7 ml  dye: 126 ml      APPLICATION, EXTERNAL FIXATION DEVICE Left 6/6/2023    Procedure: APPLICATION, EXTERNAL FIXATION DEVICE, LEFT ANKLE, Vasquez;  Surgeon: Gilbert Mahmood MD;  [facility];  Service: Orthopedics;  Laterality: Left;    AUGMENTATION OF BREAST      INTRAMEDULLARY RODDING OF FEMUR Left 2/18/2023    Procedure: INSERTION, INTRAMEDULLARY ROXANNA, FEMUR (hana table -- synthes -- long nail -- have bovie and suction and large bone set);  Surgeon: Keanu Brand MD;  Location: [facility] OR;  Service: Orthopedics;  Laterality: Left;    OPEN REDUCTION AND INTERNAL FIXATION (ORIF) OF PILON FRACTURE Left 6/6/2023    Procedure: ORIF, FRACTURE, PILON, LEFT;  Surgeon: Gilbert Mahmood MD;  [facility];  Service: Orthopedics;  Laterality: Left;    PERCUTANEOUS TRANSLUMINAL ANGIOPLASTY Left 4/25/2023    Procedure: PTA (ANGIOPLASTY, PERCUTANEOUS, TRANSLUMINAL);  Surgeon: Gilbert Sheppard MD;  [facility];  Service: Vascular;  Laterality: Left;  Tibial and popliteal angioplasty     Social History     Tobacco Use    Smoking status: Every Day     Packs/day: 0.25     Years: 45.00     Pack years: 11.25     Types: Cigarettes    Smokeless tobacco: Never   Substance Use Topics    Alcohol use: Yes    Drug use: Never        REVIEW OF SYSTEMS  Per hpi     OBJECTIVE     VITAL SIGNS (Most Recent)  Temp: 97.5 °F (36.4 °C) (06/24/23 1900)  Pulse: 73 (06/25/23 0905)  Resp: 15 (06/25/23 0905)  BP: (!) 89/56 (06/25/23 0500)  SpO2: 99 % (06/25/23 0905)    VENTILATION STATUS  Resp: 15 (06/25/23 0905)  SpO2: 99 % (06/25/23 0905)           I & O (Last  24H):  Intake/Output Summary (Last 24 hours) at 6/25/2023 1307  Last data filed at 6/25/2023 0847  Gross per 24 hour   Intake 5683.76 ml   Output 290 ml   Net 5393.76 ml       WEIGHTS  Wt Readings from Last 3 Encounters:   06/24/23 0021 72.6 kg (160 lb)   06/25/23 1010 72.6 kg (160 lb)   06/08/23 1229 72.9 kg (160 lb 11.5 oz)   06/07/23 2042 72.9 kg (160 lb 11.5 oz)   06/06/23 1215 72.6 kg (160 lb 1.9 oz)   06/05/23 1830 72.6 kg (160 lb)       PHYSICAL EXAM  GENERAL: awake, alert, appears weak. Fruity odor to breath  HEENT: Normocephalic.   NECK: No JVD. No bruit..    CARDIAC:RRR  CHEST ANATOMY: normal.   LUNGS: Diminished  ABDOMEN: Soft     Left lower extremity with external rotation rods  Gee catheter with cloudy yellow urine  HOME MEDICATIONS:  No current facility-administered medications on file prior to encounter.     Current Outpatient Medications on File Prior to Encounter   Medication Sig Dispense Refill    acetaminophen (TYLENOL) 500 MG tablet Take 2 tablets (1,000 mg total) by mouth every 8 (eight) hours.  0    aspirin (ECOTRIN) 81 MG EC tablet Take 1 tablet (81 mg total) by mouth 2 (two) times a day. - end date august 30, 2023  0    atorvastatin (LIPITOR) 80 MG tablet Take 1 tablet (80 mg total) by mouth every evening. 90 tablet 3    blood sugar diagnostic Strp To check BG two times daily, to use with insurance preferred meter 200 each 1    blood-glucose meter kit To check BG two times daily, to use with insurance preferred meter 1 each 1    ciprofloxacin HCl (CIPRO) 750 MG tablet Take 1 tablet (750 mg total) by mouth every 12 (twelve) hours. End date 6/22/23      clopidogreL (PLAVIX) 75 mg tablet Take 1 tablet (75 mg total) by mouth once daily. 30 tablet 3    dextrose 5 % in water (D5W) 5 % PgBk 50 mL with ceFAZolin 2 gram SolR 2 g Inject 2 g into the vein every 8 (eight) hours. End date 7/17/23  0    famotidine (PEPCID) 20 MG tablet Take 1 tablet (20 mg total) by mouth 2 (two) times daily as needed  "(Heartburn).      insulin aspart U-100 (NOVOLOG) 100 unit/mL (3 mL) InPn pen Inject 0-5 Units into the skin before meals and at bedtime as needed (Hyperglycemia).  0    insulin aspart U-100 (NOVOLOG) 100 unit/mL (3 mL) InPn pen Inject 6 Units into the skin 3 (three) times daily with meals.  0    insulin detemir U-100, Levemir, 100 unit/mL (3 mL) SubQ InPn pen Inject 18 Units into the skin once daily.  0    lancets [facility] To check BG two times daily, to use with insurance preferred meter 200 each 1    lisinopriL (PRINIVIL,ZESTRIL) 5 MG tablet Take 1 tablet (5 mg total) by mouth once daily. 90 tablet 3    melatonin (MELATIN) 3 mg tablet Take 3 tablets (9 mg total) by mouth nightly.  0    methocarbamoL (ROBAXIN) 500 MG Tab Take 1 tablet (500 mg total) by mouth 4 (four) times daily. 40 tablet 0    ondansetron (ZOFRAN-ODT) 4 MG TbDL Take 1 tablet (4 mg total) by mouth every 8 (eight) hours as needed (nausea).      oxyCODONE (ROXICODONE) 10 mg Tab immediate release tablet Take 1 tablet (10 mg total) by mouth every 4 (four) hours as needed (pain sclae 7-10). 10 tablet 0    oxyCODONE (ROXICODONE) 5 MG immediate release tablet Take 1 tablet (5 mg total) by mouth every 4 (four) hours as needed (pain scale 4-6). 10 tablet 0    pen needle, diabetic (PEN NEEDLE) 31 gauge x 3/16" Ndle 1 application by Misc.(Non-Drug; Combo Route) route 2 (two) times a day. 200 each 0    polyethylene glycol (GLYCOLAX) 17 gram PwPk Take 17 g by mouth once daily.  0    pregabalin (LYRICA) 75 MG capsule Take 1 capsule (75 mg total) by mouth 2 (two) times daily. 60 capsule 0    senna-docusate 8.6-50 mg (PERICOLACE) 8.6-50 mg per tablet Take 1 tablet by mouth once daily.      vitamin D (VITAMIN D3) 1000 units Tab Take 1 tablet (1,000 Units total) by mouth once daily.         SCHEDULED MEDS:   insulin detemir U-100  9 Units Subcutaneous Daily    meropenem (MERREM) IVPB  1 g Intravenous Q12H    pantoprazole  40 mg Intravenous BID       CONTINUOUS " INFUSIONS:   amiodarone in dextrose 5% 0.5 mg/min (06/25/23 0253)    NORepinephrine bitartrate-D5W Stopped (06/24/23 1600)    phenylephrine 0.5 mcg/kg/min (06/25/23 0500)    sodium bicarbonate drip 125 mL/hr at 06/24/23 2316       PRN MEDS:dextrose 50%, glucagon (human recombinant), insulin aspart U-100, naloxone, sodium chloride 0.9%    LABS AND DIAGNOSTICS     CBC LAST 3 DAYS  Recent Labs   Lab 06/24/23  0121 06/24/23  0907 06/24/23  1102 06/25/23  0300   WBC 24.48*  --   --  29.30*  29.84*   RBC 2.31*  --   --  2.77*  2.73*   HGB 6.7* 8.6* 8.8* 8.2*  8.2*   HCT 20.0* 26.0*  --  24.8*  23.8*   MCV 87  --   --  90  87   MCH 29.0  --   --  29.6  30.0   MCHC 33.5  --   --  33.1  34.5   RDW 15.0*  --   --  15.6*  15.1*     --   --  408  407   MPV 11.4  --   --  11.7  11.5   GRAN 85.6*  21.0*  --   --  85.5*  82.0*  25.0*   LYMPH 3.2*  0.8*  --   --  3.6*  2.0*  1.1   MONO 8.8  2.2*  --   --  7.7  6.0  2.3*   BASO 0.05  --   --  0.07   NRBC 0  --   --  0  0       COAGULATION LAST 3 DAYS  Recent Labs   Lab 06/24/23  0524 06/24/23 2043 06/25/23  0300   INR 4.4* 7.0* 4.0*   APTT 55.0* 58.7* 49.3*       CHEMISTRY LAST 3 DAYS  Recent Labs   Lab 06/24/23  0121 06/24/23  0925 06/24/23  1800 06/24/23  1821 06/24/23 2011 06/24/23 2043 06/25/23  0300 06/25/23  0743 06/25/23  0905   *   < >  --  131*  --  131* 134*  --   --    K 4.2   < >  --  3.5  --  3.3* 3.8  --   --       < >  --  108  --  108 107  --   --    CO2 16*   < >  --  11*  --  14* 16*  --   --    ANIONGAP 11   < >  --  12  --  9 11  --   --    BUN 90*   < >  --  82*  --  83* 83*  --   --    CREATININE 3.0*   < >  --  3.0*  --  3.1* 3.1*  --   --    GLU 89   < >  --  268*  --  211* 108  --   --    CALCIUM 7.4*   < >  --  7.0*  --  6.9* 7.1*  --   --    PH  --    < > 7.197*  --  7.297*  --   --   --  7.394   ALBUMIN 1.8*  --   --   --   --   --   --  1.7*  --    PROT 5.0*  --   --   --   --   --   --  4.7*  --    ALKPHOS 59   --   --   --   --   --   --  69  --    ALT <5*  --   --   --   --   --   --  <5*  --    AST 19  --   --   --   --   --   --  30  --    BILITOT 0.6  --   --   --   --   --   --  0.6  --     < > = values in this interval not displayed.       CARDIAC PROFILE LAST 3 DAYS  Recent Labs   Lab 06/24/23  0121 06/25/23  0743   *  --    LDH  --  267*       ENDOCRINE LAST 3 DAYS  Recent Labs   Lab 06/24/23  0121   PROCAL 0.99*       LAST ARTERIAL BLOOD GAS  ABG  Recent Labs   Lab 06/25/23  0905   PH 7.394   PO2 88   PCO2 26.2*   HCO3 16.0*   BE -9       LAST 7 DAYS MICROBIOLOGY   Microbiology Results (last 7 days)       Procedure Component Value Units Date/Time    Culture, Respiratory with Gram Stain [[id number]] Collected: 06/24/23 1245    Order Status: Completed Specimen: Respiratory from Sputum Updated: 06/25/23 0824     Respiratory Culture No Growth     Gram Stain (Respiratory) Many WBC's     Gram Stain (Respiratory) >10epis/lfp and <than many WBC's     Gram Stain (Respiratory) Few yeast    Urine culture [[id number]]  (Abnormal) Collected: 06/24/23 0208    Order Status: Completed Specimen: Urine Updated: 06/25/23 0759     Urine Culture, Routine YEAST   > 100,000 cfu/ml  identification pending      Narrative:      Specimen Source->Urine    Blood culture x two cultures. Draw prior to antibiotics. [[id number]] Collected: 06/24/23 0209    Order Status: Completed Specimen: Blood from Peripheral, Forearm, Left Updated: 06/25/23 0232     Blood Culture, Routine No Growth to date      No Growth to date    Narrative:      Aerobic and anaerobic    Blood culture x two cultures. Draw prior to antibiotics. [[id number]] Collected: 06/24/23 0209    Order Status: Completed Specimen: Blood from Peripheral, Hand, Left Updated: 06/25/23 0232     Blood Culture, Routine No Growth to date      No Growth to date    Narrative:      Aerobic and anaerobic    IV catheter culture [[id number]]     Order Status: No result Specimen: Catheter Tip, PICC      MRSA Screen by PCR [[id number]] Collected: 06/24/23 0524    Order Status: Completed Specimen: Nasopharyngeal Swab from Nasal Updated: 06/24/23 0646     MRSA SCREEN BY PCR Negative            MOST RECENT IMAGING  US Retroperitoneal Complete  Ultrasound retroperitoneum    CLINICAL DATA: Hyperdense right renal mass    FINDINGS: Sonographic assessment targeted to the kidneys and urinary bladder was performed.    Right kidney measures 10.2 cm in length. Exophytic upper pole right renal mass described on CT is difficult to visualize because of difficulty with patient positioning and acoustic shadowing. 2 cm hypoechoic well marginated exophytic lesion is noted, probably a complicated cyst. The right kidney is otherwise normal in appearance.    The left kidney measures 11.1 cm in length, with no mass or hydronephrosis.    Urinary bladder is unremarkable.    IMPRESSION:  1. Limited visualization of exophytic upper pole right renal mass. This likely reflects a complicated cyst. Multiphase CT with contrast could be utilized for further assessment. If patient cannot receive IV contrast, follow-up nonemergent MRI could be considered.    Electronically signed by:  Perfecto Castellanos MD  6/25/2023 11:11 AM CDT Workstation: [id number]      ECHOCARDIOGRAM RESULTS (last 5)  No results found for this or any previous visit.      CURRENT/PREVIOUS VISIT EKG  Results for orders placed or performed during the hospital encounter of 06/24/23   EKG 12-lead    Collection Time: 06/24/23  1:11 PM    Narrative    Test Reason : I48.91,    Vent. Rate : 113 BPM     Atrial Rate : 113 BPM     P-R Int : 136 ms          QRS Dur : 090 ms      QT Int : 310 ms       P-R-T Axes : 030 097 047 degrees     QTc Int : 425 ms    Sinus tachycardia with occasional Premature ventricular complexes  Rightward axis  Low voltage QRS  Septal infarct (cited on or before 24-JUN-2023)  Abnormal ECG  When compared with ECG of 24-JUN-2023 10:28,  Sinus rhythm has replaced  Atrial fibrillation    Referred By: [email or web address]   SELF           Confirmed By:            ASSESSMENT/PLAN:     Active Hospital Problems    Diagnosis    *UTI (urinary tract infection)    DKA, type 2    Anemia    Disseminated intra-vascular coagulation (possible)    Encephalopathy, metabolic    ATN (acute tubular necrosis)    Septic shock   Pt had paroxysmal Afib (<12hr onset) with HR upto 200s. Pt was on 2 pressors for septic shock. Spoke to pts' son over the phone who agreed to cardioversion. Due to hemodynamic instability, emergent DCCV ( 200J x 1 ) was performed with conversion to SR. Consider anticoagulation when no contraindications.     ASSESSMENT & PLAN:     Afib with RVR-now SR with PACs  Septic Shock  Hypotension  Elevated INR 4.0  S/P ORIF  PAD S/P  baloon angioplasty L popliteal artery and left posterior tibial arery on DAPT.   Anemia  Metabolic Acidosis  UTI        RECOMMENDATIONS:      Use amiodarone 200 mg daily  Consider anticoagulation when no contraindications.  Will follow    .      Zo Al NP  Department of Cardiology  Date of Service: 06/25/2023      I have personally examined the patient, I have reviewed the Nurse Practitioner's history and physical, assessment, and plan. I have personally evaluated the patient at bedside and agree with the findings and made appropriate changes as necessary in recommendations.        Tami Almeida MD  Department of Cardiology  [facility]  6/25/23

## 2023-06-25 NOTE — NURSING
Dr. Martin made aware of blood glass, pt LOC, and urine output for shift.  Orders received to recheck blood gas at 9pm

## 2023-06-25 NOTE — CONSULTS
INPATIENT NEPHROLOGY CONSULT   [facility] NEPHROLOGY    Anastasiia COLLIN Justino  06/25/2023    Reason for consultation:  Acute kidney injury    Chief Complaint:   Chief Complaint   Patient presents with    Hypotension     [facility] from [facility] for eval of low blood pressure.            History of Present Illness:    Per H and P    74 y/o F with a past medical history significant for DM2, HTN, femur fracture, tibial fracture and tobacco use, L foot wounds,  high grade stenosis SFA bilaterally and popliteal on the left s/p baloon angioplasty L popliteal artery and left posterior tibial arery on DAPT.      Recently discharged from [facility] [place] [facility] for fracture of left tibia, s/p ORIF 6/6, also finished antibiotics for acute osteomyelitis of left calcaneus ( Final ID recs with end date for cipro of 6/22 and end date of ancef for 7/17 )     Patient sent from rehab for hypotension.     In the ER was in septic shock, e/o UTI and anemic.        6/25  Pt states she feels nauseated and weak.  No sob.  Now bowel complaints.  Denies pain.  Somewhat confused but engages when prompted.  On phenylephrine vasopressor support.  275 cc uop    Plan of Care:       Assessment:    Acute kidney injury secondary to hemodynamically mediated renal injury with likely acute tubular necrosis  --iv fluids  --hold lisinopril  --urine na, urea, creatinine  --keep map above 55  --Avoid NSAIDS, Lanier II inhibitors, and other non-essential nephrotoxic agents  --at risk for contrast nephropathy  --renal dose medication for crcl 10-50.    --strict I/Os    Metabolic acidosis--non-gap  --urine anion  gap  --elevated urine pH consistent with tubular acidification defect.  A urease producing bacteria can also cause alkalinization of the urine    Hyponatremia  --urine osm  --uric acid  --avoid hypotonic iv piggy backs     Hypokalemia   --be judicious with repletion given MYLES and oliguria    Anemia  --iron stores low  --check stool for occult blood  --s/p  pRBC  --haptoglobin, LDH  --trend h/h    UTI with sepsis  --renal dose antibiotics  --avoid gentamicin and vancomycin/zosyn combination     Left hydroureteronephrosis  --mag 3 lasix renal scan when more stable  --renal ultrasound    Renal cyst--possibly hyperdense   --renal us evaluation for internal structures or calcification         Thank you for allowing us to participate in this patient's care. We will continue to follow.    Vital Signs:  Temp Readings from Last 3 Encounters:   06/24/23 97.5 °F (36.4 °C) (Axillary)   06/14/23 97.6 °F (36.4 °C) (Oral)   06/05/23 98.9 °F (37.2 °C)       Pulse Readings from Last 3 Encounters:   06/25/23 72   06/14/23 87   06/05/23 84       BP Readings from Last 3 Encounters:   06/25/23 (!) 89/56   06/14/23 (!) 141/65   06/05/23 122/67       Weight:  Wt Readings from Last 3 Encounters:   06/24/23 72.6 kg (160 lb)   06/08/23 72.9 kg (160 lb 11.5 oz)   06/05/23 72.6 kg (160 lb)       Past Medical & Surgical History:  Past Medical History:   Diagnosis Date    Acute osteomyelitis of left calcaneus 6/7/2023    Chronic ulcer of great toe of left foot with fat layer exposed 6/7/2023    Closed left pilon fracture 6/5/2023    Closed left subtrochanteric femur fracture, initial encounter 2/18/2023    Diabetes mellitus, type 2     Essential (primary) hypertension 2/17/2023    Mass of upper outer quadrant of right breast 4/27/2023    Nicotine dependence 2/20/2023    PAD (peripheral artery disease) 4/22/2023    Tobacco use 6/6/2023    Type 2 diabetes mellitus with hyperglycemia, with long-term current use of insulin 6/6/2023       Past Surgical History:   Procedure Laterality Date    ANGIOGRAPHY OF LOWER EXTREMITY Left 4/25/2023    Procedure: Angiogram Extremity Unilateral;  Surgeon: Gilbert Sheppard MD;  Location: [facility] OR [address] [place] [address];  Service: Vascular;  Laterality: Left;  28.4 min  487.45 mGy  75.9180 Gycm2  trans radial: 7 ml  dye: 126 ml      APPLICATION, EXTERNAL FIXATION DEVICE  Left 6/6/2023    Procedure: APPLICATION, EXTERNAL FIXATION DEVICE, LEFT ANKLE, Vasquez;  Surgeon: Gilbert Mahmood MD;  [facility];  Service: Orthopedics;  Laterality: Left;    AUGMENTATION OF BREAST      INTRAMEDULLARY RODDING OF FEMUR Left 2/18/2023    Procedure: INSERTION, INTRAMEDULLARY ROXANNA, FEMUR (hana table -- synthes -- long nail -- have bovie and suction and large bone set);  Surgeon: Keanu Brand MD;  Location: [facility] OR;  Service: Orthopedics;  Laterality: Left;    OPEN REDUCTION AND INTERNAL FIXATION (ORIF) OF PILON FRACTURE Left 6/6/2023    Procedure: ORIF, FRACTURE, PILON, LEFT;  Surgeon: Gilbert Mahmood MD;  [facility];  Service: Orthopedics;  Laterality: Left;    PERCUTANEOUS TRANSLUMINAL ANGIOPLASTY Left 4/25/2023    Procedure: PTA (ANGIOPLASTY, PERCUTANEOUS, TRANSLUMINAL);  Surgeon: Gilbert Sheppard MD;  [facility];  Service: Vascular;  Laterality: Left;  Tibial and popliteal angioplasty       Past Social History:  Social History     Socioeconomic History    Marital status:    Tobacco Use    Smoking status: Every Day     Packs/day: 0.25     Years: 45.00     Pack years: 11.25     Types: Cigarettes    Smokeless tobacco: Never   Substance and Sexual Activity    Alcohol use: Yes    Drug use: Never   Social History Narrative    ** Merged History Encounter **          Social Determinants of Health     Financial Resource Strain: Low Risk     Difficulty of Paying Living Expenses: Not hard at all   Food Insecurity: No Food Insecurity    Worried About Running Out of Food in the Last Year: Never true    Ran Out of Food in the Last Year: Never true   Transportation Needs: No Transportation Needs    Lack of Transportation (Medical): No    Lack of Transportation (Non-Medical): No   Physical Activity: Unknown    Days of Exercise per Week: Patient refused    Minutes of Exercise per Session: Patient refused   Stress: Unknown     Feeling of Stress : Patient refused   Social Connections: Unknown    Frequency of Communication with Friends and Family: Patient refused    Frequency of Social Gatherings with Friends and Family: Patient refused    Attends Taoist Services: Patient refused    Active Member of Clubs or Organizations: Patient refused    Attends Club or Organization Meetings: Patient refused    Marital Status: Patient refused   Housing Stability: Unknown    Unable to Pay for Housing in the Last Year: No    Unstable Housing in the Last Year: No       Medications:  No current facility-administered medications on file prior to encounter.     Current Outpatient Medications on File Prior to Encounter   Medication Sig Dispense Refill    acetaminophen (TYLENOL) 500 MG tablet Take 2 tablets (1,000 mg total) by mouth every 8 (eight) hours.  0    aspirin (ECOTRIN) 81 MG EC tablet Take 1 tablet (81 mg total) by mouth 2 (two) times a day. - end date august 30, 2023  0    atorvastatin (LIPITOR) 80 MG tablet Take 1 tablet (80 mg total) by mouth every evening. 90 tablet 3    blood sugar diagnostic Strp To check BG two times daily, to use with insurance preferred meter 200 each 1    blood-glucose meter kit To check BG two times daily, to use with insurance preferred meter 1 each 1    ciprofloxacin HCl (CIPRO) 750 MG tablet Take 1 tablet (750 mg total) by mouth every 12 (twelve) hours. End date 6/22/23      clopidogreL (PLAVIX) 75 mg tablet Take 1 tablet (75 mg total) by mouth once daily. 30 tablet 3    dextrose 5 % in water (D5W) 5 % PgBk 50 mL with ceFAZolin 2 gram SolR 2 g Inject 2 g into the vein every 8 (eight) hours. End date 7/17/23  0    famotidine (PEPCID) 20 MG tablet Take 1 tablet (20 mg total) by mouth 2 (two) times daily as needed (Heartburn).      insulin aspart U-100 (NOVOLOG) 100 unit/mL (3 mL) InPn pen Inject 0-5 Units into the skin before meals and at bedtime as needed (Hyperglycemia).  0    insulin aspart U-100 (NOVOLOG) 100  "unit/mL (3 mL) InPn pen Inject 6 Units into the skin 3 (three) times daily with meals.  0    insulin detemir U-100, Levemir, 100 unit/mL (3 mL) SubQ InPn pen Inject 18 Units into the skin once daily.  0    lancets [facility] To check BG two times daily, to use with insurance preferred meter 200 each 1    lisinopriL (PRINIVIL,ZESTRIL) 5 MG tablet Take 1 tablet (5 mg total) by mouth once daily. 90 tablet 3    melatonin (MELATIN) 3 mg tablet Take 3 tablets (9 mg total) by mouth nightly.  0    methocarbamoL (ROBAXIN) 500 MG Tab Take 1 tablet (500 mg total) by mouth 4 (four) times daily. 40 tablet 0    ondansetron (ZOFRAN-ODT) 4 MG TbDL Take 1 tablet (4 mg total) by mouth every 8 (eight) hours as needed (nausea).      oxyCODONE (ROXICODONE) 10 mg Tab immediate release tablet Take 1 tablet (10 mg total) by mouth every 4 (four) hours as needed (pain sclae 7-10). 10 tablet 0    oxyCODONE (ROXICODONE) 5 MG immediate release tablet Take 1 tablet (5 mg total) by mouth every 4 (four) hours as needed (pain scale 4-6). 10 tablet 0    pen needle, diabetic (PEN NEEDLE) 31 gauge x 3/16" Ndle 1 application by Misc.(Non-Drug; Combo Route) route 2 (two) times a day. 200 each 0    polyethylene glycol (GLYCOLAX) 17 gram PwPk Take 17 g by mouth once daily.  0    pregabalin (LYRICA) 75 MG capsule Take 1 capsule (75 mg total) by mouth 2 (two) times daily. 60 capsule 0    senna-docusate 8.6-50 mg (PERICOLACE) 8.6-50 mg per tablet Take 1 tablet by mouth once daily.      vitamin D (VITAMIN D3) 1000 units Tab Take 1 tablet (1,000 Units total) by mouth once daily.       Scheduled Meds:   insulin detemir U-100  9 Units Subcutaneous Daily    meropenem (MERREM) IVPB  1 g Intravenous Q12H    pantoprazole  40 mg Intravenous BID     Continuous Infusions:   amiodarone in dextrose 5% 0.5 mg/min (06/25/23 0253)    NORepinephrine bitartrate-D5W Stopped (06/24/23 1600)    phenylephrine 0.5 mcg/kg/min (06/25/23 0500)    sodium bicarbonate drip 125 mL/hr at " 06/24/23 2316     PRN Meds:.dextrose 50%, glucagon (human recombinant), insulin aspart U-100, naloxone, sodium chloride 0.9%    Allergies:  Patient has no known allergies.    Past Family History:  Reviewed; refer to Hospitalist Admission Note    Review of Systems:  Review of Systems - All 14 systems reviewed and negative, except as noted in HPI    Physical Exam:    BP (!) 89/56   Pulse 72   Temp 97.5 °F (36.4 °C) (Axillary)   Resp 11   Wt 72.6 kg (160 lb)   SpO2 100%   BMI 28.35 kg/m²     General Appearance:    Ill appearing   Head:    Normocephalic, without obvious abnormality, atraumatic   Eyes:    PER, conjunctiva/corneas clear, EOM's intact in both eyes        Throat:   Lips, mucosa, and tongue normal; teeth and gums normal   Back:     Symmetric, no curvature, ROM normal, no CVA tenderness   Lungs:     Clear to auscultation bilaterally, respirations unlabored   Chest wall:    No tenderness or deformity   Heart:    Regular rate and rhythm, S1 and S2 normal, no murmur, rub   or gallop   Abdomen:     Soft, non-tender, bowel sounds active all four quadrants,     no masses, no organomegaly   Extremities:   Edematous    Pulses:   2+ and symmetric all extremities   MSK:   No joint or muscle swelling, tenderness or deformity   Skin:   Skin color, texture, turgor normal, no rashes or lesions   Neurologic:   CNII-XII intact, normal strength and sensation       Throughout.  No flap     Results:  Lab Results   Component Value Date     (L) 06/25/2023    K 3.8 06/25/2023     06/25/2023    CO2 16 (L) 06/25/2023    BUN 83 (H) 06/25/2023    CREATININE 3.1 (H) 06/25/2023    CALCIUM 7.1 (L) 06/25/2023    ANIONGAP 11 06/25/2023    ESTGFRAFRICA 78 04/15/2021    EGFRNONAA 67 04/15/2021       Lab Results   Component Value Date    CALCIUM 7.1 (L) 06/25/2023    PHOS 4.5 06/05/2023       Recent Labs   Lab 06/25/23  0300   WBC 29.84*   RBC 2.73*   HGB 8.2*   HCT 23.8*      MCV 87   MCH 30.0   MCHC 34.5     Imaging  Results              CT Abdomen Pelvis  Without Contrast (Final result)  Result time 06/24/23 05:34:07      Final result by Allan Fountain DO (06/24/23 05:34:07)                   Narrative:    EXAM DESCRIPTION:  CT ABDOMEN PELVIS WITHOUT CONTRAST  RadLex: CT ABDOMEN PELVIS WITHOUT IV CONTRAST    CLINICAL HISTORY:  Weakness, increased WBC, decreased RBC, hypotensive, MYLES.    TECHNIQUE:  CT of the abdomen and pelvis without intravenous contrast.  All CT scans at this facility use dose modulation, iterative reconstruction, and/or weight based dosing when appropriate to reduce radiation dose to as low as reasonably achievable.    COMPARISON: None.    FINDINGS:    The visualized lower thoracic structures demonstrate minimal bilateral pleural effusions. There is mild bibasilar atelectasis. Coronary artery calcifications are present. There is subcutaneous edema throughout the thorax.    Unenhanced images of the liver, spleen, and adrenal glands appear grossly unremarkable. Pancreas is not well assessed without contrast. Gallbladder is minimally distended. No renal stones are identified. There is a round exophytic lesion off the upper pole right kidney measuring 2.1 cm, with Hounsfield units averaging 59. This may relate to hyperdense cyst. There is mild left hydronephrosis and hydroureter. No definite ureteral or bladder stone is identified. Urinary bladder is decompressed with a Gee catheter in place. There are atherosclerotic calcifications along the abdominal aorta without evidence for aneurysmal dilatation. No bowel obstruction is seen. There is no free intraperitoneal air. The appendix appears unremarkable. There is colonic diverticulosis without definite CT evidence for acute diverticulitis. There is mild fecal loading throughout the colon. There is minimal free fluid in the posterior pelvis. There is moderate subcutaneous edema throughout the abdomen and pelvis extending into the thighs. There is an intramedullary  denzel within the proximal left femur, with a compression screw through the left femoral neck. There is a left subtrochanteric fracture    IMPRESSION:  1.  Minimal gallbladder distention.  2.  Mild left hydroureteronephrosis, without definite ureteral stone seen. Urinary bladder decompressed.  3.  Anasarca.  4.  Colonic diverticulosis without definite CT evidence for acute diverticulitis.  5.  Possible hyperdense cyst at the upper pole right kidney. Follow-up nonemergent renal ultrasound could be performed.  6.  Post surgical changes at the left femur, with subtrochanteric fracture.  7.  Minimal bilateral pleural effusions and mild bibasilar atelectasis.    Electronically signed by:  Allan Fountain DO  6/24/2023 5:34 AM CDT Workstation: [id number]                                     X-Ray Chest AP Portable (Final result)  Result time 06/24/23 08:28:44      Final result by Perfecto Castellanos MD (06/24/23 08:28:44)                   Narrative:    Chest single view    CLINICAL DATA: Sepsis    FINDINGS: AP view shows the heart to be mildly enlarged. The mediastinum is unremarkable. Right-sided PICC line extends to the superior vena cava. There is mild atelectasis at the right lung base, with ill-defined atelectasis or infiltrate at the lung base on the left. There are no significant pleural effusions. No acute osseous abnormality is identified.    IMPRESSION:  1. Atelectasis or infiltrate at the left lung base.  2. Mild right basilar atelectasis.  3. Mild cardiomegaly.    Electronically signed by:  Perfecto Castellanos MD  6/24/2023 8:28 AM CDT Workstation: [id number]                                        I have personally reviewed pertinent radiological imaging and reports.    Patient care was time spent personally by me on the following activities:   Obtaining a history  Examination of patient.  Providing medical care at the patients bedside.  Developing a treatment plan with patient or surrogate and bedside  caregivers  Ordering and reviewing laboratory studies, radiographic studies, pulse oximetry.  Ordering and performing treatments and interventions.  Evaluation of patient's response to treatment.  Discussions with consultants while on the unit and immediately available to the patient.  Re-evaluation of the patient's condition.  Documentation in the medical record.     Antonio Pandya MD  Nephrology  [facility]  [phone]

## 2023-06-25 NOTE — PLAN OF CARE
06/24/23 1938   Patient Assessment/Suction   Level of Consciousness (AVPU) alert   Respiratory Effort Unlabored   PRE-TX-O2   Device (Oxygen Therapy) room air   SpO2 100 %   Pulse Oximetry Type Continuous   $ Pulse Oximetry - Multiple Charge Pulse Oximetry - Multiple   Pulse 94   Resp 14

## 2023-06-25 NOTE — CARE UPDATE
Dr. Rios admitted this patient last night with diagnoses of UTI and septic shock, as well as acute kidney injury.  Labs reviewed.  Latest arterial gases reviewed.  Patient has a profound metabolic acidosis with adequate respiratory compensation.  Lactic acid level is low.  Glucose on latest metabolic panel is 306, with an anion gap of 18.  She has insulin-dependent diabetes mellitus.  She may possibly have diabetic ketoacidosis.  Will start her on insulin infusion, as well as IVF's, consisting of 0.45% saline with 2 amps bicarb per liter.  Will monitor arterial pH and anion gap.  Will monitor creatinine and BUN.  Antibiotics at this time are adequate.  She has a PICC that is over two weeks old, so I will have it removed and substituted with a central venous catheter.  Will monitor hemoglobin as well (she had to get blood transfusion last night).  I note that prothrombin time and partial thromboplastin time are prolonged, and platelets are clumped.  She may be experiencing disseminated intravascular coagulation.  Will monitor counts on CBC and the clotting times.  This morning, she was in rapid atrial flutter with hypotension, and I ordered an amiodarone bolus.  In addition, I consulted cardiology    Patient is quite ill and risk of mortality is high.    Brandon Martin MD

## 2023-06-26 LAB
ALBUMIN SERPL BCP-MCNC: 1.5 G/DL (ref 3.5–5.2)
ALP SERPL-CCNC: 75 U/L (ref 55–135)
ALT SERPL W/O P-5'-P-CCNC: <5 U/L (ref 10–44)
ANION GAP SERPL CALC-SCNC: 11 MMOL/L (ref 8–16)
ANION GAP SERPL CALC-SCNC: 12 MMOL/L (ref 8–16)
ANION GAP SERPL CALC-SCNC: 12 MMOL/L (ref 8–16)
ANISOCYTOSIS BLD QL SMEAR: SLIGHT
ANISOCYTOSIS BLD QL SMEAR: SLIGHT
AST SERPL-CCNC: 33 U/L (ref 10–40)
BASOPHILS NFR BLD: 0 % (ref 0–1.9)
BASOPHILS NFR BLD: 0 % (ref 0–1.9)
BILIRUB SERPL-MCNC: 1 MG/DL (ref 0.1–1)
BUN SERPL-MCNC: 78 MG/DL (ref 8–23)
BUN SERPL-MCNC: 86 MG/DL (ref 8–23)
BUN SERPL-MCNC: 86 MG/DL (ref 8–23)
CALCIUM SERPL-MCNC: 6.7 MG/DL (ref 8.7–10.5)
CALCIUM SERPL-MCNC: 6.8 MG/DL (ref 8.7–10.5)
CALCIUM SERPL-MCNC: 6.8 MG/DL (ref 8.7–10.5)
CHLORIDE SERPL-SCNC: 101 MMOL/L (ref 95–110)
CHLORIDE SERPL-SCNC: 101 MMOL/L (ref 95–110)
CHLORIDE SERPL-SCNC: 97 MMOL/L (ref 95–110)
CK SERPL-CCNC: 47 U/L (ref 20–180)
CO2 SERPL-SCNC: 19 MMOL/L (ref 23–29)
CO2 SERPL-SCNC: 19 MMOL/L (ref 23–29)
CO2 SERPL-SCNC: 22 MMOL/L (ref 23–29)
CREAT SERPL-MCNC: 3 MG/DL (ref 0.5–1.4)
CREAT SERPL-MCNC: 3.1 MG/DL (ref 0.5–1.4)
CREAT SERPL-MCNC: 3.1 MG/DL (ref 0.5–1.4)
DIFFERENTIAL METHOD: ABNORMAL
DIFFERENTIAL METHOD: ABNORMAL
EOSINOPHIL NFR BLD: 0 % (ref 0–8)
EOSINOPHIL NFR BLD: 0 % (ref 0–8)
ERYTHROCYTE [DISTWIDTH] IN BLOOD BY AUTOMATED COUNT: 15 % (ref 11.5–14.5)
ERYTHROCYTE [DISTWIDTH] IN BLOOD BY AUTOMATED COUNT: 15 % (ref 11.5–14.5)
EST. GFR  (NO RACE VARIABLE): 15.4 ML/MIN/1.73 M^2
EST. GFR  (NO RACE VARIABLE): 15.4 ML/MIN/1.73 M^2
EST. GFR  (NO RACE VARIABLE): 16 ML/MIN/1.73 M^2
GIANT PLATELETS BLD QL SMEAR: PRESENT
GIANT PLATELETS BLD QL SMEAR: PRESENT
GLUCOSE SERPL-MCNC: 163 MG/DL (ref 70–110)
GLUCOSE SERPL-MCNC: 195 MG/DL (ref 70–110)
GLUCOSE SERPL-MCNC: 195 MG/DL (ref 70–110)
GLUCOSE SERPL-MCNC: 203 MG/DL (ref 70–110)
GLUCOSE SERPL-MCNC: 213 MG/DL (ref 70–110)
GLUCOSE SERPL-MCNC: 226 MG/DL (ref 70–110)
HCT VFR BLD AUTO: 24.4 % (ref 37–48.5)
HCT VFR BLD AUTO: 24.4 % (ref 37–48.5)
HGB BLD-MCNC: 8.3 G/DL (ref 12–16)
HGB BLD-MCNC: 8.3 G/DL (ref 12–16)
HYPOCHROMIA BLD QL SMEAR: ABNORMAL
HYPOCHROMIA BLD QL SMEAR: ABNORMAL
IMM GRANULOCYTES # BLD AUTO: ABNORMAL K/UL (ref 0–0.04)
IMM GRANULOCYTES # BLD AUTO: ABNORMAL K/UL (ref 0–0.04)
IMM GRANULOCYTES NFR BLD AUTO: ABNORMAL % (ref 0–0.5)
IMM GRANULOCYTES NFR BLD AUTO: ABNORMAL % (ref 0–0.5)
LYMPHOCYTES NFR BLD: 25 % (ref 18–48)
LYMPHOCYTES NFR BLD: 25 % (ref 18–48)
MCH RBC QN AUTO: 29.2 PG (ref 27–31)
MCH RBC QN AUTO: 29.2 PG (ref 27–31)
MCHC RBC AUTO-ENTMCNC: 34 G/DL (ref 32–36)
MCHC RBC AUTO-ENTMCNC: 34 G/DL (ref 32–36)
MCV RBC AUTO: 86 FL (ref 82–98)
MCV RBC AUTO: 86 FL (ref 82–98)
MONOCYTES NFR BLD: 1 % (ref 4–15)
MONOCYTES NFR BLD: 1 % (ref 4–15)
NEUTROPHILS NFR BLD: 54 % (ref 38–73)
NEUTROPHILS NFR BLD: 54 % (ref 38–73)
NEUTS BAND NFR BLD MANUAL: 20 %
NEUTS BAND NFR BLD MANUAL: 20 %
NRBC BLD-RTO: 0 /100 WBC
NRBC BLD-RTO: 0 /100 WBC
PLATELET # BLD AUTO: 318 K/UL (ref 150–450)
PLATELET # BLD AUTO: 318 K/UL (ref 150–450)
PLATELET BLD QL SMEAR: ABNORMAL
PLATELET BLD QL SMEAR: ABNORMAL
PMV BLD AUTO: 11.4 FL (ref 9.2–12.9)
PMV BLD AUTO: 11.4 FL (ref 9.2–12.9)
POIKILOCYTOSIS BLD QL SMEAR: SLIGHT
POIKILOCYTOSIS BLD QL SMEAR: SLIGHT
POTASSIUM SERPL-SCNC: 3.4 MMOL/L (ref 3.5–5.1)
POTASSIUM SERPL-SCNC: 3.5 MMOL/L (ref 3.5–5.1)
POTASSIUM SERPL-SCNC: 3.5 MMOL/L (ref 3.5–5.1)
PROT SERPL-MCNC: 4.8 G/DL (ref 6–8.4)
RBC # BLD AUTO: 2.84 M/UL (ref 4–5.4)
RBC # BLD AUTO: 2.84 M/UL (ref 4–5.4)
SODIUM SERPL-SCNC: 130 MMOL/L (ref 136–145)
SODIUM SERPL-SCNC: 132 MMOL/L (ref 136–145)
SODIUM SERPL-SCNC: 132 MMOL/L (ref 136–145)
URATE SERPL-MCNC: 8.4 MG/DL (ref 2.4–5.7)
WBC # BLD AUTO: 28.93 K/UL (ref 3.9–12.7)
WBC # BLD AUTO: 28.93 K/UL (ref 3.9–12.7)

## 2023-06-26 PROCEDURE — 99900035 HC TECH TIME PER 15 MIN (STAT)

## 2023-06-26 PROCEDURE — 63600175 PHARM REV CODE 636 W HCPCS: Performed by: HOSPITALIST

## 2023-06-26 PROCEDURE — 20000000 HC ICU ROOM

## 2023-06-26 PROCEDURE — 25000003 PHARM REV CODE 250: Performed by: HOSPITALIST

## 2023-06-26 PROCEDURE — 99232 SBSQ HOSP IP/OBS MODERATE 35: CPT | Mod: ,,, | Performed by: INTERNAL MEDICINE

## 2023-06-26 PROCEDURE — 85007 BL SMEAR W/DIFF WBC COUNT: CPT | Performed by: INTERNAL MEDICINE

## 2023-06-26 PROCEDURE — 25000003 PHARM REV CODE 250: Performed by: INTERNAL MEDICINE

## 2023-06-26 PROCEDURE — 84550 ASSAY OF BLOOD/URIC ACID: CPT | Performed by: INTERNAL MEDICINE

## 2023-06-26 PROCEDURE — 85027 COMPLETE CBC AUTOMATED: CPT | Performed by: INTERNAL MEDICINE

## 2023-06-26 PROCEDURE — 99900031 HC PATIENT EDUCATION (STAT)

## 2023-06-26 PROCEDURE — 82550 ASSAY OF CK (CPK): CPT | Performed by: INTERNAL MEDICINE

## 2023-06-26 PROCEDURE — 99232 PR SUBSEQUENT HOSPITAL CARE,LEVL II: ICD-10-PCS | Mod: ,,, | Performed by: INTERNAL MEDICINE

## 2023-06-26 PROCEDURE — 94761 N-INVAS EAR/PLS OXIMETRY MLT: CPT

## 2023-06-26 PROCEDURE — 80048 BASIC METABOLIC PNL TOTAL CA: CPT | Performed by: HOSPITALIST

## 2023-06-26 PROCEDURE — 63600175 PHARM REV CODE 636 W HCPCS: Performed by: INTERNAL MEDICINE

## 2023-06-26 PROCEDURE — C9113 INJ PANTOPRAZOLE SODIUM, VIA: HCPCS | Performed by: INTERNAL MEDICINE

## 2023-06-26 PROCEDURE — 83010 ASSAY OF HAPTOGLOBIN QUANT: CPT | Performed by: INTERNAL MEDICINE

## 2023-06-26 PROCEDURE — 80053 COMPREHEN METABOLIC PANEL: CPT | Performed by: INTERNAL MEDICINE

## 2023-06-26 RX ORDER — CHLORHEXIDINE GLUCONATE ORAL RINSE 1.2 MG/ML
15 SOLUTION DENTAL 2 TIMES DAILY
Status: COMPLETED | OUTPATIENT
Start: 2023-06-26 | End: 2023-06-30

## 2023-06-26 RX ORDER — HYDROCODONE BITARTRATE AND ACETAMINOPHEN 5; 325 MG/1; MG/1
1 TABLET ORAL EVERY 6 HOURS PRN
Status: DISCONTINUED | OUTPATIENT
Start: 2023-06-26 | End: 2023-07-07 | Stop reason: HOSPADM

## 2023-06-26 RX ORDER — GLUCAGON 1 MG
1 KIT INJECTION
Status: DISCONTINUED | OUTPATIENT
Start: 2023-06-26 | End: 2023-07-05

## 2023-06-26 RX ORDER — MUPIROCIN 20 MG/G
OINTMENT TOPICAL 2 TIMES DAILY
Status: COMPLETED | OUTPATIENT
Start: 2023-06-26 | End: 2023-06-30

## 2023-06-26 RX ORDER — FLUCONAZOLE 2 MG/ML
200 INJECTION, SOLUTION INTRAVENOUS
Status: DISCONTINUED | OUTPATIENT
Start: 2023-06-26 | End: 2023-07-02

## 2023-06-26 RX ORDER — IBUPROFEN 200 MG
16 TABLET ORAL
Status: DISCONTINUED | OUTPATIENT
Start: 2023-06-26 | End: 2023-07-05

## 2023-06-26 RX ORDER — IBUPROFEN 200 MG
24 TABLET ORAL
Status: DISCONTINUED | OUTPATIENT
Start: 2023-06-26 | End: 2023-07-05

## 2023-06-26 RX ORDER — INSULIN ASPART 100 [IU]/ML
0-5 INJECTION, SOLUTION INTRAVENOUS; SUBCUTANEOUS
Status: DISCONTINUED | OUTPATIENT
Start: 2023-06-26 | End: 2023-07-05

## 2023-06-26 RX ADMIN — HYDROCODONE BITARTRATE AND ACETAMINOPHEN 1 TABLET: 5; 325 TABLET ORAL at 12:06

## 2023-06-26 RX ADMIN — HYDROCODONE BITARTRATE AND ACETAMINOPHEN 1 TABLET: 5; 325 TABLET ORAL at 09:06

## 2023-06-26 RX ADMIN — AMIODARONE HYDROCHLORIDE 200 MG: 200 TABLET ORAL at 08:06

## 2023-06-26 RX ADMIN — FLUCONAZOLE 200 MG: 2 INJECTION, SOLUTION INTRAVENOUS at 10:06

## 2023-06-26 RX ADMIN — MUPIROCIN 1 G: 20 OINTMENT TOPICAL at 09:06

## 2023-06-26 RX ADMIN — INSULIN DETEMIR 9 UNITS: 100 INJECTION, SOLUTION SUBCUTANEOUS at 08:06

## 2023-06-26 RX ADMIN — SODIUM BICARBONATE: 84 INJECTION, SOLUTION INTRAVENOUS at 03:06

## 2023-06-26 RX ADMIN — CHLORHEXIDINE GLUCONATE 15 ML: 1.2 RINSE ORAL at 09:06

## 2023-06-26 RX ADMIN — INSULIN ASPART 2 UNITS: 100 INJECTION, SOLUTION INTRAVENOUS; SUBCUTANEOUS at 08:06

## 2023-06-26 RX ADMIN — INSULIN ASPART 2 UNITS: 100 INJECTION, SOLUTION INTRAVENOUS; SUBCUTANEOUS at 01:06

## 2023-06-26 RX ADMIN — PANTOPRAZOLE SODIUM 40 MG: 40 INJECTION, POWDER, FOR SOLUTION INTRAVENOUS at 09:06

## 2023-06-26 RX ADMIN — PANTOPRAZOLE SODIUM 40 MG: 40 INJECTION, POWDER, FOR SOLUTION INTRAVENOUS at 08:06

## 2023-06-26 RX ADMIN — SODIUM BICARBONATE: 84 INJECTION, SOLUTION INTRAVENOUS at 09:06

## 2023-06-26 RX ADMIN — CHLORHEXIDINE GLUCONATE 15 ML: 1.2 RINSE ORAL at 10:06

## 2023-06-26 RX ADMIN — HYDROCODONE BITARTRATE AND ACETAMINOPHEN 1 TABLET: 5; 325 TABLET ORAL at 02:06

## 2023-06-26 RX ADMIN — MEROPENEM 1 G: 1 INJECTION, POWDER, FOR SOLUTION INTRAVENOUS at 09:06

## 2023-06-26 RX ADMIN — MUPIROCIN 1 G: 20 OINTMENT TOPICAL at 10:06

## 2023-06-26 RX ADMIN — MEROPENEM 1 G: 1 INJECTION, POWDER, FOR SOLUTION INTRAVENOUS at 08:06

## 2023-06-26 NOTE — ASSESSMENT & PLAN NOTE
Patient's anemia is currently controlled.  Received one unit blood earlier in admission.. Etiology likely d/t chronic disease  Current CBC reviewed-   Lab Results   Component Value Date    HGB 8.2 (L) 06/25/2023    HGB 8.2 (L) 06/25/2023    HCT 23.8 (L) 06/25/2023    HCT 24.8 (L) 06/25/2023     Monitor serial CBC and transfuse if patient becomes hemodynamically unstable, symptomatic or H/H drops below 7/21.

## 2023-06-26 NOTE — ASSESSMENT & PLAN NOTE
Patient with acute kidney injury likely due to acute tubular necrosis MYLES is currently stable. Labs reviewed- Renal function/electrolytes with Estimated Creatinine Clearance: 15.7 mL/min (A) (based on SCr of 3.1 mg/dL (H)). according to latest data. Monitor urine output and serial BMP and adjust therapy as needed. Avoid nephrotoxins and renally dose meds for GFR listed above.  I will consult with nephrology.

## 2023-06-26 NOTE — ASSESSMENT & PLAN NOTE
Monitor sodium level.  Likely due to renal failure.    Sodium   Date Value Ref Range Status   06/25/2023 134 (L) 136 - 145 mmol/L Final   06/24/2023 131 (L) 136 - 145 mmol/L Final   06/24/2023 131 (L) 136 - 145 mmol/L Final

## 2023-06-26 NOTE — ASSESSMENT & PLAN NOTE
Controlled.  Monitor potassium level.  Supplement potassium when needed.    Potassium   Date Value Ref Range Status   06/25/2023 3.8 3.5 - 5.1 mmol/L Final   06/24/2023 3.3 (L) 3.5 - 5.1 mmol/L Final   06/24/2023 3.5 3.5 - 5.1 mmol/L Final

## 2023-06-26 NOTE — SUBJECTIVE & OBJECTIVE
Interval History:  the DKA is resolved.  Needs to be transitioned from IV insulin to subcutaneous.  Still has a non-anion gap acidosis.  Still with acute kidney injury.  She's more lucid today.  No recorded fevers.  Off of one pressor and continues on the other.    Review of Systems   Constitutional:  Negative for chills and fever.   Respiratory:  Negative for cough and shortness of breath.    Cardiovascular:  Negative for chest pain and leg swelling.   Gastrointestinal:  Negative for abdominal pain, nausea and vomiting.   Objective:     Vital Signs (Most Recent):  Temp: 96.7 °F (35.9 °C) (06/25/23 1901)  Pulse: 71 (06/25/23 2101)  Resp: 13 (06/25/23 2101)  BP: 112/81 (06/25/23 2100)  SpO2: 100 % (06/25/23 2101) Vital Signs (24h Range):  Temp:  [96.7 °F (35.9 °C)-98 °F (36.7 °C)] 96.7 °F (35.9 °C)  Pulse:  [] 71  Resp:  [10-23] 13  SpO2:  [88 %-100 %] 100 %  BP: ()/() 112/81  Arterial Line BP: (104-152)/(45-66) 140/49     Weight: 72.6 kg (160 lb)  Body mass index is 28.35 kg/m².    Intake/Output Summary (Last 24 hours) at 6/25/2023 2137  Last data filed at 6/25/2023 2101  Gross per 24 hour   Intake 4101.68 ml   Output 370 ml   Net 3731.68 ml         Physical Exam  Constitutional:       General: She is not in acute distress.     Appearance: She is ill-appearing.   Eyes:      General:         Right eye: No discharge.         Left eye: No discharge.   Neck:      Vascular: No JVD.   Cardiovascular:      Rate and Rhythm: Normal rate and regular rhythm.   Pulmonary:      Effort: Pulmonary effort is normal.      Breath sounds: Normal breath sounds.   Abdominal:      General: Abdomen is flat. Bowel sounds are normal. There is no distension.      Palpations: Abdomen is soft.      Tenderness: There is no abdominal tenderness.   Musculoskeletal:      Right lower leg: Pitting Edema present.      Left lower leg: Pitting Edema present.      Comments: External fixation on left lower leg   Skin:     General: Skin  is warm and moist.      Findings: No rash.   Neurological:      Mental Status: She is alert and oriented to person, place, and time.   Psychiatric:         Attention and Perception: Attention normal.         Mood and Affect: Mood and affect normal.         Speech: Speech normal.           Significant Labs: All pertinent labs within the past 24 hours have been reviewed.    Significant Imaging:  no new imaging

## 2023-06-26 NOTE — HPI
74 y/o F with a past medical history significant for DM2, HTN, femur fracture, tibial fracture and tobacco use, L foot wounds,  high grade stenosis SFA bilaterally and popliteal on the left s/p baloon angioplasty L popliteal artery and left posterior tibial arery on DAPT.      [place] [facility], s/p ORIF 6/6, also finished antibiotics for acute osteomyelitis of left calcaneus ( Final ID recs with end date for cipro of 6/22 and end date of ancef for 7/17 )     Patient sent from rehab for hypotension.     In the ER was in septic shock, e/o UTI and anemic.

## 2023-06-26 NOTE — PROGRESS NOTES
[facility] Medicine  Progress Note    Patient Name: Anastasiia Badillo  MRN: [id number]  Patient Class: IP- Inpatient   Admission Date: 6/24/2023  Length of Stay: 1 days  Attending Physician: Brandon Martin MD  Primary Care Provider: Raji Parkinson MD        Subjective:     Principal Problem:UTI (urinary tract infection)        HPI:  72 y/o F with a past medical history significant for DM2, HTN, femur fracture, tibial fracture and tobacco use, L foot wounds,  high grade stenosis SFA bilaterally and popliteal on the left s/p baloon angioplasty L popliteal artery and left posterior tibial arery on DAPT.      Recently discharged from [facility] for fracture of left tibia, s/p ORIF 6/6, also finished antibiotics for acute osteomyelitis of left calcaneus ( Final ID recs with end date for cipro of 6/22 and end date of ancef for 7/17 )     Patient sent from rehab for hypotension.     In the ER was in septic shock, e/o UTI and anemic.       Overview/Hospital Course:  No notes on file    Interval History:  the DKA is resolved.  Needs to be transitioned from IV insulin to subcutaneous.  Still has a non-anion gap acidosis.  Still with acute kidney injury.  She's more lucid today.  No recorded fevers.  Off of one pressor and continues on the other.    Review of Systems   Constitutional:  Negative for chills and fever.   Respiratory:  Negative for cough and shortness of breath.    Cardiovascular:  Negative for chest pain and leg swelling.   Gastrointestinal:  Negative for abdominal pain, nausea and vomiting.   Objective:     Vital Signs (Most Recent):  Temp: 96.7 °F (35.9 °C) (06/25/23 1901)  Pulse: 71 (06/25/23 2101)  Resp: 13 (06/25/23 2101)  BP: 112/81 (06/25/23 2100)  SpO2: 100 % (06/25/23 2101) Vital Signs (24h Range):  Temp:  [96.7 °F (35.9 °C)-98 °F (36.7 °C)] 96.7 °F (35.9 °C)  Pulse:  [] 71  Resp:  [10-23] 13  SpO2:  [88 %-100 %] 100 %  BP: ()/() 112/81  Arterial Line BP:  (104-152)/(45-66) 140/49     Weight: 72.6 kg (160 lb)  Body mass index is 28.35 kg/m².    Intake/Output Summary (Last 24 hours) at 6/25/2023 2137  Last data filed at 6/25/2023 2101  Gross per 24 hour   Intake 4101.68 ml   Output 370 ml   Net 3731.68 ml         Physical Exam  Constitutional:       General: She is not in acute distress.     Appearance: She is ill-appearing.   Eyes:      General:         Right eye: No discharge.         Left eye: No discharge.   Neck:      Vascular: No JVD.   Cardiovascular:      Rate and Rhythm: Normal rate and regular rhythm.   Pulmonary:      Effort: Pulmonary effort is normal.      Breath sounds: Normal breath sounds.   Abdominal:      General: Abdomen is flat. Bowel sounds are normal. There is no distension.      Palpations: Abdomen is soft.      Tenderness: There is no abdominal tenderness.   Musculoskeletal:      Right lower leg: Pitting Edema present.      Left lower leg: Pitting Edema present.      Comments: External fixation on left lower leg   Skin:     General: Skin is warm and moist.      Findings: No rash.   Neurological:      Mental Status: She is alert and oriented to person, place, and time.   Psychiatric:         Attention and Perception: Attention normal.         Mood and Affect: Mood and affect normal.         Speech: Speech normal.           Significant Labs: All pertinent labs within the past 24 hours have been reviewed.    Significant Imaging:  no new imaging      Assessment/Plan:      * UTI (urinary tract infection)  Continue systemic antibiotic.  Urine sample sent for culture.    Antibiotics (From admission, onward)    Start     Stop Route Frequency Ordered    06/24/23 0600  meropenem 1 g in sodium chloride 0.9 % 100 mL IVPB (ready to mix system)        Note to Pharmacy: Ht:    Wt: 72.6 kg (160 lb)  Estimated Creatinine Clearance: 16.2 mL/min (A) (based on SCr of 3 mg/dL (H)).  Body mass index is 28.35 kg/m².    -- IV Every 12 hours (non-standard times)  06/24/23 0556              Normal anion gap metabolic acidosis  Continue crystalloid infusion with sodium bicarbonate.  Monitor serum bicarb level.  Monitor pH.  Consult with nephrology for assistance with diagnosis and management.      Hyponatremia  Monitor sodium level.  Likely due to renal failure.    Sodium   Date Value Ref Range Status   06/25/2023 134 (L) 136 - 145 mmol/L Final   06/24/2023 131 (L) 136 - 145 mmol/L Final   06/24/2023 131 (L) 136 - 145 mmol/L Final           Hypokalemia  Controlled.  Monitor potassium level.  Supplement potassium when needed.    Potassium   Date Value Ref Range Status   06/25/2023 3.8 3.5 - 5.1 mmol/L Final   06/24/2023 3.3 (L) 3.5 - 5.1 mmol/L Final   06/24/2023 3.5 3.5 - 5.1 mmol/L Final           ATN (acute tubular necrosis)  Patient with acute kidney injury likely due to acute tubular necrosis MYLES is currently stable. Labs reviewed- Renal function/electrolytes with Estimated Creatinine Clearance: 15.7 mL/min (A) (based on SCr of 3.1 mg/dL (H)). according to latest data. Monitor urine output and serial BMP and adjust therapy as needed. Avoid nephrotoxins and renally dose meds for GFR listed above.  I will consult with nephrology.      Encephalopathy, metabolic  Due to septic shock.  She's more lucid.  The encephalopathy is less severe.      Disseminated intra-vascular coagulation (possible)  Prothrombin and partial thromboplastin times prolonged.  Platelet count normal, though.  Fibrinogen very high.  DIC is still possible, but laboratory evidence is somewhat lacking.  Will monitor bleeding times.    Lab Results   Component Value Date    INR 4.0 (HH) 06/25/2023    INR 7.0 (HH) 06/24/2023    INR 4.4 (HH) 06/24/2023     aPTT   Date Value Ref Range Status   06/25/2023 49.3 (H) 21.0 - 32.0 sec Final     Comment:     Refer to local heparin nomogram for intensity/dose specific   therapeutic   range.       Fibrinogen   Date Value Ref Range Status   06/25/2023 >800 (H) 182 - 400  mg/dL Final       Anemia, chronic disease  Patient's anemia is currently controlled.  Received one unit blood earlier in admission.. Etiology likely d/t chronic disease  Current CBC reviewed-   Lab Results   Component Value Date    HGB 8.2 (L) 06/25/2023    HGB 8.2 (L) 06/25/2023    HCT 23.8 (L) 06/25/2023    HCT 24.8 (L) 06/25/2023     Monitor serial CBC and transfuse if patient becomes hemodynamically unstable, symptomatic or H/H drops below 7/21.         DKA, type 2  New problem.  Began yesterday.  Received insulin infusion overnight.  Chem panel shows a narrower anion gap and controlled glucose.  Will switch insulin to subcutaneous.  Order daily Levemir.  FSG's every 4 hours.    Septic shock  Source:  UTI.  Continue systemic antibiotic.  Weaned off one pressor.  Continues on the other.        VTE Risk Mitigation (From admission, onward)         Ordered     IP VTE HIGH RISK PATIENT  Once         06/24/23 1357     Place sequential compression device  Until discontinued         06/24/23 0432                Discharge Planning   CITLALI:      Code Status: Full Code   Is the patient medically ready for discharge?:     Reason for patient still in hospital (select all that apply): Patient unstable, Patient trending condition, Laboratory test, Treatment and Consult recommendations  Discharge Plan A: Long-term acute care facility (LTAC)                  Brandon Martin MD  Department of Hospital Medicine   [facility]

## 2023-06-26 NOTE — PROGRESS NOTES
INPATIENT NEPHROLOGY PROGRESS  [facility] NEPHROLOGY    Anastasiia A Justino  06/26/2023    Reason for consultation:  Acute kidney injury    Chief Complaint:   Chief Complaint   Patient presents with    Hypotension     Sent from [facility] for eval of low blood pressure.            History of Present Illness:    Per H and P    74 y/o F with a past medical history significant for DM2, HTN, femur fracture, tibial fracture and tobacco use, L foot wounds,  high grade stenosis SFA bilaterally and popliteal on the left s/p baloon angioplasty L popliteal artery and left posterior tibial arery on DAPT.      Recently discharged from [facility] for fracture of left tibia, s/p ORIF 6/6, also finished antibiotics for acute osteomyelitis of left calcaneus ( Final ID recs with end date for cipro of 6/22 and end date of ancef for 7/17 )     Patient sent from rehab for hypotension.     In the ER was in septic shock, e/o UTI and anemic.        6/25  Pt states she feels nauseated and weak.  No sob.  Now bowel complaints.  Denies pain.  Somewhat confused but engages when prompted.  On phenylephrine vasopressor support.  275 cc uop  6/26  acidosis improving, renal same.  525cc UOP recorded.  Will cont IVF but cut rate to 75, f/u labs in am.      Plan of Care:       Assessment:    Acute kidney injury secondary to hemodynamically mediated renal injury with likely acute tubular necrosis  --iv fluids  --hold lisinopril  --urine na, urea, creatinine  --keep map above 55  --Avoid NSAIDS, Lanier II inhibitors, and other non-essential nephrotoxic agents  --at risk for contrast nephropathy  --renal dose medication for crcl 10-50.    --strict I/Os    Metabolic acidosis--non-gap  --urine anion  gap  --elevated urine pH consistent with tubular acidification defect.  A urease producing bacteria can also cause alkalinization of the urine    Hyponatremia  --urine osm  --uric acid  --avoid hypotonic iv piggy backs     Hypokalemia   --be judicious with  repletion given MYLES and oliguria    Anemia  --iron stores low  --check stool for occult blood  --s/p pRBC  --haptoglobin, LDH  --trend h/h    UTI with sepsis  --renal dose antibiotics  --avoid gentamicin and vancomycin/zosyn combination     Left hydroureteronephrosis  --mag 3 lasix renal scan when more stable  --renal ultrasound    Renal cyst--possibly hyperdense   --renal us evaluation for internal structures or calcification         Thank you for allowing us to participate in this patient's care. We will continue to follow.    Vital Signs:  Temp Readings from Last 3 Encounters:   06/26/23 96.9 °F (36.1 °C) (Axillary)   06/14/23 97.6 °F (36.4 °C) (Oral)   06/05/23 98.9 °F (37.2 °C)       Pulse Readings from Last 3 Encounters:   06/26/23 77   06/14/23 87   06/05/23 84       BP Readings from Last 3 Encounters:   06/26/23 (!) 124/58   06/14/23 (!) 141/65   06/05/23 122/67       Weight:  Wt Readings from Last 3 Encounters:   06/26/23 80 kg (176 lb 5.9 oz)   06/25/23 72.6 kg (160 lb)   06/08/23 72.9 kg (160 lb 11.5 oz)       Past Medical & Surgical History:  Past Medical History:   Diagnosis Date    Acute osteomyelitis of left calcaneus 6/7/2023    Chronic ulcer of great toe of left foot with fat layer exposed 6/7/2023    Closed left pilon fracture 6/5/2023    Closed left subtrochanteric femur fracture, initial encounter 2/18/2023    Diabetes mellitus, type 2     Essential (primary) hypertension 2/17/2023    Mass of upper outer quadrant of right breast 4/27/2023    Nicotine dependence 2/20/2023    PAD (peripheral artery disease) 4/22/2023    Tobacco use 6/6/2023    Type 2 diabetes mellitus with hyperglycemia, with long-term current use of insulin 6/6/2023       Past Surgical History:   Procedure Laterality Date    ANGIOGRAPHY OF LOWER EXTREMITY Left 4/25/2023    Procedure: Angiogram Extremity Unilateral;  Surgeon: Gilbert Sheppard MD;  Location: [facility] OR [address] [place] [address];  Service: Vascular;  Laterality: Left;  28.4  min  487.45 mGy  75.9180 Gycm2  trans radial: 7 ml  dye: 126 ml      APPLICATION, EXTERNAL FIXATION DEVICE Left 6/6/2023    Procedure: APPLICATION, EXTERNAL FIXATION DEVICE, LEFT ANKLE, [place];  Surgeon: Gilbert Mahmood MD;  [facility];  Service: Orthopedics;  Laterality: Left;    AUGMENTATION OF BREAST      INTRAMEDULLARY RODDING OF FEMUR Left 2/18/2023    Procedure: INSERTION, INTRAMEDULLARY ROXANNA, FEMUR (hana table -- synthes -- long nail -- have bovie and suction and large bone set);  Surgeon: Keanu Brand MD;  Location: [facility] OR;  Service: Orthopedics;  Laterality: Left;    OPEN REDUCTION AND INTERNAL FIXATION (ORIF) OF PILON FRACTURE Left 6/6/2023    Procedure: ORIF, FRACTURE, PILON, LEFT;  Surgeon: Gilbert Mahmood MD;  [facility];  Service: Orthopedics;  Laterality: Left;    PERCUTANEOUS TRANSLUMINAL ANGIOPLASTY Left 4/25/2023    Procedure: PTA (ANGIOPLASTY, PERCUTANEOUS, TRANSLUMINAL);  Surgeon: Gilbert Sheppard MD;  [facility];  Service: Vascular;  Laterality: Left;  Tibial and popliteal angioplasty       Past Social History:  Social History     Socioeconomic History    Marital status:    Tobacco Use    Smoking status: Every Day     Packs/day: 0.25     Years: 45.00     Pack years: 11.25     Types: Cigarettes    Smokeless tobacco: Never   Substance and Sexual Activity    Alcohol use: Yes    Drug use: Never   Social History Narrative    ** Merged History Encounter **          Social Determinants of Health     Financial Resource Strain: Low Risk     Difficulty of Paying Living Expenses: Not hard at all   Food Insecurity: No Food Insecurity    Worried About Running Out of Food in the Last Year: Never true    Ran Out of Food in the Last Year: Never true   Transportation Needs: No Transportation Needs    Lack of Transportation (Medical): No    Lack of Transportation (Non-Medical): No   Physical Activity: Unknown    Days of  Exercise per Week: Patient refused    Minutes of Exercise per Session: Patient refused   Stress: Unknown    Feeling of Stress : Patient refused   Social Connections: Unknown    Frequency of Communication with Friends and Family: Patient refused    Frequency of Social Gatherings with Friends and Family: Patient refused    Attends [employer] Services: Patient refused    Active Member of Clubs or Organizations: Patient refused    Attends Club or Organization Meetings: Patient refused    Marital Status: Patient refused   Housing Stability: Unknown    Unable to Pay for Housing in the Last Year: No    Unstable Housing in the Last Year: No       Medications:  No current facility-administered medications on file prior to encounter.     Current Outpatient Medications on File Prior to Encounter   Medication Sig Dispense Refill    acetaminophen (TYLENOL) 500 MG tablet Take 2 tablets (1,000 mg total) by mouth every 8 (eight) hours.  0    aspirin (ECOTRIN) 81 MG EC tablet Take 1 tablet (81 mg total) by mouth 2 (two) times a day. - end date august 30, 2023  0    atorvastatin (LIPITOR) 80 MG tablet Take 1 tablet (80 mg total) by mouth every evening. 90 tablet 3    blood sugar diagnostic Strp To check BG two times daily, to use with insurance preferred meter 200 each 1    blood-glucose meter kit To check BG two times daily, to use with insurance preferred meter 1 each 1    ciprofloxacin HCl (CIPRO) 750 MG tablet Take 1 tablet (750 mg total) by mouth every 12 (twelve) hours. End date 6/22/23      clopidogreL (PLAVIX) 75 mg tablet Take 1 tablet (75 mg total) by mouth once daily. 30 tablet 3    dextrose 5 % in water (D5W) 5 % PgBk 50 mL with ceFAZolin 2 gram SolR 2 g Inject 2 g into the vein every 8 (eight) hours. End date 7/17/23  0    famotidine (PEPCID) 20 MG tablet Take 1 tablet (20 mg total) by mouth 2 (two) times daily as needed (Heartburn).      insulin aspart U-100 (NOVOLOG) 100 unit/mL (3 mL) InPn pen Inject 0-5 Units into the  "skin before meals and at bedtime as needed (Hyperglycemia).  0    insulin aspart U-100 (NOVOLOG) 100 unit/mL (3 mL) InPn pen Inject 6 Units into the skin 3 (three) times daily with meals.  0    insulin detemir U-100, Levemir, 100 unit/mL (3 mL) SubQ InPn pen Inject 18 Units into the skin once daily.  0    lancets [facility] To check BG two times daily, to use with insurance preferred meter 200 each 1    lisinopriL (PRINIVIL,ZESTRIL) 5 MG tablet Take 1 tablet (5 mg total) by mouth once daily. 90 tablet 3    melatonin (MELATIN) 3 mg tablet Take 3 tablets (9 mg total) by mouth nightly.  0    methocarbamoL (ROBAXIN) 500 MG Tab Take 1 tablet (500 mg total) by mouth 4 (four) times daily. 40 tablet 0    ondansetron (ZOFRAN-ODT) 4 MG TbDL Take 1 tablet (4 mg total) by mouth every 8 (eight) hours as needed (nausea).      oxyCODONE (ROXICODONE) 10 mg Tab immediate release tablet Take 1 tablet (10 mg total) by mouth every 4 (four) hours as needed (pain sclae 7-10). 10 tablet 0    oxyCODONE (ROXICODONE) 5 MG immediate release tablet Take 1 tablet (5 mg total) by mouth every 4 (four) hours as needed (pain scale 4-6). 10 tablet 0    pen needle, diabetic (PEN NEEDLE) 31 gauge x 3/16" Ndle 1 application by Misc.(Non-Drug; Combo Route) route 2 (two) times a day. 200 each 0    polyethylene glycol (GLYCOLAX) 17 gram PwPk Take 17 g by mouth once daily.  0    pregabalin (LYRICA) 75 MG capsule Take 1 capsule (75 mg total) by mouth 2 (two) times daily. 60 capsule 0    senna-docusate 8.6-50 mg (PERICOLACE) 8.6-50 mg per tablet Take 1 tablet by mouth once daily.      vitamin D (VITAMIN D3) 1000 units Tab Take 1 tablet (1,000 Units total) by mouth once daily.       Scheduled Meds:   amiodarone  200 mg Oral Daily    chlorhexidine  15 mL Mouth/Throat BID    insulin detemir U-100  9 Units Subcutaneous Daily    meropenem (MERREM) IVPB  1 g Intravenous Q12H    mupirocin   Nasal BID    pantoprazole  40 mg Intravenous BID     Continuous Infusions:   " phenylephrine 1.1 mcg/kg/min (06/26/23 1002)    sodium bicarbonate drip 125 mL/hr at 06/26/23 0521     PRN Meds:.dextrose 50%, dextrose 50%, glucagon (human recombinant), glucose, glucose, HYDROcodone-acetaminophen, insulin aspart U-100, naloxone, sodium chloride 0.9%    Allergies:  Patient has no known allergies.    Past Family History:  Reviewed; refer to Hospitalist Admission Note    Review of Systems:  Review of Systems - All 14 systems reviewed and negative, except as noted in HPI    Physical Exam:    BP (!) 124/58   Pulse 77   Temp 96.9 °F (36.1 °C) (Axillary)   Resp 14   Wt 80 kg (176 lb 5.9 oz)   SpO2 100%   BMI 31.24 kg/m²     General Appearance:    Ill appearing   Head:    Normocephalic, without obvious abnormality, atraumatic   Eyes:    PER, conjunctiva/corneas clear, EOM's intact in both eyes        Throat:   Lips, mucosa, and tongue normal; teeth and gums normal   Back:     Symmetric, no curvature, ROM normal, no CVA tenderness   Lungs:     Clear to auscultation bilaterally, respirations unlabored   Chest wall:    No tenderness or deformity   Heart:    Regular rate and rhythm, S1 and S2 normal, no murmur, rub   or gallop   Abdomen:     Soft, non-tender, bowel sounds active all four quadrants,     no masses, no organomegaly   Extremities:   Edematous    Pulses:   2+ and symmetric all extremities   MSK:   No joint or muscle swelling, tenderness or deformity   Skin:   Skin color, texture, turgor normal, no rashes or lesions   Neurologic:   CNII-XII intact, normal strength and sensation       Throughout.  No flap     Results:  Lab Results   Component Value Date     (L) 06/26/2023     (L) 06/26/2023    K 3.5 06/26/2023    K 3.5 06/26/2023     06/26/2023     06/26/2023    CO2 19 (L) 06/26/2023    CO2 19 (L) 06/26/2023    BUN 86 (H) 06/26/2023    BUN 86 (H) 06/26/2023    CREATININE 3.1 (H) 06/26/2023    CREATININE 3.1 (H) 06/26/2023    CALCIUM 6.8 (LL) 06/26/2023    CALCIUM 6.8  (LL) 06/26/2023    ANIONGAP 12 06/26/2023    ANIONGAP 12 06/26/2023    ESTGFRAFRICA 78 04/15/2021    EGFRNONAA 67 04/15/2021       Lab Results   Component Value Date    CALCIUM 6.8 (LL) 06/26/2023    CALCIUM 6.8 (LL) 06/26/2023    PHOS 4.5 06/05/2023       Recent Labs   Lab 06/26/23  0332   WBC 28.93*  28.93*   RBC 2.84*  2.84*   HGB 8.3*  8.3*   HCT 24.4*  24.4*     318   MCV 86  86   MCH 29.2  29.2   MCHC 34.0  34.0       Imaging Results              CT Abdomen Pelvis  Without Contrast (Final result)  Result time 06/24/23 05:34:07      Final result by Allan Fountain DO (06/24/23 05:34:07)                   Narrative:    EXAM DESCRIPTION:  CT ABDOMEN PELVIS WITHOUT CONTRAST  RadLex: CT ABDOMEN PELVIS WITHOUT IV CONTRAST    CLINICAL HISTORY:  Weakness, increased WBC, decreased RBC, hypotensive, MYLES.    TECHNIQUE:  CT of the abdomen and pelvis without intravenous contrast.  All CT scans at this facility use dose modulation, iterative reconstruction, and/or weight based dosing when appropriate to reduce radiation dose to as low as reasonably achievable.    COMPARISON: None.    FINDINGS:    The visualized lower thoracic structures demonstrate minimal bilateral pleural effusions. There is mild bibasilar atelectasis. Coronary artery calcifications are present. There is subcutaneous edema throughout the thorax.    Unenhanced images of the liver, spleen, and adrenal glands appear grossly unremarkable. Pancreas is not well assessed without contrast. Gallbladder is minimally distended. No renal stones are identified. There is a round exophytic lesion off the upper pole right kidney measuring 2.1 cm, with Hounsfield units averaging 59. This may relate to hyperdense cyst. There is mild left hydronephrosis and hydroureter. No definite ureteral or bladder stone is identified. Urinary bladder is decompressed with a Gee catheter in place. There are atherosclerotic calcifications along the abdominal aorta without  evidence for aneurysmal dilatation. No bowel obstruction is seen. There is no free intraperitoneal air. The appendix appears unremarkable. There is colonic diverticulosis without definite CT evidence for acute diverticulitis. There is mild fecal loading throughout the colon. There is minimal free fluid in the posterior pelvis. There is moderate subcutaneous edema throughout the abdomen and pelvis extending into the thighs. There is an intramedullary denzel within the proximal left femur, with a compression screw through the left femoral neck. There is a left subtrochanteric fracture    IMPRESSION:  1.  Minimal gallbladder distention.  2.  Mild left hydroureteronephrosis, without definite ureteral stone seen. Urinary bladder decompressed.  3.  Anasarca.  4.  Colonic diverticulosis without definite CT evidence for acute diverticulitis.  5.  Possible hyperdense cyst at the upper pole right kidney. Follow-up nonemergent renal ultrasound could be performed.  6.  Post surgical changes at the left femur, with subtrochanteric fracture.  7.  Minimal bilateral pleural effusions and mild bibasilar atelectasis.    Electronically signed by:  Allan Fountain DO  6/24/2023 5:34 AM CDT Workstation: [id number]                                     X-Ray Chest AP Portable (Final result)  Result time 06/24/23 08:28:44      Final result by Perfecto Castellanos MD (06/24/23 08:28:44)                   Narrative:    Chest single view    CLINICAL DATA: Sepsis    FINDINGS: AP view shows the heart to be mildly enlarged. The mediastinum is unremarkable. Right-sided PICC line extends to the superior vena cava. There is mild atelectasis at the right lung base, with ill-defined atelectasis or infiltrate at the lung base on the left. There are no significant pleural effusions. No acute osseous abnormality is identified.    IMPRESSION:  1. Atelectasis or infiltrate at the left lung base.  2. Mild right basilar atelectasis.  3. Mild  cardiomegaly.    Electronically signed by:  Perfecto Castellanos MD  6/24/2023 8:28 AM CDT Workstation: [id number]                                        I have personally reviewed pertinent radiological imaging and reports.    Patient care was time spent personally by me on the following activities:   Obtaining a history  Examination of patient.  Providing medical care at the patients bedside.  Developing a treatment plan with patient or surrogate and bedside caregivers  Ordering and reviewing laboratory studies, radiographic studies, pulse oximetry.  Ordering and performing treatments and interventions.  Evaluation of patient's response to treatment.  Discussions with consultants while on the unit and immediately available to the patient.  Re-evaluation of the patient's condition.  Documentation in the medical record.     Antonio Pandya MD  Nephrology  [facility]  [phone]

## 2023-06-26 NOTE — PLAN OF CARE
Problem: Impaired Wound Healing  Goal: Optimal Wound Healing  Outcome: Ongoing, Progressing  Intervention: Promote Wound Healing  Flowsheets (Taken 6/26/2023 1055)  Oral Nutrition Promotion: (protein-dense liquids provided) other (see comments)

## 2023-06-26 NOTE — ASSESSMENT & PLAN NOTE
Prothrombin and partial thromboplastin times prolonged.  Platelet count normal, though.  Fibrinogen very high.  DIC is still possible, but laboratory evidence is somewhat lacking.  Will monitor bleeding times.    Lab Results   Component Value Date    INR 4.0 () 06/25/2023    INR 7.0 () 06/24/2023    INR 4.4 () 06/24/2023     aPTT   Date Value Ref Range Status   06/25/2023 49.3 (H) 21.0 - 32.0 sec Final     Comment:     Refer to local heparin nomogram for intensity/dose specific   therapeutic   range.       Fibrinogen   Date Value Ref Range Status   06/25/2023 >800 (H) 182 - 400 mg/dL Final

## 2023-06-26 NOTE — ASSESSMENT & PLAN NOTE
Continue systemic antibiotic.  Urine sample sent for culture.    Antibiotics (From admission, onward)    Start     Stop Route Frequency Ordered    06/24/23 0600  meropenem 1 g in sodium chloride 0.9 % 100 mL IVPB (ready to mix system)        Note to Pharmacy: Ht:    Wt: 72.6 kg (160 lb)  Estimated Creatinine Clearance: 16.2 mL/min (A) (based on SCr of 3 mg/dL (H)).  Body mass index is 28.35 kg/m².    -- IV Every 12 hours (non-standard times) 06/24/23 9549

## 2023-06-26 NOTE — PLAN OF CARE
Problem: Adult Inpatient Plan of Care  Goal: Absence of Hospital-Acquired Illness or Injury  Outcome: Ongoing, Progressing  Goal: Optimal Comfort and Wellbeing  Outcome: Ongoing, Progressing     Problem: Diabetes Comorbidity  Goal: Blood Glucose Level Within Targeted Range  Outcome: Ongoing, Progressing     Problem: Bleeding (Sepsis/Septic Shock)  Goal: Absence of Bleeding  Outcome: Ongoing, Progressing     Problem: Fall Injury Risk  Goal: Absence of Fall and Fall-Related Injury  Outcome: Ongoing, Progressing       Blood pressure went down after giving pain medication. Titrated Neosynephrine to achieve target MAP. Safety maintained. All needs attended.

## 2023-06-26 NOTE — PROGRESS NOTES
[facility]  Department of Cardiology  Progress Note      PATIENT NAME: Anastasiia Badillo  MRN: [id number]  TODAY'S DATE: 06/26/2023  ADMIT DATE: 6/24/2023                          CONSULT REQUESTED BY: Brandon Martin MD    SUBJECTIVE     PRINCIPAL PROBLEM: UTI (urinary tract infection)      REASON FOR CONSULT:  AFIB With RVR    6/26/23:    Patient seen resting in bed with no distress noted. She is more alert today. However she is requiring a bit higher dose of hood today. She is in SR on telemetry with frequent PACs.     6/25/2023    Patient In NSR with PACs.  Only on one pressor now neosynepherine.    HPI:    Consulted for severe Hypotension and AFIB with RVR Rates over 200    FROM H AND P      Hypotension       [facility] of low blood pressure.           HPI: 74 y/o F with a past medical history significant for DM2, HTN, femur fracture, tibial fracture and tobacco use, L foot wounds,  high grade stenosis SFA bilaterally and popliteal on the left s/p baloon angioplasty L popliteal artery and left posterior tibial arery on DAPT.      Recently discharged from [facility] for fracture of left tibia, s/p ORIF 6/6, also finished antibiotics for acute osteomyelitis of left calcaneus ( Final ID recs with end date for cipro of 6/22 and end date of ancef for 7/17 )     Patient sent from rehab for hypotension.     In the ER was in septic shock, e/o UTI and anemic.      Pt is currently confused and in distress.            Review of patient's allergies indicates:  No Known Allergies    Past Medical History:   Diagnosis Date    Acute osteomyelitis of left calcaneus 6/7/2023    Chronic ulcer of great toe of left foot with fat layer exposed 6/7/2023    Closed left pilon fracture 6/5/2023    Closed left subtrochanteric femur fracture, initial encounter 2/18/2023    Diabetes mellitus, type 2     Essential (primary) hypertension 2/17/2023    Mass of upper outer quadrant of right breast 4/27/2023     Nicotine dependence 2/20/2023    PAD (peripheral artery disease) 4/22/2023    Tobacco use 6/6/2023    Type 2 diabetes mellitus with hyperglycemia, with long-term current use of insulin 6/6/2023     Past Surgical History:   Procedure Laterality Date    ANGIOGRAPHY OF LOWER EXTREMITY Left 4/25/2023    Procedure: Angiogram Extremity Unilateral;  Surgeon: Gilbert Sheppard MD;  Location: [facility] OR [facility] FLR;  Service: Vascular;  Laterality: Left;  28.4 min  487.45 mGy  75.9180 Gycm2  trans radial: 7 ml  dye: 126 ml      APPLICATION, EXTERNAL FIXATION DEVICE Left 6/6/2023    Procedure: APPLICATION, EXTERNAL FIXATION DEVICE, LEFT ANKLE, Vasquez;  Surgeon: Gilbert Mahmood MD;  [facility];  Service: Orthopedics;  Laterality: Left;    AUGMENTATION OF BREAST      INTRAMEDULLARY RODDING OF FEMUR Left 2/18/2023    Procedure: INSERTION, INTRAMEDULLARY ROXANNA, FEMUR (hana table -- synthes -- long nail -- have bovie and suction and large bone set);  Surgeon: Keanu Brand MD;  Location: [facility] OR;  Service: Orthopedics;  Laterality: Left;    OPEN REDUCTION AND INTERNAL FIXATION (ORIF) OF PILON FRACTURE Left 6/6/2023    Procedure: ORIF, FRACTURE, PILON, LEFT;  Surgeon: Gilbert Mahmood MD;  [facility];  Service: Orthopedics;  Laterality: Left;    PERCUTANEOUS TRANSLUMINAL ANGIOPLASTY Left 4/25/2023    Procedure: PTA (ANGIOPLASTY, PERCUTANEOUS, TRANSLUMINAL);  Surgeon: Gilbert Sheppard MD;  [facility];  Service: Vascular;  Laterality: Left;  Tibial and popliteal angioplasty     Social History     Tobacco Use    Smoking status: Every Day     Packs/day: 0.25     Years: 45.00     Pack years: 11.25     Types: Cigarettes    Smokeless tobacco: Never   Substance Use Topics    Alcohol use: Yes    Drug use: Never        REVIEW OF SYSTEMS  Per hpi     OBJECTIVE     VITAL SIGNS (Most Recent)  Temp: 96.9 °F (36.1 °C) (06/26/23 0445)  Pulse: 77 (06/26/23 0630)  Resp: 14  (06/26/23 0630)  BP: (!) 124/58 (06/26/23 0600)  SpO2: 100 % (06/26/23 0630)    VENTILATION STATUS  Resp: 14 (06/26/23 0630)  SpO2: 100 % (06/26/23 0630)           I & O (Last 24H):  Intake/Output Summary (Last 24 hours) at 6/26/2023 0921  Last data filed at 6/26/2023 0521  Gross per 24 hour   Intake 3559.88 ml   Output 510 ml   Net 3049.88 ml         WEIGHTS  Wt Readings from Last 3 Encounters:   06/26/23 0503 80 kg (176 lb 5.9 oz)   06/24/23 0021 72.6 kg (160 lb)   06/25/23 1010 72.6 kg (160 lb)   06/08/23 1229 72.9 kg (160 lb 11.5 oz)   06/07/23 2042 72.9 kg (160 lb 11.5 oz)   06/06/23 1215 72.6 kg (160 lb 1.9 oz)   06/05/23 1830 72.6 kg (160 lb)       PHYSICAL EXAM  GENERAL: awake, alert, appears weak.   HEENT: Normocephalic.   NECK: No JVD. No bruit..    CARDIAC:RRR  CHEST ANATOMY: normal.   LUNGS: Diminished  ABDOMEN: Soft     Left lower extremity with external rotation rods  Gee catheter with cloudy yellow urine  HOME MEDICATIONS:  No current facility-administered medications on file prior to encounter.     Current Outpatient Medications on File Prior to Encounter   Medication Sig Dispense Refill    acetaminophen (TYLENOL) 500 MG tablet Take 2 tablets (1,000 mg total) by mouth every 8 (eight) hours.  0    aspirin (ECOTRIN) 81 MG EC tablet Take 1 tablet (81 mg total) by mouth 2 (two) times a day. - end date august 30, 2023  0    atorvastatin (LIPITOR) 80 MG tablet Take 1 tablet (80 mg total) by mouth every evening. 90 tablet 3    blood sugar diagnostic Strp To check BG two times daily, to use with insurance preferred meter 200 each 1    blood-glucose meter kit To check BG two times daily, to use with insurance preferred meter 1 each 1    ciprofloxacin HCl (CIPRO) 750 MG tablet Take 1 tablet (750 mg total) by mouth every 12 (twelve) hours. End date 6/22/23      clopidogreL (PLAVIX) 75 mg tablet Take 1 tablet (75 mg total) by mouth once daily. 30 tablet 3    dextrose 5 % in water (D5W) 5 % PgBk 50 mL with  "ceFAZolin 2 gram SolR 2 g Inject 2 g into the vein every 8 (eight) hours. End date 7/17/23  0    famotidine (PEPCID) 20 MG tablet Take 1 tablet (20 mg total) by mouth 2 (two) times daily as needed (Heartburn).      insulin aspart U-100 (NOVOLOG) 100 unit/mL (3 mL) InPn pen Inject 0-5 Units into the skin before meals and at bedtime as needed (Hyperglycemia).  0    insulin aspart U-100 (NOVOLOG) 100 unit/mL (3 mL) InPn pen Inject 6 Units into the skin 3 (three) times daily with meals.  0    insulin detemir U-100, Levemir, 100 unit/mL (3 mL) SubQ InPn pen Inject 18 Units into the skin once daily.  0    lancets [facility] [place] [place] To check BG two times daily, to use with insurance preferred meter 200 each 1    lisinopriL (PRINIVIL,ZESTRIL) 5 MG tablet Take 1 tablet (5 mg total) by mouth once daily. 90 tablet 3    melatonin (MELATIN) 3 mg tablet Take 3 tablets (9 mg total) by mouth nightly.  0    methocarbamoL (ROBAXIN) 500 MG Tab Take 1 tablet (500 mg total) by mouth 4 (four) times daily. 40 tablet 0    ondansetron (ZOFRAN-ODT) 4 MG TbDL Take 1 tablet (4 mg total) by mouth every 8 (eight) hours as needed (nausea).      oxyCODONE (ROXICODONE) 10 mg Tab immediate release tablet Take 1 tablet (10 mg total) by mouth every 4 (four) hours as needed (pain sclae 7-10). 10 tablet 0    oxyCODONE (ROXICODONE) 5 MG immediate release tablet Take 1 tablet (5 mg total) by mouth every 4 (four) hours as needed (pain scale 4-6). 10 tablet 0    pen needle, diabetic (PEN NEEDLE) 31 gauge x 3/16" Ndle 1 application by Misc.(Non-Drug; Combo Route) route 2 (two) times a day. 200 each 0    polyethylene glycol (GLYCOLAX) 17 gram PwPk Take 17 g by mouth once daily.  0    pregabalin (LYRICA) 75 MG capsule Take 1 capsule (75 mg total) by mouth 2 (two) times daily. 60 capsule 0    senna-docusate 8.6-50 mg (PERICOLACE) 8.6-50 mg per tablet Take 1 tablet by mouth once daily.      vitamin D (VITAMIN D3) 1000 units Tab Take 1 tablet (1,000 Units total) by mouth once " daily.         SCHEDULED MEDS:   amiodarone  200 mg Oral Daily    chlorhexidine  15 mL Mouth/Throat BID    insulin detemir U-100  9 Units Subcutaneous Daily    meropenem (MERREM) IVPB  1 g Intravenous Q12H    mupirocin   Nasal BID    pantoprazole  40 mg Intravenous BID       CONTINUOUS INFUSIONS:   NORepinephrine bitartrate-D5W Stopped (06/24/23 1600)    phenylephrine 1.3 mcg/kg/min (06/26/23 0812)    sodium bicarbonate drip 125 mL/hr at 06/26/23 0521       PRN MEDS:dextrose 50%, glucagon (human recombinant), HYDROcodone-acetaminophen, insulin aspart U-100, naloxone, sodium chloride 0.9%    LABS AND DIAGNOSTICS     CBC LAST 3 DAYS  Recent Labs   Lab 06/24/23  0121 06/24/23  0907 06/24/23  1102 06/25/23  0300 06/26/23  0332   WBC 24.48*  --   --  29.30*  29.84* 28.93*  28.93*   RBC 2.31*  --   --  2.77*  2.73* 2.84*  2.84*   HGB 6.7* 8.6* 8.8* 8.2*  8.2* 8.3*  8.3*   HCT 20.0* 26.0*  --  24.8*  23.8* 24.4*  24.4*   MCV 87  --   --  90  87 86  86   MCH 29.0  --   --  29.6  30.0 29.2  29.2   MCHC 33.5  --   --  33.1  34.5 34.0  34.0   RDW 15.0*  --   --  15.6*  15.1* 15.0*  15.0*     --   --  408  407 318  318   MPV 11.4  --   --  11.7  11.5 11.4  11.4   GRAN 85.6*  21.0*  --   --  85.5*  82.0*  25.0* 54.0  54.0   LYMPH 3.2*  0.8*  --   --  3.6*  2.0*  1.1 25.0  25.0   MONO 8.8  2.2*  --   --  7.7  6.0  2.3* 1.0*  1.0*   BASO 0.05  --   --  0.07  --    NRBC 0  --   --  0  0 0  0         COAGULATION LAST 3 DAYS  Recent Labs   Lab 06/24/23  0524 06/24/23 2043 06/25/23  0300   INR 4.4* 7.0* 4.0*   APTT 55.0* 58.7* 49.3*         CHEMISTRY LAST 3 DAYS  Recent Labs   Lab 06/24/23  0121 06/24/23  0925 06/24/23  1800 06/24/23  1821 06/24/23 2011 06/24/23 2043 06/25/23  0300 06/25/23  0743 06/25/23  0905 06/26/23  0332   *   < >  --    < >  --  131* 134*  --   --  132*  132*   K 4.2   < >  --    < >  --  3.3* 3.8  --   --  3.5  3.5      < >  --    < >  --  108 107  --    --  101  101   CO2 16*   < >  --    < >  --  14* 16*  --   --  19*  19*   ANIONGAP 11   < >  --    < >  --  9 11  --   --  12  12   BUN 90*   < >  --    < >  --  83* 83*  --   --  86*  86*   CREATININE 3.0*   < >  --    < >  --  3.1* 3.1*  --   --  3.1*  3.1*   GLU 89   < >  --    < >  --  211* 108  --   --  195*  195*   CALCIUM 7.4*   < >  --    < >  --  6.9* 7.1*  --   --  6.8*  6.8*   PH  --    < > 7.197*  --  7.297*  --   --   --  7.394  --    ALBUMIN 1.8*  --   --   --   --   --   --  1.7*  --  1.5*   PROT 5.0*  --   --   --   --   --   --  4.7*  --  4.8*   ALKPHOS 59  --   --   --   --   --   --  69  --  75   ALT <5*  --   --   --   --   --   --  <5*  --  <5*   AST 19  --   --   --   --   --   --  30  --  33   BILITOT 0.6  --   --   --   --   --   --  0.6  --  1.0    < > = values in this interval not displayed.         CARDIAC PROFILE LAST 3 DAYS  Recent Labs   Lab 06/24/23  0121 06/25/23  0743 06/26/23  0332   *  --   --    CPK  --   --  47   LDH  --  267*  --          ENDOCRINE LAST 3 DAYS  Recent Labs   Lab 06/24/23  0121   PROCAL 0.99*         LAST ARTERIAL BLOOD GAS  ABG  Recent Labs   Lab 06/25/23  0905   PH 7.394   PO2 88   PCO2 26.2*   HCO3 16.0*   BE -9         LAST 7 DAYS MICROBIOLOGY   Microbiology Results (last 7 days)       Procedure Component Value Units Date/Time    Blood culture x two cultures. Draw prior to antibiotics. [[id number]] Collected: 06/24/23 0209    Order Status: Completed Specimen: Blood from Peripheral, Forearm, Left Updated: 06/26/23 0232     Blood Culture, Routine No Growth to date      No Growth to date      No Growth to date    Narrative:      Aerobic and anaerobic    Blood culture x two cultures. Draw prior to antibiotics. [[id number]] Collected: 06/24/23 0209    Order Status: Completed Specimen: Blood from Peripheral, Hand, Left Updated: 06/26/23 0232     Blood Culture, Routine No Growth to date      No Growth to date      No Growth to date    Narrative:       Aerobic and anaerobic    IV catheter culture [[id number]] Collected: 06/25/23 1723    Order Status: Sent Specimen: Catheter Tip, PICC Updated: 06/25/23 1740    Culture, Respiratory with Gram Stain [[id number]] Collected: 06/24/23 1245    Order Status: Completed Specimen: Respiratory from Sputum Updated: 06/25/23 0824     Respiratory Culture No Growth     Gram Stain (Respiratory) Many WBC's     Gram Stain (Respiratory) >10epis/lfp and <than many WBC's     Gram Stain (Respiratory) Few yeast    Urine culture [[id number]]  (Abnormal) Collected: 06/24/23 0208    Order Status: Completed Specimen: Urine Updated: 06/25/23 0759     Urine Culture, Routine YEAST   > 100,000 cfu/ml  identification pending      Narrative:      Specimen Source->Urine    MRSA Screen by PCR [[id number]] Collected: 06/24/23 0524    Order Status: Completed Specimen: Nasopharyngeal Swab from Nasal Updated: 06/24/23 0646     MRSA SCREEN BY PCR Negative            MOST RECENT IMAGING  Echo  · The left ventricle is normal in size with normal systolic function.  · The estimated ejection fraction is 58%.  · Normal left ventricular diastolic function.  · There is abnormal septal wall motion.  · Atrial fibrillation not observed.  · Mild right ventricular enlargement with normal right ventricular   systolic function.  · There is moderate pulmonary hypertension.  · Mild to moderate tricuspid regurgitation.  · Mild to moderate pulmonic regurgitation.  · Normal central venous pressure (3 mmHg).  · The estimated PA systolic pressure is 65 mmHg.     Premature atrial contractions  Moderate pulmonary hypertension is present  US Retroperitoneal Complete  Ultrasound retroperitoneum    CLINICAL DATA: Hyperdense right renal mass    FINDINGS: Sonographic assessment targeted to the kidneys and urinary bladder was performed.    Right kidney measures 10.2 cm in length. Exophytic upper pole right renal mass described on CT is difficult to visualize because of difficulty with  patient positioning and acoustic shadowing. 2 cm hypoechoic well marginated exophytic lesion is noted, probably a complicated cyst. The right kidney is otherwise normal in appearance.    The left kidney measures 11.1 cm in length, with no mass or hydronephrosis.    Urinary bladder is unremarkable.    IMPRESSION:  1. Limited visualization of exophytic upper pole right renal mass. This likely reflects a complicated cyst. Multiphase CT with contrast could be utilized for further assessment. If patient cannot receive IV contrast, follow-up nonemergent MRI could be considered.    Electronically signed by:  Perfecto Castellanos MD  6/25/2023 11:11 AM CDT Workstation: [id number]      ECHOCARDIOGRAM RESULTS (last 5)  No results found for this or any previous visit.      CURRENT/PREVIOUS VISIT EKG  Results for orders placed or performed during the hospital encounter of 06/24/23   EKG 12-lead    Collection Time: 06/24/23  1:11 PM    Narrative    Test Reason : I48.91,    Vent. Rate : 113 BPM     Atrial Rate : 113 BPM     P-R Int : 136 ms          QRS Dur : 090 ms      QT Int : 310 ms       P-R-T Axes : 030 097 047 degrees     QTc Int : 425 ms    Sinus tachycardia with occasional Premature ventricular complexes  Rightward axis  Low voltage QRS  Septal infarct (cited on or before 24-JUN-2023)  Abnormal ECG  When compared with ECG of 24-JUN-2023 10:28,  Sinus rhythm has replaced Atrial fibrillation    Referred By: [email or web address]   SELF           Confirmed By:            ASSESSMENT/PLAN:     Active Hospital Problems    Diagnosis    *UTI (urinary tract infection)    Hypokalemia    Hyponatremia    Normal anion gap metabolic acidosis    DKA, type 2    Anemia, chronic disease    Disseminated intra-vascular coagulation (possible)    Encephalopathy, metabolic    ATN (acute tubular necrosis)    Septic shock   Pt had paroxysmal Afib (<12hr onset) with HR upto 200s. Pt was on 2 pressors for septic shock. Spoke to pts' son over the phone who  agreed to cardioversion. Due to hemodynamic instability, emergent DCCV ( 200J x 1 ) was performed with conversion to SR. Consider anticoagulation when no contraindications.     ASSESSMENT & PLAN:     Afib with RVR-now SR with PACs  Septic Shock  Hypotension  Elevated INR 4.0  S/P ORIF  PAD S/P  baloon angioplasty L popliteal artery and left posterior tibial arery on DAPT.   Anemia  Metabolic Acidosis  UTI        RECOMMENDATIONS:      Continue amiodarone 200 mg po daily.  Add systemic anticoagulation when INR is normalized and cleared by surgery if H&h and platelets are stable. Appreciate hospitalist's assistance with this.   For now cardiology will sign off and see PRN.       Xiomara Peacock NP  Department of Cardiology  Date of Service: 06/26/2023      I have personally examined the patient, I have reviewed the Nurse Practitioner's history and physical, assessment, and plan. I have personally evaluated the patient at bedside and agree with the findings and made appropriate changes as necessary in recommendations.    Tami Almeida MD  Department of Cardiology  [facility]  6/26/23

## 2023-06-26 NOTE — CONSULTS
[facility]  Adult Nutrition   Consult Note (Initial Assessment)     SUMMARY     Recommendations  1) Continue current 1500 kcal Diabetic/Dysphagia Soft diet as tolerated and encourage intake.  2) RD has added Glucerna at BF and Unjury at L & D (440 kcals and 50 g protein) to increase protein for wound healing.  3) RD to offer diabetes education to pt before discharge    Goals:   1) Pt to meet 100% of her energy and protein needs.  2) Pt to have complete wound healing.  3) Pt to understand the diabetic diet to be able to lower her HgbA1C of 10.2 to WNL's.    Nutrition Goal Status: progressing towards goal    Communication of RD Recs: reviewed with RN    Dietitian Rounds Brief  Consult for Wounds: Pt is obese but malnourished and needs protein for wound healing which is why an ONS has been added. She ate 50% of her late BF and the Glucerna had not been ordered yet and when her lunch came she had just finished her BF and was not hungry but did drink the Unjury which gave her an additional 110 kcals and 20 g much needed protein. I spoke with her nurse and asked her to encourage her and to push the ONS's as well. Hopefully she will do better at dinner tonight. Her LBM was today and will follow prn.    Per MD PN:  Chief Complaint:        Chief Complaint   Patient presents with    Hypotension       [facility] of low blood pressure.           HPI: 72 y/o F with a past medical history significant for DM2, HTN, femur fracture, tibial fracture and tobacco use, L foot wounds,  high grade stenosis SFA bilaterally and popliteal on the left s/p baloon angioplasty L popliteal artery and left posterior tibial arery on DAPT.      Recently discharged from [facility] [place] for fracture of left tibia, s/p ORIF 6/6, also finished antibiotics for acute osteomyelitis of left calcaneus ( Final ID recs with end date for cipro of 6/22 and end date of ancef for 7/17 )     Patient sent from rehab for hypotension.      In the ER was in septic shock, e/o UTI and anemic.      Pt is currently confused and in distress.      Diet order:   Current Diet Order: 1500 kcal Diabetic/Dysphagia Soft (ordered at 0930)       Evaluation of Received Nutrient/Fluid Intake  Energy Calories Required: not meeting needs  Protein Required: not meeting needs  Fluid Required: meeting needs  Tolerance: other (see comments)     % Intake of Estimated Energy Needs: 0%  % Meal Intake: NPO      Intake/Output Summary (Last 24 hours) at 6/26/2023 1040  Last data filed at 6/26/2023 0521  Gross per 24 hour   Intake 3559.88 ml   Output 510 ml   Net 3049.88 ml        Anthropometrics  Temp: 96.9 °F (36.1 °C)  Weight Method: Stated  Weight: 80 kg (176 lb 5.9 oz)  Weight (lb): 176.37 lb  BMI (Calculated): 31.3  BMI Grade: 30 - 34.9- obesity - grade I       Estimated/Assessed Needs  Weight Used For Calorie Calculations: 80 kg (176 lb 5.9 oz)  Energy Calorie Requirements (kcal): 5532-7884 kcals/day (20-25 kcals/kg ABW)  Energy Need Method: Kcal/kg  Protein Requirements:  g/day (1.5-2 g/kg IBW)  Weight Used For Protein Calculations: 52 kg (114 lb 10.2 oz)     Estimated Fluid Requirement Method: RDA Method  RDA Method (mL): 1600       Reason for Assessment  Reason For Assessment: consult  Diagnosis: other (see comments) (UTI)  Relevant Medical History: Diabetes mellitus, type 2, Closed left pilon fracture, Closed left subtrochanteric femur fracture, initial encounter, Acute osteomyelitis of left calcaneus, Essential (primary) hypertension, PAD (peripheral artery disease), Tobacco use, Mass of upper outer quadrant of right breast, Type 2 diabetes mellitus with hyperglycemia, with long-term current use of insulin, Chronic ulcer of great toe of left foot with fat layer exposed, Nicotine dependence  Interdisciplinary Rounds: attended    Nutrition/Diet History  Patient Reported Diet/Restrictions/Preferences: diabetic diet  Spiritual, Cultural Beliefs, Protestant  Practices, Values that Affect Care: no  Food Allergies: NKFA  Factors Affecting Nutritional Intake: other (see comments) (pt was just ordered a diet at 0930 today)    Nutrition Risk Screen  Nutrition Risk Screen: large or nonhealing wound, burn or pressure injury       Altered Skin Integrity 04/19/23 1600 Left posterior Heel Ulceration Full thickness tissue loss. Base is covered by slough and/or eschar in the wound bed-Wound Image: Images linked       Incision/Site 06/06/23 1604 Left Leg-Wound Image: Images linked       Incision/Site 06/24/23 0700 Left Heel medial-Wound Image: Images linked       Incision/Site 06/24/23 0701 Left Heel lateral-Wound Image: Images linked       Altered Skin Integrity 06/24/23 0701 midline Sacral spine-Wound Image: Images linked       Incision/Site 06/24/23 0700 Left Foot anterior-Wound Image: Images linked       Altered Skin Integrity 06/24/23 0700 Left anterior Toe, first-Wound Image: Images linked       Incision/Site 06/24/23 0700 Left Malleolus/Ankle medial-Wound Image: Images linked       Incision/Site 06/24/23 0700 Left Malleolus/Ankle lateral-Wound Image: Images linked  MST Score: 0  Have you recently lost weight without trying?: No  Weight loss score: 0  Have you been eating poorly because of a decreased appetite?: No  Appetite score: 0       Weight History:  Wt Readings from Last 5 Encounters:   06/26/23 80 kg (176 lb 5.9 oz)   06/25/23 72.6 kg (160 lb)   06/08/23 72.9 kg (160 lb 11.5 oz)   06/05/23 72.6 kg (160 lb)   05/24/23 72.6 kg (160 lb)        Lab/Procedures/Meds: Pertinent Labs/Meds Reviewed    Medications:Pertinent Medications Reviewed  Scheduled Meds:   amiodarone  200 mg Oral Daily    chlorhexidine  15 mL Mouth/Throat BID    insulin detemir U-100  9 Units Subcutaneous Daily    meropenem (MERREM) IVPB  1 g Intravenous Q12H    mupirocin   Nasal BID    pantoprazole  40 mg Intravenous BID     Continuous Infusions:   phenylephrine 1.1 mcg/kg/min (06/26/23 1002)    sodium  bicarbonate drip 125 mL/hr at 06/26/23 0521     PRN Meds:.dextrose 50%, dextrose 50%, glucagon (human recombinant), glucose, glucose, HYDROcodone-acetaminophen, insulin aspart U-100, naloxone, sodium chloride 0.9%    Labs: Pertinent Labs Reviewed  Clinical Chemistry:  Recent Labs   Lab 06/26/23  0332   *  132*   K 3.5  3.5     101   CO2 19*  19*   *  195*   BUN 86*  86*   CREATININE 3.1*  3.1*   CALCIUM 6.8*  6.8*   PROT 4.8*   ALBUMIN 1.5*   BILITOT 1.0   ALKPHOS 75   AST 33   ALT <5*   ANIONGAP 12  12     CBC:   Recent Labs   Lab 06/26/23  0332   WBC 28.93*  28.93*   RBC 2.84*  2.84*   HGB 8.3*  8.3*   HCT 24.4*  24.4*     318   MCV 86  86   MCH 29.2  29.2   MCHC 34.0  34.0     Cardiac Profile:  Recent Labs   Lab 06/24/23  0121 06/26/23 0332   *  --    CPK  --  47       Monitor and Evaluation  Food and Nutrient Intake: food and beverage intake, energy intake  Food and Nutrient Adminstration: diet order  Knowledge/Beliefs/Attitudes: food and nutrition knowledge/skill, beliefs and attitudes  Physical Activity and Function: nutrition-related ADLs and IADLs, factors affecting access to physical activity  Anthropometric Measurements: weight, weight change, body mass index  Biochemical Data, Medical Tests and Procedures: lipid profile, inflammatory profile, glucose/endocrine profile, gastrointestinal profile, electrolyte and renal panel  Nutrition-Focused Physical Findings: overall appearance     Nutrition Risk  Level of Risk/Frequency of Follow-up: high     Nutrition Follow-Up  RD Follow-up?: Yes      Dali Bryan, TAHMINA 06/26/2023 10:40 AM

## 2023-06-26 NOTE — ASSESSMENT & PLAN NOTE
New problem.  Began yesterday.  Received insulin infusion overnight.  Chem panel shows a narrower anion gap and controlled glucose.  Will switch insulin to subcutaneous.  Order daily Levemir.  FSG's every 4 hours.

## 2023-06-26 NOTE — CARE UPDATE
06/25/23 1940   Patient Assessment/Suction   Level of Consciousness (AVPU) alert   Respiratory Effort Normal   PRE-TX-O2   Device (Oxygen Therapy) nasal cannula   $ Is the patient on Low Flow Oxygen? Yes   Flow (L/min) 2   Pulse Oximetry Type Continuous   $ Pulse Oximetry - Multiple Charge Pulse Oximetry - Multiple   Education   $ Education 15 min   Respiratory Evaluation   $ Care Plan Tech Time 15 min

## 2023-06-26 NOTE — ASSESSMENT & PLAN NOTE
Continue crystalloid infusion with sodium bicarbonate.  Monitor serum bicarb level.  Monitor pH.  Consult with nephrology for assistance with diagnosis and management.

## 2023-06-27 PROBLEM — E87.20 NORMAL ANION GAP METABOLIC ACIDOSIS: Status: RESOLVED | Noted: 2023-06-25 | Resolved: 2023-06-27

## 2023-06-27 PROBLEM — Z79.4 TYPE 2 DIABETES MELLITUS, WITH LONG-TERM CURRENT USE OF INSULIN: Status: ACTIVE | Noted: 2023-06-27

## 2023-06-27 PROBLEM — E11.9 TYPE 2 DIABETES MELLITUS, WITH LONG-TERM CURRENT USE OF INSULIN: Status: ACTIVE | Noted: 2023-06-27

## 2023-06-27 PROBLEM — E11.10 DKA, TYPE 2: Status: RESOLVED | Noted: 2023-06-24 | Resolved: 2023-06-27

## 2023-06-27 LAB
ANION GAP SERPL CALC-SCNC: 8 MMOL/L (ref 8–16)
ANISOCYTOSIS BLD QL SMEAR: SLIGHT
BASOPHILS # BLD AUTO: 0.05 K/UL (ref 0–0.2)
BASOPHILS NFR BLD: 0.2 % (ref 0–1.9)
BUN SERPL-MCNC: 81 MG/DL (ref 8–23)
CALCIUM SERPL-MCNC: 6.7 MG/DL (ref 8.7–10.5)
CHLORIDE SERPL-SCNC: 98 MMOL/L (ref 95–110)
CO2 SERPL-SCNC: 25 MMOL/L (ref 23–29)
CREAT SERPL-MCNC: 3.2 MG/DL (ref 0.5–1.4)
DIFFERENTIAL METHOD: ABNORMAL
EOSINOPHIL # BLD AUTO: 0.1 K/UL (ref 0–0.5)
EOSINOPHIL NFR BLD: 0.3 % (ref 0–8)
ERYTHROCYTE [DISTWIDTH] IN BLOOD BY AUTOMATED COUNT: 15.9 % (ref 11.5–14.5)
EST. GFR  (NO RACE VARIABLE): 14.8 ML/MIN/1.73 M^2
GIANT PLATELETS BLD QL SMEAR: PRESENT
GLUCOSE SERPL-MCNC: 194 MG/DL (ref 70–110)
GLUCOSE SERPL-MCNC: 216 MG/DL (ref 70–110)
GLUCOSE SERPL-MCNC: 238 MG/DL (ref 70–110)
GLUCOSE SERPL-MCNC: 288 MG/DL (ref 70–110)
HAPTOGLOB SERPL-MCNC: 320 MG/DL (ref 42–346)
HCT VFR BLD AUTO: 23.8 % (ref 37–48.5)
HGB BLD-MCNC: 8.2 G/DL (ref 12–16)
HYPOCHROMIA BLD QL SMEAR: ABNORMAL
IMM GRANULOCYTES # BLD AUTO: 0.62 K/UL (ref 0–0.04)
IMM GRANULOCYTES NFR BLD AUTO: 2.8 % (ref 0–0.5)
INR PPP: 1 (ref 0.8–1.2)
LYMPHOCYTES # BLD AUTO: 0.8 K/UL (ref 1–4.8)
LYMPHOCYTES NFR BLD: 3.6 % (ref 18–48)
MCH RBC QN AUTO: 29.2 PG (ref 27–31)
MCHC RBC AUTO-ENTMCNC: 34.5 G/DL (ref 32–36)
MCV RBC AUTO: 85 FL (ref 82–98)
MONOCYTES # BLD AUTO: 1.8 K/UL (ref 0.3–1)
MONOCYTES NFR BLD: 8.2 % (ref 4–15)
NEUTROPHILS # BLD AUTO: 19 K/UL (ref 1.8–7.7)
NEUTROPHILS NFR BLD: 84.9 % (ref 38–73)
NRBC BLD-RTO: 0 /100 WBC
PLATELET # BLD AUTO: 262 K/UL (ref 150–450)
PLATELET BLD QL SMEAR: ABNORMAL
PMV BLD AUTO: 11.3 FL (ref 9.2–12.9)
POIKILOCYTOSIS BLD QL SMEAR: SLIGHT
POTASSIUM SERPL-SCNC: 3.3 MMOL/L (ref 3.5–5.1)
PROTHROMBIN TIME: 11.4 SEC (ref 9–12.5)
RBC # BLD AUTO: 2.81 M/UL (ref 4–5.4)
SODIUM SERPL-SCNC: 131 MMOL/L (ref 136–145)
WBC # BLD AUTO: 22.35 K/UL (ref 3.9–12.7)

## 2023-06-27 PROCEDURE — 25000003 PHARM REV CODE 250: Performed by: INTERNAL MEDICINE

## 2023-06-27 PROCEDURE — 85025 COMPLETE CBC W/AUTO DIFF WBC: CPT | Performed by: INTERNAL MEDICINE

## 2023-06-27 PROCEDURE — 25000003 PHARM REV CODE 250: Performed by: HOSPITALIST

## 2023-06-27 PROCEDURE — 80048 BASIC METABOLIC PNL TOTAL CA: CPT | Performed by: INTERNAL MEDICINE

## 2023-06-27 PROCEDURE — 63600175 PHARM REV CODE 636 W HCPCS: Performed by: INTERNAL MEDICINE

## 2023-06-27 PROCEDURE — 20000000 HC ICU ROOM

## 2023-06-27 PROCEDURE — 25000003 PHARM REV CODE 250: Performed by: NURSE PRACTITIONER

## 2023-06-27 PROCEDURE — 94761 N-INVAS EAR/PLS OXIMETRY MLT: CPT

## 2023-06-27 PROCEDURE — 85610 PROTHROMBIN TIME: CPT | Performed by: HOSPITALIST

## 2023-06-27 PROCEDURE — 63600175 PHARM REV CODE 636 W HCPCS: Performed by: HOSPITALIST

## 2023-06-27 PROCEDURE — C9113 INJ PANTOPRAZOLE SODIUM, VIA: HCPCS | Performed by: INTERNAL MEDICINE

## 2023-06-27 RX ORDER — ENOXAPARIN SODIUM 100 MG/ML
30 INJECTION SUBCUTANEOUS EVERY 24 HOURS
Status: DISCONTINUED | OUTPATIENT
Start: 2023-06-27 | End: 2023-07-05

## 2023-06-27 RX ORDER — SODIUM CHLORIDE 9 MG/ML
INJECTION, SOLUTION INTRAVENOUS CONTINUOUS
Status: DISCONTINUED | OUTPATIENT
Start: 2023-06-27 | End: 2023-06-29

## 2023-06-27 RX ORDER — CEFAZOLIN SODIUM 2 G/50ML
2 SOLUTION INTRAVENOUS
Status: DISCONTINUED | OUTPATIENT
Start: 2023-06-27 | End: 2023-06-28

## 2023-06-27 RX ADMIN — PANTOPRAZOLE SODIUM 40 MG: 40 INJECTION, POWDER, FOR SOLUTION INTRAVENOUS at 09:06

## 2023-06-27 RX ADMIN — HYDROCODONE BITARTRATE AND ACETAMINOPHEN 1 TABLET: 5; 325 TABLET ORAL at 06:06

## 2023-06-27 RX ADMIN — HYDROCODONE BITARTRATE AND ACETAMINOPHEN 1 TABLET: 5; 325 TABLET ORAL at 08:06

## 2023-06-27 RX ADMIN — MUPIROCIN 1 G: 20 OINTMENT TOPICAL at 08:06

## 2023-06-27 RX ADMIN — INSULIN ASPART 2 UNITS: 100 INJECTION, SOLUTION INTRAVENOUS; SUBCUTANEOUS at 05:06

## 2023-06-27 RX ADMIN — INSULIN DETEMIR 9 UNITS: 100 INJECTION, SOLUTION SUBCUTANEOUS at 08:06

## 2023-06-27 RX ADMIN — SODIUM CHLORIDE: 0.9 INJECTION, SOLUTION INTRAVENOUS at 03:06

## 2023-06-27 RX ADMIN — CEFAZOLIN SODIUM 2 G: 2 SOLUTION INTRAVENOUS at 11:06

## 2023-06-27 RX ADMIN — PANTOPRAZOLE SODIUM 40 MG: 40 INJECTION, POWDER, FOR SOLUTION INTRAVENOUS at 08:06

## 2023-06-27 RX ADMIN — ENOXAPARIN SODIUM 30 MG: 30 INJECTION SUBCUTANEOUS at 06:06

## 2023-06-27 RX ADMIN — SODIUM BICARBONATE: 84 INJECTION, SOLUTION INTRAVENOUS at 02:06

## 2023-06-27 RX ADMIN — INSULIN ASPART 1 UNITS: 100 INJECTION, SOLUTION INTRAVENOUS; SUBCUTANEOUS at 09:06

## 2023-06-27 RX ADMIN — CHLORHEXIDINE GLUCONATE 15 ML: 1.2 RINSE ORAL at 09:06

## 2023-06-27 RX ADMIN — POTASSIUM BICARBONATE 20 MEQ: 782 TABLET, EFFERVESCENT ORAL at 08:06

## 2023-06-27 RX ADMIN — POTASSIUM BICARBONATE 20 MEQ: 782 TABLET, EFFERVESCENT ORAL at 11:06

## 2023-06-27 RX ADMIN — CHLORHEXIDINE GLUCONATE 15 ML: 1.2 RINSE ORAL at 08:06

## 2023-06-27 RX ADMIN — FLUCONAZOLE 200 MG: 2 INJECTION, SOLUTION INTRAVENOUS at 10:06

## 2023-06-27 RX ADMIN — CEFAZOLIN SODIUM 2 G: 2 SOLUTION INTRAVENOUS at 06:06

## 2023-06-27 RX ADMIN — AMIODARONE HYDROCHLORIDE 200 MG: 200 TABLET ORAL at 08:06

## 2023-06-27 RX ADMIN — SODIUM CHLORIDE 0.5 MCG/KG/MIN: 9 INJECTION, SOLUTION INTRAVENOUS at 08:06

## 2023-06-27 RX ADMIN — MEROPENEM 1 G: 1 INJECTION, POWDER, FOR SOLUTION INTRAVENOUS at 08:06

## 2023-06-27 RX ADMIN — INSULIN ASPART 3 UNITS: 100 INJECTION, SOLUTION INTRAVENOUS; SUBCUTANEOUS at 11:06

## 2023-06-27 NOTE — ASSESSMENT & PLAN NOTE
Patient's anemia is currently controlled.  Received one unit blood earlier in admission.. Etiology likely d/t chronic disease  Current CBC reviewed-   Lab Results   Component Value Date    HGB 8.2 (L) 06/27/2023    HCT 23.8 (L) 06/27/2023     Monitor serial CBC and transfuse if patient becomes hemodynamically unstable, symptomatic or H/H drops below 7/21.

## 2023-06-27 NOTE — SUBJECTIVE & OBJECTIVE
Interval History:  clotting times are now normal.  Still on a little bit of phenylephrine drip.  No fever.  Still with poor kidney function; no improvement.  Still with edema.  Has some weeping from the pin sites on her left leg external fixator.    Review of Systems   Constitutional:  Negative for chills and fever.   Respiratory:  Negative for cough and shortness of breath.    Cardiovascular:  Negative for chest pain and leg swelling.   Gastrointestinal:  Negative for abdominal pain, nausea and vomiting.   Objective:     Vital Signs (Most Recent):  Temp: 96.4 °F (35.8 °C) (06/27/23 0701)  Pulse: 74 (06/27/23 1101)  Resp: 11 (06/27/23 1101)  BP: (!) 123/58 (06/27/23 1101)  SpO2: 100 % (06/27/23 1101) Vital Signs (24h Range):  Temp:  [96.4 °F (35.8 °C)-98.5 °F (36.9 °C)] 96.4 °F (35.8 °C)  Pulse:  [68-84] 74  Resp:  [0-18] 11  SpO2:  [95 %-100 %] 100 %  BP: ()/(51-86) 123/58  Arterial Line BP: (111-172)/(48-62) 119/56     Weight: 80 kg (176 lb 5.9 oz)  Body mass index is 31.24 kg/m².    Intake/Output Summary (Last 24 hours) at 6/27/2023 1406  Last data filed at 6/27/2023 0601  Gross per 24 hour   Intake 2822.23 ml   Output 625 ml   Net 2197.23 ml         Physical Exam  Constitutional:       General: She is not in acute distress.  Eyes:      General:         Right eye: No discharge.         Left eye: No discharge.   Neck:      Vascular: No JVD.   Cardiovascular:      Rate and Rhythm: Normal rate and regular rhythm.   Pulmonary:      Effort: Pulmonary effort is normal.      Breath sounds: Normal breath sounds.   Abdominal:      General: Abdomen is flat. Bowel sounds are normal. There is no distension.      Palpations: Abdomen is soft.      Tenderness: There is no abdominal tenderness.   Musculoskeletal:      Right lower leg: Pitting Edema present.      Left lower leg: Pitting Edema present.      Comments: External fixation on left lower leg   Skin:     General: Skin is warm and moist.      Findings: No rash.    Neurological:      Mental Status: She is alert and oriented to person, place, and time.   Psychiatric:         Attention and Perception: Attention normal.         Mood and Affect: Mood and affect normal.         Speech: Speech normal.           Significant Labs: All pertinent labs within the past 24 hours have been reviewed.    Significant Imaging:  no new imaging

## 2023-06-27 NOTE — ASSESSMENT & PLAN NOTE
Patient's anemia is currently controlled.  Received one unit blood earlier in admission.. Etiology likely d/t chronic disease  Current CBC reviewed-   Lab Results   Component Value Date    HGB 8.3 (L) 06/26/2023    HGB 8.3 (L) 06/26/2023    HCT 24.4 (L) 06/26/2023    HCT 24.4 (L) 06/26/2023     Monitor serial CBC and transfuse if patient becomes hemodynamically unstable, symptomatic or H/H drops below 7/21.

## 2023-06-27 NOTE — ASSESSMENT & PLAN NOTE
Patient with acute kidney injury likely due to acute tubular necrosis MYLES is currently stable. Labs reviewed- Renal function/electrolytes with Estimated Creatinine Clearance: 15.9 mL/min (A) (based on SCr of 3.2 mg/dL (H)). according to latest data. Monitor urine output and serial BMP and adjust therapy as needed. Avoid nephrotoxins and renally dose meds for GFR listed above.  I am consulting with nephrology.

## 2023-06-27 NOTE — PLAN OF CARE
Pt not medically clear for DC at this time, still on pressors. CM following.      06/27/23 1517   Discharge Reassessment   Assessment Type Discharge Planning Reassessment   Did the patient's condition or plan change since previous assessment? No   Discharge Plan A Long-term acute care facility (LTAC)   Discharge Plan B Long-term acute care facility (LTAC)   Why the patient remains in the hospital Requires continued medical care

## 2023-06-27 NOTE — ASSESSMENT & PLAN NOTE
Controlled.  Monitor potassium level.  Supplement potassium when needed.    Potassium   Date Value Ref Range Status   06/27/2023 3.3 (L) 3.5 - 5.1 mmol/L Final   06/26/2023 3.4 (L) 3.5 - 5.1 mmol/L Final   06/26/2023 3.5 3.5 - 5.1 mmol/L Final   06/26/2023 3.5 3.5 - 5.1 mmol/L Final

## 2023-06-27 NOTE — SUBJECTIVE & OBJECTIVE
Interval History:  quite lucid today.  Conversant.  The acidosis is less than before.  Renal function unchanged.  Remains on phenylephrine.  Monitor shows normal sinus rhythm.    Review of Systems   Constitutional:  Negative for chills and fever.   Respiratory:  Negative for cough and shortness of breath.    Cardiovascular:  Negative for chest pain and leg swelling.   Gastrointestinal:  Negative for abdominal pain, nausea and vomiting.   Objective:     Vital Signs (Most Recent):  Temp: 98.4 °F (36.9 °C) (06/26/23 1501)  Pulse: 72 (06/26/23 2009)  Resp: 14 (06/26/23 2009)  BP: (!) 115/55 (06/26/23 1800)  SpO2: 100 % (06/26/23 2009) Vital Signs (24h Range):  Temp:  [96.6 °F (35.9 °C)-98.6 °F (37 °C)] 98.4 °F (36.9 °C)  Pulse:  [64-83] 72  Resp:  [8-20] 14  SpO2:  [96 %-100 %] 100 %  BP: ()/(46-83) 115/55  Arterial Line BP: ()/(38-56) 129/47     Weight: 80 kg (176 lb 5.9 oz)  Body mass index is 31.24 kg/m².    Intake/Output Summary (Last 24 hours) at 6/26/2023 2110  Last data filed at 6/26/2023 1855  Gross per 24 hour   Intake 3720.59 ml   Output 530 ml   Net 3190.59 ml           Physical Exam  Constitutional:       General: She is not in acute distress.  Eyes:      General:         Right eye: No discharge.         Left eye: No discharge.   Neck:      Vascular: No JVD.   Cardiovascular:      Rate and Rhythm: Normal rate and regular rhythm.   Pulmonary:      Effort: Pulmonary effort is normal.      Breath sounds: Normal breath sounds.   Abdominal:      General: Abdomen is flat. Bowel sounds are normal. There is no distension.      Palpations: Abdomen is soft.      Tenderness: There is no abdominal tenderness.   Musculoskeletal:      Right lower leg: Pitting Edema present.      Left lower leg: Pitting Edema present.      Comments: External fixation on left lower leg   Skin:     General: Skin is warm and moist.      Findings: No rash.   Neurological:      Mental Status: She is alert and oriented to person,  place, and time.   Psychiatric:         Attention and Perception: Attention normal.         Mood and Affect: Mood and affect normal.         Speech: Speech normal.           Significant Labs: All pertinent labs within the past 24 hours have been reviewed.    Significant Imaging: I have reviewed all pertinent imaging results/findings within the past 24 hours.

## 2023-06-27 NOTE — ASSESSMENT & PLAN NOTE
Resolved.  Off insulin drip.  On subcutaneous basal insulin with PRN rapid-acting insulin.  Monitor fingerstick glucose levels.

## 2023-06-27 NOTE — ASSESSMENT & PLAN NOTE
Patient with acute kidney injury likely due to acute tubular necrosis MYLES is currently stable. Labs reviewed- Renal function/electrolytes with Estimated Creatinine Clearance: 17 mL/min (A) (based on SCr of 3 mg/dL (H)). according to latest data. Monitor urine output and serial BMP and adjust therapy as needed. Avoid nephrotoxins and renally dose meds for GFR listed above.  I am consulting with nephrology.

## 2023-06-27 NOTE — PLAN OF CARE
06/27/23 0815   Patient Assessment/Suction   Level of Consciousness (AVPU) alert   Respiratory Effort Normal;Unlabored   Expansion/Accessory Muscles/Retractions no use of accessory muscles   All Lung Fields Breath Sounds Anterior:;Lateral:;aeration increased   Cough Frequency infrequent   Cough Type good;nonproductive   Suction Method oral;tracheal   Secretions Amount moderate   Secretions Color white   Secretions Characteristics thick   PRE-TX-O2   Device (Oxygen Therapy) room air   SpO2 99 %   Pulse Oximetry Type Continuous   $ Pulse Oximetry - Multiple Charge Pulse Oximetry - Multiple   Oximetry Probe Site Assessed;Intact   Pulse 84   Resp (!) 4

## 2023-06-27 NOTE — PROGRESS NOTES
INPATIENT NEPHROLOGY PROGRESS  [facility] NEPHROLOGY    Anastasiia A Justino  06/27/2023    Reason for consultation:  Acute kidney injury    Chief Complaint:   Chief Complaint   Patient presents with    Hypotension     Sent from [facility] for eval of low blood pressure.            History of Present Illness:    Per H and P    72 y/o F with a past medical history significant for DM2, HTN, femur fracture, tibial fracture and tobacco use, L foot wounds,  high grade stenosis SFA bilaterally and popliteal on the left s/p baloon angioplasty L popliteal artery and left posterior tibial arery on DAPT.      Recently discharged from [facility] for fracture of left tibia, s/p ORIF 6/6, also finished antibiotics for acute osteomyelitis of left calcaneus ( Final ID recs with end date for cipro of 6/22 and end date of ancef for 7/17 )     Patient sent from rehab for hypotension.     In the ER was in septic shock, e/o UTI and anemic.        6/25  Pt states she feels nauseated and weak.  No sob.  Now bowel complaints.  Denies pain.  Somewhat confused but engages when prompted.  On phenylephrine vasopressor support.  275 cc uop  6/26  acidosis improving, renal same.  525cc UOP recorded.  Will cont IVF but cut rate to 75, f/u labs in am.    6/27  UOP 625cc, on hood, VSS.  Renal function a little worse, acidosis better, hypokalemic-got repletion.  Stopped bicarb gtt, switch to NS.    Plan of Care:       Assessment:    Acute kidney injury secondary to hemodynamically mediated renal injury with likely acute tubular necrosis  --iv fluids  --hold lisinopril  --urine na, urea, creatinine  --keep map above 55  --Avoid NSAIDS, Lanier II inhibitors, and other non-essential nephrotoxic agents  --at risk for contrast nephropathy  --renal dose medication for crcl 10-50.    --strict I/Os    Metabolic acidosis--non-gap  --urine anion  gap  --elevated urine pH consistent with tubular acidification defect.  A urease producing bacteria can also cause  alkalinization of the urine    Hyponatremia  --urine osm  --uric acid  --avoid hypotonic iv piggy backs     Hypokalemia   --be judicious with repletion given MYLES and oliguria    Anemia  --iron stores low  --check stool for occult blood  --s/p pRBC  --haptoglobin, LDH  --trend h/h    UTI with sepsis  --renal dose antibiotics  --avoid gentamicin and vancomycin/zosyn combination     Left hydroureteronephrosis  --mag 3 lasix renal scan when more stable  --renal ultrasound    Renal cyst--possibly hyperdense   --renal us evaluation for internal structures or calcification         Thank you for allowing us to participate in this patient's care. We will continue to follow.    Vital Signs:  Temp Readings from Last 3 Encounters:   06/27/23 96.4 °F (35.8 °C) (Axillary)   06/14/23 97.6 °F (36.4 °C) (Oral)   06/05/23 98.9 °F (37.2 °C)       Pulse Readings from Last 3 Encounters:   06/27/23 74   06/14/23 87   06/05/23 84       BP Readings from Last 3 Encounters:   06/27/23 (!) 123/58   06/14/23 (!) 141/65   06/05/23 122/67       Weight:  Wt Readings from Last 3 Encounters:   06/27/23 80 kg (176 lb 5.9 oz)   06/25/23 72.6 kg (160 lb)   06/08/23 72.9 kg (160 lb 11.5 oz)       Past Medical & Surgical History:  Past Medical History:   Diagnosis Date    Acute osteomyelitis of left calcaneus 6/7/2023    Chronic ulcer of great toe of left foot with fat layer exposed 6/7/2023    Closed left pilon fracture 6/5/2023    Closed left subtrochanteric femur fracture, initial encounter 2/18/2023    Diabetes mellitus, type 2     Essential (primary) hypertension 2/17/2023    Mass of upper outer quadrant of right breast 4/27/2023    Nicotine dependence 2/20/2023    PAD (peripheral artery disease) 4/22/2023    Tobacco use 6/6/2023    Type 2 diabetes mellitus with hyperglycemia, with long-term current use of insulin 6/6/2023       Past Surgical History:   Procedure Laterality Date    ANGIOGRAPHY OF LOWER EXTREMITY Left 4/25/2023    Procedure:  Angiogram Extremity Unilateral;  Surgeon: Gilbert Sheppard MD;  Location: [facility] OR 2ND FLR;  Service: Vascular;  Laterality: Left;  28.4 min  487.45 mGy  75.9180 Gycm2  trans radial: 7 ml  dye: 126 ml      APPLICATION, EXTERNAL FIXATION DEVICE Left 6/6/2023    Procedure: APPLICATION, EXTERNAL FIXATION DEVICE, LEFT ANKLE, [place];  Surgeon: Gilbert Mahmood MD;  Location: [facility] OR [facility] FLR;  Service: Orthopedics;  Laterality: Left;    AUGMENTATION OF BREAST      INTRAMEDULLARY RODDING OF FEMUR Left 2/18/2023    Procedure: INSERTION, INTRAMEDULLARY ROXANNA, FEMUR (hana table -- synthes -- long nail -- have bovie and suction and large bone set);  Surgeon: Keanu Brand MD;  Location: [facility] OR;  Service: Orthopedics;  Laterality: Left;    OPEN REDUCTION AND INTERNAL FIXATION (ORIF) OF PILON FRACTURE Left 6/6/2023    Procedure: ORIF, FRACTURE, PILON, LEFT;  Surgeon: Gilbert Mahmood MD;  Location: [facility] OR [facility] FLR;  Service: Orthopedics;  Laterality: Left;    PERCUTANEOUS TRANSLUMINAL ANGIOPLASTY Left 4/25/2023    Procedure: PTA (ANGIOPLASTY, PERCUTANEOUS, TRANSLUMINAL);  Surgeon: Gilbert Sheppard MD;  Location: [facility] OR [facility] FLR;  Service: Vascular;  Laterality: Left;  Tibial and popliteal angioplasty       Past Social History:  Social History     Socioeconomic History    Marital status:    Tobacco Use    Smoking status: Every Day     Packs/day: 0.25     Years: 45.00     Pack years: 11.25     Types: Cigarettes    Smokeless tobacco: Never   Substance and Sexual Activity    Alcohol use: Yes    Drug use: Never   Social History Narrative    ** Merged History Encounter **          Social Determinants of Health     Financial Resource Strain: Low Risk     Difficulty of Paying Living Expenses: Not hard at all   Food Insecurity: No Food Insecurity    Worried About Running Out of Food in the Last Year: Never true    Ran Out of Food in the Last Year: Never true   Transportation Needs: No  Transportation Needs    Lack of Transportation (Medical): No    Lack of Transportation (Non-Medical): No   Physical Activity: Unknown    Days of Exercise per Week: Patient refused    Minutes of Exercise per Session: Patient refused   Stress: Unknown    Feeling of Stress : Patient refused   Social Connections: Unknown    Frequency of Communication with Friends and Family: Patient refused    Frequency of Social Gatherings with Friends and Family: Patient refused    Attends Je[employer] witness Services: Patient refused    Active Member of Clubs or Organizations: Patient refused    Attends Club or Organization Meetings: Patient refused    Marital Status: Patient refused   Housing Stability: Unknown    Unable to Pay for Housing in the Last Year: No    Unstable Housing in the Last Year: No       Medications:  No current facility-administered medications on file prior to encounter.     Current Outpatient Medications on File Prior to Encounter   Medication Sig Dispense Refill    acetaminophen (TYLENOL) 500 MG tablet Take 2 tablets (1,000 mg total) by mouth every 8 (eight) hours.  0    aspirin (ECOTRIN) 81 MG EC tablet Take 1 tablet (81 mg total) by mouth 2 (two) times a day. - end date august 30, 2023  0    atorvastatin (LIPITOR) 80 MG tablet Take 1 tablet (80 mg total) by mouth every evening. 90 tablet 3    blood sugar diagnostic Strp To check BG two times daily, to use with insurance preferred meter 200 each 1    blood-glucose meter kit To check BG two times daily, to use with insurance preferred meter 1 each 1    ciprofloxacin HCl (CIPRO) 750 MG tablet Take 1 tablet (750 mg total) by mouth every 12 (twelve) hours. End date 6/22/23      clopidogreL (PLAVIX) 75 mg tablet Take 1 tablet (75 mg total) by mouth once daily. 30 tablet 3    dextrose 5 % in water (D5W) 5 % PgBk 50 mL with ceFAZolin 2 gram SolR 2 g Inject 2 g into the vein every 8 (eight) hours. End date 7/17/23  0    famotidine (PEPCID) 20 MG tablet Take 1 tablet (20 mg  "total) by mouth 2 (two) times daily as needed (Heartburn).      insulin aspart U-100 (NOVOLOG) 100 unit/mL (3 mL) InPn pen Inject 0-5 Units into the skin before meals and at bedtime as needed (Hyperglycemia).  0    insulin aspart U-100 (NOVOLOG) 100 unit/mL (3 mL) InPn pen Inject 6 Units into the skin 3 (three) times daily with meals.  0    insulin detemir U-100, Levemir, 100 unit/mL (3 mL) SubQ InPn pen Inject 18 Units into the skin once daily.  0    lancets [facility] [place] [facility] To check BG two times daily, to use with insurance preferred meter 200 each 1    lisinopriL (PRINIVIL,ZESTRIL) 5 MG tablet Take 1 tablet (5 mg total) by mouth once daily. 90 tablet 3    melatonin (MELATIN) 3 mg tablet Take 3 tablets (9 mg total) by mouth nightly.  0    methocarbamoL (ROBAXIN) 500 MG Tab Take 1 tablet (500 mg total) by mouth 4 (four) times daily. 40 tablet 0    ondansetron (ZOFRAN-ODT) 4 MG TbDL Take 1 tablet (4 mg total) by mouth every 8 (eight) hours as needed (nausea).      oxyCODONE (ROXICODONE) 10 mg Tab immediate release tablet Take 1 tablet (10 mg total) by mouth every 4 (four) hours as needed (pain sclae 7-10). 10 tablet 0    oxyCODONE (ROXICODONE) 5 MG immediate release tablet Take 1 tablet (5 mg total) by mouth every 4 (four) hours as needed (pain scale 4-6). 10 tablet 0    pen needle, diabetic (PEN NEEDLE) 31 gauge x 3/16" Ndle 1 application by Misc.(Non-Drug; Combo Route) route 2 (two) times a day. 200 each 0    polyethylene glycol (GLYCOLAX) 17 gram PwPk Take 17 g by mouth once daily.  0    pregabalin (LYRICA) 75 MG capsule Take 1 capsule (75 mg total) by mouth 2 (two) times daily. 60 capsule 0    senna-docusate 8.6-50 mg (PERICOLACE) 8.6-50 mg per tablet Take 1 tablet by mouth once daily.      vitamin D (VITAMIN D3) 1000 units Tab Take 1 tablet (1,000 Units total) by mouth once daily.       Scheduled Meds:   amiodarone  200 mg Oral Daily    ceFAZolin (ANCEF) IVPB  2 g Intravenous Q8H    chlorhexidine  15 mL Mouth/Throat BID    " "fluconazole (DIFLUCAN) IV (PEDS and ADULTS)  200 mg Intravenous Q24H    insulin detemir U-100  9 Units Subcutaneous Daily    mupirocin   Nasal BID    pantoprazole  40 mg Intravenous BID     Continuous Infusions:   phenylephrine 0.5 mcg/kg/min (06/27/23 1127)    sodium bicarbonate drip 75 mL/hr at 06/27/23 0601     PRN Meds:.dextrose 50%, dextrose 50%, glucagon (human recombinant), glucose, glucose, HYDROcodone-acetaminophen, insulin aspart U-100, naloxone, sodium chloride 0.9%    Allergies:  Patient has no known allergies.    Past Family History:  Reviewed; refer to Hospitalist Admission Note    Review of Systems:  Review of Systems - All 14 systems reviewed and negative, except as noted in HPI    Physical Exam:    BP (!) 123/58   Pulse 74   Temp 96.4 °F (35.8 °C) (Axillary)   Resp 11   Ht 5' 3" (1.6 m)   Wt 80 kg (176 lb 5.9 oz)   SpO2 100%   BMI 31.24 kg/m²     General Appearance:    Ill appearing   Head:    Normocephalic, without obvious abnormality, atraumatic   Eyes:    PER, conjunctiva/corneas clear, EOM's intact in both eyes        Throat:   Lips, mucosa, and tongue normal; teeth and gums normal   Back:     Symmetric, no curvature, ROM normal, no CVA tenderness   Lungs:     Clear to auscultation bilaterally, respirations unlabored   Chest wall:    No tenderness or deformity   Heart:    Regular rate and rhythm, S1 and S2 normal, no murmur, rub   or gallop   Abdomen:     Soft, non-tender, bowel sounds active all four quadrants,     no masses, no organomegaly   Extremities:   Edematous    Pulses:   2+ and symmetric all extremities   MSK:   No joint or muscle swelling, tenderness or deformity   Skin:   Skin color, texture, turgor normal, no rashes or lesions   Neurologic:   CNII-XII intact, normal strength and sensation       Throughout.  No flap     Results:  Lab Results   Component Value Date     (L) 06/27/2023    K 3.3 (L) 06/27/2023    CL 98 06/27/2023    CO2 25 06/27/2023    BUN 81 (H) " 06/27/2023    CREATININE 3.2 (H) 06/27/2023    CALCIUM 6.7 (LL) 06/27/2023    ANIONGAP 8 06/27/2023    ESTGFRAFRICA 78 04/15/2021    EGFRNONAA 67 04/15/2021       Lab Results   Component Value Date    CALCIUM 6.7 (LL) 06/27/2023    PHOS 4.5 06/05/2023       Recent Labs   Lab 06/27/23  0435   WBC 22.35*   RBC 2.81*   HGB 8.2*   HCT 23.8*      MCV 85   MCH 29.2   MCHC 34.5       Imaging Results              CT Abdomen Pelvis  Without Contrast (Final result)  Result time 06/24/23 05:34:07      Final result by Allan Fountain DO (06/24/23 05:34:07)                   Narrative:    EXAM DESCRIPTION:  CT ABDOMEN PELVIS WITHOUT CONTRAST  RadLex: CT ABDOMEN PELVIS WITHOUT IV CONTRAST    CLINICAL HISTORY:  Weakness, increased WBC, decreased RBC, hypotensive, MYLES.    TECHNIQUE:  CT of the abdomen and pelvis without intravenous contrast.  All CT scans at this facility use dose modulation, iterative reconstruction, and/or weight based dosing when appropriate to reduce radiation dose to as low as reasonably achievable.    COMPARISON: None.    FINDINGS:    The visualized lower thoracic structures demonstrate minimal bilateral pleural effusions. There is mild bibasilar atelectasis. Coronary artery calcifications are present. There is subcutaneous edema throughout the thorax.    Unenhanced images of the liver, spleen, and adrenal glands appear grossly unremarkable. Pancreas is not well assessed without contrast. Gallbladder is minimally distended. No renal stones are identified. There is a round exophytic lesion off the upper pole right kidney measuring 2.1 cm, with Hounsfield units averaging 59. This may relate to hyperdense cyst. There is mild left hydronephrosis and hydroureter. No definite ureteral or bladder stone is identified. Urinary bladder is decompressed with a Gee catheter in place. There are atherosclerotic calcifications along the abdominal aorta without evidence for aneurysmal dilatation. No bowel obstruction is  seen. There is no free intraperitoneal air. The appendix appears unremarkable. There is colonic diverticulosis without definite CT evidence for acute diverticulitis. There is mild fecal loading throughout the colon. There is minimal free fluid in the posterior pelvis. There is moderate subcutaneous edema throughout the abdomen and pelvis extending into the thighs. There is an intramedullary denzel within the proximal left femur, with a compression screw through the left femoral neck. There is a left subtrochanteric fracture    IMPRESSION:  1.  Minimal gallbladder distention.  2.  Mild left hydroureteronephrosis, without definite ureteral stone seen. Urinary bladder decompressed.  3.  Anasarca.  4.  Colonic diverticulosis without definite CT evidence for acute diverticulitis.  5.  Possible hyperdense cyst at the upper pole right kidney. Follow-up nonemergent renal ultrasound could be performed.  6.  Post surgical changes at the left femur, with subtrochanteric fracture.  7.  Minimal bilateral pleural effusions and mild bibasilar atelectasis.    Electronically signed by:  Allan Fountain DO  6/24/2023 5:34 AM CDT Workstation: [id number]                                     X-Ray Chest AP Portable (Final result)  Result time 06/24/23 08:28:44      Final result by Perfecto Castellanos MD (06/24/23 08:28:44)                   Narrative:    Chest single view    CLINICAL DATA: Sepsis    FINDINGS: AP view shows the heart to be mildly enlarged. The mediastinum is unremarkable. Right-sided PICC line extends to the superior vena cava. There is mild atelectasis at the right lung base, with ill-defined atelectasis or infiltrate at the lung base on the left. There are no significant pleural effusions. No acute osseous abnormality is identified.    IMPRESSION:  1. Atelectasis or infiltrate at the left lung base.  2. Mild right basilar atelectasis.  3. Mild cardiomegaly.    Electronically signed by:  Perfecto Castellanos MD  6/24/2023 8:28 AM CDT  Workstation: [id number]                                        I have personally reviewed pertinent radiological imaging and reports.    Patient care was time spent personally by me on the following activities:   Obtaining a history  Examination of patient.  Providing medical care at the patients bedside.  Developing a treatment plan with patient or surrogate and bedside caregivers  Ordering and reviewing laboratory studies, radiographic studies, pulse oximetry.  Ordering and performing treatments and interventions.  Evaluation of patient's response to treatment.  Discussions with consultants while on the unit and immediately available to the patient.  Re-evaluation of the patient's condition.  Documentation in the medical record.     Anastasiia [place] NP    Nephrology  [facility]  [phone]

## 2023-06-27 NOTE — ASSESSMENT & PLAN NOTE
Continue systemic antibiotic.  Urine sample sent for culture.  So far, it's growing yeast.  Will add fluconazole.    Antibiotics (From admission, onward)    Start     Stop Route Frequency Ordered    06/24/23 0600  meropenem 1 g in sodium chloride 0.9 % 100 mL IVPB (ready to mix system)        Note to Pharmacy: Ht:    Wt: 72.6 kg (160 lb)  Estimated Creatinine Clearance: 16.2 mL/min (A) (based on SCr of 3 mg/dL (H)).  Body mass index is 28.35 kg/m².    -- IV Every 12 hours (non-standard times) 06/24/23 4039

## 2023-06-27 NOTE — PROGRESS NOTES
[facility] Medicine  Progress Note    Patient Name: Anastasiia Badillo  MRN: [id number]  Patient Class: IP- Inpatient   Admission Date: 6/24/2023  Length of Stay: 2 days  Attending Physician: Brandon Martin MD  Primary Care Provider: Raji Parkinson MD        Subjective:     Principal Problem:UTI (urinary tract infection)        HPI:  74 y/o F with a past medical history significant for DM2, HTN, femur fracture, tibial fracture and tobacco use, L foot wounds,  high grade stenosis SFA bilaterally and popliteal on the left s/p baloon angioplasty L popliteal artery and left posterior tibial arery on DAPT.      [place] [facility] [place], s/p ORIF 6/6, also finished antibiotics for acute osteomyelitis of left calcaneus ( Final ID recs with end date for cipro of 6/22 and end date of ancef for 7/17 )     Patient sent from rehab for hypotension.     In the ER was in septic shock, e/o UTI and anemic.       Overview/Hospital Course:  No notes on file    Interval History:  quite lucid today.  Conversant.  The acidosis is less than before.  Renal function unchanged.  Remains on phenylephrine.  Monitor shows normal sinus rhythm.    Review of Systems   Constitutional:  Negative for chills and fever.   Respiratory:  Negative for cough and shortness of breath.    Cardiovascular:  Negative for chest pain and leg swelling.   Gastrointestinal:  Negative for abdominal pain, nausea and vomiting.   Objective:     Vital Signs (Most Recent):  Temp: 98.4 °F (36.9 °C) (06/26/23 1501)  Pulse: 72 (06/26/23 2009)  Resp: 14 (06/26/23 2009)  BP: (!) 115/55 (06/26/23 1800)  SpO2: 100 % (06/26/23 2009) Vital Signs (24h Range):  Temp:  [96.6 °F (35.9 °C)-98.6 °F (37 °C)] 98.4 °F (36.9 °C)  Pulse:  [64-83] 72  Resp:  [8-20] 14  SpO2:  [96 %-100 %] 100 %  BP: ()/(46-83) 115/55  Arterial Line BP: ()/(38-56) 129/47     Weight: 80 kg (176 lb 5.9 oz)  Body mass index is 31.24  kg/m².    Intake/Output Summary (Last 24 hours) at 6/26/2023 2110  Last data filed at 6/26/2023 1855  Gross per 24 hour   Intake 3720.59 ml   Output 530 ml   Net 3190.59 ml           Physical Exam  Constitutional:       General: She is not in acute distress.  Eyes:      General:         Right eye: No discharge.         Left eye: No discharge.   Neck:      Vascular: No JVD.   Cardiovascular:      Rate and Rhythm: Normal rate and regular rhythm.   Pulmonary:      Effort: Pulmonary effort is normal.      Breath sounds: Normal breath sounds.   Abdominal:      General: Abdomen is flat. Bowel sounds are normal. There is no distension.      Palpations: Abdomen is soft.      Tenderness: There is no abdominal tenderness.   Musculoskeletal:      Right lower leg: Pitting Edema present.      Left lower leg: Pitting Edema present.      Comments: External fixation on left lower leg   Skin:     General: Skin is warm and moist.      Findings: No rash.   Neurological:      Mental Status: She is alert and oriented to person, place, and time.   Psychiatric:         Attention and Perception: Attention normal.         Mood and Affect: Mood and affect normal.         Speech: Speech normal.           Significant Labs: All pertinent labs within the past 24 hours have been reviewed.    Significant Imaging: I have reviewed all pertinent imaging results/findings within the past 24 hours.      Assessment/Plan:      * UTI (urinary tract infection)  Continue systemic antibiotic.  Urine sample sent for culture.  So far, it's growing yeast.  Will add fluconazole.    Antibiotics (From admission, onward)      Start     Stop Route Frequency Ordered    06/24/23 0600  meropenem 1 g in sodium chloride 0.9 % 100 mL IVPB (ready to mix system)        Note to Pharmacy: Ht:    Wt: 72.6 kg (160 lb)  Estimated Creatinine Clearance: 16.2 mL/min (A) (based on SCr of 3 mg/dL (H)).  Body mass index is 28.35 kg/m².    -- IV Every 12 hours (non-standard times)  06/24/23 0556                Normal anion gap metabolic acidosis  Continue crystalloid infusion with sodium bicarbonate.  Monitor serum bicarb level.  Monitor pH.  Consulting with nephrology for assistance with diagnosis and management.      Hyponatremia  Monitor sodium level.  Likely due to renal failure.    Sodium   Date Value Ref Range Status   06/26/2023 130 (L) 136 - 145 mmol/L Final   06/26/2023 132 (L) 136 - 145 mmol/L Final   06/26/2023 132 (L) 136 - 145 mmol/L Final           Hypokalemia  Controlled.  Monitor potassium level.  Supplement potassium when needed.    Potassium   Date Value Ref Range Status   06/26/2023 3.4 (L) 3.5 - 5.1 mmol/L Final   06/26/2023 3.5 3.5 - 5.1 mmol/L Final   06/26/2023 3.5 3.5 - 5.1 mmol/L Final           ATN (acute tubular necrosis)  Patient with acute kidney injury likely due to acute tubular necrosis MYLES is currently stable. Labs reviewed- Renal function/electrolytes with Estimated Creatinine Clearance: 17 mL/min (A) (based on SCr of 3 mg/dL (H)). according to latest data. Monitor urine output and serial BMP and adjust therapy as needed. Avoid nephrotoxins and renally dose meds for GFR listed above.  I am consulting with nephrology.      Encephalopathy, metabolic  Due to septic shock.  She's more lucid.  The encephalopathy is less severe.      Disseminated intra-vascular coagulation (possible)  Prothrombin and partial thromboplastin times prolonged.  Platelet count normal, though.  Fibrinogen very high.  DIC is still possible, but laboratory evidence is somewhat lacking.  Will monitor bleeding times.    Lab Results   Component Value Date    INR 4.0 (HH) 06/25/2023    INR 7.0 (HH) 06/24/2023    INR 4.4 (HH) 06/24/2023     aPTT   Date Value Ref Range Status   06/25/2023 49.3 (H) 21.0 - 32.0 sec Final     Comment:     Refer to local heparin nomogram for intensity/dose specific   therapeutic   range.       Fibrinogen   Date Value Ref Range Status   06/25/2023 >800 (H) 182 - 400  mg/dL Final       Anemia, chronic disease  Patient's anemia is currently controlled.  Received one unit blood earlier in admission.. Etiology likely d/t chronic disease  Current CBC reviewed-   Lab Results   Component Value Date    HGB 8.3 (L) 06/26/2023    HGB 8.3 (L) 06/26/2023    HCT 24.4 (L) 06/26/2023    HCT 24.4 (L) 06/26/2023     Monitor serial CBC and transfuse if patient becomes hemodynamically unstable, symptomatic or H/H drops below 7/21.         DKA, type 2  Resolved.  Off insulin drip.  On subcutaneous basal insulin with PRN rapid-acting insulin.  Monitor fingerstick glucose levels.    Septic shock  Source:  UTI.  Continue systemic antibiotic.  Weaned off one pressor.  Continues on the other.        VTE Risk Mitigation (From admission, onward)           Ordered     IP VTE HIGH RISK PATIENT  Once         06/27/23 1404     Reason for No Pharmacological VTE Prophylaxis  Once        Question:  Reasons:  Answer:  Risk of Bleeding.  Has elevated PT and PTT.    06/27/23 1404     Place sequential compression device  Until discontinued         06/24/23 0432                    Discharge Planning   CITLALI: 6/30/2023     Code Status: Full Code   Is the patient medically ready for discharge?:     Reason for patient still in hospital (select all that apply): Patient trending condition, Laboratory test and Treatment  Discharge Plan A: Long-term acute care facility (LTAC)                  Brandon Martin MD  Department of Hospital Medicine   [facility]

## 2023-06-27 NOTE — ASSESSMENT & PLAN NOTE
Continue systemic antibiotic.  Urine sample sent for culture.  So far, it's growing yeast.   Stop meropenem.  Continue fluconazole.    Antifungals (From admission, onward)    Start     Stop Route Frequency Ordered    06/26/23 2230  fluconazole (DIFLUCAN) IVPB 200 mg        Note to Pharmacy: Ht:    Wt: 80 kg (176 lb 5.9 oz)  Estimated Creatinine Clearance: 17 mL/min (A) (based on SCr of 3 mg/dL (H)).  Body mass index is 31.24 kg/m².    -- IV Every 24 hours (non-standard times) 06/26/23 2116

## 2023-06-27 NOTE — PROGRESS NOTES
[facility] Medicine  Progress Note    Patient Name: Anastasiia Badillo  MRN: [id number]  Patient Class: IP- Inpatient   Admission Date: 6/24/2023  Length of Stay: 3 days  Attending Physician: Brandon Martin MD  Primary Care Provider: Raji Parkinson MD        Subjective:     Principal Problem:UTI (urinary tract infection)        HPI:  72 y/o F with a past medical history significant for DM2, HTN, femur fracture, tibial fracture and tobacco use, L foot wounds,  high grade stenosis SFA bilaterally and popliteal on the left s/p baloon angioplasty L popliteal artery and left posterior tibial arery on DAPT.      Recently discharged from [facility] [place] for fracture of left tibia, s/p ORIF 6/6, also finished antibiotics for acute osteomyelitis of left calcaneus ( Final ID recs with end date for cipro of 6/22 and end date of ancef for 7/17 )     Patient sent from rehab for hypotension.     In the ER was in septic shock, e/o UTI and anemic.       Overview/Hospital Course:  No notes on file    Interval History:  clotting times are now normal.  Still on a little bit of phenylephrine drip.  No fever.  Still with poor kidney function; no improvement.  Still with edema.  Has some weeping from the pin sites on her left leg external fixator.    Review of Systems   Constitutional:  Negative for chills and fever.   Respiratory:  Negative for cough and shortness of breath.    Cardiovascular:  Negative for chest pain and leg swelling.   Gastrointestinal:  Negative for abdominal pain, nausea and vomiting.   Objective:     Vital Signs (Most Recent):  Temp: 96.4 °F (35.8 °C) (06/27/23 0701)  Pulse: 74 (06/27/23 1101)  Resp: 11 (06/27/23 1101)  BP: (!) 123/58 (06/27/23 1101)  SpO2: 100 % (06/27/23 1101) Vital Signs (24h Range):  Temp:  [96.4 °F (35.8 °C)-98.5 °F (36.9 °C)] 96.4 °F (35.8 °C)  Pulse:  [68-84] 74  Resp:  [0-18] 11  SpO2:  [95 %-100 %] 100 %  BP: ()/(51-86) 123/58  Arterial Line BP: (111-172)/(48-62) 119/56      Weight: 80 kg (176 lb 5.9 oz)  Body mass index is 31.24 kg/m².    Intake/Output Summary (Last 24 hours) at 6/27/2023 1406  Last data filed at 6/27/2023 0601  Gross per 24 hour   Intake 2822.23 ml   Output 625 ml   Net 2197.23 ml         Physical Exam  Constitutional:       General: She is not in acute distress.  Eyes:      General:         Right eye: No discharge.         Left eye: No discharge.   Neck:      Vascular: No JVD.   Cardiovascular:      Rate and Rhythm: Normal rate and regular rhythm.   Pulmonary:      Effort: Pulmonary effort is normal.      Breath sounds: Normal breath sounds.   Abdominal:      General: Abdomen is flat. Bowel sounds are normal. There is no distension.      Palpations: Abdomen is soft.      Tenderness: There is no abdominal tenderness.   Musculoskeletal:      Right lower leg: Pitting Edema present.      Left lower leg: Pitting Edema present.      Comments: External fixation on left lower leg   Skin:     General: Skin is warm and moist.      Findings: No rash.   Neurological:      Mental Status: She is alert and oriented to person, place, and time.   Psychiatric:         Attention and Perception: Attention normal.         Mood and Affect: Mood and affect normal.         Speech: Speech normal.           Significant Labs: All pertinent labs within the past 24 hours have been reviewed.    Significant Imaging:  no new imaging      Assessment/Plan:      * UTI (urinary tract infection)  Continue systemic antibiotic.  Urine sample sent for culture.  So far, it's growing yeast.   Stop meropenem.  Continue fluconazole.    Antifungals (From admission, onward)    Start     Stop Route Frequency Ordered    06/26/23 2230  fluconazole (DIFLUCAN) IVPB 200 mg        Note to Pharmacy: Ht:    Wt: 80 kg (176 lb 5.9 oz)  Estimated Creatinine Clearance: 17 mL/min (A) (based on SCr of 3 mg/dL (H)).  Body mass index is 31.24 kg/m².    -- IV Every 24 hours (non-standard times) 06/26/23 2116            Type 2  diabetes mellitus, with long-term current use of insulin  Patient's FSGs are controlled on current medication regimen.  Last A1c reviewed-   Lab Results   Component Value Date    HGBA1C 10.2 (H) 06/05/2023     Current correctional scale  Low  Maintain anti-hyperglycemic dose as follows-   Antihyperglycemics (From admission, onward)    Start     Stop Route Frequency Ordered    06/26/23 1052  insulin aspart U-100 pen 0-5 Units         -- SubQ Before meals & nightly PRN 06/26/23 0952    06/25/23 0800  insulin detemir U-100 (Levemir) pen 9 Units         -- SubQ Daily 06/25/23 0757        Hold Oral hypoglycemics while patient is in the hospital.    Normal anion gap metabolic acidosis  Much improved.  The bicarbonate drip can be discontinued.  Monitor serum bicarb level.  Monitor pH.  Consulting with nephrology for assistance with diagnosis and management.      Hyponatremia  Monitor sodium level.  Likely due to renal failure.    Sodium   Date Value Ref Range Status   06/27/2023 131 (L) 136 - 145 mmol/L Final   06/26/2023 130 (L) 136 - 145 mmol/L Final   06/26/2023 132 (L) 136 - 145 mmol/L Final   06/26/2023 132 (L) 136 - 145 mmol/L Final           Hypokalemia  Controlled.  Monitor potassium level.  Supplement potassium when needed.    Potassium   Date Value Ref Range Status   06/27/2023 3.3 (L) 3.5 - 5.1 mmol/L Final   06/26/2023 3.4 (L) 3.5 - 5.1 mmol/L Final   06/26/2023 3.5 3.5 - 5.1 mmol/L Final   06/26/2023 3.5 3.5 - 5.1 mmol/L Final           ATN (acute tubular necrosis)  Patient with acute kidney injury likely due to acute tubular necrosis MYLES is currently stable. Labs reviewed- Renal function/electrolytes with Estimated Creatinine Clearance: 15.9 mL/min (A) (based on SCr of 3.2 mg/dL (H)). according to latest data. Monitor urine output and serial BMP and adjust therapy as needed. Avoid nephrotoxins and renally dose meds for GFR listed above.  I am consulting with nephrology.      Encephalopathy, metabolic  Due to  septic shock.  She's more lucid.  The encephalopathy is less severe.      Disseminated intra-vascular coagulation (possible)  Prothrombin and partial thromboplastin times were prolonged, but now they're normal.    Platelet count normal.  Fibrinogen very high.  DIC ruled out.    Lab Results   Component Value Date    INR 1.0 06/27/2023    INR 4.0 (HH) 06/25/2023    INR 7.0 (HH) 06/24/2023     aPTT   Date Value Ref Range Status   06/25/2023 49.3 (H) 21.0 - 32.0 sec Final     Comment:     Refer to local heparin nomogram for intensity/dose specific   therapeutic   range.       Fibrinogen   Date Value Ref Range Status   06/25/2023 >800 (H) 182 - 400 mg/dL Final       Anemia, chronic disease  Patient's anemia is currently controlled.  Received one unit blood earlier in admission.. Etiology likely d/t chronic disease  Current CBC reviewed-   Lab Results   Component Value Date    HGB 8.2 (L) 06/27/2023    HCT 23.8 (L) 06/27/2023     Monitor serial CBC and transfuse if patient becomes hemodynamically unstable, symptomatic or H/H drops below 7/21.         Septic shock  Source:  UTI.  Continue systemic antibiotic.  Weaned off one pressor.  Continues on the other.        VTE Risk Mitigation (From admission, onward)         Ordered     enoxaparin injection 30 mg  Every 24 hours         06/27/23 1405     IP VTE HIGH RISK PATIENT  Once         06/27/23 1404     Place sequential compression device  Until discontinued         06/24/23 0432                Discharge Planning   CITLALI: 6/30/2023     Code Status: Full Code   Is the patient medically ready for discharge?:     Reason for patient still in hospital (select all that apply): Patient trending condition, Laboratory test and Treatment  Discharge Plan A: Long-term acute care facility (LTAC)                  Brandon Martin MD  Department of Hospital Medicine   [facility]

## 2023-06-27 NOTE — ASSESSMENT & PLAN NOTE
Monitor sodium level.  Likely due to renal failure.    Sodium   Date Value Ref Range Status   06/26/2023 130 (L) 136 - 145 mmol/L Final   06/26/2023 132 (L) 136 - 145 mmol/L Final   06/26/2023 132 (L) 136 - 145 mmol/L Final

## 2023-06-27 NOTE — CARE UPDATE
06/26/23 2009   Patient Assessment/Suction   Level of Consciousness (AVPU) alert   Respiratory Effort Normal   PRE-TX-O2   Device (Oxygen Therapy) room air   SpO2 100 %   Pulse Oximetry Type Continuous   $ Pulse Oximetry - Multiple Charge Pulse Oximetry - Multiple   Pulse 72   Resp 14   Education   $ Education 15 min   Respiratory Evaluation   $ Care Plan Tech Time 15 min

## 2023-06-27 NOTE — NURSING
No acute events overnight. Patient presentation unchanged.     -Weaned Vinh gtt off; vitals remain stable while on RA.    -Bicarb gtt continued at 75cc/hr.     -Gee output 325 for shift: remains cloudy with sediment    - Labs: Unable to correct electrolytes d/t creat.   Na 131  K 3.3  BUN/Creat 81/ 3.2  Calcium 6.7

## 2023-06-27 NOTE — ASSESSMENT & PLAN NOTE
Controlled.  Monitor potassium level.  Supplement potassium when needed.    Potassium   Date Value Ref Range Status   06/26/2023 3.4 (L) 3.5 - 5.1 mmol/L Final   06/26/2023 3.5 3.5 - 5.1 mmol/L Final   06/26/2023 3.5 3.5 - 5.1 mmol/L Final

## 2023-06-27 NOTE — ASSESSMENT & PLAN NOTE
Much improved.  The bicarbonate drip can be discontinued.  Monitor serum bicarb level.  Monitor pH.  Consulting with nephrology for assistance with diagnosis and management.

## 2023-06-27 NOTE — ASSESSMENT & PLAN NOTE
Patient's FSGs are controlled on current medication regimen.  Last A1c reviewed-   Lab Results   Component Value Date    HGBA1C 10.2 (H) 06/05/2023     Current correctional scale  Low  Maintain anti-hyperglycemic dose as follows-   Antihyperglycemics (From admission, onward)    Start     Stop Route Frequency Ordered    06/26/23 1052  insulin aspart U-100 pen 0-5 Units         -- SubQ Before meals & nightly PRN 06/26/23 0952    06/25/23 0800  insulin detemir U-100 (Levemir) pen 9 Units         -- SubQ Daily 06/25/23 0757        Hold Oral hypoglycemics while patient is in the hospital.

## 2023-06-27 NOTE — ASSESSMENT & PLAN NOTE
Monitor sodium level.  Likely due to renal failure.    Sodium   Date Value Ref Range Status   06/27/2023 131 (L) 136 - 145 mmol/L Final   06/26/2023 130 (L) 136 - 145 mmol/L Final   06/26/2023 132 (L) 136 - 145 mmol/L Final   06/26/2023 132 (L) 136 - 145 mmol/L Final

## 2023-06-27 NOTE — ASSESSMENT & PLAN NOTE
Prothrombin and partial thromboplastin times were prolonged, but now they're normal.    Platelet count normal.  Fibrinogen very high.  DIC ruled out.    Lab Results   Component Value Date    INR 1.0 06/27/2023    INR 4.0 () 06/25/2023    INR 7.0 () 06/24/2023     aPTT   Date Value Ref Range Status   06/25/2023 49.3 (H) 21.0 - 32.0 sec Final     Comment:     Refer to local heparin nomogram for intensity/dose specific   therapeutic   range.       Fibrinogen   Date Value Ref Range Status   06/25/2023 >800 (H) 182 - 400 mg/dL Final

## 2023-06-27 NOTE — ASSESSMENT & PLAN NOTE
Continue crystalloid infusion with sodium bicarbonate.  Monitor serum bicarb level.  Monitor pH.  Consulting with nephrology for assistance with diagnosis and management.

## 2023-06-27 NOTE — NURSING
[facility]  Wound Care    Patient Name:  Anastasiia Badillo   MRN:  [id number]  Date: 6/27/2023  Diagnosis: UTI (urinary tract infection)    History:     Past Medical History:   Diagnosis Date    Acute osteomyelitis of left calcaneus 6/7/2023    Chronic ulcer of great toe of left foot with fat layer exposed 6/7/2023    Closed left pilon fracture 6/5/2023    Closed left subtrochanteric femur fracture, initial encounter 2/18/2023    Diabetes mellitus, type 2     Essential (primary) hypertension 2/17/2023    Mass of upper outer quadrant of right breast 4/27/2023    Nicotine dependence 2/20/2023    PAD (peripheral artery disease) 4/22/2023    Tobacco use 6/6/2023    Type 2 diabetes mellitus with hyperglycemia, with long-term current use of insulin 6/6/2023       Social History     Socioeconomic History    Marital status:    Tobacco Use    Smoking status: Every Day     Packs/day: 0.25     Years: 45.00     Pack years: 11.25     Types: Cigarettes    Smokeless tobacco: Never   Substance and Sexual Activity    Alcohol use: Yes    Drug use: Never   Social History Narrative    ** Merged History Encounter **          Social Determinants of Health     Financial Resource Strain: Low Risk     Difficulty of Paying Living Expenses: Not hard at all   Food Insecurity: No Food Insecurity    Worried About Running Out of Food in the Last Year: Never true    Ran Out of Food in the Last Year: Never true   Transportation Needs: No Transportation Needs    Lack of Transportation (Medical): No    Lack of Transportation (Non-Medical): No   Physical Activity: Unknown    Days of Exercise per Week: Patient refused    Minutes of Exercise per Session: Patient refused   Stress: Unknown    Feeling of Stress : Patient refused   Social Connections: Unknown    Frequency of Communication with Friends and Family: Patient refused    Frequency of Social Gatherings with Friends and Family: Patient refused    Attends Jew Services:  Patient refused    Active Member of Clubs or Organizations: Patient refused    Attends Club or Organization Meetings: Patient refused    Marital Status: Patient refused   Housing Stability: Unknown    Unable to Pay for Housing in the Last Year: No    Unstable Housing in the Last Year: No       Precautions:     Allergies as of 06/24/2023    (No Known Allergies)       [facility] Assessment Details/Treatment   06/27/2023  73 yr old female in bed external fixator to the left foot   Sacral 3x5x?  Unstageable  Clean with chlorhexidine/ns.  Pat dry. Apply Medihoney and cover with mepilex.   Left heel 4x4x1 stage 4 pressure injury  Clean with chlorhexidine/ns.  Pat dry. Apply Medihoney Cover with large band aid.      Left great toe 2x2x? Pressure injury  unstageable  Clean with chlorhexidine/ns.  Pat dry. Apply Medihoney Cover with large band aid.      Dressing removed from pin sites and cleaned with wound wash and pat dry  wrapped with xerofoam    Multi sites of sutures                     Bilat lower legs weeping clear straw fluid   Recommendation:   Left heel great toe and buttock/sacral  Clean with chlorhexidine/ns.  Pat dry. Apply Medihoney and sacral cover with mepilex. Cover heel and great toe  with large band aid.    Pin sites left foot   Clean with wound wash and pat dry  wrap with Xerofoam daily   Elevate the left foot on two pillows   Turn reposition q2 as pt's condition will allow.  Bilat heel pillows   Float and elevate heels of bed with pillows.  air mattress

## 2023-06-28 ENCOUNTER — PATIENT OUTREACH (OUTPATIENT)
Dept: ADMINISTRATIVE | Facility: HOSPITAL | Age: 73
End: 2023-06-28
Payer: MEDICARE

## 2023-06-28 LAB
ANION GAP SERPL CALC-SCNC: 10 MMOL/L (ref 8–16)
ANISOCYTOSIS BLD QL SMEAR: SLIGHT
BACTERIA UR CULT: ABNORMAL
BASOPHILS NFR BLD: 0 % (ref 0–1.9)
BUN SERPL-MCNC: 79 MG/DL (ref 8–23)
C DIFF GDH STL QL: NEGATIVE
C DIFF TOX A+B STL QL IA: NEGATIVE
CA-I BLDV-SCNC: 1 MMOL/L (ref 1.06–1.42)
CALCIUM SERPL-MCNC: 6.8 MG/DL (ref 8.7–10.5)
CHLORIDE SERPL-SCNC: 97 MMOL/L (ref 95–110)
CO2 SERPL-SCNC: 25 MMOL/L (ref 23–29)
CREAT SERPL-MCNC: 2.7 MG/DL (ref 0.5–1.4)
DIFFERENTIAL METHOD: ABNORMAL
EOSINOPHIL NFR BLD: 3 % (ref 0–8)
ERYTHROCYTE [DISTWIDTH] IN BLOOD BY AUTOMATED COUNT: 16 % (ref 11.5–14.5)
EST. GFR  (NO RACE VARIABLE): 18.2 ML/MIN/1.73 M^2
GLUCOSE SERPL-MCNC: 156 MG/DL (ref 70–110)
GLUCOSE SERPL-MCNC: 183 MG/DL (ref 70–110)
GLUCOSE SERPL-MCNC: 184 MG/DL (ref 70–110)
GLUCOSE SERPL-MCNC: 207 MG/DL (ref 70–110)
HCT VFR BLD AUTO: 24.8 % (ref 37–48.5)
HGB BLD-MCNC: 8.6 G/DL (ref 12–16)
IMM GRANULOCYTES # BLD AUTO: ABNORMAL K/UL (ref 0–0.04)
IMM GRANULOCYTES NFR BLD AUTO: ABNORMAL % (ref 0–0.5)
LYMPHOCYTES NFR BLD: 8 % (ref 18–48)
MAGNESIUM SERPL-MCNC: 1.4 MG/DL (ref 1.6–2.6)
MCH RBC QN AUTO: 29.8 PG (ref 27–31)
MCHC RBC AUTO-ENTMCNC: 34.7 G/DL (ref 32–36)
MCV RBC AUTO: 86 FL (ref 82–98)
MONOCYTES NFR BLD: 6 % (ref 4–15)
NEUTROPHILS NFR BLD: 70 % (ref 38–73)
NEUTS BAND NFR BLD MANUAL: 13 %
NRBC BLD-RTO: 0 /100 WBC
PHOSPHATE SERPL-MCNC: 3.7 MG/DL (ref 2.7–4.5)
PLATELET # BLD AUTO: 249 K/UL (ref 150–450)
PMV BLD AUTO: 11.6 FL (ref 9.2–12.9)
POTASSIUM SERPL-SCNC: 3.7 MMOL/L (ref 3.5–5.1)
RBC # BLD AUTO: 2.89 M/UL (ref 4–5.4)
SODIUM SERPL-SCNC: 132 MMOL/L (ref 136–145)
WBC # BLD AUTO: 23.57 K/UL (ref 3.9–12.7)

## 2023-06-28 PROCEDURE — 63600175 PHARM REV CODE 636 W HCPCS: Performed by: INTERNAL MEDICINE

## 2023-06-28 PROCEDURE — 25000003 PHARM REV CODE 250: Performed by: HOSPITALIST

## 2023-06-28 PROCEDURE — 94761 N-INVAS EAR/PLS OXIMETRY MLT: CPT

## 2023-06-28 PROCEDURE — 36415 COLL VENOUS BLD VENIPUNCTURE: CPT | Performed by: INTERNAL MEDICINE

## 2023-06-28 PROCEDURE — 63600175 PHARM REV CODE 636 W HCPCS: Performed by: HOSPITALIST

## 2023-06-28 PROCEDURE — 82330 ASSAY OF CALCIUM: CPT | Performed by: INTERNAL MEDICINE

## 2023-06-28 PROCEDURE — 84100 ASSAY OF PHOSPHORUS: CPT | Performed by: INTERNAL MEDICINE

## 2023-06-28 PROCEDURE — 99900031 HC PATIENT EDUCATION (STAT)

## 2023-06-28 PROCEDURE — 25000003 PHARM REV CODE 250: Performed by: NURSE PRACTITIONER

## 2023-06-28 PROCEDURE — 83735 ASSAY OF MAGNESIUM: CPT | Performed by: INTERNAL MEDICINE

## 2023-06-28 PROCEDURE — 25000003 PHARM REV CODE 250: Performed by: INTERNAL MEDICINE

## 2023-06-28 PROCEDURE — 99900035 HC TECH TIME PER 15 MIN (STAT)

## 2023-06-28 PROCEDURE — 85027 COMPLETE CBC AUTOMATED: CPT | Performed by: INTERNAL MEDICINE

## 2023-06-28 PROCEDURE — 85007 BL SMEAR W/DIFF WBC COUNT: CPT | Performed by: INTERNAL MEDICINE

## 2023-06-28 PROCEDURE — 80048 BASIC METABOLIC PNL TOTAL CA: CPT | Performed by: INTERNAL MEDICINE

## 2023-06-28 PROCEDURE — C9113 INJ PANTOPRAZOLE SODIUM, VIA: HCPCS | Performed by: INTERNAL MEDICINE

## 2023-06-28 PROCEDURE — 20000000 HC ICU ROOM

## 2023-06-28 PROCEDURE — 87449 NOS EACH ORGANISM AG IA: CPT | Performed by: HOSPITALIST

## 2023-06-28 RX ORDER — MIDODRINE HYDROCHLORIDE 2.5 MG/1
2.5 TABLET ORAL ONCE
Status: COMPLETED | OUTPATIENT
Start: 2023-06-28 | End: 2023-06-28

## 2023-06-28 RX ORDER — MAGNESIUM SULFATE HEPTAHYDRATE 40 MG/ML
2 INJECTION, SOLUTION INTRAVENOUS ONCE
Status: COMPLETED | OUTPATIENT
Start: 2023-06-28 | End: 2023-06-28

## 2023-06-28 RX ORDER — MIDODRINE HYDROCHLORIDE 2.5 MG/1
2.5 TABLET ORAL
Status: DISCONTINUED | OUTPATIENT
Start: 2023-06-28 | End: 2023-06-28

## 2023-06-28 RX ORDER — CEFAZOLIN SODIUM 2 G/50ML
2 SOLUTION INTRAVENOUS
Status: DISCONTINUED | OUTPATIENT
Start: 2023-06-28 | End: 2023-07-03

## 2023-06-28 RX ORDER — MIDODRINE HYDROCHLORIDE 2.5 MG/1
5 TABLET ORAL
Status: DISCONTINUED | OUTPATIENT
Start: 2023-06-28 | End: 2023-07-07 | Stop reason: HOSPADM

## 2023-06-28 RX ORDER — CALCIUM GLUCONATE 98 MG/ML
1 INJECTION, SOLUTION INTRAVENOUS ONCE
Status: DISCONTINUED | OUTPATIENT
Start: 2023-06-28 | End: 2023-06-28

## 2023-06-28 RX ORDER — CALCIUM GLUCONATE 20 MG/ML
1 INJECTION, SOLUTION INTRAVENOUS ONCE
Status: COMPLETED | OUTPATIENT
Start: 2023-06-28 | End: 2023-06-28

## 2023-06-28 RX ADMIN — CHLORHEXIDINE GLUCONATE 15 ML: 1.2 RINSE ORAL at 08:06

## 2023-06-28 RX ADMIN — FLUCONAZOLE 200 MG: 2 INJECTION, SOLUTION INTRAVENOUS at 10:06

## 2023-06-28 RX ADMIN — INSULIN ASPART 2 UNITS: 100 INJECTION, SOLUTION INTRAVENOUS; SUBCUTANEOUS at 12:06

## 2023-06-28 RX ADMIN — CALCIUM GLUCONATE 1 G: 20 INJECTION, SOLUTION INTRAVENOUS at 10:06

## 2023-06-28 RX ADMIN — PANTOPRAZOLE SODIUM 40 MG: 40 INJECTION, POWDER, FOR SOLUTION INTRAVENOUS at 08:06

## 2023-06-28 RX ADMIN — HYDROCODONE BITARTRATE AND ACETAMINOPHEN 1 TABLET: 5; 325 TABLET ORAL at 01:06

## 2023-06-28 RX ADMIN — MUPIROCIN 1 G: 20 OINTMENT TOPICAL at 08:06

## 2023-06-28 RX ADMIN — AMIODARONE HYDROCHLORIDE 200 MG: 200 TABLET ORAL at 08:06

## 2023-06-28 RX ADMIN — INSULIN DETEMIR 9 UNITS: 100 INJECTION, SOLUTION SUBCUTANEOUS at 08:06

## 2023-06-28 RX ADMIN — MIDODRINE HYDROCHLORIDE 5 MG: 2.5 TABLET ORAL at 05:06

## 2023-06-28 RX ADMIN — CEFAZOLIN SODIUM 2 G: 2 SOLUTION INTRAVENOUS at 01:06

## 2023-06-28 RX ADMIN — MAGNESIUM SULFATE HEPTAHYDRATE 2 G: 40 INJECTION, SOLUTION INTRAVENOUS at 10:06

## 2023-06-28 RX ADMIN — MIDODRINE HYDROCHLORIDE 2.5 MG: 2.5 TABLET ORAL at 08:06

## 2023-06-28 RX ADMIN — POTASSIUM BICARBONATE 40 MEQ: 782 TABLET, EFFERVESCENT ORAL at 10:06

## 2023-06-28 RX ADMIN — MIDODRINE HYDROCHLORIDE 5 MG: 2.5 TABLET ORAL at 12:06

## 2023-06-28 RX ADMIN — SODIUM CHLORIDE: 0.9 INJECTION, SOLUTION INTRAVENOUS at 08:06

## 2023-06-28 RX ADMIN — HYDROCODONE BITARTRATE AND ACETAMINOPHEN 1 TABLET: 5; 325 TABLET ORAL at 06:06

## 2023-06-28 RX ADMIN — ENOXAPARIN SODIUM 30 MG: 30 INJECTION SUBCUTANEOUS at 05:06

## 2023-06-28 NOTE — PROGRESS NOTES
INPATIENT NEPHROLOGY PROGRESS NOTE  [facility] NEPHROLOGY    Anastasiia COLLIN Justino  06/28/2023    Reason for consultation:  Acute kidney injury    Chief Complaint:   Chief Complaint   Patient presents with    Hypotension     Sent from [facility] for eval of low blood pressure.            History of Present Illness:    Per H and P    74 y/o F with a past medical history significant for DM2, HTN, femur fracture, tibial fracture and tobacco use, L foot wounds,  high grade stenosis SFA bilaterally and popliteal on the left s/p baloon angioplasty L popliteal artery and left posterior tibial arery on DAPT.      Recently discharged from [facility] for fracture of left tibia, s/p ORIF 6/6, also finished antibiotics for acute osteomyelitis of left calcaneus ( Final ID recs with end date for cipro of 6/22 and end date of ancef for 7/17 )     Patient sent from rehab for hypotension.     In the ER was in septic shock, e/o UTI and anemic.        6/25  Pt states she feels nauseated and weak.  No sob.  Now bowel complaints.  Denies pain.  Somewhat confused but engages when prompted.  On phenylephrine vasopressor support.  275 cc uop  6/26  acidosis improving, renal same.  525cc UOP recorded.  Will cont IVF but cut rate to 75, f/u labs in am.    6/27  UOP 625cc, on hood, VSS.  Renal function a little worse, acidosis better, hypokalemic-got repletion.  Stopped bicarb gtt, switch to NS.  6/28 SBP , back on hood, on RA, UOP 300cc from holley- asked nurse to flush holley, no diarrhea reported    Plan of Care:     Acute kidney injury secondary to hemodynamically mediated renal injury with likely acute tubular necrosis in setting of urosepsis with shock  - renal function improving  - not sure why she is oliguric- will have nurse flush holley  - remains on hood- started on midodrine today  - continue NS 75cc/hr another day- edema noted- would be fine with IV diuresis once BP comes up some  - no nsaids or IV contrast  - dose meds for CrCl <  20  - no acute RRT needs    Hyponatremia  Hypokalemia  HypoMg  Metabolic acidosis--non-gap  Hypocalcemia  - serum Na improved  - will replete K, Mg today  - CO2 normal  - will replete Ca, check phos    Anemia  - iron stores low- stool + blood  - check stool for occult blood  - s/p pRBC this admission  - so far stable    Renal cyst--complicated cyst   - imaging reviewed- normal kidneys, no hydro     Thank you for allowing us to participate in this patient's care. We will continue to follow.    Vital Signs:  Temp Readings from Last 3 Encounters:   06/28/23 97.8 °F (36.6 °C) (Oral)   06/14/23 97.6 °F (36.4 °C) (Oral)   06/05/23 98.9 °F (37.2 °C)       Pulse Readings from Last 3 Encounters:   06/28/23 66   06/14/23 87   06/05/23 84       BP Readings from Last 3 Encounters:   06/28/23 (!) 91/52   06/14/23 (!) 141/65   06/05/23 122/67       Weight:  Wt Readings from Last 3 Encounters:   06/27/23 80 kg (176 lb 5.9 oz)   06/25/23 72.6 kg (160 lb)   06/08/23 72.9 kg (160 lb 11.5 oz)     Medications:  No current facility-administered medications on file prior to encounter.     Current Outpatient Medications on File Prior to Encounter   Medication Sig Dispense Refill    acetaminophen (TYLENOL) 500 MG tablet Take 2 tablets (1,000 mg total) by mouth every 8 (eight) hours.  0    aspirin (ECOTRIN) 81 MG EC tablet Take 1 tablet (81 mg total) by mouth 2 (two) times a day. - end date august 30, 2023  0    atorvastatin (LIPITOR) 80 MG tablet Take 1 tablet (80 mg total) by mouth every evening. 90 tablet 3    blood sugar diagnostic Strp To check BG two times daily, to use with insurance preferred meter 200 each 1    blood-glucose meter kit To check BG two times daily, to use with insurance preferred meter 1 each 1    ciprofloxacin HCl (CIPRO) 750 MG tablet Take 1 tablet (750 mg total) by mouth every 12 (twelve) hours. End date 6/22/23      clopidogreL (PLAVIX) 75 mg tablet Take 1 tablet (75 mg total) by mouth once daily. 30 tablet 3     "dextrose 5 % in water (D5W) 5 % PgBk 50 mL with ceFAZolin 2 gram SolR 2 g Inject 2 g into the vein every 8 (eight) hours. End date 7/17/23  0    famotidine (PEPCID) 20 MG tablet Take 1 tablet (20 mg total) by mouth 2 (two) times daily as needed (Heartburn).      insulin aspart U-100 (NOVOLOG) 100 unit/mL (3 mL) InPn pen Inject 0-5 Units into the skin before meals and at bedtime as needed (Hyperglycemia).  0    insulin aspart U-100 (NOVOLOG) 100 unit/mL (3 mL) InPn pen Inject 6 Units into the skin 3 (three) times daily with meals.  0    insulin detemir U-100, Levemir, 100 unit/mL (3 mL) SubQ InPn pen Inject 18 Units into the skin once daily.  0    lancets [facility] To check BG two times daily, to use with insurance preferred meter 200 each 1    lisinopriL (PRINIVIL,ZESTRIL) 5 MG tablet Take 1 tablet (5 mg total) by mouth once daily. 90 tablet 3    melatonin (MELATIN) 3 mg tablet Take 3 tablets (9 mg total) by mouth nightly.  0    methocarbamoL (ROBAXIN) 500 MG Tab Take 1 tablet (500 mg total) by mouth 4 (four) times daily. 40 tablet 0    ondansetron (ZOFRAN-ODT) 4 MG TbDL Take 1 tablet (4 mg total) by mouth every 8 (eight) hours as needed (nausea).      oxyCODONE (ROXICODONE) 10 mg Tab immediate release tablet Take 1 tablet (10 mg total) by mouth every 4 (four) hours as needed (pain sclae 7-10). 10 tablet 0    oxyCODONE (ROXICODONE) 5 MG immediate release tablet Take 1 tablet (5 mg total) by mouth every 4 (four) hours as needed (pain scale 4-6). 10 tablet 0    pen needle, diabetic (PEN NEEDLE) 31 gauge x 3/16" Ndle 1 application by Misc.(Non-Drug; Combo Route) route 2 (two) times a day. 200 each 0    polyethylene glycol (GLYCOLAX) 17 gram PwPk Take 17 g by mouth once daily.  0    pregabalin (LYRICA) 75 MG capsule Take 1 capsule (75 mg total) by mouth 2 (two) times daily. 60 capsule 0    senna-docusate 8.6-50 mg (PERICOLACE) 8.6-50 mg per tablet Take 1 tablet by mouth once daily.      vitamin D (VITAMIN D3) 1000 units Tab " "Take 1 tablet (1,000 Units total) by mouth once daily.       Scheduled Meds:   amiodarone  200 mg Oral Daily    ceFAZolin (ANCEF) IVPB  2 g Intravenous Q12H    chlorhexidine  15 mL Mouth/Throat BID    enoxparin  30 mg Subcutaneous Q24H (prophylaxis, 1700)    fluconazole (DIFLUCAN) IV (PEDS and ADULTS)  200 mg Intravenous Q24H    insulin detemir U-100  9 Units Subcutaneous Daily    midodrine  2.5 mg Oral TID WM    mupirocin   Nasal BID    pantoprazole  40 mg Intravenous BID     Continuous Infusions:   sodium chloride 0.9% 75 mL/hr at 06/28/23 0501    phenylephrine 0.05 mcg/kg/min (06/28/23 0501)     PRN Meds:.dextrose 50%, dextrose 50%, glucagon (human recombinant), glucose, glucose, HYDROcodone-acetaminophen, insulin aspart U-100, naloxone, sodium chloride 0.9%    Review of Systems:  Neg    Physical Exam:    BP (!) 91/52   Pulse 66   Temp 97.8 °F (36.6 °C) (Oral)   Resp 12   Ht 5' 3" (1.6 m)   Wt 80 kg (176 lb 5.9 oz)   SpO2 100%   BMI 31.24 kg/m²     General Appearance:    Ill appearing   Head:    Normocephalic, without obvious abnormality, atraumatic   Eyes:    PER, conjunctiva/corneas clear, EOM's intact in both eyes        Throat:   Lips, mucosa, and tongue normal; teeth and gums normal   Back:     Symmetric, no curvature, ROM normal, no CVA tenderness   Lungs:     Clear to auscultation bilaterally, respirations unlabored   Chest wall:    No tenderness or deformity   Heart:    Regular rate and rhythm, S1 and S2 normal, no murmur, rub   or gallop   Abdomen:     Soft, non-tender, bowel sounds active all four quadrants,     no masses, no organomegaly   Extremities:   Edematous    Pulses:   2+ and symmetric all extremities   MSK:   No joint or muscle swelling, tenderness or deformity   Skin:   Skin color, texture, turgor normal, no rashes or lesions   Neurologic:   CNII-XII intact, normal strength and sensation       Throughout.  No flap     Results:  Lab Results   Component Value Date     (L) " 06/28/2023    K 3.7 06/28/2023    CL 97 06/28/2023    CO2 25 06/28/2023    BUN 79 (H) 06/28/2023    CREATININE 2.7 (H) 06/28/2023    CALCIUM 6.8 (LL) 06/28/2023    ANIONGAP 10 06/28/2023    ESTGFRAFRICA 78 04/15/2021    EGFRNONAA 67 04/15/2021       Lab Results   Component Value Date    CALCIUM 6.8 (LL) 06/28/2023    PHOS 4.5 06/05/2023       Recent Labs   Lab 06/28/23  0150   WBC 23.57*   RBC 2.89*   HGB 8.6*   HCT 24.8*      MCV 86   MCH 29.8   MCHC 34.7       Imaging Results              CT Abdomen Pelvis  Without Contrast (Final result)  Result time 06/24/23 05:34:07      Final result by Allan Fountain DO (06/24/23 05:34:07)                   Narrative:    EXAM DESCRIPTION:  CT ABDOMEN PELVIS WITHOUT CONTRAST  RadLex: CT ABDOMEN PELVIS WITHOUT IV CONTRAST    CLINICAL HISTORY:  Weakness, increased WBC, decreased RBC, hypotensive, MYLES.    TECHNIQUE:  CT of the abdomen and pelvis without intravenous contrast.  All CT scans at this facility use dose modulation, iterative reconstruction, and/or weight based dosing when appropriate to reduce radiation dose to as low as reasonably achievable.    COMPARISON: None.    FINDINGS:    The visualized lower thoracic structures demonstrate minimal bilateral pleural effusions. There is mild bibasilar atelectasis. Coronary artery calcifications are present. There is subcutaneous edema throughout the thorax.    Unenhanced images of the liver, spleen, and adrenal glands appear grossly unremarkable. Pancreas is not well assessed without contrast. Gallbladder is minimally distended. No renal stones are identified. There is a round exophytic lesion off the upper pole right kidney measuring 2.1 cm, with Hounsfield units averaging 59. This may relate to hyperdense cyst. There is mild left hydronephrosis and hydroureter. No definite ureteral or bladder stone is identified. Urinary bladder is decompressed with a Gee catheter in place. There are atherosclerotic calcifications along  the abdominal aorta without evidence for aneurysmal dilatation. No bowel obstruction is seen. There is no free intraperitoneal air. The appendix appears unremarkable. There is colonic diverticulosis without definite CT evidence for acute diverticulitis. There is mild fecal loading throughout the colon. There is minimal free fluid in the posterior pelvis. There is moderate subcutaneous edema throughout the abdomen and pelvis extending into the thighs. There is an intramedullary denzel within the proximal left femur, with a compression screw through the left femoral neck. There is a left subtrochanteric fracture    IMPRESSION:  1.  Minimal gallbladder distention.  2.  Mild left hydroureteronephrosis, without definite ureteral stone seen. Urinary bladder decompressed.  3.  Anasarca.  4.  Colonic diverticulosis without definite CT evidence for acute diverticulitis.  5.  Possible hyperdense cyst at the upper pole right kidney. Follow-up nonemergent renal ultrasound could be performed.  6.  Post surgical changes at the left femur, with subtrochanteric fracture.  7.  Minimal bilateral pleural effusions and mild bibasilar atelectasis.    Electronically signed by:  Allan Fountain DO  6/24/2023 5:34 AM CDT Workstation: [id number]                                     X-Ray Chest AP Portable (Final result)  Result time 06/24/23 08:28:44      Final result by Perfecto Castellanos MD (06/24/23 08:28:44)                   Narrative:    Chest single view    CLINICAL DATA: Sepsis    FINDINGS: AP view shows the heart to be mildly enlarged. The mediastinum is unremarkable. Right-sided PICC line extends to the superior vena cava. There is mild atelectasis at the right lung base, with ill-defined atelectasis or infiltrate at the lung base on the left. There are no significant pleural effusions. No acute osseous abnormality is identified.    IMPRESSION:  1. Atelectasis or infiltrate at the left lung base.  2. Mild right basilar atelectasis.  3. Mild  cardiomegaly.    Electronically signed by:  Perfecto Castellanos MD  6/24/2023 8:28 AM CDT Workstation: [id number]                                        I have personally reviewed pertinent radiological imaging and reports.    Patient care was time spent personally by me on the following activities: > 35 min  Obtaining a history  Examination of patient.  Providing medical care at the patients bedside.  Developing a treatment plan with patient or surrogate and bedside caregivers  Ordering and reviewing laboratory studies, radiographic studies, pulse oximetry.  Ordering and performing treatments and interventions.  Evaluation of patient's response to treatment.  Discussions with consultants while on the unit and immediately available to the patient.  Re-evaluation of the patient's condition.  Documentation in the medical record.     Wily Carranza MD    Nephrology  [facility] [place]  [phone]

## 2023-06-28 NOTE — PROGRESS NOTES
Population Health Chart Review & Patient Outreach Details:     Reason for Outreach Encounter:     [x]  Non-Compliant Report   []  Payor Report ([employer], PHN, BCBS, MSSP, MCIP, C, etc.)   []  Pre-Visit Chart Review     Updates Requested / Reviewed:     [x]  Care Everywhere    [x]     [x]  External Sources ([facility], [facility], DIS, etc.)   [x]  Care Team Updated    Patient Outreach Method:    []  Telephone Outreach Completed   [] Successful   [] Left Voicemail   [] Unable to Contact (wrong number, no voicemail)  []  [employer] Portal Outreach Sent  [x]  Letter Outreach Mailed  []  Fax Sent for External Records  [x]  External Records Upload    Health Maintenance Topics Addressed and Outreach Outcomes / Actions Taken:        [x]      Breast Cancer Screening []  Mammo Scheduled      []  External Records Requested     []  Added Reminder to Complete to Upcoming Primary Care Appt Notes     []  Patient Declined     []  Patient Will Call Back to Schedule     []  Patient Will Schedule with External Provider / Order Routed if Applicable             []       Cervical Cancer Screening []  Pap Scheduled      []  External Records Requested     []  Added Reminder to Complete to Upcoming Primary Care Appt Notes     []  Patient Declined     []  Patient Will Call Back to Schedule     []  Patient Will Schedule with External Provider               [x]          Colorectal Cancer Screening []  Colonoscopy Case Request or Referral Placed     []  External Records Requested     []  Added Reminder to Complete to Upcoming Primary Care Appt Notes     []  Patient Declined     []  Patient Will Call Back to Schedule     []  Patient Will Schedule with External Provider     []  Fit Kit Mailed (add the SmartPhrase under additional notes)     []  Reminded Patient to Complete Home Test             [x]      Diabetic Eye Exam []  Eye Camera Scheduled or Optometry Referral Placed     []  External Records Requested     []  Added Reminder to  Complete to Upcoming Primary Care Appt Notes     []  Patient Declined     []  Patient Will Call Back to Schedule     []  Patient Will Schedule with External Provider             []      Blood Pressure Control []  Primary Care Follow Up Visit Scheduled     []  Remote Blood Pressure Reading Captured     []  Added Reminder to Complete to Upcoming Primary Care Appt Notes     []  Patient Declined     []  Patient Will Call Back / Patient Will Send Portal Message with Reading     []  Patient Will Call Back to Schedule Provider Visit             [x]       HbA1c & Other Labs []  Lab Appt Scheduled for Due Labs     []  Primary Care Follow Up Visit Scheduled      []  Reminded Patient to Complete Home Test     []  Added Reminder to Complete to Upcoming Primary Care Appt Notes     []  Patient Declined     []  Patient Will Call Back to Schedule     []  Patient Will Schedule with External Provider / Order Routed if Applicable           []    Schedule Primary Care Appt []  Primary Care Appt Scheduled     []  Patient Declined     []  Patient Will Call Back to Schedule     []  Pt Established with External Provider & Updated Care Team             []      Medication Adherence []  Primary Care Appointment Scheduled     []  Added Reminder to Upcoming Primary Care Appt Notes     []  Patient Reminded to  Prescription     []  Patient Declined, Provider Notified if Needed     []  Sent Provider Message to Review and/or Add Exclusion to Problem List             [x]      Osteoporosis Screening []  DXA Appointment Scheduled     []  External Records Requested     []  Added Reminder to Complete to Upcoming Primary Care Appt Notes     []  Patient Declined     []  Patient Will Call Back to Schedule     []  Patient Will Schedule with External Provider / Order Routed if Applicable     Additional Care Coordinator Notes:         Further Action Needed If Patient Returns Outreach:

## 2023-06-28 NOTE — PROGRESS NOTES
[facility]  Adult Nutrition   Progress Note (Follow-Up)    SUMMARY      Recommendations:   1) Continue current 1500 kcal Diabetic/Dysphagia Soft diet with Glucerna at BF and Unjury at L and D as tolerated and encourage intake.  2) RD to offer diabetes education to pt before when she moves to the floor.       Goals:   1) Pt to meet 100% of her energy and protein needs.  2) Pt to understand the diabetic diet and be able to lower HgbA1C of 10.2 to WNL's.    Dietitian Rounds Brief  F/U Nutrition Note: Observed pt today in ICU but she was sleeping and did not wake up when I called her name. She is eating 25 to 50% and drinking her ONS's which is adequate. Will offer education when she moves to the floor. Her LBM was yesterday.    Diet order: 1500 kcal Diabetic/Dysphagia Soft    Oral Supplement: Glucerna @ BF and Unjury @ L & D    % Intake of Estimated Energy Needs: 75 - 100 %  % Meal Intake: 25 - 50 %    Estimated/Assessed Needs  Weight Used For Calorie Calculations: 80 kg (176 lb 5.9 oz)  Energy Calorie Requirements (kcal): 8859-8771 kcals/day (20-25 kcals/kg ABW)  Energy Need Method: Kcal/kg  Protein Requirements:  g/day (1.5-2 g/kg IBW)  Weight Used For Protein Calculations: 52 kg (114 lb 10.2 oz)     Estimated Fluid Requirement Method: RDA Method  RDA Method (mL): 1600       Weight History:  Wt Readings from Last 5 Encounters:   06/27/23 80 kg (176 lb 5.9 oz)   06/25/23 72.6 kg (160 lb)   06/08/23 72.9 kg (160 lb 11.5 oz)   06/05/23 72.6 kg (160 lb)   05/24/23 72.6 kg (160 lb)        Reason for Assessment  Reason For Assessment: consult  Diagnosis: other (see comments) (UTI)  Relevant Medical History: Diabetes mellitus, type 2, Closed left pilon fracture, Closed left subtrochanteric femur fracture, initial encounter, Acute osteomyelitis of left calcaneus, Essential (primary) hypertension, PAD (peripheral artery disease), Tobacco use, Mass of upper outer quadrant of right breast, Type 2 diabetes  mellitus with hyperglycemia, with long-term current use of insulin, Chronic ulcer of great toe of left foot with fat layer exposed, Nicotine dependence  Interdisciplinary Rounds: attended    Medications:Pertinent Medications Reviewed  Scheduled Meds:   amiodarone  200 mg Oral Daily    ceFAZolin (ANCEF) IVPB  2 g Intravenous Q12H    chlorhexidine  15 mL Mouth/Throat BID    enoxparin  30 mg Subcutaneous Q24H (prophylaxis, 1700)    fluconazole (DIFLUCAN) IV (PEDS and ADULTS)  200 mg Intravenous Q24H    insulin detemir U-100  9 Units Subcutaneous Daily    midodrine  5 mg Oral TID WM    mupirocin   Nasal BID    pantoprazole  40 mg Intravenous BID     Continuous Infusions:   sodium chloride 0.9% 75 mL/hr at 06/28/23 0501    phenylephrine 0.05 mcg/kg/min (06/28/23 0501)     PRN Meds:.dextrose 50%, dextrose 50%, glucagon (human recombinant), glucose, glucose, HYDROcodone-acetaminophen, insulin aspart U-100, naloxone, sodium chloride 0.9%    Labs: Pertinent Labs Reviewed  Clinical Chemistry:  Recent Labs   Lab 06/26/23  0332 06/26/23  1834 06/28/23  0150 06/28/23  0152   *  132*   < > 132*  --    K 3.5  3.5   < > 3.7  --      101   < > 97  --    CO2 19*  19*   < > 25  --    *  195*   < > 156*  --    BUN 86*  86*   < > 79*  --    CREATININE 3.1*  3.1*   < > 2.7*  --    CALCIUM 6.8*  6.8*   < > 6.8*  --    PROT 4.8*  --   --   --    ALBUMIN 1.5*  --   --   --    BILITOT 1.0  --   --   --    ALKPHOS 75  --   --   --    AST 33  --   --   --    ALT <5*  --   --   --    ANIONGAP 12  12   < > 10  --    MG  --   --  1.4*  --    PHOS  --   --   --  3.7    < > = values in this interval not displayed.     CBC:   Recent Labs   Lab 06/28/23  0150   WBC 23.57*   RBC 2.89*   HGB 8.6*   HCT 24.8*      MCV 86   MCH 29.8   MCHC 34.7     No results for input(s): CHOL, HDL, LDLCALC, TRIG, CHOLHDL in the last 168 hours.  Cardiac Profile:  Recent Labs   Lab 06/24/23  0121 06/26/23  0332   *  --    CPK  --   47       Monitor and Evaluation  Food and Nutrient Intake: food and beverage intake, energy intake  Food and Nutrient Adminstration: diet order  Knowledge/Beliefs/Attitudes: food and nutrition knowledge/skill, beliefs and attitudes  Physical Activity and Function: nutrition-related ADLs and IADLs, factors affecting access to physical activity  Anthropometric Measurements: weight, weight change, body mass index  Biochemical Data, Medical Tests and Procedures: lipid profile, inflammatory profile, glucose/endocrine profile, gastrointestinal profile, electrolyte and renal panel  Nutrition-Focused Physical Findings: overall appearance     Nutrition Risk  Level of Risk/Frequency of Follow-up: high     Nutrition Follow-Up  RD Follow-up?: Yes    Dali Bryan, TAHMINA 06/28/2023 1:01 PM

## 2023-06-28 NOTE — LETTER
June 28, 2023    Anastasiia Badillo  [address] [place] [place]   [address]  [facility] [address]             [facility]  [address]  [facility] [address]  Phone: [phone] [facility] is committed to your overall health.  To help you get the most out of each of your visits, we will review your information to make sure you are up to date on all of your recommended tests and/or procedures.       Your PCP  Raji Parkinson MD   found that you may be due for:    Colonoscopy  Mammogram  DEXA Scan  Eye Exam  Labs       If you have had any of the above done at another facility, please let us know where you went so that we can request your record. So that your chart with [facility] will be complete.  If you would like to schedule any of these, please contact us at [phone].      Thank you for choosing [facility] .    Abner GELLER  Clinical Care Coordinator    [facility]  [phone] (Phone)  [phone] (Fax)

## 2023-06-28 NOTE — PROGRESS NOTES
Pharmacist Renal Dose Adjustment Note    Anastasiia Badillo is a 73 y.o. female being treated with the medication cefazolin    Patient Data:    Vital Signs (Most Recent):  Temp: 98 °F (36.7 °C) (06/28/23 0301)  Pulse: 66 (06/28/23 0530)  Resp: 10 (06/28/23 0530)  BP: (!) 97/50 (06/28/23 0530)  SpO2: 100 % (06/28/23 0530) Vital Signs (72h Range):  Temp:  [96.4 °F (35.8 °C)-98.6 °F (37 °C)]   Pulse:  [61-85]   Resp:  [0-23]   BP: ()/(46-86)   SpO2:  [92 %-100 %]   Arterial Line BP: ()/(38-62)      Recent Labs   Lab 06/26/23  1834 06/27/23  0435 06/28/23  0150   CREATININE 3.0* 3.2* 2.7*     Serum creatinine: 2.7 mg/dL (H) 06/28/23 0150  Estimated creatinine clearance: 18.9 mL/min (A)    Per [facility] renal dosing protocol:     Previous Order: cefazolin 2 g Q 8 hours    Will be changed to:     New Order:cefazolin 2 g Q 12 hours,    Due to: crcl 10-30 ml/min    Renal dose adjustments performed as noted above.    We will continue monitoring and adjusting as necessary.    Pharmacist: Antonio Marion, PharmD  Ext: [phone]

## 2023-06-28 NOTE — PLAN OF CARE
Problem: Infection  Goal: Absence of Infection Signs and Symptoms  Outcome: Ongoing, Progressing     Problem: Adult Inpatient Plan of Care  Goal: Patient-Specific Goal (Individualized)  Outcome: Ongoing, Progressing     Problem: Diabetes Comorbidity  Goal: Blood Glucose Level Within Targeted Range  Outcome: Ongoing, Progressing     Problem: Impaired Wound Healing  Goal: Optimal Wound Healing  Outcome: Ongoing, Progressing

## 2023-06-28 NOTE — LETTER
June 28, 2023    Anastasiia Badillo  [address] [place]   [address]  [facility] [address]             [facility]  [address]  [facility] [address]  Phone: [phone] [facility] is committed to your overall health.  To help you get the most out of each of your visits, we will review your information to make sure you are up to date on all of your recommended tests and/or procedures.       Your PCP  Raji Parkinson MD   found that you may be due for:           If you have had any of the above done at another facility, please let us know where you went so that we can request your record. So that your chart with [facility] will be complete.  If you would like to schedule any of these, please contact us at [phone].      Thank you for choosing [facility] .    Abner GELLER  Clinical Care Coordinator    [facility] / [facility]  [phone] (Phone)  [phone] (Fax)

## 2023-06-29 PROBLEM — R65.21 SEPTIC SHOCK: Status: RESOLVED | Noted: 2023-04-19 | Resolved: 2023-06-29

## 2023-06-29 PROBLEM — A41.9 SEPTIC SHOCK: Status: RESOLVED | Noted: 2023-04-19 | Resolved: 2023-06-29

## 2023-06-29 LAB
ANION GAP SERPL CALC-SCNC: 8 MMOL/L (ref 8–16)
ANISOCYTOSIS BLD QL SMEAR: SLIGHT
BACTERIA BLD CULT: NORMAL
BACTERIA BLD CULT: NORMAL
BASOPHILS NFR BLD: 0 % (ref 0–1.9)
BUN SERPL-MCNC: 78 MG/DL (ref 8–23)
CALCIUM SERPL-MCNC: 7 MG/DL (ref 8.7–10.5)
CHLORIDE SERPL-SCNC: 99 MMOL/L (ref 95–110)
CO2 SERPL-SCNC: 24 MMOL/L (ref 23–29)
CREAT SERPL-MCNC: 2.5 MG/DL (ref 0.5–1.4)
DIFFERENTIAL METHOD: ABNORMAL
EOSINOPHIL NFR BLD: 0 % (ref 0–8)
ERYTHROCYTE [DISTWIDTH] IN BLOOD BY AUTOMATED COUNT: 16.5 % (ref 11.5–14.5)
EST. GFR  (NO RACE VARIABLE): 19.9 ML/MIN/1.73 M^2
GLUCOSE SERPL-MCNC: 107 MG/DL (ref 70–110)
GLUCOSE SERPL-MCNC: 136 MG/DL (ref 70–110)
GLUCOSE SERPL-MCNC: 138 MG/DL (ref 70–110)
GLUCOSE SERPL-MCNC: 152 MG/DL (ref 70–110)
HCT VFR BLD AUTO: 25.4 % (ref 37–48.5)
HGB BLD-MCNC: 8.5 G/DL (ref 12–16)
IMM GRANULOCYTES # BLD AUTO: ABNORMAL K/UL (ref 0–0.04)
IMM GRANULOCYTES NFR BLD AUTO: ABNORMAL % (ref 0–0.5)
LYMPHOCYTES NFR BLD: 9 % (ref 18–48)
MAGNESIUM SERPL-MCNC: 1.6 MG/DL (ref 1.6–2.6)
MCH RBC QN AUTO: 29.1 PG (ref 27–31)
MCHC RBC AUTO-ENTMCNC: 33.5 G/DL (ref 32–36)
MCV RBC AUTO: 87 FL (ref 82–98)
MONOCYTES NFR BLD: 5 % (ref 4–15)
NEUTROPHILS NFR BLD: 75 % (ref 38–73)
NEUTS BAND NFR BLD MANUAL: 11 %
NRBC BLD-RTO: 0 /100 WBC
PLATELET # BLD AUTO: 228 K/UL (ref 150–450)
PMV BLD AUTO: 12.1 FL (ref 9.2–12.9)
POTASSIUM SERPL-SCNC: 3.8 MMOL/L (ref 3.5–5.1)
RBC # BLD AUTO: 2.92 M/UL (ref 4–5.4)
SODIUM SERPL-SCNC: 131 MMOL/L (ref 136–145)
WBC # BLD AUTO: 19.84 K/UL (ref 3.9–12.7)

## 2023-06-29 PROCEDURE — 25000003 PHARM REV CODE 250: Performed by: INTERNAL MEDICINE

## 2023-06-29 PROCEDURE — 12000002 HC ACUTE/MED SURGE SEMI-PRIVATE ROOM

## 2023-06-29 PROCEDURE — 83735 ASSAY OF MAGNESIUM: CPT | Performed by: HOSPITALIST

## 2023-06-29 PROCEDURE — 97530 THERAPEUTIC ACTIVITIES: CPT

## 2023-06-29 PROCEDURE — 25000003 PHARM REV CODE 250: Performed by: HOSPITALIST

## 2023-06-29 PROCEDURE — 85027 COMPLETE CBC AUTOMATED: CPT | Performed by: INTERNAL MEDICINE

## 2023-06-29 PROCEDURE — 85007 BL SMEAR W/DIFF WBC COUNT: CPT | Performed by: INTERNAL MEDICINE

## 2023-06-29 PROCEDURE — 63600175 PHARM REV CODE 636 W HCPCS: Performed by: HOSPITALIST

## 2023-06-29 PROCEDURE — 97163 PT EVAL HIGH COMPLEX 45 MIN: CPT

## 2023-06-29 PROCEDURE — 82962 GLUCOSE BLOOD TEST: CPT

## 2023-06-29 PROCEDURE — 92610 EVALUATE SWALLOWING FUNCTION: CPT

## 2023-06-29 PROCEDURE — 94761 N-INVAS EAR/PLS OXIMETRY MLT: CPT

## 2023-06-29 PROCEDURE — 80048 BASIC METABOLIC PNL TOTAL CA: CPT | Performed by: INTERNAL MEDICINE

## 2023-06-29 RX ORDER — SODIUM,POTASSIUM PHOSPHATES 280-250MG
2 POWDER IN PACKET (EA) ORAL
Status: DISCONTINUED | OUTPATIENT
Start: 2023-06-29 | End: 2023-07-07 | Stop reason: HOSPADM

## 2023-06-29 RX ORDER — LANOLIN ALCOHOL/MO/W.PET/CERES
800 CREAM (GRAM) TOPICAL
Status: DISCONTINUED | OUTPATIENT
Start: 2023-06-29 | End: 2023-07-07 | Stop reason: HOSPADM

## 2023-06-29 RX ORDER — FUROSEMIDE 10 MG/ML
20 INJECTION INTRAMUSCULAR; INTRAVENOUS ONCE
Status: COMPLETED | OUTPATIENT
Start: 2023-06-29 | End: 2023-06-29

## 2023-06-29 RX ADMIN — CHLORHEXIDINE GLUCONATE 15 ML: 1.2 RINSE ORAL at 08:06

## 2023-06-29 RX ADMIN — HYDROCODONE BITARTRATE AND ACETAMINOPHEN 1 TABLET: 5; 325 TABLET ORAL at 01:06

## 2023-06-29 RX ADMIN — MIDODRINE HYDROCHLORIDE 5 MG: 2.5 TABLET ORAL at 12:06

## 2023-06-29 RX ADMIN — MUPIROCIN 1 G: 20 OINTMENT TOPICAL at 08:06

## 2023-06-29 RX ADMIN — FUROSEMIDE 20 MG: 10 INJECTION, SOLUTION INTRAMUSCULAR; INTRAVENOUS at 08:06

## 2023-06-29 RX ADMIN — ENOXAPARIN SODIUM 30 MG: 30 INJECTION SUBCUTANEOUS at 05:06

## 2023-06-29 RX ADMIN — CEFAZOLIN SODIUM 2 G: 2 SOLUTION INTRAVENOUS at 01:06

## 2023-06-29 RX ADMIN — AMIODARONE HYDROCHLORIDE 200 MG: 200 TABLET ORAL at 08:06

## 2023-06-29 RX ADMIN — MIDODRINE HYDROCHLORIDE 5 MG: 2.5 TABLET ORAL at 05:06

## 2023-06-29 RX ADMIN — FLUCONAZOLE 200 MG: 2 INJECTION, SOLUTION INTRAVENOUS at 11:06

## 2023-06-29 RX ADMIN — INSULIN DETEMIR 9 UNITS: 100 INJECTION, SOLUTION SUBCUTANEOUS at 08:06

## 2023-06-29 RX ADMIN — MIDODRINE HYDROCHLORIDE 5 MG: 2.5 TABLET ORAL at 08:06

## 2023-06-29 NOTE — NURSING
Pt lying in bed in no acute distress. Repositioned pt. See ongoing assessment and charted vital signs in epic. See telemetry strip in chart. Call light within reach and bed alarm on. Will continue to monitor.

## 2023-06-29 NOTE — PLAN OF CARE
Clinicals sent to [facility] rehab for review for pt to return @ DC.     06/29/23 1521   Post-Acute Status   Post-Acute Authorization Placement   Post-Acute Placement Status Pending post-acute provider review/more information requested

## 2023-06-29 NOTE — PROGRESS NOTES
[facility]  Progress Note    Patient Name: Anastasiia Badillo  MRN: [id number]  Patient Class: IP- Inpatient   Admission Date: 6/24/2023  Length of Stay: 4 days  Attending Physician: Brandon Martin MD  Primary Care Provider: Raji Parkinson MD        Subjective:     Principal Problem:Septic shock        HPI:  72 y/o F with a past medical history significant for DM2, HTN, femur fracture, tibial fracture and tobacco use, L foot wounds,  high grade stenosis SFA bilaterally and popliteal on the left s/p baloon angioplasty L popliteal artery and left posterior tibial arery on DAPT.      Recently discharged from [facility] for fracture of left tibia, s/p ORIF 6/6, also finished antibiotics for acute osteomyelitis of left calcaneus ( Final ID recs with end date for cipro of 6/22 and end date of ancef for 7/17 )     Patient sent from rehab for hypotension.     In the ER was in septic shock, e/o UTI and anemic.       Overview/Hospital Course:  No notes on file    Interval History:  creatinine less today than what it's been the last 72 hours.  She still has edema in all four extremities, especially her hands.  Has some weeping from the pin sites on her leg.  BP low at times, requiring phenylephrine.  Will start midodrine.    Review of Systems   Constitutional:  Negative for chills and fever.   Respiratory:  Negative for cough and shortness of breath.    Cardiovascular:  Positive for leg swelling. Negative for chest pain.   Gastrointestinal:  Negative for abdominal pain, nausea and vomiting.   Objective:     Vital Signs (Most Recent):  Temp: 98 °F (36.7 °C) (06/28/23 1953)  Pulse: 66 (06/28/23 2000)  Resp: 16 (06/28/23 2000)  BP: (!) 99/55 (06/28/23 2000)  SpO2: 100 % (06/28/23 2000) Vital Signs (24h Range):  Temp:  [97.3 °F (36.3 °C)-98 °F (36.7 °C)] 98 °F (36.7 °C)  Pulse:  [61-78] 66  Resp:  [7-18] 16  SpO2:  [92 %-100 %] 100 %  BP: ()/(50-75) 99/55  Arterial Line BP: ()/(41-58) 107/44      Weight: 80 kg (176 lb 5.9 oz)  Body mass index is 31.24 kg/m².    Intake/Output Summary (Last 24 hours) at 6/28/2023 2124  Last data filed at 6/28/2023 1901  Gross per 24 hour   Intake 1349.1 ml   Output 0 ml   Net 1349.1 ml           Physical Exam  Constitutional:       General: She is not in acute distress.  Eyes:      General:         Right eye: No discharge.         Left eye: No discharge.   Neck:      Vascular: No JVD.   Cardiovascular:      Rate and Rhythm: Normal rate and regular rhythm.   Pulmonary:      Effort: Pulmonary effort is normal.      Breath sounds: Normal breath sounds.   Abdominal:      General: Abdomen is flat. Bowel sounds are normal. There is no distension.      Palpations: Abdomen is soft.      Tenderness: There is no abdominal tenderness.   Musculoskeletal:      Right hand: Swelling present.      Left hand: Swelling present.      Right lower leg: Pitting Edema present.      Left lower leg: Pitting Edema present.      Comments: External fixation on left lower leg   Skin:     General: Skin is warm and moist.      Findings: No rash.   Neurological:      Mental Status: She is alert and oriented to person, place, and time.   Psychiatric:         Attention and Perception: Attention normal.         Mood and Affect: Mood and affect normal.         Speech: Speech normal.           Significant Labs: All pertinent labs within the past 24 hours have been reviewed.    Significant Imaging:  no new imaging      Assessment/Plan:      * Septic shock  Source:  UTI.  Continue systemic antibiotic.  Requiring phenylephrine sometimes.  Will start midodrine.    Type 2 diabetes mellitus, with long-term current use of insulin  Patient's FSGs are controlled on current medication regimen.  Last A1c reviewed-   Lab Results   Component Value Date    HGBA1C 10.2 (H) 06/05/2023     Current correctional scale  Low  Maintain anti-hyperglycemic dose as follows-   Antihyperglycemics (From admission, onward)    Start      Stop Route Frequency Ordered    06/26/23 1052  insulin aspart U-100 pen 0-5 Units         -- SubQ Before meals & nightly PRN 06/26/23 0952    06/25/23 0800  insulin detemir U-100 (Levemir) pen 9 Units         -- SubQ Daily 06/25/23 0757        Hold Oral hypoglycemics while patient is in the hospital.    Hyponatremia  Improved.  Monitor sodium level.  Likely due to renal failure.    Sodium   Date Value Ref Range Status   06/28/2023 132 (L) 136 - 145 mmol/L Final   06/27/2023 131 (L) 136 - 145 mmol/L Final   06/26/2023 130 (L) 136 - 145 mmol/L Final           Hypokalemia  Improved.  Monitor potassium level.  Supplement potassium when needed.    Potassium   Date Value Ref Range Status   06/28/2023 3.7 3.5 - 5.1 mmol/L Final   06/27/2023 3.3 (L) 3.5 - 5.1 mmol/L Final   06/26/2023 3.4 (L) 3.5 - 5.1 mmol/L Final           ATN (acute tubular necrosis)  Patient with acute kidney injury likely due to acute tubular necrosis MYLES is currently improving. Labs reviewed- Renal function/electrolytes with Estimated Creatinine Clearance: 18.9 mL/min (A) (based on SCr of 2.7 mg/dL (H)). according to latest data. Monitor urine output and serial BMP and adjust therapy as needed. Avoid nephrotoxins and renally dose meds for GFR listed above.  I am consulting with nephrology.      Encephalopathy, metabolic  Due to septic shock.  She's more lucid.  The encephalopathy is less severe.  Almost at baseline mental function.      Anemia, chronic disease  Patient's anemia is currently controlled.  Received one unit blood earlier in admission.. Etiology likely d/t chronic disease  Current CBC reviewed-   Lab Results   Component Value Date    HGB 8.6 (L) 06/28/2023    HCT 24.8 (L) 06/28/2023     Monitor serial CBC and transfuse if patient becomes hemodynamically unstable, symptomatic or H/H drops below 7/21.         UTI (urinary tract infection)  Continue systemic antibiotic.  Urine sample sent for culture.  So far, it's growing yeast.   I stopped  the meropenem.  Continue fluconazole.    Antifungals (From admission, onward)    Start     Stop Route Frequency Ordered    06/26/23 2230  fluconazole (DIFLUCAN) IVPB 200 mg        Note to Pharmacy: Ht:    Wt: 80 kg (176 lb 5.9 oz)  Estimated Creatinine Clearance: 17 mL/min (A) (based on SCr of 3 mg/dL (H)).  Body mass index is 31.24 kg/m².    -- IV Every 24 hours (non-standard times) 06/26/23 2116              VTE Risk Mitigation (From admission, onward)         Ordered     enoxaparin injection 30 mg  Every 24 hours         06/27/23 1405     IP VTE HIGH RISK PATIENT  Once         06/27/23 1404     Place sequential compression device  Until discontinued         06/24/23 0432                Discharge Planning   CITLALI: 7/3/2023     Code Status: Full Code   Is the patient medically ready for discharge?:     Reason for patient still in hospital (select all that apply): Patient trending condition, Laboratory test and Treatment  Discharge Plan A: Long-term acute care facility (LTAC)                  Brandon Martin MD  Department of Hospital Medicine   [facility]

## 2023-06-29 NOTE — NURSING
Patient transferred to med surg unit rm 1105. Central line still in place due to no other iv access being available. Order placed for iv nurse to place u/s PIV tomorrow and plan for central line to be removed tomorrow. Passed on to receiving nurse. No signs of distress or changes noted at this time. Family notified of rm change and verbalized understanding.

## 2023-06-29 NOTE — ASSESSMENT & PLAN NOTE
Improved.  Monitor potassium level.  Supplement potassium when needed.    Potassium   Date Value Ref Range Status   06/28/2023 3.7 3.5 - 5.1 mmol/L Final   06/27/2023 3.3 (L) 3.5 - 5.1 mmol/L Final   06/26/2023 3.4 (L) 3.5 - 5.1 mmol/L Final

## 2023-06-29 NOTE — SUBJECTIVE & OBJECTIVE
Interval History:  creatinine less today than what it's been the last 72 hours.  She still has edema in all four extremities, especially her hands.  Has some weeping from the pin sites on her leg.  BP low at times, requiring phenylephrine.  Will start midodrine.    Review of Systems   Constitutional:  Negative for chills and fever.   Respiratory:  Negative for cough and shortness of breath.    Cardiovascular:  Positive for leg swelling. Negative for chest pain.   Gastrointestinal:  Negative for abdominal pain, nausea and vomiting.   Objective:     Vital Signs (Most Recent):  Temp: 98 °F (36.7 °C) (06/28/23 1953)  Pulse: 66 (06/28/23 2000)  Resp: 16 (06/28/23 2000)  BP: (!) 99/55 (06/28/23 2000)  SpO2: 100 % (06/28/23 2000) Vital Signs (24h Range):  Temp:  [97.3 °F (36.3 °C)-98 °F (36.7 °C)] 98 °F (36.7 °C)  Pulse:  [61-78] 66  Resp:  [7-18] 16  SpO2:  [92 %-100 %] 100 %  BP: ()/(50-75) 99/55  Arterial Line BP: ()/(41-58) 107/44     Weight: 80 kg (176 lb 5.9 oz)  Body mass index is 31.24 kg/m².    Intake/Output Summary (Last 24 hours) at 6/28/2023 2124  Last data filed at 6/28/2023 1901  Gross per 24 hour   Intake 1349.1 ml   Output 0 ml   Net 1349.1 ml           Physical Exam  Constitutional:       General: She is not in acute distress.  Eyes:      General:         Right eye: No discharge.         Left eye: No discharge.   Neck:      Vascular: No JVD.   Cardiovascular:      Rate and Rhythm: Normal rate and regular rhythm.   Pulmonary:      Effort: Pulmonary effort is normal.      Breath sounds: Normal breath sounds.   Abdominal:      General: Abdomen is flat. Bowel sounds are normal. There is no distension.      Palpations: Abdomen is soft.      Tenderness: There is no abdominal tenderness.   Musculoskeletal:      Right hand: Swelling present.      Left hand: Swelling present.      Right lower leg: Pitting Edema present.      Left lower leg: Pitting Edema present.      Comments: External fixation on left  lower leg   Skin:     General: Skin is warm and moist.      Findings: No rash.   Neurological:      Mental Status: She is alert and oriented to person, place, and time.   Psychiatric:         Attention and Perception: Attention normal.         Mood and Affect: Mood and affect normal.         Speech: Speech normal.           Significant Labs: All pertinent labs within the past 24 hours have been reviewed.    Significant Imaging:  no new imaging

## 2023-06-29 NOTE — PROGRESS NOTES
INPATIENT NEPHROLOGY PROGRESS NOTE  [facility] NEPHROLOGY    Anastasiia A Justino  06/29/2023    Reason for consultation:  Acute kidney injury    Chief Complaint:   Chief Complaint   Patient presents with    Hypotension     [facility] eval of low blood pressure.            History of Present Illness:    Per H and P    72 y/o F with a past medical history significant for DM2, HTN, femur fracture, tibial fracture and tobacco use, L foot wounds,  high grade stenosis SFA bilaterally and popliteal on the left s/p baloon angioplasty L popliteal artery and left posterior tibial arery on DAPT. Recently discharged from [facility] [place] for fracture of left tibia, s/p ORIF 6/6, also finished antibiotics for acute osteomyelitis of left calcaneus ( Final ID recs with end date for cipro of 6/22 and end date of ancef for 7/17 ). Patient sent from rehab for hypotension. In the ER was in septic shock, e/o UTI and anemic.   6/25  Pt states she feels nauseated and weak.  No sob.  Now bowel complaints.  Denies pain.  Somewhat confused but engages when prompted.  On phenylephrine vasopressor support.  275 cc uop  6/26  acidosis improving, renal same.  525cc UOP recorded.  Will cont IVF but cut rate to 75, f/u labs in am.    6/27  UOP 625cc, on hood, VSS.  Renal function a little worse, acidosis better, hypokalemic-got repletion.  Stopped bicarb gtt, switch to NS.  6/28 SBP , back on hood, on RA, UOP 300cc from holley- asked nurse to flush holley, no diarrhea reported  6/29 VSS, on RA, UOP 425cc, off hood, off NS IVFs, getting IV lasix today    Plan of Care:     Acute kidney injury secondary to hemodynamically mediated renal injury with likely acute tubular necrosis in setting of urosepsis with shock  - renal function improving  - not sure why she is oliguric but renal function is improving  - off hood- transitioned to midodrine  - stop NS IVFs- ok to initiate IV diuresis today  - no nsaids or IV contrast  - dose meds for CrCl <  20  - no acute RRT needs    Hyponatremia  Hypokalemia  HypoMg  Metabolic acidosis--non-gap  Hypocalcemia  - serum Na improved  - will replete K, Mg today  - CO2 normal  - will replete Ca  - phos normal    Anemia  - iron stores low- stool + blood  - s/p pRBC this admission  - so far stable    Renal cyst--complicated cyst   - imaging reviewed- normal kidneys, no hydro     Thank you for allowing us to participate in this patient's care. We will continue to follow.    Vital Signs:  Temp Readings from Last 3 Encounters:   06/29/23 98.4 °F (36.9 °C) (Axillary)   06/14/23 97.6 °F (36.4 °C) (Oral)   06/05/23 98.9 °F (37.2 °C)       Pulse Readings from Last 3 Encounters:   06/29/23 (!) 59   06/14/23 87   06/05/23 84       BP Readings from Last 3 Encounters:   06/29/23 (!) 112/50   06/14/23 (!) 141/65   06/05/23 122/67       Weight:  Wt Readings from Last 3 Encounters:   06/29/23 93.5 kg (206 lb 2.1 oz)   06/25/23 72.6 kg (160 lb)   06/08/23 72.9 kg (160 lb 11.5 oz)     Medications:  No current facility-administered medications on file prior to encounter.     Current Outpatient Medications on File Prior to Encounter   Medication Sig Dispense Refill    acetaminophen (TYLENOL) 500 MG tablet Take 2 tablets (1,000 mg total) by mouth every 8 (eight) hours.  0    aspirin (ECOTRIN) 81 MG EC tablet Take 1 tablet (81 mg total) by mouth 2 (two) times a day. - end date august 30, 2023  0    atorvastatin (LIPITOR) 80 MG tablet Take 1 tablet (80 mg total) by mouth every evening. 90 tablet 3    blood sugar diagnostic Strp To check BG two times daily, to use with insurance preferred meter 200 each 1    blood-glucose meter kit To check BG two times daily, to use with insurance preferred meter 1 each 1    ciprofloxacin HCl (CIPRO) 750 MG tablet Take 1 tablet (750 mg total) by mouth every 12 (twelve) hours. End date 6/22/23      clopidogreL (PLAVIX) 75 mg tablet Take 1 tablet (75 mg total) by mouth once daily. 30 tablet 3    dextrose 5 % in  "water (D5W) 5 % PgBk 50 mL with ceFAZolin 2 gram SolR 2 g Inject 2 g into the vein every 8 (eight) hours. End date 7/17/23  0    famotidine (PEPCID) 20 MG tablet Take 1 tablet (20 mg total) by mouth 2 (two) times daily as needed (Heartburn).      insulin aspart U-100 (NOVOLOG) 100 unit/mL (3 mL) InPn pen Inject 0-5 Units into the skin before meals and at bedtime as needed (Hyperglycemia).  0    insulin aspart U-100 (NOVOLOG) 100 unit/mL (3 mL) InPn pen Inject 6 Units into the skin 3 (three) times daily with meals.  0    insulin detemir U-100, Levemir, 100 unit/mL (3 mL) SubQ InPn pen Inject 18 Units into the skin once daily.  0    lancets [facility] [place] To check BG two times daily, to use with insurance preferred meter 200 each 1    lisinopriL (PRINIVIL,ZESTRIL) 5 MG tablet Take 1 tablet (5 mg total) by mouth once daily. 90 tablet 3    melatonin (MELATIN) 3 mg tablet Take 3 tablets (9 mg total) by mouth nightly.  0    methocarbamoL (ROBAXIN) 500 MG Tab Take 1 tablet (500 mg total) by mouth 4 (four) times daily. 40 tablet 0    ondansetron (ZOFRAN-ODT) 4 MG TbDL Take 1 tablet (4 mg total) by mouth every 8 (eight) hours as needed (nausea).      oxyCODONE (ROXICODONE) 10 mg Tab immediate release tablet Take 1 tablet (10 mg total) by mouth every 4 (four) hours as needed (pain sclae 7-10). 10 tablet 0    oxyCODONE (ROXICODONE) 5 MG immediate release tablet Take 1 tablet (5 mg total) by mouth every 4 (four) hours as needed (pain scale 4-6). 10 tablet 0    pen needle, diabetic (PEN NEEDLE) 31 gauge x 3/16" Ndle 1 application by Misc.(Non-Drug; Combo Route) route 2 (two) times a day. 200 each 0    polyethylene glycol (GLYCOLAX) 17 gram PwPk Take 17 g by mouth once daily.  0    pregabalin (LYRICA) 75 MG capsule Take 1 capsule (75 mg total) by mouth 2 (two) times daily. 60 capsule 0    senna-docusate 8.6-50 mg (PERICOLACE) 8.6-50 mg per tablet Take 1 tablet by mouth once daily.      vitamin D (VITAMIN D3) 1000 units Tab Take 1 tablet " "(1,000 Units total) by mouth once daily.       Scheduled Meds:   amiodarone  200 mg Oral Daily    ceFAZolin (ANCEF) IVPB  2 g Intravenous Q12H    chlorhexidine  15 mL Mouth/Throat BID    enoxparin  30 mg Subcutaneous Q24H (prophylaxis, 1700)    fluconazole (DIFLUCAN) IV (PEDS and ADULTS)  200 mg Intravenous Q24H    insulin detemir U-100  9 Units Subcutaneous Daily    midodrine  5 mg Oral TID WM    mupirocin   Nasal BID     Continuous Infusions:      PRN Meds:.dextrose 50%, dextrose 50%, glucagon (human recombinant), glucose, glucose, HYDROcodone-acetaminophen, insulin aspart U-100, magnesium oxide, magnesium oxide, naloxone, potassium bicarbonate, potassium bicarbonate, potassium bicarbonate, potassium, sodium phosphates, potassium, sodium phosphates, potassium, sodium phosphates, sodium chloride 0.9%    Review of Systems:  Neg    Physical Exam:    BP (!) 112/50   Pulse (!) 59   Temp 98.4 °F (36.9 °C) (Axillary)   Resp (!) 7   Ht 5' 3" (1.6 m)   Wt 93.5 kg (206 lb 2.1 oz)   SpO2 98%   BMI 36.51 kg/m²     General Appearance:    Ill appearing   Head:    Normocephalic, without obvious abnormality, atraumatic   Eyes:    PER, conjunctiva/corneas clear, EOM's intact in both eyes        Throat:   Lips, mucosa, and tongue normal; teeth and gums normal   Back:     Symmetric, no curvature, ROM normal, no CVA tenderness   Lungs:     Clear to auscultation bilaterally, respirations unlabored   Chest wall:    No tenderness or deformity   Heart:    Regular rate and rhythm, S1 and S2 normal, no murmur, rub   or gallop   Abdomen:     Soft, non-tender, bowel sounds active all four quadrants,     no masses, no organomegaly   Extremities:   Edematous    Pulses:   2+ and symmetric all extremities   MSK:   No joint or muscle swelling, tenderness or deformity   Skin:   Skin color, texture, turgor normal, no rashes or lesions   Neurologic:   CNII-XII intact, normal strength and sensation       Throughout.  No flap     Results:  Lab " Results   Component Value Date     (L) 06/29/2023    K 3.8 06/29/2023    CL 99 06/29/2023    CO2 24 06/29/2023    BUN 78 (H) 06/29/2023    CREATININE 2.5 (H) 06/29/2023    CALCIUM 7.0 (L) 06/29/2023    ANIONGAP 8 06/29/2023    ESTGFRAFRICA 78 04/15/2021    EGFRNONAA 67 04/15/2021       Lab Results   Component Value Date    CALCIUM 7.0 (L) 06/29/2023    PHOS 3.7 06/28/2023       Recent Labs   Lab 06/29/23  0308   WBC 19.84*   RBC 2.92*   HGB 8.5*   HCT 25.4*      MCV 87   MCH 29.1   MCHC 33.5       Imaging Results              CT Abdomen Pelvis  Without Contrast (Final result)  Result time 06/24/23 05:34:07      Final result by Allan Fountain DO (06/24/23 05:34:07)                   Narrative:    EXAM DESCRIPTION:  CT ABDOMEN PELVIS WITHOUT CONTRAST  RadLex: CT ABDOMEN PELVIS WITHOUT IV CONTRAST    CLINICAL HISTORY:  Weakness, increased WBC, decreased RBC, hypotensive, MYLES.    TECHNIQUE:  CT of the abdomen and pelvis without intravenous contrast.  All CT scans at this facility use dose modulation, iterative reconstruction, and/or weight based dosing when appropriate to reduce radiation dose to as low as reasonably achievable.    COMPARISON: None.    FINDINGS:    The visualized lower thoracic structures demonstrate minimal bilateral pleural effusions. There is mild bibasilar atelectasis. Coronary artery calcifications are present. There is subcutaneous edema throughout the thorax.    Unenhanced images of the liver, spleen, and adrenal glands appear grossly unremarkable. Pancreas is not well assessed without contrast. Gallbladder is minimally distended. No renal stones are identified. There is a round exophytic lesion off the upper pole right kidney measuring 2.1 cm, with Hounsfield units averaging 59. This may relate to hyperdense cyst. There is mild left hydronephrosis and hydroureter. No definite ureteral or bladder stone is identified. Urinary bladder is decompressed with a Gee catheter in place. There  are atherosclerotic calcifications along the abdominal aorta without evidence for aneurysmal dilatation. No bowel obstruction is seen. There is no free intraperitoneal air. The appendix appears unremarkable. There is colonic diverticulosis without definite CT evidence for acute diverticulitis. There is mild fecal loading throughout the colon. There is minimal free fluid in the posterior pelvis. There is moderate subcutaneous edema throughout the abdomen and pelvis extending into the thighs. There is an intramedullary denzel within the proximal left femur, with a compression screw through the left femoral neck. There is a left subtrochanteric fracture    IMPRESSION:  1.  Minimal gallbladder distention.  2.  Mild left hydroureteronephrosis, without definite ureteral stone seen. Urinary bladder decompressed.  3.  Anasarca.  4.  Colonic diverticulosis without definite CT evidence for acute diverticulitis.  5.  Possible hyperdense cyst at the upper pole right kidney. Follow-up nonemergent renal ultrasound could be performed.  6.  Post surgical changes at the left femur, with subtrochanteric fracture.  7.  Minimal bilateral pleural effusions and mild bibasilar atelectasis.    Electronically signed by:  Allan Fountain DO  6/24/2023 5:34 AM CDT Workstation: [id number]                                     X-Ray Chest AP Portable (Final result)  Result time 06/24/23 08:28:44      Final result by Perfecto Castellanos MD (06/24/23 08:28:44)                   Narrative:    Chest single view    CLINICAL DATA: Sepsis    FINDINGS: AP view shows the heart to be mildly enlarged. The mediastinum is unremarkable. Right-sided PICC line extends to the superior vena cava. There is mild atelectasis at the right lung base, with ill-defined atelectasis or infiltrate at the lung base on the left. There are no significant pleural effusions. No acute osseous abnormality is identified.    IMPRESSION:  1. Atelectasis or infiltrate at the left lung base.  2.  Mild right basilar atelectasis.  3. Mild cardiomegaly.    Electronically signed by:  Perfecto Castellanos MD  6/24/2023 8:28 AM CDT Workstation: [id number]                                        I have personally reviewed pertinent radiological imaging and reports.    Patient care was time spent personally by me on the following activities: > 35 min  Obtaining a history  Examination of patient.  Providing medical care at the patients bedside.  Developing a treatment plan with patient or surrogate and bedside caregivers  Ordering and reviewing laboratory studies, radiographic studies, pulse oximetry.  Ordering and performing treatments and interventions.  Evaluation of patient's response to treatment.  Discussions with consultants while on the unit and immediately available to the patient.  Re-evaluation of the patient's condition.  Documentation in the medical record.     Wily Carranza MD    Nephrology  [facility]  [phone]

## 2023-06-29 NOTE — ASSESSMENT & PLAN NOTE
Continue systemic antibiotic.  Urine sample sent for culture.  So far, it's growing yeast.   I stopped the meropenem.  Continue fluconazole.    Antifungals (From admission, onward)    Start     Stop Route Frequency Ordered    06/26/23 2230  fluconazole (DIFLUCAN) IVPB 200 mg        Note to Pharmacy: Ht:    Wt: 80 kg (176 lb 5.9 oz)  Estimated Creatinine Clearance: 17 mL/min (A) (based on SCr of 3 mg/dL (H)).  Body mass index is 31.24 kg/m².    -- IV Every 24 hours (non-standard times) 06/26/23 2113

## 2023-06-29 NOTE — ASSESSMENT & PLAN NOTE
Source:  UTI.  Continue systemic antibiotic.  Requiring phenylephrine sometimes.  Will start midodrine.

## 2023-06-29 NOTE — ASSESSMENT & PLAN NOTE
Improved.  Monitor sodium level.  Likely due to renal failure.    Sodium   Date Value Ref Range Status   06/28/2023 132 (L) 136 - 145 mmol/L Final   06/27/2023 131 (L) 136 - 145 mmol/L Final   06/26/2023 130 (L) 136 - 145 mmol/L Final

## 2023-06-29 NOTE — PT/OT/SLP EVAL
"Speech Language Pathology Evaluation  Bedside Swallow    Patient Name:  Anastasiia Badillo   MRN:  [id number]  Admitting Diagnosis: Septic shock    Recommendations:                 General Recommendations:  Follow-up not indicated  Diet recommendations:  Minced & Moist Diet - IDDSI Level 5, Thin liquids - IDDSI Level 0   Aspiration Precautions: Assistance with meals, HOB to 90 degrees, and Meds whole 1 at a time   General Precautions: Standard,    Communication strategies:  none    Assessment:     Anastasiia Badillo is a 73 y.o. female with an admitting diagnosis of  Septic shock .  She presents impaired mastication of complex textures due to absence of dentition. No overt s/s aspiration or pharyngeal dysphagia noted during CSE. Rec IDDSI 5- minced and moist diet w/ thin liquids. No further ST warranted at this time.    History:   Per H&P:  "HPI: 74 y/o F with a past medical history significant for DM2, HTN, femur fracture, tibial fracture and tobacco use, L foot wounds,  high grade stenosis SFA bilaterally and popliteal on the left s/p baloon angioplasty L popliteal artery and left posterior tibial arery on DAPT.      Recently discharged from [facility] [place] for fracture of left tibia, s/p ORIF 6/6, also finished antibiotics for acute osteomyelitis of left calcaneus ( Final ID recs with end date for cipro of 6/22 and end date of ancef for 7/17 )     Patient sent from rehab for hypotension.     In the ER was in septic shock, e/o UTI and anemic.      Pt is currently confused and in distress. "    Past Medical History:   Diagnosis Date    Acute osteomyelitis of left calcaneus 6/7/2023    Chronic ulcer of great toe of left foot with fat layer exposed 6/7/2023    Closed left pilon fracture 6/5/2023    Closed left subtrochanteric femur fracture, initial encounter 2/18/2023    Diabetes mellitus, type 2     Essential (primary) hypertension 2/17/2023    Mass of upper outer quadrant of right breast 4/27/2023    Nicotine dependence " 2/20/2023    PAD (peripheral artery disease) 4/22/2023    Tobacco use 6/6/2023    Type 2 diabetes mellitus with hyperglycemia, with long-term current use of insulin 6/6/2023       Past Surgical History:   Procedure Laterality Date    ANGIOGRAPHY OF LOWER EXTREMITY Left 4/25/2023    Procedure: Angiogram Extremity Unilateral;  Surgeon: Gilbert Sheppard MD;  Location: [facility] OR [facility];  Service: Vascular;  Laterality: Left;  28.4 min  487.45 mGy  75.9180 Gycm2  trans radial: 7 ml  dye: 126 ml      APPLICATION, EXTERNAL FIXATION DEVICE Left 6/6/2023    Procedure: APPLICATION, EXTERNAL FIXATION DEVICE, LEFT ANKLE, Kinta;  Surgeon: Gilbert Mahmood MD;  [facility];  Service: Orthopedics;  Laterality: Left;    AUGMENTATION OF BREAST      INTRAMEDULLARY RODDING OF FEMUR Left 2/18/2023    Procedure: INSERTION, INTRAMEDULLARY ROXANNA, FEMUR (hana table -- synthes -- long nail -- have bovie and suction and large bone set);  Surgeon: Keanu Brand MD;  Location: [facility] OR;  Service: Orthopedics;  Laterality: Left;    OPEN REDUCTION AND INTERNAL FIXATION (ORIF) OF PILON FRACTURE Left 6/6/2023    Procedure: ORIF, FRACTURE, PILON, LEFT;  Surgeon: Gilbert Mahmood MD;  [facility];  Service: Orthopedics;  Laterality: Left;    PERCUTANEOUS TRANSLUMINAL ANGIOPLASTY Left 4/25/2023    Procedure: PTA (ANGIOPLASTY, PERCUTANEOUS, TRANSLUMINAL);  Surgeon: Gilbert Sheppard MD;  [facility];  Service: Vascular;  Laterality: Left;  Tibial and popliteal angioplasty       Social History: Patient admitted from [facility].    Prior Intubation HX:  none this admit    Modified Barium Swallow: none found in epic or reported by pt      Chest X-Rays: 6/29/23  Narrative & Impression  Reason: cough Cough     FINDINGS:  Portable chest at 438 compared to 6/24/2023 shows removal of right PICC. Right IJ catheter unchanged. Cardiomediastinal silhouette unchanged. Increased degree of  "patient rotation is evident.     Scattered left mid to lower lung zone alveolar opacities and patchy right lower lung zone opacities are unchanged. New hazy opacities affecting bilateral lung bases suggests layering pleural effusions. Central pulmonary vascular prominence has not significantly changed without evidence of ines pulmonary edema. No pneumothorax.     No acute osseous abnormality.     IMPRESSION:     1. Removal of right PICC.  2. Development of new bibasilar hazy opacity suggesting small layering pleural effusions.  3. Unchanged scattered bibasilar opacities.     Electronically signed by:  Dion Maloney MD  6/29/2023 7:31 AM CDT Workstation: [id number]        Prior diet: regular, thin.    Occupation/hobbies/homemaking: none stated.    Subjective     Pt awake in bed. Agreeable to CSE.  Patient goals: "A miracle"      Pain/Comfort:       Respiratory Status: Room air    Objective:   Alert and oriented to person and situation. Pt reports she has dentures, but only uses upper dentures when eating. Dentures not present during CSE.     Oral Musculature Evaluation  Dentition: edentulous, other (see comments) (sometimes uses upper dentures)  Secretion Management: adequate  Mucosal Quality: adequate  Mandibular Strength and Mobility: other (see comments) (deviation to R)  Oral Labial Strength and Mobility: impaired pursing, functional seal, functional retraction, functional coordination  Lingual Strength and Mobility: impaired strength, functional protrusion, functional anterior elevation, functional lateral movement  Velar Elevation: WFL  Buccal Strength and Mobility: decreased tone  Volitional Cough: elicited; adequate  Volitional Swallow: palpated; adequate laryngeal elevation/excursion  Voice Prior to PO Intake: clear    Bedside Swallow Eval:   Consistencies Assessed:  Thin liquids water via straw  Puree tsp bites pudding  Mixed consistencies tsp bite diced peaches in thin juice      Oral Phase:   Spitting " out of food/liquid- peaches due to lack of dentition/trouble masticating  Adequate oral acceptance and clearance across remaining consistencies    Pharyngeal Phase:   no overt clinical signs/symptoms of aspiration  no overt clinical signs/symptoms of pharyngeal dysphagia    Compensatory Strategies  None    Treatment: Pt educated re purpose of evaluation, role of SLP, results of evaluation, and recs. Pt expressed understanding of recs. Recs shared w/ nursing.      Goals:   Multidisciplinary Problems       SLP Goals       Not on file                    Plan:     Patient to be seen:      Plan of Care expires:     Plan of Care reviewed with:  patient, other (see comments) (nursing)   SLP Follow-Up:  No       Discharge recommendations:      Barriers to Discharge:  None    Time Tracking:     SLP Treatment Date:   06/29/23  Speech Start Time:  1032  Speech Stop Time:  1050     Speech Total Time (min):  18 min    Billable Minutes: Eval Swallow and Oral Function 18 min    06/29/2023

## 2023-06-29 NOTE — ASSESSMENT & PLAN NOTE
Due to septic shock.  She's more lucid.  The encephalopathy is less severe.  Almost at baseline mental function.

## 2023-06-29 NOTE — PT/OT/SLP EVAL
Physical Therapy Evaluation    Patient Name:  Anastasiia Badillo   MRN:  [id number]    Recommendations:     Discharge Recommendations:   Return to LTAC  Discharge Equipment Recommendations:   TBD  Barriers to discharge: Decreased caregiver support and Requirement of Max A x2 for bed mobility    Assessment:     Anastasiia Badillo is a 73 y.o. female admitted with a medical diagnosis of Septic shock.  She presents with the following impairments/functional limitations: weakness, impaired endurance, impaired self care skills, impaired functional mobility, gait instability, impaired balance, impaired cognition, decreased coordination, decreased safety awareness, decreased ROM, orthopedic precautions .    Rehab Prognosis:  guarded ; patient would benefit from acute skilled PT services to address these deficits and reach maximum level of function.    Recent Surgery: Procedure(s) (LRB):  Cardioversion or Defibrillation (N/A) 5 Days Post-Op    Plan:     During this hospitalization, patient to be seen 3 x/week to address the identified rehab impairments via therapeutic activities, therapeutic exercises and progress toward the following goals:    Plan of Care Expires:   7/29/2023    Subjective     Chief Complaint: L LE external fixator discomfort  Patient/Family Comments/goals: Return to LTAC  Pain/Comfort:       Patients cultural, spiritual, Religion conflicts given the current situation:      Living Environment:  [facility] with  sustained L tibia fracture . Pt t/f'ed to [facility] from an LTAC  Prior to admission, patients level of function was possibly Total A x2. .  Equipment used at home: bedside commode, wheelchair, grab bar, walker, standard.  DME owned (not currently used): none.  Upon discharge, patient will have assistance from facility.    Objective:     Communicated with nurse prior to session.  Patient found supine with bed alarm, telemetry, pulse ox (continuous)   "upon PT entry to room.    General Precautions: Standard, fall  Orthopedic Precautions:LLE non weight bearing   Braces:    Respiratory Status: Room air    Exams:  Cognitive Exam:  Patient is oriented to Person  RLE ROM: WFL  RLE Strength: WFL  LLE ROM: limited with placement  of external fixator  LLE Strength: Deficits: immobilized     Functional Mobility:  Bed Mobility:     Rolling Left:  maximal assistance and of 2 persons  Rolling Right: maximal assistance and of 2 persons  Supine to Sit: maximal assistance and of 2 persons  Sit to Supine: maximal assistance and of 2  persons  Balance: Mod/Max A for sitting at EOB for less than 1 minute      AM-PAC 6 CLICK MOBILITY  Total Score:8       Treatment & Education:  Pt was educated on use of call light, the importance of functional mobility  training. Pt was confused and was a poor historian responding to many questions with " don't know."    Patient left supine with all lines intact, call button in reach, and bed alarm on.    GOALS:   Multidisciplinary Problems       Physical Therapy Goals          Problem: Physical Therapy    Goal Priority Disciplines Outcome Goal Variances Interventions   Physical Therapy Goal     PT, PT/OT      Description: Goals to be met by: 2023     Patient will increase functional independence with mobility by performin. Supine to sit with Maximum Assistance  2. Rolling to Left and Right with Moderate Assistance.  3. Sitting at edge of bed x15  minutes with Minimal Assistance                         History:     Past Medical History:   Diagnosis Date    Acute osteomyelitis of left calcaneus 2023    Chronic ulcer of great toe of left foot with fat layer exposed 2023    Closed left pilon fracture 2023    Closed left subtrochanteric femur fracture, initial encounter 2023    Diabetes mellitus, type 2     Essential (primary) hypertension 2023    Mass of upper outer quadrant of right breast 2023    Nicotine " dependence 2/20/2023    PAD (peripheral artery disease) 4/22/2023    Tobacco use 6/6/2023    Type 2 diabetes mellitus with hyperglycemia, with long-term current use of insulin 6/6/2023       Past Surgical History:   Procedure Laterality Date    ANGIOGRAPHY OF LOWER EXTREMITY Left 4/25/2023    Procedure: Angiogram Extremity Unilateral;  Surgeon: Gilbert Sheppard MD;  Location: [facility] OR [facility];  Service: Vascular;  Laterality: Left;  28.4 min  487.45 mGy  75.9180 Gycm2  trans radial: 7 ml  dye: 126 ml      APPLICATION, EXTERNAL FIXATION DEVICE Left 6/6/2023    Procedure: APPLICATION, EXTERNAL FIXATION DEVICE, LEFT ANKLE, [place];  Surgeon: Gilbert Mahmood MD;  [facility];  Service: Orthopedics;  Laterality: Left;    AUGMENTATION OF BREAST      INTRAMEDULLARY RODDING OF FEMUR Left 2/18/2023    Procedure: INSERTION, INTRAMEDULLARY ROXANNA, FEMUR (hana table -- synthes -- long nail -- have bovie and suction and large bone set);  Surgeon: Keanu Brand MD;  Location: [facility] OR;  Service: Orthopedics;  Laterality: Left;    OPEN REDUCTION AND INTERNAL FIXATION (ORIF) OF PILON FRACTURE Left 6/6/2023    Procedure: ORIF, FRACTURE, PILON, LEFT;  Surgeon: Gilbert Mahmood MD;  [facility];  Service: Orthopedics;  Laterality: Left;    PERCUTANEOUS TRANSLUMINAL ANGIOPLASTY Left 4/25/2023    Procedure: PTA (ANGIOPLASTY, PERCUTANEOUS, TRANSLUMINAL);  Surgeon: Gilbert Sheppard MD;  [facility];  Service: Vascular;  Laterality: Left;  Tibial and popliteal angioplasty       Time Tracking:     PT Received On:    PT Start Time: 1130     PT Stop Time: 1145  PT Total Time (min): 15 min     Billable Minutes: Evaluation 7 minutes  and Therapeutic Activity 8 minutes      06/29/2023

## 2023-06-29 NOTE — PT/OT/SLP PROGRESS
Occupational Therapy      Patient Name:  Anastasiia Badillo   MRN:  [id number]    Patient not seen today secondary to difficulty staying awake after having worked with PT. Will follow-up 6/30/23.    6/29/2023

## 2023-06-29 NOTE — ASSESSMENT & PLAN NOTE
Patient with acute kidney injury likely due to acute tubular necrosis MYLES is currently improving. Labs reviewed- Renal function/electrolytes with Estimated Creatinine Clearance: 18.9 mL/min (A) (based on SCr of 2.7 mg/dL (H)). according to latest data. Monitor urine output and serial BMP and adjust therapy as needed. Avoid nephrotoxins and renally dose meds for GFR listed above.  I am consulting with nephrology.

## 2023-06-29 NOTE — ASSESSMENT & PLAN NOTE
Patient's anemia is currently controlled.  Received one unit blood earlier in admission.. Etiology likely d/t chronic disease  Current CBC reviewed-   Lab Results   Component Value Date    HGB 8.6 (L) 06/28/2023    HCT 24.8 (L) 06/28/2023     Monitor serial CBC and transfuse if patient becomes hemodynamically unstable, symptomatic or H/H drops below 7/21.

## 2023-06-29 NOTE — CARE UPDATE
06/28/23 1938   Patient Assessment/Suction   Level of Consciousness (AVPU) alert   Respiratory Effort Normal;Unlabored   Expansion/Accessory Muscles/Retractions expansion symmetric;no use of accessory muscles;no retractions   All Lung Fields Breath Sounds clear   Rhythm/Pattern, Respiratory no shortness of breath reported;depth regular;pattern regular;unlabored   Cough Frequency incessant   PRE-TX-O2   Device (Oxygen Therapy) room air   Pulse Oximetry Type Continuous   $ Pulse Oximetry - Multiple Charge Pulse Oximetry - Multiple   Aerosol Therapy   $ Aerosol Therapy Charges PRN treatment not required   Respiratory Treatment Status (SVN) PRN treatment not required   Education   $ Education 15 min;Bronchodilator

## 2023-06-30 LAB
ANION GAP SERPL CALC-SCNC: 9 MMOL/L (ref 8–16)
ANISOCYTOSIS BLD QL SMEAR: SLIGHT
BACTERIA CATH TIP CULT: NO GROWTH
BASOPHILS # BLD AUTO: 0.07 K/UL (ref 0–0.2)
BASOPHILS NFR BLD: 0.4 % (ref 0–1.9)
BUN SERPL-MCNC: 77 MG/DL (ref 8–23)
CALCIUM SERPL-MCNC: 7.3 MG/DL (ref 8.7–10.5)
CHLORIDE SERPL-SCNC: 98 MMOL/L (ref 95–110)
CO2 SERPL-SCNC: 23 MMOL/L (ref 23–29)
CREAT SERPL-MCNC: 2.7 MG/DL (ref 0.5–1.4)
DIFFERENTIAL METHOD: ABNORMAL
EOSINOPHIL # BLD AUTO: 0.2 K/UL (ref 0–0.5)
EOSINOPHIL NFR BLD: 0.9 % (ref 0–8)
ERYTHROCYTE [DISTWIDTH] IN BLOOD BY AUTOMATED COUNT: 17 % (ref 11.5–14.5)
EST. GFR  (NO RACE VARIABLE): 18.2 ML/MIN/1.73 M^2
GIANT PLATELETS BLD QL SMEAR: PRESENT
GLUCOSE SERPL-MCNC: 58 MG/DL (ref 70–110)
GLUCOSE SERPL-MCNC: 67 MG/DL (ref 70–110)
GLUCOSE SERPL-MCNC: 73 MG/DL (ref 70–110)
GLUCOSE SERPL-MCNC: 75 MG/DL (ref 70–110)
GLUCOSE SERPL-MCNC: 77 MG/DL (ref 70–110)
GLUCOSE SERPL-MCNC: 83 MG/DL (ref 70–110)
HCT VFR BLD AUTO: 24.1 % (ref 37–48.5)
HGB BLD-MCNC: 8.2 G/DL (ref 12–16)
HYPOCHROMIA BLD QL SMEAR: ABNORMAL
IMM GRANULOCYTES # BLD AUTO: 0.8 K/UL (ref 0–0.04)
IMM GRANULOCYTES NFR BLD AUTO: 4.4 % (ref 0–0.5)
LACTATE SERPL-SCNC: 0.9 MMOL/L (ref 0.5–1.9)
LYMPHOCYTES # BLD AUTO: 1.1 K/UL (ref 1–4.8)
LYMPHOCYTES NFR BLD: 6 % (ref 18–48)
MCH RBC QN AUTO: 28.9 PG (ref 27–31)
MCHC RBC AUTO-ENTMCNC: 34 G/DL (ref 32–36)
MCV RBC AUTO: 85 FL (ref 82–98)
MONOCYTES # BLD AUTO: 1 K/UL (ref 0.3–1)
MONOCYTES NFR BLD: 5.2 % (ref 4–15)
NEUTROPHILS # BLD AUTO: 15.3 K/UL (ref 1.8–7.7)
NEUTROPHILS NFR BLD: 83.1 % (ref 38–73)
NRBC BLD-RTO: 0 /100 WBC
PLATELET # BLD AUTO: 251 K/UL (ref 150–450)
PLATELET BLD QL SMEAR: ABNORMAL
PMV BLD AUTO: 12.9 FL (ref 9.2–12.9)
POIKILOCYTOSIS BLD QL SMEAR: SLIGHT
POLYCHROMASIA BLD QL SMEAR: ABNORMAL
POTASSIUM SERPL-SCNC: 4 MMOL/L (ref 3.5–5.1)
RBC # BLD AUTO: 2.84 M/UL (ref 4–5.4)
SODIUM SERPL-SCNC: 130 MMOL/L (ref 136–145)
TSH SERPL DL<=0.005 MIU/L-ACNC: 2.25 UIU/ML (ref 0.34–5.6)
WBC # BLD AUTO: 18.36 K/UL (ref 3.9–12.7)

## 2023-06-30 PROCEDURE — 85025 COMPLETE CBC W/AUTO DIFF WBC: CPT | Performed by: HOSPITALIST

## 2023-06-30 PROCEDURE — 83605 ASSAY OF LACTIC ACID: CPT | Performed by: INTERNAL MEDICINE

## 2023-06-30 PROCEDURE — 25000003 PHARM REV CODE 250: Performed by: HOSPITALIST

## 2023-06-30 PROCEDURE — 84443 ASSAY THYROID STIM HORMONE: CPT | Performed by: INTERNAL MEDICINE

## 2023-06-30 PROCEDURE — 80048 BASIC METABOLIC PNL TOTAL CA: CPT | Performed by: HOSPITALIST

## 2023-06-30 PROCEDURE — 97530 THERAPEUTIC ACTIVITIES: CPT | Mod: CQ

## 2023-06-30 PROCEDURE — 97530 THERAPEUTIC ACTIVITIES: CPT

## 2023-06-30 PROCEDURE — 97166 OT EVAL MOD COMPLEX 45 MIN: CPT

## 2023-06-30 PROCEDURE — 12000002 HC ACUTE/MED SURGE SEMI-PRIVATE ROOM

## 2023-06-30 PROCEDURE — 87040 BLOOD CULTURE FOR BACTERIA: CPT | Performed by: INTERNAL MEDICINE

## 2023-06-30 PROCEDURE — 36415 COLL VENOUS BLD VENIPUNCTURE: CPT | Performed by: INTERNAL MEDICINE

## 2023-06-30 PROCEDURE — 63600175 PHARM REV CODE 636 W HCPCS: Performed by: HOSPITALIST

## 2023-06-30 PROCEDURE — 99900035 HC TECH TIME PER 15 MIN (STAT)

## 2023-06-30 PROCEDURE — 94761 N-INVAS EAR/PLS OXIMETRY MLT: CPT

## 2023-06-30 PROCEDURE — 82947 ASSAY GLUCOSE BLOOD QUANT: CPT | Performed by: INTERNAL MEDICINE

## 2023-06-30 RX ADMIN — ENOXAPARIN SODIUM 30 MG: 30 INJECTION SUBCUTANEOUS at 04:06

## 2023-06-30 RX ADMIN — CHLORHEXIDINE GLUCONATE 15 ML: 1.2 RINSE ORAL at 09:06

## 2023-06-30 RX ADMIN — Medication 16 G: at 04:06

## 2023-06-30 RX ADMIN — CEFAZOLIN SODIUM 2 G: 2 SOLUTION INTRAVENOUS at 03:06

## 2023-06-30 RX ADMIN — CHLORHEXIDINE GLUCONATE 15 ML: 1.2 RINSE ORAL at 10:06

## 2023-06-30 RX ADMIN — CEFAZOLIN SODIUM 2 G: 2 SOLUTION INTRAVENOUS at 02:06

## 2023-06-30 RX ADMIN — FLUCONAZOLE 200 MG: 2 INJECTION, SOLUTION INTRAVENOUS at 09:06

## 2023-06-30 RX ADMIN — MIDODRINE HYDROCHLORIDE 5 MG: 2.5 TABLET ORAL at 10:06

## 2023-06-30 RX ADMIN — MUPIROCIN 1 G: 20 OINTMENT TOPICAL at 09:06

## 2023-06-30 RX ADMIN — MIDODRINE HYDROCHLORIDE 5 MG: 2.5 TABLET ORAL at 04:06

## 2023-06-30 RX ADMIN — AMIODARONE HYDROCHLORIDE 200 MG: 200 TABLET ORAL at 10:06

## 2023-06-30 RX ADMIN — INSULIN DETEMIR 9 UNITS: 100 INJECTION, SOLUTION SUBCUTANEOUS at 10:06

## 2023-06-30 RX ADMIN — MUPIROCIN 1 G: 20 OINTMENT TOPICAL at 10:06

## 2023-06-30 RX ADMIN — HYDROCODONE BITARTRATE AND ACETAMINOPHEN 1 TABLET: 5; 325 TABLET ORAL at 10:06

## 2023-06-30 RX ADMIN — MIDODRINE HYDROCHLORIDE 5 MG: 2.5 TABLET ORAL at 12:06

## 2023-06-30 NOTE — SUBJECTIVE & OBJECTIVE
Interval History:  kidney function improving.  BP improved.  Edema continues.  We need to stop the IVF and dose Lasix.    Review of Systems   Constitutional:  Negative for chills and fever.   Respiratory:  Negative for cough and shortness of breath.    Cardiovascular:  Positive for leg swelling. Negative for chest pain.   Gastrointestinal:  Negative for abdominal pain, nausea and vomiting.   Objective:     Vital Signs (Most Recent):  Temp: 98.6 °F (37 °C) (06/29/23 1101)  Pulse: 60 (06/29/23 1700)  Resp: 12 (06/29/23 1700)  BP: (!) 114/53 (06/29/23 1700)  SpO2: 97 % (06/29/23 1700) Vital Signs (24h Range):  Temp:  [97.7 °F (36.5 °C)-98.6 °F (37 °C)] 98.6 °F (37 °C)  Pulse:  [59-67] 60  Resp:  [7-22] 12  SpO2:  [96 %-100 %] 97 %  BP: ()/(50-75) 114/53  Arterial Line BP: (106-141)/(44-56) 132/51     Weight: 93.5 kg (206 lb 2.1 oz)  Body mass index is 36.51 kg/m².    Intake/Output Summary (Last 24 hours) at 6/29/2023 1957  Last data filed at 6/29/2023 1749  Gross per 24 hour   Intake 1820 ml   Output 875 ml   Net 945 ml           Physical Exam  Constitutional:       General: She is not in acute distress.  Eyes:      General:         Right eye: No discharge.         Left eye: No discharge.   Neck:      Vascular: No JVD.   Cardiovascular:      Rate and Rhythm: Normal rate and regular rhythm.   Pulmonary:      Effort: Pulmonary effort is normal.      Breath sounds: Normal breath sounds.   Abdominal:      General: Abdomen is flat. Bowel sounds are normal. There is no distension.      Palpations: Abdomen is soft.      Tenderness: There is no abdominal tenderness.   Musculoskeletal:      Right hand: Swelling present.      Left hand: Swelling present.      Right lower leg: Pitting Edema present.      Left lower leg: Pitting Edema present.      Comments: External fixation on left lower leg   Skin:     General: Skin is warm and moist.      Findings: No rash.   Neurological:      Mental Status: She is alert and oriented to  person, place, and time.   Psychiatric:         Attention and Perception: Attention normal.         Mood and Affect: Mood and affect normal.         Speech: Speech normal.           Significant Labs: All pertinent labs within the past 24 hours have been reviewed.    Significant Imaging:  no new imaging

## 2023-06-30 NOTE — ASSESSMENT & PLAN NOTE
Patient's anemia is currently controlled.  Received one unit blood earlier in admission.. Etiology likely d/t chronic disease  Current CBC reviewed-   Lab Results   Component Value Date    HGB 8.5 (L) 06/29/2023    HCT 25.4 (L) 06/29/2023     Monitor serial CBC and transfuse if patient becomes hemodynamically unstable, symptomatic or H/H drops below 7/21.

## 2023-06-30 NOTE — PT/OT/SLP EVAL
Occupational Therapy   Evaluation    Name: Anastasiia Badillo  MRN: [id number]  Admitting Diagnosis: Septic shock  Recent Surgery: Procedure(s) (LRB):  Cardioversion or Defibrillation (N/A) 6 Days Post-Op    Recommendations:     Discharge Recommendations: LTACH (long-term acute care [facility])  Discharge Equipment Recommendations:  none  Barriers to discharge:  Decreased caregiver support    Assessment:     Anastasiia Badillo is a 73 y.o. female with a medical diagnosis of Septic shock.  She presents with general weakness. Performance deficits affecting function: weakness, impaired endurance, impaired self care skills, impaired functional mobility, gait instability, impaired sensation, impaired balance, impaired cognition, decreased upper extremity function, decreased lower extremity function, decreased safety awareness.      Rehab Prognosis: Fair; patient would benefit from acute skilled OT services to address these deficits and reach maximum level of function.       Plan:     Patient to be seen 3 x/week to address the above listed problems via self-care/home management, therapeutic activities, therapeutic exercises  Plan of Care Expires: 07/30/23  Plan of Care Reviewed with: patient    Subjective     Chief Complaint: general weakness  Patient/Family Comments/goals: improved functional mobility and ADL independence.    Occupational Profile:  Living Environment: lives in a Senior living apartment. Admitted to [facility] from LTAC.  Previous level of function: Maximal to total assistance for functional mobility and ADLs since fall, fracturing left leg.   Roles and Routines: primary homemaker  Equipment Used at Home: bedside commode, wheelchair, grab bar, walker, standard  Assistance upon Discharge: Limited support    Pain/Comfort:  Pain Rating 1: 0/10  Pain Rating Post-Intervention 1: 0/10    Patients cultural, spiritual, Amish conflicts given the current situation: no    Objective:     Communicated with: nurse prior to  session.  Patient found HOB elevated with telemetry, peripheral IV, holley catheter, central line upon OT entry to room.    General Precautions: Standard, fall  Orthopedic Precautions: LLE non weight bearing  Braces:  (LLE External Fixator)  Respiratory Status: Room air    Occupational Performance:    Bed Mobility:    Performed long sitting x3 trials with maximal assistance bed level.    Activities of Daily Living:  Grooming: minimum assistance to wash face bed level with HOB elevated 30 degrees.    Cognitive/Visual Perceptual:  Cognitive/Psychosocial Skills:     -       Oriented to: Person   -       Follows Commands/attention:Follows one-step commands  -       Communication: clear/fluent  -       Memory: Impaired STM  -       Safety awareness/insight to disability: impaired   -       Mood/Affect/Coping skills/emotional control: Cooperative and Pleasant  Visual/Perceptual:      -Intact Acuity    Physical Exam:  Upper Extremity Range of Motion:     -       Right Upper Extremity: WFL  -       Left Upper Extremity: WFL  Upper Extremity Strength:    -       Right Upper Extremity: 3+/5  -       Left Upper Extremity: 3/5   Strength:    -       Right Upper Extremity: fair  -       Left Upper Extremity: fair  Fine Motor Coordination:    -       Intact    AMPAC 6 Click ADL:  AMPAC Total Score: 13    Treatment & Education:  Patient educated on the purpose of Occupational Therapy and the importance of getting OOB.    Patient left HOB elevated with all lines intact, call button in reach, and bed alarm on    GOALS:   Multidisciplinary Problems       Occupational Therapy Goals          Problem: Occupational Therapy    Goal Priority Disciplines Outcome Interventions   Occupational Therapy Goal     OT, PT/OT     Description: Goals to be met by: 7/30/2023     Patient will increase functional independence with ADLs by performing:    UE Dressing with Minimal Assistance.  Grooming while seated with Stand-by Assistance.  Sitting at  edge of bed x10 minutes with Contact Guard Assistance.  Supine to sit with Minimal Assistance.  Upper extremity exercise program x10 reps per handout, with assistance as needed.                         History:     Past Medical History:   Diagnosis Date    Acute osteomyelitis of left calcaneus 6/7/2023    Chronic ulcer of great toe of left foot with fat layer exposed 6/7/2023    Closed left pilon fracture 6/5/2023    Closed left subtrochanteric femur fracture, initial encounter 2/18/2023    Diabetes mellitus, type 2     Essential (primary) hypertension 2/17/2023    Mass of upper outer quadrant of right breast 4/27/2023    Nicotine dependence 2/20/2023    PAD (peripheral artery disease) 4/22/2023    Tobacco use 6/6/2023    Type 2 diabetes mellitus with hyperglycemia, with long-term current use of insulin 6/6/2023         Past Surgical History:   Procedure Laterality Date    ANGIOGRAPHY OF LOWER EXTREMITY Left 4/25/2023    Procedure: Angiogram Extremity Unilateral;  Surgeon: Gilbert Sheppard MD;  Location: [facility] OR [address] [place] [address];  Service: Vascular;  Laterality: Left;  28.4 min  487.45 mGy  75.9180 Gycm2  trans radial: 7 ml  dye: 126 ml      APPLICATION, EXTERNAL FIXATION DEVICE Left 6/6/2023    Procedure: APPLICATION, EXTERNAL FIXATION DEVICE, LEFT ANKLE, [place];  Surgeon: Gilbert Mahmood MD;  Location: [facility] OR [address] [place] [address];  Service: Orthopedics;  Laterality: Left;    AUGMENTATION OF BREAST      INTRAMEDULLARY RODDING OF FEMUR Left 2/18/2023    Procedure: INSERTION, INTRAMEDULLARY ROXANNA, FEMUR (hana table -- synthes -- long nail -- have bovie and suction and large bone set);  Surgeon: Keanu Brand MD;  Location: [facility];  Service: Orthopedics;  Laterality: Left;    OPEN REDUCTION AND INTERNAL FIXATION (ORIF) OF PILON FRACTURE Left 6/6/2023    Procedure: ORIF, FRACTURE, PILON, LEFT;  Surgeon: Gilbert Mahmood MD;  Location: [facility] OR [address] [place] [address];  Service: Orthopedics;  Laterality: Left;    PERCUTANEOUS  TRANSLUMINAL ANGIOPLASTY Left 4/25/2023    Procedure: PTA (ANGIOPLASTY, PERCUTANEOUS, TRANSLUMINAL);  Surgeon: Gilbert Sheppard MD;  Location: [facility] OR [address] [place] [address];  Service: Vascular;  Laterality: Left;  Tibial and popliteal angioplasty       Time Tracking:     OT Date of Treatment: 06/30/23  OT Start Time: 1120  OT Stop Time: 1135  OT Total Time (min): 15 min    Billable Minutes:Evaluation 2  Therapeutic Activity 13    6/30/2023

## 2023-06-30 NOTE — PROGRESS NOTES
INPATIENT NEPHROLOGY PROGRESS NOTE  [facility] NEPHROLOGY    Anastasiia COLLIN Justino  06/30/2023    Reason for consultation:  Acute kidney injury    Chief Complaint:   Chief Complaint   Patient presents with    Hypotension     [facility] from [facility] for eval of low blood pressure.            History of Present Illness:    Per H and P    72 y/o F with a past medical history significant for DM2, HTN, femur fracture, tibial fracture and tobacco use, L foot wounds,  high grade stenosis SFA bilaterally and popliteal on the left s/p baloon angioplasty L popliteal artery and left posterior tibial arery on DAPT. Recently discharged from [facility] for fracture of left tibia, s/p ORIF 6/6, also finished antibiotics for acute osteomyelitis of left calcaneus ( Final ID recs with end date for cipro of 6/22 and end date of ancef for 7/17 ). Patient sent from rehab for hypotension. In the ER was in septic shock, e/o UTI and anemic.   6/25  Pt states she feels nauseated and weak.  No sob.  Now bowel complaints.  Denies pain.  Somewhat confused but engages when prompted.  On phenylephrine vasopressor support.  275 cc uop  6/26  acidosis improving, renal same.  525cc UOP recorded.  Will cont IVF but cut rate to 75, f/u labs in am.    6/27  UOP 625cc, on hood, VSS.  Renal function a little worse, acidosis better, hypokalemic-got repletion.  Stopped bicarb gtt, switch to NS.  6/28 SBP , back on hood, on RA, UOP 300cc from holley- asked nurse to flush holley, no diarrhea reported  6/29 VSS, on RA, UOP 425cc, off hood, off NS IVFs, getting IV lasix today  6/30 to SNF today, UOP 450cc    Plan of Care:     Acute kidney injury secondary to hemodynamically mediated renal injury with likely acute tubular necrosis in setting of urosepsis with shock  - renal function in a stable range  - no nsaids or IV contrast  - dose meds for CrCl < 20  - no acute RRT needs    Hypotension  Hypervolemia  - continue midodrine  - ok with PRN diuretics  - optimize  nutrition to reduce third spacing    Hyponatremia  Hypokalemia  HypoMg  Metabolic acidosis--non-gap  Hypocalcemia  - serum Na improved  - K, Mg improved  - CO2 normal  - Ca improved  - phos normal    Anemia  - iron stores low- stool + blood  - s/p pRBC this admission  - so far stable    Renal cyst--complicated cyst   - imaging reviewed- normal kidneys, no hydro     Thank you for allowing us to participate in this patient's care. We will continue to follow.    Vital Signs:  Temp Readings from Last 3 Encounters:   06/30/23 97.5 °F (36.4 °C)   06/14/23 97.6 °F (36.4 °C) (Oral)   06/05/23 98.9 °F (37.2 °C)       Pulse Readings from Last 3 Encounters:   06/30/23 65   06/14/23 87   06/05/23 84       BP Readings from Last 3 Encounters:   06/30/23 (!) 101/51   06/14/23 (!) 141/65   06/05/23 122/67       Weight:  Wt Readings from Last 3 Encounters:   06/29/23 93.5 kg (206 lb 2.1 oz)   06/25/23 72.6 kg (160 lb)   06/08/23 72.9 kg (160 lb 11.5 oz)     Medications:  No current facility-administered medications on file prior to encounter.     Current Outpatient Medications on File Prior to Encounter   Medication Sig Dispense Refill    acetaminophen (TYLENOL) 500 MG tablet Take 2 tablets (1,000 mg total) by mouth every 8 (eight) hours.  0    aspirin (ECOTRIN) 81 MG EC tablet Take 1 tablet (81 mg total) by mouth 2 (two) times a day. - end date august 30, 2023  0    atorvastatin (LIPITOR) 80 MG tablet Take 1 tablet (80 mg total) by mouth every evening. 90 tablet 3    blood sugar diagnostic Strp To check BG two times daily, to use with insurance preferred meter 200 each 1    blood-glucose meter kit To check BG two times daily, to use with insurance preferred meter 1 each 1    ciprofloxacin HCl (CIPRO) 750 MG tablet Take 1 tablet (750 mg total) by mouth every 12 (twelve) hours. End date 6/22/23      clopidogreL (PLAVIX) 75 mg tablet Take 1 tablet (75 mg total) by mouth once daily. 30 tablet 3    dextrose 5 % in water (D5W) 5 % PgBk  "50 mL with ceFAZolin 2 gram SolR 2 g Inject 2 g into the vein every 8 (eight) hours. End date 7/17/23  0    famotidine (PEPCID) 20 MG tablet Take 1 tablet (20 mg total) by mouth 2 (two) times daily as needed (Heartburn).      insulin aspart U-100 (NOVOLOG) 100 unit/mL (3 mL) InPn pen Inject 0-5 Units into the skin before meals and at bedtime as needed (Hyperglycemia).  0    insulin aspart U-100 (NOVOLOG) 100 unit/mL (3 mL) InPn pen Inject 6 Units into the skin 3 (three) times daily with meals.  0    insulin detemir U-100, Levemir, 100 unit/mL (3 mL) SubQ InPn pen Inject 18 Units into the skin once daily.  0    lancets [facility] To check BG two times daily, to use with insurance preferred meter 200 each 1    lisinopriL (PRINIVIL,ZESTRIL) 5 MG tablet Take 1 tablet (5 mg total) by mouth once daily. 90 tablet 3    melatonin (MELATIN) 3 mg tablet Take 3 tablets (9 mg total) by mouth nightly.  0    methocarbamoL (ROBAXIN) 500 MG Tab Take 1 tablet (500 mg total) by mouth 4 (four) times daily. 40 tablet 0    ondansetron (ZOFRAN-ODT) 4 MG TbDL Take 1 tablet (4 mg total) by mouth every 8 (eight) hours as needed (nausea).      oxyCODONE (ROXICODONE) 10 mg Tab immediate release tablet Take 1 tablet (10 mg total) by mouth every 4 (four) hours as needed (pain sclae 7-10). 10 tablet 0    oxyCODONE (ROXICODONE) 5 MG immediate release tablet Take 1 tablet (5 mg total) by mouth every 4 (four) hours as needed (pain scale 4-6). 10 tablet 0    pen needle, diabetic (PEN NEEDLE) 31 gauge x 3/16" Ndle 1 application by Misc.(Non-Drug; Combo Route) route 2 (two) times a day. 200 each 0    polyethylene glycol (GLYCOLAX) 17 gram PwPk Take 17 g by mouth once daily.  0    pregabalin (LYRICA) 75 MG capsule Take 1 capsule (75 mg total) by mouth 2 (two) times daily. 60 capsule 0    senna-docusate 8.6-50 mg (PERICOLACE) 8.6-50 mg per tablet Take 1 tablet by mouth once daily.      vitamin D (VITAMIN D3) 1000 units Tab Take 1 tablet (1,000 Units total) by " "mouth once daily.       Scheduled Meds:   amiodarone  200 mg Oral Daily    ceFAZolin (ANCEF) IVPB  2 g Intravenous Q12H    chlorhexidine  15 mL Mouth/Throat BID    enoxparin  30 mg Subcutaneous Q24H (prophylaxis, 1700)    fluconazole (DIFLUCAN) IV (PEDS and ADULTS)  200 mg Intravenous Q24H    insulin detemir U-100  9 Units Subcutaneous Daily    midodrine  5 mg Oral TID WM    mupirocin   Nasal BID     Continuous Infusions:      PRN Meds:.dextrose 50%, dextrose 50%, glucagon (human recombinant), glucose, glucose, HYDROcodone-acetaminophen, insulin aspart U-100, magnesium oxide, magnesium oxide, naloxone, potassium bicarbonate, potassium bicarbonate, potassium bicarbonate, potassium, sodium phosphates, potassium, sodium phosphates, potassium, sodium phosphates, sodium chloride 0.9%    Review of Systems:  Neg    Physical Exam:    BP (!) 101/51   Pulse 65   Temp 97.5 °F (36.4 °C)   Resp 16   Ht 5' 3" (1.6 m)   Wt 93.5 kg (206 lb 2.1 oz)   SpO2 (!) 94%   BMI 36.51 kg/m²     General Appearance:    Ill appearing   Head:    Normocephalic, without obvious abnormality, atraumatic   Eyes:    PER, conjunctiva/corneas clear, EOM's intact in both eyes        Throat:   Lips, mucosa, and tongue normal; teeth and gums normal   Back:     Symmetric, no curvature, ROM normal, no CVA tenderness   Lungs:     Clear to auscultation bilaterally, respirations unlabored   Chest wall:    No tenderness or deformity   Heart:    Regular rate and rhythm, S1 and S2 normal, no murmur, rub   or gallop   Abdomen:     Soft, non-tender, bowel sounds active all four quadrants,     no masses, no organomegaly   Extremities:   Edematous    Pulses:   2+ and symmetric all extremities   MSK:   No joint or muscle swelling, tenderness or deformity   Skin:   Skin color, texture, turgor normal, no rashes or lesions   Neurologic:   CNII-XII intact, normal strength and sensation       Throughout.  No flap     Results:  Lab Results   Component Value Date    NA " 130 (L) 06/30/2023    K 4.0 06/30/2023    CL 98 06/30/2023    CO2 23 06/30/2023    BUN 77 (H) 06/30/2023    CREATININE 2.7 (H) 06/30/2023    CALCIUM 7.3 (L) 06/30/2023    ANIONGAP 9 06/30/2023    ESTGFRAFRICA 78 04/15/2021    EGFRNONAA 67 04/15/2021       Lab Results   Component Value Date    CALCIUM 7.3 (L) 06/30/2023    PHOS 3.7 06/28/2023       Recent Labs   Lab 06/30/23  0839   WBC 18.36*   RBC 2.84*   HGB 8.2*   HCT 24.1*      MCV 85   MCH 28.9   MCHC 34.0       Imaging Results              CT Abdomen Pelvis  Without Contrast (Final result)  Result time 06/24/23 05:34:07      Final result by Allan Fountain DO (06/24/23 05:34:07)                   Narrative:    EXAM DESCRIPTION:  CT ABDOMEN PELVIS WITHOUT CONTRAST  RadLex: CT ABDOMEN PELVIS WITHOUT IV CONTRAST    CLINICAL HISTORY:  Weakness, increased WBC, decreased RBC, hypotensive, MYLES.    TECHNIQUE:  CT of the abdomen and pelvis without intravenous contrast.  All CT scans at this facility use dose modulation, iterative reconstruction, and/or weight based dosing when appropriate to reduce radiation dose to as low as reasonably achievable.    COMPARISON: None.    FINDINGS:    The visualized lower thoracic structures demonstrate minimal bilateral pleural effusions. There is mild bibasilar atelectasis. Coronary artery calcifications are present. There is subcutaneous edema throughout the thorax.    Unenhanced images of the liver, spleen, and adrenal glands appear grossly unremarkable. Pancreas is not well assessed without contrast. Gallbladder is minimally distended. No renal stones are identified. There is a round exophytic lesion off the upper pole right kidney measuring 2.1 cm, with Hounsfield units averaging 59. This may relate to hyperdense cyst. There is mild left hydronephrosis and hydroureter. No definite ureteral or bladder stone is identified. Urinary bladder is decompressed with a Gee catheter in place. There are atherosclerotic calcifications  along the abdominal aorta without evidence for aneurysmal dilatation. No bowel obstruction is seen. There is no free intraperitoneal air. The appendix appears unremarkable. There is colonic diverticulosis without definite CT evidence for acute diverticulitis. There is mild fecal loading throughout the colon. There is minimal free fluid in the posterior pelvis. There is moderate subcutaneous edema throughout the abdomen and pelvis extending into the thighs. There is an intramedullary denzel within the proximal left femur, with a compression screw through the left femoral neck. There is a left subtrochanteric fracture    IMPRESSION:  1.  Minimal gallbladder distention.  2.  Mild left hydroureteronephrosis, without definite ureteral stone seen. Urinary bladder decompressed.  3.  Anasarca.  4.  Colonic diverticulosis without definite CT evidence for acute diverticulitis.  5.  Possible hyperdense cyst at the upper pole right kidney. Follow-up nonemergent renal ultrasound could be performed.  6.  Post surgical changes at the left femur, with subtrochanteric fracture.  7.  Minimal bilateral pleural effusions and mild bibasilar atelectasis.    Electronically signed by:  Allan Fountain DO  6/24/2023 5:34 AM CDT Workstation: [id number]                                     X-Ray Chest AP Portable (Final result)  Result time 06/24/23 08:28:44      Final result by Perfecto Castellanos MD (06/24/23 08:28:44)                   Narrative:    Chest single view    CLINICAL DATA: Sepsis    FINDINGS: AP view shows the heart to be mildly enlarged. The mediastinum is unremarkable. Right-sided PICC line extends to the superior vena cava. There is mild atelectasis at the right lung base, with ill-defined atelectasis or infiltrate at the lung base on the left. There are no significant pleural effusions. No acute osseous abnormality is identified.    IMPRESSION:  1. Atelectasis or infiltrate at the left lung base.  2. Mild right basilar  atelectasis.  3. Mild cardiomegaly.    Electronically signed by:  Perfecto Castellanos MD  6/24/2023 8:28 AM CDT Workstation: [id number]                                        I have personally reviewed pertinent radiological imaging and reports.    Patient care was time spent personally by me on the following activities: > 35 min  Obtaining a history  Examination of patient.  Providing medical care at the patients bedside.  Developing a treatment plan with patient or surrogate and bedside caregivers  Ordering and reviewing laboratory studies, radiographic studies, pulse oximetry.  Ordering and performing treatments and interventions.  Evaluation of patient's response to treatment.  Discussions with consultants while on the unit and immediately available to the patient.  Re-evaluation of the patient's condition.  Documentation in the medical record.     Wily Carranza MD    Nephrology  [facility] [place]  [phone]

## 2023-06-30 NOTE — PROGRESS NOTES
[facility]  Adult Nutrition   Progress Note (Follow-Up)    SUMMARY      Recommendations:   1. Continue current diet as tolerated.  2. Discontinue Glucerna as patient does not drink. Send only Unjury.  3. RD provided plate method and A1c handout with contact information as pt was asleep.     Goals:   Goals: Pt to meet 100% of her energy and protein needs.    Dietitian Rounds Brief  Patient eating well and drinks Unjury. Pt does not drink Glucerna (RD noted several on bedside table). Last BM 12/29/23. RD to monitor for intake, labs, and status change.    Diet order: Dysphagia Mechanical Soft (IDDSI Level 5).    Oral Supplement: Glucerna daily and Unjury BID    % Intake of Estimated Energy Needs: 50 - 75 %  % Meal Intake: 50 - 75 %    Estimated/Assessed Needs  Weight Used For Calorie Calculations: 80 kg (176 lb 5.9 oz)  Energy Calorie Requirements (kcal): 7708-3924 kcals/day (20-25 kcals/kg ABW)  Energy Need Method: Kcal/kg  Protein Requirements:  g/day (1.5-2 g/kg IBW)  Weight Used For Protein Calculations: 52 kg (114 lb 10.2 oz)     Estimated Fluid Requirement Method: RDA Method  RDA Method (mL): 1600       Weight History:  Wt Readings from Last 5 Encounters:   06/29/23 93.5 kg (206 lb 2.1 oz)   06/25/23 72.6 kg (160 lb)   06/08/23 72.9 kg (160 lb 11.5 oz)   06/05/23 72.6 kg (160 lb)   05/24/23 72.6 kg (160 lb)        Reason for Assessment  Reason For Assessment: consult  Diagnosis: other (see comments) (UTI)  Relevant Medical History: Diabetes mellitus, type 2, Closed left pilon fracture, Closed left subtrochanteric femur fracture, initial encounter, Acute osteomyelitis of left calcaneus, Essential (primary) hypertension, PAD (peripheral artery disease), Tobacco use, Mass of upper outer quadrant of right breast, Type 2 diabetes mellitus with hyperglycemia, with long-term current use of insulin, Chronic ulcer of great toe of left foot with fat layer exposed, Nicotine dependence  Interdisciplinary  Rounds: attended    Medications:Pertinent Medications Reviewed  Scheduled Meds:   amiodarone  200 mg Oral Daily    ceFAZolin (ANCEF) IVPB  2 g Intravenous Q12H    chlorhexidine  15 mL Mouth/Throat BID    enoxparin  30 mg Subcutaneous Q24H (prophylaxis, 1700)    fluconazole (DIFLUCAN) IV (PEDS and ADULTS)  200 mg Intravenous Q24H    insulin detemir U-100  9 Units Subcutaneous Daily    midodrine  5 mg Oral TID WM    mupirocin   Nasal BID     Continuous Infusions:  PRN Meds:.dextrose 50%, dextrose 50%, glucagon (human recombinant), glucose, glucose, HYDROcodone-acetaminophen, insulin aspart U-100, magnesium oxide, magnesium oxide, naloxone, potassium bicarbonate, potassium bicarbonate, potassium bicarbonate, potassium, sodium phosphates, potassium, sodium phosphates, potassium, sodium phosphates, sodium chloride 0.9%    Labs: Pertinent Labs Reviewed  Clinical Chemistry:  Recent Labs   Lab 06/26/23  0332 06/26/23  1834 06/28/23  0152 06/29/23  0308 06/30/23  0613   *  132*   < >  --  131* 130*   K 3.5  3.5   < >  --  3.8 4.0     101   < >  --  99 98   CO2 19*  19*   < >  --  24 23   *  195*   < >  --  136* 77   BUN 86*  86*   < >  --  78* 77*   CREATININE 3.1*  3.1*   < >  --  2.5* 2.7*   CALCIUM 6.8*  6.8*   < >  --  7.0* 7.3*   PROT 4.8*  --   --   --   --    ALBUMIN 1.5*  --   --   --   --    BILITOT 1.0  --   --   --   --    ALKPHOS 75  --   --   --   --    AST 33  --   --   --   --    ALT <5*  --   --   --   --    ANIONGAP 12  12   < >  --  8 9   MG  --    < >  --  1.6  --    PHOS  --   --  3.7  --   --     < > = values in this interval not displayed.     CBC:   Recent Labs   Lab 06/30/23  0839   WBC 18.36*   RBC 2.84*   HGB 8.2*   HCT 24.1*      MCV 85   MCH 28.9   MCHC 34.0     Lipid Panel:  No results for input(s): CHOL, HDL, LDLCALC, TRIG, CHOLHDL in the last 168 hours.  Cardiac Profile:  Recent Labs   Lab 06/24/23  0121 06/26/23  0332   *  --    CPK  --  47      Inflammatory Labs:  No results for input(s): CRP in the last 168 hours.  Diabetes:  No results for input(s): HGBA1C, POCTGLUCOSE in the last 168 hours.  Thyroid & Parathyroid:  Recent Labs   Lab 06/30/23  0613   TSH 2.250       Monitor and Evaluation  Food and Nutrient Intake: food and beverage intake, energy intake  Food and Nutrient Adminstration: diet order  Knowledge/Beliefs/Attitudes: food and nutrition knowledge/skill, beliefs and attitudes  Physical Activity and Function: nutrition-related ADLs and IADLs, factors affecting access to physical activity  Anthropometric Measurements: weight, weight change, body mass index  Biochemical Data, Medical Tests and Procedures: lipid profile, inflammatory profile, glucose/endocrine profile, gastrointestinal profile, electrolyte and renal panel  Nutrition-Focused Physical Findings: overall appearance     Nutrition Risk  Level of Risk/Frequency of Follow-up: moderate - high     Nutrition Follow-Up  RD Follow-up?: Yes    Rae Darnell RD 06/30/2023 1:55 PM

## 2023-06-30 NOTE — ASSESSMENT & PLAN NOTE
Improved.  Monitor potassium level.  Supplement potassium when needed.    Potassium   Date Value Ref Range Status   06/29/2023 3.8 3.5 - 5.1 mmol/L Final   06/28/2023 3.7 3.5 - 5.1 mmol/L Final   06/27/2023 3.3 (L) 3.5 - 5.1 mmol/L Final

## 2023-06-30 NOTE — PT/OT/SLP PROGRESS
Physical Therapy Treatment    Patient Name:  Anastasiia Badillo   MRN:  [id number]    Recommendations:     Discharge Recommendations: LTACH (long-term acute care hospital)  Discharge Equipment Recommendations: to be determined by next level of care  Barriers to discharge:  increased assist with mobility, orthopedic precautions    Assessment:     Anastasiia Badillo is a 73 y.o. female admitted with a medical diagnosis of Septic shock.  She presents with the following impairments/functional limitations: weakness, impaired endurance, impaired self care skills, impaired functional mobility, gait instability, impaired balance, impaired cognition, decreased coordination, decreased safety awareness, decreased ROM, orthopedic precautions.    Pt agreeable to visit. Pt confused with difficulty following commands. Pt required max to total assist x 2 for bed mobility with therapist providing most assist for LLE due to external fixator. Pt required max to total assist for sitting EOB and only able to maintain for 10-15 seconds before trying to lie straight back requiring total assist to return to supine.    Rehab Prognosis: Fair; patient would benefit from acute skilled PT services to address these deficits and reach maximum level of function.    Recent Surgery: Procedure(s) (LRB):  Cardioversion or Defibrillation (N/A) 6 Days Post-Op    Plan:     During this hospitalization, patient to be seen 3 x/week to address the identified rehab impairments via therapeutic activities, therapeutic exercises and progress toward the following goals:    Plan of Care Expires:       Subjective     Chief Complaint: LLE pain  Patient/Family Comments/goals: none verbalized  Pain/Comfort:  Pain Rating 1: other (see comments) (not rated)  Location - Side 1: Left  Location 1: ankle  Pain Addressed 1: Cessation of Activity  Pain Rating Post-Intervention 1: other (see comments) (not rated)      Objective:     Communicated with RN prior to session.   Patient found HOB elevated with bed alarm, telemetry (gale hugger) upon PT entry to room.     General Precautions: Standard, fall  Orthopedic Precautions: LLE non weight bearing  Braces:    Respiratory Status: Room air     Functional Mobility:  Bed Mobility:     Supine to Sit: maximal assistance and of 2 persons  Sit to Supine: total assistance and of 2 persons  Balance: sitting EOB mod-maxA 10-15 seconds before trying to lie straight back down      AM-PAC 6 CLICK MOBILITY          Treatment & Education:  Pt educated on importance of time OOB, importance of intermittent mobility, safe techniques for transfers/ambulation, discharge recommendations/options, and use of call light for assistance and fall prevention.      Patient left HOB elevated with all lines intact, call button in reach, bed alarm on, and RN notified..    GOALS:   Multidisciplinary Problems       Physical Therapy Goals          Problem: Physical Therapy    Goal Priority Disciplines Outcome Goal Variances Interventions   Physical Therapy Goal     PT, PT/OT      Description: Goals to be met by: 2023     Patient will increase functional independence with mobility by performin. Supine to sit with Maximum Assistance  2. Rolling to Left and Right with Moderate Assistance.  3. Sitting at edge of bed x15  minutes with Minimal Assistance                         Time Tracking:     PT Received On: 23  PT Start Time: 903     PT Stop Time: 922  PT Total Time (min): 19 min     Billable Minutes: Therapeutic Activity 19    Treatment Type: Treatment  PT/PTA: PTA     Number of PTA visits since last PT visit: 2023

## 2023-06-30 NOTE — PLAN OF CARE
06/30/23 1418   Post-Acute Status   Post-Acute Authorization Placement   Post-Acute Placement Status Pending payor review/awaiting authorization (if required)     ASH submitted for auth to return pt to LT[facility].  Cm to continue to follow.

## 2023-06-30 NOTE — PROGRESS NOTES
Critical access hospital Medicine  Progress Note    Patient Name: Anastasiia Badillo  MRN: [id number]  Patient Class: IP- Inpatient   Admission Date: 6/24/2023  Length of Stay: 5 days  Attending Physician: Brandon Martin MD  Primary Care Provider: Raji Parkinson MD        Subjective:     Principal Problem:Septic shock        HPI:  72 y/o F with a past medical history significant for DM2, HTN, femur fracture, tibial fracture and tobacco use, L foot wounds,  high grade stenosis SFA bilaterally and popliteal on the left s/p baloon angioplasty L popliteal artery and left posterior tibial arery on DAPT.      Recently discharged from [facility] [place] for fracture of left tibia, s/p ORIF 6/6, also finished antibiotics for acute osteomyelitis of left calcaneus ( Final ID recs with end date for cipro of 6/22 and end date of ancef for 7/17 )     Patient sent from rehab for hypotension.     In the ER was in septic shock, e/o UTI and anemic.       Overview/Hospital Course:  No notes on file    Interval History:  kidney function improving.  BP improved.  Edema continues.  We need to stop the IVF and dose Lasix.    Review of Systems   Constitutional:  Negative for chills and fever.   Respiratory:  Negative for cough and shortness of breath.    Cardiovascular:  Positive for leg swelling. Negative for chest pain.   Gastrointestinal:  Negative for abdominal pain, nausea and vomiting.   Objective:     Vital Signs (Most Recent):  Temp: 98.6 °F (37 °C) (06/29/23 1101)  Pulse: 60 (06/29/23 1700)  Resp: 12 (06/29/23 1700)  BP: (!) 114/53 (06/29/23 1700)  SpO2: 97 % (06/29/23 1700) Vital Signs (24h Range):  Temp:  [97.7 °F (36.5 °C)-98.6 °F (37 °C)] 98.6 °F (37 °C)  Pulse:  [59-67] 60  Resp:  [7-22] 12  SpO2:  [96 %-100 %] 97 %  BP: ()/(50-75) 114/53  Arterial Line BP: (106-141)/(44-56) 132/51     Weight: 93.5 kg (206 lb 2.1 oz)  Body mass index is 36.51 kg/m².    Intake/Output Summary (Last 24 hours) at 6/29/2023 1957  Last data  filed at 6/29/2023 1749  Gross per 24 hour   Intake 1820 ml   Output 875 ml   Net 945 ml           Physical Exam  Constitutional:       General: She is not in acute distress.  Eyes:      General:         Right eye: No discharge.         Left eye: No discharge.   Neck:      Vascular: No JVD.   Cardiovascular:      Rate and Rhythm: Normal rate and regular rhythm.   Pulmonary:      Effort: Pulmonary effort is normal.      Breath sounds: Normal breath sounds.   Abdominal:      General: Abdomen is flat. Bowel sounds are normal. There is no distension.      Palpations: Abdomen is soft.      Tenderness: There is no abdominal tenderness.   Musculoskeletal:      Right hand: Swelling present.      Left hand: Swelling present.      Right lower leg: Pitting Edema present.      Left lower leg: Pitting Edema present.      Comments: External fixation on left lower leg   Skin:     General: Skin is warm and moist.      Findings: No rash.   Neurological:      Mental Status: She is alert and oriented to person, place, and time.   Psychiatric:         Attention and Perception: Attention normal.         Mood and Affect: Mood and affect normal.         Speech: Speech normal.           Significant Labs: All pertinent labs within the past 24 hours have been reviewed.    Significant Imaging:  no new imaging      Assessment/Plan:      Type 2 diabetes mellitus, with long-term current use of insulin  Patient's FSGs are controlled on current medication regimen.  Last A1c reviewed-   Lab Results   Component Value Date    HGBA1C 10.2 (H) 06/05/2023     Current correctional scale  Low  Maintain anti-hyperglycemic dose as follows-   Antihyperglycemics (From admission, onward)    Start     Stop Route Frequency Ordered    06/26/23 1052  insulin aspart U-100 pen 0-5 Units         -- SubQ Before meals & nightly PRN 06/26/23 0952    06/25/23 0800  insulin detemir U-100 (Levemir) pen 9 Units         -- SubQ Daily 06/25/23 0757        Hold Oral  hypoglycemics while patient is in the hospital.    Hyponatremia  Improved.  Monitor sodium level.  Likely due to renal failure.    Sodium   Date Value Ref Range Status   06/29/2023 131 (L) 136 - 145 mmol/L Final   06/28/2023 132 (L) 136 - 145 mmol/L Final   06/27/2023 131 (L) 136 - 145 mmol/L Final           Hypokalemia  Improved.  Monitor potassium level.  Supplement potassium when needed.    Potassium   Date Value Ref Range Status   06/29/2023 3.8 3.5 - 5.1 mmol/L Final   06/28/2023 3.7 3.5 - 5.1 mmol/L Final   06/27/2023 3.3 (L) 3.5 - 5.1 mmol/L Final           ATN (acute tubular necrosis)  Patient with acute kidney injury likely due to acute tubular necrosis MYLES is currently improving. Labs reviewed- Renal function/electrolytes with Estimated Creatinine Clearance: 22.1 mL/min (A) (based on SCr of 2.5 mg/dL (H)). according to latest data. Monitor urine output and serial BMP and adjust therapy as needed. Avoid nephrotoxins and renally dose meds for GFR listed above.  I am consulting with nephrology.      Encephalopathy, metabolic  Due to septic shock.  She's more lucid.  The encephalopathy is less severe.  Almost at baseline mental function.      Anemia, chronic disease  Patient's anemia is currently controlled.  Received one unit blood earlier in admission.. Etiology likely d/t chronic disease  Current CBC reviewed-   Lab Results   Component Value Date    HGB 8.5 (L) 06/29/2023    HCT 25.4 (L) 06/29/2023     Monitor serial CBC and transfuse if patient becomes hemodynamically unstable, symptomatic or H/H drops below 7/21.         UTI (urinary tract infection)  Continue systemic antibiotic.  Urine sample sent for culture.  So far, it's growing yeast.   I stopped the meropenem.  Continue fluconazole.    Antifungals (From admission, onward)    Start     Stop Route Frequency Ordered    06/26/23 2230  fluconazole (DIFLUCAN) IVPB 200 mg        Note to Pharmacy: Ht:    Wt: 80 kg (176 lb 5.9 oz)  Estimated Creatinine  Clearance: 17 mL/min (A) (based on SCr of 3 mg/dL (H)).  Body mass index is 31.24 kg/m².    -- IV Every 24 hours (non-standard times) 06/26/23 2116              VTE Risk Mitigation (From admission, onward)         Ordered     enoxaparin injection 30 mg  Every 24 hours         06/27/23 1405     IP VTE HIGH RISK PATIENT  Once         06/27/23 1404     Place sequential compression device  Until discontinued         06/24/23 0432                Discharge Planning   CITLALI: 7/3/2023     Code Status: Full Code   Is the patient medically ready for discharge?:     Reason for patient still in hospital (select all that apply): Patient trending condition, Laboratory test, Treatment and Consult recommendations  Discharge Plan A: Long-term acute care facility (LTAC)                  Brandon Martin MD  Department of Hospital Medicine   [facility]

## 2023-06-30 NOTE — ASSESSMENT & PLAN NOTE
Patient with acute kidney injury likely due to acute tubular necrosis MYLES is currently improving. Labs reviewed- Renal function/electrolytes with Estimated Creatinine Clearance: 22.1 mL/min (A) (based on SCr of 2.5 mg/dL (H)). according to latest data. Monitor urine output and serial BMP and adjust therapy as needed. Avoid nephrotoxins and renally dose meds for GFR listed above.  I am consulting with nephrology.

## 2023-06-30 NOTE — ASSESSMENT & PLAN NOTE
Improved.  Monitor sodium level.  Likely due to renal failure.    Sodium   Date Value Ref Range Status   06/29/2023 131 (L) 136 - 145 mmol/L Final   06/28/2023 132 (L) 136 - 145 mmol/L Final   06/27/2023 131 (L) 136 - 145 mmol/L Final

## 2023-06-30 NOTE — PROGRESS NOTES
[facility] Medicine  Progress Note    Patient Name: Anastasiia Badillo  MRN: [id number]  Patient Class: IP- Inpatient   Admission Date: 6/24/2023  Length of Stay: 6 days  Attending Physician: Chris Frias MD  Primary Care Provider: Raji Parkinson MD        Subjective:     Principal Problem:Septic shock    Interval History:  Patient came in to my service today  Patient was planned to discharge to LTAC  Renal function near baseline at on think it will get better, appeared to be new normal.  Postop wound appeared to be edematous and weeping but no signs of infection.  Blood pressure has been stable.  Hemoglobin low side but stable, WBC is not back to normal but a lot better from the past.  Urine culture with Candida tropicalis more than 100,000 colony.  Blood culture negative    On examination, patient is slightly confused, unsure the baseline  Nobody is anasarca.  Surprisingly BNP is minimal.  Acute echo ejection fraction 58% and normal left ventricular diastolic function, moderate pulmonary hypertension        Review of Systems  Objective:     Vital Signs (Most Recent):  Temp: 97.3 °F (36.3 °C) (06/30/23 1300)  Pulse: 71 (06/30/23 1300)  Resp: 18 (06/30/23 1300)  BP: 120/79 (06/30/23 1300)  SpO2: (!) 93 % (06/30/23 1300) Vital Signs (24h Range):  Temp:  [94.2 °F (34.6 °C)-97.5 °F (36.4 °C)] 97.3 °F (36.3 °C)  Pulse:  [59-71] 71  Resp:  [10-20] 18  SpO2:  [93 %-98 %] 93 %  BP: (101-144)/(51-79) 120/79     Weight: 93.5 kg (206 lb 2.1 oz)  Body mass index is 36.51 kg/m².    Intake/Output Summary (Last 24 hours) at 6/30/2023 1337  Last data filed at 6/30/2023 1015  Gross per 24 hour   Intake 100 ml   Output 450 ml   Net -350 ml      Physical Exam    General: Patient resting comfortably in no acute distress. Appears as stated age. Calm  Eyes: EOM intact. No conjunctivae injection. No scleral icterus.  ENT: Hearing grossly intact. No discharge from ears. No nasal discharge.   CVS: RRR. No LE  edema BL.  Lungs: CTA BL, no wheezing or crackles. Good breath sounds. No accessory muscle use. No acute respiratory distress  Neuro:  , Follows commands. Mild confusion       Significant Labs: All pertinent labs within the past 24 hours have been reviewed.  CBC:   Recent Labs   Lab 06/29/23  0308 06/30/23  0839   WBC 19.84* 18.36*   HGB 8.5* 8.2*   HCT 25.4* 24.1*    251     CMP:   Recent Labs   Lab 06/29/23  0308 06/30/23  0613   * 130*   K 3.8 4.0   CL 99 98   CO2 24 23   * 77   BUN 78* 77*   CREATININE 2.5* 2.7*   CALCIUM 7.0* 7.3*   ANIONGAP 8 9     Cardiac Markers: No results for input(s): CKMB, MYOGLOBIN, BNP, TROPISTAT in the last 48 hours.    Significant Imaging: I have reviewed all pertinent imaging results/findings within the past 24 hours.    Assessment/Plan:      Active Diagnoses:    Diagnosis Date Noted POA    Type 2 diabetes mellitus, with long-term current use of insulin [E11.9, Z79.4] 06/27/2023 Not Applicable    Hypokalemia [E87.6] 06/25/2023 Yes    Hyponatremia [E87.1] 06/25/2023 Yes    UTI (urinary tract infection) [N39.0] 06/24/2023 Yes    Anemia, chronic disease [D63.8] 06/24/2023 Yes    Encephalopathy, metabolic [G93.41] 06/24/2023 Yes    ATN (acute tubular necrosis) [N17.0] 06/24/2023 Yes      Problems Resolved During this Admission:    Diagnosis Date Noted Date Resolved POA    PRINCIPAL PROBLEM:  Septic shock [A41.9, R65.21] 04/19/2023 06/29/2023 Yes    Normal anion gap metabolic acidosis [E87.20] 06/25/2023 06/27/2023 Yes    DKA, type 2 [E11.10] 06/24/2023 06/27/2023 No     PLAN   Continue antifungal for urinary tract infection  Was some discontinue cefazolin will give total 7 days   Hyponatremia is only mild and continue to monitor  Renal function 2.7 and may try to give 1 more dose of Lasix patient is totally and anasarca and weeping all over the body        Follow nephrology and blood test renal function can be repeated as outpatient   Patient is possible baseline  mental status   Patient is status post 1 unit of blood transfusion and close monitor hemoglobin    He LTAC placement awaited and awaited otherwise stable .  Can medically clear if patient is going  Really need DVT prophylaxis, lovenox   PT and OT.  And known baseline functional status at home    VTE Risk Mitigation (From admission, onward)           Ordered     enoxaparin injection 30 mg  Every 24 hours         06/27/23 1405     IP VTE HIGH RISK PATIENT  Once         06/27/23 1404     Place sequential compression device  Until discontinued         06/24/23 4772                       Chris Frias MD  Department of Hospital Medicine   [facility]

## 2023-06-30 NOTE — PLAN OF CARE
Problem: Skin Injury Risk Increased  Goal: Skin Health and Integrity  Intervention: Promote and Optimize Oral Intake  Flowsheets (Taken 6/30/2023 1412)  Oral Nutrition Promotion: calorie-dense liquids provided     Problem: Oral Intake Inadequate  Goal: Improved Oral Intake  Outcome: Ongoing, Progressing  Intervention: Promote and Optimize Oral Intake  Flowsheets (Taken 6/30/2023 1412)  Oral Nutrition Promotion: calorie-dense liquids provided    Discontinued Glucerna daily as patient does not drink.

## 2023-07-01 LAB
ANION GAP SERPL CALC-SCNC: 9 MMOL/L (ref 8–16)
BACTERIA SPEC AEROBE CULT: ABNORMAL
BACTERIA SPEC AEROBE CULT: ABNORMAL
BASOPHILS # BLD AUTO: 0.04 K/UL (ref 0–0.2)
BASOPHILS NFR BLD: 0.2 % (ref 0–1.9)
BUN SERPL-MCNC: 73 MG/DL (ref 8–23)
CALCIUM SERPL-MCNC: 7.6 MG/DL (ref 8.7–10.5)
CHLORIDE SERPL-SCNC: 99 MMOL/L (ref 95–110)
CO2 SERPL-SCNC: 25 MMOL/L (ref 23–29)
CREAT SERPL-MCNC: 2.6 MG/DL (ref 0.5–1.4)
DIFFERENTIAL METHOD: ABNORMAL
EOSINOPHIL # BLD AUTO: 0.2 K/UL (ref 0–0.5)
EOSINOPHIL NFR BLD: 0.8 % (ref 0–8)
ERYTHROCYTE [DISTWIDTH] IN BLOOD BY AUTOMATED COUNT: 17.2 % (ref 11.5–14.5)
EST. GFR  (NO RACE VARIABLE): 19 ML/MIN/1.73 M^2
GLUCOSE SERPL-MCNC: 111 MG/DL (ref 70–110)
GLUCOSE SERPL-MCNC: 114 MG/DL (ref 70–110)
GLUCOSE SERPL-MCNC: 68 MG/DL (ref 70–110)
GLUCOSE SERPL-MCNC: 90 MG/DL (ref 70–110)
GLUCOSE SERPL-MCNC: 92 MG/DL (ref 70–110)
GRAM STN SPEC: ABNORMAL
HCT VFR BLD AUTO: 24.5 % (ref 37–48.5)
HGB BLD-MCNC: 8.3 G/DL (ref 12–16)
IMM GRANULOCYTES # BLD AUTO: 0.89 K/UL (ref 0–0.04)
IMM GRANULOCYTES NFR BLD AUTO: 4.5 % (ref 0–0.5)
LYMPHOCYTES # BLD AUTO: 1.2 K/UL (ref 1–4.8)
LYMPHOCYTES NFR BLD: 5.9 % (ref 18–48)
MCH RBC QN AUTO: 28.9 PG (ref 27–31)
MCHC RBC AUTO-ENTMCNC: 33.9 G/DL (ref 32–36)
MCV RBC AUTO: 85 FL (ref 82–98)
MONOCYTES # BLD AUTO: 0.9 K/UL (ref 0.3–1)
MONOCYTES NFR BLD: 4.4 % (ref 4–15)
NEUTROPHILS # BLD AUTO: 16.7 K/UL (ref 1.8–7.7)
NEUTROPHILS NFR BLD: 84.2 % (ref 38–73)
NRBC BLD-RTO: 0 /100 WBC
PLATELET # BLD AUTO: 267 K/UL (ref 150–450)
PMV BLD AUTO: 12.3 FL (ref 9.2–12.9)
POTASSIUM SERPL-SCNC: 4.3 MMOL/L (ref 3.5–5.1)
RBC # BLD AUTO: 2.87 M/UL (ref 4–5.4)
SODIUM SERPL-SCNC: 133 MMOL/L (ref 136–145)
WBC # BLD AUTO: 19.84 K/UL (ref 3.9–12.7)

## 2023-07-01 PROCEDURE — 63600175 PHARM REV CODE 636 W HCPCS: Performed by: HOSPITALIST

## 2023-07-01 PROCEDURE — 36556 INSERT NON-TUNNEL CV CATH: CPT

## 2023-07-01 PROCEDURE — 85025 COMPLETE CBC W/AUTO DIFF WBC: CPT | Performed by: HOSPITALIST

## 2023-07-01 PROCEDURE — 12000002 HC ACUTE/MED SURGE SEMI-PRIVATE ROOM

## 2023-07-01 PROCEDURE — 25000003 PHARM REV CODE 250: Performed by: HOSPITALIST

## 2023-07-01 PROCEDURE — 80048 BASIC METABOLIC PNL TOTAL CA: CPT | Performed by: HOSPITALIST

## 2023-07-01 PROCEDURE — 94761 N-INVAS EAR/PLS OXIMETRY MLT: CPT

## 2023-07-01 RX ADMIN — MIDODRINE HYDROCHLORIDE 5 MG: 2.5 TABLET ORAL at 10:07

## 2023-07-01 RX ADMIN — CEFAZOLIN SODIUM 2 G: 2 SOLUTION INTRAVENOUS at 01:07

## 2023-07-01 RX ADMIN — INSULIN DETEMIR 9 UNITS: 100 INJECTION, SOLUTION SUBCUTANEOUS at 10:07

## 2023-07-01 RX ADMIN — MIDODRINE HYDROCHLORIDE 5 MG: 2.5 TABLET ORAL at 12:07

## 2023-07-01 RX ADMIN — CEFAZOLIN SODIUM 2 G: 2 SOLUTION INTRAVENOUS at 02:07

## 2023-07-01 RX ADMIN — HYDROCODONE BITARTRATE AND ACETAMINOPHEN 1 TABLET: 5; 325 TABLET ORAL at 02:07

## 2023-07-01 RX ADMIN — FLUCONAZOLE 200 MG: 2 INJECTION, SOLUTION INTRAVENOUS at 10:07

## 2023-07-01 RX ADMIN — AMIODARONE HYDROCHLORIDE 200 MG: 200 TABLET ORAL at 10:07

## 2023-07-01 RX ADMIN — HYDROCODONE BITARTRATE AND ACETAMINOPHEN 1 TABLET: 5; 325 TABLET ORAL at 09:07

## 2023-07-01 RX ADMIN — ENOXAPARIN SODIUM 30 MG: 30 INJECTION SUBCUTANEOUS at 05:07

## 2023-07-01 RX ADMIN — MIDODRINE HYDROCHLORIDE 5 MG: 2.5 TABLET ORAL at 05:07

## 2023-07-01 NOTE — PLAN OF CARE
06/30/23 2121   Patient Assessment/Suction   Level of Consciousness (AVPU) alert   Respiratory Effort Normal;Unlabored   Expansion/Accessory Muscles/Retractions expansion symmetric;no retractions;no use of accessory muscles   Rhythm/Pattern, Respiratory depth regular;pattern regular;unlabored   PRE-TX-O2   Device (Oxygen Therapy) room air   SpO2 95 %   Pulse Oximetry Type Intermittent   $ Pulse Oximetry - Multiple Charge Pulse Oximetry - Multiple   Pulse 64   Resp 18   Respiratory Evaluation   $ Care Plan Tech Time 15 min

## 2023-07-01 NOTE — PROGRESS NOTES
INPATIENT NEPHROLOGY PROGRESS NOTE  [facility] NEPHROLOGY    Anastasiia A Justino  07/01/2023    Reason for consultation:  Acute kidney injury    Chief Complaint:   Chief Complaint   Patient presents with    Hypotension     [facility] for eval of low blood pressure.            History of Present Illness:    Per H and P    72 y/o F with a past medical history significant for DM2, HTN, femur fracture, tibial fracture and tobacco use, L foot wounds,  high grade stenosis SFA bilaterally and popliteal on the left s/p baloon angioplasty L popliteal artery and left posterior tibial arery on DAPT. [place] [facility] [place], s/p ORIF 6/6, also finished antibiotics for acute osteomyelitis of left calcaneus ( Final ID recs with end date for cipro of 6/22 and end date of ancef for 7/17 ). Patient sent from rehab for hypotension. In the ER was in septic shock, e/o UTI and anemic.   6/25  Pt states she feels nauseated and weak.  No sob.  Now bowel complaints.  Denies pain.  Somewhat confused but engages when prompted.  On phenylephrine vasopressor support.  275 cc uop  6/26  acidosis improving, renal same.  525cc UOP recorded.  Will cont IVF but cut rate to 75, f/u labs in am.    6/27  UOP 625cc, on hood, VSS.  Renal function a little worse, acidosis better, hypokalemic-got repletion.  Stopped bicarb gtt, switch to NS.  6/28 SBP , back on hood, on RA, UOP 300cc from holley- asked nurse to flush holley, no diarrhea reported  6/29 VSS, on RA, UOP 425cc, off hood, off NS IVFs, getting IV lasix today  6/30 to SNF today, UOP 450cc  7/1 VSS, UOP 1L    Plan of Care:     Acute kidney injury secondary to hemodynamically mediated renal injury with likely acute tubular necrosis in setting of urosepsis with shock  - renal function in a stable range  - no nsaids or IV contrast  - dose meds for CrCl < 20  - no acute RRT needs    Hypotension  Hypervolemia  - continue midodrine  - ok with PRN  diuretics  - optimize nutrition to reduce third spacing    Hyponatremia  Hypokalemia  HypoMg  Metabolic acidosis--non-gap  Hypocalcemia  - serum Na improved  - K, Mg improved  - CO2 normal  - Ca improved  - phos normal    Anemia  - iron stores low- stool + blood  - s/p pRBC this admission  - so far stable    Renal cyst--complicated cyst   - imaging reviewed- normal kidneys, no hydro     Thank you for allowing us to participate in this patient's care. We will continue to follow.    Vital Signs:  Temp Readings from Last 3 Encounters:   07/01/23 97.5 °F (36.4 °C) (Oral)   06/14/23 97.6 °F (36.4 °C) (Oral)   06/05/23 98.9 °F (37.2 °C)       Pulse Readings from Last 3 Encounters:   07/01/23 63   06/14/23 87   06/05/23 84       BP Readings from Last 3 Encounters:   07/01/23 (!) 142/94   06/14/23 (!) 141/65   06/05/23 122/67       Weight:  Wt Readings from Last 3 Encounters:   06/29/23 93.5 kg (206 lb 2.1 oz)   06/25/23 72.6 kg (160 lb)   06/08/23 72.9 kg (160 lb 11.5 oz)     Medications:  No current facility-administered medications on file prior to encounter.     Current Outpatient Medications on File Prior to Encounter   Medication Sig Dispense Refill    acetaminophen (TYLENOL) 500 MG tablet Take 2 tablets (1,000 mg total) by mouth every 8 (eight) hours.  0    aspirin (ECOTRIN) 81 MG EC tablet Take 1 tablet (81 mg total) by mouth 2 (two) times a day. - end date august 30, 2023  0    atorvastatin (LIPITOR) 80 MG tablet Take 1 tablet (80 mg total) by mouth every evening. 90 tablet 3    blood sugar diagnostic Strp To check BG two times daily, to use with insurance preferred meter 200 each 1    blood-glucose meter kit To check BG two times daily, to use with insurance preferred meter 1 each 1    ciprofloxacin HCl (CIPRO) 750 MG tablet Take 1 tablet (750 mg total) by mouth every 12 (twelve) hours. End date 6/22/23      clopidogreL (PLAVIX) 75 mg tablet Take 1 tablet (75 mg total) by mouth once daily. 30 tablet 3    dextrose  "5 % in water (D5W) 5 % PgBk 50 mL with ceFAZolin 2 gram SolR 2 g Inject 2 g into the vein every 8 (eight) hours. End date 7/17/23  0    famotidine (PEPCID) 20 MG tablet Take 1 tablet (20 mg total) by mouth 2 (two) times daily as needed (Heartburn).      insulin aspart U-100 (NOVOLOG) 100 unit/mL (3 mL) InPn pen Inject 0-5 Units into the skin before meals and at bedtime as needed (Hyperglycemia).  0    insulin aspart U-100 (NOVOLOG) 100 unit/mL (3 mL) InPn pen Inject 6 Units into the skin 3 (three) times daily with meals.  0    insulin detemir U-100, Levemir, 100 unit/mL (3 mL) SubQ InPn pen Inject 18 Units into the skin once daily.  0    lancets [facility] To check BG two times daily, to use with insurance preferred meter 200 each 1    lisinopriL (PRINIVIL,ZESTRIL) 5 MG tablet Take 1 tablet (5 mg total) by mouth once daily. 90 tablet 3    melatonin (MELATIN) 3 mg tablet Take 3 tablets (9 mg total) by mouth nightly.  0    methocarbamoL (ROBAXIN) 500 MG Tab Take 1 tablet (500 mg total) by mouth 4 (four) times daily. 40 tablet 0    ondansetron (ZOFRAN-ODT) 4 MG TbDL Take 1 tablet (4 mg total) by mouth every 8 (eight) hours as needed (nausea).      oxyCODONE (ROXICODONE) 10 mg Tab immediate release tablet Take 1 tablet (10 mg total) by mouth every 4 (four) hours as needed (pain sclae 7-10). 10 tablet 0    oxyCODONE (ROXICODONE) 5 MG immediate release tablet Take 1 tablet (5 mg total) by mouth every 4 (four) hours as needed (pain scale 4-6). 10 tablet 0    pen needle, diabetic (PEN NEEDLE) 31 gauge x 3/16" Ndle 1 application by Misc.(Non-Drug; Combo Route) route 2 (two) times a day. 200 each 0    polyethylene glycol (GLYCOLAX) 17 gram PwPk Take 17 g by mouth once daily.  0    pregabalin (LYRICA) 75 MG capsule Take 1 capsule (75 mg total) by mouth 2 (two) times daily. 60 capsule 0    senna-docusate 8.6-50 mg (PERICOLACE) 8.6-50 mg per tablet Take 1 tablet by mouth once daily.      vitamin D (VITAMIN D3) 1000 units Tab Take 1 " "tablet (1,000 Units total) by mouth once daily.       Scheduled Meds:   amiodarone  200 mg Oral Daily    ceFAZolin (ANCEF) IVPB  2 g Intravenous Q12H    enoxparin  30 mg Subcutaneous Q24H (prophylaxis, 1700)    fluconazole (DIFLUCAN) IV (PEDS and ADULTS)  200 mg Intravenous Q24H    insulin detemir U-100  9 Units Subcutaneous Daily    midodrine  5 mg Oral TID WM     Continuous Infusions:      PRN Meds:.dextrose 50%, dextrose 50%, glucagon (human recombinant), glucose, glucose, HYDROcodone-acetaminophen, insulin aspart U-100, magnesium oxide, magnesium oxide, naloxone, potassium bicarbonate, potassium bicarbonate, potassium bicarbonate, potassium, sodium phosphates, potassium, sodium phosphates, potassium, sodium phosphates, sodium chloride 0.9%    Review of Systems:  Neg    Physical Exam:    BP (!) 142/94 Comment: nurse notified kristin  Pulse 63   Temp 97.5 °F (36.4 °C) (Oral)   Resp 18   Ht 5' 3" (1.6 m)   Wt 93.5 kg (206 lb 2.1 oz)   SpO2 95%   BMI 36.51 kg/m²     General Appearance:    Ill appearing   Head:    Normocephalic, without obvious abnormality, atraumatic   Eyes:    PER, conjunctiva/corneas clear, EOM's intact in both eyes        Throat:   Lips, mucosa, and tongue normal; teeth and gums normal   Back:     Symmetric, no curvature, ROM normal, no CVA tenderness   Lungs:     Clear to auscultation bilaterally, respirations unlabored   Chest wall:    No tenderness or deformity   Heart:    Regular rate and rhythm, S1 and S2 normal, no murmur, rub   or gallop   Abdomen:     Soft, non-tender, bowel sounds active all four quadrants,     no masses, no organomegaly   Extremities:   Edematous    Pulses:   2+ and symmetric all extremities   MSK:   No joint or muscle swelling, tenderness or deformity   Skin:   Skin color, texture, turgor normal, no rashes or lesions   Neurologic:   CNII-XII intact, normal strength and sensation       Throughout.  No flap     Results:  Lab Results   Component Value Date    NA " 133 (L) 07/01/2023    K 4.3 07/01/2023    CL 99 07/01/2023    CO2 25 07/01/2023    BUN 73 (H) 07/01/2023    CREATININE 2.6 (H) 07/01/2023    CALCIUM 7.6 (L) 07/01/2023    ANIONGAP 9 07/01/2023    ESTGFRAFRICA 78 04/15/2021    EGFRNONAA 67 04/15/2021       Lab Results   Component Value Date    CALCIUM 7.6 (L) 07/01/2023    PHOS 3.7 06/28/2023       Recent Labs   Lab 07/01/23  0406   WBC 19.84*   RBC 2.87*   HGB 8.3*   HCT 24.5*      MCV 85   MCH 28.9   MCHC 33.9       Imaging Results              CT Abdomen Pelvis  Without Contrast (Final result)  Result time 06/24/23 05:34:07      Final result by Allan Fountain DO (06/24/23 05:34:07)                   Narrative:    EXAM DESCRIPTION:  CT ABDOMEN PELVIS WITHOUT CONTRAST  RadLex: CT ABDOMEN PELVIS WITHOUT IV CONTRAST    CLINICAL HISTORY:  Weakness, increased WBC, decreased RBC, hypotensive, MYLES.    TECHNIQUE:  CT of the abdomen and pelvis without intravenous contrast.  All CT scans at this facility use dose modulation, iterative reconstruction, and/or weight based dosing when appropriate to reduce radiation dose to as low as reasonably achievable.    COMPARISON: None.    FINDINGS:    The visualized lower thoracic structures demonstrate minimal bilateral pleural effusions. There is mild bibasilar atelectasis. Coronary artery calcifications are present. There is subcutaneous edema throughout the thorax.    Unenhanced images of the liver, spleen, and adrenal glands appear grossly unremarkable. Pancreas is not well assessed without contrast. Gallbladder is minimally distended. No renal stones are identified. There is a round exophytic lesion off the upper pole right kidney measuring 2.1 cm, with Hounsfield units averaging 59. This may relate to hyperdense cyst. There is mild left hydronephrosis and hydroureter. No definite ureteral or bladder stone is identified. Urinary bladder is decompressed with a Gee catheter in place. There are atherosclerotic calcifications  along the abdominal aorta without evidence for aneurysmal dilatation. No bowel obstruction is seen. There is no free intraperitoneal air. The appendix appears unremarkable. There is colonic diverticulosis without definite CT evidence for acute diverticulitis. There is mild fecal loading throughout the colon. There is minimal free fluid in the posterior pelvis. There is moderate subcutaneous edema throughout the abdomen and pelvis extending into the thighs. There is an intramedullary denzel within the proximal left femur, with a compression screw through the left femoral neck. There is a left subtrochanteric fracture    IMPRESSION:  1.  Minimal gallbladder distention.  2.  Mild left hydroureteronephrosis, without definite ureteral stone seen. Urinary bladder decompressed.  3.  Anasarca.  4.  Colonic diverticulosis without definite CT evidence for acute diverticulitis.  5.  Possible hyperdense cyst at the upper pole right kidney. Follow-up nonemergent renal ultrasound could be performed.  6.  Post surgical changes at the left femur, with subtrochanteric fracture.  7.  Minimal bilateral pleural effusions and mild bibasilar atelectasis.    Electronically signed by:  Allan Fountain DO  6/24/2023 5:34 AM CDT Workstation: [id number]                                     X-Ray Chest AP Portable (Final result)  Result time 06/24/23 08:28:44      Final result by Perfecto Castellanos MD (06/24/23 08:28:44)                   Narrative:    Chest single view    CLINICAL DATA: Sepsis    FINDINGS: AP view shows the heart to be mildly enlarged. The mediastinum is unremarkable. Right-sided PICC line extends to the superior vena cava. There is mild atelectasis at the right lung base, with ill-defined atelectasis or infiltrate at the lung base on the left. There are no significant pleural effusions. No acute osseous abnormality is identified.    IMPRESSION:  1. Atelectasis or infiltrate at the left lung base.  2. Mild right basilar  atelectasis.  3. Mild cardiomegaly.    Electronically signed by:  Pefrecto Castellanos MD  6/24/2023 8:28 AM CDT Workstation: [id number]                                        I have personally reviewed pertinent radiological imaging and reports.    Patient care was time spent personally by me on the following activities: > 35 min  Obtaining a history  Examination of patient.  Providing medical care at the patients bedside.  Developing a treatment plan with patient or surrogate and bedside caregivers  Ordering and reviewing laboratory studies, radiographic studies, pulse oximetry.  Ordering and performing treatments and interventions.  Evaluation of patient's response to treatment.  Discussions with consultants while on the unit and immediately available to the patient.  Re-evaluation of the patient's condition.  Documentation in the medical record.     Wily Carranza MD    Nephrology  [facility]  [phone]

## 2023-07-01 NOTE — PLAN OF CARE
Problem: Infection  Goal: Absence of Infection Signs and Symptoms  Outcome: Ongoing, Progressing     Problem: Adult Inpatient Plan of Care  Goal: Plan of Care Review  Outcome: Ongoing, Progressing  Goal: Patient-Specific Goal (Individualized)  Outcome: Ongoing, Progressing  Goal: Absence of Hospital-Acquired Illness or Injury  Outcome: Ongoing, Progressing  Goal: Readiness for Transition of Care  Outcome: Ongoing, Progressing

## 2023-07-01 NOTE — HOSPITAL COURSE
Anastasiia Badillo was admitted to the service of hospital medicine for further treatment of septic shock suspected to be 2/2 UTI.  Prior to admission, she was being treated at [facility] for acute osteomyelitis of left calcaneus ( Final ID recs with end date for cipro of 6/22 and end date of ancef for 7/17 ), with hx fracture of left tibia, s/p ORIF 6/6.  She was placed on IV vancomycin, IV cefepime, and IVFH.  She was hypotensive initially requiring vasopressors and was admitted to ICU.  She was given one unit PRBCs for symptomatic anemia.  She developed atrial fibrillation with HR up to 200 and was placed on IV amiodarone.  Cardiology was consulted and pt was ultimately cardioverted at bedside.  IV levophed was changed to IV hood-synephrine.  She was noted to have profound metabolic acidosis, possibly diabetic ketoacidosis, and was placed on IV insulin.  Additionally, she developed an MYLES and nephrology was consulted.  Sodium bicarb drip was recommended.  She eventually improved, with IV vasopressors weaned and placed on oral midodrine and oral amiodarone.  PT/OT were consulted.  Urine culture growing yeast.  Placed on IV diflucan, IV ancef continued due to drainage from external fixator.  Wound care consulted.  Pt had stagnant renal function.  She had continued decline in her WBC count, however she developed hypothermia.  Abx were broadened and infectious workup was pursued.  ID was consulted and followed pt in hospital and adjusted abx therapy.  There was concern for possible cellulitis surrounding her sacral wound-we reached out to wound care physician to have this debrided. Pt experienced another episode of hypothermia and rectal temperature was obtained and confirmed patient was not hypothermic and had normal temperature.  Patient had no further episodes of hypothermia after this incident.  Her oral intake was poor and protein levels very low.  Dietary was consulted and patient was initiated on Clinimix until TPN  was available.  Patient was maintained on TPN however her nutrition intake continued to be poor.  Her mentation improved with nutritional supplementation.  She was started on Marinol for appetite stimulant.  Patient remained very poorly motivated to work with therapy.  Goals of care was discussed with patient and family at bedside patient was advised that she was experiencing failure to thrive and if she did not improve then she would need to pursue home hospice care.  Patient and family members verbalized understanding. Pt underwent bedside left heel debridement with  on 7/7 and tolerated procedure well.  Patient was accepted to [facility] in [place] and discharged on 7/7 for continued care and IV abx therapy.     Physical exam:  Awake alert oriented x 3  Heart-regular rate rhythm, anasarca to bilat LEs  Lungs-Clear to auscultation bilaterally, respirations even unlabored   Abdomen-soft nontender normoactive bowel sounds   Extremities-generalized weakness to all extremities, left lower extremity with external fixator in place with no erythema or drainage at pin sites, drsg to left foot CDI

## 2023-07-02 PROBLEM — E87.6 HYPOKALEMIA: Status: RESOLVED | Noted: 2023-06-25 | Resolved: 2023-07-02

## 2023-07-02 PROBLEM — L89.150 PRESSURE INJURY OF SACRAL REGION, UNSTAGEABLE: Status: ACTIVE | Noted: 2023-07-02

## 2023-07-02 LAB
ANION GAP SERPL CALC-SCNC: 8 MMOL/L (ref 8–16)
ANISOCYTOSIS BLD QL SMEAR: SLIGHT
BASOPHILS # BLD AUTO: 0.04 K/UL (ref 0–0.2)
BASOPHILS NFR BLD: 0.2 % (ref 0–1.9)
BUN SERPL-MCNC: 72 MG/DL (ref 8–23)
CALCIUM SERPL-MCNC: 7.6 MG/DL (ref 8.7–10.5)
CHLORIDE SERPL-SCNC: 99 MMOL/L (ref 95–110)
CO2 SERPL-SCNC: 26 MMOL/L (ref 23–29)
CREAT SERPL-MCNC: 2.7 MG/DL (ref 0.5–1.4)
DIFFERENTIAL METHOD: ABNORMAL
EOSINOPHIL # BLD AUTO: 0.2 K/UL (ref 0–0.5)
EOSINOPHIL NFR BLD: 1.2 % (ref 0–8)
ERYTHROCYTE [DISTWIDTH] IN BLOOD BY AUTOMATED COUNT: 17.4 % (ref 11.5–14.5)
EST. GFR  (NO RACE VARIABLE): 18.2 ML/MIN/1.73 M^2
GLUCOSE SERPL-MCNC: 103 MG/DL (ref 70–110)
GLUCOSE SERPL-MCNC: 110 MG/DL (ref 70–110)
GLUCOSE SERPL-MCNC: 135 MG/DL (ref 70–110)
GLUCOSE SERPL-MCNC: 156 MG/DL (ref 70–110)
GLUCOSE SERPL-MCNC: 58 MG/DL (ref 70–110)
GLUCOSE SERPL-MCNC: 96 MG/DL (ref 70–110)
HCT VFR BLD AUTO: 25.1 % (ref 37–48.5)
HGB BLD-MCNC: 8.4 G/DL (ref 12–16)
HYPOCHROMIA BLD QL SMEAR: ABNORMAL
IMM GRANULOCYTES # BLD AUTO: 0.79 K/UL (ref 0–0.04)
IMM GRANULOCYTES NFR BLD AUTO: 4.4 % (ref 0–0.5)
LYMPHOCYTES # BLD AUTO: 1.4 K/UL (ref 1–4.8)
LYMPHOCYTES NFR BLD: 7.7 % (ref 18–48)
MCH RBC QN AUTO: 28.7 PG (ref 27–31)
MCHC RBC AUTO-ENTMCNC: 33.5 G/DL (ref 32–36)
MCV RBC AUTO: 86 FL (ref 82–98)
MONOCYTES # BLD AUTO: 0.7 K/UL (ref 0.3–1)
MONOCYTES NFR BLD: 4 % (ref 4–15)
NEUTROPHILS # BLD AUTO: 14.9 K/UL (ref 1.8–7.7)
NEUTROPHILS NFR BLD: 82.5 % (ref 38–73)
NRBC BLD-RTO: 0 /100 WBC
PLATELET # BLD AUTO: 291 K/UL (ref 150–450)
PLATELET BLD QL SMEAR: ABNORMAL
PMV BLD AUTO: 12.3 FL (ref 9.2–12.9)
POLYCHROMASIA BLD QL SMEAR: ABNORMAL
POTASSIUM SERPL-SCNC: 4.2 MMOL/L (ref 3.5–5.1)
RBC # BLD AUTO: 2.93 M/UL (ref 4–5.4)
SODIUM SERPL-SCNC: 133 MMOL/L (ref 136–145)
WBC # BLD AUTO: 18.08 K/UL (ref 3.9–12.7)

## 2023-07-02 PROCEDURE — 25000003 PHARM REV CODE 250: Performed by: HOSPITALIST

## 2023-07-02 PROCEDURE — 80048 BASIC METABOLIC PNL TOTAL CA: CPT | Performed by: HOSPITALIST

## 2023-07-02 PROCEDURE — 63600175 PHARM REV CODE 636 W HCPCS: Performed by: NURSE PRACTITIONER

## 2023-07-02 PROCEDURE — 63600175 PHARM REV CODE 636 W HCPCS: Performed by: HOSPITALIST

## 2023-07-02 PROCEDURE — 12000002 HC ACUTE/MED SURGE SEMI-PRIVATE ROOM

## 2023-07-02 PROCEDURE — 99900035 HC TECH TIME PER 15 MIN (STAT)

## 2023-07-02 PROCEDURE — 85025 COMPLETE CBC W/AUTO DIFF WBC: CPT | Performed by: HOSPITALIST

## 2023-07-02 PROCEDURE — 94761 N-INVAS EAR/PLS OXIMETRY MLT: CPT

## 2023-07-02 RX ORDER — FLUCONAZOLE 2 MG/ML
200 INJECTION, SOLUTION INTRAVENOUS
Status: COMPLETED | OUTPATIENT
Start: 2023-07-02 | End: 2023-07-02

## 2023-07-02 RX ADMIN — DEXTROSE MONOHYDRATE 12.5 G: 25 INJECTION, SOLUTION INTRAVENOUS at 04:07

## 2023-07-02 RX ADMIN — AMIODARONE HYDROCHLORIDE 200 MG: 200 TABLET ORAL at 09:07

## 2023-07-02 RX ADMIN — HYDROCODONE BITARTRATE AND ACETAMINOPHEN 1 TABLET: 5; 325 TABLET ORAL at 03:07

## 2023-07-02 RX ADMIN — CEFAZOLIN SODIUM 2 G: 2 SOLUTION INTRAVENOUS at 03:07

## 2023-07-02 RX ADMIN — CEFAZOLIN SODIUM 2 G: 2 SOLUTION INTRAVENOUS at 02:07

## 2023-07-02 RX ADMIN — FLUCONAZOLE 200 MG: 2 INJECTION, SOLUTION INTRAVENOUS at 10:07

## 2023-07-02 RX ADMIN — MIDODRINE HYDROCHLORIDE 5 MG: 2.5 TABLET ORAL at 09:07

## 2023-07-02 RX ADMIN — ENOXAPARIN SODIUM 30 MG: 30 INJECTION SUBCUTANEOUS at 05:07

## 2023-07-02 NOTE — ASSESSMENT & PLAN NOTE
Patient with acute kidney injury likely due to acute tubular necrosis MYLES is currently stable. Labs reviewed- Renal function/electrolytes with Estimated Creatinine Clearance: 20.5 mL/min (A) (based on SCr of 2.7 mg/dL (H)). according to latest data. Monitor urine output and serial BMP and adjust therapy as needed. Avoid nephrotoxins and renally dose meds for GFR listed above.   -Nephrology following: appreciate recs  -Cr stagnant today  -Follow the BMP

## 2023-07-02 NOTE — ASSESSMENT & PLAN NOTE
Patient's anemia is currently controlled.  Received one unit blood earlier in admission.. Etiology likely d/t chronic disease  Current CBC reviewed-   Lab Results   Component Value Date    HGB 8.4 (L) 07/02/2023    HCT 25.1 (L) 07/02/2023     Monitor serial CBC and transfuse if patient becomes hemodynamically unstable, symptomatic or H/H drops below 7/21.

## 2023-07-02 NOTE — ASSESSMENT & PLAN NOTE
Continue systemic antibiotic.  Urine sample sent for culture.  So far, it's growing yeast.   Meropenem discontinued  Continue fluconazole.    Antifungals (From admission, onward)    Start     Stop Route Frequency Ordered    06/26/23 2230  fluconazole (DIFLUCAN) IVPB 200 mg        Note to Pharmacy: Ht:    Wt: 80 kg (176 lb 5.9 oz)  Estimated Creatinine Clearance: 17 mL/min (A) (based on SCr of 3 mg/dL (H)).  Body mass index is 31.24 kg/m².    -- IV Every 24 hours (non-standard times) 06/26/23 2111

## 2023-07-02 NOTE — ASSESSMENT & PLAN NOTE
Improved.  Monitor potassium level.  Supplement potassium when needed.    Potassium   Date Value Ref Range Status   07/02/2023 4.2 3.5 - 5.1 mmol/L Final   07/01/2023 4.3 3.5 - 5.1 mmol/L Final   06/30/2023 4.0 3.5 - 5.1 mmol/L Final

## 2023-07-02 NOTE — SUBJECTIVE & OBJECTIVE
Interval History: no acute events overnight.  Very tired today.    Review of Systems   Constitutional:  Negative for chills and fever.   Respiratory:  Negative for cough and shortness of breath.    Cardiovascular:  Positive for leg swelling. Negative for chest pain.   Gastrointestinal:  Negative for abdominal pain, nausea and vomiting.   Neurological:  Positive for weakness. Negative for headaches.   Psychiatric/Behavioral:  Negative for agitation. The patient is not nervous/anxious.    Objective:     Vital Signs (Most Recent):  Temp: 97.7 °F (36.5 °C) (07/01/23 1718)  Pulse: 64 (07/01/23 1718)  Resp: 19 (07/01/23 1718)  BP: 122/75 (07/01/23 1718)  SpO2: 95 % (07/01/23 1718) Vital Signs (24h Range):  Temp:  [97.2 °F (36.2 °C)-98.7 °F (37.1 °C)] 97.7 °F (36.5 °C)  Pulse:  [57-67] 64  Resp:  [16-19] 19  SpO2:  [95 %-99 %] 95 %  BP: (121-144)/(67-94) 122/75     Weight: 93.5 kg (206 lb 2.1 oz)  Body mass index is 36.51 kg/m².    Intake/Output Summary (Last 24 hours) at 7/1/2023 1949  Last data filed at 7/1/2023 0417  Gross per 24 hour   Intake --   Output 425 ml   Net -425 ml         Physical Exam  Constitutional:       General: She is not in acute distress.  Eyes:      General:         Right eye: No discharge.         Left eye: No discharge.   Neck:      Vascular: No JVD.   Cardiovascular:      Rate and Rhythm: Normal rate and regular rhythm.   Pulmonary:      Effort: Pulmonary effort is normal.      Breath sounds: Normal breath sounds.   Abdominal:      General: Abdomen is flat. Bowel sounds are normal. There is no distension.      Palpations: Abdomen is soft.      Tenderness: There is no abdominal tenderness.   Musculoskeletal:      Right hand: Swelling present.      Left hand: Swelling present.      Right lower leg: Pitting Edema present.      Left lower leg: Pitting Edema present.      Comments: External fixation on left lower leg   Skin:     General: Skin is warm and moist.      Findings: No rash.      Comments:  Dressing left great toe   Neurological:      Mental Status: She is alert and oriented to person, place, and time.   Psychiatric:         Attention and Perception: Attention normal.         Mood and Affect: Mood and affect normal.         Speech: Speech normal.           Significant Labs: All pertinent labs within the past 24 hours have been reviewed.  BMP:   Recent Labs   Lab 07/01/23  0406   GLU 92   *   K 4.3   CL 99   CO2 25   BUN 73*   CREATININE 2.6*   CALCIUM 7.6*     CBC:   Recent Labs   Lab 06/30/23  0839 07/01/23  0406   WBC 18.36* 19.84*   HGB 8.2* 8.3*   HCT 24.1* 24.5*    267       Significant Imaging: I have reviewed all pertinent imaging results/findings within the past 24 hours.

## 2023-07-02 NOTE — PROGRESS NOTES
[facility] Medicine  Progress Note    Patient Name: Anastasiia Badillo  MRN: [id number]  Patient Class: IP- Inpatient   Admission Date: 6/24/2023  Length of Stay: 8 days  Attending Physician: Criss Beck MD  Primary Care Provider: Raji Parkinson MD        Subjective:     Principal Problem:Septic shock        HPI:  72 y/o F with a past medical history significant for DM2, HTN, femur fracture, tibial fracture and tobacco use, L foot wounds,  high grade stenosis SFA bilaterally and popliteal on the left s/p baloon angioplasty L popliteal artery and left posterior tibial arery on DAPT.      [place] [facility] [place], s/p ORIF 6/6, also finished antibiotics for acute osteomyelitis of left calcaneus ( Final ID recs with end date for cipro of 6/22 and end date of ancef for 7/17 )     Patient sent from rehab for hypotension.     In the ER was in septic shock, e/o UTI and anemic.       Overview/Hospital Course:  Anastasiia Badillo was admitted to the service of hospital medicine for further treatment of septic shock suspected to be 2/2 UTI.  Prior to admission, she was being treated at LTAC for acute osteomyelitis of left calcaneus ( Final ID recs with end date for cipro of 6/22 and end date of ancef for 7/17 ), with hx fracture of left tibia, s/p ORIF 6/6.  She was placed on IV vancomycin, IV cefepime, and IVFH.  She was hypotensive initially requiring vasopressors and was admitted to ICU.  She was given one unit PRBCs for symptomatic anemia.  She developed atrial fibrillation with HR up to 200 and was placed on IV amiodarone.  Cardiology was consulted and pt was ultimately cardioverted at bedside.  IV levophed was changed to IV hood-synephrine.  She was noted to have profound metabolic acidosis, possibly diabetic ketoacidosis, and was placed on IV insulin.  Additionally, she developed an MYLES and nephrology was consulted.  Sodium bicarb drip was recommended.  She  eventually improved, with IV vasopressors weaned and placed on oral midodrine and oral amiodarone.  PT/OT were consulted.  Urine culture growing yeast.  Placed on IV diflucan, IV cefepime continued due to drainage from external fixator.  Wound care consulted.      Interval History:  Pt seen and examined.  States she is feeling generally fatigued and crummy.  She denies any chest pain or shortness of breath.  She remains with leukocytosis with slight down trend.  Renal function stagnant.  Had a period of hypoglycemia this morning-she is not eating well.  Will consult dietitian as she is not showing her nutritional supplements-states they are too sweet.  I did deescalate her insulin.  This may need to be deescalated further her appetite does not , especially in light of her renal function.    Review of Systems   Constitutional:  Positive for fatigue. Negative for chills and fever.   Respiratory:  Negative for shortness of breath.    Gastrointestinal:  Negative for nausea and vomiting.   Objective:     Vital Signs (Most Recent):  Temp: 97.6 °F (36.4 °C) (07/02/23 1220)  Pulse: 65 (07/02/23 1220)  Resp: 17 (07/02/23 1220)  BP: (!) 164/73 (07/02/23 1220)  SpO2: 97 % (07/02/23 1220) Vital Signs (24h Range):  Temp:  [96.4 °F (35.8 °C)-97.7 °F (36.5 °C)] 97.6 °F (36.4 °C)  Pulse:  [56-96] 65  Resp:  [15-19] 17  SpO2:  [95 %-98 %] 97 %  BP: (107-164)/(52-75) 164/73     Weight: 93.5 kg (206 lb 2.1 oz)  Body mass index is 36.51 kg/m².    Intake/Output Summary (Last 24 hours) at 7/2/2023 2105  Last data filed at 7/2/2023 0524  Gross per 24 hour   Intake 870 ml   Output 1200 ml   Net -330 ml         Physical Exam  Vitals and nursing note reviewed.   Constitutional:       Appearance: Normal appearance.   Eyes:      Extraocular Movements: Extraocular movements intact.      Conjunctiva/sclera: Conjunctivae normal.      Pupils: Pupils are equal, round, and reactive to light.   Cardiovascular:      Rate and Rhythm: Normal rate  and regular rhythm.   Pulmonary:      Effort: Pulmonary effort is normal.      Breath sounds: Normal breath sounds.   Abdominal:      General: Abdomen is flat. Bowel sounds are normal.      Palpations: Abdomen is soft.   Musculoskeletal:      Comments: +anasarca  LEFT leg with external fixator, no overt drainage noted   Skin:     General: Skin is warm and dry.      Capillary Refill: Capillary refill takes less than 2 seconds.      Comments: Dressing to LEFT great toe   Neurological:      General: No focal deficit present.      Mental Status: She is alert.      Comments: Oriented to self and situation. Disoriented to place.   Psychiatric:      Comments: hypoactive           Significant Labs: All pertinent labs within the past 24 hours have been reviewed.  CBC:   Recent Labs   Lab 07/01/23  0406 07/02/23  0404   WBC 19.84* 18.08*   HGB 8.3* 8.4*   HCT 24.5* 25.1*    291     CMP:   Recent Labs   Lab 06/30/23  1730 07/01/23  0406 07/02/23  0404   NA  --  133* 133*   K  --  4.3 4.2   CL  --  99 99   CO2  --  25 26   GLU 73 92 58*   BUN  --  73* 72*   CREATININE  --  2.6* 2.7*   CALCIUM  --  7.6* 7.6*   ANIONGAP  --  9 8       Significant Imaging: I have reviewed all pertinent imaging results/findings within the past 24 hours.      Assessment/Plan:      Pressure injury of sacral region, unstageable  Present on arrival  -wound care consulted and following  -continue to following care orders   -turn Pt q.2 hours    Type 2 diabetes mellitus, with long-term current use of insulin  Patient's FSGs are uncontrolled due to hypoglycemia on current medication regimen.  Last A1c reviewed-   Lab Results   Component Value Date    HGBA1C 10.2 (H) 06/05/2023     Current correctional scale  Low  Decrease anti-hyperglycemic dose as follows-   Antihyperglycemics (From admission, onward)      Start     Stop Route Frequency Ordered    06/26/23 1052  insulin aspart U-100 pen 0-5 Units         -- SubQ Before meals & nightly PRN 06/26/23  0952    06/25/23 0800  insulin detemir U-100 (Levemir) pen 9 Units         -- SubQ Daily 06/25/23 0757        Hold Oral hypoglycemics while patient is in the hospital.    Hyponatremia  Improved.  Monitor sodium level.  Likely due to renal failure.    Sodium   Date Value Ref Range Status   07/02/2023 133 (L) 136 - 145 mmol/L Final   07/01/2023 133 (L) 136 - 145 mmol/L Final   06/30/2023 130 (L) 136 - 145 mmol/L Final           ATN (acute tubular necrosis)  Patient with acute kidney injury likely due to acute tubular necrosis MYLES is currently stable. Labs reviewed- Renal function/electrolytes with Estimated Creatinine Clearance: 20.5 mL/min (A) (based on SCr of 2.7 mg/dL (H)). according to latest data. Monitor urine output and serial BMP and adjust therapy as needed. Avoid nephrotoxins and renally dose meds for GFR listed above.   -Nephrology following: appreciate recs  -Cr stagnant today  -Follow the BMP      Encephalopathy, metabolic  Due to septic shock.  Appears to be at baseline at this time.    Anemia, chronic disease  Patient's anemia is currently controlled.  Received one unit blood earlier in admission.. Etiology likely d/t chronic disease  Current CBC reviewed-   Lab Results   Component Value Date    HGB 8.4 (L) 07/02/2023    HCT 25.1 (L) 07/02/2023     Monitor serial CBC and transfuse if patient becomes hemodynamically unstable, symptomatic or H/H drops below 7/21.         UTI (urinary tract infection)  Continue systemic antibiotic.  Urine sample sent for culture.  So far, it's growing yeast.   Meropenem discontinued  Today makes 7 day course of fluconazole after which we will discontinue.  It looks like a catheter was placed or replaced?  After Urine sample was sent.  We will try to get in touch with her LTAC to find out if she already had the catheter before coming here and if it needs to stay in.      Antifungals (From admission, onward)      Start     Stop Route Frequency Ordered    07/02/23 5103   fluconazole (DIFLUCAN) IVPB 200 mg        Note to Pharmacy: Ht:    Wt: 80 kg (176 lb 5.9 oz)  Estimated Creatinine Clearance: 17 mL/min (A) (based on SCr of 3 mg/dL (H)).  Body mass index is 31.24 kg/m².    07/03 2229 IV Every 24 hours (non-standard times) 07/02/23 1457              Acute osteomyelitis of left calcaneus  This is an established diagnosis.  She was [place] [facility] [place]C on ciprofloxacin which she has completed and Ancef with end date of 07/17.  -continue the Ancef   -plan for discharge back to [facility] hopefully tomorrow.      Chronic ulcer of great toe of left foot with fat layer exposed  Wound care following  -continue to follow wound care orders       Chronic ulcer of left heel with fat layer exposed  Wound care following  -continue to follow and care orders        VTE Risk Mitigation (From admission, onward)           Ordered     enoxaparin injection 30 mg  Every 24 hours         06/27/23 1405     IP VTE HIGH RISK PATIENT  Once         06/27/23 1404     Place sequential compression device  Until discontinued         06/24/23 0432                    Discharge Planning   CITLALI: 7/3/2023     Code Status: Full Code   Is the patient medically ready for discharge?:     Reason for patient still in hospital (select all that apply): Consult recommendations and Pending disposition  Discharge Plan A: Long-term acute care facility (LTAC)          I discussed case with patient's son, Yaya.  He reports her progressive decline since original fall in February.  Plan is discharge back to LTAC.        Mera Owens NP  Department of Hospital Medicine   [facility]

## 2023-07-02 NOTE — ASSESSMENT & PLAN NOTE
After Visit Summary   3/22/2018    Agnes Saravia    MRN: [id number]           Patient Information     Date Of Birth          1942        Visit Information        Provider Department      3/22/2018 9:40 AM Manuel Conway MD [facility]        Today's Diagnoses     Wheezing    -  1    Rheumatoid arthritis, involving unspecified site, unspecified rheumatoid factor presence (H)        Stridor        Pulmonary nodule        ILD (interstitial lung disease) (H)           Follow-ups after your visit        Your next 10 appointments already scheduled     Apr 11, 2018  9:40 AM CDT   (Arrive by 9:25 AM)   CT CHEST W/O CONTRAST with UUCT4   [facility], [place], [place] ([facility], [facility])    [address] [address] [phone]           Please bring any scans or X-rays taken at other hospitals, if similar tests were done. Also bring a list of your medicines, including vitamins, minerals and over-the-counter drugs. It is safest to leave personal items at home.  Be sure to tell your doctor:   If you have any allergies.   If there s any chance you are pregnant.   If you are breastfeeding.  You do not need to do anything special to prepare for this exam.  Please wear loose clothing, such as a sweat suit or jogging clothes. Avoid snaps, zippers and other metal. We may ask you to undress and put on a hospital gown.            Apr 11, 2018   Procedure with Manuel Conway MD   [facility], [place], Endoscopy ([facility], [facility])    [address] [address] [phone]           The [facility] is located on the corner of [address] and [address] on the [address]. It is easily accessible from virtually any point in the [place] area, via [address] and [address].              Future tests that were ordered for you today     Open  Due to septic shock.  Appears to be at baseline at this time.   Future Orders        Priority Expected Expires Ordered    CT Chest w/o contrast Routine  3/22/2019 3/22/2018            Who to contact     If you have questions or need follow up information about today's clinic visit or your schedule please contact [facility] directly at [phone].  Normal or non-critical lab and imaging results will be communicated to you by MyChart, letter or phone within 4 business days after the clinic has received the results. If you do not hear from us within 7 days, please contact the clinic through [employer] or phone. If you have a critical or abnormal lab result, we will notify you by phone as soon as possible.  Submit refill requests through [employer] or call your pharmacy and they will forward the refill request to us. Please allow 3 business days for your refill to be completed.          Additional Information About Your Visit        MyChart Information     [employer] gives you secure access to your electronic health record. If you see a primary care provider, you can also send messages to your care team and make appointments. If you have questions, please call your primary care clinic.  If you do not have a primary care provider, please call [phone] and they will assist you.        Care EveryWhere ID     This is your Care EveryWhere ID. This could be used by other organizations to access your [place] medical records  [id number]        Your Vitals Were     Pulse Respirations Pulse Oximetry             78 16 95%          Blood Pressure from Last 3 Encounters:   03/22/18 133/79   02/01/18 135/81   01/30/18 129/68    Weight from Last 3 Encounters:   02/01/18 89.8 kg (198 lb)   01/30/18 89.8 kg (198 lb)   01/16/18 89.8 kg (198 lb)              We Performed the Following     BRONCHOSCOPY W LAVAGE          Today's Medication Changes          These changes are accurate as of 3/22/18 10:21 AM.  If you have any questions, ask your nurse or doctor.                Stop taking these medicines if you haven't already. Please contact your care team if you have questions.     fluticasone-salmeterol 500-50 MCG/DOSE diskus inhaler   Commonly known as:  ADVAIR   Stopped by:  Manuel Conway MD           predniSONE 10 MG tablet   Commonly known as:  DELTASONE   Stopped by:  Manuel Conway MD                    Primary Care Provider Office Phone # Fax #    José Miguel Bruce Wallace [phone] [address]   [facility] [address]        Equal Access to Services     [facility]: Hadii aad ku hadasho Soomaali, waaxda luqadaha, qaybta kaalmada adeegyada, michelle wu . So [facility] [phone].    ATENCIÓN: Si habla español, tiene a pedraza disposición servicios gratuitos de asistencia lingüística. [facility] [phone].    We comply with applicable federal civil rights laws and [place] laws. We do not discriminate on the basis of race, color, national origin, age, disability, sex, sexual orientation, or gender identity.            Thank you!     Thank you for choosing [facility]  for your care. Our goal is always to provide you with excellent care. Hearing back from our patients is one way we can continue to improve our services. Please take a few minutes to complete the written survey that you may receive in the mail after your visit with us. Thank you!             Your Updated Medication List - Protect others around you: Learn how to safely use, store and throw away your medicines at www.disposemymeds.org.          This list is accurate as of 3/22/18 10:21 AM.  Always use your most recent med list.                   Brand Name Dispense Instructions for use Diagnosis    albuterol 108 (90 BASE) MCG/ACT Inhaler    VENTOLIN HFA    18 g    Inhale 1-2 puffs into the lungs every 4 hours as needed for shortness of breath / dyspnea or wheezing    Pneumonia of left lower lobe due to infectious  organism (H)       aspirin 81 MG EC tablet      Take 81 mg by mouth daily        cevimeline 30 MG capsule    EVOXAC     Take 30 mg by mouth 3 times daily.        Clorazepate Dipotassium 15 MG Tabs     90 tablet    Take 1 tablet by mouth daily    Sjogren's syndrome, with unspecified organ involvement (H), DANA (generalized anxiety disorder)       fluticasone 50 MCG/ACT spray    FLONASE    1 Bottle    Spray 1 spray into both nostrils daily    Chronic pansinusitis       gabapentin 300 MG capsule    NEURONTIN     Take 300 mg by mouth 3 times daily        guaiFENesin-codeine 100-10 MG/5ML Soln solution    ROBITUSSIN AC    236 mL    TAKE 10 ML BY MOUTH EVERY 6 HOURS AS NEEDED    Cough       hydrochlorothiazide 50 MG tablet    HYDRODIURIL    90 tablet    Take 1 tablet (50 mg) by mouth daily Please call to schedule a follow up visit    Essential hypertension       potassium chloride 10 MEQ tablet    K-TAB,KLOR-CON    180 tablet    TAKE TWO TABLETS BY MOUTH DAILY    Hypokalemia       simvastatin 40 MG tablet    ZOCOR    90 tablet    Take 1 tablet (40 mg) by mouth At Bedtime    Hyperlipidemia LDL goal <130       sodium chloride 0.65 % nasal spray    OCEAN NASAL SPRAY    50 mL    Spray 1 spray into both nostrils daily as needed for congestion    Chronic maxillary sinusitis       traZODone 50 MG tablet    DESYREL    45 tablet    Take 0.5 tablets (25 mg) by mouth At Bedtime    Insomnia, unspecified type

## 2023-07-02 NOTE — ASSESSMENT & PLAN NOTE
Patient's anemia is currently controlled.  Received one unit blood earlier in admission.. Etiology likely d/t chronic disease  Current CBC reviewed-   Lab Results   Component Value Date    HGB 8.3 (L) 07/01/2023    HCT 24.5 (L) 07/01/2023     Monitor serial CBC and transfuse if patient becomes hemodynamically unstable, symptomatic or H/H drops below 7/21.

## 2023-07-02 NOTE — PROGRESS NOTES
[facility]  Progress Note    Patient Name: Anastasiia Badillo  MRN: [id number]  Patient Class: IP- Inpatient   Admission Date: 6/24/2023  Length of Stay: 7 days  Attending Physician: Criss Beck MD  Primary Care Provider: Raji Parkinson MD        Subjective:     Principal Problem:Septic shock        HPI:  72 y/o F with a past medical history significant for DM2, HTN, femur fracture, tibial fracture and tobacco use, L foot wounds,  high grade stenosis SFA bilaterally and popliteal on the left s/p baloon angioplasty L popliteal artery and left posterior tibial arery on DAPT.      [place] [facility] [place], s/p ORIF 6/6, also finished antibiotics for acute osteomyelitis of left calcaneus ( Final ID recs with end date for cipro of 6/22 and end date of ancef for 7/17 )     Patient sent from rehab for hypotension.     In the ER was in septic shock, e/o UTI and anemic.       Overview/Hospital Course:  Anastasiia Badillo was admitted to the service of hospital medicine for further treatment of septic shock suspected to be 2/2 UTI.  Prior to admission, she was being treated at LTAC for acute osteomyelitis of left calcaneus ( Final ID recs with end date for cipro of 6/22 and end date of ancef for 7/17 ), with hx fracture of left tibia, s/p ORIF 6/6.  She was placed on IV vancomycin, IV cefepime, and IVFH.  She was hypotensive initially requiring vasopressors and was admitted to ICU.  She was given one unit PRBCs for symptomatic anemia.  She developed atrial fibrillation with HR up to 200 and was placed on IV amiodarone.  Cardiology was consulted and pt was ultimately cardioverted at bedside.  IV levophed was changed to IV hood-synephrine.  She was noted to have profound metabolic acidosis, possibly diabetic ketoacidosis, and was placed on IV insulin.  Additionally, she developed an MYLES and nephrology was consulted.  Sodium bicarb drip was recommended.  She  eventually improved, with IV vasopressors weaned and placed on oral midodrine and oral amiodarone.  PT/OT were consulted.  Urine culture growing yeast.  Placed on IV diflucan, IV cefepime continued due to drainage from external fixator.  Wound care consulted.      Interval History: no acute events overnight.  Very tired today.    Review of Systems   Constitutional:  Negative for chills and fever.   Respiratory:  Negative for cough and shortness of breath.    Cardiovascular:  Positive for leg swelling. Negative for chest pain.   Gastrointestinal:  Negative for abdominal pain, nausea and vomiting.   Neurological:  Positive for weakness. Negative for headaches.   Psychiatric/Behavioral:  Negative for agitation. The patient is not nervous/anxious.    Objective:     Vital Signs (Most Recent):  Temp: 97.7 °F (36.5 °C) (07/01/23 1718)  Pulse: 64 (07/01/23 1718)  Resp: 19 (07/01/23 1718)  BP: 122/75 (07/01/23 1718)  SpO2: 95 % (07/01/23 1718) Vital Signs (24h Range):  Temp:  [97.2 °F (36.2 °C)-98.7 °F (37.1 °C)] 97.7 °F (36.5 °C)  Pulse:  [57-67] 64  Resp:  [16-19] 19  SpO2:  [95 %-99 %] 95 %  BP: (121-144)/(67-94) 122/75     Weight: 93.5 kg (206 lb 2.1 oz)  Body mass index is 36.51 kg/m².    Intake/Output Summary (Last 24 hours) at 7/1/2023 1949  Last data filed at 7/1/2023 0417  Gross per 24 hour   Intake --   Output 425 ml   Net -425 ml         Physical Exam  Constitutional:       General: She is not in acute distress.  Eyes:      General:         Right eye: No discharge.         Left eye: No discharge.   Neck:      Vascular: No JVD.   Cardiovascular:      Rate and Rhythm: Normal rate and regular rhythm.   Pulmonary:      Effort: Pulmonary effort is normal.      Breath sounds: Normal breath sounds.   Abdominal:      General: Abdomen is flat. Bowel sounds are normal. There is no distension.      Palpations: Abdomen is soft.      Tenderness: There is no abdominal tenderness.   Musculoskeletal:      Right hand: Swelling  present.      Left hand: Swelling present.      Right lower leg: Pitting Edema present.      Left lower leg: Pitting Edema present.      Comments: External fixation on left lower leg   Skin:     General: Skin is warm and moist.      Findings: No rash.      Comments: Dressing left great toe   Neurological:      Mental Status: She is alert and oriented to person, place, and time.   Psychiatric:         Attention and Perception: Attention normal.         Mood and Affect: Mood and affect normal.         Speech: Speech normal.           Significant Labs: All pertinent labs within the past 24 hours have been reviewed.  BMP:   Recent Labs   Lab 07/01/23  0406   GLU 92   *   K 4.3   CL 99   CO2 25   BUN 73*   CREATININE 2.6*   CALCIUM 7.6*     CBC:   Recent Labs   Lab 06/30/23  0839 07/01/23  0406   WBC 18.36* 19.84*   HGB 8.2* 8.3*   HCT 24.1* 24.5*    267       Significant Imaging: I have reviewed all pertinent imaging results/findings within the past 24 hours.      Assessment/Plan:      Type 2 diabetes mellitus, with long-term current use of insulin  Patient's FSGs are controlled on current medication regimen.  Last A1c reviewed-   Lab Results   Component Value Date    HGBA1C 10.2 (H) 06/05/2023     Current correctional scale  Low  Maintain anti-hyperglycemic dose as follows-   Antihyperglycemics (From admission, onward)    Start     Stop Route Frequency Ordered    06/26/23 1052  insulin aspart U-100 pen 0-5 Units         -- SubQ Before meals & nightly PRN 06/26/23 0952    06/25/23 0800  insulin detemir U-100 (Levemir) pen 9 Units         -- SubQ Daily 06/25/23 0757        Hold Oral hypoglycemics while patient is in the hospital.    Hyponatremia  Improved.  Monitor sodium level.  Likely due to renal failure.    Sodium   Date Value Ref Range Status   06/29/2023 131 (L) 136 - 145 mmol/L Final   06/28/2023 132 (L) 136 - 145 mmol/L Final   06/27/2023 131 (L) 136 - 145 mmol/L Final            Hypokalemia  Improved.  Monitor potassium level.  Supplement potassium when needed.    Potassium   Date Value Ref Range Status   07/01/2023 4.3 3.5 - 5.1 mmol/L Final   06/30/2023 4.0 3.5 - 5.1 mmol/L Final   06/29/2023 3.8 3.5 - 5.1 mmol/L Final           ATN (acute tubular necrosis)  Patient with acute kidney injury likely due to acute tubular necrosis MYLES is currently improving. Labs reviewed- Renal function/electrolytes with Estimated Creatinine Clearance: 21.2 mL/min (A) (based on SCr of 2.6 mg/dL (H)). according to latest data. Monitor urine output and serial BMP and adjust therapy as needed. Avoid nephrotoxins and renally dose meds for GFR listed above.  I am consulting with nephrology.      Encephalopathy, metabolic  Due to septic shock.  Appears to be at baseline.    Anemia, chronic disease  Patient's anemia is currently controlled.  Received one unit blood earlier in admission.. Etiology likely d/t chronic disease  Current CBC reviewed-   Lab Results   Component Value Date    HGB 8.3 (L) 07/01/2023    HCT 24.5 (L) 07/01/2023     Monitor serial CBC and transfuse if patient becomes hemodynamically unstable, symptomatic or H/H drops below 7/21.         UTI (urinary tract infection)  Continue systemic antibiotic.  Urine sample sent for culture.  So far, it's growing yeast.   Meropenem discontinued  Continue fluconazole.    Antifungals (From admission, onward)    Start     Stop Route Frequency Ordered    06/26/23 2230  fluconazole (DIFLUCAN) IVPB 200 mg        Note to Pharmacy: Ht:    Wt: 80 kg (176 lb 5.9 oz)  Estimated Creatinine Clearance: 17 mL/min (A) (based on SCr of 3 mg/dL (H)).  Body mass index is 31.24 kg/m².    -- IV Every 24 hours (non-standard times) 06/26/23 2116              VTE Risk Mitigation (From admission, onward)         Ordered     enoxaparin injection 30 mg  Every 24 hours         06/27/23 1405     IP VTE HIGH RISK PATIENT  Once         06/27/23 1404     Place sequential  compression device  Until discontinued         06/24/23 0432                Discharge Planning   CITLALI: 7/3/2023     Code Status: Full Code   Is the patient medically ready for discharge?:     Reason for patient still in hospital (select all that apply): Patient trending condition, Consult recommendations and Pending disposition  Discharge Plan A: Long-term acute care facility (LTAC)                  RAMYA Mckeon  Department of Hospital Medicine   Formerly [facility]

## 2023-07-02 NOTE — ASSESSMENT & PLAN NOTE
Continue systemic antibiotic.  Urine sample sent for culture.  So far, it's growing yeast.   Meropenem discontinued  Today makes 7 day course of fluconazole after which we will discontinue.  It looks like a catheter was placed or replaced?  After Urine sample was sent.  We will try to get in touch with her LTAC to find out if she already had the catheter before coming here and if it needs to stay in.      Antifungals (From admission, onward)    Start     Stop Route Frequency Ordered    07/02/23 2230  fluconazole (DIFLUCAN) IVPB 200 mg        Note to Pharmacy: Ht:    Wt: 80 kg (176 lb 5.9 oz)  Estimated Creatinine Clearance: 17 mL/min (A) (based on SCr of 3 mg/dL (H)).  Body mass index is 31.24 kg/m².    07/03 2229 IV Every 24 hours (non-standard times) 07/02/23 4086

## 2023-07-02 NOTE — ASSESSMENT & PLAN NOTE
Improved.  Monitor sodium level.  Likely due to renal failure.    Sodium   Date Value Ref Range Status   07/02/2023 133 (L) 136 - 145 mmol/L Final   07/01/2023 133 (L) 136 - 145 mmol/L Final   06/30/2023 130 (L) 136 - 145 mmol/L Final

## 2023-07-02 NOTE — ASSESSMENT & PLAN NOTE
Patient's FSGs are uncontrolled due to hypoglycemia on current medication regimen.  Last A1c reviewed-   Lab Results   Component Value Date    HGBA1C 10.2 (H) 06/05/2023     Current correctional scale  Low  Decrease anti-hyperglycemic dose as follows-   Antihyperglycemics (From admission, onward)    Start     Stop Route Frequency Ordered    06/26/23 1052  insulin aspart U-100 pen 0-5 Units         -- SubQ Before meals & nightly PRN 06/26/23 0952    06/25/23 0800  insulin detemir U-100 (Levemir) pen 9 Units         -- SubQ Daily 06/25/23 0757        Hold Oral hypoglycemics while patient is in the hospital.

## 2023-07-02 NOTE — PROGRESS NOTES
INPATIENT NEPHROLOGY PROGRESS NOTE  [facility] NEPHROLOGY    Anastasiia COLLIN Justino  07/02/2023    Reason for consultation:  Acute kidney injury    Chief Complaint:   Chief Complaint   Patient presents with    Hypotension     [facility] from [facility] eval of low blood pressure.            History of Present Illness:    Per H and P    74 y/o F with a past medical history significant for DM2, HTN, femur fracture, tibial fracture and tobacco use, L foot wounds,  high grade stenosis SFA bilaterally and popliteal on the left s/p baloon angioplasty L popliteal artery and left posterior tibial arery on DAPT. [place] [facility] [place], s/p ORIF 6/6, also finished antibiotics for acute osteomyelitis of left calcaneus ( Final ID recs with end date for cipro of 6/22 and end date of ancef for 7/17 ). Patient sent from rehab for hypotension. In the ER was in septic shock, e/o UTI and anemic.   6/25  Pt states she feels nauseated and weak.  No sob.  Now bowel complaints.  Denies pain.  Somewhat confused but engages when prompted.  On phenylephrine vasopressor support.  275 cc uop  6/26  acidosis improving, renal same.  525cc UOP recorded.  Will cont IVF but cut rate to 75, f/u labs in am.    6/27  UOP 625cc, on hood, VSS.  Renal function a little worse, acidosis better, hypokalemic-got repletion.  Stopped bicarb gtt, switch to NS.  6/28 SBP , back on hood, on RA, UOP 300cc from holley- asked nurse to flush holley, no diarrhea reported  6/29 VSS, on RA, UOP 425cc, off hood, off NS IVFs, getting IV lasix today  6/30 to SNF today, UOP 450cc  7/1 VSS, UOP 1L  7/2 VSS, on RA, UOP 1.2L    Plan of Care:     Acute kidney injury secondary to hemodynamically mediated renal injury with likely acute tubular necrosis in setting of urosepsis with shock  - renal function in a stable range  - no nsaids or IV contrast  - dose meds for CrCl < 20  - no acute RRT needs    Hypotension  Hypervolemia  - continue  midodrine  - ok with PRN diuretics  - optimize nutrition to reduce third spacing    Hyponatremia  Hypokalemia  HypoMg  Metabolic acidosis--non-gap  Hypocalcemia  - serum Na improved  - K, Mg improved  - CO2 normal  - Ca improved  - phos normal    Anemia  - iron stores low- stool + blood  - s/p pRBC this admission  - so far stable    Renal cyst--complicated cyst   - imaging reviewed- normal kidneys, no hydro     Thank you for allowing us to participate in this patient's care. We will continue to follow.    Vital Signs:  Temp Readings from Last 3 Encounters:   07/02/23 97.5 °F (36.4 °C)   06/14/23 97.6 °F (36.4 °C) (Oral)   06/05/23 98.9 °F (37.2 °C)       Pulse Readings from Last 3 Encounters:   07/02/23 (!) 56   06/14/23 87   06/05/23 84       BP Readings from Last 3 Encounters:   07/02/23 (!) 131/58   06/14/23 (!) 141/65   06/05/23 122/67       Weight:  Wt Readings from Last 3 Encounters:   06/29/23 93.5 kg (206 lb 2.1 oz)   06/25/23 72.6 kg (160 lb)   06/08/23 72.9 kg (160 lb 11.5 oz)     Medications:  No current facility-administered medications on file prior to encounter.     Current Outpatient Medications on File Prior to Encounter   Medication Sig Dispense Refill    acetaminophen (TYLENOL) 500 MG tablet Take 2 tablets (1,000 mg total) by mouth every 8 (eight) hours.  0    aspirin (ECOTRIN) 81 MG EC tablet Take 1 tablet (81 mg total) by mouth 2 (two) times a day. - end date august 30, 2023  0    atorvastatin (LIPITOR) 80 MG tablet Take 1 tablet (80 mg total) by mouth every evening. 90 tablet 3    blood sugar diagnostic Strp To check BG two times daily, to use with insurance preferred meter 200 each 1    blood-glucose meter kit To check BG two times daily, to use with insurance preferred meter 1 each 1    ciprofloxacin HCl (CIPRO) 750 MG tablet Take 1 tablet (750 mg total) by mouth every 12 (twelve) hours. End date 6/22/23      clopidogreL (PLAVIX) 75 mg tablet Take 1 tablet (75 mg total) by mouth once daily. 30  "tablet 3    dextrose 5 % in water (D5W) 5 % PgBk 50 mL with ceFAZolin 2 gram SolR 2 g Inject 2 g into the vein every 8 (eight) hours. End date 7/17/23  0    famotidine (PEPCID) 20 MG tablet Take 1 tablet (20 mg total) by mouth 2 (two) times daily as needed (Heartburn).      insulin aspart U-100 (NOVOLOG) 100 unit/mL (3 mL) InPn pen Inject 0-5 Units into the skin before meals and at bedtime as needed (Hyperglycemia).  0    insulin aspart U-100 (NOVOLOG) 100 unit/mL (3 mL) InPn pen Inject 6 Units into the skin 3 (three) times daily with meals.  0    insulin detemir U-100, Levemir, 100 unit/mL (3 mL) SubQ InPn pen Inject 18 Units into the skin once daily.  0    lancets [facility] To check BG two times daily, to use with insurance preferred meter 200 each 1    lisinopriL (PRINIVIL,ZESTRIL) 5 MG tablet Take 1 tablet (5 mg total) by mouth once daily. 90 tablet 3    melatonin (MELATIN) 3 mg tablet Take 3 tablets (9 mg total) by mouth nightly.  0    methocarbamoL (ROBAXIN) 500 MG Tab Take 1 tablet (500 mg total) by mouth 4 (four) times daily. 40 tablet 0    ondansetron (ZOFRAN-ODT) 4 MG TbDL Take 1 tablet (4 mg total) by mouth every 8 (eight) hours as needed (nausea).      oxyCODONE (ROXICODONE) 10 mg Tab immediate release tablet Take 1 tablet (10 mg total) by mouth every 4 (four) hours as needed (pain sclae 7-10). 10 tablet 0    oxyCODONE (ROXICODONE) 5 MG immediate release tablet Take 1 tablet (5 mg total) by mouth every 4 (four) hours as needed (pain scale 4-6). 10 tablet 0    pen needle, diabetic (PEN NEEDLE) 31 gauge x 3/16" Ndle 1 application by Misc.(Non-Drug; Combo Route) route 2 (two) times a day. 200 each 0    polyethylene glycol (GLYCOLAX) 17 gram PwPk Take 17 g by mouth once daily.  0    pregabalin (LYRICA) 75 MG capsule Take 1 capsule (75 mg total) by mouth 2 (two) times daily. 60 capsule 0    senna-docusate 8.6-50 mg (PERICOLACE) 8.6-50 mg per tablet Take 1 tablet by mouth once daily.      vitamin D (VITAMIN D3) " "1000 units Tab Take 1 tablet (1,000 Units total) by mouth once daily.       Scheduled Meds:   amiodarone  200 mg Oral Daily    ceFAZolin (ANCEF) IVPB  2 g Intravenous Q12H    enoxparin  30 mg Subcutaneous Q24H (prophylaxis, 1700)    fluconazole (DIFLUCAN) IV (PEDS and ADULTS)  200 mg Intravenous Q24H    [START ON 7/3/2023] insulin detemir U-100  6 Units Subcutaneous Daily    midodrine  5 mg Oral TID WM     Continuous Infusions:      PRN Meds:.dextrose 50%, dextrose 50%, glucagon (human recombinant), glucose, glucose, HYDROcodone-acetaminophen, insulin aspart U-100, magnesium oxide, magnesium oxide, naloxone, potassium bicarbonate, potassium bicarbonate, potassium bicarbonate, potassium, sodium phosphates, potassium, sodium phosphates, potassium, sodium phosphates, sodium chloride 0.9%    Review of Systems:  Neg    Physical Exam:    BP (!) 131/58 (Patient Position: Lying)   Pulse (!) 56   Temp 97.5 °F (36.4 °C)   Resp 15   Ht 5' 3" (1.6 m)   Wt 93.5 kg (206 lb 2.1 oz)   SpO2 96%   BMI 36.51 kg/m²     General Appearance:    Ill appearing   Head:    Normocephalic, without obvious abnormality, atraumatic   Eyes:    PER, conjunctiva/corneas clear, EOM's intact in both eyes        Throat:   Lips, mucosa, and tongue normal; teeth and gums normal   Back:     Symmetric, no curvature, ROM normal, no CVA tenderness   Lungs:     Clear to auscultation bilaterally, respirations unlabored   Chest wall:    No tenderness or deformity   Heart:    Regular rate and rhythm, S1 and S2 normal, no murmur, rub   or gallop   Abdomen:     Soft, non-tender, bowel sounds active all four quadrants,     no masses, no organomegaly   Extremities:   Edematous    Pulses:   2+ and symmetric all extremities   MSK:   No joint or muscle swelling, tenderness or deformity   Skin:   Skin color, texture, turgor normal, no rashes or lesions   Neurologic:   CNII-XII intact, normal strength and sensation       Throughout.  No flap     Results:  Lab " Results   Component Value Date     (L) 07/02/2023    K 4.2 07/02/2023    CL 99 07/02/2023    CO2 26 07/02/2023    BUN 72 (H) 07/02/2023    CREATININE 2.7 (H) 07/02/2023    CALCIUM 7.6 (L) 07/02/2023    ANIONGAP 8 07/02/2023    ESTGFRAFRICA 78 04/15/2021    EGFRNONAA 67 04/15/2021       Lab Results   Component Value Date    CALCIUM 7.6 (L) 07/02/2023    PHOS 3.7 06/28/2023       Recent Labs   Lab 07/02/23  0404   WBC 18.08*   RBC 2.93*   HGB 8.4*   HCT 25.1*      MCV 86   MCH 28.7   MCHC 33.5       Imaging Results              CT Abdomen Pelvis  Without Contrast (Final result)  Result time 06/24/23 05:34:07      Final result by Allan Fountain DO (06/24/23 05:34:07)                   Narrative:    EXAM DESCRIPTION:  CT ABDOMEN PELVIS WITHOUT CONTRAST  RadLex: CT ABDOMEN PELVIS WITHOUT IV CONTRAST    CLINICAL HISTORY:  Weakness, increased WBC, decreased RBC, hypotensive, MYLES.    TECHNIQUE:  CT of the abdomen and pelvis without intravenous contrast.  All CT scans at this facility use dose modulation, iterative reconstruction, and/or weight based dosing when appropriate to reduce radiation dose to as low as reasonably achievable.    COMPARISON: None.    FINDINGS:    The visualized lower thoracic structures demonstrate minimal bilateral pleural effusions. There is mild bibasilar atelectasis. Coronary artery calcifications are present. There is subcutaneous edema throughout the thorax.    Unenhanced images of the liver, spleen, and adrenal glands appear grossly unremarkable. Pancreas is not well assessed without contrast. Gallbladder is minimally distended. No renal stones are identified. There is a round exophytic lesion off the upper pole right kidney measuring 2.1 cm, with Hounsfield units averaging 59. This may relate to hyperdense cyst. There is mild left hydronephrosis and hydroureter. No definite ureteral or bladder stone is identified. Urinary bladder is decompressed with a Gee catheter in place. There  are atherosclerotic calcifications along the abdominal aorta without evidence for aneurysmal dilatation. No bowel obstruction is seen. There is no free intraperitoneal air. The appendix appears unremarkable. There is colonic diverticulosis without definite CT evidence for acute diverticulitis. There is mild fecal loading throughout the colon. There is minimal free fluid in the posterior pelvis. There is moderate subcutaneous edema throughout the abdomen and pelvis extending into the thighs. There is an intramedullary denzel within the proximal left femur, with a compression screw through the left femoral neck. There is a left subtrochanteric fracture    IMPRESSION:  1.  Minimal gallbladder distention.  2.  Mild left hydroureteronephrosis, without definite ureteral stone seen. Urinary bladder decompressed.  3.  Anasarca.  4.  Colonic diverticulosis without definite CT evidence for acute diverticulitis.  5.  Possible hyperdense cyst at the upper pole right kidney. Follow-up nonemergent renal ultrasound could be performed.  6.  Post surgical changes at the left femur, with subtrochanteric fracture.  7.  Minimal bilateral pleural effusions and mild bibasilar atelectasis.    Electronically signed by:  Allan Fountain DO  6/24/2023 5:34 AM CDT Workstation: [id number]                                     X-Ray Chest AP Portable (Final result)  Result time 06/24/23 08:28:44      Final result by Perfecto Castellanos MD (06/24/23 08:28:44)                   Narrative:    Chest single view    CLINICAL DATA: Sepsis    FINDINGS: AP view shows the heart to be mildly enlarged. The mediastinum is unremarkable. Right-sided PICC line extends to the superior vena cava. There is mild atelectasis at the right lung base, with ill-defined atelectasis or infiltrate at the lung base on the left. There are no significant pleural effusions. No acute osseous abnormality is identified.    IMPRESSION:  1. Atelectasis or infiltrate at the left lung base.  2.  Mild right basilar atelectasis.  3. Mild cardiomegaly.    Electronically signed by:  Perfecto Castellanos MD  6/24/2023 8:28 AM CDT Workstation: [id number]                                        I have personally reviewed pertinent radiological imaging and reports.    Patient care was time spent personally by me on the following activities: > 35 min  Obtaining a history  Examination of patient.  Providing medical care at the patients bedside.  Developing a treatment plan with patient or surrogate and bedside caregivers  Ordering and reviewing laboratory studies, radiographic studies, pulse oximetry.  Ordering and performing treatments and interventions.  Evaluation of patient's response to treatment.  Discussions with consultants while on the unit and immediately available to the patient.  Re-evaluation of the patient's condition.  Documentation in the medical record.     Wily Carranza MD    Nephrology  [facility]  [phone]

## 2023-07-02 NOTE — SUBJECTIVE & OBJECTIVE
Interval History:  Pt seen and examined.  States she is feeling generally fatigued and crummy.  She denies any chest pain or shortness of breath.  She remains with leukocytosis with slight down trend.  Renal function stagnant.  Had a period of hypoglycemia this morning-she is not eating well.  Will consult dietitian as she is not showing her nutritional supplements-states they are too sweet.  I did deescalate her insulin.  This may need to be deescalated further her appetite does not , especially in light of her renal function.    Review of Systems   Constitutional:  Positive for fatigue. Negative for chills and fever.   Respiratory:  Negative for shortness of breath.    Gastrointestinal:  Negative for nausea and vomiting.   Objective:     Vital Signs (Most Recent):  Temp: 97.6 °F (36.4 °C) (07/02/23 1220)  Pulse: 65 (07/02/23 1220)  Resp: 17 (07/02/23 1220)  BP: (!) 164/73 (07/02/23 1220)  SpO2: 97 % (07/02/23 1220) Vital Signs (24h Range):  Temp:  [96.4 °F (35.8 °C)-97.7 °F (36.5 °C)] 97.6 °F (36.4 °C)  Pulse:  [56-96] 65  Resp:  [15-19] 17  SpO2:  [95 %-98 %] 97 %  BP: (107-164)/(52-75) 164/73     Weight: 93.5 kg (206 lb 2.1 oz)  Body mass index is 36.51 kg/m².    Intake/Output Summary (Last 24 hours) at 7/2/2023 1458  Last data filed at 7/2/2023 0524  Gross per 24 hour   Intake 870 ml   Output 1200 ml   Net -330 ml         Physical Exam  Vitals and nursing note reviewed.   Constitutional:       Appearance: Normal appearance.   Eyes:      Extraocular Movements: Extraocular movements intact.      Conjunctiva/sclera: Conjunctivae normal.      Pupils: Pupils are equal, round, and reactive to light.   Cardiovascular:      Rate and Rhythm: Normal rate and regular rhythm.   Pulmonary:      Effort: Pulmonary effort is normal.      Breath sounds: Normal breath sounds.   Abdominal:      General: Abdomen is flat. Bowel sounds are normal.      Palpations: Abdomen is soft.   Musculoskeletal:      Comments:  +anasarca  LEFT leg with external fixator, no overt drainage noted   Skin:     General: Skin is warm and dry.      Capillary Refill: Capillary refill takes less than 2 seconds.      Comments: Dressing to LEFT great toe   Neurological:      General: No focal deficit present.      Mental Status: She is alert.      Comments: Oriented to self and situation. Disoriented to place.   Psychiatric:      Comments: hypoactive           Significant Labs: All pertinent labs within the past 24 hours have been reviewed.  CBC:   Recent Labs   Lab 07/01/23  0406 07/02/23  0404   WBC 19.84* 18.08*   HGB 8.3* 8.4*   HCT 24.5* 25.1*    291     CMP:   Recent Labs   Lab 06/30/23  1730 07/01/23  0406 07/02/23  0404   NA  --  133* 133*   K  --  4.3 4.2   CL  --  99 99   CO2  --  25 26   GLU 73 92 58*   BUN  --  73* 72*   CREATININE  --  2.6* 2.7*   CALCIUM  --  7.6* 7.6*   ANIONGAP  --  9 8       Significant Imaging: I have reviewed all pertinent imaging results/findings within the past 24 hours.

## 2023-07-02 NOTE — ASSESSMENT & PLAN NOTE
This is an established diagnosis.  She was followed closely by ID at [facility] and D/C on ciprofloxacin which she has completed and Ancef with end date of 07/17.  -continue the Ancef   -plan for discharge back to [facility] hopefully tomorrow.

## 2023-07-02 NOTE — CARE UPDATE
07/02/23 0844   PRE-TX-O2   Device (Oxygen Therapy) room air   SpO2 96 %   Pulse Oximetry Type Intermittent   $ Pulse Oximetry - Multiple Charge Pulse Oximetry - Multiple   Pulse (!) 56   Resp 15   Respiratory Evaluation   $ Care Plan Tech Time 15 min

## 2023-07-02 NOTE — ASSESSMENT & PLAN NOTE
Improved.  Monitor potassium level.  Supplement potassium when needed.    Potassium   Date Value Ref Range Status   07/01/2023 4.3 3.5 - 5.1 mmol/L Final   06/30/2023 4.0 3.5 - 5.1 mmol/L Final   06/29/2023 3.8 3.5 - 5.1 mmol/L Final

## 2023-07-02 NOTE — ASSESSMENT & PLAN NOTE
Patient with acute kidney injury likely due to acute tubular necrosis MYLES is currently improving. Labs reviewed- Renal function/electrolytes with Estimated Creatinine Clearance: 21.2 mL/min (A) (based on SCr of 2.6 mg/dL (H)). according to latest data. Monitor urine output and serial BMP and adjust therapy as needed. Avoid nephrotoxins and renally dose meds for GFR listed above.  I am consulting with nephrology.

## 2023-07-02 NOTE — ASSESSMENT & PLAN NOTE
Present on arrival  -wound care consulted and following  -continue to following care orders   -turn Pt q.2 hours

## 2023-07-03 PROBLEM — T68.XXXA HYPOTHERMIA: Status: ACTIVE | Noted: 2023-07-03

## 2023-07-03 PROBLEM — E43 SEVERE PROTEIN-CALORIE MALNUTRITION: Status: ACTIVE | Noted: 2023-07-03

## 2023-07-03 PROBLEM — I48.0 PAF (PAROXYSMAL ATRIAL FIBRILLATION): Status: ACTIVE | Noted: 2023-07-03

## 2023-07-03 PROBLEM — E44.0 MODERATE PROTEIN-CALORIE MALNUTRITION: Status: ACTIVE | Noted: 2023-07-03

## 2023-07-03 LAB
ALBUMIN SERPL BCP-MCNC: 1.3 G/DL (ref 3.5–5.2)
ALP SERPL-CCNC: 90 U/L (ref 55–135)
ALT SERPL W/O P-5'-P-CCNC: <5 U/L (ref 10–44)
ANION GAP SERPL CALC-SCNC: 13 MMOL/L (ref 8–16)
ANION GAP SERPL CALC-SCNC: 13 MMOL/L (ref 8–16)
ANISOCYTOSIS BLD QL SMEAR: SLIGHT
AST SERPL-CCNC: 15 U/L (ref 10–40)
BASOPHILS # BLD AUTO: 0.04 K/UL (ref 0–0.2)
BASOPHILS NFR BLD: 0.2 % (ref 0–1.9)
BILIRUB SERPL-MCNC: 0.9 MG/DL (ref 0.1–1)
BUN SERPL-MCNC: 70 MG/DL (ref 8–23)
BUN SERPL-MCNC: 70 MG/DL (ref 8–23)
CALCIUM SERPL-MCNC: 7.6 MG/DL (ref 8.7–10.5)
CALCIUM SERPL-MCNC: 7.6 MG/DL (ref 8.7–10.5)
CHLORIDE SERPL-SCNC: 99 MMOL/L (ref 95–110)
CHLORIDE SERPL-SCNC: 99 MMOL/L (ref 95–110)
CO2 SERPL-SCNC: 21 MMOL/L (ref 23–29)
CO2 SERPL-SCNC: 21 MMOL/L (ref 23–29)
CREAT SERPL-MCNC: 2.7 MG/DL (ref 0.5–1.4)
CREAT SERPL-MCNC: 2.7 MG/DL (ref 0.5–1.4)
CRP SERPL-MCNC: 7.34 MG/DL
DIFFERENTIAL METHOD: ABNORMAL
EOSINOPHIL # BLD AUTO: 0.1 K/UL (ref 0–0.5)
EOSINOPHIL NFR BLD: 0.5 % (ref 0–8)
ERYTHROCYTE [DISTWIDTH] IN BLOOD BY AUTOMATED COUNT: 17.8 % (ref 11.5–14.5)
EST. GFR  (NO RACE VARIABLE): 18.2 ML/MIN/1.73 M^2
EST. GFR  (NO RACE VARIABLE): 18.2 ML/MIN/1.73 M^2
GIANT PLATELETS BLD QL SMEAR: PRESENT
GLUCOSE SERPL-MCNC: 146 MG/DL (ref 70–110)
GLUCOSE SERPL-MCNC: 149 MG/DL (ref 70–110)
GLUCOSE SERPL-MCNC: 149 MG/DL (ref 70–110)
GLUCOSE SERPL-MCNC: 150 MG/DL (ref 70–110)
GLUCOSE SERPL-MCNC: 159 MG/DL (ref 70–110)
GLUCOSE SERPL-MCNC: 202 MG/DL (ref 70–110)
HCT VFR BLD AUTO: 25.3 % (ref 37–48.5)
HGB BLD-MCNC: 8.3 G/DL (ref 12–16)
HYPOCHROMIA BLD QL SMEAR: ABNORMAL
IMM GRANULOCYTES # BLD AUTO: 0.55 K/UL (ref 0–0.04)
IMM GRANULOCYTES NFR BLD AUTO: 3.4 % (ref 0–0.5)
INR PPP: 1.1 (ref 0.8–1.2)
LACTATE SERPL-SCNC: 0.9 MMOL/L (ref 0.5–1.9)
LYMPHOCYTES # BLD AUTO: 1 K/UL (ref 1–4.8)
LYMPHOCYTES NFR BLD: 6.1 % (ref 18–48)
MCH RBC QN AUTO: 28.5 PG (ref 27–31)
MCHC RBC AUTO-ENTMCNC: 32.8 G/DL (ref 32–36)
MCV RBC AUTO: 87 FL (ref 82–98)
MONOCYTES # BLD AUTO: 0.5 K/UL (ref 0.3–1)
MONOCYTES NFR BLD: 3.3 % (ref 4–15)
NEUTROPHILS # BLD AUTO: 13.9 K/UL (ref 1.8–7.7)
NEUTROPHILS NFR BLD: 86.5 % (ref 38–73)
NRBC BLD-RTO: 0 /100 WBC
PLATELET # BLD AUTO: 312 K/UL (ref 150–450)
PLATELET BLD QL SMEAR: ABNORMAL
PMV BLD AUTO: 12.3 FL (ref 9.2–12.9)
POIKILOCYTOSIS BLD QL SMEAR: SLIGHT
POTASSIUM SERPL-SCNC: 4.8 MMOL/L (ref 3.5–5.1)
POTASSIUM SERPL-SCNC: 4.8 MMOL/L (ref 3.5–5.1)
PREALB SERPL-MCNC: 8 MG/DL (ref 20–43)
PROCALCITONIN SERPL IA-MCNC: 0.47 NG/ML (ref 0–0.5)
PROT SERPL-MCNC: 4.4 G/DL (ref 6–8.4)
PROTHROMBIN TIME: 12.3 SEC (ref 9–12.5)
RBC # BLD AUTO: 2.91 M/UL (ref 4–5.4)
SODIUM SERPL-SCNC: 133 MMOL/L (ref 136–145)
SODIUM SERPL-SCNC: 133 MMOL/L (ref 136–145)
TRIGL SERPL-MCNC: 105 MG/DL (ref 30–150)
WBC # BLD AUTO: 16.14 K/UL (ref 3.9–12.7)

## 2023-07-03 PROCEDURE — 97530 THERAPEUTIC ACTIVITIES: CPT | Mod: CO

## 2023-07-03 PROCEDURE — 80053 COMPREHEN METABOLIC PANEL: CPT | Performed by: HOSPITALIST

## 2023-07-03 PROCEDURE — 97535 SELF CARE MNGMENT TRAINING: CPT | Mod: CO

## 2023-07-03 PROCEDURE — 63600175 PHARM REV CODE 636 W HCPCS: Performed by: HOSPITALIST

## 2023-07-03 PROCEDURE — 99223 PR INITIAL HOSPITAL CARE,LEVL III: ICD-10-PCS | Mod: ,,, | Performed by: INTERNAL MEDICINE

## 2023-07-03 PROCEDURE — 83605 ASSAY OF LACTIC ACID: CPT | Performed by: NURSE PRACTITIONER

## 2023-07-03 PROCEDURE — 63600175 PHARM REV CODE 636 W HCPCS: Performed by: NURSE PRACTITIONER

## 2023-07-03 PROCEDURE — 25000003 PHARM REV CODE 250: Performed by: HOSPITALIST

## 2023-07-03 PROCEDURE — 25000003 PHARM REV CODE 250: Performed by: INTERNAL MEDICINE

## 2023-07-03 PROCEDURE — 87040 BLOOD CULTURE FOR BACTERIA: CPT | Performed by: HOSPITALIST

## 2023-07-03 PROCEDURE — 84145 PROCALCITONIN (PCT): CPT | Performed by: NURSE PRACTITIONER

## 2023-07-03 PROCEDURE — 97530 THERAPEUTIC ACTIVITIES: CPT | Mod: CQ

## 2023-07-03 PROCEDURE — 85025 COMPLETE CBC W/AUTO DIFF WBC: CPT | Performed by: HOSPITALIST

## 2023-07-03 PROCEDURE — 94761 N-INVAS EAR/PLS OXIMETRY MLT: CPT

## 2023-07-03 PROCEDURE — 80048 BASIC METABOLIC PNL TOTAL CA: CPT | Performed by: HOSPITALIST

## 2023-07-03 PROCEDURE — 12000002 HC ACUTE/MED SURGE SEMI-PRIVATE ROOM

## 2023-07-03 PROCEDURE — 25000003 PHARM REV CODE 250: Performed by: NURSE PRACTITIONER

## 2023-07-03 PROCEDURE — 86140 C-REACTIVE PROTEIN: CPT | Performed by: NURSE PRACTITIONER

## 2023-07-03 PROCEDURE — 85610 PROTHROMBIN TIME: CPT | Performed by: NURSE PRACTITIONER

## 2023-07-03 PROCEDURE — 84478 ASSAY OF TRIGLYCERIDES: CPT | Performed by: NURSE PRACTITIONER

## 2023-07-03 PROCEDURE — 99223 1ST HOSP IP/OBS HIGH 75: CPT | Mod: ,,, | Performed by: INTERNAL MEDICINE

## 2023-07-03 PROCEDURE — 99900035 HC TECH TIME PER 15 MIN (STAT)

## 2023-07-03 PROCEDURE — 84134 ASSAY OF PREALBUMIN: CPT | Performed by: NURSE PRACTITIONER

## 2023-07-03 PROCEDURE — 36415 COLL VENOUS BLD VENIPUNCTURE: CPT | Performed by: HOSPITALIST

## 2023-07-03 RX ORDER — ONDANSETRON HYDROCHLORIDE 4 MG/5ML
4 SOLUTION ORAL EVERY 6 HOURS PRN
Status: DISCONTINUED | OUTPATIENT
Start: 2023-07-03 | End: 2023-07-03

## 2023-07-03 RX ORDER — NAPROXEN SODIUM 220 MG/1
81 TABLET, FILM COATED ORAL DAILY
Status: DISCONTINUED | OUTPATIENT
Start: 2023-07-04 | End: 2023-07-05

## 2023-07-03 RX ORDER — CLOPIDOGREL BISULFATE 75 MG/1
75 TABLET ORAL DAILY
Status: DISCONTINUED | OUTPATIENT
Start: 2023-07-04 | End: 2023-07-07 | Stop reason: HOSPADM

## 2023-07-03 RX ORDER — CLOTRIMAZOLE 10 MG/1
10 LOZENGE ORAL; TOPICAL 3 TIMES DAILY
Status: DISCONTINUED | OUTPATIENT
Start: 2023-07-03 | End: 2023-07-07 | Stop reason: HOSPADM

## 2023-07-03 RX ORDER — ONDANSETRON 2 MG/ML
4 INJECTION INTRAMUSCULAR; INTRAVENOUS EVERY 6 HOURS PRN
Status: DISCONTINUED | OUTPATIENT
Start: 2023-07-03 | End: 2023-07-07 | Stop reason: HOSPADM

## 2023-07-03 RX ADMIN — ENOXAPARIN SODIUM 30 MG: 30 INJECTION SUBCUTANEOUS at 06:07

## 2023-07-03 RX ADMIN — MIDODRINE HYDROCHLORIDE 5 MG: 2.5 TABLET ORAL at 01:07

## 2023-07-03 RX ADMIN — COLLAGENASE SANTYL: 250 OINTMENT TOPICAL at 08:07

## 2023-07-03 RX ADMIN — HYDROCODONE BITARTRATE AND ACETAMINOPHEN 1 TABLET: 5; 325 TABLET ORAL at 02:07

## 2023-07-03 RX ADMIN — CEFAZOLIN SODIUM 2 G: 2 SOLUTION INTRAVENOUS at 02:07

## 2023-07-03 RX ADMIN — VANCOMYCIN HYDROCHLORIDE 1500 MG: 1.5 INJECTION, POWDER, LYOPHILIZED, FOR SOLUTION INTRAVENOUS at 06:07

## 2023-07-03 RX ADMIN — INSULIN ASPART 1 UNITS: 100 INJECTION, SOLUTION INTRAVENOUS; SUBCUTANEOUS at 08:07

## 2023-07-03 RX ADMIN — CEFEPIME HYDROCHLORIDE 2 G: 2 INJECTION, POWDER, FOR SOLUTION INTRAVENOUS at 05:07

## 2023-07-03 RX ADMIN — CLOTRIMAZOLE 10 MG: 10 LOZENGE ORAL; TOPICAL at 08:07

## 2023-07-03 RX ADMIN — MIDODRINE HYDROCHLORIDE 5 MG: 2.5 TABLET ORAL at 05:07

## 2023-07-03 RX ADMIN — AMIODARONE HYDROCHLORIDE 200 MG: 200 TABLET ORAL at 09:07

## 2023-07-03 RX ADMIN — LEUCINE, PHENYLALANINE, LYSINE, METHIONINE, ISOLEUCINE, VALINE, HISTIDINE, THREONINE, TRYPTOPHAN, ALANINE, GLYCINE, ARGININE, PROLINE, SERINE, TYROSINE, SODIUM ACETATE, DIBASIC POTASSIUM PHOSPHATE, MAGNESIUM CHLORIDE, SODIUM CHLORIDE, CALCIUM CHLORIDE, DEXTROSE
311; 238; 247; 170; 255; 247; 204; 179; 77; 880; 438; 489; 289; 213; 17; 297; 261; 51; 77; 33; 5 INJECTION INTRAVENOUS at 05:07

## 2023-07-03 RX ADMIN — MIDODRINE HYDROCHLORIDE 5 MG: 2.5 TABLET ORAL at 09:07

## 2023-07-03 NOTE — ASSESSMENT & PLAN NOTE
Patient with acute kidney injury likely due to acute tubular necrosis MYLES is currently stable. Labs reviewed- Renal function/electrolytes with Estimated Creatinine Clearance: 20.5 mL/min (A) (based on SCr of 2.7 mg/dL (H)). according to latest data. Monitor urine output and serial BMP and adjust therapy as needed. Avoid nephrotoxins and renally dose meds for GFR listed above.   -Nephrology following: appreciate recs  -Cr stagnant at 2.7  -last received IV Lasix on 06/29  -Follow the BMP  -she has diffuse anasarca and hypoalbuminemia.  I discussed with Nephrology.  They are hesitant to diurese in light of lower blood pressures, concern for occult infection.  We will follow closely and can diurese if/when appropriate

## 2023-07-03 NOTE — PLAN OF CARE
AAOx4. Pt quiet and flat. Not eating. Clinimix started. Wound care completed to sacrum and LLE, pictures updated. On waffle mattress, turning Q2H. Pt does not appear motivated to eat or work with therapy, staff encouraging pt throughout shift. ML and R IR in place, dressings changed. Pt weeping, especially from LUE. Changing absorptive pad frequently. Pt near nursing station.        Problem: Adult Inpatient Plan of Care  Goal: Plan of Care Review  Outcome: Ongoing, Progressing  Goal: Absence of Hospital-Acquired Illness or Injury  Outcome: Ongoing, Progressing  Goal: Optimal Comfort and Wellbeing  Outcome: Ongoing, Progressing

## 2023-07-03 NOTE — PT/OT/SLP PROGRESS
"Occupational Therapy   Treatment    Name: Anastasiia Badillo  MRN: [id number]  Admitting Diagnosis:  Septic shock  9 Days Post-Op    Recommendations:     Discharge Recommendations: LTACH (long-term acute care hospital)  Discharge Equipment Recommendations:  none  Barriers to discharge:  Decreased caregiver support, Other (Comment) (low motivation)    Assessment:     Anastasiia Badillo is a 73 y.o. female with a medical diagnosis of Septic shock.  She presents with  intrinsic motivation and constant pain (even at rest 7/10) and is easily fatigued resulting in bed seeking behavior. Pt requires max encouragement, leverage of benefits of movement, and direct instruction to move body parts and was given "homework" for LUE this date to decreased edema and increase functional independence. Pt was able to maintain 6 min EOB with 2 x 30sec periods of SBA, requiring VC to use LUE weight bearing into bed and RUE grabbing bed rail to center herself and maintain midline. RNSonya present for wound care and inspection and pictures throughout session. Pt with edematous weeping LUE and L posterolateral lean at EOB. Performance deficits affecting function are weakness, pain, impaired endurance, impaired self care skills, impaired functional mobility, gait instability, impaired balance, decreased upper extremity function, decreased lower extremity function, decreased safety awareness, decreased ROM, impaired skin, edema, orthopedic precautions.     Rehab Prognosis:  Fair; patient would benefit from acute skilled OT services to address these deficits and reach maximum level of function.       Plan:     Patient to be seen 3 x/week to address the above listed problems via self-care/home management, therapeutic activities, therapeutic exercises  Plan of Care Expires: 23  Plan of Care Reviewed with: patient    Subjective     Chief Complaint: I need to lay down.  Patient/Family Comments/goals: decrease pain  Pain/Comfort:  Pain " "Rating 1: 7/10  Location - Side 1: Left  Location 1: ankle (and also "everywhere")  Pain Addressed 1: Pre-medicate for activity, Reposition, Distraction, Cessation of Activity  Pain Rating Post-Intervention 1: 7/10    Objective:     Communicated with: Sonya POTTER prior to session.  Patient found HOB elevated with telemetry, holley catheter, peripheral IV, central line, external fixator upon OT entry to room.    General Precautions: Standard, fall    Orthopedic Precautions:LLE non weight bearing  Braces:  (LLE External Fixator)  Respiratory Status: Room air     Occupational Performance:     Bed Mobility:    Patient completed Rolling/Turning to Left with  moderate assistance and 2 persons, and with side rail  Patient completed Rolling/Turning to Right with moderate assistance, 2 persons, and with side rail  Patient completed Scooting/Bridging with dependent, 3 persons, and bed in trendelenburg   Patient completed Supine to Sit with maximal assistance and 2 persons  Patient completed Sit to Supine with maximal assistance, 2 persons, and with side rail     Functional Mobility/Transfers:  Not attempted  Functional Mobility: POOR; pt is in severe pain and is scared to be in more pain.    ADL's:  Toileting: dependent with holley and diaper. HAUSER and rehab tech assisting with diaper change with nurse. LLE with external fixator requires constant support during all rolling. Pt with sacral wounds and labial redness and tearing skin at diaper placement along the groin.    [facility] 6 Click ADL: 14    Treatment & Education:  -COG and midline training at EOB. Pt requires varying assist starting at MaxA x 2 and resulting in intermittent SBA up to 30 secs with VC for all hand placement and encouragement to continue when she asks to lay down. RN completing wound care and pictures and forced pt to remain EOB much longer than she wanted to. Positive feedback and praise given for efforts.    Patient left supine with  Sonya POTTER present    GOALS: "   Multidisciplinary Problems       Occupational Therapy Goals          Problem: Occupational Therapy    Goal Priority Disciplines Outcome Interventions   Occupational Therapy Goal     OT, PT/OT     Description: Goals to be met by: 7/30/2023     Patient will increase functional independence with ADLs by performing:    UE Dressing with Minimal Assistance.  Grooming while seated with Stand-by Assistance.  Sitting at edge of bed x10 minutes with Contact Guard Assistance.  Supine to sit with Minimal Assistance.  Upper extremity exercise program x10 reps per handout, with assistance as needed.                         Time Tracking:     OT Date of Treatment: 07/03/23  OT Start Time: 1333  OT Stop Time: 1416  OT Total Time (min): 43 min    Billable Minutes:Self Care/Home Management 20 min  Therapeutic Activity 23 min    OT/DARION: DARION     Number of DARION visits since last OT visit: 1    7/3/2023

## 2023-07-03 NOTE — PROGRESS NOTES
Holley cath unclamped, holley to remain in place for now. New pics taken of pt's wound and there is concern from pt's sacral wound that wound may be infected. ID consulted. WC re-consulted.

## 2023-07-03 NOTE — ASSESSMENT & PLAN NOTE
Pt with recurrent hypothermia with episodes on 06/30, then again overnight   -thyroid tests were within normal limits at that time, will add T3, T4   -check cortisol level in a.m.   -broaden infectious workup, repeat cultures, check CXR, procalcitonin.  Id is involved   -work on nutritional status

## 2023-07-03 NOTE — PROGRESS NOTES
INPATIENT NEPHROLOGY PROGRESS NOTE  [facility] NEPHROLOGY    Anastasiia A Justino  07/03/2023    Reason for consultation:  Acute kidney injury    Chief Complaint:   Chief Complaint   Patient presents with    Hypotension     Sent from [facility] for eval of low blood pressure.         History of Present Illness:    Per H and P    72 y/o F with a past medical history significant for DM2, HTN, femur fracture, tibial fracture and tobacco use, L foot wounds,  high grade stenosis SFA bilaterally and popliteal on the left s/p baloon angioplasty L popliteal artery and left posterior tibial arery on DAPT. [place] [facility] [place], s/p ORIF 6/6, also finished antibiotics for acute osteomyelitis of left calcaneus ( Final ID recs with end date for cipro of 6/22 and end date of ancef for 7/17 ). Patient sent from rehab for hypotension. In the ER was in septic shock, e/o UTI and anemic.   6/25  Pt states she feels nauseated and weak.  No sob.  Now bowel complaints.  Denies pain.  Somewhat confused but engages when prompted.  On phenylephrine vasopressor support.  275 cc uop  6/26  acidosis improving, renal same.  525cc UOP recorded.  Will cont IVF but cut rate to 75, f/u labs in am.    6/27  UOP 625cc, on hood, VSS.  Renal function a little worse, acidosis better, hypokalemic-got repletion.  Stopped bicarb gtt, switch to NS.  6/28 SBP , back on hood, on RA, UOP 300cc from holley- asked nurse to flush holley, no diarrhea reported  6/29 VSS, on RA, UOP 425cc, off hood, off NS IVFs, getting IV lasix today  6/30 to SNF today, UOP 450cc  7/1 VSS, UOP 1L  7/2 VSS, on RA, UOP 1.2L  7/3 VSS, no new complains, Ok to go to LTAC when ready.    Plan of Care:     Acute kidney injury secondary to hemodynamically mediated renal injury with likely acute tubular necrosis in setting of urosepsis with shock  - renal function in a stable range  - no nsaids or IV contrast  - dose meds for CrCl < 20  - no acute RRT  needs    Hypotension  Hypervolemia  - continue midodrine  - ok with PRN diuretics  - optimize nutrition to reduce third spacing    Hyponatremia  Hypokalemia  HypoMg  Metabolic acidosis--non-gap  Hypocalcemia  - serum Na improved  - K, Mg improved  - CO2 normal  - Ca improved  - phos normal    Anemia  - iron stores low- stool + blood  - s/p pRBC this admission  - so far stable    Renal cyst--complicated cyst   - imaging reviewed- normal kidneys, no hydro     Thank you for allowing us to participate in this patient's care. We will continue to follow.    Vital Signs:  Temp Readings from Last 3 Encounters:   07/03/23 97.5 °F (36.4 °C) (Oral)   06/14/23 97.6 °F (36.4 °C) (Oral)   06/05/23 98.9 °F (37.2 °C)       Pulse Readings from Last 3 Encounters:   07/03/23 68   06/14/23 87   06/05/23 84       BP Readings from Last 3 Encounters:   07/03/23 (!) 98/57   06/14/23 (!) 141/65   06/05/23 122/67       Weight:  Wt Readings from Last 3 Encounters:   06/29/23 93.5 kg (206 lb 2.1 oz)   06/25/23 72.6 kg (160 lb)   06/08/23 72.9 kg (160 lb 11.5 oz)     Medications:  No current facility-administered medications on file prior to encounter.     Current Outpatient Medications on File Prior to Encounter   Medication Sig Dispense Refill    acetaminophen (TYLENOL) 500 MG tablet Take 2 tablets (1,000 mg total) by mouth every 8 (eight) hours.  0    aspirin (ECOTRIN) 81 MG EC tablet Take 1 tablet (81 mg total) by mouth 2 (two) times a day. - end date august 30, 2023  0    atorvastatin (LIPITOR) 80 MG tablet Take 1 tablet (80 mg total) by mouth every evening. 90 tablet 3    blood sugar diagnostic Strp To check BG two times daily, to use with insurance preferred meter 200 each 1    blood-glucose meter kit To check BG two times daily, to use with insurance preferred meter 1 each 1    ciprofloxacin HCl (CIPRO) 750 MG tablet Take 1 tablet (750 mg total) by mouth every 12 (twelve) hours. End date 6/22/23      clopidogreL (PLAVIX) 75 mg tablet  "Take 1 tablet (75 mg total) by mouth once daily. 30 tablet 3    dextrose 5 % in water (D5W) 5 % PgBk 50 mL with ceFAZolin 2 gram SolR 2 g Inject 2 g into the vein every 8 (eight) hours. End date 7/17/23  0    famotidine (PEPCID) 20 MG tablet Take 1 tablet (20 mg total) by mouth 2 (two) times daily as needed (Heartburn).      insulin aspart U-100 (NOVOLOG) 100 unit/mL (3 mL) InPn pen Inject 0-5 Units into the skin before meals and at bedtime as needed (Hyperglycemia).  0    insulin aspart U-100 (NOVOLOG) 100 unit/mL (3 mL) InPn pen Inject 6 Units into the skin 3 (three) times daily with meals.  0    insulin detemir U-100, Levemir, 100 unit/mL (3 mL) SubQ InPn pen Inject 18 Units into the skin once daily.  0    lancets [facility] To check BG two times daily, to use with insurance preferred meter 200 each 1    lisinopriL (PRINIVIL,ZESTRIL) 5 MG tablet Take 1 tablet (5 mg total) by mouth once daily. 90 tablet 3    melatonin (MELATIN) 3 mg tablet Take 3 tablets (9 mg total) by mouth nightly.  0    methocarbamoL (ROBAXIN) 500 MG Tab Take 1 tablet (500 mg total) by mouth 4 (four) times daily. 40 tablet 0    ondansetron (ZOFRAN-ODT) 4 MG TbDL Take 1 tablet (4 mg total) by mouth every 8 (eight) hours as needed (nausea).      oxyCODONE (ROXICODONE) 10 mg Tab immediate release tablet Take 1 tablet (10 mg total) by mouth every 4 (four) hours as needed (pain sclae 7-10). 10 tablet 0    oxyCODONE (ROXICODONE) 5 MG immediate release tablet Take 1 tablet (5 mg total) by mouth every 4 (four) hours as needed (pain scale 4-6). 10 tablet 0    pen needle, diabetic (PEN NEEDLE) 31 gauge x 3/16" Ndle 1 application by Misc.(Non-Drug; Combo Route) route 2 (two) times a day. 200 each 0    polyethylene glycol (GLYCOLAX) 17 gram PwPk Take 17 g by mouth once daily.  0    pregabalin (LYRICA) 75 MG capsule Take 1 capsule (75 mg total) by mouth 2 (two) times daily. 60 capsule 0    senna-docusate 8.6-50 mg (PERICOLACE) 8.6-50 mg per tablet Take 1 tablet " "by mouth once daily.      vitamin D (VITAMIN D3) 1000 units Tab Take 1 tablet (1,000 Units total) by mouth once daily.       Scheduled Meds:   amiodarone  200 mg Oral Daily    ceFAZolin (ANCEF) IVPB  2 g Intravenous Q12H    enoxparin  30 mg Subcutaneous Q24H (prophylaxis, 1700)    insulin detemir U-100  6 Units Subcutaneous Daily    midodrine  5 mg Oral TID WM     Continuous Infusions:      PRN Meds:.dextrose 50%, dextrose 50%, glucagon (human recombinant), glucose, glucose, HYDROcodone-acetaminophen, insulin aspart U-100, magnesium oxide, magnesium oxide, naloxone, potassium bicarbonate, potassium bicarbonate, potassium bicarbonate, potassium, sodium phosphates, potassium, sodium phosphates, potassium, sodium phosphates, sodium chloride 0.9%    Review of Systems:  Neg    Physical Exam:    BP (!) 98/57   Pulse 68   Temp 97.5 °F (36.4 °C) (Oral)   Resp 17   Ht 5' 3" (1.6 m)   Wt 93.5 kg (206 lb 2.1 oz)   SpO2 95%   BMI 36.51 kg/m²     General Appearance:    Ill appearing   Head:    Normocephalic, without obvious abnormality, atraumatic   Eyes:    PER, conjunctiva/corneas clear, EOM's intact in both eyes        Throat:   Lips, mucosa, and tongue normal; teeth and gums normal   Back:     Symmetric, no curvature, ROM normal, no CVA tenderness   Lungs:     Clear to auscultation bilaterally, respirations unlabored   Chest wall:    No tenderness or deformity   Heart:    Regular rate and rhythm, S1 and S2 normal, no murmur, rub   or gallop   Abdomen:     Soft, non-tender, bowel sounds active all four quadrants,     no masses, no organomegaly   Extremities:   Edematous    Pulses:   2+ and symmetric all extremities   MSK:   No joint or muscle swelling, tenderness or deformity   Skin:   Skin color, texture, turgor normal, no rashes or lesions   Neurologic:   CNII-XII intact, normal strength and sensation       Throughout.  No flap     Results:  Lab Results   Component Value Date     (L) 07/03/2023    K 4.8 " 07/03/2023    CL 99 07/03/2023    CO2 21 (L) 07/03/2023    BUN 70 (H) 07/03/2023    CREATININE 2.7 (H) 07/03/2023    CALCIUM 7.6 (L) 07/03/2023    ANIONGAP 13 07/03/2023    ESTGFRAFRICA 78 04/15/2021    EGFRNONAA 67 04/15/2021       Lab Results   Component Value Date    CALCIUM 7.6 (L) 07/03/2023    PHOS 3.7 06/28/2023       Recent Labs   Lab 07/03/23  0433   WBC 16.14*   RBC 2.91*   HGB 8.3*   HCT 25.3*      MCV 87   MCH 28.5   MCHC 32.8       Imaging Results              CT Abdomen Pelvis  Without Contrast (Final result)  Result time 06/24/23 05:34:07      Final result by Allan Fountain DO (06/24/23 05:34:07)                   Narrative:    EXAM DESCRIPTION:  CT ABDOMEN PELVIS WITHOUT CONTRAST  RadLex: CT ABDOMEN PELVIS WITHOUT IV CONTRAST    CLINICAL HISTORY:  Weakness, increased WBC, decreased RBC, hypotensive, MYLES.    TECHNIQUE:  CT of the abdomen and pelvis without intravenous contrast.  All CT scans at this facility use dose modulation, iterative reconstruction, and/or weight based dosing when appropriate to reduce radiation dose to as low as reasonably achievable.    COMPARISON: None.    FINDINGS:    The visualized lower thoracic structures demonstrate minimal bilateral pleural effusions. There is mild bibasilar atelectasis. Coronary artery calcifications are present. There is subcutaneous edema throughout the thorax.    Unenhanced images of the liver, spleen, and adrenal glands appear grossly unremarkable. Pancreas is not well assessed without contrast. Gallbladder is minimally distended. No renal stones are identified. There is a round exophytic lesion off the upper pole right kidney measuring 2.1 cm, with Hounsfield units averaging 59. This may relate to hyperdense cyst. There is mild left hydronephrosis and hydroureter. No definite ureteral or bladder stone is identified. Urinary bladder is decompressed with a Gee catheter in place. There are atherosclerotic calcifications along the abdominal  aorta without evidence for aneurysmal dilatation. No bowel obstruction is seen. There is no free intraperitoneal air. The appendix appears unremarkable. There is colonic diverticulosis without definite CT evidence for acute diverticulitis. There is mild fecal loading throughout the colon. There is minimal free fluid in the posterior pelvis. There is moderate subcutaneous edema throughout the abdomen and pelvis extending into the thighs. There is an intramedullary denzel within the proximal left femur, with a compression screw through the left femoral neck. There is a left subtrochanteric fracture    IMPRESSION:  1.  Minimal gallbladder distention.  2.  Mild left hydroureteronephrosis, without definite ureteral stone seen. Urinary bladder decompressed.  3.  Anasarca.  4.  Colonic diverticulosis without definite CT evidence for acute diverticulitis.  5.  Possible hyperdense cyst at the upper pole right kidney. Follow-up nonemergent renal ultrasound could be performed.  6.  Post surgical changes at the left femur, with subtrochanteric fracture.  7.  Minimal bilateral pleural effusions and mild bibasilar atelectasis.    Electronically signed by:  Allan Fountain DO  6/24/2023 5:34 AM CDT Workstation: [id number]                                     X-Ray Chest AP Portable (Final result)  Result time 06/24/23 08:28:44      Final result by Perfecto Castellanos MD (06/24/23 08:28:44)                   Narrative:    Chest single view    CLINICAL DATA: Sepsis    FINDINGS: AP view shows the heart to be mildly enlarged. The mediastinum is unremarkable. Right-sided PICC line extends to the superior vena cava. There is mild atelectasis at the right lung base, with ill-defined atelectasis or infiltrate at the lung base on the left. There are no significant pleural effusions. No acute osseous abnormality is identified.    IMPRESSION:  1. Atelectasis or infiltrate at the left lung base.  2. Mild right basilar atelectasis.  3. Mild  cardiomegaly.    Electronically signed by:  Perfecto Castellanos MD  6/24/2023 8:28 AM CDT Workstation: [id number]                                    Patient care was time spent personally by me on the following activities: >  min  Obtaining a history  Examination of patient.  Providing medical care at the patients bedside.  Developing a treatment plan with patient or surrogate and bedside caregivers  Ordering and reviewing laboratory studies, radiographic studies, pulse oximetry.  Ordering and performing treatments and interventions.  Evaluation of patient's response to treatment.  Discussions with consultants while on the unit and immediately available to the patient.  Re-evaluation of the patient's condition.  Documentation in the medical record.       Shravan Thomas MD  Nephrology  [facility]  [phone]

## 2023-07-03 NOTE — ASSESSMENT & PLAN NOTE
Patient's FSGs are controlled on current medication regimen.  Last A1c reviewed-   Lab Results   Component Value Date    HGBA1C 10.2 (H) 06/05/2023     Current correctional scale  Low  Decrease anti-hyperglycemic dose as follows-   Antihyperglycemics (From admission, onward)    Start     Stop Route Frequency Ordered    06/26/23 1052  insulin aspart U-100 pen 0-5 Units         -- SubQ Before meals & nightly PRN 06/26/23 0952    06/25/23 0800  insulin detemir U-100 (Levemir) pen 9 Units         -- SubQ Daily 06/25/23 0757        Hold Oral hypoglycemics while patient is in the hospital.

## 2023-07-03 NOTE — SUBJECTIVE & OBJECTIVE
Interval History:  Pt seen and examined.  Remains hypoactive.  Required bear hugger initiation overnight.  Reports left leg pain, otherwise denies any complaints.    Review of Systems   Constitutional:  Positive for fatigue. Negative for chills and fever.   Respiratory:  Negative for shortness of breath.    Gastrointestinal:  Negative for nausea and vomiting.   Musculoskeletal:  Positive for arthralgias and gait problem.   Objective:     Vital Signs (Most Recent):  Temp: 97.3 °F (36.3 °C) (07/03/23 1630)  Pulse: 60 (07/03/23 1630)  Resp: 17 (07/03/23 1630)  BP: 119/74 (07/03/23 1630)  SpO2: 95 % (07/03/23 1630) Vital Signs (24h Range):  Temp:  [94.6 °F (34.8 °C)-97.8 °F (36.6 °C)] 97.3 °F (36.3 °C)  Pulse:  [60-69] 60  Resp:  [17-19] 17  SpO2:  [94 %-97 %] 95 %  BP: ()/(57-74) 119/74     Weight: 93.5 kg (206 lb 2.1 oz)  Body mass index is 36.51 kg/m².    Intake/Output Summary (Last 24 hours) at 7/3/2023 1707  Last data filed at 7/3/2023 1518  Gross per 24 hour   Intake 50 ml   Output 750 ml   Net -700 ml           Physical Exam  Vitals and nursing note reviewed.   Constitutional:       Appearance: Normal appearance.   Eyes:      Extraocular Movements: Extraocular movements intact.      Conjunctiva/sclera: Conjunctivae normal.      Pupils: Pupils are equal, round, and reactive to light.   Cardiovascular:      Rate and Rhythm: Normal rate and regular rhythm.      Comments: Right IJ dressing without erythema or drainage at site noted.  Pulmonary:      Effort: Pulmonary effort is normal.      Breath sounds: Normal breath sounds.      Comments: She does have a congested cough with thick, clear sputum  Abdominal:      General: Abdomen is flat. Bowel sounds are normal.      Palpations: Abdomen is soft.   Musculoskeletal:      Comments: +anasarca  LEFT leg with external fixator, no overt drainage noted   Skin:     General: Skin is warm and dry.      Capillary Refill: Capillary refill takes less than 2 seconds.       Comments: Left Heel wound, left great toe wound evaluated-see photos   -sacral wound evaluated, there is an area of erythema surrounding wound.  Wound bed is covered with soft.  No foul odor appreciated.  Evaluated with ID at bedside.   Neurological:      General: No focal deficit present.      Mental Status: She is alert.      Comments: Oriented to self and situation. Disoriented to place.   Psychiatric:      Comments: hypoactive                     Significant Labs: All pertinent labs within the past 24 hours have been reviewed.  CBC:   Recent Labs   Lab 07/02/23 0404 07/03/23 0433   WBC 18.08* 16.14*   HGB 8.4* 8.3*   HCT 25.1* 25.3*    312       CMP:   Recent Labs   Lab 07/02/23 0404 07/03/23 0433   * 133*  133*   K 4.2 4.8  4.8   CL 99 99  99   CO2 26 21*  21*   GLU 58* 149*  149*   BUN 72* 70*  70*   CREATININE 2.7* 2.7*  2.7*   CALCIUM 7.6* 7.6*  7.6*   PROT  --  4.4*   ALBUMIN  --  1.3*   BILITOT  --  0.9   ALKPHOS  --  90   AST  --  15   ALT  --  <5*   ANIONGAP 8 13  13       Lab Results   Component Value Date    TSH 2.250 06/30/2023       Microbiology Results (last 7 days)       Procedure Component Value Units Date/Time    Blood culture [[id number]]     Order Status: Sent Specimen: Blood     Blood culture [[id number]]     Order Status: Sent Specimen: Blood     Blood culture [[id number]]     Order Status: Canceled Specimen: Blood     Blood culture [[id number]]     Order Status: Canceled Specimen: Blood     Blood culture [[id number]] Collected: 06/30/23 0613    Order Status: Completed Specimen: Blood Updated: 07/03/23 1032     Blood Culture, Routine No Growth to date      No Growth to date      No Growth to date      No Growth to date    Narrative:      Collection has been rescheduled by LND at 06/30/2023 06:11 Reason:   Completed  Collection has been rescheduled by LND at 06/30/2023 06:11 Reason:   Completed    Culture, Respiratory with Gram Stain [[id number]]  (Abnormal) Collected:  06/24/23 1245    Order Status: Completed Specimen: Respiratory from Sputum Updated: 07/01/23 0628     Respiratory Culture PRESUMPTIVE CANDIDA ALBICANS  Moderate        BALDOMERO TROPICALIS  Moderate       Gram Stain (Respiratory) Many WBC's     Gram Stain (Respiratory) >10epis/lfp and <than many WBC's     Gram Stain (Respiratory) Few yeast    IV catheter culture [[id number]] Collected: 06/25/23 1723    Order Status: Completed Specimen: Catheter Tip, PICC Updated: 06/30/23 0827     Aerobic Culture - Cath tip No growth    Blood culture x two cultures. Draw prior to antibiotics. [[id number]] Collected: 06/24/23 0209    Order Status: Completed Specimen: Blood from Peripheral, Forearm, Left Updated: 06/29/23 0232     Blood Culture, Routine No growth after 5 days.    Narrative:      Aerobic and anaerobic    Blood culture x two cultures. Draw prior to antibiotics. [[id number]] Collected: 06/24/23 0209    Order Status: Completed Specimen: Blood from Peripheral, Hand, Left Updated: 06/29/23 0232     Blood Culture, Routine No growth after 5 days.    Narrative:      Aerobic and anaerobic    Clostridium difficile EIA [[id number]] Collected: 06/28/23 1547    Order Status: Completed Specimen: Stool Updated: 06/28/23 1714     C. diff Antigen Negative     C difficile Toxins A+B, EIA Negative     Comment: Testing not recommended for children <24 months old.       Urine culture [[id number]]  (Abnormal) Collected: 06/24/23 0208    Order Status: Completed Specimen: Urine Updated: 06/28/23 1015     Urine Culture, Routine CANDIDA TROPICALIS  > 100,000 cfu/ml  Treatment of asymptomatic candiduria is not recommended (except for   specific populations). Candida isolated in the urine typically   represents colonization. If an indwelling urinary catheter is present  it should be removed or replaced.      Narrative:      Specimen Source->Urine              Significant Imaging: I have reviewed all pertinent imaging results/findings within the past 24  hours.

## 2023-07-03 NOTE — PLAN OF CARE
Spoke with Yin at [facility] regarding pending LTAC. hospitals request is still pending but will notify review nurse that patient is ready for transfer back to LTAC. hospitals nurse will call me back after she reviews with determination       07/03/23 8161   Post-Acute Status   Post-Acute Authorization Placement   Post-Acute Placement Status Pending payor review/awaiting authorization (if required)

## 2023-07-03 NOTE — ASSESSMENT & PLAN NOTE
This is an established diagnosis.  She was followed closely by [place] [facility] [place]/C on ciprofloxacin which she has completed and Ancef with end date of 07/17.  -today we escalated Abx widening to cefepime and vanc given hypothermia, continued leukocytosis with concern for occult infection   -id has been consulted call appreciate recommendations  -eventually plan for discharge back to LTAC for continued antibiotic therapy as originally outlined

## 2023-07-03 NOTE — ASSESSMENT & PLAN NOTE
Nutrition consulted. Most recent weight and BMI monitored-     Measurements:  Wt Readings from Last 1 Encounters:   06/29/23 93.5 kg (206 lb 2.1 oz)   Body mass index is 36.51 kg/m².    Patient has been screened and assessed by RD.    Malnutrition Type:  Context:    Level:      Malnutrition Characteristic Summary:       Interventions/Recommendations (treatment strategy):  Continue current 1500 kcal Diabetic/Dysphagia Soft diet s tolerated and encourage intake.     She is not eating.  As pre-albumin 11, albumin today 1.3   -has diffuse third-spacing   -she needs protein for wound healing   -will initiate Clinimix with plan for TPN tomorrow.

## 2023-07-03 NOTE — PLAN OF CARE
Per Steffi with [facility], LTAC request going to medical review due to medical director saying patient is not stable enough for LTAC. States they may call provider this afternoon for peer to peer. Updated NP via secure chat    [id number] NP updated me that she agrees that patient is not medically clear for LTAC at this time. Will need to request for new auth once medically clear     07/03/23 1351   Post-Acute Status   Post-Acute Authorization Placement   Post-Acute Placement Status Pending payor medical review/second level review

## 2023-07-03 NOTE — PROGRESS NOTES
[facility] Medicine  Progress Note    Patient Name: Anastasiia Badillo  MRN: [id number]  Patient Class: IP- Inpatient   Admission Date: 6/24/2023  Length of Stay: 9 days  Attending Physician: Criss Beck MD  Primary Care Provider: Raji Parkinson MD        Subjective:     Principal Problem:Septic shock        HPI:  72 y/o F with a past medical history significant for DM2, HTN, femur fracture, tibial fracture and tobacco use, L foot wounds,  high grade stenosis SFA bilaterally and popliteal on the left s/p baloon angioplasty L popliteal artery and left posterior tibial arery on DAPT.      Recently [place] [facility] [place] of left tibia, s/p ORIF 6/6, also finished antibiotics for acute osteomyelitis of left calcaneus ( Final ID recs with end date for cipro of 6/22 and end date of ancef for 7/17 )     Patient sent from rehab for hypotension.     In the ER was in septic shock, e/o UTI and anemic.       Overview/Hospital Course:  Anastasiia Badillo was admitted to the service of hospital medicine for further treatment of septic shock suspected to be 2/2 UTI.  Prior to admission, she was being treated at LTAC for acute osteomyelitis of left calcaneus ( Final ID recs with end date for cipro of 6/22 and end date of ancef for 7/17 ), with hx fracture of left tibia, s/p ORIF 6/6.  She was placed on IV vancomycin, IV cefepime, and IVFH.  She was hypotensive initially requiring vasopressors and was admitted to ICU.  She was given one unit PRBCs for symptomatic anemia.  She developed atrial fibrillation with HR up to 200 and was placed on IV amiodarone.  Cardiology was consulted and pt was ultimately cardioverted at bedside.  IV levophed was changed to IV hood-synephrine.  She was noted to have profound metabolic acidosis, possibly diabetic ketoacidosis, and was placed on IV insulin.  Additionally, she developed an MYLES and nephrology was consulted.  Sodium bicarb drip was recommended.  She  eventually improved, with IV vasopressors weaned and placed on oral midodrine and oral amiodarone.  PT/OT were consulted.  Urine culture growing yeast.  Placed on IV diflucan, IV cefepime continued due to drainage from external fixator.  Wound care consulted.      Interval History:  Pt seen and examined.  Remains hypoactive.  Required bear hugger initiation overnight.  Reports left leg pain, otherwise denies any complaints.    Review of Systems   Constitutional:  Positive for fatigue. Negative for chills and fever.   Respiratory:  Negative for shortness of breath.    Gastrointestinal:  Negative for nausea and vomiting.   Musculoskeletal:  Positive for arthralgias and gait problem.   Objective:     Vital Signs (Most Recent):  Temp: 97.3 °F (36.3 °C) (07/03/23 1630)  Pulse: 60 (07/03/23 1630)  Resp: 17 (07/03/23 1630)  BP: 119/74 (07/03/23 1630)  SpO2: 95 % (07/03/23 1630) Vital Signs (24h Range):  Temp:  [94.6 °F (34.8 °C)-97.8 °F (36.6 °C)] 97.3 °F (36.3 °C)  Pulse:  [60-69] 60  Resp:  [17-19] 17  SpO2:  [94 %-97 %] 95 %  BP: ()/(57-74) 119/74     Weight: 93.5 kg (206 lb 2.1 oz)  Body mass index is 36.51 kg/m².    Intake/Output Summary (Last 24 hours) at 7/3/2023 1707  Last data filed at 7/3/2023 1518  Gross per 24 hour   Intake 50 ml   Output 750 ml   Net -700 ml           Physical Exam  Vitals and nursing note reviewed.   Constitutional:       Appearance: Normal appearance.   Eyes:      Extraocular Movements: Extraocular movements intact.      Conjunctiva/sclera: Conjunctivae normal.      Pupils: Pupils are equal, round, and reactive to light.   Cardiovascular:      Rate and Rhythm: Normal rate and regular rhythm.      Comments: Right IJ dressing without erythema or drainage at site noted.  Pulmonary:      Effort: Pulmonary effort is normal.      Breath sounds: Normal breath sounds.      Comments: She does have a congested cough with thick, clear sputum  Abdominal:      General: Abdomen is flat. Bowel sounds  are normal.      Palpations: Abdomen is soft.   Musculoskeletal:      Comments: +anasarca  LEFT leg with external fixator, no overt drainage noted   Skin:     General: Skin is warm and dry.      Capillary Refill: Capillary refill takes less than 2 seconds.      Comments: Left Heel wound, left great toe wound evaluated-see photos   -sacral wound evaluated, there is an area of erythema surrounding wound.  Wound bed is covered with soft.  No foul odor appreciated.  Evaluated with ID at bedside.   Neurological:      General: No focal deficit present.      Mental Status: She is alert.      Comments: Oriented to self and situation. Disoriented to place.   Psychiatric:      Comments: hypoactive                     Significant Labs: All pertinent labs within the past 24 hours have been reviewed.  CBC:   Recent Labs   Lab 07/02/23 0404 07/03/23 0433   WBC 18.08* 16.14*   HGB 8.4* 8.3*   HCT 25.1* 25.3*    312       CMP:   Recent Labs   Lab 07/02/23  0404 07/03/23 0433   * 133*  133*   K 4.2 4.8  4.8   CL 99 99  99   CO2 26 21*  21*   GLU 58* 149*  149*   BUN 72* 70*  70*   CREATININE 2.7* 2.7*  2.7*   CALCIUM 7.6* 7.6*  7.6*   PROT  --  4.4*   ALBUMIN  --  1.3*   BILITOT  --  0.9   ALKPHOS  --  90   AST  --  15   ALT  --  <5*   ANIONGAP 8 13  13       Lab Results   Component Value Date    TSH 2.250 06/30/2023       Microbiology Results (last 7 days)       Procedure Component Value Units Date/Time    Blood culture [[id number]]     Order Status: Sent Specimen: Blood     Blood culture [[id number]]     Order Status: Sent Specimen: Blood     Blood culture [[id number]]     Order Status: Canceled Specimen: Blood     Blood culture [[id number]]     Order Status: Canceled Specimen: Blood     Blood culture [[id number]] Collected: 06/30/23 0613    Order Status: Completed Specimen: Blood Updated: 07/03/23 1032     Blood Culture, Routine No Growth to date      No Growth to date      No Growth to date      No Growth to  date    Narrative:      Collection has been rescheduled by LND at 06/30/2023 06:11 Reason:   Completed  Collection has been rescheduled by LND at 06/30/2023 06:11 Reason:   Completed    Culture, Respiratory with Gram Stain [[id number]]  (Abnormal) Collected: 06/24/23 1245    Order Status: Completed Specimen: Respiratory from Sputum Updated: 07/01/23 0628     Respiratory Culture PRESUMPTIVE CANDIDA ALBICANS  Moderate        BALDOMERO TROPICALIS  Moderate       Gram Stain (Respiratory) Many WBC's     Gram Stain (Respiratory) >10epis/lfp and <than many WBC's     Gram Stain (Respiratory) Few yeast    IV catheter culture [[id number]] Collected: 06/25/23 1723    Order Status: Completed Specimen: Catheter Tip, PICC Updated: 06/30/23 0827     Aerobic Culture - Cath tip No growth    Blood culture x two cultures. Draw prior to antibiotics. [[id number]] Collected: 06/24/23 0209    Order Status: Completed Specimen: Blood from Peripheral, Forearm, Left Updated: 06/29/23 0232     Blood Culture, Routine No growth after 5 days.    Narrative:      Aerobic and anaerobic    Blood culture x two cultures. Draw prior to antibiotics. [[id number]] Collected: 06/24/23 0209    Order Status: Completed Specimen: Blood from Peripheral, Hand, Left Updated: 06/29/23 0232     Blood Culture, Routine No growth after 5 days.    Narrative:      Aerobic and anaerobic    Clostridium difficile EIA [[id number]] Collected: 06/28/23 1547    Order Status: Completed Specimen: Stool Updated: 06/28/23 1714     C. diff Antigen Negative     C difficile Toxins A+B, EIA Negative     Comment: Testing not recommended for children <24 months old.       Urine culture [[id number]]  (Abnormal) Collected: 06/24/23 0208    Order Status: Completed Specimen: Urine Updated: 06/28/23 1015     Urine Culture, Routine CANDIDA TROPICALIS  > 100,000 cfu/ml  Treatment of asymptomatic candiduria is not recommended (except for   specific populations). Candida isolated in the urine typically    represents colonization. If an indwelling urinary catheter is present  it should be removed or replaced.      Narrative:      Specimen Source->Urine              Significant Imaging: I have reviewed all pertinent imaging results/findings within the past 24 hours.      Assessment/Plan:      PAF (paroxysmal atrial fibrillation)  Patient with new onset AFib this admission with RVR- rates upwards 200s.  -Required cardioversion: now NSR  -On oral amiodarone  -was recommended to start eliquis once H/H stabilized (required transfusion at start of admit).  -This has not yet been started- can discuss with cards tomorrow as she is on ASA/plavix per vascular surgery after recent vascular intervention (April).    Moderate protein-calorie malnutrition  Nutrition consulted. Most recent weight and BMI monitored-     Measurements:  Wt Readings from Last 1 Encounters:   06/29/23 93.5 kg (206 lb 2.1 oz)   Body mass index is 36.51 kg/m².    Patient has been screened and assessed by RD.    Malnutrition Type:  Context:    Level:      Malnutrition Characteristic Summary:       Interventions/Recommendations (treatment strategy):  Continue current 1500 kcal Diabetic/Dysphagia Soft diet s tolerated and encourage intake.     She is not eating.  As pre-albumin 11, albumin today 1.3   -has diffuse third-spacing   -she needs protein for wound healing   -will initiate Clinimix with plan for TPN tomorrow.    Hypothermia  Pt with recurrent hypothermia with episodes on 06/30, then again overnight   -thyroid tests were within normal limits at that time, will add T3, T4   -check cortisol level in a.m.   -broaden infectious workup, repeat cultures, check CXR, procalcitonin.  Id is involved   -work on nutritional status      Pressure injury of sacral region, unstageable  Present on arrival with surrounding erythema and slough covering  -wound care consulted and following.  Discussed with them today.  Would like wound care physician to see for  debridement cultures.  -continue to follow wound care orders   -turn Pt q.2 hours    Type 2 diabetes mellitus, with long-term current use of insulin  Patient's FSGs are controlled on current medication regimen.  Last A1c reviewed-   Lab Results   Component Value Date    HGBA1C 10.2 (H) 06/05/2023     Current correctional scale  Low  Decrease anti-hyperglycemic dose as follows-   Antihyperglycemics (From admission, onward)      Start     Stop Route Frequency Ordered    06/26/23 1052  insulin aspart U-100 pen 0-5 Units         -- SubQ Before meals & nightly PRN 06/26/23 0952    06/25/23 0800  insulin detemir U-100 (Levemir) pen 9 Units         -- SubQ Daily 06/25/23 0757        Hold Oral hypoglycemics while patient is in the hospital.    Hyponatremia  Improved.  Monitor sodium level.  Likely due to renal failure.    Sodium   Date Value Ref Range Status   07/03/2023 133 (L) 136 - 145 mmol/L Final   07/03/2023 133 (L) 136 - 145 mmol/L Final   07/02/2023 133 (L) 136 - 145 mmol/L Final           ATN (acute tubular necrosis)  Patient with acute kidney injury likely due to acute tubular necrosis MYLES is currently stable. Labs reviewed- Renal function/electrolytes with Estimated Creatinine Clearance: 20.5 mL/min (A) (based on SCr of 2.7 mg/dL (H)). according to latest data. Monitor urine output and serial BMP and adjust therapy as needed. Avoid nephrotoxins and renally dose meds for GFR listed above.   -Nephrology following: appreciate recs  -Cr stagnant at 2.7  -last received IV Lasix on 06/29  -Follow the BMP  -she has diffuse anasarca and hypoalbuminemia.  I discussed with Nephrology.  They are hesitant to diurese in light of lower blood pressures, concern for occult infection.  We will follow closely and can diurese if/when appropriate    Encephalopathy, metabolic  Due to septic shock.  Appears to be at baseline at this time.    Anemia, chronic disease  Patient's anemia is currently controlled.  Received one unit blood  earlier in admission.. Etiology likely d/t chronic disease  Current CBC reviewed-   Lab Results   Component Value Date    HGB 8.4 (L) 07/02/2023    HCT 25.1 (L) 07/02/2023     Monitor serial CBC and transfuse if patient becomes hemodynamically unstable, symptomatic or H/H drops below 7/21.         UTI (urinary tract infection)  She completed a 7 day course of antifungal urine grew yeast.    Antifungals (From admission, onward)      None              Acute osteomyelitis of left calcaneus  This is an established diagnosis.  [place] [facility] [place] ciprofloxacin which she has completed and Ancef with end date of 07/17.  -today we escalated Abx widening to cefepime and vanc given hypothermia, continued leukocytosis with concern for occult infection   -id has been consulted call appreciate recommendations  -eventually plan for discharge back to LTAC for continued antibiotic therapy as originally outlined      Chronic ulcer of great toe of left foot with fat layer exposed  Wound care following  -continue to follow wound care orders   -does not appear acutely infected      Chronic ulcer of left heel with fat layer exposed  Wound care following  -continue to follow and care orders  -does not appear acutely infected      Closed displaced pilon fracture of left tibia  - Patient to be non weight bearing to left lower extremity x 3 months from surgery date on 6/6.   - Ortho to remove ex fix I 4-6 weeks but if construct fails then would have to convert to ring fixator as per Ortho.   -She was recommended ASA BID until Aug 30- currently on lovenox.      PAD (peripheral artery disease)  high grade stenosis SFA bilaterally and popliteal on the left s/p baloon angioplasty L popliteal artery and left posterior tibial artery 4/2023 on DAPT  -H&H is stable without signs of bleeding  -resume the ASA/plavix.      VTE Risk Mitigation (From admission, onward)           Ordered     enoxaparin injection 30 mg  Every  24 hours         06/27/23 1405     IP VTE HIGH RISK PATIENT  Once         06/27/23 1404     Place sequential compression device  Until discontinued         06/24/23 0432                    Discharge Planning   CITLALI: 7/5/2023     Code Status: Full Code   Is the patient medically ready for discharge?:     Reason for patient still in hospital (select all that apply): Patient new problem, Patient trending condition, Laboratory test, Treatment and Consult recommendations  Discharge Plan A: Long-term acute care facility (LTAC)   Discharge Delays: None known at this time              Mera Owens NP  Department of Hospital Medicine   [facility]

## 2023-07-03 NOTE — ASSESSMENT & PLAN NOTE
Improved.  Monitor sodium level.  Likely due to renal failure.    Sodium   Date Value Ref Range Status   07/03/2023 133 (L) 136 - 145 mmol/L Final   07/03/2023 133 (L) 136 - 145 mmol/L Final   07/02/2023 133 (L) 136 - 145 mmol/L Final

## 2023-07-03 NOTE — ASSESSMENT & PLAN NOTE
She completed a 7 day course of antifungal urine grew yeast.    Antifungals (From admission, onward)    None

## 2023-07-03 NOTE — PLAN OF CARE
Clinimix E @ 75 mL/hr ordered per MD. Plan to start TPN tomorrow.  Problem: Oral Intake Inadequate  Goal: Improved Oral Intake  Outcome: Ongoing, Not Progressing  Intervention: Promote and Optimize Oral Intake  Flowsheets (Taken 7/3/2023 8762)  Oral Nutrition Promotion: calorie-dense liquids provided. Pt reports her supplement is too sweet per RN consult. RD provided skim milk to use half/half with Unjury. RD to follow for intake.

## 2023-07-03 NOTE — CARE UPDATE
07/02/23 2015   Patient Assessment/Suction   Level of Consciousness (AVPU) alert   Respiratory Effort Normal;Unlabored   Expansion/Accessory Muscles/Retractions no use of accessory muscles   PRE-TX-O2   Device (Oxygen Therapy) room air   SpO2 96 %   Pulse Oximetry Type Intermittent   $ Pulse Oximetry - Multiple Charge Pulse Oximetry - Multiple   Pulse 61   Resp 19   Respiratory Evaluation   $ Care Plan Tech Time 15 min

## 2023-07-03 NOTE — PLAN OF CARE
Per Nidia (ASH), authorization has been submitted and is currently pending.       07/03/23 1119   Post-Acute Status   Post-Acute Authorization Placement   Post-Acute Placement Status Pending payor medical review/second level review   Discharge Delays None known at this time   Discharge Plan   Discharge Plan A Long-term acute care facility (LTAC)   Discharge Plan B Long-term acute care facility (LTAC)

## 2023-07-03 NOTE — PT/OT/SLP PROGRESS
"Physical Therapy Treatment    Patient Name:  Anastasiia Badillo   MRN:  [id number]    Recommendations:     Discharge Recommendations: LTACH (long-term acute care hospital)  Discharge Equipment Recommendations: to be determined by next level of care  Barriers to discharge:  assist of two persons for mobility, decreased activity tolerance, pain, orthopedic precautions, decreased safety awareness    Assessment:     Anastasiia Badillo is a 73 y.o. female admitted with a medical diagnosis of Septic shock.  She presents with the following impairments/functional limitations: weakness, impaired endurance, impaired self care skills, impaired functional mobility, gait instability, impaired balance, impaired cognition, decreased coordination, decreased safety awareness, decreased ROM, orthopedic precautions.    Pt with HOB elevated and RN present. Pt reluctantly agreeable to visit, requiring encouragement. Pt lifted B arms up and down several times and stated, "There I moved." Pt educated that she needs to work on getting up and sitting EOB to improve balance and strength. Pt reluctantly agreed to try. Pt required max to total assist for transfer to and from sitting EOB. Pt tried several times to lie herself back down and required max encouragement to remain seated EOB for about 2 minutes before insisting on back to bed. Pt required mod to max assist to maintain balance EOB mostly due to pt trying to transfer back to supine.    Rehab Prognosis: Fair; patient would benefit from acute skilled PT services to address these deficits and reach maximum level of function.    Recent Surgery: Procedure(s) (LRB):  Cardioversion or Defibrillation (N/A) 9 Days Post-Op    Plan:     During this hospitalization, patient to be seen 3 x/week to address the identified rehab impairments via therapeutic activities, therapeutic exercises and progress toward the following goals:    Plan of Care Expires:       Subjective     Chief Complaint: pt " reluctant to sit EOB  Patient/Family Comments/goals: none verbalized  Pain/Comfort:  Pain Rating 1: other (see comments) (not rated)  Location - Side 1: Left  Location 1: ankle  Pain Addressed 1: Distraction, Cessation of Activity, Nurse notified  Pain Rating Post-Intervention 1: other (see comments) (not rated)      Objective:     Communicated with RN prior to session.  Patient found HOB elevated with bed alarm, telemetry (gale hugger) upon PT entry to room.     General Precautions: Standard, fall  Orthopedic Precautions: LLE non weight bearing  Braces:    Respiratory Status: Room air     Functional Mobility:  Bed Mobility:     Supine to Sit: maximal assistance, total assistance, and of 2 persons  Sit to Supine: maximal assistance, total assistance, and of 2 persons  Balance: mod-maxA to maintain balance sitting EOB for about 2 minutes, with pt trying to transfer back to supine several times      AM-PAC 6 CLICK MOBILITY          Treatment & Education:  Pt educated on importance of time OOB, importance of intermittent mobility, safe techniques for transfers/ambulation, discharge recommendations/options, and use of call light for assistance and fall prevention.      Patient left HOB elevated with all lines intact, call button in reach, bed alarm on, and RN notified..    GOALS:   Multidisciplinary Problems       Physical Therapy Goals          Problem: Physical Therapy    Goal Priority Disciplines Outcome Goal Variances Interventions   Physical Therapy Goal     PT, PT/OT      Description: Goals to be met by: 2023     Patient will increase functional independence with mobility by performin. Supine to sit with Maximum Assistance  2. Rolling to Left and Right with Moderate Assistance.  3. Sitting at edge of bed x15  minutes with Minimal Assistance                         Time Tracking:     PT Received On: 23  PT Start Time: 918     PT Stop Time: 933  PT Total Time (min): 15 min     Billable Minutes:  Therapeutic Activity 15    Treatment Type: Treatment  PT/PTA: PTA     Number of PTA visits since last PT visit: 2     07/03/2023

## 2023-07-03 NOTE — CONSULTS
Consult Note  Infectious Disease    Reason for Consult:  Leukocytosis and multiple wounds.    HPI: Anastasiia Badillo is a 73 y.o. female known to ID service, with PMHx  DM 2,  insulin-requiring , HTN, PAD S/P  baloon angioplasty L popliteal artery and left posterior tibial arery on DAPT. 04/25/2023,   smoker, femur and tibial fracture, intramedullary rodding of femur 02/18/2023., open reduction internal fixation of left pilon fx and left external fixator by [place].06/06/23, GERD, obese, BMI 36, constipation, vitamin-D deficiency, chronic opiate use, right breast mass    Patient was sent from post acute Medical for evaluation of low blood pressure and confusion.    -She was considered  septic, and was started on vancomycin and cefepime, IV fluids.     -She also had diabetic ketoacidosis and received insulin infusion.   - She was noticed to have severe anemia and received PRBCs.  -- She had AFib with RVR, a flutter.  She received amiodarone and was seen by cardiologist.  Patient was cardioverted on 06/24/2023.  --  She had MYLES BUN creatinine 90/3 and nephrologist has been following.    --she had elevated clotting times which improved. INR has been ranging about 4.    In terms of ID workup so far  -- MRSA screen is negative   -- Gee catheter was placed 06/24/23 Urine culture on 24th grew Candida tropicalis and Diflucan was started on 26th.  I am not sure how the urine was collected(from  old or new cath?)  ---Chest x-ray found atelectasis, left lung base infiltrate, she is needing 2 L O2, which is not an impressive, given her anasarca and atelectasis/laying flat in bed.  Sputum culture grew Candida tropicalis and Candida albicans, I do not think they are causative organisms.  -- CT abdomen found minimal gallbladder distention, mild left hydronephrosis, anasarca, colonic diverticulosis, hyperdense cysts in the right upper kidney, postsurgical findings of left femur and bilateral pleural effusions.  She is not  tender on physical exam.  Total bilirubin has been completely normal.  Nephrologist plans to follow up kidney findings as outpatient.  - She had a right-sided midline which has been discontinued on admission.  She has a right IJ at this time.    --  She had multiple wounds left 1st toe, left ankle, buttocks wound.  With recommendations from prior ID as below    ID consult was called for leukocytosis and multiple wounds  Patient can not offer much history she admits to hurting all over feeling tired.  She is afebrile.  She has been afebrile throughout but she does develop episode of hypothermia 0 6/23, 0 6/25, 0 6/26, 0 6/29, 0701, 0702,  WBC trend nicely down 24--29--28--22--23--19--18--19--18--16.  CRP is 7  Procalcitonin trended nicely down ---47  She received vancomycin and cefepime on admission x1 day, then meropenem x 4 days days.  Which was later switched to cefazolin(d6)   Diflucan was started on 06/26 for Candida UTI.      In terms of ID chart review:   Patient was hospitalized at [facility] in April and seen by ID , Dr. Keenan for left 1st toe nonhealing ulcer, and left heel pressure ulcer.  MRI found no osteomyelitis.  Patient was transferred to [facility] for vascular procedure. She was seen by ID at [facility]- Dr Adkins, who discontinued vancomycin and cefepime and gave cefadroxil for 10 days for SSTI (for MSSA 04/19).  Then left great toe cultures 05/18/2023 showed E cloaca patient was given doxycycline and Levaquin by podiatrist.   Id was consulted again.  Dr. Johns who recommended Ancef  on 06/07/23--07/17.  Then 1st toe culture showed Pseudomonas and patient was given ciprofloxacin 750 mg p.o. q. 12 x 2 weeks(06/06--06/22)      Antibiotics (From admission, onward)      Start     Stop Route Frequency Ordered    07/03/23 1800  vancomycin 1.5 g in dextrose 5 % 250 mL IVPB (ready to mix)         -- IV Once 07/03/23 1606    07/03/23 1645  cefepime 2 g in dextrose 5% 100 mL IVPB (ready to mix)          -- IV Every 24 hours (non-standard times) 07/03/23 1537    07/03/23 1635  vancomycin - pharmacy to dose  (vancomycin IVPB (PEDS and ADULTS))        See Hyperspace for full Linked Orders Report.    -- IV pharmacy to manage frequency 07/03/23 1537          Antifungals (From admission, onward)      None          Antivirals (From admission, onward)      None                Review of patient's allergies indicates:  No Known Allergies  Past Medical History:   Diagnosis Date    Acute osteomyelitis of left calcaneus 6/7/2023    Chronic ulcer of great toe of left foot with fat layer exposed 6/7/2023    Closed left pilon fracture 6/5/2023    Closed left subtrochanteric femur fracture, initial encounter 2/18/2023    Diabetes mellitus, type 2     Essential (primary) hypertension 2/17/2023    Mass of upper outer quadrant of right breast 4/27/2023    Nicotine dependence 2/20/2023    PAD (peripheral artery disease) 4/22/2023    Tobacco use 6/6/2023    Type 2 diabetes mellitus with hyperglycemia, with long-term current use of insulin 6/6/2023     Past Surgical History:   Procedure Laterality Date    ANGIOGRAPHY OF LOWER EXTREMITY Left 4/25/2023    Procedure: Angiogram Extremity Unilateral;  Surgeon: Gilbert Sheppard MD;  Location: [facility] OR [address] [place] [address];  Service: Vascular;  Laterality: Left;  28.4 min  487.45 mGy  75.9180 Gycm2  trans radial: 7 ml  dye: 126 ml      APPLICATION, EXTERNAL FIXATION DEVICE Left 6/6/2023    Procedure: APPLICATION, EXTERNAL FIXATION DEVICE, LEFT ANKLE, [place];  Surgeon: Gilbert Mahmood MD;  Location: [address];  Service: Orthopedics;  Laterality: Left;    AUGMENTATION OF BREAST      INTRAMEDULLARY RODDING OF FEMUR Left 2/18/2023    Procedure: INSERTION, INTRAMEDULLARY ROXANNA, FEMUR (hana table -- synthes -- long nail -- have bovie and suction and large bone set);  Surgeon: Keanu Brand MD;  Location[facility];  Service: Orthopedics;  Laterality: Left;    OPEN  REDUCTION AND INTERNAL FIXATION (ORIF) OF PILON FRACTURE Left 6/6/2023    Procedure: ORIF, FRACTURE, PILON, LEFT;  Surgeon: Gilbert Mahmood MD;  [facility];  Service: Orthopedics;  Laterality: Left;    PERCUTANEOUS TRANSLUMINAL ANGIOPLASTY Left 4/25/2023    Procedure: PTA (ANGIOPLASTY, PERCUTANEOUS, TRANSLUMINAL);  Surgeon: Gilbert Sheppard MD;  [facility];  Service: Vascular;  Laterality: Left;  Tibial and popliteal angioplasty     Social History     Socioeconomic History    Marital status:    Tobacco Use    Smoking status: Every Day     Packs/day: 0.25     Years: 45.00     Pack years: 11.25     Types: Cigarettes    Smokeless tobacco: Never   Substance and Sexual Activity    Alcohol use: Yes    Drug use: Never   Social History Narrative    ** Merged History Encounter **          Social Determinants of Health     Financial Resource Strain: Low Risk     Difficulty of Paying Living Expenses: Not hard at all   Food Insecurity: No Food Insecurity    Worried About Running Out of Food in the Last Year: Never true    Ran Out of Food in the Last Year: Never true   Transportation Needs: No Transportation Needs    Lack of Transportation (Medical): No    Lack of Transportation (Non-Medical): No   Physical Activity: Unknown    Days of Exercise per Week: Patient refused    Minutes of Exercise per Session: Patient refused   Stress: Unknown    Feeling of Stress : Patient refused   Social Connections: Unknown    Frequency of Communication with Friends and Family: Patient refused    Frequency of Social Gatherings with Friends and Family: Patient refused    Attends Church Services: Patient refused    Active Member of Clubs or Organizations: Patient refused    Attends Club or Organization Meetings: Patient refused    Marital Status: Patient refused   Housing Stability: Unknown    Unable to Pay for Housing in the Last Year: No    Unstable Housing in the  Last Year: No     Family History   Problem Relation Age of Onset    Heart disease Mother     Cancer Mother     Cataracts Mother     Hypertension Father     Heart disease Father     Cancer Sister     Heart disease Sister     Breast cancer Sister     Heart disease Brother          Review of Systems:   Not accurate.  She has a blunt affect and does not have answers to my questions.      EXAM & DIAGNOSTICS REVIEWED:   Vitals:     Temp:  [94.6 °F (34.8 °C)-97.8 °F (36.6 °C)]   Temp: 97.3 °F (36.3 °C) (07/03/23 1630)  Pulse: 60 (07/03/23 1630)  Resp: 17 (07/03/23 1630)  BP: 119/74 (07/03/23 1630)  SpO2: 95 % (07/03/23 1630)    Intake/Output Summary (Last 24 hours) at 7/3/2023 1729  Last data filed at 7/3/2023 1518  Gross per 24 hour   Intake 50 ml   Output 750 ml   Net -700 ml       General:  In NAD. Alert and attentive, cooperative, comfortable obese  Eyes:  Anicteric, PERRL, EOMI  ENT:  No ulcers, exudates, thrush, nares patent, dentition is fair  Neck:  supple, no masses or adenopathy appreciated  Lungs: Crackles at bases, decreased air entry due to body habitus   Heart:  RRR, no gallop/murmur/rub noted  Abd:  Soft, NT, ND, normal BS, no masses or organomegaly appreciated.  : Gee catheter with slightly dark urine. no flank tenderness  Musc:  Joints without effusion, swelling, erythema, synovitis, muscle wasting.   Skin:  Pictures as below otherwise No rashes. No palmar or plantar lesions. No subungual petechiae  Neuro:             Alert, attentive, speech fluent, face symmetric, extremely weak.  Nonambulatory at this time.     Psych: Calm, cooperative blunt affect  Lymphatic:     No cervical, supraclavicular, axillary, or inguinal nodes  Extrem: Anasarca .  No erythema, phlebitis, cellulitis, warm and well perfused but please see pictures.  There is a left leg external fixator.  Left heel ulcer.  Left 1st toe ulcer.      VAD:       Isolation:    Wound:                                         Lines/Tubes/Drains:  Right IJ.    General Labs reviewed:  Recent Labs   Lab 07/01/23  0406 07/02/23  0404 07/03/23  0433   WBC 19.84* 18.08* 16.14*   HGB 8.3* 8.4* 8.3*   HCT 24.5* 25.1* 25.3*    291 312       Recent Labs   Lab 07/01/23  0406 07/02/23  0404 07/03/23  0433   * 133* 133*  133*   K 4.3 4.2 4.8  4.8   CL 99 99 99  99   CO2 25 26 21*  21*   BUN 73* 72* 70*  70*   CREATININE 2.6* 2.7* 2.7*  2.7*   CALCIUM 7.6* 7.6* 7.6*  7.6*   PROT  --   --  4.4*   BILITOT  --   --  0.9   ALKPHOS  --   --  90   ALT  --   --  <5*   AST  --   --  15     No results for input(s): CRP in the last 168 hours.      Micro:  Microbiology Results (last 7 days)       Procedure Component Value Units Date/Time    Blood culture [[id number]]     Order Status: Sent Specimen: Blood     Blood culture [[id number]]     Order Status: Sent Specimen: Blood     Blood culture [[id number]]     Order Status: Canceled Specimen: Blood     Blood culture [[id number]]     Order Status: Canceled Specimen: Blood     Blood culture [[id number]] Collected: 06/30/23 0613    Order Status: Completed Specimen: Blood Updated: 07/03/23 1032     Blood Culture, Routine No Growth to date      No Growth to date      No Growth to date      No Growth to date    Narrative:      Collection has been rescheduled by LND at 06/30/2023 06:11 Reason:   Completed  Collection has been rescheduled by LND at 06/30/2023 06:11 Reason:   Completed    Culture, Respiratory with Gram Stain [[id number]]  (Abnormal) Collected: 06/24/23 1245    Order Status: Completed Specimen: Respiratory from Sputum Updated: 07/01/23 0628     Respiratory Culture PRESUMPTIVE CANDIDA ALBICANS  Moderate        BALDOMERO TROPICALIS  Moderate       Gram Stain (Respiratory) Many WBC's     Gram Stain (Respiratory) >10epis/lfp and <than many WBC's     Gram Stain (Respiratory) Few yeast    IV catheter culture [[id number]] Collected: 06/25/23 7321    Order Status: Completed Specimen: Catheter  Tip, PICC Updated: 06/30/23 0827     Aerobic Culture - Cath tip No growth    Blood culture x two cultures. Draw prior to antibiotics. [[id number]] Collected: 06/24/23 0209    Order Status: Completed Specimen: Blood from Peripheral, Forearm, Left Updated: 06/29/23 0232     Blood Culture, Routine No growth after 5 days.    Narrative:      Aerobic and anaerobic    Blood culture x two cultures. Draw prior to antibiotics. [[id number]] Collected: 06/24/23 0209    Order Status: Completed Specimen: Blood from Peripheral, Hand, Left Updated: 06/29/23 0232     Blood Culture, Routine No growth after 5 days.    Narrative:      Aerobic and anaerobic    Clostridium difficile EIA [[id number]] Collected: 06/28/23 1547    Order Status: Completed Specimen: Stool Updated: 06/28/23 1714     C. diff Antigen Negative     C difficile Toxins A+B, EIA Negative     Comment: Testing not recommended for children <24 months old.       Urine culture [[id number]]  (Abnormal) Collected: 06/24/23 0208    Order Status: Completed Specimen: Urine Updated: 06/28/23 1015     Urine Culture, Routine CANDIDA TROPICALIS  > 100,000 cfu/ml  Treatment of asymptomatic candiduria is not recommended (except for   specific populations). Candida isolated in the urine typically   represents colonization. If an indwelling urinary catheter is present  it should be removed or replaced.      Narrative:      Specimen Source->Urine        06/06/2023 left 1st toe wound Pseudomonas aeruginosa.    05/18/2023.  Left great toe wound Enterobacter cloaca    04/27/2023 left foot bone???  Staphylococcus aureus, (MSSA) x2 different susceptibility patterns and Staphylococcus epidermidis.    04/27/2023 left foot wound Staphylococcus aureus and Candida albicans.  04/19/2023 left toe wound MSSA  04/19/2023 blood cultures no growth.    02/17/2023 urine culture E coli   Staphylococcus aureus (1) Staphylococcus aureus (2) Staphylococcus epidermidis     CULTURE, AEROBIC  (SPECIFY SOURCE)  CULTURE, AEROBIC  (SPECIFY SOURCE) CULTURE, AEROBIC  (SPECIFY SOURCE)    Clindamycin <=0.5 mcg/mL Sensitive <=0.5 mcg/mL Sensitive <=0.5 mcg/mL Sensitive    Erythromycin <=0.5 mcg/mL Sensitive >4 mcg/mL Resistant >4 mcg/mL Resistant    Oxacillin <=0.25 mcg/mL Sensitive <=0.25 mcg/mL Sensitive <=0.25 mcg/mL Sensitive    Penicillin <=0.03 mcg/mL Sensitive 0.25 mcg/mL Resistant 2 mcg/mL Resistant    Tetracycline <=4 mcg/mL Sensitive <=4 mcg/mL Sensitive <=4 mcg/mL Sensitive    Trimeth/Sulfa <=0.5/9.5 m... Sensitive >2/38 mcg/mL Resistant >2/38 mcg/mL Resistant                   Imaging Reviewed:  US  1. Limited visualization of exophytic upper pole right renal mass. This likely reflects a complicated cyst. Multiphase CT with contrast could be utilized for further assessment. If patient cannot receive IV contrast, follow-up nonemergent MRI could be considered.      CT 06/24/2023   1.  Minimal gallbladder distention.  2.  Mild left hydroureteronephrosis, without definite ureteral stone seen. Urinary bladder decompressed.  3.  Anasarca.  4.  Colonic diverticulosis without definite CT evidence for acute diverticulitis.  5.  Possible hyperdense cyst at the upper pole right kidney. Follow-up nonemergent renal ultrasound could be performed.  6.  Post surgical changes at the left femur, with subtrochanteric fracture.  7.  Minimal bilateral pleural effusions and mild bibasilar atelectasis.    MRI 04/27/2023  1. Patchy marrow edema within the posterior calcaneus with an overlying soft tissue ulcer, possibly reactive or sequela of early osteomyelitis.  No focal fluid collection.  2. Subcortical marrow edema at the plantar aspect of the talar head with a small superimposed linear hypointensity, possibly degenerative or sequela of an insufficiency fracture.  3. Subcutaneous edema about the ankle and dorsum of the foot, possibly sterile or infectious in nature.    MRI04/20/2023  1. Skin ulcer along the plantar aspect of the great  toe.  No subjacent osteomyelitis for abscess.  2. Organizing soft tissue infection/cellulitis along the great toe.  3. Dorsal foot soft tissue swelling is nonspecific.       Cardiology:  · The left ventricle is normal in size with normal systolic function.  · The estimated ejection fraction is 58%.  · Normal left ventricular diastolic function.  · There is abnormal septal wall motion.  · Atrial fibrillation not observed.  · Mild right ventricular enlargement with normal right ventricular  systolic function.  · There is moderate pulmonary hypertension.  · Mild to moderate tricuspid regurgitation.  · Mild to moderate pulmonic regurgitation.  · Normal central venous pressure (3 mmHg).  · The estimated PA systolic pressure is 65 mmHg.  Premature atrial contractions  Moderate pulmonary hypertension is present    IMPRESSION & PLAN       Left heel osteomyelitis as per evaluation by prior ID.  Completing Ancef  on 06/07/23--07/17.      First toe nonhealing ulcer.  06/06/23 culture had shown Pseudomonas.  She had received 2 weeks of  ciprofloxacin 750 mg p.o. q. 12 x 2 weeks (06/06--06/22) possible osteomyelitis of left 1st toe    Decubitus sacral ulcer.  Please follow with wound care      Leukocytosis.  Multifactorial; it could be reactive due to external fixator, nonhealing skin ulcers, anemia requiring transfusions on admission,    Colonized with yeast urine, sputum.  This, coupled with need for TPN, and central lines, will place  her at high risk for fungemia      MYLES resolved, CKD.  Multiple electrolyte abnormalities, hyperdense renal cysts, nephrologist is following.       Expect slow healing due to MYLES, CKD, anasarca, diabetes, protein malnutrition     PMHx    DM 2, nsulin-requiring , HTN, PAD S/P  baloon angioplasty L popliteal artery and left posterior tibial arery on DAPT. 04/25/2023,   smoker, femur and tibial fracture, intramedullary rodding of femur 02/18/2023., open reduction internal fixation of left pilon fx  and left external fixator 06/06/23, GERD, obese, BMI 36, constipation, vitamin-D deficiency, chronic opiate use, right breast mass      Recommendations:  The fact that left first toe ulcer  is not improving makes me concerned about osteomyelitis.    In that case she may have been undertreated for Pseudomonas aeruginosa, with just 2 weeks of ciprofloxacin.  I do not think she can have an MRI at this time  Ask podiatrist to culture again(culture may be suboptimal given that she has been exposed to antibiotics already)  On vancomycin, because of hypothermia and concern for sepsis.  Will DC soon.    Continue cefepime, which she will probably need it for 4-6 weeks..          Medical Decision Making during this encounter was  [_] Low Complexity  [_] Moderate Complexity  [  xxx] High Complexity

## 2023-07-03 NOTE — ASSESSMENT & PLAN NOTE
Present on arrival with surrounding erythema and slough covering  -wound care consulted and following.  Discussed with them today.  Would like wound care physician to see for debridement cultures.  -continue to follow wound care orders   -turn Pt q.2 hours

## 2023-07-03 NOTE — PROGRESS NOTES
VANCOMYCIN PHARMACOKINETIC NOTE:  Vancomycin Day # 1    Objective/Assessment:    Diagnosis/Indication for Vancomycin:Skin & Soft Tissue infection      73 y.o., female; Actual Body Weight = 93.5 kg (206 lb 2.1 oz).    The patient has the following labs:  7/3/2023 Estimated Creatinine Clearance: 20.5 mL/min (A) (based on SCr of 2.7 mg/dL (H)). Lab Results   Component Value Date    BUN 70 (H) 07/03/2023    BUN 70 (H) 07/03/2023     Lab Results   Component Value Date    WBC 16.14 (H) 07/03/2023          Plan:  Adjust vancomycin dose and/or frequency based on the patient's actual weight and renal function:  Initiate Vancomycin 1500 mg IV x 1 dose.  Orders have been entered into patient's chart.        Vancomycin random level has been ordered for 7/4, approximately 23 hours following dose # 1.     Pharmacy will manage vancomycin therapy, monitor serum vancomycin levels, monitor renal function and adjust regimen as necessary.    Thank you for allowing us to participate in this patient's care.     Antonio Marion 7/3/2023   Department of Pharmacy  Ext [phone]

## 2023-07-04 LAB
ANION GAP SERPL CALC-SCNC: 10 MMOL/L (ref 8–16)
BASOPHILS # BLD AUTO: 0.04 K/UL (ref 0–0.2)
BASOPHILS NFR BLD: 0.3 % (ref 0–1.9)
BUN SERPL-MCNC: 76 MG/DL (ref 8–23)
CALCIUM SERPL-MCNC: 7.4 MG/DL (ref 8.7–10.5)
CHLORIDE SERPL-SCNC: 98 MMOL/L (ref 95–110)
CO2 SERPL-SCNC: 23 MMOL/L (ref 23–29)
CORTIS SERPL-MCNC: 19.3 UG/DL
CREAT SERPL-MCNC: 2.9 MG/DL (ref 0.5–1.4)
DIFFERENTIAL METHOD: ABNORMAL
EOSINOPHIL # BLD AUTO: 0.1 K/UL (ref 0–0.5)
EOSINOPHIL NFR BLD: 0.8 % (ref 0–8)
ERYTHROCYTE [DISTWIDTH] IN BLOOD BY AUTOMATED COUNT: 17.8 % (ref 11.5–14.5)
EST. GFR  (NO RACE VARIABLE): 16.7 ML/MIN/1.73 M^2
GLUCOSE SERPL-MCNC: 225 MG/DL (ref 70–110)
GLUCOSE SERPL-MCNC: 253 MG/DL (ref 70–110)
GLUCOSE SERPL-MCNC: 280 MG/DL (ref 70–110)
GLUCOSE SERPL-MCNC: 297 MG/DL (ref 70–110)
GLUCOSE SERPL-MCNC: 305 MG/DL (ref 70–110)
HCT VFR BLD AUTO: 22.9 % (ref 37–48.5)
HGB BLD-MCNC: 7.7 G/DL (ref 12–16)
IMM GRANULOCYTES # BLD AUTO: 0.49 K/UL (ref 0–0.04)
IMM GRANULOCYTES NFR BLD AUTO: 3.1 % (ref 0–0.5)
LYMPHOCYTES # BLD AUTO: 1.1 K/UL (ref 1–4.8)
LYMPHOCYTES NFR BLD: 6.8 % (ref 18–48)
MCH RBC QN AUTO: 28.8 PG (ref 27–31)
MCHC RBC AUTO-ENTMCNC: 33.6 G/DL (ref 32–36)
MCV RBC AUTO: 86 FL (ref 82–98)
MONOCYTES # BLD AUTO: 0.7 K/UL (ref 0.3–1)
MONOCYTES NFR BLD: 4.3 % (ref 4–15)
NEUTROPHILS # BLD AUTO: 13.4 K/UL (ref 1.8–7.7)
NEUTROPHILS NFR BLD: 84.7 % (ref 38–73)
NRBC BLD-RTO: 0 /100 WBC
PLATELET # BLD AUTO: 353 K/UL (ref 150–450)
PMV BLD AUTO: 12.3 FL (ref 9.2–12.9)
POTASSIUM SERPL-SCNC: 4.9 MMOL/L (ref 3.5–5.1)
PREALB SERPL-MCNC: 8 MG/DL (ref 20–43)
RBC # BLD AUTO: 2.67 M/UL (ref 4–5.4)
SODIUM SERPL-SCNC: 131 MMOL/L (ref 136–145)
TRIGL SERPL-MCNC: 98 MG/DL (ref 30–150)
VANCOMYCIN SERPL-MCNC: 19.7 UG/ML
WBC # BLD AUTO: 15.79 K/UL (ref 3.9–12.7)

## 2023-07-04 PROCEDURE — 85025 COMPLETE CBC W/AUTO DIFF WBC: CPT | Performed by: HOSPITALIST

## 2023-07-04 PROCEDURE — A4217 STERILE WATER/SALINE, 500 ML: HCPCS | Performed by: HOSPITALIST

## 2023-07-04 PROCEDURE — 25000003 PHARM REV CODE 250: Performed by: HOSPITALIST

## 2023-07-04 PROCEDURE — 63600175 PHARM REV CODE 636 W HCPCS: Performed by: NURSE PRACTITIONER

## 2023-07-04 PROCEDURE — 99222 PR INITIAL HOSPITAL CARE,LEVL II: ICD-10-PCS | Mod: ,,, | Performed by: PODIATRIST

## 2023-07-04 PROCEDURE — 25000003 PHARM REV CODE 250: Performed by: INTERNAL MEDICINE

## 2023-07-04 PROCEDURE — 80202 ASSAY OF VANCOMYCIN: CPT | Performed by: HOSPITALIST

## 2023-07-04 PROCEDURE — 80048 BASIC METABOLIC PNL TOTAL CA: CPT | Performed by: HOSPITALIST

## 2023-07-04 PROCEDURE — 99222 1ST HOSP IP/OBS MODERATE 55: CPT | Mod: ,,, | Performed by: PODIATRIST

## 2023-07-04 PROCEDURE — 63600175 PHARM REV CODE 636 W HCPCS: Performed by: HOSPITALIST

## 2023-07-04 PROCEDURE — 12000002 HC ACUTE/MED SURGE SEMI-PRIVATE ROOM

## 2023-07-04 PROCEDURE — 84134 ASSAY OF PREALBUMIN: CPT | Performed by: HOSPITALIST

## 2023-07-04 PROCEDURE — B4185 PARENTERAL SOL 10 GM LIPIDS: HCPCS | Performed by: HOSPITALIST

## 2023-07-04 PROCEDURE — 82533 TOTAL CORTISOL: CPT | Performed by: NURSE PRACTITIONER

## 2023-07-04 PROCEDURE — 25000003 PHARM REV CODE 250: Performed by: NURSE PRACTITIONER

## 2023-07-04 PROCEDURE — 84478 ASSAY OF TRIGLYCERIDES: CPT | Performed by: HOSPITALIST

## 2023-07-04 RX ORDER — DRONABINOL 2.5 MG/1
2.5 CAPSULE ORAL 2 TIMES DAILY
Status: DISCONTINUED | OUTPATIENT
Start: 2023-07-04 | End: 2023-07-07 | Stop reason: HOSPADM

## 2023-07-04 RX ADMIN — ASPIRIN 81 MG 81 MG: 81 TABLET ORAL at 08:07

## 2023-07-04 RX ADMIN — INSULIN ASPART 3 UNITS: 100 INJECTION, SOLUTION INTRAVENOUS; SUBCUTANEOUS at 05:07

## 2023-07-04 RX ADMIN — I.V. FAT EMULSION 200 ML: 20 EMULSION INTRAVENOUS at 09:07

## 2023-07-04 RX ADMIN — CLOTRIMAZOLE 10 MG: 10 LOZENGE ORAL; TOPICAL at 09:07

## 2023-07-04 RX ADMIN — COLLAGENASE SANTYL: 250 OINTMENT TOPICAL at 08:07

## 2023-07-04 RX ADMIN — LEUCINE, PHENYLALANINE, LYSINE, METHIONINE, ISOLEUCINE, VALINE, HISTIDINE, THREONINE, TRYPTOPHAN, ALANINE, GLYCINE, ARGININE, PROLINE, SERINE, TYROSINE, SODIUM ACETATE, DIBASIC POTASSIUM PHOSPHATE, MAGNESIUM CHLORIDE, SODIUM CHLORIDE, CALCIUM CHLORIDE, DEXTROSE
311; 238; 247; 170; 255; 247; 204; 179; 77; 880; 438; 489; 289; 213; 17; 297; 261; 51; 77; 33; 5 INJECTION INTRAVENOUS at 08:07

## 2023-07-04 RX ADMIN — ENOXAPARIN SODIUM 30 MG: 30 INJECTION SUBCUTANEOUS at 05:07

## 2023-07-04 RX ADMIN — INSULIN ASPART 2 UNITS: 100 INJECTION, SOLUTION INTRAVENOUS; SUBCUTANEOUS at 09:07

## 2023-07-04 RX ADMIN — CLOTRIMAZOLE 10 MG: 10 LOZENGE ORAL; TOPICAL at 08:07

## 2023-07-04 RX ADMIN — DRONABINOL 2.5 MG: 2.5 CAPSULE ORAL at 03:07

## 2023-07-04 RX ADMIN — MAGNESIUM SULFATE HEPTAHYDRATE: 500 INJECTION, SOLUTION INTRAMUSCULAR; INTRAVENOUS at 09:07

## 2023-07-04 RX ADMIN — ONDANSETRON 4 MG: 2 INJECTION INTRAMUSCULAR; INTRAVENOUS at 07:07

## 2023-07-04 RX ADMIN — CLOTRIMAZOLE 10 MG: 10 LOZENGE ORAL; TOPICAL at 03:07

## 2023-07-04 RX ADMIN — AMIODARONE HYDROCHLORIDE 200 MG: 200 TABLET ORAL at 08:07

## 2023-07-04 RX ADMIN — DRONABINOL 2.5 MG: 2.5 CAPSULE ORAL at 09:07

## 2023-07-04 RX ADMIN — INSULIN ASPART 3 UNITS: 100 INJECTION, SOLUTION INTRAVENOUS; SUBCUTANEOUS at 12:07

## 2023-07-04 RX ADMIN — MIDODRINE HYDROCHLORIDE 5 MG: 2.5 TABLET ORAL at 04:07

## 2023-07-04 RX ADMIN — MIDODRINE HYDROCHLORIDE 5 MG: 2.5 TABLET ORAL at 07:07

## 2023-07-04 RX ADMIN — MIDODRINE HYDROCHLORIDE 5 MG: 2.5 TABLET ORAL at 12:07

## 2023-07-04 RX ADMIN — INSULIN ASPART 3 UNITS: 100 INJECTION, SOLUTION INTRAVENOUS; SUBCUTANEOUS at 08:07

## 2023-07-04 RX ADMIN — CLOPIDOGREL BISULFATE 75 MG: 75 TABLET, FILM COATED ORAL at 08:07

## 2023-07-04 RX ADMIN — CEFEPIME HYDROCHLORIDE 2 G: 2 INJECTION, POWDER, FOR SOLUTION INTRAVENOUS at 04:07

## 2023-07-04 NOTE — CONSULTS
[facility]  Podiatry  Consult Note    Patient Name: Anastasiia Badillo  MRN: [id number]  Admission Date: 6/24/2023  Hospital Length of Stay: 10 days  Attending Physician: Criss Beck MD  Primary Care Provider: Raji Parkinson MD     Consults  Subjective:     History of Present Illness:  72 y/o F with a past medical history significant for DM2, HTN, femur fracture, tibial fracture and tobacco use, L foot wounds,  high grade stenosis SFA bilaterally and popliteal on the left s/p baloon angioplasty L popliteal artery and left posterior tibial arery on DAPT.   [place] [facility] [place] of [place], s/p ORIF 6/6, also finished antibiotics for acute osteomyelitis of left calcaneus.    She has multiple chronic wounds left 1st toe, left heel, currently being treated by wound care at an alternate facility.     Patient has been on multiple courses of recent antibiotics.      Scheduled Meds:   amiodarone  200 mg Oral Daily    aspirin  81 mg Oral Daily    ceFEPime (MAXIPIME) IVPB  2 g Intravenous Q24H    clopidogreL  75 mg Oral Daily    clotrimazole  10 mg Oral TID    collagenase   Topical (Top) Daily    enoxparin  30 mg Subcutaneous Q24H (prophylaxis, 1700)    insulin detemir U-100  6 Units Subcutaneous Daily    midodrine  5 mg Oral TID WM     Continuous Infusions:   amino acids 4.25%-grmqp-[id number]-J5K 75 mL/hr at 07/04/23 0800     PRN Meds:dextrose 50%, dextrose 50%, glucagon (human recombinant), glucose, glucose, HYDROcodone-acetaminophen, insulin aspart U-100, magnesium oxide, magnesium oxide, naloxone, ondansetron, potassium bicarbonate, potassium bicarbonate, potassium bicarbonate, potassium, sodium phosphates, potassium, sodium phosphates, potassium, sodium phosphates, sodium chloride 0.9%, Pharmacy to dose Vancomycin consult **AND** vancomycin - pharmacy to dose    Review of patient's allergies indicates:  No Known Allergies     Past Medical History:   Diagnosis Date    Acute  osteomyelitis of left calcaneus 6/7/2023    Chronic ulcer of great toe of left foot with fat layer exposed 6/7/2023    Closed left pilon fracture 6/5/2023    Closed left subtrochanteric femur fracture, initial encounter 2/18/2023    Diabetes mellitus, type 2     Essential (primary) hypertension 2/17/2023    Mass of upper outer quadrant of right breast 4/27/2023    Nicotine dependence 2/20/2023    PAD (peripheral artery disease) 4/22/2023    Tobacco use 6/6/2023    Type 2 diabetes mellitus with hyperglycemia, with long-term current use of insulin 6/6/2023     Past Surgical History:   Procedure Laterality Date    ANGIOGRAPHY OF LOWER EXTREMITY Left 4/25/2023    Procedure: Angiogram Extremity Unilateral;  Surgeon: Gilbert Sheppard MD;  Location: [facility] OR [facility];  Service: Vascular;  Laterality: Left;  28.4 min  487.45 mGy  75.9180 Gycm2  trans radial: 7 ml  dye: 126 ml      APPLICATION, EXTERNAL FIXATION DEVICE Left 6/6/2023    Procedure: APPLICATION, EXTERNAL FIXATION DEVICE, LEFT ANKLE, [place];  Surgeon: Gilbert Mahmood MD;  [facility];  Service: Orthopedics;  Laterality: Left;    AUGMENTATION OF BREAST      INTRAMEDULLARY RODDING OF FEMUR Left 2/18/2023    Procedure: INSERTION, INTRAMEDULLARY ROXANNA, FEMUR (hana table -- synthes -- long nail -- have bovie and suction and large bone set);  Surgeon: Keanu Brand MD;  Location: [facility] OR;  Service: Orthopedics;  Laterality: Left;    OPEN REDUCTION AND INTERNAL FIXATION (ORIF) OF PILON FRACTURE Left 6/6/2023    Procedure: ORIF, FRACTURE, PILON, LEFT;  Surgeon: Gilbert Mahmood MD;  Location: [facility] OR [facility]LR;  Service: Orthopedics;  Laterality: Left;    PERCUTANEOUS TRANSLUMINAL ANGIOPLASTY Left 4/25/2023    Procedure: PTA (ANGIOPLASTY, PERCUTANEOUS, TRANSLUMINAL);  Surgeon: Gilbert Sheppard MD;  Location: [facility] OR 2ND FLR;  Service: Vascular;  Laterality: Left;  Tibial and popliteal angioplasty        Family History       Problem Relation (Age of Onset)    Breast cancer Sister    Cancer Mother, Sister    Cataracts Mother    Heart disease Mother, Father, Sister, Brother    Hypertension Father          Tobacco Use    Smoking status: Every Day     Packs/day: 0.25     Years: 45.00     Pack years: 11.25     Types: Cigarettes    Smokeless tobacco: Never   Substance and Sexual Activity    Alcohol use: Yes    Drug use: Never    Sexual activity: Not on file     Review of Systems  Objective:     Vital Signs (Most Recent):  Temp: 97.9 °F (36.6 °C) (07/04/23 0745)  Pulse: 76 (07/04/23 0745)  Resp: 17 (07/04/23 0745)  BP: (!) 128/58 (07/04/23 0745)  SpO2: (!) 93 % (07/04/23 0745) Vital Signs (24h Range):  Temp:  [95.7 °F (35.4 °C)-98.5 °F (36.9 °C)] 97.9 °F (36.6 °C)  Pulse:  [60-76] 76  Resp:  [17-18] 17  SpO2:  [92 %-96 %] 93 %  BP: (109-131)/(58-74) 128/58     Weight: 93.5 kg (206 lb 2.1 oz)  Body mass index is 36.51 kg/m².    Foot Exam    Right Foot/Ankle     Neurovascular  Dorsalis pedis: 1+  Posterior tibial: 1+  Saphenous nerve sensation: diminished  Tibial nerve sensation: diminished  Superficial peroneal nerve sensation: diminished  Deep peroneal nerve sensation: diminished  Sural nerve sensation: diminished      Left Foot/Ankle      Neurovascular  Dorsalis pedis: 1+  Posterior tibial: 1+  Saphenous nerve sensation: diminished  Tibial nerve sensation: diminished  Superficial peroneal nerve sensation: diminished  Deep peroneal nerve sensation: diminished  Sural nerve sensation: diminished                          Laboratory:  All pertinent labs reviewed within the last 24 hours.    Diagnostic Results:  I have reviewed all pertinent imaging results/findings within the past 24 hours.  .    Assessment/Plan:     Orthopedic  Chronic ulcer of great toe of left foot with fat layer exposed  I discussed with patient recommendations for a bone culture from ID, but she states she does not wish to undergo a procedure  at this time. Will reconsider this later this week if patient is agreeable. Agree with ID that the culture would likely be sub optimal given her extensive recent courses of antibiotics     Recommend treating empirically if concerns for osteomyelitis.     Recommend continue dressing changes per wound care, recommend santyl followed by aquacel ag      gordon and glucerna    Counseled patient on increasing protein intake, not getting wound wet, keeping dressing clean dry and intact, following a healthy diet, elevating legs when able, removing pressure from wound      Chronic ulcer of left heel with fat layer exposed  Recommend continue dressing changes per wound care, recommend santyl followed by aquacel ag    gordon and glucerna              Thank you for your consult. I will follow-up with patient. Please contact us if you have any additional questions.    Jenny Peace DPM  Podiatry  [facility]

## 2023-07-04 NOTE — SUBJECTIVE & OBJECTIVE
Scheduled Meds:   amiodarone  200 mg Oral Daily    aspirin  81 mg Oral Daily    ceFEPime (MAXIPIME) IVPB  2 g Intravenous Q24H    clopidogreL  75 mg Oral Daily    clotrimazole  10 mg Oral TID    collagenase   Topical (Top) Daily    enoxparin  30 mg Subcutaneous Q24H (prophylaxis, 1700)    insulin detemir U-100  6 Units Subcutaneous Daily    midodrine  5 mg Oral TID WM     Continuous Infusions:   amino acids 4.25%-tysnm-Yb-U0L 75 mL/hr at 07/04/23 0800     PRN Meds:dextrose 50%, dextrose 50%, glucagon (human recombinant), glucose, glucose, HYDROcodone-acetaminophen, insulin aspart U-100, magnesium oxide, magnesium oxide, naloxone, ondansetron, potassium bicarbonate, potassium bicarbonate, potassium bicarbonate, potassium, sodium phosphates, potassium, sodium phosphates, potassium, sodium phosphates, sodium chloride 0.9%, Pharmacy to dose Vancomycin consult **AND** vancomycin - pharmacy to dose    Review of patient's allergies indicates:  No Known Allergies     Past Medical History:   Diagnosis Date    Acute osteomyelitis of left calcaneus 6/7/2023    Chronic ulcer of great toe of left foot with fat layer exposed 6/7/2023    Closed left pilon fracture 6/5/2023    Closed left subtrochanteric femur fracture, initial encounter 2/18/2023    Diabetes mellitus, type 2     Essential (primary) hypertension 2/17/2023    Mass of upper outer quadrant of right breast 4/27/2023    Nicotine dependence 2/20/2023    PAD (peripheral artery disease) 4/22/2023    Tobacco use 6/6/2023    Type 2 diabetes mellitus with hyperglycemia, with long-term current use of insulin 6/6/2023     Past Surgical History:   Procedure Laterality Date    ANGIOGRAPHY OF LOWER EXTREMITY Left 4/25/2023    Procedure: Angiogram Extremity Unilateral;  Surgeon: Gilbert Sheppard MD;  Location: [facility] OR [address] [place] [address];  Service: Vascular;  Laterality: Left;  28.4 min  487.45 mGy  75.9180 Gycm2  trans radial: 7 ml  dye: 126 ml      APPLICATION, EXTERNAL FIXATION  DEVICE Left 6/6/2023    Procedure: APPLICATION, EXTERNAL FIXATION DEVICE, LEFT ANKLE, [place];  Surgeon: Gilbert Mahmood MD;  Location: [facility] OR 2ND FLR;  Service: Orthopedics;  Laterality: Left;    AUGMENTATION OF BREAST      INTRAMEDULLARY RODDING OF FEMUR Left 2/18/2023    Procedure: INSERTION, INTRAMEDULLARY ROXANNA, FEMUR (hana table -- synthes -- long nail -- have bovie and suction and large bone set);  Surgeon: Keanu Brand MD;  Location: [facility] OR;  Service: Orthopedics;  Laterality: Left;    OPEN REDUCTION AND INTERNAL FIXATION (ORIF) OF PILON FRACTURE Left 6/6/2023    Procedure: ORIF, FRACTURE, PILON, LEFT;  Surgeon: Gilbert Mahmood MD;  Location: [facility] OR 2ND FLR;  Service: Orthopedics;  Laterality: Left;    PERCUTANEOUS TRANSLUMINAL ANGIOPLASTY Left 4/25/2023    Procedure: PTA (ANGIOPLASTY, PERCUTANEOUS, TRANSLUMINAL);  Surgeon: Gilbert Sheppard MD;  Location: [facility] OR [facility]LR;  Service: Vascular;  Laterality: Left;  Tibial and popliteal angioplasty       Family History       Problem Relation (Age of Onset)    Breast cancer Sister    Cancer Mother, Sister    Cataracts Mother    Heart disease Mother, Father, Sister, Brother    Hypertension Father          Tobacco Use    Smoking status: Every Day     Packs/day: 0.25     Years: 45.00     Pack years: 11.25     Types: Cigarettes    Smokeless tobacco: Never   Substance and Sexual Activity    Alcohol use: Yes    Drug use: Never    Sexual activity: Not on file     Review of Systems  Objective:     Vital Signs (Most Recent):  Temp: 97.9 °F (36.6 °C) (07/04/23 0745)  Pulse: 76 (07/04/23 0745)  Resp: 17 (07/04/23 0745)  BP: (!) 128/58 (07/04/23 0745)  SpO2: (!) 93 % (07/04/23 0745) Vital Signs (24h Range):  Temp:  [95.7 °F (35.4 °C)-98.5 °F (36.9 °C)] 97.9 °F (36.6 °C)  Pulse:  [60-76] 76  Resp:  [17-18] 17  SpO2:  [92 %-96 %] 93 %  BP: (109-131)/(58-74) 128/58     Weight: 93.5 kg (206 lb 2.1 oz)  Body mass index is 36.51 kg/m².    Foot  Exam    Right Foot/Ankle     Neurovascular  Dorsalis pedis: 1+  Posterior tibial: 1+  Saphenous nerve sensation: diminished  Tibial nerve sensation: diminished  Superficial peroneal nerve sensation: diminished  Deep peroneal nerve sensation: diminished  Sural nerve sensation: diminished      Left Foot/Ankle      Neurovascular  Dorsalis pedis: 1+  Posterior tibial: 1+  Saphenous nerve sensation: diminished  Tibial nerve sensation: diminished  Superficial peroneal nerve sensation: diminished  Deep peroneal nerve sensation: diminished  Sural nerve sensation: diminished                          Laboratory:  All pertinent labs reviewed within the last 24 hours.    Diagnostic Results:  I have reviewed all pertinent imaging results/findings within the past 24 hours.  .

## 2023-07-04 NOTE — ASSESSMENT & PLAN NOTE
I discussed with patient recommendations for a bone culture from ID, but she states she does not wish to undergo a procedure at this time. Will reconsider this later this week if patient is agreeable. Agree with ID that the culture would likely be sub optimal given her extensive recent courses of antibiotics     Recommend treating empirically if concerns for osteomyelitis.     Recommend continue dressing changes per wound care, recommend santyl followed by aquacel ag      gordon and angel    Counseled patient on increasing protein intake, not getting wound wet, keeping dressing clean dry and intact, following a healthy diet, elevating legs when able, removing pressure from wound

## 2023-07-04 NOTE — ASSESSMENT & PLAN NOTE
high grade stenosis SFA bilaterally and popliteal on the left s/p baloon angioplasty L popliteal artery and left posterior tibial artery 4/2023 on DAPT  -H&H is stable without signs of bleeding  -resume the ASA/plavix.

## 2023-07-04 NOTE — CLINICAL REVIEW
Afebrile  WBC improving    24.48 29.84 28.93 22.35 23.57 19.84 18.36 19.84 18.08 16.14 15.79  WBC     CRP 7.34    0.99   0.47   Procalc       A/P  Left heel Osteomyelitis due to MSSA-- completes tx on 07/17/23. Looks well on exam   2. Nonhealing left 1st toe wound-- concerning for under treated osteomyelitis?  Can't send her for MRI; will treat empirically.   Recent cx grew Pseudomonas;   Sp ciprofloxacin x 2 weeks      Recommend    --Culture of the left first toe please, for possible osteomyelitis     --Cefepime 2gr q12  x 4 weeks ( dose as per renal function) -- The choice of cefepime is based on the most recent cx of left 1st toe on 06/06/23.  Plan may change depending on new culture results    DC vancomycin soon if no MRSA     Wound care   Improve nutrition  PTOT  Aspiration precautions,-- she is at high risk of Pneumonia while laying in bed  She is at high risk for fungemia, due to abx exposure, IV nutrition, Colonized sputum and urine with candida

## 2023-07-04 NOTE — PROGRESS NOTES
[facility] Medicine  Progress Note    Patient Name: Anastasiia Badillo  MRN: [id number]  Patient Class: IP- Inpatient   Admission Date: 6/24/2023  Length of Stay: 10 days  Attending Physician: Criss Beck MD  Primary Care Provider: Raji Parkinson MD        Subjective:     Principal Problem:Septic shock        HPI:  74 y/o F with a past medical history significant for DM2, HTN, femur fracture, tibial fracture and tobacco use, L foot wounds,  high grade stenosis SFA bilaterally and popliteal on the left s/p baloon angioplasty L popliteal artery and left posterior tibial arery on DAPT.      Recently discharged from [facility] for fracture of left tibia, s/p ORIF 6/6, also finished antibiotics for acute osteomyelitis of left calcaneus ( Final ID recs with end date for cipro of 6/22 and end date of ancef for 7/17 )     Patient sent from rehab for hypotension.     In the ER was in septic shock, e/o UTI and anemic.       Overview/Hospital Course:  Anastasiia Badillo was admitted to the service of hospital medicine for further treatment of septic shock suspected to be 2/2 UTI.  Prior to admission, she was being treated at LTAC for acute osteomyelitis of left calcaneus ( Final ID recs with end date for cipro of 6/22 and end date of ancef for 7/17 ), with hx fracture of left tibia, s/p ORIF 6/6.  She was placed on IV vancomycin, IV cefepime, and IVFH.  She was hypotensive initially requiring vasopressors and was admitted to ICU.  She was given one unit PRBCs for symptomatic anemia.  She developed atrial fibrillation with HR up to 200 and was placed on IV amiodarone.  Cardiology was consulted and pt was ultimately cardioverted at bedside.  IV levophed was changed to IV hood-synephrine.  She was noted to have profound metabolic acidosis, possibly diabetic ketoacidosis, and was placed on IV insulin.  Additionally, she developed an MYLES and nephrology was consulted.  Sodium bicarb drip was recommended.  She  eventually improved, with IV vasopressors weaned and placed on oral midodrine and oral amiodarone.  PT/OT were consulted.  Urine culture growing yeast.  Placed on IV diflucan, IV ancef continued due to drainage from external fixator.  Wound care consulted.  Pt had stagnant renal function.  She had continued decline in her WBC count, however she developed hypothermia.  Abx were broadened and infectious workup was pursued.  Id was consulted.  There was concern for possible cellulitis surrounding her sacral wound-we reached out to wound care physician to have this debrided.  Her oral intake was poor and protein levels very low.  I discussed with dietitian and we initiated Clinimix.      Interval History:  Notes reviewed, no acute events overnight.  Patient remains minimally motivated and not eating.  TPN will start today.  I had a very detailed discussion with patient and her son and daughter-in-law at bedside in regards to patient experiencing failure to thrive.  Patient voiced she wants to try to get better.  Pt denies pain or acute complaints and states she just feels generally weak and no appetite. Will start appetite stimulant today.  I informed patient that if she does not improve over the next week the next step will be to consider hospice for failure to thrive and chronic illnesses.  Patient verbalized understanding.  Family at bedside also updated and agreeable with plan of care.  Will continue to monitor closely.    Review of Systems   Constitutional:  Positive for activity change, appetite change and fatigue. Negative for chills and fever.   HENT:  Negative for congestion, sore throat and trouble swallowing.    Respiratory:  Negative for cough and shortness of breath.    Cardiovascular:  Negative for chest pain.   Gastrointestinal:  Positive for nausea. Negative for abdominal pain, diarrhea and vomiting.   Musculoskeletal:  Positive for arthralgias and gait problem. Negative for back pain.   Skin:  Positive  for wound. Negative for color change, pallor and rash.   Neurological:  Negative for dizziness, weakness and light-headedness.   Psychiatric/Behavioral:  Negative for agitation, behavioral problems and confusion.    All other systems reviewed and are negative.  Objective:     Vital Signs (Most Recent):  Temp: 98.9 °F (37.2 °C) (07/04/23 1219)  Pulse: 75 (07/04/23 1135)  Resp: 17 (07/04/23 1135)  BP: 136/72 (07/04/23 1135)  SpO2: (!) 93 % (07/04/23 1135) Vital Signs (24h Range):  Temp:  [93.4 °F (34.1 °C)-98.9 °F (37.2 °C)] 98.9 °F (37.2 °C)  Pulse:  [60-76] 75  Resp:  [17-18] 17  SpO2:  [92 %-95 %] 93 %  BP: (109-136)/(58-74) 136/72     Weight: 93.5 kg (206 lb 2.1 oz)  Body mass index is 36.51 kg/m².    Intake/Output Summary (Last 24 hours) at 7/4/2023 1441  Last data filed at 7/4/2023 1318  Gross per 24 hour   Intake 1539.55 ml   Output 725 ml   Net 814.55 ml           Physical Exam  Vitals and nursing note reviewed.   Constitutional:       General: She is not in acute distress.     Appearance: She is well-developed. She is ill-appearing (chronically ill appearing). She is not diaphoretic.   HENT:      Head: Normocephalic and atraumatic.      Right Ear: External ear normal.      Left Ear: External ear normal.      Nose: Nose normal. No congestion or rhinorrhea.      Mouth/Throat:      Mouth: Mucous membranes are moist.      Pharynx: Oropharynx is clear. No oropharyngeal exudate or posterior oropharyngeal erythema.   Eyes:      General: No scleral icterus.     Extraocular Movements: Extraocular movements intact.      Conjunctiva/sclera: Conjunctivae normal.      Pupils: Pupils are equal, round, and reactive to light.   Neck:      Vascular: No JVD.   Cardiovascular:      Rate and Rhythm: Normal rate and regular rhythm.      Pulses: Normal pulses.      Heart sounds: Normal heart sounds. No murmur heard.     Comments: Right IJ dressing without erythema or drainage at site noted.  Pulmonary:      Effort: Pulmonary  effort is normal. No respiratory distress.      Breath sounds: Normal breath sounds. No stridor. No wheezing, rhonchi or rales.      Comments: congested cough with thick, clear sputum  Abdominal:      General: Abdomen is flat. Bowel sounds are normal. There is no distension.      Palpations: Abdomen is soft.      Tenderness: There is no abdominal tenderness.   Musculoskeletal:         General: No swelling or tenderness. Normal range of motion.      Cervical back: Normal range of motion and neck supple.      Comments: +anasarca  LEFT leg with external fixator, no overt drainage noted   Skin:     General: Skin is warm and dry.      Capillary Refill: Capillary refill takes less than 2 seconds.      Coloration: Skin is not jaundiced or pale.      Findings: No erythema.      Comments: Left Heel wound, left great toe wound evaluated-see photos   -sacral wound present - see wound care pics   Neurological:      General: No focal deficit present.      Mental Status: She is alert. She is disoriented.      Cranial Nerves: No cranial nerve deficit.      Sensory: No sensory deficit.      Comments: Oriented to self and situation. Disoriented to place, year of birth, president.    Psychiatric:         Mood and Affect: Mood normal.         Behavior: Behavior normal.      Comments: Patient with flat affect and withdrawn.  She does interact intermittently with me and family at bedside but very minimally conversive.  Patient is very poorly motivated but appears to want to try to improve.                     Significant Labs: All pertinent labs within the past 24 hours have been reviewed.  CBC:   Recent Labs   Lab 07/03/23  0433 07/04/23  0430   WBC 16.14* 15.79*   HGB 8.3* 7.7*   HCT 25.3* 22.9*    353       CMP:   Recent Labs   Lab 07/03/23  0433 07/04/23  0430   *  133* 131*   K 4.8  4.8 4.9   CL 99  99 98   CO2 21*  21* 23   *  149* 225*   BUN 70*  70* 76*   CREATININE 2.7*  2.7* 2.9*   CALCIUM 7.6*   7.6* 7.4*   PROT 4.4*  --    ALBUMIN 1.3*  --    BILITOT 0.9  --    ALKPHOS 90  --    AST 15  --    ALT <5*  --    ANIONGAP 13  13 10       Lab Results   Component Value Date    TSH 2.250 06/30/2023       Microbiology Results (last 7 days)       Procedure Component Value Units Date/Time    Blood culture [[id number]] Collected: 06/30/23 0613    Order Status: Completed Specimen: Blood Updated: 07/04/23 1032     Blood Culture, Routine No Growth to date      No Growth to date      No Growth to date      No Growth to date      No Growth to date    Narrative:      Collection has been rescheduled by LND at 06/30/2023 06:11 Reason:   Completed  Collection has been rescheduled by LND at 06/30/2023 06:11 Reason:   Completed    Blood culture [[id number]] Collected: 07/03/23 1741    Order Status: Completed Specimen: Blood Updated: 07/04/23 0317     Blood Culture, Routine No Growth to date    Blood culture [[id number]] Collected: 07/03/23 1741    Order Status: Completed Specimen: Blood Updated: 07/04/23 0317     Blood Culture, Routine No Growth to date    Blood culture [[id number]]     Order Status: Canceled Specimen: Blood     Blood culture [[id number]]     Order Status: Canceled Specimen: Blood     Culture, Respiratory with Gram Stain [[id number]]  (Abnormal) Collected: 06/24/23 1245    Order Status: Completed Specimen: Respiratory from Sputum Updated: 07/01/23 0628     Respiratory Culture PRESUMPTIVE CANDIDA ALBICANS  Moderate        BALDOMERO TROPICALIS  Moderate       Gram Stain (Respiratory) Many WBC's     Gram Stain (Respiratory) >10epis/lfp and <than many WBC's     Gram Stain (Respiratory) Few yeast    IV catheter culture [[id number]] Collected: 06/25/23 1723    Order Status: Completed Specimen: Catheter Tip, PICC Updated: 06/30/23 0827     Aerobic Culture - Cath tip No growth    Blood culture x two cultures. Draw prior to antibiotics. [[id number]] Collected: 06/24/23 0209    Order Status: Completed Specimen: Blood from  Peripheral, Forearm, Left Updated: 06/29/23 0232     Blood Culture, Routine No growth after 5 days.    Narrative:      Aerobic and anaerobic    Blood culture x two cultures. Draw prior to antibiotics. [[id number]] Collected: 06/24/23 0209    Order Status: Completed Specimen: Blood from Peripheral, Hand, Left Updated: 06/29/23 0232     Blood Culture, Routine No growth after 5 days.    Narrative:      Aerobic and anaerobic    Clostridium difficile EIA [[id number]] Collected: 06/28/23 1547    Order Status: Completed Specimen: Stool Updated: 06/28/23 1714     C. diff Antigen Negative     C difficile Toxins A+B, EIA Negative     Comment: Testing not recommended for children <24 months old.       Urine culture [[id number]]  (Abnormal) Collected: 06/24/23 0208    Order Status: Completed Specimen: Urine Updated: 06/28/23 1015     Urine Culture, Routine CANDIDA TROPICALIS  > 100,000 cfu/ml  Treatment of asymptomatic candiduria is not recommended (except for   specific populations). Candida isolated in the urine typically   represents colonization. If an indwelling urinary catheter is present  it should be removed or replaced.      Narrative:      Specimen Source->Urine              Significant Imaging: I have reviewed all pertinent imaging results/findings within the past 24 hours.      Assessment/Plan:      PAF (paroxysmal atrial fibrillation)  Patient with new onset AFib this admission with RVR- rates upwards 200s.  -Required cardioversion: now NSR  -On oral amiodarone  -was recommended to start eliquis once H/H stabilized (required transfusion at start of admit).  -This has not yet been started- will discuss with cards tomorrow as she is on ASA/plavix per vascular surgery after recent vascular intervention (April).    Moderate protein-calorie malnutrition  Nutrition consulted. Most recent weight and BMI monitored-     Measurements:  Wt Readings from Last 1 Encounters:   06/29/23 93.5 kg (206 lb 2.1 oz)   Body mass index is  36.51 kg/m².    Patient has been screened and assessed by RD.    Malnutrition Type:  Context:    Level:      Malnutrition Characteristic Summary:       Interventions/Recommendations (treatment strategy):  Continue current 1500 kcal Diabetic/Dysphagia Soft diet s tolerated and encourage intake.     She is not eating.  As pre-albumin 11, albumin today 1.3   -has diffuse third-spacing   -she needs protein for wound healing   -cont Clinimix until TPN starts tonight.     Hypothermia  Pt with recurrent hypothermia with episodes on 06/30, then again overnight   -thyroid tests were within normal limits at that time, will add T3, T4   -check cortisol level in a.m.   -broaden infectious workup, repeat cultures, check CXR, procalcitonin.  Id is involved   -work on nutritional status    7/4 - cortisol level normal. Pt had low oral temp today and rectal temp obtained and normal. Advised nursing to confirm hypothermia with rectal temp if issue reoccurs      Pressure injury of sacral region, unstageable  Present on arrival with surrounding erythema and slough covering  -wound care consulted and following.  Discussed with them today.  Would like wound care physician to see for debridement cultures.  -continue to follow wound care orders   -turn Pt q.2 hours    Type 2 diabetes mellitus, with long-term current use of insulin  Patient's FSGs are controlled on current medication regimen.  Last A1c reviewed-   Lab Results   Component Value Date    HGBA1C 10.2 (H) 06/05/2023     Current correctional scale  Low  Decrease anti-hyperglycemic dose as follows-   Antihyperglycemics (From admission, onward)    Start     Stop Route Frequency Ordered    06/26/23 1052  insulin aspart U-100 pen 0-5 Units         -- SubQ Before meals & nightly PRN 06/26/23 0952    06/25/23 0800  insulin detemir U-100 (Levemir) pen 9 Units         -- SubQ Daily 06/25/23 0757        Hold Oral hypoglycemics while patient is in the  hospital.    Hyponatremia  stable  Monitor sodium level.  Likely due to renal failure.    Sodium   Date Value Ref Range Status   07/04/2023 131 (L) 136 - 145 mmol/L Final   07/03/2023 133 (L) 136 - 145 mmol/L Final   07/03/2023 133 (L) 136 - 145 mmol/L Final           ATN (acute tubular necrosis)  Patient with acute kidney injury likely due to acute tubular necrosis MYLES is currently stable. Labs reviewed- Renal function/electrolytes with Estimated Creatinine Clearance: 20.5 mL/min (A) (based on SCr of 2.7 mg/dL (H)). according to latest data. Monitor urine output and serial BMP and adjust therapy as needed. Avoid nephrotoxins and renally dose meds for GFR listed above.   -Nephrology following: appreciate recs  -Cr stagnant at 2.7  -last received IV Lasix on 06/29  -Follow the BMP  -she has diffuse anasarca and hypoalbuminemia.  I discussed with Nephrology.  They are hesitant to diurese in light of lower blood pressures, concern for occult infection.  We will follow closely and can diurese if/when appropriate    Encephalopathy, metabolic  Due to septic shock, malnutrition and FTT  Not at baseline per family - prior to hospitalization pt was more independent and mentation was stable  Will cont to monitor closely. Family at BS today and encouraged to have other family and friends visit with pt to improve mentation.     Anemia, chronic disease  Patient's anemia is currently controlled.  Received one unit blood earlier in admission.. Etiology likely d/t chronic disease  Current CBC reviewed-   Lab Results   Component Value Date    HGB 8.4 (L) 07/02/2023    HCT 25.1 (L) 07/02/2023     Monitor serial CBC and transfuse if patient becomes hemodynamically unstable, symptomatic or H/H drops below 7/21.         UTI (urinary tract infection)  She completed a 7 day course of antifungal urine grew yeast.  ID following          Acute osteomyelitis of left calcaneus  This is an established diagnosis.  She was followed closely by ID  at [facility] [place] [facility] and D/C on ciprofloxacin which she has completed and Ancef with end date of 07/17.  -today we escalated Abx widening to cefepime and vanc given hypothermia, continued leukocytosis with concern for occult infection   -id has been consulted call appreciate recommendations  -eventually plan for discharge back to LTAC for continued antibiotic therapy as originally outlined      Chronic ulcer of great toe of left foot with fat layer exposed  Wound care following  -continue to follow wound care orders   -does not appear acutely infected      Chronic ulcer of left heel with fat layer exposed  Wound care following  -continue to follow and care orders  -does not appear acutely infected      Closed displaced pilon fracture of left tibia  - Patient to be non weight bearing to left lower extremity x 3 months from surgery date on 6/6.   - Ortho to remove ex fix I 4-6 weeks but if construct fails then would have to convert to ring fixator as per Ortho.   -She was recommended ASA BID until Aug 30- currently on lovenox.      PAD (peripheral artery disease)  high grade stenosis SFA bilaterally and popliteal on the left s/p baloon angioplasty L popliteal artery and left posterior tibial artery 4/2023 on DAPT  -H&H is stable without signs of bleeding  -resume the ASA/plavix.      VTE Risk Mitigation (From admission, onward)         Ordered     enoxaparin injection 30 mg  Every 24 hours         06/27/23 1405     IP VTE HIGH RISK PATIENT  Once         06/27/23 1404     Place sequential compression device  Until discontinued         06/24/23 0432                Discharge Planning   CITLALI: 7/3/2023     Code Status: Full Code   Is the patient medically ready for discharge?:     Reason for patient still in hospital (select all that apply): Laboratory test, Treatment and Consult recommendations  Discharge Plan A: Long-term acute care facility (LTAC)   Discharge Delays: None known at this time              Zo Alonso,  NP  Department of Hospital Medicine   Former[facility]

## 2023-07-04 NOTE — CARE UPDATE
07/03/23 9650   Patient Assessment/Suction   Level of Consciousness (AVPU) alert   Respiratory Effort Normal;Unlabored   Expansion/Accessory Muscles/Retractions no use of accessory muscles   PRE-TX-O2   Device (Oxygen Therapy) room air   SpO2 95 %   Pulse Oximetry Type Intermittent   $ Pulse Oximetry - Multiple Charge Pulse Oximetry - Multiple   Pulse 68   Resp 18   Respiratory Evaluation   $ Care Plan Tech Time 15 min

## 2023-07-04 NOTE — PROGRESS NOTES
Pt's bear hugger turned off at 1105, pt stated she felt hot.  Oral temp checked at 1136: 93.4. NP Zo notified. Rectal temp performed at 1219: 98.9. NP updated. bear hugger remains off at this time

## 2023-07-04 NOTE — ASSESSMENT & PLAN NOTE
Pt with recurrent hypothermia with episodes on 06/30, then again overnight   -thyroid tests were within normal limits at that time, will add T3, T4   -check cortisol level in a.m.   -broaden infectious workup, repeat cultures, check CXR, procalcitonin.  Id is involved   -work on nutritional status    7/4 - cortisol level normal. Pt had low oral temp today and rectal temp obtained and normal. Advised nursing to confirm hypothermia with rectal temp if issue reoccurs

## 2023-07-04 NOTE — ASSESSMENT & PLAN NOTE
Patient with new onset AFib this admission with RVR- rates upwards 200s.  -Required cardioversion: now NSR  -On oral amiodarone  -was recommended to start eliquis once H/H stabilized (required transfusion at start of admit).  -This has not yet been started- will discuss with cards tomorrow as she is on ASA/plavix per vascular surgery after recent vascular intervention (April).

## 2023-07-04 NOTE — ASSESSMENT & PLAN NOTE
Due to septic shock, malnutrition and FTT  Not at baseline per family - prior to hospitalization pt was more independent and mentation was stable  Will cont to monitor closely. Family at BS today and encouraged to have other family and friends visit with pt to improve mentation.

## 2023-07-04 NOTE — ASSESSMENT & PLAN NOTE
Nutrition consulted. Most recent weight and BMI monitored-     Measurements:  Wt Readings from Last 1 Encounters:   06/29/23 93.5 kg (206 lb 2.1 oz)   Body mass index is 36.51 kg/m².    Patient has been screened and assessed by RD.    Malnutrition Type:  Context:    Level:      Malnutrition Characteristic Summary:       Interventions/Recommendations (treatment strategy):  Continue current 1500 kcal Diabetic/Dysphagia Soft diet s tolerated and encourage intake.     She is not eating.  As pre-albumin 11, albumin today 1.3   -has diffuse third-spacing   -she needs protein for wound healing   -cont Clinimix until TPN starts tonight.

## 2023-07-04 NOTE — ASSESSMENT & PLAN NOTE
stable  Monitor sodium level.  Likely due to renal failure.    Sodium   Date Value Ref Range Status   07/04/2023 131 (L) 136 - 145 mmol/L Final   07/03/2023 133 (L) 136 - 145 mmol/L Final   07/03/2023 133 (L) 136 - 145 mmol/L Final

## 2023-07-04 NOTE — ASSESSMENT & PLAN NOTE
Recommend continue dressing changes per wound care, recommend santyl followed by aquacel ag    gordon and glucerna

## 2023-07-04 NOTE — PLAN OF CARE
AAOx4. + cough, not productive, instructed pt on IS use, only getting ~300 mL on IS. Pt quiet and flat. Not eating. NP discussed ' failure to thrive' with pt and pt's. On waffle mattress, turning Q2H. Pt does not appear motivated to eat or work with therapy, staff encouraging pt throughout shift. ML and R IR in place. Pt weeping, especially from LUE. Changing absorptive pad frequently. Pt near nursing station.  not OOB today.        Problem: Infection  Goal: Absence of Infection Signs and Symptoms  Outcome: Ongoing, Progressing     Problem: Adult Inpatient Plan of Care  Goal: Plan of Care Review  Outcome: Ongoing, Progressing  Goal: Absence of Hospital-Acquired Illness or Injury  Outcome: Ongoing, Progressing  Goal: Optimal Comfort and Wellbeing  Outcome: Ongoing, Progressing

## 2023-07-04 NOTE — PROGRESS NOTES
PARENTERAL NUTRITION PROGRESS NOTE:      Parenteral Nutrition Day #           Infusion Rate and Frequency: 50       PN Composition:   78 grams Amino Acid, 118 grams Dextrose, and 40 grams Lipid.    Today's TPN provides 1113 kcal.  Pt has clinimix running. Will start TPN at 2000. RD to monitor NS, labs and weight.    *Dextrose is started at less than full dextrose goal to reduce risk for Refeeding Syndrome. Dextrose content will be advanced as appropriate over the next few days.    Please see order for electrolytes and additives content and adjustments.   *The Nutrition Support Team will continue to monitor electrolytes daily and adjust parenteral nutrition as warranted.

## 2023-07-04 NOTE — SUBJECTIVE & OBJECTIVE
Interval History:  Notes reviewed, no acute events overnight.  Patient remains minimally motivated and not eating.  TPN will start today.  I had a very detailed discussion with patient and her son and daughter-in-law at bedside in regards to patient experiencing failure to thrive.  Patient voiced she wants to try to get better.  Pt denies pain or acute complaints and states she just feels generally weak and no appetite. Will start appetite stimulant today.  I informed patient that if she does not improve over the next week the next step will be to consider hospice for failure to thrive and chronic illnesses.  Patient verbalized understanding.  Family at bedside also updated and agreeable with plan of care.  Will continue to monitor closely.    Review of Systems   Constitutional:  Positive for activity change, appetite change and fatigue. Negative for chills and fever.   HENT:  Negative for congestion, sore throat and trouble swallowing.    Respiratory:  Negative for cough and shortness of breath.    Cardiovascular:  Negative for chest pain.   Gastrointestinal:  Positive for nausea. Negative for abdominal pain, diarrhea and vomiting.   Musculoskeletal:  Positive for arthralgias and gait problem. Negative for back pain.   Skin:  Positive for wound. Negative for color change, pallor and rash.   Neurological:  Negative for dizziness, weakness and light-headedness.   Psychiatric/Behavioral:  Negative for agitation, behavioral problems and confusion.    All other systems reviewed and are negative.  Objective:     Vital Signs (Most Recent):  Temp: 98.9 °F (37.2 °C) (07/04/23 1219)  Pulse: 75 (07/04/23 1135)  Resp: 17 (07/04/23 1135)  BP: 136/72 (07/04/23 1135)  SpO2: (!) 93 % (07/04/23 1135) Vital Signs (24h Range):  Temp:  [93.4 °F (34.1 °C)-98.9 °F (37.2 °C)] 98.9 °F (37.2 °C)  Pulse:  [60-76] 75  Resp:  [17-18] 17  SpO2:  [92 %-95 %] 93 %  BP: (109-136)/(58-74) 136/72     Weight: 93.5 kg (206 lb 2.1 oz)  Body mass index  is 36.51 kg/m².    Intake/Output Summary (Last 24 hours) at 7/4/2023 1441  Last data filed at 7/4/2023 1318  Gross per 24 hour   Intake 1539.55 ml   Output 725 ml   Net 814.55 ml           Physical Exam  Vitals and nursing note reviewed.   Constitutional:       General: She is not in acute distress.     Appearance: She is well-developed. She is ill-appearing (chronically ill appearing). She is not diaphoretic.   HENT:      Head: Normocephalic and atraumatic.      Right Ear: External ear normal.      Left Ear: External ear normal.      Nose: Nose normal. No congestion or rhinorrhea.      Mouth/Throat:      Mouth: Mucous membranes are moist.      Pharynx: Oropharynx is clear. No oropharyngeal exudate or posterior oropharyngeal erythema.   Eyes:      General: No scleral icterus.     Extraocular Movements: Extraocular movements intact.      Conjunctiva/sclera: Conjunctivae normal.      Pupils: Pupils are equal, round, and reactive to light.   Neck:      Vascular: No JVD.   Cardiovascular:      Rate and Rhythm: Normal rate and regular rhythm.      Pulses: Normal pulses.      Heart sounds: Normal heart sounds. No murmur heard.     Comments: Right IJ dressing without erythema or drainage at site noted.  Pulmonary:      Effort: Pulmonary effort is normal. No respiratory distress.      Breath sounds: Normal breath sounds. No stridor. No wheezing, rhonchi or rales.      Comments: congested cough with thick, clear sputum  Abdominal:      General: Abdomen is flat. Bowel sounds are normal. There is no distension.      Palpations: Abdomen is soft.      Tenderness: There is no abdominal tenderness.   Musculoskeletal:         General: No swelling or tenderness. Normal range of motion.      Cervical back: Normal range of motion and neck supple.      Comments: +anasarca  LEFT leg with external fixator, no overt drainage noted   Skin:     General: Skin is warm and dry.      Capillary Refill: Capillary refill takes less than 2 seconds.       Coloration: Skin is not jaundiced or pale.      Findings: No erythema.      Comments: Left Heel wound, left great toe wound evaluated-see photos   -sacral wound present - see wound care pics   Neurological:      General: No focal deficit present.      Mental Status: She is alert. She is disoriented.      Cranial Nerves: No cranial nerve deficit.      Sensory: No sensory deficit.      Comments: Oriented to self and situation. Disoriented to place, year of birth, president.    Psychiatric:         Mood and Affect: Mood normal.         Behavior: Behavior normal.      Comments: Patient with flat affect and withdrawn.  She does interact intermittently with me and family at bedside but very minimally conversive.  Patient is very poorly motivated but appears to want to try to improve.                     Significant Labs: All pertinent labs within the past 24 hours have been reviewed.  CBC:   Recent Labs   Lab 07/03/23 0433 07/04/23 0430   WBC 16.14* 15.79*   HGB 8.3* 7.7*   HCT 25.3* 22.9*    353       CMP:   Recent Labs   Lab 07/03/23 0433 07/04/23 0430   *  133* 131*   K 4.8  4.8 4.9   CL 99  99 98   CO2 21*  21* 23   *  149* 225*   BUN 70*  70* 76*   CREATININE 2.7*  2.7* 2.9*   CALCIUM 7.6*  7.6* 7.4*   PROT 4.4*  --    ALBUMIN 1.3*  --    BILITOT 0.9  --    ALKPHOS 90  --    AST 15  --    ALT <5*  --    ANIONGAP 13  13 10       Lab Results   Component Value Date    TSH 2.250 06/30/2023       Microbiology Results (last 7 days)       Procedure Component Value Units Date/Time    Blood culture [[id number]] Collected: 06/30/23 0613    Order Status: Completed Specimen: Blood Updated: 07/04/23 1032     Blood Culture, Routine No Growth to date      No Growth to date      No Growth to date      No Growth to date      No Growth to date    Narrative:      Collection has been rescheduled by LND at 06/30/2023 06:11 Reason:   Completed  Collection has been rescheduled by LND at 06/30/2023 06:11  Reason:   Completed    Blood culture [[id number]] Collected: 07/03/23 1741    Order Status: Completed Specimen: Blood Updated: 07/04/23 0317     Blood Culture, Routine No Growth to date    Blood culture [[id number]] Collected: 07/03/23 1741    Order Status: Completed Specimen: Blood Updated: 07/04/23 0317     Blood Culture, Routine No Growth to date    Blood culture [[id number]]     Order Status: Canceled Specimen: Blood     Blood culture [[id number]]     Order Status: Canceled Specimen: Blood     Culture, Respiratory with Gram Stain [[id number]]  (Abnormal) Collected: 06/24/23 1245    Order Status: Completed Specimen: Respiratory from Sputum Updated: 07/01/23 0628     Respiratory Culture PRESUMPTIVE CANDIDA ALBICANS  Moderate        BALDOMERO TROPICALIS  Moderate       Gram Stain (Respiratory) Many WBC's     Gram Stain (Respiratory) >10epis/lfp and <than many WBC's     Gram Stain (Respiratory) Few yeast    IV catheter culture [[id number]] Collected: 06/25/23 1723    Order Status: Completed Specimen: Catheter Tip, PICC Updated: 06/30/23 0827     Aerobic Culture - Cath tip No growth    Blood culture x two cultures. Draw prior to antibiotics. [[id number]] Collected: 06/24/23 0209    Order Status: Completed Specimen: Blood from Peripheral, Forearm, Left Updated: 06/29/23 0232     Blood Culture, Routine No growth after 5 days.    Narrative:      Aerobic and anaerobic    Blood culture x two cultures. Draw prior to antibiotics. [[id number]] Collected: 06/24/23 0209    Order Status: Completed Specimen: Blood from Peripheral, Hand, Left Updated: 06/29/23 0232     Blood Culture, Routine No growth after 5 days.    Narrative:      Aerobic and anaerobic    Clostridium difficile EIA [[id number]] Collected: 06/28/23 1547    Order Status: Completed Specimen: Stool Updated: 06/28/23 1714     C. diff Antigen Negative     C difficile Toxins A+B, EIA Negative     Comment: Testing not recommended for children <24 months old.       Urine culture  [[id number]]  (Abnormal) Collected: 06/24/23 0208    Order Status: Completed Specimen: Urine Updated: 06/28/23 1015     Urine Culture, Routine CANDIDA TROPICALIS  > 100,000 cfu/ml  Treatment of asymptomatic candiduria is not recommended (except for   specific populations). Candida isolated in the urine typically   represents colonization. If an indwelling urinary catheter is present  it should be removed or replaced.      Narrative:      Specimen Source->Urine              Significant Imaging: I have reviewed all pertinent imaging results/findings within the past 24 hours.

## 2023-07-04 NOTE — ASSESSMENT & PLAN NOTE
- Patient to be non weight bearing to left lower extremity x 3 months from surgery date on 6/6.   - Ortho to remove ex fix I 4-6 weeks but if construct fails then would have to convert to ring fixator as per Ortho.   -She was recommended ASA BID until Aug 30- currently on lovenox.

## 2023-07-04 NOTE — HPI
72 y/o F with a past medical history significant for DM2, HTN, femur fracture, tibial fracture and tobacco use, L foot wounds,  high grade stenosis SFA bilaterally and popliteal on the left s/p baloon angioplasty L popliteal artery and left posterior tibial arery on DAPT.   Recently discharged from [facility] for fracture of left tibia, s/p ORIF 6/6, also finished antibiotics for acute osteomyelitis of left calcaneus.    She has multiple chronic wounds left 1st toe, left heel, currently being treated by wound care at an alternate facility.     Patient has been on multiple courses of recent antibiotics.

## 2023-07-04 NOTE — PROGRESS NOTES
"                                                                                     NAME:   Anastasiia Badillo                AGE:  73 y.o.  ..............................                .............................              Visit ID:   [id number]                  SEX:    female  ...........................  Med Rec:  [id number]                   Ht: 5' 3" (1.6 m)    WEIGHT: 93.5 kg (206 lb 2.1 oz)    Recent Labs   Lab 07/04/23  0430   BUN 76*   CREATININE 2.9*   WBC 15.79*      Estimated Creatinine Clearance: 19 mL/min (A) (based on SCr of 2.9 mg/dL (H)).,   Vancomycin Administrations:  vancomycin given in the last 96 hours                     vancomycin 1.5 g in dextrose 5 % 250 mL IVPB (ready to mix) (mg) 1,500 mg New Bag 07/03/23 1824                  Drug levels (last 2 results):  Recent Labs   Lab Result Units 04/20/23  1144 04/21/23  1202 07/04/23  1708   Vancomycin, Random ug/mL  --   --  19.7   Vancomycin-Trough ug/mL 14.2 17.1  --      Plan:  Schedule next random level for tomorrow, 7/5/23 with morning labs.    " [place] office was closed but was able to leave a message stating that provider would sign the death certificate.   Carol Holguin RN

## 2023-07-04 NOTE — PROGRESS NOTES
INPATIENT NEPHROLOGY PROGRESS NOTE  [facility] NEPHROLOGY    Anastasiia A Justino  07/04/2023    Reason for consultation:  Acute kidney injury    Chief Complaint:   Chief Complaint   Patient presents with    Hypotension     Sent from [facility] for eval of low blood pressure.       History of Present Illness:    Per H and P    72 y/o F with a past medical history significant for DM2, HTN, femur fracture, tibial fracture and tobacco use, L foot wounds,  high grade stenosis SFA bilaterally and popliteal on the left s/p baloon angioplasty L popliteal artery and left posterior tibial arery on DAPT. [place] [facility] [place], s/p ORIF 6/6, also finished antibiotics for acute osteomyelitis of left calcaneus ( Final ID recs with end date for cipro of 6/22 and end date of ancef for 7/17 ). Patient sent from rehab for hypotension. In the ER was in septic shock, e/o UTI and anemic.     6/25  Pt states she feels nauseated and weak.  No sob.  Now bowel complaints.  Denies pain.  Somewhat confused but engages when prompted.  On phenylephrine vasopressor support.  275 cc uop  6/26  acidosis improving, renal same.  525cc UOP recorded.  Will cont IVF but cut rate to 75, f/u labs in am.    6/27  UOP 625cc, on hood, VSS.  Renal function a little worse, acidosis better, hypokalemic-got repletion.  Stopped bicarb gtt, switch to NS.  6/28 SBP , back on hood, on RA, UOP 300cc from holley- asked nurse to flush holley, no diarrhea reported  6/29 VSS, on RA, UOP 425cc, off hood, off NS IVFs, getting IV lasix today  6/30 to SNF today, UOP 450cc  7/1 VSS, UOP 1L  7/2 VSS, on RA, UOP 1.2L  7/3 VSS, no new complains, Ok to go to LTAC when ready.  7/4 VSS. Input from ID appreciated and agree with plan.    Plan of Care:     MYLES 2/2 ATN in setting of urosepsis with shock or unresolved/new osteomyelitis  CKD stage 3, baseline sCr 1.1  - renal function is abnormal but stable  - no nsaids or IV contrast  - dose  meds for CrCl < 20  - no acute RRT needs  - appreciate ID input, agree with plan, broadened abx.    Hypotension  - continue midodrine  - ok with PRN diuretics  - optimize nutrition to reduce third spacing    Hyponatremia  Hypokalemia, Hypomagnesemia, Hypocalcemia  Metabolic acidosis--non-gap  - monitor electrolytes and replete as needed  - phos normal    Anemia  - iron stores low- stool + blood  - s/p pRBC this admission  - so far stable    Renal cyst--complicated cyst   - imaging reviewed- normal kidneys, no hydro     Thank you for allowing us to participate in this patient's care. We will continue to follow.    Vital Signs:  Temp Readings from Last 3 Encounters:   07/04/23 97.9 °F (36.6 °C) (Oral)   06/14/23 97.6 °F (36.4 °C) (Oral)   06/05/23 98.9 °F (37.2 °C)       Pulse Readings from Last 3 Encounters:   07/04/23 76   06/14/23 87   06/05/23 84       BP Readings from Last 3 Encounters:   07/04/23 (!) 128/58   06/14/23 (!) 141/65   06/05/23 122/67       Weight:  Wt Readings from Last 3 Encounters:   06/29/23 93.5 kg (206 lb 2.1 oz)   06/25/23 72.6 kg (160 lb)   06/08/23 72.9 kg (160 lb 11.5 oz)     Medications:  No current facility-administered medications on file prior to encounter.     Current Outpatient Medications on File Prior to Encounter   Medication Sig Dispense Refill    acetaminophen (TYLENOL) 500 MG tablet Take 2 tablets (1,000 mg total) by mouth every 8 (eight) hours.  0    aspirin (ECOTRIN) 81 MG EC tablet Take 1 tablet (81 mg total) by mouth 2 (two) times a day. - end date august 30, 2023  0    atorvastatin (LIPITOR) 80 MG tablet Take 1 tablet (80 mg total) by mouth every evening. 90 tablet 3    blood sugar diagnostic Strp To check BG two times daily, to use with insurance preferred meter 200 each 1    blood-glucose meter kit To check BG two times daily, to use with insurance preferred meter 1 each 1    ciprofloxacin HCl (CIPRO) 750 MG tablet Take 1 tablet (750 mg total) by mouth every 12 (twelve)  "hours. End date 6/22/23      clopidogreL (PLAVIX) 75 mg tablet Take 1 tablet (75 mg total) by mouth once daily. 30 tablet 3    dextrose 5 % in water (D5W) 5 % PgBk 50 mL with ceFAZolin 2 gram SolR 2 g Inject 2 g into the vein every 8 (eight) hours. End date 7/17/23  0    famotidine (PEPCID) 20 MG tablet Take 1 tablet (20 mg total) by mouth 2 (two) times daily as needed (Heartburn).      insulin aspart U-100 (NOVOLOG) 100 unit/mL (3 mL) InPn pen Inject 0-5 Units into the skin before meals and at bedtime as needed (Hyperglycemia).  0    insulin aspart U-100 (NOVOLOG) 100 unit/mL (3 mL) InPn pen Inject 6 Units into the skin 3 (three) times daily with meals.  0    insulin detemir U-100, Levemir, 100 unit/mL (3 mL) SubQ InPn pen Inject 18 Units into the skin once daily.  0    lancets [facility] To check BG two times daily, to use with insurance preferred meter 200 each 1    lisinopriL (PRINIVIL,ZESTRIL) 5 MG tablet Take 1 tablet (5 mg total) by mouth once daily. 90 tablet 3    melatonin (MELATIN) 3 mg tablet Take 3 tablets (9 mg total) by mouth nightly.  0    methocarbamoL (ROBAXIN) 500 MG Tab Take 1 tablet (500 mg total) by mouth 4 (four) times daily. 40 tablet 0    ondansetron (ZOFRAN-ODT) 4 MG TbDL Take 1 tablet (4 mg total) by mouth every 8 (eight) hours as needed (nausea).      oxyCODONE (ROXICODONE) 10 mg Tab immediate release tablet Take 1 tablet (10 mg total) by mouth every 4 (four) hours as needed (pain sclae 7-10). 10 tablet 0    oxyCODONE (ROXICODONE) 5 MG immediate release tablet Take 1 tablet (5 mg total) by mouth every 4 (four) hours as needed (pain scale 4-6). 10 tablet 0    pen needle, diabetic (PEN NEEDLE) 31 gauge x 3/16" Ndle 1 application by Misc.(Non-Drug; Combo Route) route 2 (two) times a day. 200 each 0    polyethylene glycol (GLYCOLAX) 17 gram PwPk Take 17 g by mouth once daily.  0    pregabalin (LYRICA) 75 MG capsule Take 1 capsule (75 mg total) by mouth 2 (two) times daily. 60 capsule 0    " "senna-docusate 8.6-50 mg (PERICOLACE) 8.6-50 mg per tablet Take 1 tablet by mouth once daily.      vitamin D (VITAMIN D3) 1000 units Tab Take 1 tablet (1,000 Units total) by mouth once daily.       Scheduled Meds:   amiodarone  200 mg Oral Daily    aspirin  81 mg Oral Daily    ceFEPime (MAXIPIME) IVPB  2 g Intravenous Q24H    clopidogreL  75 mg Oral Daily    clotrimazole  10 mg Oral TID    collagenase   Topical (Top) Daily    enoxparin  30 mg Subcutaneous Q24H (prophylaxis, 1700)    insulin detemir U-100  6 Units Subcutaneous Daily    midodrine  5 mg Oral TID WM     Continuous Infusions:   amino acids 4.25%-rduws-Kv-H8R 75 mL/hr at 07/04/23 0800       PRN Meds:.dextrose 50%, dextrose 50%, glucagon (human recombinant), glucose, glucose, HYDROcodone-acetaminophen, insulin aspart U-100, magnesium oxide, magnesium oxide, naloxone, ondansetron, potassium bicarbonate, potassium bicarbonate, potassium bicarbonate, potassium, sodium phosphates, potassium, sodium phosphates, potassium, sodium phosphates, sodium chloride 0.9%, Pharmacy to dose Vancomycin consult **AND** vancomycin - pharmacy to dose    Review of Systems:  Neg    Physical Exam:    BP (!) 128/58   Pulse 76   Temp 97.9 °F (36.6 °C) (Oral)   Resp 17   Ht 5' 3" (1.6 m)   Wt 93.5 kg (206 lb 2.1 oz)   SpO2 (!) 93%   BMI 36.51 kg/m²     General Appearance:    Ill appearing   Head:    Normocephalic, without obvious abnormality, atraumatic   Eyes:    PER, conjunctiva/corneas clear, EOM's intact in both eyes        Throat:   Lips, mucosa, and tongue normal; teeth and gums normal   Back:     Symmetric, no curvature, ROM normal, no CVA tenderness   Lungs:     Clear to auscultation bilaterally, respirations unlabored   Chest wall:    No tenderness or deformity   Heart:    Regular rate and rhythm, S1 and S2 normal, no murmur, rub   or gallop   Abdomen:     Soft, non-tender, bowel sounds active all four quadrants,     no masses, no organomegaly   Extremities:   " Edematous    Pulses:   2+ and symmetric all extremities   MSK:   No joint or muscle swelling, tenderness or deformity   Skin:   Skin color, texture, turgor normal, no rashes or lesions   Neurologic:   CNII-XII intact, normal strength and sensation       Throughout.  No flap     Results:  Recent Labs   Lab 07/02/23  0404 07/03/23  0433 07/04/23  0430   * 133*  133* 131*   K 4.2 4.8  4.8 4.9   CL 99 99  99 98   CO2 26 21*  21* 23   BUN 72* 70*  70* 76*   CREATININE 2.7* 2.7*  2.7* 2.9*   GLU 58* 149*  149* 225*       Recent Labs   Lab 06/28/23  0150 06/28/23  0152 06/29/23  0308 06/30/23  0613 07/02/23  0404 07/03/23  0433 07/04/23  0430   CALCIUM 6.8*  --  7.0*   < > 7.6* 7.6*  7.6* 7.4*   ALBUMIN  --   --   --   --   --  1.3*  --    PHOS  --  3.7  --   --   --   --   --    MG 1.4*  --  1.6  --   --   --   --     < > = values in this interval not displayed.             No results for input(s): POCTGLUCOSE in the last 168 hours.    Recent Labs   Lab 02/17/23  1415 04/21/23  0429 06/05/23 1957   Hemoglobin A1C 8.9 H 8.2 H 10.2 H       Recent Labs   Lab 07/02/23  0404 07/03/23  0433 07/04/23  0430   WBC 18.08* 16.14* 15.79*   HGB 8.4* 8.3* 7.7*   HCT 25.1* 25.3* 22.9*    312 353   MCV 86 87 86   MCHC 33.5 32.8 33.6   MONO 4.0  0.7 3.3*  0.5 4.3  0.7       Recent Labs   Lab 07/03/23  0433   BILITOT 0.9   PROT 4.4*   ALBUMIN 1.3*   ALKPHOS 90   ALT <5*   AST 15       Recent Labs   Lab 11/04/20  1028 07/08/21  1215 02/17/23  1424 06/24/23  0208   Color, UA YELLOW DARK YELLOW [place] A   Appearance, UA CLOUDY A TURBID A Hazy A Cloudy A   pH, UA < OR = 5.0 < OR = 5.0 5.0 6.0   Specific [place], UA 1.028 1.024 1.010 1.015   Protein, UA 1+ A 2+ A Negative 2+ A   Glucose, UA  --   --  3+ A Negative   Ketones, UA 1+ A 1+ A 3+ A Negative   Urobilinogen, UA  --   --  Negative Negative   Bilirubin (UA)  --   --  Negative Negative   Occult Blood UA 3+ A 3+ A 2+ A 2+ A   Nitrite, UA NEGATIVE NEGATIVE  Positive A Negative   RBC, UA  --   --  6 H 1   WBC, UA  --   --  25 H 23 H   Bacteria, UA MANY A MANY A  --   --    Bacteria  --   --  Many A Negative   Hyaline Casts, UA NONE SEEN NONE SEEN  --  19 A       Recent Labs   Lab 06/24/23  1800 06/24/23 2011 06/25/23  0905   POC PH 7.197 LL 7.297 L 7.394   POC PCO2 24.7 LL 23.4 LL 26.2 LL   POC HCO3 9.6 L 11.4 L 16.0 L   POC PO2 86 82 88   POC SATURATED O2 94 L 95 97   POC BE -19 -15 -9   Sample ARTERIAL ARTERIAL ARTERIAL       Microbiology Results (last 7 days)       Procedure Component Value Units Date/Time    Blood culture [[id number]] Collected: 06/30/23 0613    Order Status: Completed Specimen: Blood Updated: 07/04/23 1032     Blood Culture, Routine No Growth to date      No Growth to date      No Growth to date      No Growth to date      No Growth to date    Narrative:      Collection has been rescheduled by LND at 06/30/2023 06:11 Reason:   Completed  Collection has been rescheduled by LND at 06/30/2023 06:11 Reason:   Completed    Blood culture [[id number]] Collected: 07/03/23 1741    Order Status: Completed Specimen: Blood Updated: 07/04/23 0317     Blood Culture, Routine No Growth to date    Blood culture [[id number]] Collected: 07/03/23 1741    Order Status: Completed Specimen: Blood Updated: 07/04/23 0317     Blood Culture, Routine No Growth to date    Blood culture [[id number]]     Order Status: Canceled Specimen: Blood     Blood culture [[id number]]     Order Status: Canceled Specimen: Blood     Culture, Respiratory with Gram Stain [[id number]]  (Abnormal) Collected: 06/24/23 1245    Order Status: Completed Specimen: Respiratory from Sputum Updated: 07/01/23 0628     Respiratory Culture PRESUMPTIVE CANDIDA ALBICANS  Moderate        BALDOMERO TROPICALIS  Moderate       Gram Stain (Respiratory) Many WBC's     Gram Stain (Respiratory) >10epis/lfp and <than many WBC's     Gram Stain (Respiratory) Few yeast    IV catheter culture [[id number]] Collected: 06/25/23 1723     Order Status: Completed Specimen: Catheter Tip, PICC Updated: 06/30/23 0827     Aerobic Culture - Cath tip No growth    Blood culture x two cultures. Draw prior to antibiotics. [[id number]] Collected: 06/24/23 0209    Order Status: Completed Specimen: Blood from Peripheral, Forearm, Left Updated: 06/29/23 0232     Blood Culture, Routine No growth after 5 days.    Narrative:      Aerobic and anaerobic    Blood culture x two cultures. Draw prior to antibiotics. [[id number]] Collected: 06/24/23 0209    Order Status: Completed Specimen: Blood from Peripheral, Hand, Left Updated: 06/29/23 0232     Blood Culture, Routine No growth after 5 days.    Narrative:      Aerobic and anaerobic    Clostridium difficile EIA [[id number]] Collected: 06/28/23 1547    Order Status: Completed Specimen: Stool Updated: 06/28/23 1714     C. diff Antigen Negative     C difficile Toxins A+B, EIA Negative     Comment: Testing not recommended for children <24 months old.       Urine culture [[id number]]  (Abnormal) Collected: 06/24/23 0208    Order Status: Completed Specimen: Urine Updated: 06/28/23 1015     Urine Culture, Routine CANDIDA TROPICALIS  > 100,000 cfu/ml  Treatment of asymptomatic candiduria is not recommended (except for   specific populations). Candida isolated in the urine typically   represents colonization. If an indwelling urinary catheter is present  it should be removed or replaced.      Narrative:      Specimen Source->Urine          Patient care was time spent personally by me on the following activities: >  min    Obtaining a history  Examination of patient.  Providing medical care at the patients bedside.  Developing a treatment plan with patient or surrogate and bedside caregivers  Ordering and reviewing laboratory studies, radiographic studies, pulse oximetry.  Ordering and performing treatments and interventions.  Evaluation of patient's response to treatment.  Discussions with consultants while on the unit and  immediately available to the patient.  Re-evaluation of the patient's condition.  Documentation in the medical record.     Shravan Thomas MD  Nephrology  [facility] [place]  [phone]

## 2023-07-05 LAB
ABO + RH BLD: NORMAL
ANION GAP SERPL CALC-SCNC: 6 MMOL/L (ref 8–16)
BACTERIA BLD CULT: NORMAL
BASOPHILS # BLD AUTO: 0.03 K/UL (ref 0–0.2)
BASOPHILS NFR BLD: 0.2 % (ref 0–1.9)
BLD GP AB SCN CELLS X3 SERPL QL: NORMAL
BLD PROD TYP BPU: NORMAL
BLOOD UNIT EXPIRATION DATE: NORMAL
BLOOD UNIT TYPE CODE: 5100
BLOOD UNIT TYPE: NORMAL
BUN SERPL-MCNC: 83 MG/DL (ref 8–23)
CALCIUM SERPL-MCNC: 7.4 MG/DL (ref 8.7–10.5)
CHLORIDE SERPL-SCNC: 98 MMOL/L (ref 95–110)
CO2 SERPL-SCNC: 25 MMOL/L (ref 23–29)
CODING SYSTEM: NORMAL
CREAT SERPL-MCNC: 2.6 MG/DL (ref 0.5–1.4)
CROSSMATCH INTERPRETATION: NORMAL
DIFFERENTIAL METHOD: ABNORMAL
DISPENSE STATUS: NORMAL
EOSINOPHIL # BLD AUTO: 0.1 K/UL (ref 0–0.5)
EOSINOPHIL NFR BLD: 0.9 % (ref 0–8)
ERYTHROCYTE [DISTWIDTH] IN BLOOD BY AUTOMATED COUNT: 17.5 % (ref 11.5–14.5)
EST. GFR  (NO RACE VARIABLE): 19 ML/MIN/1.73 M^2
FERRITIN SERPL-MCNC: 707 NG/ML (ref 20–300)
GLUCOSE SERPL-MCNC: 292 MG/DL (ref 70–110)
GLUCOSE SERPL-MCNC: 323 MG/DL (ref 70–110)
GLUCOSE SERPL-MCNC: 358 MG/DL (ref 70–110)
GLUCOSE SERPL-MCNC: 384 MG/DL (ref 70–110)
GLUCOSE SERPL-MCNC: 393 MG/DL (ref 70–110)
HCT VFR BLD AUTO: 21.7 % (ref 37–48.5)
HCT VFR BLD AUTO: 24.9 % (ref 37–48.5)
HGB BLD-MCNC: 7.1 G/DL (ref 12–16)
HGB BLD-MCNC: 8.3 G/DL (ref 12–16)
IMM GRANULOCYTES # BLD AUTO: 0.36 K/UL (ref 0–0.04)
IMM GRANULOCYTES NFR BLD AUTO: 2.6 % (ref 0–0.5)
IRON SERPL-MCNC: 58 UG/DL (ref 30–160)
LYMPHOCYTES # BLD AUTO: 0.9 K/UL (ref 1–4.8)
LYMPHOCYTES NFR BLD: 6.8 % (ref 18–48)
MAGNESIUM SERPL-MCNC: 1.7 MG/DL (ref 1.6–2.6)
MCH RBC QN AUTO: 28.5 PG (ref 27–31)
MCHC RBC AUTO-ENTMCNC: 32.7 G/DL (ref 32–36)
MCV RBC AUTO: 87 FL (ref 82–98)
MONOCYTES # BLD AUTO: 0.7 K/UL (ref 0.3–1)
MONOCYTES NFR BLD: 4.9 % (ref 4–15)
NEUTROPHILS # BLD AUTO: 11.8 K/UL (ref 1.8–7.7)
NEUTROPHILS NFR BLD: 84.6 % (ref 38–73)
NRBC BLD-RTO: 0 /100 WBC
NUM UNITS TRANS PACKED RBC: NORMAL
PHOSPHATE SERPL-MCNC: 4.9 MG/DL (ref 2.7–4.5)
PLATELET # BLD AUTO: 341 K/UL (ref 150–450)
PMV BLD AUTO: 11.8 FL (ref 9.2–12.9)
POTASSIUM SERPL-SCNC: 4.9 MMOL/L (ref 3.5–5.1)
RBC # BLD AUTO: 2.49 M/UL (ref 4–5.4)
SATURATED IRON: 44 % (ref 20–50)
SODIUM SERPL-SCNC: 129 MMOL/L (ref 136–145)
SPECIMEN OUTDATE: NORMAL
TOTAL IRON BINDING CAPACITY: 133 UG/DL (ref 250–450)
TRANSFERRIN SERPL-MCNC: 95 MG/DL (ref 200–375)
TRANSFERRIN SERPL-MCNC: 95 MG/DL (ref 200–375)
WBC # BLD AUTO: 13.89 K/UL (ref 3.9–12.7)

## 2023-07-05 PROCEDURE — 86900 BLOOD TYPING SEROLOGIC ABO: CPT | Performed by: NURSE PRACTITIONER

## 2023-07-05 PROCEDURE — 83735 ASSAY OF MAGNESIUM: CPT | Performed by: HOSPITALIST

## 2023-07-05 PROCEDURE — P9016 RBC LEUKOCYTES REDUCED: HCPCS | Performed by: NURSE PRACTITIONER

## 2023-07-05 PROCEDURE — 84466 ASSAY OF TRANSFERRIN: CPT | Performed by: NURSE PRACTITIONER

## 2023-07-05 PROCEDURE — 85025 COMPLETE CBC W/AUTO DIFF WBC: CPT | Performed by: HOSPITALIST

## 2023-07-05 PROCEDURE — 99233 PR SUBSEQUENT HOSPITAL CARE,LEVL III: ICD-10-PCS | Mod: ,,, | Performed by: INTERNAL MEDICINE

## 2023-07-05 PROCEDURE — A4217 STERILE WATER/SALINE, 500 ML: HCPCS | Performed by: HOSPITALIST

## 2023-07-05 PROCEDURE — 80048 BASIC METABOLIC PNL TOTAL CA: CPT | Performed by: HOSPITALIST

## 2023-07-05 PROCEDURE — 82728 ASSAY OF FERRITIN: CPT | Performed by: NURSE PRACTITIONER

## 2023-07-05 PROCEDURE — 97535 SELF CARE MNGMENT TRAINING: CPT

## 2023-07-05 PROCEDURE — 25000003 PHARM REV CODE 250: Performed by: NURSE PRACTITIONER

## 2023-07-05 PROCEDURE — 63600175 PHARM REV CODE 636 W HCPCS: Performed by: HOSPITALIST

## 2023-07-05 PROCEDURE — 25000003 PHARM REV CODE 250: Performed by: HOSPITALIST

## 2023-07-05 PROCEDURE — 12000002 HC ACUTE/MED SURGE SEMI-PRIVATE ROOM

## 2023-07-05 PROCEDURE — 85018 HEMOGLOBIN: CPT | Performed by: HOSPITALIST

## 2023-07-05 PROCEDURE — B4185 PARENTERAL SOL 10 GM LIPIDS: HCPCS | Performed by: HOSPITALIST

## 2023-07-05 PROCEDURE — 99233 SBSQ HOSP IP/OBS HIGH 50: CPT | Mod: ,,, | Performed by: INTERNAL MEDICINE

## 2023-07-05 PROCEDURE — 63600175 PHARM REV CODE 636 W HCPCS: Performed by: NURSE PRACTITIONER

## 2023-07-05 PROCEDURE — 25000003 PHARM REV CODE 250: Performed by: INTERNAL MEDICINE

## 2023-07-05 PROCEDURE — 87070 CULTURE OTHR SPECIMN AEROBIC: CPT | Performed by: NURSE PRACTITIONER

## 2023-07-05 PROCEDURE — 85014 HEMATOCRIT: CPT | Performed by: HOSPITALIST

## 2023-07-05 PROCEDURE — 97530 THERAPEUTIC ACTIVITIES: CPT | Mod: CQ

## 2023-07-05 PROCEDURE — 86920 COMPATIBILITY TEST SPIN: CPT | Performed by: NURSE PRACTITIONER

## 2023-07-05 PROCEDURE — 87147 CULTURE TYPE IMMUNOLOGIC: CPT | Performed by: NURSE PRACTITIONER

## 2023-07-05 PROCEDURE — 84100 ASSAY OF PHOSPHORUS: CPT | Performed by: HOSPITALIST

## 2023-07-05 RX ORDER — HYDROCODONE BITARTRATE AND ACETAMINOPHEN 500; 5 MG/1; MG/1
TABLET ORAL
Status: DISCONTINUED | OUTPATIENT
Start: 2023-07-05 | End: 2023-07-07 | Stop reason: HOSPADM

## 2023-07-05 RX ORDER — INSULIN ASPART 100 [IU]/ML
1-10 INJECTION, SOLUTION INTRAVENOUS; SUBCUTANEOUS
Status: DISCONTINUED | OUTPATIENT
Start: 2023-07-05 | End: 2023-07-07 | Stop reason: HOSPADM

## 2023-07-05 RX ORDER — GLUCAGON 1 MG
1 KIT INJECTION
Status: DISCONTINUED | OUTPATIENT
Start: 2023-07-05 | End: 2023-07-07 | Stop reason: HOSPADM

## 2023-07-05 RX ORDER — IBUPROFEN 200 MG
24 TABLET ORAL
Status: DISCONTINUED | OUTPATIENT
Start: 2023-07-05 | End: 2023-07-07 | Stop reason: HOSPADM

## 2023-07-05 RX ORDER — IBUPROFEN 200 MG
16 TABLET ORAL
Status: DISCONTINUED | OUTPATIENT
Start: 2023-07-05 | End: 2023-07-07 | Stop reason: HOSPADM

## 2023-07-05 RX ADMIN — INSULIN ASPART 10 UNITS: 100 INJECTION, SOLUTION INTRAVENOUS; SUBCUTANEOUS at 06:07

## 2023-07-05 RX ADMIN — CLOTRIMAZOLE 10 MG: 10 LOZENGE ORAL; TOPICAL at 09:07

## 2023-07-05 RX ADMIN — ERYTHROPOIETIN 10000 UNITS: 10000 INJECTION, SOLUTION INTRAVENOUS; SUBCUTANEOUS at 02:07

## 2023-07-05 RX ADMIN — CEFEPIME HYDROCHLORIDE 2 G: 2 INJECTION, POWDER, FOR SOLUTION INTRAVENOUS at 05:07

## 2023-07-05 RX ADMIN — INSULIN ASPART 5 UNITS: 100 INJECTION, SOLUTION INTRAVENOUS; SUBCUTANEOUS at 08:07

## 2023-07-05 RX ADMIN — CLOPIDOGREL BISULFATE 75 MG: 75 TABLET, FILM COATED ORAL at 08:07

## 2023-07-05 RX ADMIN — AMIODARONE HYDROCHLORIDE 200 MG: 200 TABLET ORAL at 08:07

## 2023-07-05 RX ADMIN — ASPIRIN 81 MG 81 MG: 81 TABLET ORAL at 08:07

## 2023-07-05 RX ADMIN — INSULIN ASPART 3 UNITS: 100 INJECTION, SOLUTION INTRAVENOUS; SUBCUTANEOUS at 09:07

## 2023-07-05 RX ADMIN — CLOTRIMAZOLE 10 MG: 10 LOZENGE ORAL; TOPICAL at 08:07

## 2023-07-05 RX ADMIN — CLOTRIMAZOLE 10 MG: 10 LOZENGE ORAL; TOPICAL at 05:07

## 2023-07-05 RX ADMIN — APIXABAN 2.5 MG: 2.5 TABLET, FILM COATED ORAL at 09:07

## 2023-07-05 RX ADMIN — COLLAGENASE SANTYL: 250 OINTMENT TOPICAL at 08:07

## 2023-07-05 RX ADMIN — INSULIN ASPART 10 UNITS: 100 INJECTION, SOLUTION INTRAVENOUS; SUBCUTANEOUS at 02:07

## 2023-07-05 RX ADMIN — DRONABINOL 2.5 MG: 2.5 CAPSULE ORAL at 09:07

## 2023-07-05 RX ADMIN — MIDODRINE HYDROCHLORIDE 5 MG: 2.5 TABLET ORAL at 05:07

## 2023-07-05 RX ADMIN — I.V. FAT EMULSION 250 ML: 20 EMULSION INTRAVENOUS at 09:07

## 2023-07-05 RX ADMIN — DRONABINOL 2.5 MG: 2.5 CAPSULE ORAL at 08:07

## 2023-07-05 RX ADMIN — HYDROCODONE BITARTRATE AND ACETAMINOPHEN 1 TABLET: 5; 325 TABLET ORAL at 10:07

## 2023-07-05 RX ADMIN — MAGNESIUM SULFATE HEPTAHYDRATE: 500 INJECTION, SOLUTION INTRAMUSCULAR; INTRAVENOUS at 09:07

## 2023-07-05 NOTE — PROGRESS NOTES
[facility] Medicine  Progress Note    Patient Name: Anastasiia Badillo  MRN: [id number]  Patient Class: IP- Inpatient   Admission Date: 6/24/2023  Length of Stay: 11 days  Attending Physician: Criss Beck MD  Primary Care Provider: Raji Parkinson MD        Subjective:     Principal Problem:Septic shock        HPI:  72 y/o F with a past medical history significant for DM2, HTN, femur fracture, tibial fracture and tobacco use, L foot wounds,  high grade stenosis SFA bilaterally and popliteal on the left s/p baloon angioplasty L popliteal artery and left posterior tibial arery on DAPT.      Recently discharged from [facility] [place] for fracture of left tibia, s/p ORIF 6/6, also finished antibiotics for acute osteomyelitis of left calcaneus ( Final ID recs with end date for cipro of 6/22 and end date of ancef for 7/17 )     Patient sent from rehab for hypotension.     In the ER was in septic shock, e/o UTI and anemic.       Overview/Hospital Course:  Anastasiia Badillo was admitted to the service of hospital medicine for further treatment of septic shock suspected to be 2/2 UTI.  Prior to admission, she was being treated at LTAC for acute osteomyelitis of left calcaneus ( Final ID recs with end date for cipro of 6/22 and end date of ancef for 7/17 ), with hx fracture of left tibia, s/p ORIF 6/6.  She was placed on IV vancomycin, IV cefepime, and IVFH.  She was hypotensive initially requiring vasopressors and was admitted to ICU.  She was given one unit PRBCs for symptomatic anemia.  She developed atrial fibrillation with HR up to 200 and was placed on IV amiodarone.  Cardiology was consulted and pt was ultimately cardioverted at bedside.  IV levophed was changed to IV hood-synephrine.  She was noted to have profound metabolic acidosis, possibly diabetic ketoacidosis, and was placed on IV insulin.  Additionally, she developed an MYLES and nephrology was consulted.  Sodium bicarb drip was recommended.  She  eventually improved, with IV vasopressors weaned and placed on oral midodrine and oral amiodarone.  PT/OT were consulted.  Urine culture growing yeast.  Placed on IV diflucan, IV ancef continued due to drainage from external fixator.  Wound care consulted.  Pt had stagnant renal function.  She had continued decline in her WBC count, however she developed hypothermia.  Abx were broadened and infectious workup was pursued.  Id was consulted.  There was concern for possible cellulitis surrounding her sacral wound-we reached out to wound care physician to have this debrided.  Her oral intake was poor and protein levels very low.  I discussed with dietitian and we initiated Clinimix.      No new subjective & objective note has been filed under this hospital service since the last note was generated.      Assessment/Plan:      PAF (paroxysmal atrial fibrillation)  Patient with new onset AFib this admission with RVR- rates upwards 200s.  -Required cardioversion: now NSR  -On oral amiodarone  -was recommended to start eliquis once H/H stabilized (required transfusion at start of admit).  -eliquis has not yet been started- will discuss with cards as she is on ASA/plavix per vascular surgery after recent vascular intervention (April).    7/5 - d/w cardiology, will stop asa and lovenox and start eliquis today, cont plavix for antiplatelet therapy (balloon angioplasty with DCB in April)    Moderate protein-calorie malnutrition  Nutrition consulted. Most recent weight and BMI monitored-     Measurements:  Wt Readings from Last 1 Encounters:   06/29/23 93.5 kg (206 lb 2.1 oz)   Body mass index is 36.51 kg/m².    Patient has been screened and assessed by RD.    Malnutrition Type:  Context:    Level:      Malnutrition Characteristic Summary:       Interventions/Recommendations (treatment strategy):  Continue current 1500 kcal Diabetic/Dysphagia Soft diet s tolerated and encourage intake.     She is not eating.  As pre-albumin 11,  albumin today 1.3   -has diffuse third-spacing   -she needs protein for wound healing   -cont Clinimix until TPN starts tonight.     Hypothermia  Pt with recurrent hypothermia with episodes on 06/30, then again overnight   -thyroid tests were within normal limits at that time, will add T3, T4   -check cortisol level in a.m.   -broaden infectious workup, repeat cultures, check CXR, procalcitonin.  Id is involved   -work on nutritional status    7/4 - cortisol level normal. Pt had low oral temp today and rectal temp obtained and normal. Advised nursing to confirm hypothermia with rectal temp if issue reoccurs    7/5 - no further hypothermia      Pressure injury of sacral region, unstageable  Present on arrival with surrounding erythema and slough covering  -wound care consulted and following.  Discussed with them today.  Would like wound care physician to see for debridement cultures.  -continue to follow wound care orders   -turn Pt q.2 hours    Type 2 diabetes mellitus, with long-term current use of insulin  Patient's FSGs are controlled on current medication regimen.  Last A1c reviewed-   Lab Results   Component Value Date    HGBA1C 10.2 (H) 06/05/2023     Current correctional scale  Low  Decrease anti-hyperglycemic dose as follows-   Antihyperglycemics (From admission, onward)    Start     Stop Route Frequency Ordered    06/26/23 1052  insulin aspart U-100 pen 0-5 Units         -- SubQ Before meals & nightly PRN 06/26/23 0952    06/25/23 0800  insulin detemir U-100 (Levemir) pen 9 Units         -- SubQ Daily 06/25/23 0757        Hold Oral hypoglycemics while patient is in the hospital.    Hyponatremia  stable  Monitor sodium level.  Likely due to renal failure.  Nephrology following    Sodium   Date Value Ref Range Status   07/05/2023 129 (L) 136 - 145 mmol/L Final   07/04/2023 131 (L) 136 - 145 mmol/L Final   07/03/2023 133 (L) 136 - 145 mmol/L Final   07/03/2023 133 (L) 136 - 145 mmol/L Final           ATN  (acute tubular necrosis)  Patient with acute kidney injury likely due to acute tubular necrosis MYLES is currently stable. Labs reviewed- Renal function/electrolytes with Estimated Creatinine Clearance: 20.5 mL/min (A) (based on SCr of 2.7 mg/dL (H)). according to latest data. Monitor urine output and serial BMP and adjust therapy as needed. Avoid nephrotoxins and renally dose meds for GFR listed above.   -Nephrology following: appreciate recs  -Cr stagnant at 2.7  -last received IV Lasix on 06/29  -Follow the BMP  -she has diffuse anasarca and hypoalbuminemia.  I discussed with Nephrology.  They are hesitant to diurese in light of lower blood pressures, concern for occult infection.  We will follow closely and can diurese if/when appropriate    Encephalopathy, metabolic  Due to septic shock, malnutrition and FTT  Not at baseline per family - prior to hospitalization pt was more independent and mentation was stable  Will cont to monitor closely. Family at  today and encouraged to have other family and friends visit with pt to improve mentation.     7/5 - mentation improved today, will cont to monitor closely    Anemia, chronic disease  Patient's anemia is currently uncontrolled.  Received one unit blood earlier in admission.. Etiology likely d/t chronic disease  Current CBC reviewed-   Lab Results   Component Value Date    HGB 7.1 (L) 07/05/2023    HCT 21.7 (L) 07/05/2023     Monitor serial CBC and transfuse if patient becomes hemodynamically unstable, symptomatic or H/H drops below 7/21.     Will transfuse 1 unit PRBC today.     UTI (urinary tract infection)  She completed a 7 day course of antifungal urine grew yeast.  ID following          Acute osteomyelitis of left calcaneus  This is an established diagnosis.  She [place] [facility] [place] ciprofloxacin which she has completed and Ancef with end date of 07/17.  -today we escalated Abx widening to cefepime and vanc given hypothermia, continued  leukocytosis with concern for occult infection   -id has been consulted call appreciate recommendations  -eventually plan for discharge back to LTAC for continued antibiotic therapy as originally outlined      Chronic ulcer of great toe of left foot with fat layer exposed  Wound care following  -continue to follow wound care orders   -does not appear acutely infected      Chronic ulcer of left heel with fat layer exposed  Wound care following  -continue to follow and care orders  -does not appear acutely infected      Closed displaced pilon fracture of left tibia  - Patient to be non weight bearing to left lower extremity x 3 months from surgery date on 6/6.   - Ortho to remove ex fix I 4-6 weeks but if construct fails then would have to convert to ring fixator as per Ortho.   -She was recommended ASA BID until Aug 30- currently on lovenox.      PAD (peripheral artery disease)  high grade stenosis SFA bilaterally and popliteal on the left s/p baloon angioplasty L popliteal artery and left posterior tibial artery 4/2023 on DAPT  -H&H is stable without signs of bleeding  -resume the ASA/plavix.    7/5 - stopping asa and lovenox and starting eliquis today for PAF and VTE prevention. Cont plavix for antiplatelet therapy      VTE Risk Mitigation (From admission, onward)         Ordered     apixaban tablet 2.5 mg  2 times daily         07/05/23 1404     IP VTE HIGH RISK PATIENT  Once         06/27/23 1404     Place sequential compression device  Until discontinued         06/24/23 0432                Discharge Planning   CITLALI: 7/7/2023     Code Status: Full Code   Is the patient medically ready for discharge?:     Reason for patient still in hospital (select all that apply): Patient trending condition and Treatment  Discharge Plan A: Long-term acute care facility (LTAC)   Discharge Delays: None known at this time              Zo Alonso NP  Department of Hospital Medicine   [facility]

## 2023-07-05 NOTE — PROGRESS NOTES
INPATIENT NEPHROLOGY PROGRESS NOTE  [facility] NEPHROLOGY    Anastasiia A Justino  07/05/2023    Reason for consultation:  Acute kidney injury    Chief Complaint:   Chief Complaint   Patient presents with    Hypotension     Sent from [facility] for eval of low blood pressure.       History of Present Illness:    Per H and P    72 y/o F with a past medical history significant for DM2, HTN, femur fracture, tibial fracture and tobacco use, L foot wounds,  high grade stenosis SFA bilaterally and popliteal on the left s/p baloon angioplasty L popliteal artery and left posterior tibial arery on DAPT. Recently discharged from [facility] for fracture of left tibia, s/p ORIF 6/6, also finished antibiotics for acute osteomyelitis of left calcaneus ( Final ID recs with end date for cipro of 6/22 and end date of ancef for 7/17 ). Patient sent from rehab for hypotension. In the ER was in septic shock, e/o UTI and anemic.     6/25  Pt states she feels nauseated and weak.  No sob.  Now bowel complaints.  Denies pain.  Somewhat confused but engages when prompted.  On phenylephrine vasopressor support.  275 cc uop  6/26  acidosis improving, renal same.  525cc UOP recorded.  Will cont IVF but cut rate to 75, f/u labs in am.    6/27  UOP 625cc, on hood, VSS.  Renal function a little worse, acidosis better, hypokalemic-got repletion.  Stopped bicarb gtt, switch to NS.  6/28 SBP , back on hood, on RA, UOP 300cc from holley- asked nurse to flush holley, no diarrhea reported  6/29 VSS, on RA, UOP 425cc, off hood, off NS IVFs, getting IV lasix today  6/30 to SNF today, UOP 450cc  7/1 VSS, UOP 1L  7/2 VSS, on RA, UOP 1.2L  7/3 VSS, no new complains, Ok to go to LTAC when ready.  7/4 VSS. Input from ID appreciated and agree with plan.  7/5 VSS. Agree with blood transfusion to keep Hgb > 8, add Epoetin.    Plan of Care:     MYLES 2/2 ATN in setting of urosepsis with shock or unresolved/new osteomyelitis  CKD stage 3, baseline sCr 1.1  - renal  function is abnormal but stable  - no nsaids or IV contrast  - dose meds for CrCl < 20  - no acute RRT needs  - appreciate ID input, agree with plan, broadened abx.    Hypotension  - continue midodrine  - ok with PRN diuretics  - optimize nutrition to reduce third spacing    Hyponatremia  Hypokalemia, Hypomagnesemia, Hypocalcemia  Metabolic acidosis--non-gap  - monitor electrolytes and replete as needed  - phos normal    Anemia  - tool + blood, iron stores acceptable  - s/p pRBC this admission, agree with more to keep Hgb > 8  - will start Epoetin 10K TID on 7/5.    Renal cyst--complicated cyst   - imaging reviewed- normal kidneys, no hydro     Thank you for allowing us to participate in this patient's care. We will continue to follow.    Vital Signs:  Temp Readings from Last 3 Encounters:   07/05/23 97.8 °F (36.6 °C) (Oral)   06/14/23 97.6 °F (36.4 °C) (Oral)   06/05/23 98.9 °F (37.2 °C)       Pulse Readings from Last 3 Encounters:   07/05/23 65   06/14/23 87   06/05/23 84       BP Readings from Last 3 Encounters:   07/05/23 137/66   06/14/23 (!) 141/65   06/05/23 122/67       Weight:  Wt Readings from Last 3 Encounters:   06/29/23 93.5 kg (206 lb 2.1 oz)   06/25/23 72.6 kg (160 lb)   06/08/23 72.9 kg (160 lb 11.5 oz)     Medications:  No current facility-administered medications on file prior to encounter.     Current Outpatient Medications on File Prior to Encounter   Medication Sig Dispense Refill    acetaminophen (TYLENOL) 500 MG tablet Take 2 tablets (1,000 mg total) by mouth every 8 (eight) hours.  0    aspirin (ECOTRIN) 81 MG EC tablet Take 1 tablet (81 mg total) by mouth 2 (two) times a day. - end date august 30, 2023  0    atorvastatin (LIPITOR) 80 MG tablet Take 1 tablet (80 mg total) by mouth every evening. 90 tablet 3    blood sugar diagnostic Strp To check BG two times daily, to use with insurance preferred meter 200 each 1    blood-glucose meter kit To check BG two times daily, to use with insurance  "preferred meter 1 each 1    ciprofloxacin HCl (CIPRO) 750 MG tablet Take 1 tablet (750 mg total) by mouth every 12 (twelve) hours. End date 6/22/23      clopidogreL (PLAVIX) 75 mg tablet Take 1 tablet (75 mg total) by mouth once daily. 30 tablet 3    dextrose 5 % in water (D5W) 5 % PgBk 50 mL with ceFAZolin 2 gram SolR 2 g Inject 2 g into the vein every 8 (eight) hours. End date 7/17/23  0    famotidine (PEPCID) 20 MG tablet Take 1 tablet (20 mg total) by mouth 2 (two) times daily as needed (Heartburn).      insulin aspart U-100 (NOVOLOG) 100 unit/mL (3 mL) InPn pen Inject 0-5 Units into the skin before meals and at bedtime as needed (Hyperglycemia).  0    insulin aspart U-100 (NOVOLOG) 100 unit/mL (3 mL) InPn pen Inject 6 Units into the skin 3 (three) times daily with meals.  0    insulin detemir U-100, Levemir, 100 unit/mL (3 mL) SubQ InPn pen Inject 18 Units into the skin once daily.  0    lancets Misc To check BG two times daily, to use with insurance preferred meter 200 each 1    lisinopriL (PRINIVIL,ZESTRIL) 5 MG tablet Take 1 tablet (5 mg total) by mouth once daily. 90 tablet 3    melatonin (MELATIN) 3 mg tablet Take 3 tablets (9 mg total) by mouth nightly.  0    methocarbamoL (ROBAXIN) 500 MG Tab Take 1 tablet (500 mg total) by mouth 4 (four) times daily. 40 tablet 0    ondansetron (ZOFRAN-ODT) 4 MG TbDL Take 1 tablet (4 mg total) by mouth every 8 (eight) hours as needed (nausea).      oxyCODONE (ROXICODONE) 10 mg Tab immediate release tablet Take 1 tablet (10 mg total) by mouth every 4 (four) hours as needed (pain sclae 7-10). 10 tablet 0    oxyCODONE (ROXICODONE) 5 MG immediate release tablet Take 1 tablet (5 mg total) by mouth every 4 (four) hours as needed (pain scale 4-6). 10 tablet 0    pen needle, diabetic (PEN NEEDLE) 31 gauge x 3/16" Ndle 1 application by Misc.(Non-Drug; Combo Route) route 2 (two) times a day. 200 each 0    polyethylene glycol (GLYCOLAX) 17 gram PwPk Take 17 g by mouth once daily.  " "0    pregabalin (LYRICA) 75 MG capsule Take 1 capsule (75 mg total) by mouth 2 (two) times daily. 60 capsule 0    senna-docusate 8.6-50 mg (PERICOLACE) 8.6-50 mg per tablet Take 1 tablet by mouth once daily.      vitamin D (VITAMIN D3) 1000 units Tab Take 1 tablet (1,000 Units total) by mouth once daily.       Scheduled Meds:   amiodarone  200 mg Oral Daily    aspirin  81 mg Oral Daily    ceFEPime (MAXIPIME) IVPB  2 g Intravenous Q24H    clopidogreL  75 mg Oral Daily    clotrimazole  10 mg Oral TID    collagenase   Topical (Top) Daily    droNABinol  2.5 mg Oral BID    enoxparin  30 mg Subcutaneous Q24H (prophylaxis, 1700)    epoetin juan f-epbx  10,000 Units Subcutaneous Every Mon, Wed, Fri    fat emulsion 20%  200 mL Intravenous Daily    insulin detemir U-100  6 Units Subcutaneous Daily    midodrine  5 mg Oral TID WM     Continuous Infusions:   TPN ADULT CENTRAL LINE CUSTOM 50 mL/hr at 07/04/23 2123       PRN Meds:.dextrose 50%, dextrose 50%, glucagon (human recombinant), glucose, glucose, HYDROcodone-acetaminophen, insulin aspart U-100, magnesium oxide, magnesium oxide, naloxone, ondansetron, potassium bicarbonate, potassium bicarbonate, potassium bicarbonate, potassium, sodium phosphates, potassium, sodium phosphates, potassium, sodium phosphates, sodium chloride 0.9%    Review of Systems:  Neg    Physical Exam:    /66   Pulse 65   Temp 97.8 °F (36.6 °C) (Oral)   Resp 18   Ht 5' 3" (1.6 m)   Wt 93.5 kg (206 lb 2.1 oz)   SpO2 95%   BMI 36.51 kg/m²     General Appearance:    Ill appearing   Head:    Normocephalic, without obvious abnormality, atraumatic   Eyes:    PER, conjunctiva/corneas clear, EOM's intact in both eyes        Throat:   Lips, mucosa, and tongue normal; teeth and gums normal   Back:     Symmetric, no curvature, ROM normal, no CVA tenderness   Lungs:     Clear to auscultation bilaterally, respirations unlabored   Chest wall:    No tenderness or deformity   Heart:    Regular rate and " rhythm, S1 and S2 normal, no murmur, rub   or gallop   Abdomen:     Soft, non-tender, bowel sounds active all four quadrants,     no masses, no organomegaly   Extremities:   Edematous    Pulses:   2+ and symmetric all extremities   MSK:   No joint or muscle swelling, tenderness or deformity   Skin:   Skin color, texture, turgor normal, no rashes or lesions   Neurologic:   CNII-XII intact, normal strength and sensation       Throughout.  No flap     Results:  Recent Labs   Lab 07/03/23 0433 07/04/23  0430 07/05/23  0424   *  133* 131* 129*   K 4.8  4.8 4.9 4.9   CL 99  99 98 98   CO2 21*  21* 23 25   BUN 70*  70* 76* 83*   CREATININE 2.7*  2.7* 2.9* 2.6*   *  149* 225* 323*         Recent Labs   Lab 06/29/23  0308 06/30/23  0613 07/03/23 0433 07/04/23  0430 07/05/23  0424   CALCIUM 7.0*   < > 7.6*  7.6* 7.4* 7.4*   ALBUMIN  --   --  1.3*  --   --    MG 1.6  --   --   --   --     < > = values in this interval not displayed.               No results for input(s): POCTGLUCOSE in the last 168 hours.    Recent Labs   Lab 02/17/23  1415 04/21/23  0429 06/05/23 1957   Hemoglobin A1C 8.9 H 8.2 H 10.2 H         Recent Labs   Lab 07/03/23 0433 07/04/23  0430 07/05/23  0424   WBC 16.14* 15.79* 13.89*   HGB 8.3* 7.7* 7.1*   HCT 25.3* 22.9* 21.7*    353 341   MCV 87 86 87   MCHC 32.8 33.6 32.7   MONO 3.3*  0.5 4.3  0.7 4.9  0.7         Recent Labs   Lab 07/03/23  0433   BILITOT 0.9   PROT 4.4*   ALBUMIN 1.3*   ALKPHOS 90   ALT <5*   AST 15         Recent Labs   Lab 11/04/20  1028 07/08/21  1215 02/17/23  1424 06/24/23  0208   Color, UA YELLOW DARK YELLOW Yellow [place] A   Appearance, UA CLOUDY A TURBID A Hazy A Cloudy A   pH, UA < OR = 5.0 < OR = 5.0 5.0 6.0   Specific [place], UA 1.028 1.024 1.010 1.015   Protein, UA 1+ A 2+ A Negative 2+ A   Glucose, UA  --   --  3+ A Negative   Ketones, UA 1+ A 1+ A 3+ A Negative   Urobilinogen, UA  --   --  Negative Negative   Bilirubin (UA)  --   --   Negative Negative   Occult Blood UA 3+ A 3+ A 2+ A 2+ A   Nitrite, UA NEGATIVE NEGATIVE Positive A Negative   RBC, UA  --   --  6 H 1   WBC, UA  --   --  25 H 23 H   Bacteria, UA MANY A MANY A  --   --    Bacteria  --   --  Many A Negative   Hyaline Casts, UA NONE SEEN NONE SEEN  --  19 A         Recent Labs   Lab 06/24/23  1800 06/24/23 2011 06/25/23  0905   POC PH 7.197 LL 7.297 L 7.394   POC PCO2 24.7 LL 23.4 LL 26.2 LL   POC HCO3 9.6 L 11.4 L 16.0 L   POC PO2 86 82 88   POC SATURATED O2 94 L 95 97   POC BE -19 -15 -9   Sample ARTERIAL ARTERIAL ARTERIAL         Microbiology Results (last 7 days)       Procedure Component Value Units Date/Time    Blood culture [[id number]] Collected: 07/03/23 1741    Order Status: Completed Specimen: Blood Updated: 07/04/23 2232     Blood Culture, Routine No Growth to date      No Growth to date    Blood culture [[id number]] Collected: 07/03/23 1741    Order Status: Completed Specimen: Blood Updated: 07/04/23 2232     Blood Culture, Routine No Growth to date      No Growth to date    Blood culture [[id number]] Collected: 06/30/23 0613    Order Status: Completed Specimen: Blood Updated: 07/04/23 1032     Blood Culture, Routine No Growth to date      No Growth to date      No Growth to date      No Growth to date      No Growth to date    Narrative:      Collection has been rescheduled by LND at 06/30/2023 06:11 Reason:   Completed  Collection has been rescheduled by LND at 06/30/2023 06:11 Reason:   Completed    Blood culture [[id number]]     Order Status: Canceled Specimen: Blood     Blood culture [[id number]]     Order Status: Canceled Specimen: Blood     Culture, Respiratory with Gram Stain [[id number]]  (Abnormal) Collected: 06/24/23 1245    Order Status: Completed Specimen: Respiratory from Sputum Updated: 07/01/23 0628     Respiratory Culture PRESUMPTIVE CANDIDA ALBICANS  Moderate        BALDOMERO TROPICALIS  Moderate       Gram Stain (Respiratory) Many WBC's     Gram Stain  (Respiratory) >10epis/lfp and <than many WBC's     Gram Stain (Respiratory) Few yeast    IV catheter culture [[id number]] Collected: 06/25/23 1723    Order Status: Completed Specimen: Catheter Tip, PICC Updated: 06/30/23 0827     Aerobic Culture - Cath tip No growth    Blood culture x two cultures. Draw prior to antibiotics. [[id number]] Collected: 06/24/23 0209    Order Status: Completed Specimen: Blood from Peripheral, Forearm, Left Updated: 06/29/23 0232     Blood Culture, Routine No growth after 5 days.    Narrative:      Aerobic and anaerobic    Blood culture x two cultures. Draw prior to antibiotics. [[id number]] Collected: 06/24/23 0209    Order Status: Completed Specimen: Blood from Peripheral, Hand, Left Updated: 06/29/23 0232     Blood Culture, Routine No growth after 5 days.    Narrative:      Aerobic and anaerobic    Clostridium difficile EIA [[id number]] Collected: 06/28/23 1547    Order Status: Completed Specimen: Stool Updated: 06/28/23 1714     C. diff Antigen Negative     C difficile Toxins A+B, EIA Negative     Comment: Testing not recommended for children <24 months old.       Urine culture [[id number]]  (Abnormal) Collected: 06/24/23 0208    Order Status: Completed Specimen: Urine Updated: 06/28/23 1015     Urine Culture, Routine CANDIDA TROPICALIS  > 100,000 cfu/ml  Treatment of asymptomatic candiduria is not recommended (except for   specific populations). Candida isolated in the urine typically   represents colonization. If an indwelling urinary catheter is present  it should be removed or replaced.      Narrative:      Specimen Source->Urine          Patient care was time spent personally by me on the following activities: >  min    Obtaining a history  Examination of patient.  Providing medical care at the patients bedside.  Developing a treatment plan with patient or surrogate and bedside caregivers  Ordering and reviewing laboratory studies, radiographic studies, pulse oximetry.  Ordering and  performing treatments and interventions.  Evaluation of patient's response to treatment.  Discussions with consultants while on the unit and immediately available to the patient.  Re-evaluation of the patient's condition.  Documentation in the medical record.     Shravan Thomas MD  Nephrology  [facility] [place]  [phone]

## 2023-07-05 NOTE — PT/OT/SLP PROGRESS
Physical Therapy Treatment    Patient Name:  Anastasiia Badillo   MRN:  [id number]    Recommendations:     Discharge Recommendations: LTACH (long-term acute care hospital)  Discharge Equipment Recommendations: to be determined by next level of care  Barriers to discharge:  assist of two persons for mobility, decreased activity tolerance, pain, low motivation, max encouragement    Assessment:     Anastasiia Badillo is a 73 y.o. female admitted with a medical diagnosis of Septic shock.  She presents with the following impairments/functional limitations: weakness, impaired endurance, impaired self care skills, impaired functional mobility, gait instability, impaired balance, impaired cognition, decreased coordination, decreased safety awareness, decreased ROM, orthopedic precautions.    Pt agreeable to visit with encouragement. Pt required max to total assist for bed mobility. Pt required mod to max assist to maintain balance sitting EOB with pt constantly trying to lie straight back requiring max encouragement to maintain sitting with pt providing minimal effort. Pt returned to bed at end of session.    Rehab Prognosis: Fair; patient would benefit from acute skilled PT services to address these deficits and reach maximum level of function.    Recent Surgery: Procedure(s) (LRB):  Cardioversion or Defibrillation (N/A) 11 Days Post-Op    Plan:     During this hospitalization, patient to be seen 3 x/week to address the identified rehab impairments via therapeutic activities, therapeutic exercises and progress toward the following goals:    Plan of Care Expires:       Subjective     Chief Complaint: back pain  Patient/Family Comments/goals: none verbalized  Pain/Comfort:  Pain Rating 1: other (see comments) (not rated)  Location - Orientation 1: generalized  Location 1: back  Pain Addressed 1: Distraction, Cessation of Activity, Reposition  Pain Rating Post-Intervention 1: other (see comments) (not rated)      Objective:      Communicated with Rn prior to session.  Patient found HOB elevated with bed alarm, telemetry (gale hugger) upon PT entry to room.     General Precautions: Standard, fall  Orthopedic Precautions: LLE non weight bearing  Braces:    Respiratory Status: Room air     Functional Mobility:  Bed Mobility:     Scooting: total assistance and of 2 persons  Supine to Sit: maximal assistance and of 2 persons  Sit to Supine: total assistance and of 2 persons  Balance: mod-maxA to maintain sitting balance EOB with pt trying to lie back down requiring max encouragement      AM-PAC 6 CLICK MOBILITY          Treatment & Education:  Pt educated on importance of time OOB, importance of intermittent mobility, safe techniques for transfers/ambulation, discharge recommendations/options, and use of call light for assistance and fall prevention.      Patient left HOB elevated with all lines intact, call button in reach, bed alarm on, and RN notified..    GOALS:   Multidisciplinary Problems       Physical Therapy Goals          Problem: Physical Therapy    Goal Priority Disciplines Outcome Goal Variances Interventions   Physical Therapy Goal     PT, PT/OT      Description: Goals to be met by: 2023     Patient will increase functional independence with mobility by performin. Supine to sit with Maximum Assistance  2. Rolling to Left and Right with Moderate Assistance.  3. Sitting at edge of bed x15  minutes with Minimal Assistance                         Time Tracking:     PT Received On: 23  PT Start Time: 0934     PT Stop Time: 0950  PT Total Time (min): 16 min     Billable Minutes: Therapeutic Activity 16    Treatment Type: Treatment  PT/PTA: PTA     Number of PTA visits since last PT visit: 3     2023

## 2023-07-05 NOTE — ASSESSMENT & PLAN NOTE
Due to septic shock, malnutrition and FTT  Not at baseline per family - prior to hospitalization pt was more independent and mentation was stable  Will cont to monitor closely. Family at BS today and encouraged to have other family and friends visit with pt to improve mentation.     7/5 - mentation improved today, will cont to monitor closely

## 2023-07-05 NOTE — ASSESSMENT & PLAN NOTE
Pt with recurrent hypothermia with episodes on 06/30, then again overnight   -thyroid tests were within normal limits at that time, will add T3, T4   -check cortisol level in a.m.   -broaden infectious workup, repeat cultures, check CXR, procalcitonin.  Id is involved   -work on nutritional status    7/4 - cortisol level normal. Pt had low oral temp today and rectal temp obtained and normal. Advised nursing to confirm hypothermia with rectal temp if issue reoccurs    7/5 - no further hypothermia

## 2023-07-05 NOTE — ASSESSMENT & PLAN NOTE
Patient's anemia is currently uncontrolled.  Received one unit blood earlier in admission.. Etiology likely d/t chronic disease  Current CBC reviewed-   Lab Results   Component Value Date    HGB 7.1 (L) 07/05/2023    HCT 21.7 (L) 07/05/2023     Monitor serial CBC and transfuse if patient becomes hemodynamically unstable, symptomatic or H/H drops below 7/21.     Will transfuse 1 unit PRBC today.

## 2023-07-05 NOTE — ASSESSMENT & PLAN NOTE
Patient with new onset AFib this admission with RVR- rates upwards 200s.  -Required cardioversion: now NSR  -On oral amiodarone  -was recommended to start eliquis once H/H stabilized (required transfusion at start of admit).  -eliquis has not yet been started- will discuss with cards as she is on ASA/plavix per vascular surgery after recent vascular intervention (April).    7/5 - d/w cardiology, will stop asa and lovenox and start eliquis today, cont plavix for antiplatelet therapy (balloon angioplasty with DCB in April)

## 2023-07-05 NOTE — PLAN OF CARE
Problem: Diabetes Comorbidity  Goal: Blood Glucose Level Within Targeted Range  Outcome: Ongoing, Progressing     Problem: Adjustment to Illness (Sepsis/Septic Shock)  Goal: Optimal Coping  Outcome: Ongoing, Progressing     Problem: Nutrition Impaired (Sepsis/Septic Shock)  Goal: Optimal Nutrition Intake  Outcome: Ongoing, Progressing

## 2023-07-05 NOTE — ASSESSMENT & PLAN NOTE
stable  Monitor sodium level.  Likely due to renal failure.  Nephrology following    Sodium   Date Value Ref Range Status   07/05/2023 129 (L) 136 - 145 mmol/L Final   07/04/2023 131 (L) 136 - 145 mmol/L Final   07/03/2023 133 (L) 136 - 145 mmol/L Final   07/03/2023 133 (L) 136 - 145 mmol/L Final

## 2023-07-05 NOTE — PT/OT/SLP PROGRESS
Occupational Therapy   Treatment    Name: Anastasiia Badillo  MRN: [id number]  Admitting Diagnosis:  Septic shock  11 Days Post-Op    Recommendations:     Discharge Recommendations: LTACH (long-term acute care hospital)  Discharge Equipment Recommendations:  none  Barriers to discharge:       Assessment:     Anastasiia Badillo is a 73 y.o. female with a medical diagnosis of Septic shock.  Patient very difficult to encourage with ADL participation and UB exercises. After max encouragement provided, patient was agreeable to performing grooming tasks in bed and UB exercises. Performance deficits affecting function are weakness, impaired endurance, impaired self care skills, impaired functional mobility, gait instability, impaired balance, decreased lower extremity function, decreased upper extremity function, decreased ROM, orthopedic precautions, edema, impaired skin.     Rehab Prognosis:  Fair; patient would benefit from acute skilled OT services to address these deficits and reach maximum level of function.       Plan:     Patient to be seen 3 x/week to address the above listed problems via self-care/home management, therapeutic activities, therapeutic exercises  Plan of Care Expires: 07/30/23  Plan of Care Reviewed with: patient    Subjective     Chief Complaint: none  Patient/Family Comments/goals: none  Pain/Comfort:  Pain Rating 1: 0/10  Pain Rating Post-Intervention 1: 0/10    Objective:     Communicated with: nurse Hastings prior to session.  Patient found HOB elevated with central line, holley catheter, peripheral IV, external fixator upon OT entry to room.    General Precautions: Standard, fall    Orthopedic Precautions:LLE non weight bearing  Braces:  (LLE External fixator)  Respiratory Status: Room air     Occupational Performance:     Activities of Daily Living:  Grooming: stand by assistance with oral and facial hygiene while seated upright in bed. Patient with decreased motivation to open hygiene wrappers  requiring much set up assistance.       [facility] 6 Click ADL:      Treatment & Education:  Patient educated on importance of participating in therapy to facilitate increased independence with ADLs and mobility.   Pt. performed B UE AROM  2 x 5 reps (with red t-band) each motion with rest breaks taken between each set.      Patient left HOB elevated with all lines intact and call button in reach    GOALS:   Multidisciplinary Problems       Occupational Therapy Goals          Problem: Occupational Therapy    Goal Priority Disciplines Outcome Interventions   Occupational Therapy Goal     OT, PT/OT     Description: Goals to be met by: 7/30/2023     Patient will increase functional independence with ADLs by performing:    UE Dressing with Minimal Assistance.  Grooming while seated with Stand-by Assistance.  Sitting at edge of bed x10 minutes with Contact Guard Assistance.  Supine to sit with Minimal Assistance.  Upper extremity exercise program x10 reps per handout, with assistance as needed.                         Time Tracking:     OT Date of Treatment: 07/05/23  OT Start Time: 0904  OT Stop Time: 0928  OT Total Time (min): 24 min    Billable Minutes:Self Care/Home Management 24    OT/DARION: OT     Number of DARION visits since last OT visit: 1    7/5/2023

## 2023-07-05 NOTE — PROGRESS NOTES
[facility]  Progress Note    Patient Name: Anastasiia Badillo  MRN: [id number]  Patient Class: IP- Inpatient   Admission Date: 6/24/2023  Length of Stay: 11 days  Attending Physician: Criss Beck MD  Primary Care Provider: Raji Parkinson MD        Subjective:     Principal Problem:Septic shock        HPI:  74 y/o F with a past medical history significant for DM2, HTN, femur fracture, tibial fracture and tobacco use, L foot wounds,  high grade stenosis SFA bilaterally and popliteal on the left s/p baloon angioplasty L popliteal artery and left posterior tibial arery on DAPT.      Recently discharged from [facility] for fracture of left tibia, s/p ORIF 6/6, also finished antibiotics for acute osteomyelitis of left calcaneus ( Final ID recs with end date for cipro of 6/22 and end date of ancef for 7/17 )     Patient sent from rehab for hypotension.     In the ER was in septic shock, e/o UTI and anemic.       Overview/Hospital Course:  Anastasiia Badillo was admitted to the service of hospital medicine for further treatment of septic shock suspected to be 2/2 UTI.  Prior to admission, she was being treated at LTAC for acute osteomyelitis of left calcaneus ( Final ID recs with end date for cipro of 6/22 and end date of ancef for 7/17 ), with hx fracture of left tibia, s/p ORIF 6/6.  She was placed on IV vancomycin, IV cefepime, and IVFH.  She was hypotensive initially requiring vasopressors and was admitted to ICU.  She was given one unit PRBCs for symptomatic anemia.  She developed atrial fibrillation with HR up to 200 and was placed on IV amiodarone.  Cardiology was consulted and pt was ultimately cardioverted at bedside.  IV levophed was changed to IV hood-synephrine.  She was noted to have profound metabolic acidosis, possibly diabetic ketoacidosis, and was placed on IV insulin.  Additionally, she developed an MYLES and nephrology was consulted.  Sodium bicarb drip was recommended.  She  eventually improved, with IV vasopressors weaned and placed on oral midodrine and oral amiodarone.  PT/OT were consulted.  Urine culture growing yeast.  Placed on IV diflucan, IV ancef continued due to drainage from external fixator.  Wound care consulted.  Pt had stagnant renal function.  She had continued decline in her WBC count, however she developed hypothermia.  Abx were broadened and infectious workup was pursued.  Id was consulted.  There was concern for possible cellulitis surrounding her sacral wound-we reached out to wound care physician to have this debrided.  Her oral intake was poor and protein levels very low.  I discussed with dietitian and we initiated Clinimix.      Interval History:  Notes reviewed, no acute events overnight.  Patient much more awake and alert today and interactive. Sister present at BS visiting with her and encouraging pt. Pt reports poor appetite due to nausea. She is tolerating marinol well. Will cont to monitor closely. CM following and working on LTAC placement.     Review of Systems   Constitutional:  Positive for activity change, appetite change and fatigue. Negative for chills and fever.   HENT:  Negative for congestion, sore throat and trouble swallowing.    Respiratory:  Negative for cough and shortness of breath.    Cardiovascular:  Negative for chest pain.   Gastrointestinal:  Positive for nausea. Negative for abdominal pain, diarrhea and vomiting.   Musculoskeletal:  Positive for arthralgias and gait problem. Negative for back pain.   Skin:  Positive for wound. Negative for color change, pallor and rash.   Neurological:  Negative for dizziness, weakness and light-headedness.   Psychiatric/Behavioral:  Negative for agitation, behavioral problems and confusion.    All other systems reviewed and are negative.  Objective:     Vital Signs (Most Recent):  Temp: 97.8 °F (36.6 °C) (07/05/23 1104)  Pulse: 65 (07/05/23 1104)  Resp: 18 (07/05/23 1104)  BP: 137/66 (07/05/23  1104)  SpO2: 95 % (07/05/23 1104) Vital Signs (24h Range):  Temp:  [97.3 °F (36.3 °C)-97.8 °F (36.6 °C)] 97.8 °F (36.6 °C)  Pulse:  [63-65] 65  Resp:  [18] 18  SpO2:  [92 %-95 %] 95 %  BP: (131-148)/(66-82) 137/66     Weight: 93.5 kg (206 lb 2.1 oz)  Body mass index is 36.51 kg/m².    Intake/Output Summary (Last 24 hours) at 7/5/2023 1302  Last data filed at 7/5/2023 0501  Gross per 24 hour   Intake 1924.63 ml   Output 700 ml   Net 1224.63 ml           Physical Exam  Vitals and nursing note reviewed.   Constitutional:       General: She is not in acute distress.     Appearance: She is well-developed. She is ill-appearing (chronically ill appearing). She is not diaphoretic.   HENT:      Head: Normocephalic and atraumatic.      Right Ear: External ear normal.      Left Ear: External ear normal.      Nose: Nose normal. No congestion or rhinorrhea.      Mouth/Throat:      Mouth: Mucous membranes are moist.      Pharynx: Oropharynx is clear. No oropharyngeal exudate or posterior oropharyngeal erythema.   Eyes:      General: No scleral icterus.     Extraocular Movements: Extraocular movements intact.      Conjunctiva/sclera: Conjunctivae normal.      Pupils: Pupils are equal, round, and reactive to light.   Neck:      Vascular: No JVD.   Cardiovascular:      Rate and Rhythm: Normal rate and regular rhythm.      Pulses: Normal pulses.      Heart sounds: Normal heart sounds. No murmur heard.     Comments: Right IJ dressing without erythema or drainage at site noted.  Pulmonary:      Effort: Pulmonary effort is normal. No respiratory distress.      Breath sounds: Normal breath sounds. No stridor. No wheezing, rhonchi or rales.      Comments: congested cough with thick, clear sputum  Abdominal:      General: Abdomen is flat. Bowel sounds are normal. There is no distension.      Palpations: Abdomen is soft.      Tenderness: There is no abdominal tenderness.   Musculoskeletal:         General: No swelling or tenderness. Normal  range of motion.      Cervical back: Normal range of motion and neck supple.      Comments: +anasarca  LEFT leg with external fixator, no overt drainage noted   Skin:     General: Skin is warm and dry.      Capillary Refill: Capillary refill takes less than 2 seconds.      Coloration: Skin is not jaundiced or pale.      Findings: No erythema.      Comments: Left Heel wound, left great toe wound evaluated-see photos   -sacral wound present - see wound care pics   Neurological:      General: No focal deficit present.      Mental Status: She is alert. She is disoriented.      Cranial Nerves: No cranial nerve deficit.      Sensory: No sensory deficit.      Comments: More oriented today. Knows she is in the hospital and able to state her full name and month and day of birth. Still confused to year of birth.    Psychiatric:         Mood and Affect: Mood normal.         Behavior: Behavior normal.      Comments: Pt in much better spirits today. More talkative and interactive.                      Significant Labs: All pertinent labs within the past 24 hours have been reviewed.  CBC:   Recent Labs   Lab 07/04/23  0430 07/05/23  0424   WBC 15.79* 13.89*   HGB 7.7* 7.1*   HCT 22.9* 21.7*    341       CMP:   Recent Labs   Lab 07/04/23  0430 07/05/23  0424   * 129*   K 4.9 4.9   CL 98 98   CO2 23 25   * 323*   BUN 76* 83*   CREATININE 2.9* 2.6*   CALCIUM 7.4* 7.4*   ANIONGAP 10 6*       Lab Results   Component Value Date    TSH 2.250 06/30/2023       Microbiology Results (last 7 days)       Procedure Component Value Units Date/Time    Aerobic culture [[id number]] Collected: 07/05/23 1213    Order Status: Sent Specimen: Wound from Toe, Left Foot Updated: 07/05/23 1222    Blood culture [[id number]] Collected: 06/30/23 0613    Order Status: Completed Specimen: Blood Updated: 07/05/23 1032     Blood Culture, Routine No growth after 5 days.    Narrative:      Collection has been rescheduled by LND at 06/30/2023  06:11 Reason:   Completed  Collection has been rescheduled by LND at 06/30/2023 06:11 Reason:   Completed    Blood culture [[id number]] Collected: 07/03/23 1741    Order Status: Completed Specimen: Blood Updated: 07/04/23 2232     Blood Culture, Routine No Growth to date      No Growth to date    Blood culture [[id number]] Collected: 07/03/23 1741    Order Status: Completed Specimen: Blood Updated: 07/04/23 2232     Blood Culture, Routine No Growth to date      No Growth to date    Blood culture [[id number]]     Order Status: Canceled Specimen: Blood     Blood culture [[id number]]     Order Status: Canceled Specimen: Blood     Culture, Respiratory with Gram Stain [[id number]]  (Abnormal) Collected: 06/24/23 1245    Order Status: Completed Specimen: Respiratory from Sputum Updated: 07/01/23 0628     Respiratory Culture PRESUMPTIVE CANDIDA ALBICANS  Moderate        BALDOMERO TROPICALIS  Moderate       Gram Stain (Respiratory) Many WBC's     Gram Stain (Respiratory) >10epis/lfp and <than many WBC's     Gram Stain (Respiratory) Few yeast    IV catheter culture [[id number]] Collected: 06/25/23 1723    Order Status: Completed Specimen: Catheter Tip, PICC Updated: 06/30/23 0827     Aerobic Culture - Cath tip No growth    Blood culture x two cultures. Draw prior to antibiotics. [[id number]] Collected: 06/24/23 0209    Order Status: Completed Specimen: Blood from Peripheral, Forearm, Left Updated: 06/29/23 0232     Blood Culture, Routine No growth after 5 days.    Narrative:      Aerobic and anaerobic    Blood culture x two cultures. Draw prior to antibiotics. [[id number]] Collected: 06/24/23 0209    Order Status: Completed Specimen: Blood from Peripheral, Hand, Left Updated: 06/29/23 0232     Blood Culture, Routine No growth after 5 days.    Narrative:      Aerobic and anaerobic    Clostridium difficile EIA [[id number]] Collected: 06/28/23 1547    Order Status: Completed Specimen: Stool Updated: 06/28/23 1714     C. diff Antigen  Negative     C difficile Toxins A+B, EIA Negative     Comment: Testing not recommended for children <24 months old.                 Significant Imaging: I have reviewed all pertinent imaging results/findings within the past 24 hours.      Assessment/Plan:      PAF (paroxysmal atrial fibrillation)  Patient with new onset AFib this admission with RVR- rates upwards 200s.  -Required cardioversion: now NSR  -On oral amiodarone  -was recommended to start eliquis once H/H stabilized (required transfusion at start of admit).  -eliquis has not yet been started- will discuss with cards as she is on ASA/plavix per vascular surgery after recent vascular intervention (April).    Moderate protein-calorie malnutrition  Nutrition consulted. Most recent weight and BMI monitored-     Measurements:  Wt Readings from Last 1 Encounters:   06/29/23 93.5 kg (206 lb 2.1 oz)   Body mass index is 36.51 kg/m².    Patient has been screened and assessed by RD.    Malnutrition Type:  Context:    Level:      Malnutrition Characteristic Summary:       Interventions/Recommendations (treatment strategy):  Continue current 1500 kcal Diabetic/Dysphagia Soft diet s tolerated and encourage intake.     She is not eating.  As pre-albumin 11, albumin today 1.3   -has diffuse third-spacing   -she needs protein for wound healing   -cont Clinimix until TPN starts tonight.     Hypothermia  Pt with recurrent hypothermia with episodes on 06/30, then again overnight   -thyroid tests were within normal limits at that time, will add T3, T4   -check cortisol level in a.m.   -broaden infectious workup, repeat cultures, check CXR, procalcitonin.  Id is involved   -work on nutritional status    7/4 - cortisol level normal. Pt had low oral temp today and rectal temp obtained and normal. Advised nursing to confirm hypothermia with rectal temp if issue reoccurs    7/5 - no further hypothermia      Pressure injury of sacral region, unstageable  Present on arrival with  surrounding erythema and slough covering  -wound care consulted and following.  Discussed with them today.  Would like wound care physician to see for debridement cultures.  -continue to follow wound care orders   -turn Pt q.2 hours    Type 2 diabetes mellitus, with long-term current use of insulin  Patient's FSGs are controlled on current medication regimen.  Last A1c reviewed-   Lab Results   Component Value Date    HGBA1C 10.2 (H) 06/05/2023     Current correctional scale  Low  Decrease anti-hyperglycemic dose as follows-   Antihyperglycemics (From admission, onward)    Start     Stop Route Frequency Ordered    06/26/23 1052  insulin aspart U-100 pen 0-5 Units         -- SubQ Before meals & nightly PRN 06/26/23 0952    06/25/23 0800  insulin detemir U-100 (Levemir) pen 9 Units         -- SubQ Daily 06/25/23 0757        Hold Oral hypoglycemics while patient is in the hospital.    Hyponatremia  stable  Monitor sodium level.  Likely due to renal failure.  Nephrology following    Sodium   Date Value Ref Range Status   07/05/2023 129 (L) 136 - 145 mmol/L Final   07/04/2023 131 (L) 136 - 145 mmol/L Final   07/03/2023 133 (L) 136 - 145 mmol/L Final   07/03/2023 133 (L) 136 - 145 mmol/L Final           ATN (acute tubular necrosis)  Patient with acute kidney injury likely due to acute tubular necrosis MYLES is currently stable. Labs reviewed- Renal function/electrolytes with Estimated Creatinine Clearance: 20.5 mL/min (A) (based on SCr of 2.7 mg/dL (H)). according to latest data. Monitor urine output and serial BMP and adjust therapy as needed. Avoid nephrotoxins and renally dose meds for GFR listed above.   -Nephrology following: appreciate recs  -Cr stagnant at 2.7  -last received IV Lasix on 06/29  -Follow the BMP  -she has diffuse anasarca and hypoalbuminemia.  I discussed with Nephrology.  They are hesitant to diurese in light of lower blood pressures, concern for occult infection.  We will follow closely and can  diurese if/when appropriate    Encephalopathy, metabolic  Due to septic shock, malnutrition and FTT  Not at baseline per family - prior to hospitalization pt was more independent and mentation was stable  Will cont to monitor closely. Family at  today and encouraged to have other family and friends visit with pt to improve mentation.     7/5 - mentation improved today, will cont to monitor closely    Anemia, chronic disease  Patient's anemia is currently uncontrolled.  Received one unit blood earlier in admission.. Etiology likely d/t chronic disease  Current CBC reviewed-   Lab Results   Component Value Date    HGB 7.1 (L) 07/05/2023    HCT 21.7 (L) 07/05/2023     Monitor serial CBC and transfuse if patient becomes hemodynamically unstable, symptomatic or H/H drops below 7/21.     Will transfuse 1 unit PRBC today.     UTI (urinary tract infection)  She completed a 7 day course of antifungal urine grew yeast.  ID following          Acute osteomyelitis of left calcaneus  This is an established diagnosis.  She was followed closely by ID at [facility] and D/C on ciprofloxacin which she has completed and Ancef with end date of 07/17.  -today we escalated Abx widening to cefepime and vanc given hypothermia, continued leukocytosis with concern for occult infection   -id has been consulted call appreciate recommendations  -eventually plan for discharge back to LTAC for continued antibiotic therapy as originally outlined      Chronic ulcer of great toe of left foot with fat layer exposed  Wound care following  -continue to follow wound care orders   -does not appear acutely infected      Chronic ulcer of left heel with fat layer exposed  Wound care following  -continue to follow and care orders  -does not appear acutely infected      Closed displaced pilon fracture of left tibia  - Patient to be non weight bearing to left lower extremity x 3 months from surgery date on 6/6.   - Ortho to remove ex fix I 4-6 weeks but if construct  fails then would have to convert to ring fixator as per Ortho.   -She was recommended ASA BID until Aug 30- currently on lovenox.      PAD (peripheral artery disease)  high grade stenosis SFA bilaterally and popliteal on the left s/p baloon angioplasty L popliteal artery and left posterior tibial artery 4/2023 on DAPT  -H&H is stable without signs of bleeding  -resume the ASA/plavix.      VTE Risk Mitigation (From admission, onward)         Ordered     enoxaparin injection 30 mg  Every 24 hours         06/27/23 1405     IP VTE HIGH RISK PATIENT  Once         06/27/23 1404     Place sequential compression device  Until discontinued         06/24/23 0432                Discharge Planning   CITLALI: 7/7/2023     Code Status: Full Code   Is the patient medically ready for discharge?:     Reason for patient still in hospital (select all that apply): Patient trending condition and Treatment  Discharge Plan A: Long-term acute care facility (LTAC)   Discharge Delays: None known at this time              Zo Alonso NP  Department of Hospital Medicine   [facility]

## 2023-07-05 NOTE — PROGRESS NOTES
Consult Note  Infectious Disease    Reason for Consult:  Leukocytosis and multiple wounds.    HPI: Anastasiia Badillo is a 73 y.o. female known to ID service, with PMHx  DM 2,  insulin-requiring , HTN, PAD S/P  baloon angioplasty L popliteal artery and left posterior tibial arery on DAPT. 04/25/2023,   smoker, femur and tibial fracture, intramedullary rodding of femur 02/18/2023., open reduction internal fixation of left pilon fx and left external fixator by Timoteo.06/06/23, GERD, obese, BMI 36, constipation, vitamin-D deficiency, chronic opiate use, right breast mass    Patient was sent from post acute Medical for evaluation of low blood pressure and confusion.    -She was considered  septic, and was started on vancomycin and cefepime, IV fluids.     -She also had diabetic ketoacidosis and received insulin infusion.   - She was noticed to have severe anemia and received PRBCs.  -- She had AFib with RVR, a flutter.  She received amiodarone and was seen by cardiologist.  Patient was cardioverted on 06/24/2023.  --  She had MYLES BUN creatinine 90/3 and nephrologist has been following.    --she had elevated clotting times which improved. INR has been ranging about 4.    In terms of ID workup so far  -- MRSA screen is negative   -- Gee catheter was placed 06/24/23 Urine culture on 24th grew Candida tropicalis and Diflucan was started on 26th.  I am not sure how the urine was collected(from  old or new cath?)  ---Chest x-ray found atelectasis, left lung base infiltrate, she is needing 2 L O2, which is not an impressive, given her anasarca and atelectasis/laying flat in bed.  Sputum culture grew Candida tropicalis and Candida albicans, I do not think they are causative organisms.  -- CT abdomen found minimal gallbladder distention, mild left hydronephrosis, anasarca, colonic diverticulosis, hyperdense cysts in the right upper kidney, postsurgical findings of left femur and bilateral pleural effusions.  She is not  tender on physical exam.  Total bilirubin has been completely normal.  Nephrologist plans to follow up kidney findings as outpatient.  - She had a right-sided midline which has been discontinued on admission.  She has a right IJ at this time.    --  She had multiple wounds left 1st toe, left ankle, buttocks wound.  With recommendations from prior ID as below    ID consult was called for leukocytosis and multiple wounds  Patient can not offer much history she admits to hurting all over feeling tired.  She is afebrile.  She has been afebrile throughout but she does develop episode of hypothermia 0 6/23, 0 6/25, 0 6/26, 0 6/29, 0701, 0702,  WBC trend nicely down 24--29--28--22--23--19--18--19--18--16.  CRP is 7  Procalcitonin trended nicely down ---47  She received vancomycin and cefepime on admission x1 day, then meropenem x 4 days days.  Which was later switched to cefazolin(d6)   Diflucan was started on 06/26 for Candida UTI.      In terms of ID chart review:   Patient was hospitalized at [facility] in April and seen by ID , Dr. Keenan for left 1st toe nonhealing ulcer, and left heel pressure ulcer.  MRI found no osteomyelitis.  Patient was transferred to [facility] for vascular procedure. She was seen by ID at [facility]- Dr Adkins, who discontinued vancomycin and cefepime and gave cefadroxil for 10 days for SSTI (for MSSA 04/19).  Then left great toe cultures 05/18/2023 showed E cloaca patient was given doxycycline and Levaquin by podiatrist.   Id was consulted again.  Dr. Johns who recommended Ancef  on 06/07/23--07/17.  Then 1st toe culture showed Pseudomonas and patient was given ciprofloxacin 750 mg p.o. q. 12 x 2 weeks(06/06--06/22)        07/05/2023.  Patient is resting comfortably in bed.  Her daughter-in-law is at bedside.  I discussed her prior ID history with both of them.    I discussed with hospitalist, who is giving blood today and encouraging nutrition.          Antibiotics (From admission,  onward)      Start     Stop Route Frequency Ordered    07/03/23 1645  cefepime 2 g in dextrose 5% 100 mL IVPB (ready to mix)         -- IV Every 24 hours (non-standard times) 07/03/23 1537          Antifungals (From admission, onward)      Start     Stop Route Frequency Ordered    07/03/23 2100  clotrimazole kasie 10 mg         -- Oral 3 times daily 07/03/23 1850          Antivirals (From admission, onward)      None                Review of patient's allergies indicates:  No Known Allergies  Past Medical History:   Diagnosis Date    Acute osteomyelitis of left calcaneus 6/7/2023    Chronic ulcer of great toe of left foot with fat layer exposed 6/7/2023    Closed left pilon fracture 6/5/2023    Closed left subtrochanteric femur fracture, initial encounter 2/18/2023    Diabetes mellitus, type 2     Essential (primary) hypertension 2/17/2023    Mass of upper outer quadrant of right breast 4/27/2023    Nicotine dependence 2/20/2023    PAD (peripheral artery disease) 4/22/2023    Tobacco use 6/6/2023    Type 2 diabetes mellitus with hyperglycemia, with long-term current use of insulin 6/6/2023     Past Surgical History:   Procedure Laterality Date    ANGIOGRAPHY OF LOWER EXTREMITY Left 4/25/2023    Procedure: Angiogram Extremity Unilateral;  Surgeon: Gilbert Sheppard MD;  Location: [facility] OR [address] [place] [address];  Service: Vascular;  Laterality: Left;  28.4 min  487.45 mGy  75.9180 Gycm2  trans radial: 7 ml  dye: 126 ml      APPLICATION, EXTERNAL FIXATION DEVICE Left 6/6/2023    Procedure: APPLICATION, EXTERNAL FIXATION DEVICE, LEFT ANKLE, Vasquez;  Surgeon: Gilbert Mahmood MD;  Location: [facility] OR [address] [place] [address];  Service: Orthopedics;  Laterality: Left;    AUGMENTATION OF BREAST      INTRAMEDULLARY RODDING OF FEMUR Left 2/18/2023    Procedure: INSERTION, INTRAMEDULLARY ROXANNA, FEMUR (OpenDoora table -- synthes -- long nail -- have bovie and suction and large bone set);  Surgeon: Keanu Brand MD;  Location:  [facility] OR;  Service: Orthopedics;  Laterality: Left;    OPEN REDUCTION AND INTERNAL FIXATION (ORIF) OF PILON FRACTURE Left 6/6/2023    Procedure: ORIF, FRACTURE, PILON, LEFT;  Surgeon: Gilbert Mahmood MD;  [facility];  Service: Orthopedics;  Laterality: Left;    PERCUTANEOUS TRANSLUMINAL ANGIOPLASTY Left 4/25/2023    Procedure: PTA (ANGIOPLASTY, PERCUTANEOUS, TRANSLUMINAL);  Surgeon: Gilbert Sheppard MD;  [facility];  Service: Vascular;  Laterality: Left;  Tibial and popliteal angioplasty     Social History     Socioeconomic History    Marital status:    Tobacco Use    Smoking status: Every Day     Packs/day: 0.25     Years: 45.00     Pack years: 11.25     Types: Cigarettes    Smokeless tobacco: Never   Substance and Sexual Activity    Alcohol use: Yes    Drug use: Never   Social History Narrative    ** Merged History Encounter **          Social Determinants of Health     Financial Resource Strain: Low Risk     Difficulty of Paying Living Expenses: Not hard at all   Food Insecurity: No Food Insecurity    Worried About Running Out of Food in the Last Year: Never true    Ran Out of Food in the Last Year: Never true   Transportation Needs: No Transportation Needs    Lack of Transportation (Medical): No    Lack of Transportation (Non-Medical): No   Physical Activity: Unknown    Days of Exercise per Week: Patient refused    Minutes of Exercise per Session: Patient refused   Stress: Unknown    Feeling of Stress : Patient refused   Social Connections: Unknown    Frequency of Communication with Friends and Family: Patient refused    Frequency of Social Gatherings with Friends and Family: Patient refused    Attends Church Services: Patient refused    Active Member of Clubs or Organizations: Patient refused    Attends Club or Organization Meetings: Patient refused    Marital Status: Patient refused   Housing Stability: Unknown    Unable to Pay  for Housing in the Last Year: No    Unstable Housing in the Last Year: No     Family History   Problem Relation Age of Onset    Heart disease Mother     Cancer Mother     Cataracts Mother     Hypertension Father     Heart disease Father     Cancer Sister     Heart disease Sister     Breast cancer Sister     Heart disease Brother          Review of Systems:   Not accurate.  She has a blunt affect and does not have answers to my questions.      EXAM & DIAGNOSTICS REVIEWED:   Vitals:     Temp:  [93.4 °F (34.1 °C)-98.9 °F (37.2 °C)]   Temp: 97.8 °F (36.6 °C) (07/05/23 0733)  Pulse: 65 (07/05/23 0435)  Resp: 18 (07/05/23 0733)  BP: 137/66 (07/05/23 0733)  SpO2: 95 % (07/05/23 0733)    Intake/Output Summary (Last 24 hours) at 7/5/2023 0849  Last data filed at 7/5/2023 0501  Gross per 24 hour   Intake 1924.63 ml   Output 700 ml   Net 1224.63 ml       General:  In NAD. Alert and attentive, cooperative, comfortable obese  Eyes:  Anicteric, EOMI  ENT:  No ulcers   Neck:  supple,   Lungs: Crackles at bases, decreased air entry due to body habitus --in general improved to yesterday  Heart:  RRR, no gallop/murmur/rub noted  Abd:  Soft, NT, ND, normal BS, no masses or organomegaly appreciated.  Obese.  :  Gee catheter with slightly dark urine. no flank tenderness  Musc:  Other than below Joints without effusion, swelling, erythema, synovitis, muscle wasting.   Skin:  Pictures as below otherwise No rashes. No palmar or plantar lesions. No subungual petechiae  Neuro:             Alert, attentive, speech fluent, face symmetric, extremely weak.  Nonambulatory at this time.     Psych: Calm, cooperative blunt affect  Lymphatic:    Extrem: Anasarca is nicely improving--I can see some wrinkling where there was just swelling a few days ago,.  No erythema, phlebitis, cellulitis, warm and well perfused but please see pictures.  There is a left leg external fixator.  Left heel ulcer.  Left 1st toe ulcer.       VAD:       Isolation:    Wound:   07/05/2023.  Left heel is cleaned up and medicated with Medihoney by wound care nurse.  Looks great.  Left toe ulcer is greatly improved as well.                                     Lines/Tubes/Drains:  Right IJ.    General Labs reviewed:  Recent Labs   Lab 07/03/23 0433 07/04/23 0430 07/05/23  0424   WBC 16.14* 15.79* 13.89*   HGB 8.3* 7.7* 7.1*   HCT 25.3* 22.9* 21.7*    353 341       Recent Labs   Lab 07/03/23 0433 07/04/23 0430 07/05/23  0424   *  133* 131* 129*   K 4.8  4.8 4.9 4.9   CL 99  99 98 98   CO2 21*  21* 23 25   BUN 70*  70* 76* 83*   CREATININE 2.7*  2.7* 2.9* 2.6*   CALCIUM 7.6*  7.6* 7.4* 7.4*   PROT 4.4*  --   --    BILITOT 0.9  --   --    ALKPHOS 90  --   --    ALT <5*  --   --    AST 15  --   --      Recent Labs   Lab 07/03/23  1654   CRP 7.34*         Micro:  Microbiology Results (last 7 days)       Procedure Component Value Units Date/Time    Blood culture [[id number]] Collected: 07/03/23 1741    Order Status: Completed Specimen: Blood Updated: 07/04/23 2232     Blood Culture, Routine No Growth to date      No Growth to date    Blood culture [[id number]] Collected: 07/03/23 1741    Order Status: Completed Specimen: Blood Updated: 07/04/23 2232     Blood Culture, Routine No Growth to date      No Growth to date    Blood culture [[id number]] Collected: 06/30/23 0613    Order Status: Completed Specimen: Blood Updated: 07/04/23 1032     Blood Culture, Routine No Growth to date      No Growth to date      No Growth to date      No Growth to date      No Growth to date    Narrative:      Collection has been rescheduled by LND at 06/30/2023 06:11 Reason:   Completed  Collection has been rescheduled by LND at 06/30/2023 06:11 Reason:   Completed    Blood culture [[id number]]     Order Status: Canceled Specimen: Blood     Blood culture [[id number]]     Order Status: Canceled Specimen: Blood     Culture, Respiratory with Gram Stain [[id number]]   (Abnormal) Collected: 06/24/23 1245    Order Status: Completed Specimen: Respiratory from Sputum Updated: 07/01/23 0628     Respiratory Culture PRESUMPTIVE CANDIDA ALBICANS  Moderate        BALDOMERO TROPICALIS  Moderate       Gram Stain (Respiratory) Many WBC's     Gram Stain (Respiratory) >10epis/lfp and <than many WBC's     Gram Stain (Respiratory) Few yeast    IV catheter culture [[id number]] Collected: 06/25/23 1723    Order Status: Completed Specimen: Catheter Tip, PICC Updated: 06/30/23 0827     Aerobic Culture - Cath tip No growth    Blood culture x two cultures. Draw prior to antibiotics. [[id number]] Collected: 06/24/23 0209    Order Status: Completed Specimen: Blood from Peripheral, Forearm, Left Updated: 06/29/23 0232     Blood Culture, Routine No growth after 5 days.    Narrative:      Aerobic and anaerobic    Blood culture x two cultures. Draw prior to antibiotics. [[id number]] Collected: 06/24/23 0209    Order Status: Completed Specimen: Blood from Peripheral, Hand, Left Updated: 06/29/23 0232     Blood Culture, Routine No growth after 5 days.    Narrative:      Aerobic and anaerobic    Clostridium difficile EIA [[id number]] Collected: 06/28/23 1547    Order Status: Completed Specimen: Stool Updated: 06/28/23 1714     C. diff Antigen Negative     C difficile Toxins A+B, EIA Negative     Comment: Testing not recommended for children <24 months old.       Urine culture [[id number]]  (Abnormal) Collected: 06/24/23 0208    Order Status: Completed Specimen: Urine Updated: 06/28/23 1015     Urine Culture, Routine CANDIDA TROPICALIS  > 100,000 cfu/ml  Treatment of asymptomatic candiduria is not recommended (except for   specific populations). Candida isolated in the urine typically   represents colonization. If an indwelling urinary catheter is present  it should be removed or replaced.      Narrative:      Specimen Source->Urine        06/06/2023 left 1st toe wound Pseudomonas aeruginosa.    05/18/2023.  Left  great toe wound Enterobacter cloaca    04/27/2023 left foot bone???  Staphylococcus aureus, (MSSA) x2 different susceptibility patterns and Staphylococcus epidermidis.    04/27/2023 left foot wound Staphylococcus aureus and Candida albicans.  04/19/2023 left toe wound MSSA  04/19/2023 blood cultures no growth.    02/17/2023 urine culture E coli   Staphylococcus aureus (1) Staphylococcus aureus (2) Staphylococcus epidermidis     CULTURE, AEROBIC  (SPECIFY SOURCE) CULTURE, AEROBIC  (SPECIFY SOURCE) CULTURE, AEROBIC  (SPECIFY SOURCE)    Clindamycin <=0.5 mcg/mL Sensitive <=0.5 mcg/mL Sensitive <=0.5 mcg/mL Sensitive    Erythromycin <=0.5 mcg/mL Sensitive >4 mcg/mL Resistant >4 mcg/mL Resistant    Oxacillin <=0.25 mcg/mL Sensitive <=0.25 mcg/mL Sensitive <=0.25 mcg/mL Sensitive    Penicillin <=0.03 mcg/mL Sensitive 0.25 mcg/mL Resistant 2 mcg/mL Resistant    Tetracycline <=4 mcg/mL Sensitive <=4 mcg/mL Sensitive <=4 mcg/mL Sensitive    Trimeth/Sulfa <=0.5/9.5 m... Sensitive >2/38 mcg/mL Resistant >2/38 mcg/mL Resistant                   Imaging Reviewed:  US  1. Limited visualization of exophytic upper pole right renal mass. This likely reflects a complicated cyst. Multiphase CT with contrast could be utilized for further assessment. If patient cannot receive IV contrast, follow-up nonemergent MRI could be considered.      CT 06/24/2023   1.  Minimal gallbladder distention.  2.  Mild left hydroureteronephrosis, without definite ureteral stone seen. Urinary bladder decompressed.  3.  Anasarca.  4.  Colonic diverticulosis without definite CT evidence for acute diverticulitis.  5.  Possible hyperdense cyst at the upper pole right kidney. Follow-up nonemergent renal ultrasound could be performed.  6.  Post surgical changes at the left femur, with subtrochanteric fracture.  7.  Minimal bilateral pleural effusions and mild bibasilar atelectasis.    MRI 04/27/2023  1. Patchy marrow edema within the posterior calcaneus with an  overlying soft tissue ulcer, possibly reactive or sequela of early osteomyelitis.  No focal fluid collection.  2. Subcortical marrow edema at the plantar aspect of the talar head with a small superimposed linear hypointensity, possibly degenerative or sequela of an insufficiency fracture.  3. Subcutaneous edema about the ankle and dorsum of the foot, possibly sterile or infectious in nature.    MRI04/20/2023  1. Skin ulcer along the plantar aspect of the great toe.  No subjacent osteomyelitis for abscess.  2. Organizing soft tissue infection/cellulitis along the great toe.  3. Dorsal foot soft tissue swelling is nonspecific.       Cardiology:  · The left ventricle is normal in size with normal systolic function.  · The estimated ejection fraction is 58%.  · Normal left ventricular diastolic function.  · There is abnormal septal wall motion.  · Atrial fibrillation not observed.  · Mild right ventricular enlargement with normal right ventricular  systolic function.  · There is moderate pulmonary hypertension.  · Mild to moderate tricuspid regurgitation.  · Mild to moderate pulmonic regurgitation.  · Normal central venous pressure (3 mmHg).  · The estimated PA systolic pressure is 65 mmHg.  Premature atrial contractions  Moderate pulmonary hypertension is present    IMPRESSION & PLAN       Left heel osteomyelitis as per evaluation by prior ID.  Completing treatment on 06/07/23--07/17.      First toe nonhealing ulcer.  06/06/23 culture showed Pseudomonas.  She had received 2 weeks of  ciprofloxacin 750 mg p.o. q. 12 x 2 ks (06/06--06/22)   The fact that it still on going, makes me concerned about under treated osteomyelitis of left 1st toe.  Continue wound care.  Give cefepime x4 weeks    Decubitus sacral ulcer.  Please follow with wound care      Leukocytosis.  Multifactorial; it could be reactive due to external fixator, nonhealing skin ulcers, anemia requiring transfusions on admission, improving.    Colonized with  yeast urine, sputum.  This, coupled with need for TPN, and central lines, will place  her at high risk for fungemia  monitoring clinically    MYLES resolved, CKD.  Multiple electrolyte abnormalities, hyperdense renal cysts, per nephrologist and hospitalist     I expect Expect slow healing due to MYLES, CKD, anasarca, diabetes, protein malnutrition     PMHx    DM 2, nsulin-requiring , HTN, PAD S/P  baloon angioplasty L popliteal artery and left posterior tibial arery on DAPT. 04/25/2023,   smoker, femur and tibial fracture, intramedullary rodding of femur 02/18/2023., open reduction internal fixation of left pilon fx and left external fixator 06/06/23, GERD, obese, BMI 36, constipation, vitamin-D deficiency, chronic opiate use, right breast mass      Recommendations:  ---Continue cefepime 2g  q  24 hr   x  4 weeks (renally dose)    ---Follow-up cultures obtained today by nurse practitioner, Zo Alonso---please bring it to my attention if it grows any organism that is not covered by cefepime    ---she may need midline x2---at nephrologist discretion, given that she has CKD and   eventually may need dialysis.    No objections to discharge to [facility] at your discretion.      Will sign off.        Medical Decision Making during this encounter was  [_] Low Complexity  [_] Moderate Complexity  [  xxx] High Complexity

## 2023-07-05 NOTE — PROGRESS NOTES
I was hoping Podiatrist would help with bone cx or deep tissue cultures.   Patient did not want bone culture    I will attempt wound cx tomorrow      On cefepime  Covering all prior organisms  Dc vancomycin

## 2023-07-05 NOTE — ASSESSMENT & PLAN NOTE
Patient with new onset AFib this admission with RVR- rates upwards 200s.  -Required cardioversion: now NSR  -On oral amiodarone  -was recommended to start eliquis once H/H stabilized (required transfusion at start of admit).  -eliquis has not yet been started- will discuss with cards as she is on ASA/plavix per vascular surgery after recent vascular intervention (April).

## 2023-07-05 NOTE — PROGRESS NOTES
Formerly [facility]  Adult Nutrition   Progress Note (Nutrition Support Management)    SUMMARY     Recommendations  1. Recommend start appetite stimulnat.   2. Continue current diet and encourage po intake.   3. RD to monitor TPN/ intake, weight and labs.  Goals: Improve po intake to meet 75% of EEN/EPN. Improve appetite and d/c TPN.  Nutrition Goal Status: progressing towards goal  Communication of RD Recs: reviewed with RN    Dietitian Rounds Brief  Pt with family at bedside. States she is just not hungry at all. She at 0% at BK today. Family was encouraging she eat lunch. Encourage pt to at least work on drinking Unjury at meals to increase protein intake to improve healing to foot/ leg.     PARENTERAL NUTRITION PROGRESS NOTE:      Parenteral Nutrition Day # 1  Diagnosis/Indication for PN: malnutrition  IV Access: PICC  Diet/Tube Feeding: DM diet         Infusion Rate and Frequency: 50     PN Composition:   78 grams Amino Acid, 118 grams Dextrose, and 50 grams Lipid.    Today's TPN provides 1212 kcal.    *Dextrose is started at less than full dextrose goal to reduce risk for Refeeding Syndrome. Dextrose content will be advanced as appropriate over the next few days.    Please see order for electrolytes and additives content and adjustments.   *The Nutrition Support Team will continue to monitor electrolytes daily and adjust parenteral nutrition as warranted.     Diet order:   Current Diet Order: 2000 kcal DM diet                 Evaluation of Received Nutrient/Fluid Intake  Energy Calories Required: not meeting needs  Protein Required: not meeting needs  Fluid Required: not meeting needs     % Intake of Estimated Energy Needs: 25 - 50 %  % Meal Intake: 25 - 50 %      Intake/Output Summary (Last 24 hours) at 7/5/2023 1253  Last data filed at 7/5/2023 0501  Gross per 24 hour   Intake 1924.63 ml   Output 700 ml   Net 1224.63 ml        Anthropometrics  Temp: 97.8 °F (36.6 °C)  Height Method: Stated  Height: 5'  "3" (160 cm)  Height (inches): 63 in  Weight Method: Bed Scale  Weight: 93.5 kg (206 lb 2.1 oz)  Weight (lb): 206.13 lb  Ideal Body Weight (IBW), Female: 115 lb  % Ideal Body Weight, Female (lb): 153.37 %  BMI (Calculated): 36.5  BMI Grade: 30 - 34.9- obesity - grade I       Estimated/Assessed Needs  Weight Used For Calorie Calculations: 80 kg (176 lb 5.9 oz)  Energy Calorie Requirements (kcal): 6369-7924 kcals/day (20-25 kcals/kg ABW)  Energy Need Method: Kcal/kg  Protein Requirements:  g/day (1.5-2 g/kg IBW)  Weight Used For Protein Calculations: 52 kg (114 lb 10.2 oz)     Estimated Fluid Requirement Method: RDA Method  RDA Method (mL): 1600       Reason for Assessment  Reason For Assessment: RD follow-up  Diagnosis: other (see comments) (UTI)  Relevant Medical History: Diabetes mellitus, type 2, Closed left pilon fracture, Closed left subtrochanteric femur fracture, initial encounter, Acute osteomyelitis of left calcaneus, Essential (primary) hypertension, PAD (peripheral artery disease), Tobacco use, Mass of upper outer quadrant of right breast, Type 2 diabetes mellitus with hyperglycemia, with long-term current use of insulin, Chronic ulcer of great toe of left foot with fat layer exposed, Nicotine dependence  Interdisciplinary Rounds: attended    Nutrition/Diet History  Patient Reported Diet/Restrictions/Preferences: diabetic diet  Spiritual, Cultural Beliefs, Yazdanism Practices, Values that Affect Care: no  Food Allergies: NKFA  Factors Affecting Nutritional Intake: other (see comments) (pt was just ordered a diet at 0930 today)    Nutrition Risk Screen  Nutrition Risk Screen: no indicators present       Altered Skin Integrity 04/19/23 1600 Left posterior Heel Ulceration Full thickness tissue loss. Base is covered by slough and/or eschar in the wound bed-Wound Image: Images linked       Incision/Site 06/06/23 1604 Left Leg-Wound Image: Images linked       Incision/Site 06/24/23 0700 Left Heel " medial-Wound Image: Images linked       Incision/Site 06/24/23 0701 Left Heel lateral-Wound Image: Images linked       Altered Skin Integrity 06/24/23 0701 midline Sacral spine-Wound Image: Images linked       Incision/Site 06/24/23 0700 Left Foot anterior-Wound Image: Images linked       Altered Skin Integrity 06/24/23 0700 Left anterior Toe, first-Wound Image: Images linked       Incision/Site 06/24/23 0700 Left Malleolus/Ankle medial-Wound Image: Images linked       Incision/Site 06/24/23 0700 Left Malleolus/Ankle lateral-Wound Image: Images linked  MST Score: 0  Have you recently lost weight without trying?: No  Weight loss score: 0  Have you been eating poorly because of a decreased appetite?: No  Appetite score: 0       Weight History:  Wt Readings from Last 10 Encounters:   06/29/23 93.5 kg (206 lb 2.1 oz)   06/25/23 72.6 kg (160 lb)   06/08/23 72.9 kg (160 lb 11.5 oz)   06/05/23 72.6 kg (160 lb)   05/24/23 72.6 kg (160 lb)   05/18/23 75.8 kg (167 lb)   04/21/23 76.1 kg (167 lb 12.3 oz)   04/20/23 72.5 kg (159 lb 13.3 oz)   04/21/23 72.1 kg (159 lb)   03/28/23 66.7 kg (147 lb)        Lab/Procedures/Meds: Pertinent Labs/Meds Reviewed    Medications:Pertinent Medications Reviewed  Scheduled Meds:   amiodarone  200 mg Oral Daily    aspirin  81 mg Oral Daily    ceFEPime (MAXIPIME) IVPB  2 g Intravenous Q24H    clopidogreL  75 mg Oral Daily    clotrimazole  10 mg Oral TID    collagenase   Topical (Top) Daily    droNABinol  2.5 mg Oral BID    enoxparin  30 mg Subcutaneous Q24H (prophylaxis, 1700)    epoetin juan f (PROCRIT) injection  10,000 Units Subcutaneous Every Mon, Wed, Fri    fat emulsion 20%  200 mL Intravenous Daily    fat emulsion 20%  250 mL Intravenous Daily    insulin detemir U-100  10 Units Subcutaneous QHS    midodrine  5 mg Oral TID WM     Continuous Infusions:   TPN ADULT CENTRAL LINE CUSTOM 50 mL/hr at 07/04/23 2123    TPN ADULT CENTRAL LINE CUSTOM       PRN Meds:.sodium chloride, dextrose 50%,  dextrose 50%, glucagon (human recombinant), glucose, glucose, HYDROcodone-acetaminophen, insulin aspart U-100, magnesium oxide, magnesium oxide, naloxone, ondansetron, potassium bicarbonate, potassium bicarbonate, potassium bicarbonate, potassium, sodium phosphates, potassium, sodium phosphates, potassium, sodium phosphates, sodium chloride 0.9%    Labs: Pertinent Labs Reviewed  Clinical Chemistry:  Recent Labs   Lab 07/03/23  0433 07/04/23  0430 07/05/23  0424 07/05/23  1133   *  133*   < > 129*  --    K 4.8  4.8   < > 4.9  --    CL 99  99   < > 98  --    CO2 21*  21*   < > 25  --    *  149*   < > 323*  --    BUN 70*  70*   < > 83*  --    CREATININE 2.7*  2.7*   < > 2.6*  --    CALCIUM 7.6*  7.6*   < > 7.4*  --    PROT 4.4*  --   --   --    ALBUMIN 1.3*  --   --   --    BILITOT 0.9  --   --   --    ALKPHOS 90  --   --   --    AST 15  --   --   --    ALT <5*  --   --   --    ANIONGAP 13  13   < > 6*  --    MG  --   --   --  1.7   PHOS  --   --   --  4.9*    < > = values in this interval not displayed.     CBC:   Recent Labs   Lab 07/05/23  0424   WBC 13.89*   RBC 2.49*   HGB 7.1*   HCT 21.7*      MCV 87   MCH 28.5   MCHC 32.7     Lipid Panel:  Recent Labs   Lab 07/04/23  0430   TRIG 98     Cardiac Profile:  No results for input(s): BNP, CPK, CPKMB, TROPONINI, CKTOTAL in the last 168 hours.  Inflammatory Labs:  Recent Labs   Lab 07/03/23  1654   CRP 7.34*     Diabetes:  No results for input(s): HGBA1C, POCTGLUCOSE in the last 168 hours.  Thyroid & Parathyroid:  Recent Labs   Lab 06/30/23  0613   TSH 2.250       Monitor and Evaluation  Food and Nutrient Intake: energy intake, food and beverage intake, parenteral nutrition intake  Food and Nutrient Adminstration: diet order, enteral and parenteral nutrition administration  Knowledge/Beliefs/Attitudes: food and nutrition knowledge/skill, beliefs and attitudes  Physical Activity and Function: nutrition-related ADLs and IADLs, factors  affecting access to physical activity  Anthropometric Measurements: weight, weight change, body mass index  Biochemical Data, Medical Tests and Procedures: lipid profile, inflammatory profile, glucose/endocrine profile, gastrointestinal profile, electrolyte and renal panel  Nutrition-Focused Physical Findings: overall appearance     Nutrition Risk  Level of Risk/Frequency of Follow-up: moderate - high     Nutrition Follow-Up  RD Follow-up?: Yes      Hope Patino RD 07/05/2023 12:53 PM

## 2023-07-05 NOTE — PLAN OF CARE
Asked Nidia with [facility] to resubmit for LTAC auth.    CM also reached out to Skylar with [facility].. awaiting response       07/05/23 5884   Post-Acute Status   Post-Acute Authorization Placement   Post-Acute Placement Status Pending payor review/awaiting authorization (if required)

## 2023-07-05 NOTE — ASSESSMENT & PLAN NOTE
high grade stenosis SFA bilaterally and popliteal on the left s/p baloon angioplasty L popliteal artery and left posterior tibial artery 4/2023 on DAPT  -H&H is stable without signs of bleeding  -resume the ASA/plavix.    7/5 - stopping asa and lovenox and starting eliquis today for PAF and VTE prevention. Cont plavix for antiplatelet therapy

## 2023-07-05 NOTE — SUBJECTIVE & OBJECTIVE
Interval History:  Notes reviewed, no acute events overnight.  Patient much more awake and alert today and interactive. Sister present at BS visiting with her and encouraging pt. Pt reports poor appetite due to nausea. She is tolerating marinol well. Will cont to monitor closely. CM following and working on LTAC placement.     Review of Systems   Constitutional:  Positive for activity change, appetite change and fatigue. Negative for chills and fever.   HENT:  Negative for congestion, sore throat and trouble swallowing.    Respiratory:  Negative for cough and shortness of breath.    Cardiovascular:  Negative for chest pain.   Gastrointestinal:  Positive for nausea. Negative for abdominal pain, diarrhea and vomiting.   Musculoskeletal:  Positive for arthralgias and gait problem. Negative for back pain.   Skin:  Positive for wound. Negative for color change, pallor and rash.   Neurological:  Negative for dizziness, weakness and light-headedness.   Psychiatric/Behavioral:  Negative for agitation, behavioral problems and confusion.    All other systems reviewed and are negative.  Objective:     Vital Signs (Most Recent):  Temp: 97.8 °F (36.6 °C) (07/05/23 1104)  Pulse: 65 (07/05/23 1104)  Resp: 18 (07/05/23 1104)  BP: 137/66 (07/05/23 1104)  SpO2: 95 % (07/05/23 1104) Vital Signs (24h Range):  Temp:  [97.3 °F (36.3 °C)-97.8 °F (36.6 °C)] 97.8 °F (36.6 °C)  Pulse:  [63-65] 65  Resp:  [18] 18  SpO2:  [92 %-95 %] 95 %  BP: (131-148)/(66-82) 137/66     Weight: 93.5 kg (206 lb 2.1 oz)  Body mass index is 36.51 kg/m².    Intake/Output Summary (Last 24 hours) at 7/5/2023 1302  Last data filed at 7/5/2023 0501  Gross per 24 hour   Intake 1924.63 ml   Output 700 ml   Net 1224.63 ml           Physical Exam  Vitals and nursing note reviewed.   Constitutional:       General: She is not in acute distress.     Appearance: She is well-developed. She is ill-appearing (chronically ill appearing). She is not diaphoretic.   HENT:       Head: Normocephalic and atraumatic.      Right Ear: External ear normal.      Left Ear: External ear normal.      Nose: Nose normal. No congestion or rhinorrhea.      Mouth/Throat:      Mouth: Mucous membranes are moist.      Pharynx: Oropharynx is clear. No oropharyngeal exudate or posterior oropharyngeal erythema.   Eyes:      General: No scleral icterus.     Extraocular Movements: Extraocular movements intact.      Conjunctiva/sclera: Conjunctivae normal.      Pupils: Pupils are equal, round, and reactive to light.   Neck:      Vascular: No JVD.   Cardiovascular:      Rate and Rhythm: Normal rate and regular rhythm.      Pulses: Normal pulses.      Heart sounds: Normal heart sounds. No murmur heard.     Comments: Right IJ dressing without erythema or drainage at site noted.  Pulmonary:      Effort: Pulmonary effort is normal. No respiratory distress.      Breath sounds: Normal breath sounds. No stridor. No wheezing, rhonchi or rales.      Comments: congested cough with thick, clear sputum  Abdominal:      General: Abdomen is flat. Bowel sounds are normal. There is no distension.      Palpations: Abdomen is soft.      Tenderness: There is no abdominal tenderness.   Musculoskeletal:         General: No swelling or tenderness. Normal range of motion.      Cervical back: Normal range of motion and neck supple.      Comments: +anasarca  LEFT leg with external fixator, no overt drainage noted   Skin:     General: Skin is warm and dry.      Capillary Refill: Capillary refill takes less than 2 seconds.      Coloration: Skin is not jaundiced or pale.      Findings: No erythema.      Comments: Left Heel wound, left great toe wound evaluated-see photos   -sacral wound present - see wound care pics   Neurological:      General: No focal deficit present.      Mental Status: She is alert. She is disoriented.      Cranial Nerves: No cranial nerve deficit.      Sensory: No sensory deficit.      Comments: More oriented today.  Knows she is in the hospital and able to state her full name and month and day of birth. Still confused to year of birth.    Psychiatric:         Mood and Affect: Mood normal.         Behavior: Behavior normal.      Comments: Pt in much better spirits today. More talkative and interactive.                      Significant Labs: All pertinent labs within the past 24 hours have been reviewed.  CBC:   Recent Labs   Lab 07/04/23 0430 07/05/23  0424   WBC 15.79* 13.89*   HGB 7.7* 7.1*   HCT 22.9* 21.7*    341       CMP:   Recent Labs   Lab 07/04/23 0430 07/05/23  0424   * 129*   K 4.9 4.9   CL 98 98   CO2 23 25   * 323*   BUN 76* 83*   CREATININE 2.9* 2.6*   CALCIUM 7.4* 7.4*   ANIONGAP 10 6*       Lab Results   Component Value Date    TSH 2.250 06/30/2023       Microbiology Results (last 7 days)       Procedure Component Value Units Date/Time    Aerobic culture [[id number]] Collected: 07/05/23 1213    Order Status: Sent Specimen: Wound from Toe, Left Foot Updated: 07/05/23 1222    Blood culture [[id number]] Collected: 06/30/23 0613    Order Status: Completed Specimen: Blood Updated: 07/05/23 1032     Blood Culture, Routine No growth after 5 days.    Narrative:      Collection has been rescheduled by LND at 06/30/2023 06:11 Reason:   Completed  Collection has been rescheduled by LND at 06/30/2023 06:11 Reason:   Completed    Blood culture [[id number]] Collected: 07/03/23 1741    Order Status: Completed Specimen: Blood Updated: 07/04/23 2232     Blood Culture, Routine No Growth to date      No Growth to date    Blood culture [[id number]] Collected: 07/03/23 1741    Order Status: Completed Specimen: Blood Updated: 07/04/23 2232     Blood Culture, Routine No Growth to date      No Growth to date    Blood culture [[id number]]     Order Status: Canceled Specimen: Blood     Blood culture [[id number]]     Order Status: Canceled Specimen: Blood     Culture, Respiratory with Gram Stain [[id number]]  (Abnormal)  Collected: 06/24/23 1245    Order Status: Completed Specimen: Respiratory from Sputum Updated: 07/01/23 0628     Respiratory Culture PRESUMPTIVE CANDIDA ALBICANS  Moderate        BALDOMERO TROPICALIS  Moderate       Gram Stain (Respiratory) Many WBC's     Gram Stain (Respiratory) >10epis/lfp and <than many WBC's     Gram Stain (Respiratory) Few yeast    IV catheter culture [[id number]] Collected: 06/25/23 1723    Order Status: Completed Specimen: Catheter Tip, PICC Updated: 06/30/23 0827     Aerobic Culture - Cath tip No growth    Blood culture x two cultures. Draw prior to antibiotics. [[id number]] Collected: 06/24/23 0209    Order Status: Completed Specimen: Blood from Peripheral, Forearm, Left Updated: 06/29/23 0232     Blood Culture, Routine No growth after 5 days.    Narrative:      Aerobic and anaerobic    Blood culture x two cultures. Draw prior to antibiotics. [[id number]] Collected: 06/24/23 0209    Order Status: Completed Specimen: Blood from Peripheral, Hand, Left Updated: 06/29/23 0232     Blood Culture, Routine No growth after 5 days.    Narrative:      Aerobic and anaerobic    Clostridium difficile EIA [[id number]] Collected: 06/28/23 1547    Order Status: Completed Specimen: Stool Updated: 06/28/23 1714     C. diff Antigen Negative     C difficile Toxins A+B, EIA Negative     Comment: Testing not recommended for children <24 months old.                 Significant Imaging: I have reviewed all pertinent imaging results/findings within the past 24 hours.

## 2023-07-05 NOTE — NURSING
0700: report received, in bed with no distress or complaints  1135: blood specimen sent to lab  1505: blood started  1730: blood completed, no reactions

## 2023-07-06 LAB
ANION GAP SERPL CALC-SCNC: 7 MMOL/L (ref 8–16)
BASOPHILS # BLD AUTO: 0.02 K/UL (ref 0–0.2)
BASOPHILS NFR BLD: 0.1 % (ref 0–1.9)
BUN SERPL-MCNC: 88 MG/DL (ref 8–23)
CALCIUM SERPL-MCNC: 7.4 MG/DL (ref 8.7–10.5)
CHLORIDE SERPL-SCNC: 95 MMOL/L (ref 95–110)
CO2 SERPL-SCNC: 25 MMOL/L (ref 23–29)
CREAT SERPL-MCNC: 2.9 MG/DL (ref 0.5–1.4)
DIFFERENTIAL METHOD: ABNORMAL
EOSINOPHIL # BLD AUTO: 0.2 K/UL (ref 0–0.5)
EOSINOPHIL NFR BLD: 1.5 % (ref 0–8)
ERYTHROCYTE [DISTWIDTH] IN BLOOD BY AUTOMATED COUNT: 19.9 % (ref 11.5–14.5)
EST. GFR  (NO RACE VARIABLE): 16.7 ML/MIN/1.73 M^2
GLUCOSE SERPL-MCNC: 221 MG/DL (ref 70–110)
GLUCOSE SERPL-MCNC: 242 MG/DL (ref 70–110)
GLUCOSE SERPL-MCNC: 245 MG/DL (ref 70–110)
GLUCOSE SERPL-MCNC: 321 MG/DL (ref 70–110)
GLUCOSE SERPL-MCNC: 325 MG/DL (ref 70–110)
HCT VFR BLD AUTO: 24.6 % (ref 37–48.5)
HGB BLD-MCNC: 8.3 G/DL (ref 12–16)
IMM GRANULOCYTES # BLD AUTO: 0.3 K/UL (ref 0–0.04)
IMM GRANULOCYTES NFR BLD AUTO: 2.1 % (ref 0–0.5)
LYMPHOCYTES # BLD AUTO: 0.8 K/UL (ref 1–4.8)
LYMPHOCYTES NFR BLD: 5.9 % (ref 18–48)
MCH RBC QN AUTO: 28.2 PG (ref 27–31)
MCHC RBC AUTO-ENTMCNC: 33.7 G/DL (ref 32–36)
MCV RBC AUTO: 84 FL (ref 82–98)
MONOCYTES # BLD AUTO: 0.7 K/UL (ref 0.3–1)
MONOCYTES NFR BLD: 5.3 % (ref 4–15)
NEUTROPHILS # BLD AUTO: 11.9 K/UL (ref 1.8–7.7)
NEUTROPHILS NFR BLD: 85.1 % (ref 38–73)
NRBC BLD-RTO: 0 /100 WBC
PLATELET # BLD AUTO: 325 K/UL (ref 150–450)
PMV BLD AUTO: 11.8 FL (ref 9.2–12.9)
POTASSIUM SERPL-SCNC: 4.5 MMOL/L (ref 3.5–5.1)
RBC # BLD AUTO: 2.94 M/UL (ref 4–5.4)
SODIUM SERPL-SCNC: 127 MMOL/L (ref 136–145)
WBC # BLD AUTO: 13.97 K/UL (ref 3.9–12.7)

## 2023-07-06 PROCEDURE — 80048 BASIC METABOLIC PNL TOTAL CA: CPT | Performed by: HOSPITALIST

## 2023-07-06 PROCEDURE — 25000003 PHARM REV CODE 250: Performed by: NURSE PRACTITIONER

## 2023-07-06 PROCEDURE — 97110 THERAPEUTIC EXERCISES: CPT

## 2023-07-06 PROCEDURE — 25000003 PHARM REV CODE 250: Performed by: PODIATRIST

## 2023-07-06 PROCEDURE — 51798 US URINE CAPACITY MEASURE: CPT

## 2023-07-06 PROCEDURE — 12000002 HC ACUTE/MED SURGE SEMI-PRIVATE ROOM

## 2023-07-06 PROCEDURE — 25000003 PHARM REV CODE 250: Performed by: INTERNAL MEDICINE

## 2023-07-06 PROCEDURE — 99232 SBSQ HOSP IP/OBS MODERATE 35: CPT | Mod: ,,, | Performed by: PODIATRIST

## 2023-07-06 PROCEDURE — B4185 PARENTERAL SOL 10 GM LIPIDS: HCPCS | Performed by: HOSPITALIST

## 2023-07-06 PROCEDURE — 25000003 PHARM REV CODE 250: Performed by: HOSPITALIST

## 2023-07-06 PROCEDURE — 85025 COMPLETE CBC W/AUTO DIFF WBC: CPT | Performed by: HOSPITALIST

## 2023-07-06 PROCEDURE — 63600175 PHARM REV CODE 636 W HCPCS: Performed by: HOSPITALIST

## 2023-07-06 PROCEDURE — A4217 STERILE WATER/SALINE, 500 ML: HCPCS | Performed by: HOSPITALIST

## 2023-07-06 PROCEDURE — 63600175 PHARM REV CODE 636 W HCPCS: Performed by: NURSE PRACTITIONER

## 2023-07-06 PROCEDURE — 99232 PR SUBSEQUENT HOSPITAL CARE,LEVL II: ICD-10-PCS | Mod: ,,, | Performed by: PODIATRIST

## 2023-07-06 RX ADMIN — MAGNESIUM SULFATE HEPTAHYDRATE: 500 INJECTION, SOLUTION INTRAMUSCULAR; INTRAVENOUS at 08:07

## 2023-07-06 RX ADMIN — INSULIN ASPART 8 UNITS: 100 INJECTION, SOLUTION INTRAVENOUS; SUBCUTANEOUS at 10:07

## 2023-07-06 RX ADMIN — COLLAGENASE SANTYL: 250 OINTMENT TOPICAL at 10:07

## 2023-07-06 RX ADMIN — APIXABAN 2.5 MG: 2.5 TABLET, FILM COATED ORAL at 10:07

## 2023-07-06 RX ADMIN — CLOTRIMAZOLE 10 MG: 10 LOZENGE ORAL; TOPICAL at 10:07

## 2023-07-06 RX ADMIN — CLOTRIMAZOLE 10 MG: 10 LOZENGE ORAL; TOPICAL at 03:07

## 2023-07-06 RX ADMIN — MIDODRINE HYDROCHLORIDE 5 MG: 2.5 TABLET ORAL at 10:07

## 2023-07-06 RX ADMIN — MIDODRINE HYDROCHLORIDE 5 MG: 2.5 TABLET ORAL at 12:07

## 2023-07-06 RX ADMIN — I.V. FAT EMULSION 250 ML: 20 EMULSION INTRAVENOUS at 08:07

## 2023-07-06 RX ADMIN — DRONABINOL 2.5 MG: 2.5 CAPSULE ORAL at 10:07

## 2023-07-06 RX ADMIN — INSULIN ASPART 8 UNITS: 100 INJECTION, SOLUTION INTRAVENOUS; SUBCUTANEOUS at 05:07

## 2023-07-06 RX ADMIN — AMIODARONE HYDROCHLORIDE 200 MG: 200 TABLET ORAL at 10:07

## 2023-07-06 RX ADMIN — CLOTRIMAZOLE 10 MG: 10 LOZENGE ORAL; TOPICAL at 09:07

## 2023-07-06 RX ADMIN — CEFEPIME HYDROCHLORIDE 2 G: 2 INJECTION, POWDER, FOR SOLUTION INTRAVENOUS at 04:07

## 2023-07-06 RX ADMIN — CLOPIDOGREL BISULFATE 75 MG: 75 TABLET, FILM COATED ORAL at 10:07

## 2023-07-06 RX ADMIN — INSULIN ASPART 4 UNITS: 100 INJECTION, SOLUTION INTRAVENOUS; SUBCUTANEOUS at 12:07

## 2023-07-06 NOTE — PT/OT/SLP PROGRESS
Physical Therapy Treatment    Patient Name:  Anastasiia Badillo   MRN:  [id number]    Recommendations:     Discharge Recommendations: LTACH (long-term acute care hospital)  Discharge Equipment Recommendations: none  Barriers to discharge: Decreased caregiver support  Decreased function of L LE in external fixator, Total A x2 for t/f to EOB    Assessment:     Anastasiia Badillo is a 73 y.o. female admitted with a medical diagnosis of Septic shock.  She presents with the following impairments/functional limitations: weakness, impaired endurance, impaired self care skills, impaired functional mobility, impaired balance, decreased lower extremity function, decreased safety awareness, pain, decreased ROM, edema, impaired cardiopulmonary response to activity, orthopedic precautions .    Pt presented in supine . She  adamantly refused t/f to EOB but  consented to perform therex.    Rehab Prognosis:  guarded ; patient would benefit from acute skilled PT services to address these deficits and reach maximum level of function.    Recent Surgery: Procedure(s) (LRB):  Cardioversion or Defibrillation (N/A) 12 Days Post-Op    Plan:     During this hospitalization, patient to be seen 3 x/week to address the identified rehab impairments via therapeutic activities, therapeutic exercises and progress toward the following goals:    Plan of Care Expires:       Subjective     Chief Complaint: decreased mobility  Patient/Family Comments/goals: Increased mobilization   Pain/Comfort:         Objective:     Communicated with nurse prior to session.  Patient found supine with holley catheter, peripheral IV, external fixator upon PT entry to room.     General Precautions: Standard, fall  Orthopedic Precautions: LLE non weight bearing  Braces:    Respiratory Status: Room air    AM-PAC 6 CLICK MOBILITY          Treatment & Education:  Pt participated in  R LE AROM to include 1x10 reps of AP's , heel slides, On L LE pt performed 2 x 5 reps of QS's  with maximum verbal and tactile cueing provided.Fair quad recruitment.    Patient left supine with all lines intact, call button in reach, and bed alarm on..    GOALS:   Multidisciplinary Problems       Physical Therapy Goals          Problem: Physical Therapy    Goal Priority Disciplines Outcome Goal Variances Interventions   Physical Therapy Goal     PT, PT/OT Ongoing, Not Progressing     Description: Goals to be met by: 2023     Patient will increase functional independence with mobility by performin. Supine to sit with Maximum Assistance  2. Rolling to Left and Right with Moderate Assistance.  3. Sitting at edge of bed x15  minutes with Minimal Assistance                         Time Tracking:     PT Received On: 23  PT Start Time: 1450     PT Stop Time: 1509  PT Total Time (min): 19 min     Billable Minutes: Therapeutic Exercise 19 minutes    Treatment Type: Treatment  PT/PTA: PT     Number of PTA visits since last PT visit: 3     2023

## 2023-07-06 NOTE — PROGRESS NOTES
[facility]  Podiatry  Progress Note    Patient Name: Anastasiia Badillo  MRN: [id number]  Admission Date: 6/24/2023  Hospital Length of Stay: 12 days  Attending Physician: Criss Beck MD  Primary Care Provider: Raji Parkinson MD     Subjective:     Interval History: Patient resting in bed. Denies complaints of pain to either extremity. Denies any new complaints.   Chart reviewed.    Plans for discharge to LTAC soon.    Scheduled Meds:   amiodarone  200 mg Oral Daily    apixaban  2.5 mg Oral BID    ceFEPime (MAXIPIME) IVPB  2 g Intravenous Q24H    clopidogreL  75 mg Oral Daily    clotrimazole  10 mg Oral TID    collagenase   Topical (Top) Daily    collagenase   Topical (Top) Daily    droNABinol  2.5 mg Oral BID    epoetin juan f (PROCRIT) injection  10,000 Units Subcutaneous Every Mon, Wed, Fri    fat emulsion 20%  250 mL Intravenous Daily    insulin detemir U-100  10 Units Subcutaneous QHS    midodrine  5 mg Oral TID WM     Continuous Infusions:   TPN ADULT CENTRAL LINE CUSTOM 50 mL/hr at 07/05/23 2153     PRN Meds:sodium chloride, dextrose 50%, dextrose 50%, glucagon (human recombinant), glucose, glucose, HYDROcodone-acetaminophen, insulin aspart U-100, magnesium oxide, magnesium oxide, naloxone, ondansetron, potassium bicarbonate, potassium bicarbonate, potassium bicarbonate, potassium, sodium phosphates, potassium, sodium phosphates, potassium, sodium phosphates, sodium chloride 0.9%    Review of Systems  Objective:     Vital Signs (Most Recent):  Temp: 98.2 °F (36.8 °C) (07/06/23 0732)  Pulse: 62 (07/06/23 0732)  Resp: 18 (07/06/23 0732)  BP: 135/84 (07/06/23 0732)  SpO2: 95 % (07/06/23 0732) Vital Signs (24h Range):  Temp:  [97.3 °F (36.3 °C)-98.2 °F (36.8 °C)] 98.2 °F (36.8 °C)  Pulse:  [56-65] 62  Resp:  [16-20] 18  SpO2:  [94 %-96 %] 95 %  BP: (120-143)/(58-85) 135/84     Weight: 93.5 kg (206 lb 2.1 oz)  Body mass index is 36.51 kg/m².    Foot  Exam                  Laboratory:  All pertinent labs reviewed within the last 24 hours.    Diagnostic Results:  I have reviewed all pertinent imaging results/findings within the past 24 hours.   .    Assessment/Plan:     Orthopedic  Chronic ulcer of great toe of left foot with fat layer exposed  Patient does not wish to have any bedside procedure    Recommend continue dressing changes per wound care, recommend santyl followed by aquacel ag    gordon and angel    Plans for discharge to LTAC.  Patient can follow up outpatient once discharged    Counseled patient on increasing protein intake, not getting wound wet, keeping dressing clean dry and intact, following a healthy diet, elevating legs when able, removing pressure from wound          Jenny Peace DPM  Podiatry  [facility]

## 2023-07-06 NOTE — NURSING
0700:  report received, in bed with no complaints or distress  1130: wound care completed and holley catheter removed, patient tolerated well.  1730: patient hasn't voided as of yet, doctor notified, waiting on instructions.  1815: uneventful shift, no distress, Md said watch for a while on the urine output.

## 2023-07-06 NOTE — PLAN OF CARE
Problem: Physical Therapy  Goal: Physical Therapy Goal  Description: Goals to be met by: 2023     Patient will increase functional independence with mobility by performin. Supine to sit with Maximum Assistance  2. Rolling to Left and Right with Moderate Assistance.  3. Sitting at edge of bed x15  minutes with Minimal Assistance    Outcome: Ongoing, Not Progressing

## 2023-07-06 NOTE — PROGRESS NOTES
INPATIENT NEPHROLOGY PROGRESS NOTE  [facility] NEPHROLOGY    Anastasiia A Justino  07/06/2023    Reason for consultation:  Acute kidney injury    Chief Complaint:   Chief Complaint   Patient presents with    Hypotension     Sent from [facility] for eval of low blood pressure.       History of Present Illness:    Per H and P    74 y/o F with a past medical history significant for DM2, HTN, femur fracture, tibial fracture and tobacco use, L foot wounds,  high grade stenosis SFA bilaterally and popliteal on the left s/p baloon angioplasty L popliteal artery and left posterior tibial arery on DAPT. Recently discharged from [facility] [place], [place] for fracture of left tibia, s/p ORIF 6/6, also finished antibiotics for acute osteomyelitis of left calcaneus ( Final ID recs with end date for cipro of 6/22 and end date of ancef for 7/17 ). Patient sent from rehab for hypotension. In the ER was in septic shock, e/o UTI and anemic.     6/25  Pt states she feels nauseated and weak.  No sob.  Now bowel complaints.  Denies pain.  Somewhat confused but engages when prompted.  On phenylephrine vasopressor support.  275 cc uop  6/26  acidosis improving, renal same.  525cc UOP recorded.  Will cont IVF but cut rate to 75, f/u labs in am.    6/27  UOP 625cc, on hood, VSS.  Renal function a little worse, acidosis better, hypokalemic-got repletion.  Stopped bicarb gtt, switch to NS.  6/28 SBP , back on hood, on RA, UOP 300cc from holley- asked nurse to flush holley, no diarrhea reported  6/29 VSS, on RA, UOP 425cc, off hood, off NS IVFs, getting IV lasix today  6/30 to SNF today, UOP 450cc  7/1 VSS, UOP 1L  7/2 VSS, on RA, UOP 1.2L  7/3 VSS, no new complains, Ok to go to LTAC when ready.  7/4 VSS. Input from ID appreciated and agree with plan.  7/5 VSS. Agree with blood transfusion to keep Hgb > 8, add Epoetin.  7/6 VSS, no new complains.    Plan of Care:     MYLES 2/2 ATN in setting of urosepsis with shock or unresolved/new osteomyelitis  CKD stage 3,  baseline sCr 1.1  - renal function is abnormal but stable  - no nsaids or IV contrast  - dose meds for CrCl < 20  - no acute RRT needs  - appreciate ID input, agree with plan, broadened abx.    Hypotension  - continue midodrine  - ok with PRN diuretics  - optimize nutrition to reduce third spacing    Hyponatremia  Hypokalemia, Hypomagnesemia, Hypocalcemia  Metabolic acidosis--non-gap  - monitor electrolytes and replete as needed  - phos normal    Anemia  - tool + blood, iron stores acceptable  - s/p pRBC this admission, agree with more to keep Hgb > 8  - started Epoetin 10K TID on 7/5.    Renal cyst--complicated cyst   - imaging reviewed- normal kidneys, no hydro     Thank you for allowing us to participate in this patient's care. We will continue to follow.    Vital Signs:  Temp Readings from Last 3 Encounters:   07/06/23 98.2 °F (36.8 °C) (Oral)   06/14/23 97.6 °F (36.4 °C) (Oral)   06/05/23 98.9 °F (37.2 °C)       Pulse Readings from Last 3 Encounters:   07/06/23 62   06/14/23 87   06/05/23 84       BP Readings from Last 3 Encounters:   07/06/23 135/84   06/14/23 (!) 141/65   06/05/23 122/67       Weight:  Wt Readings from Last 3 Encounters:   06/29/23 93.5 kg (206 lb 2.1 oz)   06/25/23 72.6 kg (160 lb)   06/08/23 72.9 kg (160 lb 11.5 oz)     Medications:  No current facility-administered medications on file prior to encounter.     Current Outpatient Medications on File Prior to Encounter   Medication Sig Dispense Refill    acetaminophen (TYLENOL) 500 MG tablet Take 2 tablets (1,000 mg total) by mouth every 8 (eight) hours.  0    aspirin (ECOTRIN) 81 MG EC tablet Take 1 tablet (81 mg total) by mouth 2 (two) times a day. - end date august 30, 2023  0    atorvastatin (LIPITOR) 80 MG tablet Take 1 tablet (80 mg total) by mouth every evening. 90 tablet 3    blood sugar diagnostic Strp To check BG two times daily, to use with insurance preferred meter 200 each 1    blood-glucose meter kit To check BG two times daily,  "to use with insurance preferred meter 1 each 1    ciprofloxacin HCl (CIPRO) 750 MG tablet Take 1 tablet (750 mg total) by mouth every 12 (twelve) hours. End date 6/22/23      clopidogreL (PLAVIX) 75 mg tablet Take 1 tablet (75 mg total) by mouth once daily. 30 tablet 3    dextrose 5 % in water (D5W) 5 % PgBk 50 mL with ceFAZolin 2 gram SolR 2 g Inject 2 g into the vein every 8 (eight) hours. End date 7/17/23  0    famotidine (PEPCID) 20 MG tablet Take 1 tablet (20 mg total) by mouth 2 (two) times daily as needed (Heartburn).      insulin aspart U-100 (NOVOLOG) 100 unit/mL (3 mL) InPn pen Inject 0-5 Units into the skin before meals and at bedtime as needed (Hyperglycemia).  0    insulin aspart U-100 (NOVOLOG) 100 unit/mL (3 mL) InPn pen Inject 6 Units into the skin 3 (three) times daily with meals.  0    insulin detemir U-100, Levemir, 100 unit/mL (3 mL) SubQ InPn pen Inject 18 Units into the skin once daily.  0    lancets Misc To check BG two times daily, to use with insurance preferred meter 200 each 1    lisinopriL (PRINIVIL,ZESTRIL) 5 MG tablet Take 1 tablet (5 mg total) by mouth once daily. 90 tablet 3    melatonin (MELATIN) 3 mg tablet Take 3 tablets (9 mg total) by mouth nightly.  0    methocarbamoL (ROBAXIN) 500 MG Tab Take 1 tablet (500 mg total) by mouth 4 (four) times daily. 40 tablet 0    ondansetron (ZOFRAN-ODT) 4 MG TbDL Take 1 tablet (4 mg total) by mouth every 8 (eight) hours as needed (nausea).      oxyCODONE (ROXICODONE) 10 mg Tab immediate release tablet Take 1 tablet (10 mg total) by mouth every 4 (four) hours as needed (pain sclae 7-10). 10 tablet 0    oxyCODONE (ROXICODONE) 5 MG immediate release tablet Take 1 tablet (5 mg total) by mouth every 4 (four) hours as needed (pain scale 4-6). 10 tablet 0    pen needle, diabetic (PEN NEEDLE) 31 gauge x 3/16" Ndle 1 application by Misc.(Non-Drug; Combo Route) route 2 (two) times a day. 200 each 0    polyethylene glycol (GLYCOLAX) 17 gram PwPk Take 17 g " "by mouth once daily.  0    pregabalin (LYRICA) 75 MG capsule Take 1 capsule (75 mg total) by mouth 2 (two) times daily. 60 capsule 0    senna-docusate 8.6-50 mg (PERICOLACE) 8.6-50 mg per tablet Take 1 tablet by mouth once daily.      vitamin D (VITAMIN D3) 1000 units Tab Take 1 tablet (1,000 Units total) by mouth once daily.       Scheduled Meds:   amiodarone  200 mg Oral Daily    apixaban  2.5 mg Oral BID    ceFEPime (MAXIPIME) IVPB  2 g Intravenous Q24H    clopidogreL  75 mg Oral Daily    clotrimazole  10 mg Oral TID    collagenase   Topical (Top) Daily    collagenase   Topical (Top) Daily    droNABinol  2.5 mg Oral BID    epoetin juan f (PROCRIT) injection  10,000 Units Subcutaneous Every Mon, Wed, Fri    fat emulsion 20%  250 mL Intravenous Daily    insulin detemir U-100  10 Units Subcutaneous QHS    midodrine  5 mg Oral TID WM     Continuous Infusions:   TPN ADULT CENTRAL LINE CUSTOM 50 mL/hr at 07/05/23 2153       PRN Meds:.sodium chloride, dextrose 50%, dextrose 50%, glucagon (human recombinant), glucose, glucose, HYDROcodone-acetaminophen, insulin aspart U-100, magnesium oxide, magnesium oxide, naloxone, ondansetron, potassium bicarbonate, potassium bicarbonate, potassium bicarbonate, potassium, sodium phosphates, potassium, sodium phosphates, potassium, sodium phosphates, sodium chloride 0.9%    Review of Systems:  Neg    Physical Exam:    /84   Pulse 62   Temp 98.2 °F (36.8 °C) (Oral)   Resp 18   Ht 5' 3" (1.6 m)   Wt 93.5 kg (206 lb 2.1 oz)   SpO2 95%   BMI 36.51 kg/m²     General Appearance:    Ill appearing   Head:    Normocephalic, without obvious abnormality, atraumatic   Eyes:    PER, conjunctiva/corneas clear, EOM's intact in both eyes        Throat:   Lips, mucosa, and tongue normal; teeth and gums normal   Back:     Symmetric, no curvature, ROM normal, no CVA tenderness   Lungs:     Clear to auscultation bilaterally, respirations unlabored   Chest wall:    No tenderness or deformity "   Heart:    Regular rate and rhythm, S1 and S2 normal, no murmur, rub   or gallop   Abdomen:     Soft, non-tender, bowel sounds active all four quadrants,     no masses, no organomegaly   Extremities:   Edematous    Pulses:   2+ and symmetric all extremities   MSK:   No joint or muscle swelling, tenderness or deformity   Skin:   Skin color, texture, turgor normal, no rashes or lesions   Neurologic:   CNII-XII intact, normal strength and sensation       Throughout.  No flap     Results:  Recent Labs   Lab 07/04/23 0430 07/05/23 0424 07/06/23  0559   * 129* 127*   K 4.9 4.9 4.5   CL 98 98 95   CO2 23 25 25   BUN 76* 83* 88*   CREATININE 2.9* 2.6* 2.9*   * 323* 221*         Recent Labs   Lab 07/03/23 0433 07/04/23 0430 07/05/23 0424 07/05/23  1133 07/06/23  0559   CALCIUM 7.6*  7.6* 7.4* 7.4*  --  7.4*   ALBUMIN 1.3*  --   --   --   --    PHOS  --   --   --  4.9*  --    MG  --   --   --  1.7  --                No results for input(s): POCTGLUCOSE in the last 168 hours.    Recent Labs   Lab 02/17/23  1415 04/21/23 0429 06/05/23 1957   Hemoglobin A1C 8.9 H 8.2 H 10.2 H         Recent Labs   Lab 07/04/23  0430 07/05/23 0424 07/05/23  1950 07/06/23  0559   WBC 15.79* 13.89*  --  13.97*   HGB 7.7* 7.1* 8.3* 8.3*   HCT 22.9* 21.7* 24.9* 24.6*    341  --  325   MCV 86 87  --  84   MCHC 33.6 32.7  --  33.7   MONO 4.3  0.7 4.9  0.7  --  5.3  0.7         Recent Labs   Lab 07/03/23  0433   BILITOT 0.9   PROT 4.4*   ALBUMIN 1.3*   ALKPHOS 90   ALT <5*   AST 15         Recent Labs   Lab 11/04/20  1028 07/08/21  1215 02/17/23  1424 06/24/23  0208   Color, UA YELLOW DARK YELLOW Yellow [place] A   Appearance, UA CLOUDY A TURBID A Hazy A Cloudy A   pH, UA < OR = 5.0 < OR = 5.0 5.0 6.0   Specific [place], UA 1.028 1.024 1.010 1.015   Protein, UA 1+ A 2+ A Negative 2+ A   Glucose, UA  --   --  3+ A Negative   Ketones, UA 1+ A 1+ A 3+ A Negative   Urobilinogen, UA  --   --  Negative Negative   Bilirubin (UA)   --   --  Negative Negative   Occult Blood UA 3+ A 3+ A 2+ A 2+ A   Nitrite, UA NEGATIVE NEGATIVE Positive A Negative   RBC, UA  --   --  6 H 1   WBC, UA  --   --  25 H 23 H   Bacteria, UA MANY A MANY A  --   --    Bacteria  --   --  Many A Negative   Hyaline Casts, UA NONE SEEN NONE SEEN  --  19 A         Recent Labs   Lab 06/24/23  1800 06/24/23 2011 06/25/23  0905   POC PH 7.197 LL 7.297 L 7.394   POC PCO2 24.7 LL 23.4 LL 26.2 LL   POC HCO3 9.6 L 11.4 L 16.0 L   POC PO2 86 82 88   POC SATURATED O2 94 L 95 97   POC BE -19 -15 -9   Sample ARTERIAL ARTERIAL ARTERIAL         Microbiology Results (last 7 days)       Procedure Component Value Units Date/Time    Blood culture [[id number]] Collected: 07/03/23 1741    Order Status: Completed Specimen: Blood Updated: 07/05/23 2232     Blood Culture, Routine No Growth to date      No Growth to date      No Growth to date    Blood culture [[id number]] Collected: 07/03/23 1741    Order Status: Completed Specimen: Blood Updated: 07/05/23 2232     Blood Culture, Routine No Growth to date      No Growth to date      No Growth to date    Aerobic culture [[id number]] Collected: 07/05/23 1213    Order Status: Sent Specimen: Wound from Toe, Left Foot Updated: 07/05/23 1222    Blood culture [[id number]] Collected: 06/30/23 0613    Order Status: Completed Specimen: Blood Updated: 07/05/23 1032     Blood Culture, Routine No growth after 5 days.    Narrative:      Collection has been rescheduled by LND at 06/30/2023 06:11 Reason:   Completed  Collection has been rescheduled by LND at 06/30/2023 06:11 Reason:   Completed    Blood culture [[id number]]     Order Status: Canceled Specimen: Blood     Blood culture [[id number]]     Order Status: Canceled Specimen: Blood     Culture, Respiratory with Gram Stain [[id number]]  (Abnormal) Collected: 06/24/23 1245    Order Status: Completed Specimen: Respiratory from Sputum Updated: 07/01/23 0628     Respiratory Culture PRESUMPTIVE CANDIDA  ALBICANS  Moderate        BALDOMERO TROPICALIS  Moderate       Gram Stain (Respiratory) Many WBC's     Gram Stain (Respiratory) >10epis/lfp and <than many WBC's     Gram Stain (Respiratory) Few yeast    IV catheter culture [[id number]] Collected: 06/25/23 1723    Order Status: Completed Specimen: Catheter Tip, PICC Updated: 06/30/23 0827     Aerobic Culture - Cath tip No growth          Patient care was time spent personally by me on the following activities: >  min    Obtaining a history  Examination of patient.  Providing medical care at the patients bedside.  Developing a treatment plan with patient or surrogate and bedside caregivers  Ordering and reviewing laboratory studies, radiographic studies, pulse oximetry.  Ordering and performing treatments and interventions.  Evaluation of patient's response to treatment.  Discussions with consultants while on the unit and immediately available to the patient.  Re-evaluation of the patient's condition.  Documentation in the medical record.     Shravan Thomas MD  Nephrology  [facility] [place]  [phone]

## 2023-07-06 NOTE — ASSESSMENT & PLAN NOTE
Due to septic shock, malnutrition and FTT  Not at baseline per family - prior to hospitalization pt was more independent and mentation was stable  Will cont to monitor closely. Family at BS today and encouraged to have other family and friends visit with pt to improve mentation.     7/5 - mentation improved today, will cont to monitor closely  7/6 - mentation stable but pt conts with FTT and poor motivation. D/w family if pt not improving to consider hospice services

## 2023-07-06 NOTE — PLAN OF CARE
Problem: Occupational Therapy  Goal: Occupational Therapy Goal  Description: Goals to be met by: 7/30/2023     Patient will increase functional independence with ADLs by performing:    UE Dressing with Minimal Assistance.  Grooming while seated with Stand-by Assistance.  Sitting at edge of bed x10 minutes with Contact Guard Assistance.  Supine to sit with Minimal Assistance.  Upper extremity exercise program x10 reps per handout, with assistance as needed.    Outcome: Ongoing, Minimally Progressing

## 2023-07-06 NOTE — SUBJECTIVE & OBJECTIVE
Interval History:  Notes reviewed, no acute events overnight.  Patient minimally participating with therapy today and remains poorly motivated.  Her appetite remains poor.  Patient accepted to LTAC and will plan for discharge tomorrow.  Educated patient on importance of diet and therapy to get stronger.  Patient verbalized understanding.  Gee catheter removed. Will monitor for retention.       Review of Systems   Constitutional:  Positive for activity change, appetite change and fatigue. Negative for chills and fever.   HENT:  Negative for congestion, sore throat and trouble swallowing.    Respiratory:  Negative for cough and shortness of breath.    Cardiovascular:  Negative for chest pain.   Gastrointestinal:  Positive for nausea. Negative for abdominal pain, diarrhea and vomiting.   Musculoskeletal:  Positive for arthralgias and gait problem. Negative for back pain.   Skin:  Positive for wound. Negative for color change, pallor and rash.   Neurological:  Negative for dizziness, weakness and light-headedness.   Psychiatric/Behavioral:  Negative for agitation, behavioral problems and confusion.    All other systems reviewed and are negative.  Objective:     Vital Signs (Most Recent):  Temp: 97.4 °F (36.3 °C) (07/06/23 1117)  Pulse: 62 (07/06/23 1117)  Resp: 18 (07/06/23 1117)  BP: 120/66 (07/06/23 1117)  SpO2: (!) 94 % (07/06/23 1117) Vital Signs (24h Range):  Temp:  [97.3 °F (36.3 °C)-98.2 °F (36.8 °C)] 97.4 °F (36.3 °C)  Pulse:  [56-62] 62  Resp:  [16-20] 18  SpO2:  [94 %-96 %] 94 %  BP: (120-143)/(65-85) 120/66     Weight: 93.5 kg (206 lb 2.1 oz)  Body mass index is 36.51 kg/m².    Intake/Output Summary (Last 24 hours) at 7/6/2023 1553  Last data filed at 7/6/2023 1352  Gross per 24 hour   Intake 2441.2 ml   Output 1600 ml   Net 841.2 ml           Physical Exam  Vitals and nursing note reviewed.   Constitutional:       General: She is not in acute distress.     Appearance: She is well-developed. She is  ill-appearing (chronically ill appearing). She is not diaphoretic.   HENT:      Head: Normocephalic and atraumatic.      Right Ear: External ear normal.      Left Ear: External ear normal.      Nose: Nose normal. No congestion or rhinorrhea.      Mouth/Throat:      Mouth: Mucous membranes are moist.      Pharynx: Oropharynx is clear. No oropharyngeal exudate or posterior oropharyngeal erythema.   Eyes:      General: No scleral icterus.     Extraocular Movements: Extraocular movements intact.      Conjunctiva/sclera: Conjunctivae normal.      Pupils: Pupils are equal, round, and reactive to light.   Neck:      Vascular: No JVD.   Cardiovascular:      Rate and Rhythm: Normal rate and regular rhythm.      Pulses: Normal pulses.      Heart sounds: Normal heart sounds. No murmur heard.     Comments: Right IJ dressing without erythema or drainage at site noted.  Pulmonary:      Effort: Pulmonary effort is normal. No respiratory distress.      Breath sounds: Normal breath sounds. No stridor. No wheezing, rhonchi or rales.      Comments: congested cough with thick, clear sputum  Abdominal:      General: Abdomen is flat. Bowel sounds are normal. There is no distension.      Palpations: Abdomen is soft.      Tenderness: There is no abdominal tenderness.   Musculoskeletal:         General: No swelling or tenderness. Normal range of motion.      Cervical back: Normal range of motion and neck supple.      Comments: +anasarca  LEFT leg with external fixator, no overt drainage noted   Skin:     General: Skin is warm and dry.      Capillary Refill: Capillary refill takes less than 2 seconds.      Coloration: Skin is not jaundiced or pale.      Findings: No erythema.      Comments: Left Heel wound, left great toe wound evaluated-see photos   -sacral wound present - see wound care pics   Neurological:      General: No focal deficit present.      Mental Status: She is alert. She is disoriented.      Cranial Nerves: No cranial nerve  deficit.      Sensory: No sensory deficit.      Comments: More oriented today. Knows she is in the hospital and able to state her full name and month and day of birth. Still confused to year of birth.    Psychiatric:         Mood and Affect: Mood normal.         Behavior: Behavior normal.      Comments: Pt in much better spirits today. More talkative and interactive.                      Significant Labs: All pertinent labs within the past 24 hours have been reviewed.  CBC:   Recent Labs   Lab 07/05/23  0424 07/05/23  1950 07/06/23  0559   WBC 13.89*  --  13.97*   HGB 7.1* 8.3* 8.3*   HCT 21.7* 24.9* 24.6*     --  325       CMP:   Recent Labs   Lab 07/05/23 0424 07/06/23  0559   * 127*   K 4.9 4.5   CL 98 95   CO2 25 25   * 221*   BUN 83* 88*   CREATININE 2.6* 2.9*   CALCIUM 7.4* 7.4*   ANIONGAP 6* 7*       Lab Results   Component Value Date    TSH 2.250 06/30/2023       Microbiology Results (last 7 days)       Procedure Component Value Units Date/Time    Aerobic culture [[id number]] Collected: 07/05/23 1213    Order Status: Completed Specimen: Wound from Toe, Left Foot Updated: 07/06/23 1236     Aerobic Bacterial Culture No growth    Narrative:      Left great toe, foot.    Blood culture [[id number]] Collected: 07/03/23 1741    Order Status: Completed Specimen: Blood Updated: 07/05/23 2232     Blood Culture, Routine No Growth to date      No Growth to date      No Growth to date    Blood culture [[id number]] Collected: 07/03/23 1741    Order Status: Completed Specimen: Blood Updated: 07/05/23 2232     Blood Culture, Routine No Growth to date      No Growth to date      No Growth to date    Blood culture [[id number]] Collected: 06/30/23 0613    Order Status: Completed Specimen: Blood Updated: 07/05/23 1032     Blood Culture, Routine No growth after 5 days.    Narrative:      Collection has been rescheduled by LND at 06/30/2023 06:11 Reason:   Completed  Collection has been rescheduled by LND at  06/30/2023 06:11 Reason:   Completed    Blood culture [[id number]]     Order Status: Canceled Specimen: Blood     Blood culture [[id number]]     Order Status: Canceled Specimen: Blood     Culture, Respiratory with Gram Stain [[id number]]  (Abnormal) Collected: 06/24/23 1245    Order Status: Completed Specimen: Respiratory from Sputum Updated: 07/01/23 0628     Respiratory Culture PRESUMPTIVE CANDIDA ALBICANS  Moderate        BALDOMERO TROPICALIS  Moderate       Gram Stain (Respiratory) Many WBC's     Gram Stain (Respiratory) >10epis/lfp and <than many WBC's     Gram Stain (Respiratory) Few yeast    IV catheter culture [[id number]] Collected: 06/25/23 1723    Order Status: Completed Specimen: Catheter Tip, PICC Updated: 06/30/23 0827     Aerobic Culture - Cath tip No growth              Significant Imaging: I have reviewed all pertinent imaging results/findings within the past 24 hours.

## 2023-07-06 NOTE — PT/OT/SLP PROGRESS
Occupational Therapy   Treatment    Name: Anastasiia Badillo  MRN: [id number]  Admitting Diagnosis:  Septic shock  12 Days Post-Op    Recommendations:     Discharge Recommendations: LTACH (long-term acute care hospital)  Discharge Equipment Recommendations:  none  Barriers to discharge:  Decreased caregiver support    Assessment:     Anastasiia Badillo is a 73 y.o. female with a medical diagnosis of Septic shock.  Performance deficits affecting function are weakness, impaired endurance, impaired self care skills, impaired functional mobility, gait instability, impaired balance, edema, impaired cardiopulmonary response to activity, orthopedic precautions.     Pt participated minimally in bed exercises; poor motivation.    Rehab Prognosis:  Fair; patient would benefit from acute skilled OT services to address these deficits and reach maximum level of function.       Plan:     Patient to be seen 3 x/week to address the above listed problems via self-care/home management, therapeutic activities, therapeutic exercises, wheelchair management/training  Plan of Care Expires: 07/30/23  Plan of Care Reviewed with: patient    Subjective     Chief Complaint: none stated  Patient/Family Comments/goals: none stated  Pain/Comfort:  Pain Rating 1:  (none reported)  Pain Rating Post-Intervention 1:  (none reported)    Objective:     Communicated with: nurse prior to session.  Patient found HOB elevated with   upon OT entry to room.    General Precautions: Standard, fall    Orthopedic Precautions:LLE non weight bearing  Braces:  (external fixator)  Respiratory Status: Room air     Occupational Performance:     Activities of Daily Living:  Feeding:  minimum assistance to hold/drink from can of sprite    Treatment & Education:  Attempted to engage pt in UE therex; pt completed 2x5 shoulder flexion on each UE with poor effort and then declined to continue.    Pt encouraged to move UEs as able throughout the day for edema  management.    Patient left HOB elevated with all lines intact, call button in reach, bed alarm on, and nurse notified    GOALS:   Multidisciplinary Problems       Occupational Therapy Goals          Problem: Occupational Therapy    Goal Priority Disciplines Outcome Interventions   Occupational Therapy Goal     OT, PT/OT Ongoing, Progressing    Description: Goals to be met by: 7/30/2023     Patient will increase functional independence with ADLs by performing:    UE Dressing with Minimal Assistance.  Grooming while seated with Stand-by Assistance.  Sitting at edge of bed x10 minutes with Contact Guard Assistance.  Supine to sit with Minimal Assistance.  Upper extremity exercise program x10 reps per handout, with assistance as needed.                         Time Tracking:     OT Date of Treatment: 07/06/23  OT Start Time: 1510  OT Stop Time: 1521  OT Total Time (min): 11 min    Billable Minutes:Therapeutic Exercise 11    OT/DARION: OT     Number of DARION visits since last OT visit: 1    7/6/2023

## 2023-07-06 NOTE — ASSESSMENT & PLAN NOTE
Patient does not wish to have any bedside procedure    Recommend continue dressing changes per wound care, recommend santyl followed by aquacel ag    gordon and angel    Plans for discharge to LTAC.  Patient can follow up outpatient once discharged    Counseled patient on increasing protein intake, not getting wound wet, keeping dressing clean dry and intact, following a healthy diet, elevating legs when able, removing pressure from wound

## 2023-07-06 NOTE — ASSESSMENT & PLAN NOTE
Nutrition consulted. Most recent weight and BMI monitored-     Measurements:  Wt Readings from Last 1 Encounters:   06/29/23 93.5 kg (206 lb 2.1 oz)   Body mass index is 36.51 kg/m².    Patient has been screened and assessed by RD.    Malnutrition Type:  Context:    Level:      Malnutrition Characteristic Summary:       Interventions/Recommendations (treatment strategy):  1. Recommend start appetite stimulnat. 2. Continue current diet and encourage po intake. 3. RD to monitor TPN/ intake, weight and labs.     She is not eating.  As pre-albumin 11, albumin today 1.3   -has diffuse third-spacing   -she needs protein for wound healing   -cont Clinimix until TPN starts tonight.     7/6 - pt continues with poor po intake and appetite. Cont marinol and TPN for now until oral intake improves

## 2023-07-06 NOTE — ASSESSMENT & PLAN NOTE
Patient's anemia is currently controlled. . Etiology likely d/t chronic disease  Current CBC reviewed-   Lab Results   Component Value Date    HGB 8.3 (L) 07/06/2023    HCT 24.6 (L) 07/06/2023     Monitor serial CBC and transfuse if patient becomes hemodynamically unstable, symptomatic or H/H drops below 7/21.     Pt received 1 Unit PRBC yesterday and hgb improved today

## 2023-07-06 NOTE — PROGRESS NOTES
[facility] Medicine  Progress Note    Patient Name: Anastasiia Badillo  MRN: [id number]  Patient Class: IP- Inpatient   Admission Date: 6/24/2023  Length of Stay: 12 days  Attending Physician: Criss Beck MD  Primary Care Provider: Raji Parkinson MD        Subjective:     Principal Problem:Septic shock        HPI:  72 y/o F with a past medical history significant for DM2, HTN, femur fracture, tibial fracture and tobacco use, L foot wounds,  high grade stenosis SFA bilaterally and popliteal on the left s/p baloon angioplasty L popliteal artery and left posterior tibial arery on DAPT.      [place] [facility], s/p ORIF 6/6, also finished antibiotics for acute osteomyelitis of left calcaneus ( Final ID recs with end date for cipro of 6/22 and end date of ancef for 7/17 )     Patient sent from rehab for hypotension.     In the ER was in septic shock, e/o UTI and anemic.       Overview/Hospital Course:  Anastasiia Badillo was admitted to the service of hospital medicine for further treatment of septic shock suspected to be 2/2 UTI.  Prior to admission, she was being treated at LTAC for acute osteomyelitis of left calcaneus ( Final ID recs with end date for cipro of 6/22 and end date of ancef for 7/17 ), with hx fracture of left tibia, s/p ORIF 6/6.  She was placed on IV vancomycin, IV cefepime, and IVFH.  She was hypotensive initially requiring vasopressors and was admitted to ICU.  She was given one unit PRBCs for symptomatic anemia.  She developed atrial fibrillation with HR up to 200 and was placed on IV amiodarone.  Cardiology was consulted and pt was ultimately cardioverted at bedside.  IV levophed was changed to IV hood-synephrine.  She was noted to have profound metabolic acidosis, possibly diabetic ketoacidosis, and was placed on IV insulin.  Additionally, she developed an MYLES and nephrology was consulted.  Sodium bicarb drip was recommended.  She  eventually improved, with IV vasopressors weaned and placed on oral midodrine and oral amiodarone.  PT/OT were consulted.  Urine culture growing yeast.  Placed on IV diflucan, IV ancef continued due to drainage from external fixator.  Wound care consulted.  Pt had stagnant renal function.  She had continued decline in her WBC count, however she developed hypothermia.  Abx were broadened and infectious workup was pursued.  Id was consulted.  There was concern for possible cellulitis surrounding her sacral wound-we reached out to wound care physician to have this debrided.  Her oral intake was poor and protein levels very low.  I discussed with dietitian and we initiated Clinimix.      Interval History:  Notes reviewed, no acute events overnight.  Patient minimally participating with therapy today and remains poorly motivated.  Her appetite remains poor.  Patient accepted to LTAC and will plan for discharge tomorrow.  Educated patient on importance of diet and therapy to get stronger.  Patient verbalized understanding.  Gee catheter removed. Will monitor for retention.       Review of Systems   Constitutional:  Positive for activity change, appetite change and fatigue. Negative for chills and fever.   HENT:  Negative for congestion, sore throat and trouble swallowing.    Respiratory:  Negative for cough and shortness of breath.    Cardiovascular:  Negative for chest pain.   Gastrointestinal:  Positive for nausea. Negative for abdominal pain, diarrhea and vomiting.   Musculoskeletal:  Positive for arthralgias and gait problem. Negative for back pain.   Skin:  Positive for wound. Negative for color change, pallor and rash.   Neurological:  Negative for dizziness, weakness and light-headedness.   Psychiatric/Behavioral:  Negative for agitation, behavioral problems and confusion.    All other systems reviewed and are negative.  Objective:     Vital Signs (Most Recent):  Temp: 97.4 °F (36.3 °C) (07/06/23 1117)  Pulse: 62  (07/06/23 1117)  Resp: 18 (07/06/23 1117)  BP: 120/66 (07/06/23 1117)  SpO2: (!) 94 % (07/06/23 1117) Vital Signs (24h Range):  Temp:  [97.3 °F (36.3 °C)-98.2 °F (36.8 °C)] 97.4 °F (36.3 °C)  Pulse:  [56-62] 62  Resp:  [16-20] 18  SpO2:  [94 %-96 %] 94 %  BP: (120-143)/(65-85) 120/66     Weight: 93.5 kg (206 lb 2.1 oz)  Body mass index is 36.51 kg/m².    Intake/Output Summary (Last 24 hours) at 7/6/2023 1553  Last data filed at 7/6/2023 1352  Gross per 24 hour   Intake 2441.2 ml   Output 1600 ml   Net 841.2 ml           Physical Exam  Vitals and nursing note reviewed.   Constitutional:       General: She is not in acute distress.     Appearance: She is well-developed. She is ill-appearing (chronically ill appearing). She is not diaphoretic.   HENT:      Head: Normocephalic and atraumatic.      Right Ear: External ear normal.      Left Ear: External ear normal.      Nose: Nose normal. No congestion or rhinorrhea.      Mouth/Throat:      Mouth: Mucous membranes are moist.      Pharynx: Oropharynx is clear. No oropharyngeal exudate or posterior oropharyngeal erythema.   Eyes:      General: No scleral icterus.     Extraocular Movements: Extraocular movements intact.      Conjunctiva/sclera: Conjunctivae normal.      Pupils: Pupils are equal, round, and reactive to light.   Neck:      Vascular: No JVD.   Cardiovascular:      Rate and Rhythm: Normal rate and regular rhythm.      Pulses: Normal pulses.      Heart sounds: Normal heart sounds. No murmur heard.     Comments: Right IJ dressing without erythema or drainage at site noted.  Pulmonary:      Effort: Pulmonary effort is normal. No respiratory distress.      Breath sounds: Normal breath sounds. No stridor. No wheezing, rhonchi or rales.      Comments: congested cough with thick, clear sputum  Abdominal:      General: Abdomen is flat. Bowel sounds are normal. There is no distension.      Palpations: Abdomen is soft.      Tenderness: There is no abdominal tenderness.    Musculoskeletal:         General: No swelling or tenderness. Normal range of motion.      Cervical back: Normal range of motion and neck supple.      Comments: +anasarca  LEFT leg with external fixator, no overt drainage noted   Skin:     General: Skin is warm and dry.      Capillary Refill: Capillary refill takes less than 2 seconds.      Coloration: Skin is not jaundiced or pale.      Findings: No erythema.      Comments: Left Heel wound, left great toe wound evaluated-see photos   -sacral wound present - see wound care pics   Neurological:      General: No focal deficit present.      Mental Status: She is alert. She is disoriented.      Cranial Nerves: No cranial nerve deficit.      Sensory: No sensory deficit.      Comments: More oriented today. Knows she is in the hospital and able to state her full name and month and day of birth. Still confused to year of birth.    Psychiatric:         Mood and Affect: Mood normal.         Behavior: Behavior normal.      Comments: Pt in much better spirits today. More talkative and interactive.                      Significant Labs: All pertinent labs within the past 24 hours have been reviewed.  CBC:   Recent Labs   Lab 07/05/23  0424 07/05/23  1950 07/06/23  0559   WBC 13.89*  --  13.97*   HGB 7.1* 8.3* 8.3*   HCT 21.7* 24.9* 24.6*     --  325       CMP:   Recent Labs   Lab 07/05/23  0424 07/06/23  0559   * 127*   K 4.9 4.5   CL 98 95   CO2 25 25   * 221*   BUN 83* 88*   CREATININE 2.6* 2.9*   CALCIUM 7.4* 7.4*   ANIONGAP 6* 7*       Lab Results   Component Value Date    TSH 2.250 06/30/2023       Microbiology Results (last 7 days)       Procedure Component Value Units Date/Time    Aerobic culture [[id number]] Collected: 07/05/23 1213    Order Status: Completed Specimen: Wound from Toe, Left Foot Updated: 07/06/23 1236     Aerobic Bacterial Culture No growth    Narrative:      Left great toe, foot.    Blood culture [[id number]] Collected: 07/03/23 0234     Order Status: Completed Specimen: Blood Updated: 07/05/23 2232     Blood Culture, Routine No Growth to date      No Growth to date      No Growth to date    Blood culture [[id number]] Collected: 07/03/23 1741    Order Status: Completed Specimen: Blood Updated: 07/05/23 2232     Blood Culture, Routine No Growth to date      No Growth to date      No Growth to date    Blood culture [[id number]] Collected: 06/30/23 0613    Order Status: Completed Specimen: Blood Updated: 07/05/23 1032     Blood Culture, Routine No growth after 5 days.    Narrative:      Collection has been rescheduled by LND at 06/30/2023 06:11 Reason:   Completed  Collection has been rescheduled by LND at 06/30/2023 06:11 Reason:   Completed    Blood culture [[id number]]     Order Status: Canceled Specimen: Blood     Blood culture [[id number]]     Order Status: Canceled Specimen: Blood     Culture, Respiratory with Gram Stain [[id number]]  (Abnormal) Collected: 06/24/23 1245    Order Status: Completed Specimen: Respiratory from Sputum Updated: 07/01/23 0628     Respiratory Culture PRESUMPTIVE CANDIDA ALBICANS  Moderate        BALDOMERO TROPICALIS  Moderate       Gram Stain (Respiratory) Many WBC's     Gram Stain (Respiratory) >10epis/lfp and <than many WBC's     Gram Stain (Respiratory) Few yeast    IV catheter culture [[id number]] Collected: 06/25/23 1723    Order Status: Completed Specimen: Catheter Tip, PICC Updated: 06/30/23 0827     Aerobic Culture - Cath tip No growth              Significant Imaging: I have reviewed all pertinent imaging results/findings within the past 24 hours.      Assessment/Plan:      PAF (paroxysmal atrial fibrillation)  Patient with new onset AFib this admission with RVR- rates upwards 200s.  -Required cardioversion: now NSR  -On oral amiodarone  -was recommended to start eliquis once H/H stabilized (required transfusion at start of admit).  -eliquis has not yet been started- will discuss with cards as she is on ASA/plavix  per vascular surgery after recent vascular intervention (April).    7/5 - d/w cardiology, will stop asa and lovenox and start eliquis today, cont plavix for antiplatelet therapy (balloon angioplasty with DCB in April)    Moderate protein-calorie malnutrition  Nutrition consulted. Most recent weight and BMI monitored-     Measurements:  Wt Readings from Last 1 Encounters:   06/29/23 93.5 kg (206 lb 2.1 oz)   Body mass index is 36.51 kg/m².    Patient has been screened and assessed by RD.    Malnutrition Type:  Context:    Level:      Malnutrition Characteristic Summary:       Interventions/Recommendations (treatment strategy):  1. Recommend start appetite stimulnat. 2. Continue current diet and encourage po intake. 3. RD to monitor TPN/ intake, weight and labs.     She is not eating.  As pre-albumin 11, albumin today 1.3   -has diffuse third-spacing   -she needs protein for wound healing   -cont Clinimix until TPN starts tonight.     7/6 - pt continues with poor po intake and appetite. Cont marinol and TPN for now until oral intake improves    Hypothermia  Pt with recurrent hypothermia with episodes on 06/30, then again overnight   -thyroid tests were within normal limits at that time, will add T3, T4   -check cortisol level in a.m.   -broaden infectious workup, repeat cultures, check CXR, procalcitonin.  Id is involved   -work on nutritional status    7/4 - cortisol level normal. Pt had low oral temp today and rectal temp obtained and normal. Advised nursing to confirm hypothermia with rectal temp if issue reoccurs    7/5 - no further hypothermia      Pressure injury of sacral region, unstageable  Present on arrival with surrounding erythema and slough covering  -wound care consulted and following.  Discussed with them today.  Would like wound care physician to see for debridement cultures.  -continue to follow wound care orders   -turn Pt q.2 hours    Type 2 diabetes mellitus, with long-term current use of  insulin  Patient's FSGs are controlled on current medication regimen.  Last A1c reviewed-   Lab Results   Component Value Date    HGBA1C 10.2 (H) 06/05/2023     Current correctional scale  Low  Decrease anti-hyperglycemic dose as follows-   Antihyperglycemics (From admission, onward)    Start     Stop Route Frequency Ordered    06/26/23 1052  insulin aspart U-100 pen 0-5 Units         -- SubQ Before meals & nightly PRN 06/26/23 0952    06/25/23 0800  insulin detemir U-100 (Levemir) pen 9 Units         -- SubQ Daily 06/25/23 0757        Hold Oral hypoglycemics while patient is in the hospital.    Hyponatremia  Sodium dropped this AM  Monitor sodium level.  Likely due to renal failure and malnutrition  Nephrology following    Sodium   Date Value Ref Range Status   07/06/2023 127 (L) 136 - 145 mmol/L Final   07/05/2023 129 (L) 136 - 145 mmol/L Final   07/04/2023 131 (L) 136 - 145 mmol/L Final           ATN (acute tubular necrosis)  Patient with acute kidney injury likely due to acute tubular necrosis MYLES is currently stable. Labs reviewed- Renal function/electrolytes with Estimated Creatinine Clearance: 20.5 mL/min (A) (based on SCr of 2.7 mg/dL (H)). according to latest data. Monitor urine output and serial BMP and adjust therapy as needed. Avoid nephrotoxins and renally dose meds for GFR listed above.   -Nephrology following: appreciate recs  -Cr stagnant at 2.7  -last received IV Lasix on 06/29  -Follow the BMP  -she has diffuse anasarca and hypoalbuminemia.  I discussed with Nephrology.  They are hesitant to diurese in light of lower blood pressures, concern for occult infection.  We will follow closely and can diurese if/when appropriate    Encephalopathy, metabolic  Due to septic shock, malnutrition and FTT  Not at baseline per family - prior to hospitalization pt was more independent and mentation was stable  Will cont to monitor closely. Family at BS today and encouraged to have other family and friends visit  with pt to improve mentation.     7/5 - mentation improved today, will cont to monitor closely  7/6 - mentation stable but pt conts with FTT and poor motivation. D/w family if pt not improving to consider hospice services    Anemia, chronic disease  Patient's anemia is currently controlled. . Etiology likely d/t chronic disease  Current CBC reviewed-   Lab Results   Component Value Date    HGB 8.3 (L) 07/06/2023    HCT 24.6 (L) 07/06/2023     Monitor serial CBC and transfuse if patient becomes hemodynamically unstable, symptomatic or H/H drops below 7/21.     Pt received 1 Unit PRBC yesterday and hgb improved today    UTI (urinary tract infection)  She completed a 7 day course of antifungal urine grew yeast.  ID following          Acute osteomyelitis of left calcaneus  This is an established diagnosis.  She was followed closely by ID at [facility] and D/C on ciprofloxacin which she has completed and Ancef with end date of 07/17.  -today we escalated Abx widening to cefepime and vanc given hypothermia, continued leukocytosis with concern for occult infection   -id has been consulted call appreciate recommendations  -eventually plan for discharge back to LTAC for continued antibiotic therapy as originally outlined      Chronic ulcer of great toe of left foot with fat layer exposed  Wound care following  -continue to follow wound care orders   -does not appear acutely infected      Chronic ulcer of left heel with fat layer exposed  Wound care following  -continue to follow and care orders  -does not appear acutely infected      Closed displaced pilon fracture of left tibia  - Patient to be non weight bearing to left lower extremity x 3 months from surgery date on 6/6.   - Ortho to remove ex fix I 4-6 weeks but if construct fails then would have to convert to ring fixator as per Ortho.   -She was recommended ASA BID until Aug 30- currently on lovenox.      PAD (peripheral artery disease)  high grade stenosis SFA bilaterally  and popliteal on the left s/p baloon angioplasty L popliteal artery and left posterior tibial artery 4/2023 on DAPT  -H&H is stable without signs of bleeding  -resume the ASA/plavix.    7/5 - stopping asa and lovenox and starting eliquis today for PAF and VTE prevention. Cont plavix for antiplatelet therapy      VTE Risk Mitigation (From admission, onward)         Ordered     apixaban tablet 2.5 mg  2 times daily         07/05/23 1404     IP VTE HIGH RISK PATIENT  Once         06/27/23 1404     Place sequential compression device  Until discontinued         06/24/23 0432                Discharge Planning   CITLALI: 7/7/2023     Code Status: Full Code   Is the patient medically ready for discharge?:     Reason for patient still in hospital (select all that apply): Patient trending condition and Treatment  Discharge Plan A: Long-term acute care facility (LTAC)   Discharge Delays: None known at this time              Zo Alonso NP  Department of Hospital Medicine   [facility]

## 2023-07-06 NOTE — ASSESSMENT & PLAN NOTE
Sodium dropped this AM  Monitor sodium level.  Likely due to renal failure and malnutrition  Nephrology following    Sodium   Date Value Ref Range Status   07/06/2023 127 (L) 136 - 145 mmol/L Final   07/05/2023 129 (L) 136 - 145 mmol/L Final   07/04/2023 131 (L) 136 - 145 mmol/L Final

## 2023-07-06 NOTE — SUBJECTIVE & OBJECTIVE
Subjective:     Interval History: Patient resting in bed. Denies complaints of pain to either extremity. Denies any new complaints.   Chart reviewed.    Plans for discharge to LTAC soon.    Scheduled Meds:   amiodarone  200 mg Oral Daily    apixaban  2.5 mg Oral BID    ceFEPime (MAXIPIME) IVPB  2 g Intravenous Q24H    clopidogreL  75 mg Oral Daily    clotrimazole  10 mg Oral TID    collagenase   Topical (Top) Daily    collagenase   Topical (Top) Daily    droNABinol  2.5 mg Oral BID    epoetin juan f (PROCRIT) injection  10,000 Units Subcutaneous Every Mon, Wed, Fri    fat emulsion 20%  250 mL Intravenous Daily    insulin detemir U-100  10 Units Subcutaneous QHS    midodrine  5 mg Oral TID WM     Continuous Infusions:   TPN ADULT CENTRAL LINE CUSTOM 50 mL/hr at 07/05/23 2153     PRN Meds:sodium chloride, dextrose 50%, dextrose 50%, glucagon (human recombinant), glucose, glucose, HYDROcodone-acetaminophen, insulin aspart U-100, magnesium oxide, magnesium oxide, naloxone, ondansetron, potassium bicarbonate, potassium bicarbonate, potassium bicarbonate, potassium, sodium phosphates, potassium, sodium phosphates, potassium, sodium phosphates, sodium chloride 0.9%    Review of Systems  Objective:     Vital Signs (Most Recent):  Temp: 98.2 °F (36.8 °C) (07/06/23 0732)  Pulse: 62 (07/06/23 0732)  Resp: 18 (07/06/23 0732)  BP: 135/84 (07/06/23 0732)  SpO2: 95 % (07/06/23 0732) Vital Signs (24h Range):  Temp:  [97.3 °F (36.3 °C)-98.2 °F (36.8 °C)] 98.2 °F (36.8 °C)  Pulse:  [56-65] 62  Resp:  [16-20] 18  SpO2:  [94 %-96 %] 95 %  BP: (120-143)/(58-85) 135/84     Weight: 93.5 kg (206 lb 2.1 oz)  Body mass index is 36.51 kg/m².    Foot Exam                  Laboratory:  All pertinent labs reviewed within the last 24 hours.    Diagnostic Results:  I have reviewed all pertinent imaging results/findings within the past 24 hours.   .

## 2023-07-06 NOTE — PLAN OF CARE
LTAC auth obtained from [facility] for [facility]; Nidia with [facility] updated on expected DC tomorrow       07/06/23 1107   Post-Acute Status   Post-Acute Authorization Placement   Post-Acute Placement Status Set-up Complete/Auth obtained

## 2023-07-07 VITALS
HEIGHT: 63 IN | DIASTOLIC BLOOD PRESSURE: 74 MMHG | HEART RATE: 65 BPM | OXYGEN SATURATION: 97 % | SYSTOLIC BLOOD PRESSURE: 162 MMHG | RESPIRATION RATE: 18 BRPM | WEIGHT: 204.38 LBS | BODY MASS INDEX: 36.21 KG/M2 | TEMPERATURE: 97 F

## 2023-07-07 PROBLEM — L97.423: Status: ACTIVE | Noted: 2023-07-07

## 2023-07-07 LAB
ANION GAP SERPL CALC-SCNC: 4 MMOL/L (ref 8–16)
BACTERIA SPEC AEROBE CULT: ABNORMAL
BASOPHILS # BLD AUTO: 0.04 K/UL (ref 0–0.2)
BASOPHILS NFR BLD: 0.3 % (ref 0–1.9)
BUN SERPL-MCNC: 93 MG/DL (ref 8–23)
CALCIUM SERPL-MCNC: 7.4 MG/DL (ref 8.7–10.5)
CHLORIDE SERPL-SCNC: 97 MMOL/L (ref 95–110)
CO2 SERPL-SCNC: 26 MMOL/L (ref 23–29)
CREAT SERPL-MCNC: 2.9 MG/DL (ref 0.5–1.4)
DIFFERENTIAL METHOD: ABNORMAL
EOSINOPHIL # BLD AUTO: 0.2 K/UL (ref 0–0.5)
EOSINOPHIL NFR BLD: 1.1 % (ref 0–8)
ERYTHROCYTE [DISTWIDTH] IN BLOOD BY AUTOMATED COUNT: 19.4 % (ref 11.5–14.5)
EST. GFR  (NO RACE VARIABLE): 16.7 ML/MIN/1.73 M^2
GLUCOSE SERPL-MCNC: 229 MG/DL (ref 70–110)
GLUCOSE SERPL-MCNC: 241 MG/DL (ref 70–110)
GLUCOSE SERPL-MCNC: 243 MG/DL (ref 70–110)
GLUCOSE SERPL-MCNC: 294 MG/DL (ref 70–110)
HCT VFR BLD AUTO: 25.3 % (ref 37–48.5)
HGB BLD-MCNC: 8.4 G/DL (ref 12–16)
IMM GRANULOCYTES # BLD AUTO: 0.19 K/UL (ref 0–0.04)
IMM GRANULOCYTES NFR BLD AUTO: 1.4 % (ref 0–0.5)
LYMPHOCYTES # BLD AUTO: 0.7 K/UL (ref 1–4.8)
LYMPHOCYTES NFR BLD: 5.3 % (ref 18–48)
MCH RBC QN AUTO: 28.2 PG (ref 27–31)
MCHC RBC AUTO-ENTMCNC: 33.2 G/DL (ref 32–36)
MCV RBC AUTO: 85 FL (ref 82–98)
MONOCYTES # BLD AUTO: 0.5 K/UL (ref 0.3–1)
MONOCYTES NFR BLD: 4 % (ref 4–15)
NEUTROPHILS # BLD AUTO: 11.6 K/UL (ref 1.8–7.7)
NEUTROPHILS NFR BLD: 87.9 % (ref 38–73)
NRBC BLD-RTO: 0 /100 WBC
PLATELET # BLD AUTO: 301 K/UL (ref 150–450)
PMV BLD AUTO: 11.9 FL (ref 9.2–12.9)
POTASSIUM SERPL-SCNC: 4 MMOL/L (ref 3.5–5.1)
RBC # BLD AUTO: 2.98 M/UL (ref 4–5.4)
SODIUM SERPL-SCNC: 127 MMOL/L (ref 136–145)
WBC # BLD AUTO: 13.14 K/UL (ref 3.9–12.7)

## 2023-07-07 PROCEDURE — 25000003 PHARM REV CODE 250: Performed by: HOSPITALIST

## 2023-07-07 PROCEDURE — 11043 DBRDMT MUSC&/FSCA 1ST 20/<: CPT | Mod: ,,, | Performed by: PODIATRIST

## 2023-07-07 PROCEDURE — 99232 PR SUBSEQUENT HOSPITAL CARE,LEVL II: ICD-10-PCS | Mod: 25,,, | Performed by: PODIATRIST

## 2023-07-07 PROCEDURE — 85025 COMPLETE CBC W/AUTO DIFF WBC: CPT | Performed by: HOSPITALIST

## 2023-07-07 PROCEDURE — 99232 SBSQ HOSP IP/OBS MODERATE 35: CPT | Mod: 25,,, | Performed by: PODIATRIST

## 2023-07-07 PROCEDURE — 25000003 PHARM REV CODE 250: Performed by: NURSE PRACTITIONER

## 2023-07-07 PROCEDURE — 11042 DBRDMT SUBQ TIS 1ST 20SQCM/<: CPT | Mod: 59,,, | Performed by: PODIATRIST

## 2023-07-07 PROCEDURE — 63600175 PHARM REV CODE 636 W HCPCS: Performed by: NURSE PRACTITIONER

## 2023-07-07 PROCEDURE — 80048 BASIC METABOLIC PNL TOTAL CA: CPT | Performed by: HOSPITALIST

## 2023-07-07 PROCEDURE — 11043 DEBRIDEMENT: ICD-10-PCS | Mod: ,,, | Performed by: PODIATRIST

## 2023-07-07 PROCEDURE — 11042 DEBRIDEMENT: ICD-10-PCS | Mod: 59,,, | Performed by: PODIATRIST

## 2023-07-07 PROCEDURE — 51798 US URINE CAPACITY MEASURE: CPT

## 2023-07-07 PROCEDURE — 25000003 PHARM REV CODE 250: Performed by: INTERNAL MEDICINE

## 2023-07-07 RX ORDER — DRONABINOL 2.5 MG/1
2.5 CAPSULE ORAL 2 TIMES DAILY
Qty: 30 CAPSULE | Refills: 0 | Status: SHIPPED | OUTPATIENT
Start: 2023-07-07

## 2023-07-07 RX ORDER — SILVER SULFADIAZINE 10 G/1000G
CREAM TOPICAL DAILY
Status: DISCONTINUED | OUTPATIENT
Start: 2023-07-07 | End: 2023-07-07 | Stop reason: HOSPADM

## 2023-07-07 RX ORDER — MIDODRINE HYDROCHLORIDE 5 MG/1
5 TABLET ORAL
Qty: 90 TABLET | Refills: 11
Start: 2023-07-07 | End: 2024-07-06

## 2023-07-07 RX ORDER — CLOTRIMAZOLE 10 MG/1
10 LOZENGE ORAL; TOPICAL 3 TIMES DAILY
Qty: 90 TABLET | Refills: 0
Start: 2023-07-07 | End: 2023-08-06

## 2023-07-07 RX ORDER — SILVER SULFADIAZINE 10 G/1000G
CREAM TOPICAL DAILY
Qty: 50 G | Refills: 0
Start: 2023-07-08 | End: 2023-08-07

## 2023-07-07 RX ORDER — AMIODARONE HYDROCHLORIDE 200 MG/1
200 TABLET ORAL DAILY
Qty: 30 TABLET | Refills: 11
Start: 2023-07-08 | End: 2024-07-07

## 2023-07-07 RX ORDER — SILVER SULFADIAZINE 10 G/1000G
CREAM TOPICAL DAILY
Status: DISCONTINUED | OUTPATIENT
Start: 2023-07-07 | End: 2023-07-07

## 2023-07-07 RX ADMIN — CLOTRIMAZOLE 10 MG: 10 LOZENGE ORAL; TOPICAL at 09:07

## 2023-07-07 RX ADMIN — APIXABAN 2.5 MG: 2.5 TABLET, FILM COATED ORAL at 09:07

## 2023-07-07 RX ADMIN — CLOPIDOGREL BISULFATE 75 MG: 75 TABLET, FILM COATED ORAL at 09:07

## 2023-07-07 RX ADMIN — SILVER SULFADIAZINE: 10 CREAM TOPICAL at 10:07

## 2023-07-07 RX ADMIN — MIDODRINE HYDROCHLORIDE 5 MG: 2.5 TABLET ORAL at 09:07

## 2023-07-07 RX ADMIN — HYDROCODONE BITARTRATE AND ACETAMINOPHEN 1 TABLET: 5; 325 TABLET ORAL at 12:07

## 2023-07-07 RX ADMIN — CLOTRIMAZOLE 10 MG: 10 LOZENGE ORAL; TOPICAL at 05:07

## 2023-07-07 RX ADMIN — CEFEPIME HYDROCHLORIDE 2 G: 2 INJECTION, POWDER, FOR SOLUTION INTRAVENOUS at 05:07

## 2023-07-07 RX ADMIN — COLLAGENASE SANTYL: 250 OINTMENT TOPICAL at 09:07

## 2023-07-07 RX ADMIN — AMIODARONE HYDROCHLORIDE 200 MG: 200 TABLET ORAL at 09:07

## 2023-07-07 RX ADMIN — INSULIN ASPART 6 UNITS: 100 INJECTION, SOLUTION INTRAVENOUS; SUBCUTANEOUS at 05:07

## 2023-07-07 RX ADMIN — DRONABINOL 2.5 MG: 2.5 CAPSULE ORAL at 09:07

## 2023-07-07 NOTE — PLAN OF CARE
DC orders sent to [facility] via [employer]        07/07/23 2719   Post-Acute Status   Post-Acute Authorization Placement   Post-Acute Placement Status Pending post-acute provider review/more information requested

## 2023-07-07 NOTE — SUBJECTIVE & OBJECTIVE
Subjective:     Interval History: Patient resting in bed. Denies complaints of pain to either extremity. Denies any new complaints.     Discussed with patient bedside debridement of foot wounds today- patient agreeable    Scheduled Meds:   amiodarone  200 mg Oral Daily    apixaban  2.5 mg Oral BID    ceFEPime (MAXIPIME) IVPB  2 g Intravenous Q24H    clopidogreL  75 mg Oral Daily    clotrimazole  10 mg Oral TID    collagenase   Topical (Top) Daily    collagenase   Topical (Top) Daily    droNABinol  2.5 mg Oral BID    epoetin juan f (PROCRIT) injection  10,000 Units Subcutaneous Every Mon, Wed, Fri    fat emulsion 20%  250 mL Intravenous Daily    insulin detemir U-100  10 Units Subcutaneous QHS    midodrine  5 mg Oral TID WM    silver sulfADIAZINE 1%   Topical (Top) Daily     Continuous Infusions:   TPN ADULT CENTRAL LINE CUSTOM 50 mL/hr at 07/06/23 2052     PRN Meds:sodium chloride, dextrose 50%, dextrose 50%, glucagon (human recombinant), glucose, glucose, HYDROcodone-acetaminophen, insulin aspart U-100, magnesium oxide, magnesium oxide, naloxone, ondansetron, potassium bicarbonate, potassium bicarbonate, potassium bicarbonate, potassium, sodium phosphates, potassium, sodium phosphates, potassium, sodium phosphates, sodium chloride 0.9%    Review of Systems   Constitutional:  Positive for activity change, appetite change and fatigue. Negative for chills and fever.   HENT:  Negative for congestion, sore throat and trouble swallowing.    Respiratory:  Negative for cough and shortness of breath.    Cardiovascular:  Negative for chest pain.   Gastrointestinal:  Positive for nausea. Negative for abdominal pain, diarrhea and vomiting.   Musculoskeletal:  Positive for arthralgias and gait problem. Negative for back pain.   Skin:  Positive for wound. Negative for color change, pallor and rash.   Neurological:  Negative for dizziness, weakness and light-headedness.   Psychiatric/Behavioral:  Negative for agitation,  behavioral problems and confusion.    All other systems reviewed and are negative.  Objective:     Vital Signs (Most Recent):  Temp: 97.3 °F (36.3 °C) (07/07/23 0726)  Pulse: 66 (07/07/23 0726)  Resp: 18 (07/07/23 0726)  BP: (!) 159/85 (07/07/23 0726)  SpO2: 98 % (07/07/23 0726) Vital Signs (24h Range):  Temp:  [97.3 °F (36.3 °C)-98.4 °F (36.9 °C)] 97.3 °F (36.3 °C)  Pulse:  [57-66] 66  Resp:  [18] 18  SpO2:  [94 %-98 %] 98 %  BP: (120-159)/(64-85) 159/85     Weight: 92.7 kg (204 lb 5.9 oz)  Body mass index is 36.2 kg/m².    Foot Exam  Post bedside debridement pictures below          Pre- bedside debridement pictures below              Laboratory:  All pertinent labs reviewed within the last 24 hours.    Diagnostic Results:  I have reviewed all pertinent imaging results/findings within the past 24 hours.

## 2023-07-07 NOTE — PROCEDURES
"Anastasiia Badillo is a 73 y.o. female patient.    Temp: 97.3 °F (36.3 °C) (07/07/23 0726)  Pulse: 66 (07/07/23 0726)  Resp: 18 (07/07/23 0726)  BP: (!) 159/85 (07/07/23 0726)  SpO2: 98 % (07/07/23 0726)  Weight: 92.7 kg (204 lb 5.9 oz) (07/07/23 0313)  Height: 5' 3" (160 cm) (06/27/23 0101)       Debridement    Date/Time: 7/7/2023 10:36 AM  Performed by: Jenny Peace DPM  Authorized by: Jenny Peace DPM     Time out: Immediately prior to procedure a "time out" was called to verify the correct patient, procedure, equipment, support staff and site/side marked as required.    Consent Done?:  Yes (Verbal)    Preparation: Patient was prepped and draped in usual sterile fashion, Patient was prepped and draped with aseptic technique and Patient was prepped and draped with clean technique    Local anesthesia used?: No      Wound Details:    Location:  Left foot    Location:  Left Heel    Type of Debridement:  Excisional       Length (cm):  2.5       Area (sq cm):  7.5       Width (cm):  3       Percent Debrided (%):  80       Depth (cm):  0.8       Total Area Debrided (sq cm):  6    Depth of debridement:  Muscle/fascia/tendon    Tissue debrided:  Fascia and Subcutaneous    Devitalized tissue debrided:  Necrotic/Eschar, Slough, Fibrin, Biofilm and Other    Instruments:  Forceps, Blade, Curette and Nippers  Bleeding:  Moderate  Hemostasis Achieved: Yes  Method Used:  Pressure    Additional wounds:  1    2nd Wound Details:     Location:  Left foot    Location:  Left 1st Toe    Location:  Left 1st Toe    Type of Debridement:  Excisional       Length (cm):  0.8       Area (sq cm):  0.64       Width (cm):  0.8       Percent Debrided (%):  100       Depth (cm):  0.3       Total Area Debrided (sq cm):  0.64    Depth of debridement:  Subcutaneous tissue    Tissue debrided:  Subcutaneous    Devitalized tissue debrided:  Biofilm, Slough and Fibrin    Instruments:  Blade, Curette and Forceps  Bleeding:  Moderate  Hemostasis " Achieved: Yes    Method Used:  Pressure  Patient tolerance:  Patient tolerated the procedure well with no immediate complications  1st Wound Pain Assessment: 0  2nd Wound Pain Assessment: 0    7/7/2023

## 2023-07-07 NOTE — ASSESSMENT & PLAN NOTE
Patient agreeable to bedside debridement- bedside debridement done- see procedure note.    Recommend wound vac changes twice weekly set at 175mmHg , bridge heel and toe wound    gordon and glucerna    Plans for discharge to LTAC.  Patient can follow up outpatient once discharged    Counseled patient on increasing protein intake, not getting wound wet, keeping dressing clean dry and intact, following a healthy diet, elevating legs when able, removing pressure from wound

## 2023-07-07 NOTE — PROGRESS NOTES
[facility]  Podiatry  Progress Note    Patient Name: Anastasiia Badillo  MRN: [id number]  Admission Date: 6/24/2023  Hospital Length of Stay: 13 days  Attending Physician: Criss Beck MD  Primary Care Provider: Raji Parkinson MD     Subjective:     Interval History: Patient resting in bed. Denies complaints of pain to either extremity. Denies any new complaints.     Discussed with patient bedside debridement of foot wounds today- patient agreeable    Scheduled Meds:   amiodarone  200 mg Oral Daily    apixaban  2.5 mg Oral BID    ceFEPime (MAXIPIME) IVPB  2 g Intravenous Q24H    clopidogreL  75 mg Oral Daily    clotrimazole  10 mg Oral TID    collagenase   Topical (Top) Daily    collagenase   Topical (Top) Daily    droNABinol  2.5 mg Oral BID    epoetin juan f (PROCRIT) injection  10,000 Units Subcutaneous Every Mon, Wed, Fri    fat emulsion 20%  250 mL Intravenous Daily    insulin detemir U-100  10 Units Subcutaneous QHS    midodrine  5 mg Oral TID WM    silver sulfADIAZINE 1%   Topical (Top) Daily     Continuous Infusions:   TPN ADULT CENTRAL LINE CUSTOM 50 mL/hr at 07/06/23 2052     PRN Meds:sodium chloride, dextrose 50%, dextrose 50%, glucagon (human recombinant), glucose, glucose, HYDROcodone-acetaminophen, insulin aspart U-100, magnesium oxide, magnesium oxide, naloxone, ondansetron, potassium bicarbonate, potassium bicarbonate, potassium bicarbonate, potassium, sodium phosphates, potassium, sodium phosphates, potassium, sodium phosphates, sodium chloride 0.9%    Review of Systems   Constitutional:  Positive for activity change, appetite change and fatigue. Negative for chills and fever.   HENT:  Negative for congestion, sore throat and trouble swallowing.    Respiratory:  Negative for cough and shortness of breath.    Cardiovascular:  Negative for chest pain.   Gastrointestinal:  Positive for nausea. Negative for abdominal pain, diarrhea and vomiting.   Musculoskeletal:  Positive  for arthralgias and gait problem. Negative for back pain.   Skin:  Positive for wound. Negative for color change, pallor and rash.   Neurological:  Negative for dizziness, weakness and light-headedness.   Psychiatric/Behavioral:  Negative for agitation, behavioral problems and confusion.    All other systems reviewed and are negative.  Objective:     Vital Signs (Most Recent):  Temp: 97.3 °F (36.3 °C) (07/07/23 0726)  Pulse: 66 (07/07/23 0726)  Resp: 18 (07/07/23 0726)  BP: (!) 159/85 (07/07/23 0726)  SpO2: 98 % (07/07/23 0726) Vital Signs (24h Range):  Temp:  [97.3 °F (36.3 °C)-98.4 °F (36.9 °C)] 97.3 °F (36.3 °C)  Pulse:  [57-66] 66  Resp:  [18] 18  SpO2:  [94 %-98 %] 98 %  BP: (120-159)/(64-85) 159/85     Weight: 92.7 kg (204 lb 5.9 oz)  Body mass index is 36.2 kg/m².    Foot Exam  Post bedside debridement pictures below          Pre- bedside debridement pictures below              Laboratory:  All pertinent labs reviewed within the last 24 hours.    Diagnostic Results:  I have reviewed all pertinent imaging results/findings within the past 24 hours.    Assessment/Plan:     Orthopedic  Skin ulcer of left heel with necrosis of muscle  Patient agreeable to bedside debridement- bedside debridement done- see procedure note.    Recommend wound vac changes twice weekly set at 175mmHg , bridge heel and toe wound    gordon and glucerna    Plans for discharge to LTAC.  Patient can follow up outpatient once discharged    Counseled patient on increasing protein intake, not getting wound wet, keeping dressing clean dry and intact, following a healthy diet, elevating legs when able, removing pressure from wound        Chronic ulcer of great toe of left foot with fat layer exposed  Patient agreeable to bedside debridement- bedside debridement done- see procedure note.    Recommend wound vac changes twice weekly set at 175mmHg , bridge heel and toe wound    gordon and glucerna    Plans for discharge to LTAC.  Patient can follow  up outpatient once discharged    Counseled patient on increasing protein intake, not getting wound wet, keeping dressing clean dry and intact, following a healthy diet, elevating legs when able, removing pressure from wound              Jenny Peace DPM  Podiatry  [facility]

## 2023-07-07 NOTE — CLINICAL REVIEW
Chart reviewed.    Nothing new in cultures (other than mod yeast which I believe it is not a causative organism,)   Podiatrist IND today left heel and left toe.      Discussed with NP Zo Alonso:    Cefepime 2g  q  24 hr   x  4 weeks     The above will cover the tail treatment for MSSA of left heel--which was meant to end on 07/17.  It will also cover the most recent Pseudomonas that grew from the left toe.

## 2023-07-07 NOTE — PLAN OF CARE
Pt clear for DC from case management standpoint. Discharging to [facility] ([place])     07/07/23 1559   Final Note   Assessment Type Final Discharge Note   Anticipated Discharge Disposition LTAC

## 2023-07-07 NOTE — NURSING
Called report to Nayely weinstein/ [facility].  Pt and family is aware of the transfer and the ambulance has been alerted.     No questions from Nayely.

## 2023-07-07 NOTE — PLAN OF CARE
Per Nidia with [facility], nurse is calling facility here to get report. Transport arranged by Nidia for 4:30pm       07/07/23 3844   Post-Acute Status   Post-Acute Authorization Placement   Post-Acute Placement Status Set-up Complete/Auth obtained

## 2023-07-07 NOTE — NURSING
Patient failed void trail x3 this shift w/ residuals being greater then 300 or close to and patient consistently was unable to void. Communicated with both Hospitalist at 0400, received order at 0450 to replace indwelling holley d/t retention. Replaced holley 16 fr silicone 10cc balloon at 0500, patient tolerated well and verbalized understanding regarding replacement. Updating lda now. Patient resting well at departure w/ no s/sx of subjective discomfort or c/o any, call bell in reach.

## 2023-07-07 NOTE — PROGRESS NOTES
"[facility]  Adult Nutrition   Progress Note (Follow-Up)    SUMMARY     Recommendations  1. Continue appetite stimulant.   2. Continue diet and encourage po intake. Encourage supplements for additional protein intake to support wound healing.    3. RD to monitor intake weight and labs.    Goals: Improve po intake to meet 75% of EEN/EPN. Improve appetite and d/c TPN.  Nutrition Goal Status: progressing towards goal  Communication of RD Recs: reviewed with RN    Dietitian Rounds Brief  Pt with decreased appetite and poor intake this past week. TPN provided for the past few days and pt is pending d/c to LTAC.     Diet order:   Current Diet Order: 2000 kcal DM diet                 Evaluation of Received Nutrient/Fluid Intake  Energy Calories Required: not meeting needs  Protein Required: not meeting needs  Fluid Required: not meeting needs     % Intake of Estimated Energy Needs: 25 - 50 %  % Meal Intake: 25 - 50 %      Intake/Output Summary (Last 24 hours) at 7/7/2023 1325  Last data filed at 7/7/2023 0010  Gross per 24 hour   Intake 120 ml   Output 772 ml   Net -652 ml        Anthropometrics  Temp: 97.3 °F (36.3 °C)  Height Method: Stated  Height: 5' 3" (160 cm)  Height (inches): 63 in  Weight Method: Bed Scale  Weight: 92.7 kg (204 lb 5.9 oz)  Weight (lb): 204.37 lb  Ideal Body Weight (IBW), Female: 115 lb  % Ideal Body Weight, Female (lb): 153.37 %  BMI (Calculated): 36.2  BMI Grade: 30 - 34.9- obesity - grade I       Estimated/Assessed Needs  Weight Used For Calorie Calculations: 80 kg (176 lb 5.9 oz)  Energy Calorie Requirements (kcal): 5279-0610 kcals/day (20-25 kcals/kg ABW)  Energy Need Method: Kcal/kg  Protein Requirements:  g/day (1.5-2 g/kg IBW)  Weight Used For Protein Calculations: 52 kg (114 lb 10.2 oz)     Estimated Fluid Requirement Method: RDA Method  RDA Method (mL): 1600       Reason for Assessment  Reason For Assessment: RD follow-up  Diagnosis: other (see comments) " (UTI)  Relevant Medical History: Diabetes mellitus, type 2, Closed left pilon fracture, Closed left subtrochanteric femur fracture, initial encounter, Acute osteomyelitis of left calcaneus, Essential (primary) hypertension, PAD (peripheral artery disease), Tobacco use, Mass of upper outer quadrant of right breast, Type 2 diabetes mellitus with hyperglycemia, with long-term current use of insulin, Chronic ulcer of great toe of left foot with fat layer exposed, Nicotine dependence  Interdisciplinary Rounds: attended    Nutrition/Diet History  Patient Reported Diet/Restrictions/Preferences: diabetic diet  Spiritual, Cultural Beliefs, Restorationism Practices, Values that Affect Care: no  Food Allergies: NKFA  Factors Affecting Nutritional Intake: other (see comments) (pt was just ordered a diet at 0930 today)    Nutrition Risk Screen  Nutrition Risk Screen: large or nonhealing wound, burn or pressure injury       Altered Skin Integrity 07/06/23 2232 Right Heel-Wound Image: Images linked (md made aware of new wound)  [REMOVED]      Altered Skin Integrity 04/19/23 1600 Left posterior Heel Ulceration Full thickness tissue loss. Base is covered by slough and/or eschar in the wound bed-Wound Image: Images linked       Incision/Site 06/06/23 1604 Left Leg-Wound Image: Images linked       Incision/Site 06/24/23 0700 Left Heel medial-Wound Image: Images linked       Incision/Site 06/24/23 0701 Left Heel lateral-Wound Image: Images linked       Altered Skin Integrity 06/24/23 0701 midline Sacral spine-Wound Image: Images linked       Incision/Site 06/24/23 0700 Left Foot anterior-Wound Image: Images linked       Altered Skin Integrity 06/24/23 0700 Left anterior Toe, first-Wound Image: Images linked       Incision/Site 06/24/23 0700 Left Malleolus/Ankle medial-Wound Image: Images linked       Incision/Site 06/24/23 0700 Left Malleolus/Ankle lateral-Wound Image: Images linked  MST Score: 0  Have you recently lost weight without  trying?: No  Weight loss score: 0  Have you been eating poorly because of a decreased appetite?: No  Appetite score: 0       Weight History:  Wt Readings from Last 10 Encounters:   07/07/23 92.7 kg (204 lb 5.9 oz)   06/25/23 72.6 kg (160 lb)   06/08/23 72.9 kg (160 lb 11.5 oz)   06/05/23 72.6 kg (160 lb)   05/24/23 72.6 kg (160 lb)   05/18/23 75.8 kg (167 lb)   04/21/23 76.1 kg (167 lb 12.3 oz)   04/20/23 72.5 kg (159 lb 13.3 oz)   04/21/23 72.1 kg (159 lb)   03/28/23 66.7 kg (147 lb)        Lab/Procedures/Meds: Pertinent Labs/Meds Reviewed    Medications:Pertinent Medications Reviewed  Scheduled Meds:   amiodarone  200 mg Oral Daily    apixaban  2.5 mg Oral BID    ceFEPime (MAXIPIME) IVPB  2 g Intravenous Q24H    clopidogreL  75 mg Oral Daily    clotrimazole  10 mg Oral TID    collagenase   Topical (Top) Daily    collagenase   Topical (Top) Daily    droNABinol  2.5 mg Oral BID    epoetin juan f (PROCRIT) injection  10,000 Units Subcutaneous Every Mon, Wed, Fri    fat emulsion 20%  250 mL Intravenous Daily    insulin detemir U-100  10 Units Subcutaneous QHS    midodrine  5 mg Oral TID WM    silver sulfADIAZINE 1%   Topical (Top) Daily     Continuous Infusions:   TPN ADULT CENTRAL LINE CUSTOM 50 mL/hr at 07/06/23 2052     PRN Meds:.sodium chloride, dextrose 50%, dextrose 50%, glucagon (human recombinant), glucose, glucose, HYDROcodone-acetaminophen, insulin aspart U-100, magnesium oxide, magnesium oxide, naloxone, ondansetron, potassium bicarbonate, potassium bicarbonate, potassium bicarbonate, potassium, sodium phosphates, potassium, sodium phosphates, potassium, sodium phosphates, sodium chloride 0.9%    Labs: Pertinent Labs Reviewed  Clinical Chemistry:  Recent Labs   Lab 07/03/23  0433 07/04/23  0430 07/05/23  1133 07/06/23  0559 07/07/23  0351   *  133*   < >  --    < > 127*   K 4.8  4.8   < >  --    < > 4.0   CL 99  99   < >  --    < > 97   CO2 21*  21*   < >  --    < > 26   *  149*   < >  --     < > 243*   BUN 70*  70*   < >  --    < > 93*   CREATININE 2.7*  2.7*   < >  --    < > 2.9*   CALCIUM 7.6*  7.6*   < >  --    < > 7.4*   PROT 4.4*  --   --   --   --    ALBUMIN 1.3*  --   --   --   --    BILITOT 0.9  --   --   --   --    ALKPHOS 90  --   --   --   --    AST 15  --   --   --   --    ALT <5*  --   --   --   --    ANIONGAP 13  13   < >  --    < > 4*   MG  --   --  1.7  --   --    PHOS  --   --  4.9*  --   --     < > = values in this interval not displayed.     CBC:   Recent Labs   Lab 07/07/23  0351   WBC 13.14*   RBC 2.98*   HGB 8.4*   HCT 25.3*      MCV 85   MCH 28.2   MCHC 33.2     Lipid Panel:  Recent Labs   Lab 07/04/23  0430   TRIG 98     Cardiac Profile:  No results for input(s): BNP, CPK, CPKMB, TROPONINI, CKTOTAL in the last 168 hours.  Inflammatory Labs:  Recent Labs   Lab 07/03/23  1654   CRP 7.34*     Diabetes:  No results for input(s): HGBA1C, POCTGLUCOSE in the last 168 hours.  Thyroid & Parathyroid:  No results for input(s): TSH, FREET4, Q0IAZJB, F9UYJIO, THYROIDAB in the last 168 hours.    Monitor and Evaluation  Food and Nutrient Intake: energy intake, food and beverage intake, parenteral nutrition intake  Food and Nutrient Adminstration: diet order, enteral and parenteral nutrition administration  Knowledge/Beliefs/Attitudes: food and nutrition knowledge/skill, beliefs and attitudes  Physical Activity and Function: nutrition-related ADLs and IADLs, factors affecting access to physical activity  Anthropometric Measurements: weight, weight change, body mass index  Biochemical Data, Medical Tests and Procedures: lipid profile, inflammatory profile, glucose/endocrine profile, gastrointestinal profile, electrolyte and renal panel  Nutrition-Focused Physical Findings: overall appearance     Nutrition Risk  Level of Risk/Frequency of Follow-up: moderate - high     Nutrition Follow-Up  RD Follow-up?: Yes      Hope Patino, TAHMINA 07/07/2023 1:25 PM

## 2023-07-07 NOTE — PROGRESS NOTES
INPATIENT NEPHROLOGY PROGRESS NOTE  [facility] NEPHROLOGY    Anastasiia A Justino  07/07/2023    Reason for consultation:  Acute kidney injury    Chief Complaint:   Chief Complaint   Patient presents with    Hypotension     [facility] eval of low blood pressure.       History of Present Illness:    Per H and P    74 y/o F with a past medical history significant for DM2, HTN, femur fracture, tibial fracture and tobacco use, L foot wounds,  high grade stenosis SFA bilaterally and popliteal on the left s/p baloon angioplasty L popliteal artery and left posterior tibial arery on DAPT. Recently discharged from [facility] for fracture of left tibia, s/p ORIF 6/6, also finished antibiotics for acute osteomyelitis of left calcaneus ( Final ID recs with end date for cipro of 6/22 and end date of ancef for 7/17 ). Patient sent from rehab for hypotension. In the ER was in septic shock, e/o UTI and anemic.     6/25  Pt states she feels nauseated and weak.  No sob.  Now bowel complaints.  Denies pain.  Somewhat confused but engages when prompted.  On phenylephrine vasopressor support.  275 cc uop  6/26  acidosis improving, renal same.  525cc UOP recorded.  Will cont IVF but cut rate to 75, f/u labs in am.    6/27  UOP 625cc, on hood, VSS.  Renal function a little worse, acidosis better, hypokalemic-got repletion.  Stopped bicarb gtt, switch to NS.  6/28 SBP , back on hood, on RA, UOP 300cc from holley- asked nurse to flush holley, no diarrhea reported  6/29 VSS, on RA, UOP 425cc, off hood, off NS IVFs, getting IV lasix today  6/30 to SNF today, UOP 450cc  7/1 VSS, UOP 1L  7/2 VSS, on RA, UOP 1.2L  7/3 VSS, no new complains, Ok to go to LTAC when ready.  7/4 VSS. Input from ID appreciated and agree with plan.  7/5 VSS. Agree with blood transfusion to keep Hgb > 8, add Epoetin.  7/6 VSS, no new complains.  7/7 VSS, doing well, good UO after holley exchanged.    Plan of Care:     MYLES 2/2 ATN in setting of urosepsis with  shock or unresolved/new osteomyelitis  CKD stage 3, baseline sCr 1.1  Urinary retention  - renal function is abnormal but stable  - no nsaids or IV contrast  - dose meds for CrCl < 20  - no acute RRT needs  - holley exchanged 7/6  - appreciate ID input, agree with plan, broadened abx.    Hypotension  - continue midodrine  - ok with PRN diuretics  - optimize nutrition to reduce third spacing    Hyponatremia  Hypokalemia, Hypomagnesemia, Hypocalcemia  Metabolic acidosis--non-gap  - monitor electrolytes and replete as needed  - phos normal    Anemia  - tool + blood, iron stores acceptable  - s/p pRBC this admission, agree with more to keep Hgb > 8  - started Epoetin 10K TID on 7/5.    Renal cyst--complicated cyst   - imaging reviewed- normal kidneys, no hydro     Thank you for allowing us to participate in this patient's care. We will continue to follow.    Vital Signs:  Temp Readings from Last 3 Encounters:   07/07/23 97.3 °F (36.3 °C) (Oral)   06/14/23 97.6 °F (36.4 °C) (Oral)   06/05/23 98.9 °F (37.2 °C)       Pulse Readings from Last 3 Encounters:   07/07/23 66   06/14/23 87   06/05/23 84       BP Readings from Last 3 Encounters:   07/07/23 (!) 159/85   06/14/23 (!) 141/65   06/05/23 122/67       Weight:  Wt Readings from Last 3 Encounters:   07/07/23 92.7 kg (204 lb 5.9 oz)   06/25/23 72.6 kg (160 lb)   06/08/23 72.9 kg (160 lb 11.5 oz)     Medications:  No current facility-administered medications on file prior to encounter.     Current Outpatient Medications on File Prior to Encounter   Medication Sig Dispense Refill    acetaminophen (TYLENOL) 500 MG tablet Take 2 tablets (1,000 mg total) by mouth every 8 (eight) hours.  0    aspirin (ECOTRIN) 81 MG EC tablet Take 1 tablet (81 mg total) by mouth 2 (two) times a day. - end date august 30, 2023  0    atorvastatin (LIPITOR) 80 MG tablet Take 1 tablet (80 mg total) by mouth every evening. 90 tablet 3    blood sugar diagnostic Strp To check BG two times daily, to use  "with insurance preferred meter 200 each 1    blood-glucose meter kit To check BG two times daily, to use with insurance preferred meter 1 each 1    ciprofloxacin HCl (CIPRO) 750 MG tablet Take 1 tablet (750 mg total) by mouth every 12 (twelve) hours. End date 6/22/23      clopidogreL (PLAVIX) 75 mg tablet Take 1 tablet (75 mg total) by mouth once daily. 30 tablet 3    dextrose 5 % in water (D5W) 5 % PgBk 50 mL with ceFAZolin 2 gram SolR 2 g Inject 2 g into the vein every 8 (eight) hours. End date 7/17/23  0    famotidine (PEPCID) 20 MG tablet Take 1 tablet (20 mg total) by mouth 2 (two) times daily as needed (Heartburn).      insulin aspart U-100 (NOVOLOG) 100 unit/mL (3 mL) InPn pen Inject 0-5 Units into the skin before meals and at bedtime as needed (Hyperglycemia).  0    insulin aspart U-100 (NOVOLOG) 100 unit/mL (3 mL) InPn pen Inject 6 Units into the skin 3 (three) times daily with meals.  0    insulin detemir U-100, Levemir, 100 unit/mL (3 mL) SubQ InPn pen Inject 18 Units into the skin once daily.  0    lancets Misc To check BG two times daily, to use with insurance preferred meter 200 each 1    lisinopriL (PRINIVIL,ZESTRIL) 5 MG tablet Take 1 tablet (5 mg total) by mouth once daily. 90 tablet 3    melatonin (MELATIN) 3 mg tablet Take 3 tablets (9 mg total) by mouth nightly.  0    methocarbamoL (ROBAXIN) 500 MG Tab Take 1 tablet (500 mg total) by mouth 4 (four) times daily. 40 tablet 0    ondansetron (ZOFRAN-ODT) 4 MG TbDL Take 1 tablet (4 mg total) by mouth every 8 (eight) hours as needed (nausea).      oxyCODONE (ROXICODONE) 10 mg Tab immediate release tablet Take 1 tablet (10 mg total) by mouth every 4 (four) hours as needed (pain sclae 7-10). 10 tablet 0    oxyCODONE (ROXICODONE) 5 MG immediate release tablet Take 1 tablet (5 mg total) by mouth every 4 (four) hours as needed (pain scale 4-6). 10 tablet 0    pen needle, diabetic (PEN NEEDLE) 31 gauge x 3/16" Ndle 1 application by Misc.(Non-Drug; Combo " "Route) route 2 (two) times a day. 200 each 0    polyethylene glycol (GLYCOLAX) 17 gram PwPk Take 17 g by mouth once daily.  0    pregabalin (LYRICA) 75 MG capsule Take 1 capsule (75 mg total) by mouth 2 (two) times daily. 60 capsule 0    senna-docusate 8.6-50 mg (PERICOLACE) 8.6-50 mg per tablet Take 1 tablet by mouth once daily.      vitamin D (VITAMIN D3) 1000 units Tab Take 1 tablet (1,000 Units total) by mouth once daily.       Scheduled Meds:   amiodarone  200 mg Oral Daily    apixaban  2.5 mg Oral BID    ceFEPime (MAXIPIME) IVPB  2 g Intravenous Q24H    clopidogreL  75 mg Oral Daily    clotrimazole  10 mg Oral TID    collagenase   Topical (Top) Daily    collagenase   Topical (Top) Daily    droNABinol  2.5 mg Oral BID    epoetin juan f (PROCRIT) injection  10,000 Units Subcutaneous Every Mon, Wed, Fri    fat emulsion 20%  250 mL Intravenous Daily    insulin detemir U-100  10 Units Subcutaneous QHS    midodrine  5 mg Oral TID WM     Continuous Infusions:   TPN ADULT CENTRAL LINE CUSTOM 50 mL/hr at 07/06/23 2052       PRN Meds:.sodium chloride, dextrose 50%, dextrose 50%, glucagon (human recombinant), glucose, glucose, HYDROcodone-acetaminophen, insulin aspart U-100, magnesium oxide, magnesium oxide, naloxone, ondansetron, potassium bicarbonate, potassium bicarbonate, potassium bicarbonate, potassium, sodium phosphates, potassium, sodium phosphates, potassium, sodium phosphates, sodium chloride 0.9%    Review of Systems:  Neg    Physical Exam:    BP (!) 159/85   Pulse 66   Temp 97.3 °F (36.3 °C) (Oral)   Resp 18   Ht 5' 3" (1.6 m)   Wt 92.7 kg (204 lb 5.9 oz)   SpO2 98%   BMI 36.20 kg/m²     General Appearance:    Ill appearing   Head:    Normocephalic, without obvious abnormality, atraumatic   Eyes:    PER, conjunctiva/corneas clear, EOM's intact in both eyes        Throat:   Lips, mucosa, and tongue normal; teeth and gums normal   Back:     Symmetric, no curvature, ROM normal, no CVA tenderness   Lungs:    "  Clear to auscultation bilaterally, respirations unlabored   Chest wall:    No tenderness or deformity   Heart:    Regular rate and rhythm, S1 and S2 normal, no murmur, rub   or gallop   Abdomen:     Soft, non-tender, bowel sounds active all four quadrants,     no masses, no organomegaly   Extremities:   Edematous    Pulses:   2+ and symmetric all extremities   MSK:   No joint or muscle swelling, tenderness or deformity   Skin:   Skin color, texture, turgor normal, no rashes or lesions   Neurologic:   CNII-XII intact, normal strength and sensation       Throughout.  No flap     Results:  Recent Labs   Lab 07/05/23  0424 07/06/23  0559 07/07/23  0351   * 127* 127*   K 4.9 4.5 4.0   CL 98 95 97   CO2 25 25 26   BUN 83* 88* 93*   CREATININE 2.6* 2.9* 2.9*   * 221* 243*         Recent Labs   Lab 07/03/23  0433 07/04/23  0430 07/05/23  0424 07/05/23  1133 07/06/23  0559 07/07/23  0351   CALCIUM 7.6*  7.6*   < > 7.4*  --  7.4* 7.4*   ALBUMIN 1.3*  --   --   --   --   --    PHOS  --   --   --  4.9*  --   --    MG  --   --   --  1.7  --   --     < > = values in this interval not displayed.               No results for input(s): POCTGLUCOSE in the last 168 hours.    Recent Labs   Lab 02/17/23  1415 04/21/23  0429 06/05/23  1957   Hemoglobin A1C 8.9 H 8.2 H 10.2 H         Recent Labs   Lab 07/05/23  0424 07/05/23  1950 07/06/23  0559 07/07/23  0351   WBC 13.89*  --  13.97* 13.14*   HGB 7.1* 8.3* 8.3* 8.4*   HCT 21.7* 24.9* 24.6* 25.3*     --  325 301   MCV 87  --  84 85   MCHC 32.7  --  33.7 33.2   MONO 4.9  0.7  --  5.3  0.7 4.0  0.5         Recent Labs   Lab 07/03/23  0433   BILITOT 0.9   PROT 4.4*   ALBUMIN 1.3*   ALKPHOS 90   ALT <5*   AST 15         Recent Labs   Lab 11/04/20  1028 07/08/21  1215 02/17/23  1424 06/24/23  0208   Color, UA YELLOW DARK YELLOW Yellow Grimes A   Appearance, UA CLOUDY A TURBID A Hazy A Cloudy A   pH, UA < OR = 5.0 < OR = 5.0 5.0 6.0   Specific Gravity, UA 1.028 1.024  1.010 1.015   Protein, UA 1+ A 2+ A Negative 2+ A   Glucose, UA  --   --  3+ A Negative   Ketones, UA 1+ A 1+ A 3+ A Negative   Urobilinogen, UA  --   --  Negative Negative   Bilirubin (UA)  --   --  Negative Negative   Occult Blood UA 3+ A 3+ A 2+ A 2+ A   Nitrite, UA NEGATIVE NEGATIVE Positive A Negative   RBC, UA  --   --  6 H 1   WBC, UA  --   --  25 H 23 H   Bacteria, UA MANY A MANY A  --   --    Bacteria  --   --  Many A Negative   Hyaline Casts, UA NONE SEEN NONE SEEN  --  19 A         Recent Labs   Lab 06/24/23  1800 06/24/23 2011 06/25/23  0905   POC PH 7.197 LL 7.297 L 7.394   POC PCO2 24.7 LL 23.4 LL 26.2 LL   POC HCO3 9.6 L 11.4 L 16.0 L   POC PO2 86 82 88   POC SATURATED O2 94 L 95 97   POC BE -19 -15 -9   Sample ARTERIAL ARTERIAL ARTERIAL         Microbiology Results (last 7 days)       Procedure Component Value Units Date/Time    Blood culture [[id number]] Collected: 07/03/23 1741    Order Status: Completed Specimen: Blood Updated: 07/06/23 2232     Blood Culture, Routine No Growth to date      No Growth to date      No Growth to date      No Growth to date    Blood culture [[id number]] Collected: 07/03/23 1741    Order Status: Completed Specimen: Blood Updated: 07/06/23 2232     Blood Culture, Routine No Growth to date      No Growth to date      No Growth to date      No Growth to date    Aerobic culture [[id number]] Collected: 07/05/23 1213    Order Status: Completed Specimen: Wound from Toe, Left Foot Updated: 07/06/23 1236     Aerobic Bacterial Culture No growth    Narrative:      Left great toe, foot.    Blood culture [[id number]] Collected: 06/30/23 0613    Order Status: Completed Specimen: Blood Updated: 07/05/23 1032     Blood Culture, Routine No growth after 5 days.    Narrative:      Collection has been rescheduled by LND at 06/30/2023 06:11 Reason:   Completed  Collection has been rescheduled by LND at 06/30/2023 06:11 Reason:   Completed    Blood culture [[id number]]     Order Status:  Canceled Specimen: Blood     Blood culture [[id number]]     Order Status: Canceled Specimen: Blood     Culture, Respiratory with Gram Stain [[id number]]  (Abnormal) Collected: 06/24/23 1245    Order Status: Completed Specimen: Respiratory from Sputum Updated: 07/01/23 0628     Respiratory Culture PRESUMPTIVE CANDIDA ALBICANS  Moderate        BALDOMERO TROPICALIS  Moderate       Gram Stain (Respiratory) Many WBC's     Gram Stain (Respiratory) >10epis/lfp and <than many WBC's     Gram Stain (Respiratory) Few yeast          Patient care was time spent personally by me on the following activities: >  min    Obtaining a history  Examination of patient.  Providing medical care at the patients bedside.  Developing a treatment plan with patient or surrogate and bedside caregivers  Ordering and reviewing laboratory studies, radiographic studies, pulse oximetry.  Ordering and performing treatments and interventions.  Evaluation of patient's response to treatment.  Discussions with consultants while on the unit and immediately available to the patient.  Re-evaluation of the patient's condition.  Documentation in the medical record.     Shravan Thomas MD  Nephrology  [facility]  [phone]

## 2023-07-07 NOTE — DISCHARGE INSTRUCTIONS
[facility]  Facility Transfer Orders        Admit to: [facility]    Diagnoses:   Active Hospital Problems    Diagnosis  POA    Skin ulcer of left heel with necrosis of muscle [L97.423]  Yes    Hypothermia [T68.XXXA]  No    Moderate protein-calorie malnutrition [E44.0]  Yes    PAF (paroxysmal atrial fibrillation) [I48.0]  Yes    Pressure injury of sacral region, unstageable [L89.150]  Yes    Type 2 diabetes mellitus, with long-term current use of insulin [E11.9, Z79.4]  Not Applicable    Hyponatremia [E87.1]  Yes    UTI (urinary tract infection) [N39.0]  Yes    Anemia, chronic disease [D63.8]  Yes    Encephalopathy, metabolic [G93.41]  Yes    ATN (acute tubular necrosis) [N17.0]  Yes    Acute osteomyelitis of left calcaneus [M86.172]  Yes    Chronic ulcer of great toe of left foot with fat layer exposed [L97.522]  Yes    Chronic ulcer of left heel with fat layer exposed [L97.422]  Yes    Closed displaced pilon fracture of left tibia [S82.872A]  Yes    PAD (peripheral artery disease) [I73.9]  Yes      Resolved Hospital Problems    Diagnosis Date Resolved POA    *Septic shock [A41.9, R65.21] 06/29/2023 Yes    Hypokalemia [E87.6] 07/02/2023 Yes    Normal anion gap metabolic acidosis [E87.20] 06/27/2023 Yes    DKA, type 2 [E11.10] 06/27/2023 No     Allergies: Review of patient's allergies indicates:  No Known Allergies    Code Status: Full code    Vitals: Routine       Diet: regular diet    Activity: Activity as tolerated    Nursing Precautions: Fall and Pressure ulcer prevention    Bed/Surface: Low Air Loss    Consults: PT to evaluate and treat- 5 times a week, OT to evaluate and treat- 5 times a week, Wound Care, and Nutrition to evaluate and recommend diet    Wound care - wound vac to left heel    Oxygen: room air    Dialysis: Patient is not on dialysis    Labs: CBC, CMP and CRP weekly    Pending Diagnostic Studies:       Procedure Component Value Units Date/Time    Beta - Hydroxybutyrate, Serum  [[id number]] Collected: 06/24/23 1242    Order Status: Sent Lab Status: In process Updated: 06/24/23 1410    Specimen: Blood     Narrative:      Collection has been rescheduled by LND at 06/24/2023 13:44 Reason:   Can be added on.    T3 [[id number]] Collected: 07/03/23 1654    Order Status: Sent Lab Status: In process Updated: 07/03/23 1654    Specimen: Blood     T4 [[id number]] Collected: 07/03/23 1654    Order Status: Sent Lab Status: In process Updated: 07/03/23 1654    Specimen: Blood           Imaging: none        IV Access: PICC and Central     Medications: Discontinue all previous medication orders, if any. See new list below.  Current Discharge Medication List        START taking these medications    Details   amiodarone (PACERONE) 200 MG Tab Take 1 tablet (200 mg total) by mouth once daily.  Qty: 30 tablet, Refills: 11      apixaban (ELIQUIS) 2.5 mg Tab Take 1 tablet (2.5 mg total) by mouth 2 (two) times daily.  Qty: 60 tablet, Refills: 0      cefepime HCl (CEFEPIME 2 G/100 ML D5W) 2 g/100 mL Inject 100 mLs (2 g total) into the vein once daily.  Qty: 3000 mL, Refills: 0      clotrimazole (MYCELEX) 10 mg kasie Take 1 tablet (10 mg total) by mouth 3 (three) times daily.  Qty: 90 tablet, Refills: 0      !! collagenase (SANTYL) ointment Apply topically once daily.  Qty: 30 g, Refills: 0      !! collagenase (SANTYL) ointment Apply topically once daily.  Qty: 30 g, Refills: 0      droNABinol (MARINOL) 2.5 MG capsule Take 1 capsule (2.5 mg total) by mouth 2 (two) times daily.  Qty: 30 capsule, Refills: 0      epoetin juan f (PROCRIT) 10,000 unit/mL injection Inject 1 mL (10,000 Units total) into the skin every Mon, Wed, Fri.  Qty: 12 mL, Refills: 0      midodrine (PROAMATINE) 5 MG Tab Take 1 tablet (5 mg total) by mouth 3 (three) times daily with meals.  Qty: 90 tablet, Refills: 11      silver sulfADIAZINE 1% (SILVADENE) 1 % cream Apply topically once daily.  Qty: 50 g, Refills: 0       !! - Potential duplicate  "medications found. Please discuss with provider.        CONTINUE these medications which have CHANGED    Details   insulin detemir U-100, Levemir, 100 unit/mL (3 mL) SubQ InPn pen Inject 10 Units into the skin every evening.  Refills: 0           CONTINUE these medications which have NOT CHANGED    Details   atorvastatin (LIPITOR) 80 MG tablet Take 1 tablet (80 mg total) by mouth every evening.  Qty: 90 tablet, Refills: 3    Associated Diagnoses: PAD (peripheral artery disease)      blood sugar diagnostic Strp To check BG two times daily, to use with insurance preferred meter  Qty: 200 each, Refills: 1    Associated Diagnoses: Type 2 diabetes mellitus with diabetic polyneuropathy, with long-term current use of insulin      blood-glucose meter kit To check BG two times daily, to use with insurance preferred meter  Qty: 1 each, Refills: 1    Associated Diagnoses: Type 2 diabetes mellitus with diabetic polyneuropathy, with long-term current use of insulin      clopidogreL (PLAVIX) 75 mg tablet Take 1 tablet (75 mg total) by mouth once daily.  Qty: 30 tablet, Refills: 3    Associated Diagnoses: PAD (peripheral artery disease)      insulin aspart U-100 (NOVOLOG) 100 unit/mL (3 mL) InPn pen Inject 6 Units into the skin 3 (three) times daily with meals.  Refills: 0      lancets Misc To check BG two times daily, to use with insurance preferred meter  Qty: 200 each, Refills: 1    Associated Diagnoses: Type 2 diabetes mellitus with diabetic polyneuropathy, with long-term current use of insulin      melatonin (MELATIN) 3 mg tablet Take 3 tablets (9 mg total) by mouth nightly.  Refills: 0      ondansetron (ZOFRAN-ODT) 4 MG TbDL Take 1 tablet (4 mg total) by mouth every 8 (eight) hours as needed (nausea).      pen needle, diabetic (PEN NEEDLE) 31 gauge x 3/16" Ndle 1 application by Misc.(Non-Drug; Combo Route) route 2 (two) times a day.  Qty: 200 each, Refills: 0      vitamin D (VITAMIN D3) 1000 units Tab Take 1 tablet (1,000 " Units total) by mouth once daily.           STOP taking these medications       acetaminophen (TYLENOL) 500 MG tablet Comments:   Reason for Stopping:         aspirin (ECOTRIN) 81 MG EC tablet Comments:   Reason for Stopping:         ciprofloxacin HCl (CIPRO) 750 MG tablet Comments:   Reason for Stopping:         dextrose 5 % in water (D5W) 5 % PgBk 50 mL with ceFAZolin 2 gram SolR 2 g Comments:   Reason for Stopping:         famotidine (PEPCID) 20 MG tablet Comments:   Reason for Stopping:         lisinopriL (PRINIVIL,ZESTRIL) 5 MG tablet Comments:   Reason for Stopping:         methocarbamoL (ROBAXIN) 500 MG Tab Comments:   Reason for Stopping:         oxyCODONE (ROXICODONE) 10 mg Tab immediate release tablet Comments:   Reason for Stopping:         oxyCODONE (ROXICODONE) 5 MG immediate release tablet Comments:   Reason for Stopping:         polyethylene glycol (GLYCOLAX) 17 gram PwPk Comments:   Reason for Stopping:         pregabalin (LYRICA) 75 MG capsule Comments:   Reason for Stopping:         senna-docusate 8.6-50 mg (PERICOLACE) 8.6-50 mg per tablet Comments:   Reason for Stopping:             Follow up:       Immunizations Administered as of 7/7/2023       Name Date Dose VIS Date Route Exp Date    COVID-19, MRNA, LN-S, PF (Moderna) 11/12/2021 -- -- -- --    COVID-19, MRNA, LN-S, PF (Moderna) 2/3/2021 -- -- -- --    COVID-19, MRNA, LN-S, PF (Moderna) 1/6/2021 -- -- -- --              Zo Alonso NP

## 2023-07-08 LAB
BACTERIA BLD CULT: NORMAL
BACTERIA BLD CULT: NORMAL

## 2023-07-08 NOTE — DISCHARGE SUMMARY
Critical access hospital Medicine  Discharge Summary      Patient Name: Anastasiia Badillo  MRN: [id number]  CASEY: [id number]  Patient Class: IP- Inpatient  Admission Date: 6/24/2023  Hospital Length of Stay: 13 days  Discharge Date and Time: 7/7/2023  8:50 PM  Attending Physician: No att. providers found   Discharging Provider: Zo Alonso NP  Primary Care Provider: Raji Parkinson MD    Primary Care Team: Networked reference to record PCT     HPI:   74 y/o F with a past medical history significant for DM2, HTN, femur fracture, tibial fracture and tobacco use, L foot wounds,  high grade stenosis SFA bilaterally and popliteal on the left s/p baloon angioplasty L popliteal artery and left posterior tibial arery on DAPT.      Recently discharged from [facility] for fracture of left tibia, s/p ORIF 6/6, also finished antibiotics for acute osteomyelitis of left calcaneus ( Final ID recs with end date for cipro of 6/22 and end date of ancef for 7/17 )     Patient sent from rehab for hypotension.     In the ER was in septic shock, e/o UTI and anemic.       Procedure(s) (LRB):  Cardioversion or Defibrillation (N/A)      Hospital Course:   Anastasiia Badillo was admitted to the service of hospital medicine for further treatment of septic shock suspected to be 2/2 UTI.  Prior to admission, she was being treated at [facility] for acute osteomyelitis of left calcaneus ( Final ID recs with end date for cipro of 6/22 and end date of ancef for 7/17 ), with hx fracture of left tibia, s/p ORIF 6/6.  She was placed on IV vancomycin, IV cefepime, and IVFH.  She was hypotensive initially requiring vasopressors and was admitted to ICU.  She was given one unit PRBCs for symptomatic anemia.  She developed atrial fibrillation with HR up to 200 and was placed on IV amiodarone.  Cardiology was consulted and pt was ultimately cardioverted at bedside.  IV levophed was changed to IV hood-synephrine.  She was noted to have profound metabolic  acidosis, possibly diabetic ketoacidosis, and was placed on IV insulin.  Additionally, she developed an MYLES and nephrology was consulted.  Sodium bicarb drip was recommended.  She eventually improved, with IV vasopressors weaned and placed on oral midodrine and oral amiodarone.  PT/OT were consulted.  Urine culture growing yeast.  Placed on IV diflucan, IV ancef continued due to drainage from external fixator.  Wound care consulted.  Pt had stagnant renal function.  She had continued decline in her WBC count, however she developed hypothermia.  Abx were broadened and infectious workup was pursued.  ID was consulted and followed pt in hospital and adjusted abx therapy.  There was concern for possible cellulitis surrounding her sacral wound-we reached out to wound care physician to have this debrided. Pt experienced another episode of hypothermia and rectal temperature was obtained and confirmed patient was not hypothermic and had normal temperature.  Patient had no further episodes of hypothermia after this incident.  Her oral intake was poor and protein levels very low.  Dietary was consulted and patient was initiated on Clinimix until TPN was available.  Patient was maintained on TPN however her nutrition intake continued to be poor.  Her mentation improved with nutritional supplementation.  She was started on Marinol for appetite stimulant.  Patient remained very poorly motivated to work with therapy.  Goals of care was discussed with patient and family at bedside patient was advised that she was experiencing failure to thrive and if she did not improve then she would need to pursue home hospice care.  Patient and family members verbalized understanding. Pt underwent bedside left heel debridement with  on 7/7 and tolerated procedure well.  Patient was accepted to [facility] in [place] and discharged on 7/7 for continued care and IV abx therapy.     Physical exam:  Awake alert oriented x 3  Heart-regular  rate rhythm, anasarca to bilat LEs  Lungs-Clear to auscultation bilaterally, respirations even unlabored   Abdomen-soft nontender normoactive bowel sounds   Extremities-generalized weakness to all extremities, left lower extremity with external fixator in place with no erythema or drainage at pin sites, drsg to left foot CDI       Goals of Care Treatment Preferences:  Code Status: Full Code      Consults:   Consults (From admission, onward)        Status Ordering Provider     Inpatient consult to Registered Dietitian/Nutritionist  Once        Provider:  (Not yet assigned)    Completed CLIFFORD ALONSO     Inpatient consult to Infectious Diseases  Once        Provider:  Lissy Mares MD    Completed MARSHALL WIGGINS     Inpatient consult to Social Work/Case Management  Once        Provider:  (Not yet assigned)    Completed MARSHALL WIGGINS     Inpatient consult to Registered Dietitian/Nutritionist  Once        Provider:  (Not yet assigned)    Completed MARSHALL WIGGINS     Inpatient consult to Nephrology  Once        Provider:  Antonio Pandya MD    Completed TOMMIE AGUAYO     Inpatient consult to Registered Dietitian/Nutritionist  Once        Provider:  (Not yet assigned)    Completed TOMMIE AGUAYO     Inpatient consult to Registered Dietitian/Nutritionist  Once        Provider:  (Not yet assigned)    Completed TOMMIE AGUAYO          No new Assessment & Plan notes have been filed under this hospital service since the last note was generated.  Service: Hospital Medicine    Final Active Diagnoses:    Diagnosis Date Noted POA    Skin ulcer of left heel with necrosis of muscle [L97.423] 07/07/2023 Yes    Hypothermia [T68.XXXA] 07/03/2023 No    Moderate protein-calorie malnutrition [E44.0] 07/03/2023 Yes    PAF (paroxysmal atrial fibrillation) [I48.0] 07/03/2023 Yes    Pressure injury of sacral region, unstageable [L89.150] 07/02/2023 Yes    Type 2 diabetes mellitus, with long-term current use of insulin  [E11.9, Z79.4] 06/27/2023 Not Applicable    Hyponatremia [E87.1] 06/25/2023 Yes    UTI (urinary tract infection) [N39.0] 06/24/2023 Yes    Anemia, chronic disease [D63.8] 06/24/2023 Yes    Encephalopathy, metabolic [G93.41] 06/24/2023 Yes    ATN (acute tubular necrosis) [N17.0] 06/24/2023 Yes    Acute osteomyelitis of left calcaneus [M86.172] 06/07/2023 Yes    Chronic ulcer of great toe of left foot with fat layer exposed [L97.522] 06/07/2023 Yes    Chronic ulcer of left heel with fat layer exposed [L97.422] 06/06/2023 Yes    Closed displaced pilon fracture of left tibia [S82.872A] 06/05/2023 Yes    PAD (peripheral artery disease) [I73.9] 04/22/2023 Yes      Problems Resolved During this Admission:    Diagnosis Date Noted Date Resolved POA    PRINCIPAL PROBLEM:  Septic shock [A41.9, R65.21] 04/19/2023 06/29/2023 Yes    Hypokalemia [E87.6] 06/25/2023 07/02/2023 Yes    Normal anion gap metabolic acidosis [E87.20] 06/25/2023 06/27/2023 Yes    DKA, type 2 [E11.10] 06/24/2023 06/27/2023 No       Discharged Condition: stable    Disposition: Long Term Acute Care    Follow Up:    Patient Instructions:   No discharge procedures on file.    Significant Diagnostic Studies: Labs:   CMP   Recent Labs   Lab 07/07/23  0351   *   K 4.0   CL 97   CO2 26   *   BUN 93*   CREATININE 2.9*   CALCIUM 7.4*   ANIONGAP 4*   , CBC   Recent Labs   Lab 07/07/23  0351   WBC 13.14*   HGB 8.4*   HCT 25.3*       and All labs within the past 24 hours have been reviewed    Pending Diagnostic Studies:     Procedure Component Value Units Date/Time    Beta - Hydroxybutyrate, Serum [[id number]] Collected: 06/24/23 1242    Order Status: Sent Lab Status: In process Updated: 06/24/23 1410    Specimen: Blood     Narrative:      Collection has been rescheduled by LNRAMÍREZ at 06/24/2023 13:44 Reason:   Can be added on.    T3 [[id number]] Collected: 07/03/23 1654    Order Status: Sent Lab Status: In process Updated: 07/03/23 1654     Specimen: Blood     T4 [[id number]] Collected: 07/03/23 1654    Order Status: Sent Lab Status: In process Updated: 07/03/23 1654    Specimen: Blood          Medications:  Reconciled Home Medications:      Medication List      START taking these medications    amiodarone 200 MG Tab  Commonly known as: PACERONE  Take 1 tablet (200 mg total) by mouth once daily.     apixaban 2.5 mg Tab  Commonly known as: ELIQUIS  Take 1 tablet (2.5 mg total) by mouth 2 (two) times daily.     cefepime 2 g/100 mL D5W 2 g/100 mL   Inject 100 mLs (2 g total) into the vein once daily.     clotrimazole 10 mg kasie  Commonly known as: MYCELEX  Take 1 tablet (10 mg total) by mouth 3 (three) times daily.     * collagenase ointment  Commonly known as: SANTYL  Apply topically once daily.     * collagenase ointment  Commonly known as: SANTYL  Apply topically once daily.     droNABinol 2.5 MG capsule  Commonly known as: MARINOL  Take 1 capsule (2.5 mg total) by mouth 2 (two) times daily.     epoetin juan f 10,000 unit/mL injection  Commonly known as: PROCRIT  Inject 1 mL (10,000 Units total) into the skin every Mon, Wed, Fri.     midodrine 5 MG Tab  Commonly known as: PROAMATINE  Take 1 tablet (5 mg total) by mouth 3 (three) times daily with meals.     silver sulfADIAZINE 1% 1 % cream  Commonly known as: SILVADENE  Apply topically once daily.         * This list has 2 medication(s) that are the same as other medications prescribed for you. Read the directions carefully, and ask your doctor or other care provider to review them with you.            CHANGE how you take these medications    insulin aspart U-100 100 unit/mL (3 mL) Inpn pen  Commonly known as: NovoLOG  Inject 6 Units into the skin 3 (three) times daily with meals.  What changed: Another medication with the same name was removed. Continue taking this medication, and follow the directions you see here.     insulin detemir U-100 (Levemir) 100 unit/mL (3 mL) Inpn pen  Inject 10 Units into  "the skin every evening.  What changed:   · how much to take  · when to take this        CONTINUE taking these medications    atorvastatin 80 MG tablet  Commonly known as: LIPITOR  Take 1 tablet (80 mg total) by mouth every evening.     blood sugar diagnostic Strp  To check BG two times daily, to use with insurance preferred meter     blood-glucose meter kit  To check BG two times daily, to use with insurance preferred meter     clopidogreL 75 mg tablet  Commonly known as: PLAVIX  Take 1 tablet (75 mg total) by mouth once daily.     lancets Misc  To check BG two times daily, to use with insurance preferred meter     melatonin 3 mg tablet  Commonly known as: MELATIN  Take 3 tablets (9 mg total) by mouth nightly.     ondansetron 4 MG Tbdl  Commonly known as: ZOFRAN-ODT  Take 1 tablet (4 mg total) by mouth every 8 (eight) hours as needed (nausea).     pen needle, diabetic 31 gauge x 3/16" Ndle  Commonly known as: PEN NEEDLE  1 application by Misc.(Non-Drug; Combo Route) route 2 (two) times a day.     vitamin D 1000 units Tab  Commonly known as: VITAMIN D3  Take 1 tablet (1,000 Units total) by mouth once daily.        STOP taking these medications    acetaminophen 500 MG tablet  Commonly known as: TYLENOL     aspirin 81 MG EC tablet  Commonly known as: ECOTRIN     ciprofloxacin HCl 750 MG tablet  Commonly known as: CIPRO     dextrose 5 % in water (D5W) 5 % PgBk 50 mL with ceFAZolin 2 gram SolR 2 g     famotidine 20 MG tablet  Commonly known as: PEPCID     lisinopriL 5 MG tablet  Commonly known as: PRINIVIL,ZESTRIL     methocarbamoL 500 MG Tab  Commonly known as: ROBAXIN     oxyCODONE 10 mg Tab immediate release tablet  Commonly known as: ROXICODONE     oxyCODONE 5 MG immediate release tablet  Commonly known as: ROXICODONE     polyethylene glycol 17 gram Pwpk  Commonly known as: GLYCOLAX     pregabalin 75 MG capsule  Commonly known as: LYRICA     senna-docusate 8.6-50 mg 8.6-50 mg per tablet  Commonly known as: " PERICOLACE            Indwelling Lines/Drains at time of discharge:   Lines/Drains/Airways     Central Venous Catheter Line  Duration           Percutaneous Central Line Insertion/Assessment - Triple Lumen  06/24/23 1523 Internal Jugular Right 13 days          Drain  Duration                Urethral Catheter 07/07/23 0500 Silicone 16 Fr. 1 day                Time spent on the discharge of patient: 50 minutes         Zo Alonso NP  Department of Hospital Medicine  [facility]

## 2023-12-28 NOTE — PROGRESS NOTES
In an effort to ensure that our patients LiveWell, a Team Member has reviewed your chart and identified an opportunity to provide the best care possible. An attempt was made to discuss or schedule overdue Preventive or Disease Management screening.     The Outcome was Contact was not made, left messageIf you have any questions or need help with scheduling, contact our Health Outreach Team at [phone]. Care Gaps include Breast Cancer Screening, Cervical Cancer Screening, Immunizations, and Wellness Visits.  Name: MALVIN GOMEZ    ### Patient Details  YOB: 1958  MRN: [id number]    ### Encounter Details  Arrival Date: N/A  Discharge Date: N/A  Encounter ID: [id number] 12:00:47    ### Related interaction  [facility] Comprehensive Annual Visit ([place] CAV Outreach) ([email or web address]   [facility] Hospital Medicine  Progress Note    Patient Name: Anastasiia Badillo  MRN: [id number]  Patient Class: IP- Inpatient   Admission Date: 2/17/2023  Length of Stay: 3 days  Attending Physician: Arely Duval MD  Primary Care Provider: Raji Pakrinson MD        Subjective:     Principal Problem:Closed left subtrochanteric femur fracture, initial encounter        HPI:  Patient is a 73-year-old female with past medical history significant for hypertension and diabetes mellitus type 2 who is being admitted to Hospital Medicine from [facility] Emergency room status post fall which patient suffered last evening at home with complaints of left hip pain.  Got a around midnight after waking up from sleep and somehow lost her balance while using walker and landed on her left hip.  Patient was brought by paramedics.  Patient remained on the floor for more than 12 hours until friend arrived.  There is with obvious left leg shortening and external rotation noted.  Patient is unable to bear weight.  Patient denies any head injury, loss of consciousness or any focal neuro deficits.  No recent fevers or chills reported.  Patient denies any chest pain or shortness of breath.      Overview/Hospital Course:  No notes on file    Interval History: s/p left femur IMR placement - POD # 2. S/p 2 PRBC. Currently afebrile.  Denies any chest pain or shortness of breath.  Glycemic control improving.    Review of Systems   Constitutional:  Positive for fatigue.   Musculoskeletal:         Left hip pain   Neurological:  Positive for weakness.   All other systems reviewed and are negative.  Objective:     Vital Signs (Most Recent):  Temp: 97.8 °F (36.6 °C) (02/20/23 0712)  Pulse: 86 (02/20/23 0712)  Resp: 16 (02/20/23 0712)  BP: (!) 140/66 (02/20/23 0712)  SpO2: 98 % (02/20/23 0712)   Vital Signs (24h Range):  Temp:  [97.8 °F (36.6 °C)-100 °F (37.8 °C)] 97.8 °F (36.6 °C)  Pulse:  [] 86  Resp:   [16-20] 16  SpO2:  [84 %-98 %] 98 %  BP: (112-140)/(56-74) 140/66     Weight: 67 kg (147 lb 11.3 oz)  Body mass index is 24.58 kg/m².    Intake/Output Summary (Last 24 hours) at 2/20/2023 0915  Last data filed at 2/20/2023 0602  Gross per 24 hour   Intake 2218.75 ml   Output 600 ml   Net 1618.75 ml        Physical Exam  Constitutional:       Appearance: She is well-developed.   HENT:      Head: Normocephalic and atraumatic.   Eyes:      Conjunctiva/sclera: Conjunctivae normal.      Pupils: Pupils are equal, round, and reactive to light.   Neck:      Thyroid: No thyromegaly.      Vascular: No JVD.   Cardiovascular:      Rate and Rhythm: Normal rate and regular rhythm.      Heart sounds: No murmur heard.    No friction rub. No gallop.   Pulmonary:      Effort: Pulmonary effort is normal.      Breath sounds: Normal breath sounds.   Abdominal:      General: Bowel sounds are normal. There is no distension.      Palpations: Abdomen is soft. There is no mass.      Tenderness: There is no abdominal tenderness.   Musculoskeletal:         General: Normal range of motion.      Cervical back: Neck supple.      Comments: Left hip surgical dressing soaked with blood.    Skin:     General: Skin is warm and dry.   Neurological:      Mental Status: She is oriented to person, place, and time.      Cranial Nerves: No cranial nerve deficit.   Psychiatric:         Behavior: Behavior normal.       Significant Labs: All pertinent labs within the past 24 hours have been reviewed.  CBC:   Recent Labs   Lab 02/19/23  0506 02/20/23  0535   WBC 7.76 7.50   HGB 7.1* 10.3*   HCT 21.8* 30.6*    184       CMP:   Recent Labs   Lab 02/19/23  0506 02/20/23  0535   * 133*   K 4.9 4.5   CL 98 98   CO2 23 26   * 158*   BUN 33* 26*   CREATININE 1.2 0.8   CALCIUM 8.3* 8.9   PROT 5.1* 5.8*   ALBUMIN 2.8* 3.0*   BILITOT 0.4 0.9   ALKPHOS 44* 48*   AST 27 29   ALT 14 10   ANIONGAP 11 9       Coagulation: No results for input(s): PT, INR,  APTT in the last 48 hours.    Significant Imaging:   CXR: Few strands of atelectasis bilaterally otherwise negative chest     Left hip x-ray:  Mildly angulated inter trochanteric fracture of the left hip.      Assessment/Plan:      Nicotine addiction  Smoking cessation counseling performed. Dangers of cigarette smoking were reviewed with patient in detail and patient was encouraged to quit. Nicotine replacement options were discussed for > 10 minutes.        Acute blood loss anemia  Secondary to femur fracture and post-op.  S/p 2 units of PRBC transfusion on 2/19/23. Follow CBC and transfuse as needed.       ACP (advance care planning)  Advance Care Planning     Date: 02/17/2023    Living Will  During this visit, I engaged the patient  in the advance care planning process.  The patient and I reviewed the role for advance directives and their purpose in directing future healthcare if the patient's unable to speak for herself.  At this point in time, the patient does have full decision-making capacity.  We discussed different extreme health states that she could experience, and reviewed what kind of medical care she would want in those situations.  The patient communicated that if she were comatose and had little chance of a meaningful recovery, she would want machines/life-sustaining treatments used.  I spent a total of 16 minutes engaging the patient in this advance care planning discussion.                Dehydration  Resolved. DC IVF.      Urinary tract infection without hematuria  Follow urine culture and sensitivity results (growing E.Coli).  On PO Macrobid.      Metabolic acidosis  Resolved. Will resume Metformin.       Hyperkalemia  Tele monitoring.  Resolved, follow BMP.      Essential hypertension  Continue lisinopril 5 mg daily.  Tele monitoring.  Continue intravenous hydralazine 10 mg every 2 hours as needed for systolic blood pressure in excess of 160 mmHg.      Type 2 diabetes mellitus with  hyperglycemia  Patient's FSGs are uncontrolled due to hyperglycemia on current medication regimen.  Last A1c reviewed-   Lab Results   Component Value Date    HGBA1C 8.9 (H) 02/17/2023     Most recent fingerstick glucose reviewed-   Recent Labs   Lab 02/17/23  1424 02/17/23  1700 02/17/23  2020 02/18/23  0759   POCTGLUCOSE 488* 320* 280* 136*     Current correctional scale  Low  Maintain anti-hyperglycemic dose as follows-   Antihyperglycemics (From admission, onward)    Start     Stop Route Frequency Ordered    02/17/23 1727  insulin aspart U-100 pen 0-5 Units         -- SubQ Before meals & nightly PRN 02/17/23 1628    02/17/23 1630  insulin detemir U-100 pen 20 Units         -- SubQ Daily 02/17/23 1628        Hold Oral hypoglycemics while patient is in the hospital.    Closed fracture of neck of left femur  S/p left femur IMR placement - POD # 2.  Continue to follow Orthopedic recommendations.   Needs aggressive incentive spirometry.  Follow hemoglobin and hematocrit closely.  Pain control with PO narcotics and antiemetics as needed.  Physical therapy as per Orthopedics protocol with fall precautions.        D/W SW regarding DC planning.   VTE Risk Mitigation (From admission, onward)         Ordered     apixaban tablet 2.5 mg  2 times daily         02/18/23 1358     Reason for No Pharmacological VTE Prophylaxis  Once        Question:  Reasons:  Answer:  Physician Provided (leave comment)    02/17/23 1628     IP VTE HIGH RISK PATIENT  Once         02/17/23 1628     Place sequential compression device  Until discontinued         02/17/23 1628                Discharge Planning   CITLALI:2/21/23      Code Status: Full Code   Is the patient medically ready for discharge?:     Reason for patient still in hospital (select all that apply): Patient trending condition and Consult recommendations  Discharge Plan A: Skilled Nursing Facility, Rehab                  Arely Duval MD  Department of Hospital Medicine   [facility]

## 2024-12-19 NOTE — ASSESSMENT & PLAN NOTE
NPO after MN. Dr. Ovalle from orthopedics to evaluate the patient.  Patient denies any cardiopulmonary symptoms or history.  Patient is considered intermediate risk for cardiopulmonary complications in the perioperative period.  Patient may proceed for left femur neck fracture repair surgery tomorrow.  In the meantime will improve patient's hydration status and glycemic control.  Check lactic acid and beta hydroxybutyrate.  Bedrest for now.  Use intravenous narcotics and antiemetics on as needed basis.  Use of Buck's traction and anticoagulation therapy for DVT prophylaxis as per orthopedic recommendations.  EKG reviewed which shows normal sinus rhythm 99 beats per minute with nonspecific ST and T-wave abnormality.     yes

## (undated) DEVICE — IMPLANTABLE DEVICE
Type: IMPLANTABLE DEVICE | Site: FIBULA | Status: NON-FUNCTIONAL
Removed: 2023-06-06

## (undated) DEVICE — GUIDEWIRE STF .035X260CM ANG

## (undated) DEVICE — BIT DRILL CALIBRATED 2.5MM

## (undated) DEVICE — Device

## (undated) DEVICE — SET MICROPUNCTURE

## (undated) DEVICE — GLOVE SURG ULTRA TOUCH 7

## (undated) DEVICE — DRAPE C-ARMOR EQUIPMENT COVER

## (undated) DEVICE — GLOVE BIOGEL PIMICRO INDIC 8.5

## (undated) DEVICE — TOWEL OR DISP STRL BLUE 4/PK

## (undated) DEVICE — GAUZE DERMACEA 3 PLY 6IN 4YD

## (undated) DEVICE — CONTRAST FLEXCIL INJECTION

## (undated) DEVICE — STOPCOCK 3 WAY MED PRESSURE

## (undated) DEVICE — DRAPE STERI U-SHAPED 47X51IN

## (undated) DEVICE — TAPE SURG DURAPORE 2 X10YD

## (undated) DEVICE — BLADE #15 STERILE CARBON

## (undated) DEVICE — DRAPE ORTH SPLIT 77X108IN

## (undated) DEVICE — COVERS PROBE NR-48 STERILE

## (undated) DEVICE — DRAPE THREE-QUARTER 53X77IN

## (undated) DEVICE — CATH BLLN SEEKER .035IN 135CM

## (undated) DEVICE — BANDAGE ELAS SOFTWRAP ST 6X5YD

## (undated) DEVICE — DRESSING TRANS 4X4 TEGADERM

## (undated) DEVICE — CATH SUCTION 10FR

## (undated) DEVICE — APPLICATOR CHLORAPREP ORN 26ML

## (undated) DEVICE — BNDG COFLEX FOAM LF2 ST 4X5YD

## (undated) DEVICE — COUPLING HOFF3 RR 5X8X11MM

## (undated) DEVICE — GAUZE SPONGE 4X4 12PLY

## (undated) DEVICE — VISE RADIFOCUS MULTI TORQUE

## (undated) DEVICE — ELECTRODE REM PLYHSV RETURN 9

## (undated) DEVICE — GUIDEWIRE UT 1.4X150MM
Type: IMPLANTABLE DEVICE | Site: ANKLE | Status: NON-FUNCTIONAL
Removed: 2023-06-06

## (undated) DEVICE — HOOD T7 W/ PEEL AWAY LENS

## (undated) DEVICE — BIT DRILL 4.2MM 3 FLUTD 145MM

## (undated) DEVICE — SUT 2/0 18IN COATED VICRYL

## (undated) DEVICE — IMPLANTABLE DEVICE
Type: IMPLANTABLE DEVICE | Site: ANKLE | Status: NON-FUNCTIONAL
Removed: 2023-06-06

## (undated) DEVICE — KIT PROBE COVER WITH GEL

## (undated) DEVICE — SUT ETHICON 3-0 BLK MONO PS

## (undated) DEVICE — BIT DRILL GRAY PT APEX 4X200MM

## (undated) DEVICE — REAMER STEPPED DHS DCS

## (undated) DEVICE — SUT CTD VICRYL 0 UND BR

## (undated) DEVICE — DRESSING N ADH OIL EMUL 3X8

## (undated) DEVICE — KWIRE DRILL TIP 2X234MM
Type: IMPLANTABLE DEVICE | Site: ANKLE | Status: NON-FUNCTIONAL
Removed: 2023-06-06

## (undated) DEVICE — TRAY MINOR ORTHO OMC

## (undated) DEVICE — CATH 5FR OMNIFLUSH 65CM .038

## (undated) DEVICE — VALVE CONTROL COPILOT

## (undated) DEVICE — CATH STERLING OTW 2X220X150

## (undated) DEVICE — SYS LABEL CORRECT MED

## (undated) DEVICE — INTRODUCER VASC RADPQ 5FRX10CM

## (undated) DEVICE — TIP YANKAUERS BULB NO VENT

## (undated) DEVICE — WIRE GUIDE 3.2MM 400MM
Type: IMPLANTABLE DEVICE | Site: HIP | Status: NON-FUNCTIONAL
Removed: 2023-02-18

## (undated) DEVICE — IMPLANTABLE DEVICE
Type: IMPLANTABLE DEVICE | Site: HIP | Status: NON-FUNCTIONAL
Removed: 2023-02-18

## (undated) DEVICE — CATH SUPERCROSS 45 DEG 130CM

## (undated) DEVICE — INTRODUCER VASC RADPQ 6FRX10CM

## (undated) DEVICE — DRAPE U SPLIT SHEET 54X76IN

## (undated) DEVICE — GLOVE BIOGEL PI MICRO INDIC 7

## (undated) DEVICE — SPONGE COTTON TRAY 4X4IN

## (undated) DEVICE — COVER INSTR ELASTIC BAND 40X20

## (undated) DEVICE — DRAPE TOP 53X102IN

## (undated) DEVICE — BANDAGE ROLL COTTN 4.5INX4.1YD

## (undated) DEVICE — GUIDEWIRE PLATINUM PL .014X300

## (undated) DEVICE — PAD CAST SPECIALIST STRL 4

## (undated) DEVICE — DEVICE MYNX GRIP 6/7F

## (undated) DEVICE — SOL 9P NACL IRR PIC IL

## (undated) DEVICE — GLOVE SURG ULTRA TOUCH 8

## (undated) DEVICE — SET DECANTER MEDICHOICE

## (undated) DEVICE — INFLATOR ENCORE

## (undated) DEVICE — DRAPE C-ARM ELAS CLIP 42X120IN

## (undated) DEVICE — GLOVE SURG ULTRA TOUCH 7.5

## (undated) DEVICE — KIT INTRO MICRO NIT VSI 4FR

## (undated) DEVICE — SOL NS 1000CC

## (undated) DEVICE — SPONGE DERMACEA 4X4IN 12PLY